# Patient Record
Sex: MALE | Race: WHITE | Employment: OTHER | URBAN - METROPOLITAN AREA
[De-identification: names, ages, dates, MRNs, and addresses within clinical notes are randomized per-mention and may not be internally consistent; named-entity substitution may affect disease eponyms.]

---

## 2017-01-03 ENCOUNTER — OFFICE VISIT (OUTPATIENT)
Dept: CARDIOLOGY | Age: 72
End: 2017-01-03

## 2017-01-03 VITALS
BODY MASS INDEX: 28.69 KG/M2 | OXYGEN SATURATION: 98 % | TEMPERATURE: 97.8 F | HEART RATE: 65 BPM | HEIGHT: 66 IN | WEIGHT: 178.5 LBS | RESPIRATION RATE: 18 BRPM | DIASTOLIC BLOOD PRESSURE: 74 MMHG | SYSTOLIC BLOOD PRESSURE: 135 MMHG

## 2017-01-03 DIAGNOSIS — I10 ESSENTIAL HYPERTENSION: Primary | Chronic | ICD-10-CM

## 2017-01-03 DIAGNOSIS — E78.00 PURE HYPERCHOLESTEROLEMIA: Chronic | ICD-10-CM

## 2017-01-03 DIAGNOSIS — R52 UNCONTROLLED PAIN: ICD-10-CM

## 2017-01-03 DIAGNOSIS — R06.02 SHORTNESS OF BREATH: ICD-10-CM

## 2017-01-03 DIAGNOSIS — I25.119 CORONARY ARTERY DISEASE INVOLVING NATIVE HEART WITH ANGINA PECTORIS, UNSPECIFIED VESSEL OR LESION TYPE (HCC): ICD-10-CM

## 2017-01-03 DIAGNOSIS — Z87.891 HISTORY OF SMOKING: ICD-10-CM

## 2017-01-03 DIAGNOSIS — Z01.818 PRE-OP EVALUATION: ICD-10-CM

## 2017-01-03 DIAGNOSIS — M16.9 OSTEOARTHRITIS OF HIP, UNSPECIFIED LATERALITY, UNSPECIFIED OSTEOARTHRITIS TYPE: ICD-10-CM

## 2017-01-03 PROCEDURE — 93000 ELECTROCARDIOGRAM COMPLETE: CPT | Performed by: INTERNAL MEDICINE

## 2017-01-03 PROCEDURE — 99214 OFFICE O/P EST MOD 30 MIN: CPT | Performed by: INTERNAL MEDICINE

## 2017-01-03 RX ORDER — ATORVASTATIN CALCIUM 20 MG/1
20 TABLET, FILM COATED ORAL DAILY
Qty: 90 TABLET | Refills: 3 | Status: SHIPPED | OUTPATIENT
Start: 2017-01-03 | End: 2017-03-30 | Stop reason: SDUPTHER

## 2017-01-03 RX ORDER — METOPROLOL TARTRATE 50 MG/1
50 TABLET, FILM COATED ORAL 2 TIMES DAILY
Qty: 180 TABLET | Refills: 3 | Status: SHIPPED | OUTPATIENT
Start: 2017-01-03 | End: 2017-03-30 | Stop reason: SDUPTHER

## 2017-01-03 RX ORDER — CLOPIDOGREL BISULFATE 75 MG/1
75 TABLET ORAL DAILY
Qty: 90 TABLET | Refills: 3 | Status: SHIPPED | OUTPATIENT
Start: 2017-01-03 | End: 2017-03-30 | Stop reason: SDUPTHER

## 2017-01-03 RX ORDER — CITALOPRAM 40 MG/1
40 TABLET ORAL DAILY
Qty: 90 TABLET | Refills: 3 | Status: SHIPPED | OUTPATIENT
Start: 2017-01-03 | End: 2017-03-30 | Stop reason: SDUPTHER

## 2017-01-11 ASSESSMENT — ENCOUNTER SYMPTOMS
EYES NEGATIVE: 1
GASTROINTESTINAL NEGATIVE: 1
ALLERGIC/IMMUNOLOGIC NEGATIVE: 1

## 2017-02-17 RX ORDER — LORAZEPAM 1 MG/1
TABLET ORAL
Qty: 30 TABLET | Refills: 2 | OUTPATIENT
Start: 2017-02-17 | End: 2017-03-30 | Stop reason: SDUPTHER

## 2017-02-22 ENCOUNTER — HOSPITAL ENCOUNTER (OUTPATIENT)
Dept: ORTHOPEDIC SURGERY | Age: 72
Discharge: HOME | End: 2017-02-22
Payer: MEDICARE

## 2017-02-22 DIAGNOSIS — M17.11 OSTEOARTHRITIS OF RIGHT KNEE, UNSPECIFIED OSTEOARTHRITIS TYPE: ICD-10-CM

## 2017-02-22 PROCEDURE — 73560 X-RAY EXAM OF KNEE 1 OR 2: CPT

## 2017-03-14 ENCOUNTER — HOSPITAL ENCOUNTER (OUTPATIENT)
Dept: PREADMISSION TESTING | Age: 72
Discharge: HOME | End: 2017-03-14
Payer: MEDICARE

## 2017-03-14 VITALS
HEIGHT: 66 IN | WEIGHT: 174.16 LBS | RESPIRATION RATE: 14 BRPM | OXYGEN SATURATION: 95 % | DIASTOLIC BLOOD PRESSURE: 83 MMHG | SYSTOLIC BLOOD PRESSURE: 162 MMHG | HEART RATE: 58 BPM | TEMPERATURE: 98.2 F | BODY MASS INDEX: 27.99 KG/M2

## 2017-03-14 LAB
ABO/RH: NORMAL
ANION GAP SERPL CALCULATED.3IONS-SCNC: 10 MEQ/L (ref 7–13)
ANTIBODY SCREEN: NORMAL
BACTERIA: NORMAL /HPF
BILIRUBIN URINE: ABNORMAL
BLOOD, URINE: NEGATIVE
BUN BLDV-MCNC: 17 MG/DL (ref 8–23)
CALCIUM SERPL-MCNC: 9.5 MG/DL (ref 8.6–10.2)
CASTS: NORMAL /LPF
CHLORIDE BLD-SCNC: 95 MEQ/L (ref 98–107)
CLARITY: CLEAR
CO2: 27 MEQ/L (ref 22–29)
COLOR: ABNORMAL
CREAT SERPL-MCNC: 0.79 MG/DL (ref 0.7–1.2)
EPITHELIAL CELLS, UA: NORMAL /HPF
GFR AFRICAN AMERICAN: >60
GFR NON-AFRICAN AMERICAN: >60
GLUCOSE BLD-MCNC: 92 MG/DL (ref 74–109)
GLUCOSE URINE: NEGATIVE MG/DL
HCT VFR BLD CALC: 38.3 % (ref 42–52)
HEMOGLOBIN: 12.5 G/DL (ref 14–18)
KETONES, URINE: ABNORMAL MG/DL
LEUKOCYTE ESTERASE, URINE: ABNORMAL
MCH RBC QN AUTO: 31.7 PG (ref 27–31.3)
MCHC RBC AUTO-ENTMCNC: 32.6 % (ref 33–37)
MCV RBC AUTO: 97 FL (ref 80–100)
MUCUS: PRESENT
NITRITE, URINE: NEGATIVE
PDW BLD-RTO: 13.9 % (ref 11.5–14.5)
PH UA: 5.5 (ref 5–9)
PLATELET # BLD: 252 K/UL (ref 130–400)
POTASSIUM SERPL-SCNC: 5.3 MEQ/L (ref 3.5–5.1)
PROTEIN UA: ABNORMAL MG/DL
RBC # BLD: 3.95 M/UL (ref 4.7–6.1)
RBC UA: NORMAL /HPF (ref 0–2)
SODIUM BLD-SCNC: 132 MEQ/L (ref 132–144)
SPECIFIC GRAVITY UA: 1.03 (ref 1–1.03)
UROBILINOGEN, URINE: 1 E.U./DL
WBC # BLD: 9 K/UL (ref 4.8–10.8)
WBC UA: NORMAL /HPF (ref 0–5)

## 2017-03-14 PROCEDURE — 85027 COMPLETE CBC AUTOMATED: CPT

## 2017-03-14 PROCEDURE — 80048 BASIC METABOLIC PNL TOTAL CA: CPT

## 2017-03-14 PROCEDURE — 86901 BLOOD TYPING SEROLOGIC RH(D): CPT

## 2017-03-14 PROCEDURE — 86900 BLOOD TYPING SEROLOGIC ABO: CPT

## 2017-03-14 PROCEDURE — 81001 URINALYSIS AUTO W/SCOPE: CPT

## 2017-03-14 PROCEDURE — 86850 RBC ANTIBODY SCREEN: CPT

## 2017-03-14 RX ORDER — SODIUM CHLORIDE 0.9 % (FLUSH) 0.9 %
10 SYRINGE (ML) INJECTION PRN
Status: CANCELLED | OUTPATIENT
Start: 2017-03-14

## 2017-03-14 RX ORDER — SODIUM CHLORIDE, SODIUM LACTATE, POTASSIUM CHLORIDE, CALCIUM CHLORIDE 600; 310; 30; 20 MG/100ML; MG/100ML; MG/100ML; MG/100ML
INJECTION, SOLUTION INTRAVENOUS CONTINUOUS
Status: CANCELLED | OUTPATIENT
Start: 2017-03-14

## 2017-03-14 RX ORDER — SODIUM CHLORIDE 0.9 % (FLUSH) 0.9 %
10 SYRINGE (ML) INJECTION EVERY 12 HOURS SCHEDULED
Status: CANCELLED | OUTPATIENT
Start: 2017-03-14

## 2017-03-14 RX ORDER — LIDOCAINE HYDROCHLORIDE 10 MG/ML
1 INJECTION, SOLUTION EPIDURAL; INFILTRATION; INTRACAUDAL; PERINEURAL
Status: CANCELLED | OUTPATIENT
Start: 2017-03-14 | End: 2017-03-14

## 2017-03-22 ENCOUNTER — HOSPITAL ENCOUNTER (OUTPATIENT)
Dept: ORTHOPEDIC SURGERY | Age: 72
Discharge: HOME | End: 2017-03-22
Payer: MEDICARE

## 2017-03-22 ENCOUNTER — ANESTHESIA EVENT (OUTPATIENT)
Dept: OPERATING ROOM | Age: 72
DRG: 470 | End: 2017-03-22
Payer: MEDICARE

## 2017-03-22 DIAGNOSIS — M16.10 OSTEOARTHRITIS OF ONE HIP: ICD-10-CM

## 2017-03-22 PROCEDURE — 73502 X-RAY EXAM HIP UNI 2-3 VIEWS: CPT

## 2017-03-24 ENCOUNTER — APPOINTMENT (OUTPATIENT)
Dept: GENERAL RADIOLOGY | Age: 72
DRG: 470 | End: 2017-03-24
Attending: ORTHOPAEDIC SURGERY
Payer: MEDICARE

## 2017-03-24 ENCOUNTER — HOSPITAL ENCOUNTER (INPATIENT)
Age: 72
LOS: 3 days | Discharge: NURSING, REHAB OR LONG-TERM CARE | DRG: 470 | End: 2017-03-27
Attending: ORTHOPAEDIC SURGERY | Admitting: ORTHOPAEDIC SURGERY
Payer: MEDICARE

## 2017-03-24 ENCOUNTER — ANESTHESIA (OUTPATIENT)
Dept: OPERATING ROOM | Age: 72
DRG: 470 | End: 2017-03-24
Payer: MEDICARE

## 2017-03-24 VITALS
OXYGEN SATURATION: 100 % | DIASTOLIC BLOOD PRESSURE: 72 MMHG | SYSTOLIC BLOOD PRESSURE: 127 MMHG | TEMPERATURE: 97.7 F | RESPIRATION RATE: 11 BRPM

## 2017-03-24 PROCEDURE — 3E0R3CZ INTRODUCE REGIONAL ANESTH IN SPINAL CANAL, PERC: ICD-10-PCS | Performed by: ORTHOPAEDIC SURGERY

## 2017-03-24 PROCEDURE — 3700000001 HC ADD 15 MINUTES (ANESTHESIA): Performed by: ORTHOPAEDIC SURGERY

## 2017-03-24 PROCEDURE — C1729 CATH, DRAINAGE: HCPCS | Performed by: ORTHOPAEDIC SURGERY

## 2017-03-24 PROCEDURE — 0QRD0JZ REPLACEMENT OF RIGHT PATELLA WITH SYNTHETIC SUBSTITUTE, OPEN APPROACH: ICD-10-PCS | Performed by: ORTHOPAEDIC SURGERY

## 2017-03-24 PROCEDURE — 73560 X-RAY EXAM OF KNEE 1 OR 2: CPT

## 2017-03-24 PROCEDURE — 2580000003 HC RX 258: Performed by: NURSE PRACTITIONER

## 2017-03-24 PROCEDURE — 6370000000 HC RX 637 (ALT 250 FOR IP): Performed by: INTERNAL MEDICINE

## 2017-03-24 PROCEDURE — 2500000003 HC RX 250 WO HCPCS: Performed by: NURSE ANESTHETIST, CERTIFIED REGISTERED

## 2017-03-24 PROCEDURE — 2500000003 HC RX 250 WO HCPCS: Performed by: STUDENT IN AN ORGANIZED HEALTH CARE EDUCATION/TRAINING PROGRAM

## 2017-03-24 PROCEDURE — C1713 ANCHOR/SCREW BN/BN,TIS/BN: HCPCS | Performed by: ORTHOPAEDIC SURGERY

## 2017-03-24 PROCEDURE — 1210000000 HC MED SURG R&B

## 2017-03-24 PROCEDURE — 2580000003 HC RX 258: Performed by: STUDENT IN AN ORGANIZED HEALTH CARE EDUCATION/TRAINING PROGRAM

## 2017-03-24 PROCEDURE — 7100000001 HC PACU RECOVERY - ADDTL 15 MIN: Performed by: ORTHOPAEDIC SURGERY

## 2017-03-24 PROCEDURE — 3700000000 HC ANESTHESIA ATTENDED CARE: Performed by: ORTHOPAEDIC SURGERY

## 2017-03-24 PROCEDURE — 6370000000 HC RX 637 (ALT 250 FOR IP): Performed by: NURSE PRACTITIONER

## 2017-03-24 PROCEDURE — 3600000004 HC SURGERY LEVEL 4 BASE: Performed by: ORTHOPAEDIC SURGERY

## 2017-03-24 PROCEDURE — 6360000002 HC RX W HCPCS: Performed by: NURSE PRACTITIONER

## 2017-03-24 PROCEDURE — 6370000000 HC RX 637 (ALT 250 FOR IP): Performed by: NURSE ANESTHETIST, CERTIFIED REGISTERED

## 2017-03-24 PROCEDURE — 2580000003 HC RX 258: Performed by: ORTHOPAEDIC SURGERY

## 2017-03-24 PROCEDURE — 0SRC0J9 REPLACEMENT OF RIGHT KNEE JOINT WITH SYNTHETIC SUBSTITUTE, CEMENTED, OPEN APPROACH: ICD-10-PCS | Performed by: ORTHOPAEDIC SURGERY

## 2017-03-24 PROCEDURE — 6370000000 HC RX 637 (ALT 250 FOR IP): Performed by: PHYSICIAN ASSISTANT

## 2017-03-24 PROCEDURE — 7100000000 HC PACU RECOVERY - FIRST 15 MIN: Performed by: ORTHOPAEDIC SURGERY

## 2017-03-24 PROCEDURE — 64520 N BLOCK LUMBAR/THORACIC: CPT | Performed by: ANESTHESIOLOGY

## 2017-03-24 PROCEDURE — 6360000002 HC RX W HCPCS: Performed by: NURSE ANESTHETIST, CERTIFIED REGISTERED

## 2017-03-24 PROCEDURE — C1776 JOINT DEVICE (IMPLANTABLE): HCPCS | Performed by: ORTHOPAEDIC SURGERY

## 2017-03-24 PROCEDURE — 2500000003 HC RX 250 WO HCPCS: Performed by: ORTHOPAEDIC SURGERY

## 2017-03-24 PROCEDURE — 3600000014 HC SURGERY LEVEL 4 ADDTL 15MIN: Performed by: ORTHOPAEDIC SURGERY

## 2017-03-24 DEVICE — COMPONENT ARTC SURF PS 6-9 EF 11 MM RT TIB KNEE POLYETH: Type: IMPLANTABLE DEVICE | Status: FUNCTIONAL

## 2017-03-24 DEVICE — COMPONENT FEM SZ 8 STD R KNEE CO CHROM POST STBL CEM: Type: IMPLANTABLE DEVICE | Status: FUNCTIONAL

## 2017-03-24 DEVICE — DISCONTINUED USE 416978 CEMENT PALACOS R SING DOSE 40GR: Type: IMPLANTABLE DEVICE | Status: FUNCTIONAL

## 2017-03-24 DEVICE — COMPONENT PAT DIA35MM THK9MM KNEE POLY CEM CONVENTIONAL: Type: IMPLANTABLE DEVICE | Status: FUNCTIONAL

## 2017-03-24 DEVICE — PSN TIB STM 5 DEG SZ F R: Type: IMPLANTABLE DEVICE | Status: FUNCTIONAL

## 2017-03-24 DEVICE — SYSTEM TOT KNEE CEM FEM TIB COMP STD TIB INSRT STD PAT: Type: IMPLANTABLE DEVICE | Status: FUNCTIONAL

## 2017-03-24 RX ORDER — ACETAMINOPHEN 325 MG/1
650 TABLET ORAL EVERY 4 HOURS PRN
Status: DISCONTINUED | OUTPATIENT
Start: 2017-03-24 | End: 2017-03-28 | Stop reason: HOSPADM

## 2017-03-24 RX ORDER — LORAZEPAM 2 MG/ML
2 INJECTION INTRAMUSCULAR
Status: DISCONTINUED | OUTPATIENT
Start: 2017-03-24 | End: 2017-03-28 | Stop reason: HOSPADM

## 2017-03-24 RX ORDER — ONDANSETRON 2 MG/ML
4 INJECTION INTRAMUSCULAR; INTRAVENOUS EVERY 6 HOURS PRN
Status: DISCONTINUED | OUTPATIENT
Start: 2017-03-24 | End: 2017-03-28 | Stop reason: HOSPADM

## 2017-03-24 RX ORDER — LORAZEPAM 2 MG/ML
2 INJECTION INTRAMUSCULAR
Status: DISCONTINUED | OUTPATIENT
Start: 2017-03-24 | End: 2017-03-24 | Stop reason: SDUPTHER

## 2017-03-24 RX ORDER — DOCUSATE SODIUM 100 MG/1
100 CAPSULE, LIQUID FILLED ORAL 2 TIMES DAILY
Status: DISCONTINUED | OUTPATIENT
Start: 2017-03-24 | End: 2017-03-28 | Stop reason: HOSPADM

## 2017-03-24 RX ORDER — DIPHENHYDRAMINE HYDROCHLORIDE 50 MG/ML
12.5 INJECTION INTRAMUSCULAR; INTRAVENOUS
Status: DISCONTINUED | OUTPATIENT
Start: 2017-03-24 | End: 2017-03-24 | Stop reason: HOSPADM

## 2017-03-24 RX ORDER — MINERAL OIL AND WHITE PETROLATUM 150; 830 MG/G; MG/G
OINTMENT OPHTHALMIC PRN
Status: DISCONTINUED | OUTPATIENT
Start: 2017-03-24 | End: 2017-03-24 | Stop reason: SDUPTHER

## 2017-03-24 RX ORDER — GLYCOPYRROLATE 0.2 MG/ML
INJECTION INTRAMUSCULAR; INTRAVENOUS PRN
Status: DISCONTINUED | OUTPATIENT
Start: 2017-03-24 | End: 2017-03-24 | Stop reason: SDUPTHER

## 2017-03-24 RX ORDER — SODIUM CHLORIDE 9 MG/ML
INJECTION, SOLUTION INTRAVENOUS CONTINUOUS
Status: DISCONTINUED | OUTPATIENT
Start: 2017-03-24 | End: 2017-03-26

## 2017-03-24 RX ORDER — LORAZEPAM 2 MG/ML
3 INJECTION INTRAMUSCULAR
Status: DISCONTINUED | OUTPATIENT
Start: 2017-03-24 | End: 2017-03-24 | Stop reason: SDUPTHER

## 2017-03-24 RX ORDER — SODIUM CHLORIDE 0.9 % (FLUSH) 0.9 %
10 SYRINGE (ML) INJECTION EVERY 12 HOURS SCHEDULED
Status: DISCONTINUED | OUTPATIENT
Start: 2017-03-24 | End: 2017-03-24 | Stop reason: HOSPADM

## 2017-03-24 RX ORDER — LORAZEPAM 1 MG/1
4 TABLET ORAL
Status: DISCONTINUED | OUTPATIENT
Start: 2017-03-24 | End: 2017-03-28 | Stop reason: HOSPADM

## 2017-03-24 RX ORDER — LORAZEPAM 1 MG/1
1 TABLET ORAL
Status: DISCONTINUED | OUTPATIENT
Start: 2017-03-24 | End: 2017-03-28 | Stop reason: HOSPADM

## 2017-03-24 RX ORDER — SODIUM CHLORIDE, SODIUM LACTATE, POTASSIUM CHLORIDE, CALCIUM CHLORIDE 600; 310; 30; 20 MG/100ML; MG/100ML; MG/100ML; MG/100ML
INJECTION, SOLUTION INTRAVENOUS CONTINUOUS
Status: DISCONTINUED | OUTPATIENT
Start: 2017-03-24 | End: 2017-03-24

## 2017-03-24 RX ORDER — LORAZEPAM 1 MG/1
2 TABLET ORAL
Status: DISCONTINUED | OUTPATIENT
Start: 2017-03-24 | End: 2017-03-28 | Stop reason: HOSPADM

## 2017-03-24 RX ORDER — SODIUM CHLORIDE 0.9 % (FLUSH) 0.9 %
10 SYRINGE (ML) INJECTION PRN
Status: DISCONTINUED | OUTPATIENT
Start: 2017-03-24 | End: 2017-03-24 | Stop reason: SDUPTHER

## 2017-03-24 RX ORDER — LORAZEPAM 2 MG/ML
4 INJECTION INTRAMUSCULAR
Status: DISCONTINUED | OUTPATIENT
Start: 2017-03-24 | End: 2017-03-28 | Stop reason: HOSPADM

## 2017-03-24 RX ORDER — M-VIT,TX,IRON,MINS/CALC/FOLIC 27MG-0.4MG
1 TABLET ORAL DAILY
Status: DISCONTINUED | OUTPATIENT
Start: 2017-03-24 | End: 2017-03-28 | Stop reason: HOSPADM

## 2017-03-24 RX ORDER — ONDANSETRON 2 MG/ML
4 INJECTION INTRAMUSCULAR; INTRAVENOUS
Status: DISCONTINUED | OUTPATIENT
Start: 2017-03-24 | End: 2017-03-24 | Stop reason: HOSPADM

## 2017-03-24 RX ORDER — SODIUM CHLORIDE 0.9 % (FLUSH) 0.9 %
10 SYRINGE (ML) INJECTION PRN
Status: DISCONTINUED | OUTPATIENT
Start: 2017-03-24 | End: 2017-03-24 | Stop reason: HOSPADM

## 2017-03-24 RX ORDER — DEXAMETHASONE SODIUM PHOSPHATE 10 MG/ML
INJECTION INTRAMUSCULAR; INTRAVENOUS PRN
Status: DISCONTINUED | OUTPATIENT
Start: 2017-03-24 | End: 2017-03-24 | Stop reason: SDUPTHER

## 2017-03-24 RX ORDER — SENNA AND DOCUSATE SODIUM 50; 8.6 MG/1; MG/1
1 TABLET, FILM COATED ORAL DAILY
Status: DISCONTINUED | OUTPATIENT
Start: 2017-03-24 | End: 2017-03-28 | Stop reason: HOSPADM

## 2017-03-24 RX ORDER — HYDROCODONE BITARTRATE AND ACETAMINOPHEN 5; 325 MG/1; MG/1
2 TABLET ORAL PRN
Status: DISCONTINUED | OUTPATIENT
Start: 2017-03-24 | End: 2017-03-24 | Stop reason: HOSPADM

## 2017-03-24 RX ORDER — LORAZEPAM 1 MG/1
2 TABLET ORAL
Status: DISCONTINUED | OUTPATIENT
Start: 2017-03-24 | End: 2017-03-24 | Stop reason: SDUPTHER

## 2017-03-24 RX ORDER — LORAZEPAM 2 MG/ML
1 INJECTION INTRAMUSCULAR
Status: DISCONTINUED | OUTPATIENT
Start: 2017-03-24 | End: 2017-03-24 | Stop reason: SDUPTHER

## 2017-03-24 RX ORDER — FINASTERIDE 5 MG/1
5 TABLET, FILM COATED ORAL DAILY
Status: DISCONTINUED | OUTPATIENT
Start: 2017-03-24 | End: 2017-03-28 | Stop reason: HOSPADM

## 2017-03-24 RX ORDER — OXYCODONE AND ACETAMINOPHEN 7.5; 325 MG/1; MG/1
1 TABLET ORAL EVERY 4 HOURS PRN
Status: DISCONTINUED | OUTPATIENT
Start: 2017-03-24 | End: 2017-03-24

## 2017-03-24 RX ORDER — LORAZEPAM 2 MG/ML
3 INJECTION INTRAMUSCULAR
Status: DISCONTINUED | OUTPATIENT
Start: 2017-03-24 | End: 2017-03-28 | Stop reason: HOSPADM

## 2017-03-24 RX ORDER — LIDOCAINE HYDROCHLORIDE 20 MG/ML
INJECTION, SOLUTION INFILTRATION; PERINEURAL PRN
Status: DISCONTINUED | OUTPATIENT
Start: 2017-03-24 | End: 2017-03-24 | Stop reason: SDUPTHER

## 2017-03-24 RX ORDER — FENTANYL CITRATE 50 UG/ML
50 INJECTION, SOLUTION INTRAMUSCULAR; INTRAVENOUS EVERY 10 MIN PRN
Status: DISCONTINUED | OUTPATIENT
Start: 2017-03-24 | End: 2017-03-24 | Stop reason: HOSPADM

## 2017-03-24 RX ORDER — LIDOCAINE HYDROCHLORIDE 10 MG/ML
1 INJECTION, SOLUTION EPIDURAL; INFILTRATION; INTRACAUDAL; PERINEURAL
Status: COMPLETED | OUTPATIENT
Start: 2017-03-24 | End: 2017-03-24

## 2017-03-24 RX ORDER — NICOTINE 21 MG/24HR
1 PATCH, TRANSDERMAL 24 HOURS TRANSDERMAL DAILY
Status: DISCONTINUED | OUTPATIENT
Start: 2017-03-24 | End: 2017-03-28 | Stop reason: HOSPADM

## 2017-03-24 RX ORDER — ONDANSETRON 2 MG/ML
INJECTION INTRAMUSCULAR; INTRAVENOUS PRN
Status: DISCONTINUED | OUTPATIENT
Start: 2017-03-24 | End: 2017-03-24 | Stop reason: SDUPTHER

## 2017-03-24 RX ORDER — SODIUM CHLORIDE 0.9 % (FLUSH) 0.9 %
10 SYRINGE (ML) INJECTION EVERY 12 HOURS SCHEDULED
Status: DISCONTINUED | OUTPATIENT
Start: 2017-03-24 | End: 2017-03-24 | Stop reason: SDUPTHER

## 2017-03-24 RX ORDER — SODIUM CHLORIDE, SODIUM LACTATE, POTASSIUM CHLORIDE, CALCIUM CHLORIDE 600; 310; 30; 20 MG/100ML; MG/100ML; MG/100ML; MG/100ML
INJECTION, SOLUTION INTRAVENOUS
Status: DISPENSED
Start: 2017-03-24 | End: 2017-03-24

## 2017-03-24 RX ORDER — LORAZEPAM 0.5 MG/1
0.5 TABLET ORAL 3 TIMES DAILY PRN
Status: DISCONTINUED | OUTPATIENT
Start: 2017-03-24 | End: 2017-03-28 | Stop reason: HOSPADM

## 2017-03-24 RX ORDER — METOCLOPRAMIDE HYDROCHLORIDE 5 MG/ML
10 INJECTION INTRAMUSCULAR; INTRAVENOUS
Status: DISCONTINUED | OUTPATIENT
Start: 2017-03-24 | End: 2017-03-24 | Stop reason: HOSPADM

## 2017-03-24 RX ORDER — LORAZEPAM 1 MG/1
3 TABLET ORAL
Status: DISCONTINUED | OUTPATIENT
Start: 2017-03-24 | End: 2017-03-28 | Stop reason: HOSPADM

## 2017-03-24 RX ORDER — OXYCODONE AND ACETAMINOPHEN 7.5; 325 MG/1; MG/1
1 TABLET ORAL EVERY 4 HOURS PRN
Status: DISCONTINUED | OUTPATIENT
Start: 2017-03-24 | End: 2017-03-24 | Stop reason: SDUPTHER

## 2017-03-24 RX ORDER — SODIUM CHLORIDE 0.9 % (FLUSH) 0.9 %
10 SYRINGE (ML) INJECTION PRN
Status: DISCONTINUED | OUTPATIENT
Start: 2017-03-24 | End: 2017-03-28 | Stop reason: HOSPADM

## 2017-03-24 RX ORDER — PROPOFOL 10 MG/ML
INJECTION, EMULSION INTRAVENOUS PRN
Status: DISCONTINUED | OUTPATIENT
Start: 2017-03-24 | End: 2017-03-24 | Stop reason: SDUPTHER

## 2017-03-24 RX ORDER — CLOPIDOGREL BISULFATE 75 MG/1
75 TABLET ORAL DAILY
Status: DISCONTINUED | OUTPATIENT
Start: 2017-03-25 | End: 2017-03-28 | Stop reason: HOSPADM

## 2017-03-24 RX ORDER — HYDROCODONE BITARTRATE AND ACETAMINOPHEN 5; 325 MG/1; MG/1
1 TABLET ORAL PRN
Status: DISCONTINUED | OUTPATIENT
Start: 2017-03-24 | End: 2017-03-24 | Stop reason: HOSPADM

## 2017-03-24 RX ORDER — ATORVASTATIN CALCIUM 20 MG/1
20 TABLET, FILM COATED ORAL DAILY
Status: DISCONTINUED | OUTPATIENT
Start: 2017-03-24 | End: 2017-03-28 | Stop reason: HOSPADM

## 2017-03-24 RX ORDER — BISACODYL 10 MG
10 SUPPOSITORY, RECTAL RECTAL DAILY PRN
Status: DISCONTINUED | OUTPATIENT
Start: 2017-03-24 | End: 2017-03-28 | Stop reason: HOSPADM

## 2017-03-24 RX ORDER — CITALOPRAM 20 MG/1
40 TABLET ORAL DAILY
Status: DISCONTINUED | OUTPATIENT
Start: 2017-03-24 | End: 2017-03-28 | Stop reason: HOSPADM

## 2017-03-24 RX ORDER — LORAZEPAM 1 MG/1
4 TABLET ORAL
Status: DISCONTINUED | OUTPATIENT
Start: 2017-03-24 | End: 2017-03-24 | Stop reason: SDUPTHER

## 2017-03-24 RX ORDER — OXYCODONE AND ACETAMINOPHEN 7.5; 325 MG/1; MG/1
1 TABLET ORAL EVERY 4 HOURS PRN
Status: DISCONTINUED | OUTPATIENT
Start: 2017-03-24 | End: 2017-03-27

## 2017-03-24 RX ORDER — OXYCODONE AND ACETAMINOPHEN 7.5; 325 MG/1; MG/1
2 TABLET ORAL EVERY 4 HOURS PRN
Status: DISCONTINUED | OUTPATIENT
Start: 2017-03-24 | End: 2017-03-27

## 2017-03-24 RX ORDER — MAGNESIUM HYDROXIDE 1200 MG/15ML
LIQUID ORAL CONTINUOUS PRN
Status: DISCONTINUED | OUTPATIENT
Start: 2017-03-24 | End: 2017-03-24 | Stop reason: HOSPADM

## 2017-03-24 RX ORDER — SODIUM CHLORIDE 0.9 % (FLUSH) 0.9 %
10 SYRINGE (ML) INJECTION EVERY 12 HOURS SCHEDULED
Status: DISCONTINUED | OUTPATIENT
Start: 2017-03-24 | End: 2017-03-28 | Stop reason: HOSPADM

## 2017-03-24 RX ORDER — LORAZEPAM 2 MG/ML
4 INJECTION INTRAMUSCULAR
Status: DISCONTINUED | OUTPATIENT
Start: 2017-03-24 | End: 2017-03-24 | Stop reason: SDUPTHER

## 2017-03-24 RX ORDER — MORPHINE SULFATE 2 MG/ML
2 INJECTION, SOLUTION INTRAMUSCULAR; INTRAVENOUS
Status: DISCONTINUED | OUTPATIENT
Start: 2017-03-24 | End: 2017-03-25

## 2017-03-24 RX ORDER — PANTOPRAZOLE SODIUM 40 MG/1
40 TABLET, DELAYED RELEASE ORAL
Status: DISCONTINUED | OUTPATIENT
Start: 2017-03-25 | End: 2017-03-28 | Stop reason: HOSPADM

## 2017-03-24 RX ORDER — LORAZEPAM 1 MG/1
3 TABLET ORAL
Status: DISCONTINUED | OUTPATIENT
Start: 2017-03-24 | End: 2017-03-24 | Stop reason: SDUPTHER

## 2017-03-24 RX ORDER — MORPHINE SULFATE 4 MG/ML
4 INJECTION, SOLUTION INTRAMUSCULAR; INTRAVENOUS
Status: DISCONTINUED | OUTPATIENT
Start: 2017-03-24 | End: 2017-03-25

## 2017-03-24 RX ORDER — MEPERIDINE HYDROCHLORIDE 25 MG/ML
12.5 INJECTION INTRAMUSCULAR; INTRAVENOUS; SUBCUTANEOUS EVERY 5 MIN PRN
Status: DISCONTINUED | OUTPATIENT
Start: 2017-03-24 | End: 2017-03-24 | Stop reason: HOSPADM

## 2017-03-24 RX ORDER — LORAZEPAM 1 MG/1
1 TABLET ORAL
Status: DISCONTINUED | OUTPATIENT
Start: 2017-03-24 | End: 2017-03-24 | Stop reason: SDUPTHER

## 2017-03-24 RX ORDER — LORAZEPAM 2 MG/ML
1 INJECTION INTRAMUSCULAR
Status: DISCONTINUED | OUTPATIENT
Start: 2017-03-24 | End: 2017-03-28 | Stop reason: HOSPADM

## 2017-03-24 RX ORDER — PROPOFOL 10 MG/ML
INJECTION, EMULSION INTRAVENOUS CONTINUOUS PRN
Status: DISCONTINUED | OUTPATIENT
Start: 2017-03-24 | End: 2017-03-24 | Stop reason: SDUPTHER

## 2017-03-24 RX ADMIN — SODIUM CHLORIDE, POTASSIUM CHLORIDE, SODIUM LACTATE AND CALCIUM CHLORIDE: 600; 310; 30; 20 INJECTION, SOLUTION INTRAVENOUS at 13:25

## 2017-03-24 RX ADMIN — LIDOCAINE HYDROCHLORIDE 40 MG: 20 INJECTION, SOLUTION INFILTRATION; PERINEURAL at 12:28

## 2017-03-24 RX ADMIN — SODIUM CHLORIDE, POTASSIUM CHLORIDE, SODIUM LACTATE AND CALCIUM CHLORIDE: 600; 310; 30; 20 INJECTION, SOLUTION INTRAVENOUS at 12:57

## 2017-03-24 RX ADMIN — PROPOFOL 50 MCG/KG/MIN: 10 INJECTION, EMULSION INTRAVENOUS at 12:28

## 2017-03-24 RX ADMIN — CEFAZOLIN SODIUM 2 G: 1 INJECTION, SOLUTION INTRAVENOUS at 12:22

## 2017-03-24 RX ADMIN — SODIUM CHLORIDE: 9 INJECTION, SOLUTION INTRAVENOUS at 18:58

## 2017-03-24 RX ADMIN — ATORVASTATIN CALCIUM 20 MG: 20 TABLET, FILM COATED ORAL at 20:26

## 2017-03-24 RX ADMIN — PHENYLEPHRINE HYDROCHLORIDE 100 MCG: 10 INJECTION INTRAVENOUS at 12:46

## 2017-03-24 RX ADMIN — DEXAMETHASONE SODIUM PHOSPHATE 5 MG: 10 INJECTION INTRAMUSCULAR; INTRAVENOUS at 12:37

## 2017-03-24 RX ADMIN — PROPOFOL 40 MG: 10 INJECTION, EMULSION INTRAVENOUS at 12:28

## 2017-03-24 RX ADMIN — PHENYLEPHRINE HYDROCHLORIDE 50 MCG: 10 INJECTION INTRAVENOUS at 12:35

## 2017-03-24 RX ADMIN — MINERAL OIL AND WHITE PETROLATUM 1 INCH: 150; 830 OINTMENT OPHTHALMIC at 12:33

## 2017-03-24 RX ADMIN — DOCUSATE SODIUM 100 MG: 100 CAPSULE, LIQUID FILLED ORAL at 20:26

## 2017-03-24 RX ADMIN — MORPHINE SULFATE 4 MG: 4 INJECTION, SOLUTION INTRAMUSCULAR; INTRAVENOUS at 18:11

## 2017-03-24 RX ADMIN — LIDOCAINE HYDROCHLORIDE 0.1 ML: 10 INJECTION, SOLUTION EPIDURAL; INFILTRATION; INTRACAUDAL; PERINEURAL at 10:54

## 2017-03-24 RX ADMIN — SODIUM CHLORIDE, POTASSIUM CHLORIDE, SODIUM LACTATE AND CALCIUM CHLORIDE: 600; 310; 30; 20 INJECTION, SOLUTION INTRAVENOUS at 10:55

## 2017-03-24 RX ADMIN — PHENYLEPHRINE HYDROCHLORIDE 50 MCG: 10 INJECTION INTRAVENOUS at 12:56

## 2017-03-24 RX ADMIN — CARBOXYMETHYLCELLULOSE SODIUM 1 DROP: 10 GEL OPHTHALMIC at 20:26

## 2017-03-24 RX ADMIN — OXYCODONE HYDROCHLORIDE AND ACETAMINOPHEN 1 TABLET: 7.5; 325 TABLET ORAL at 17:31

## 2017-03-24 RX ADMIN — OXYCODONE HYDROCHLORIDE AND ACETAMINOPHEN 2 TABLET: 7.5; 325 TABLET ORAL at 21:44

## 2017-03-24 RX ADMIN — PHENYLEPHRINE HYDROCHLORIDE 50 MCG: 10 INJECTION INTRAVENOUS at 13:02

## 2017-03-24 RX ADMIN — METOPROLOL TARTRATE 25 MG: 25 TABLET, FILM COATED ORAL at 20:26

## 2017-03-24 RX ADMIN — Medication 2 G: at 18:59

## 2017-03-24 RX ADMIN — ONDANSETRON 4 MG: 2 INJECTION, SOLUTION INTRAMUSCULAR; INTRAVENOUS at 13:42

## 2017-03-24 RX ADMIN — SENNOSIDES AND DOCUSATE SODIUM 1 TABLET: 8.6; 5 TABLET ORAL at 20:26

## 2017-03-24 RX ADMIN — GLYCOPYRROLATE 0.2 MG: 0.2 INJECTION INTRAMUSCULAR; INTRAVENOUS at 13:04

## 2017-03-24 RX ADMIN — MORPHINE SULFATE 2 MG: 2 INJECTION, SOLUTION INTRAMUSCULAR; INTRAVENOUS at 23:14

## 2017-03-24 ASSESSMENT — PAIN DESCRIPTION - ONSET: ONSET: ON-GOING

## 2017-03-24 ASSESSMENT — PAIN DESCRIPTION - PAIN TYPE
TYPE: CHRONIC PAIN
TYPE: SURGICAL PAIN

## 2017-03-24 ASSESSMENT — PAIN DESCRIPTION - ORIENTATION
ORIENTATION: RIGHT
ORIENTATION: RIGHT

## 2017-03-24 ASSESSMENT — PAIN SCALES - GENERAL
PAINLEVEL_OUTOF10: 0
PAINLEVEL_OUTOF10: 0
PAINLEVEL_OUTOF10: 5
PAINLEVEL_OUTOF10: 0
PAINLEVEL_OUTOF10: 7
PAINLEVEL_OUTOF10: 6
PAINLEVEL_OUTOF10: 7
PAINLEVEL_OUTOF10: 10
PAINLEVEL_OUTOF10: 7

## 2017-03-24 ASSESSMENT — PAIN DESCRIPTION - PROGRESSION: CLINICAL_PROGRESSION: GRADUALLY WORSENING

## 2017-03-24 ASSESSMENT — PAIN DESCRIPTION - FREQUENCY: FREQUENCY: CONTINUOUS

## 2017-03-24 ASSESSMENT — PAIN - FUNCTIONAL ASSESSMENT: PAIN_FUNCTIONAL_ASSESSMENT: 0-10

## 2017-03-24 ASSESSMENT — PAIN DESCRIPTION - DESCRIPTORS: DESCRIPTORS: ACHING

## 2017-03-24 ASSESSMENT — PAIN DESCRIPTION - LOCATION
LOCATION: KNEE
LOCATION: KNEE

## 2017-03-25 LAB
ANION GAP SERPL CALCULATED.3IONS-SCNC: 12 MEQ/L (ref 7–13)
BUN BLDV-MCNC: 15 MG/DL (ref 8–23)
CALCIUM SERPL-MCNC: 9.4 MG/DL (ref 8.6–10.2)
CHLORIDE BLD-SCNC: 94 MEQ/L (ref 98–107)
CO2: 25 MEQ/L (ref 22–29)
CREAT SERPL-MCNC: 0.76 MG/DL (ref 0.7–1.2)
GFR AFRICAN AMERICAN: >60
GFR NON-AFRICAN AMERICAN: >60
GLUCOSE BLD-MCNC: 109 MG/DL (ref 74–109)
HCT VFR BLD CALC: 36.7 % (ref 42–52)
HEMOGLOBIN: 12 G/DL (ref 14–18)
MAGNESIUM: 2.1 MG/DL (ref 1.7–2.3)
MCH RBC QN AUTO: 31.1 PG (ref 27–31.3)
MCHC RBC AUTO-ENTMCNC: 32.9 % (ref 33–37)
MCV RBC AUTO: 94.6 FL (ref 80–100)
PDW BLD-RTO: 13 % (ref 11.5–14.5)
PLATELET # BLD: 269 K/UL (ref 130–400)
POTASSIUM SERPL-SCNC: 4.9 MEQ/L (ref 3.5–5.1)
RBC # BLD: 3.88 M/UL (ref 4.7–6.1)
SODIUM BLD-SCNC: 131 MEQ/L (ref 132–144)
WBC # BLD: 11.9 K/UL (ref 4.8–10.8)

## 2017-03-25 PROCEDURE — 6360000002 HC RX W HCPCS: Performed by: NURSE PRACTITIONER

## 2017-03-25 PROCEDURE — G8978 MOBILITY CURRENT STATUS: HCPCS

## 2017-03-25 PROCEDURE — 81001 URINALYSIS AUTO W/SCOPE: CPT

## 2017-03-25 PROCEDURE — 83735 ASSAY OF MAGNESIUM: CPT

## 2017-03-25 PROCEDURE — G8987 SELF CARE CURRENT STATUS: HCPCS

## 2017-03-25 PROCEDURE — 97110 THERAPEUTIC EXERCISES: CPT

## 2017-03-25 PROCEDURE — 1210000000 HC MED SURG R&B

## 2017-03-25 PROCEDURE — 97167 OT EVAL HIGH COMPLEX 60 MIN: CPT

## 2017-03-25 PROCEDURE — 6370000000 HC RX 637 (ALT 250 FOR IP): Performed by: PHYSICIAN ASSISTANT

## 2017-03-25 PROCEDURE — 97535 SELF CARE MNGMENT TRAINING: CPT

## 2017-03-25 PROCEDURE — 36415 COLL VENOUS BLD VENIPUNCTURE: CPT

## 2017-03-25 PROCEDURE — 85027 COMPLETE CBC AUTOMATED: CPT

## 2017-03-25 PROCEDURE — G8979 MOBILITY GOAL STATUS: HCPCS

## 2017-03-25 PROCEDURE — 80048 BASIC METABOLIC PNL TOTAL CA: CPT

## 2017-03-25 PROCEDURE — G8988 SELF CARE GOAL STATUS: HCPCS

## 2017-03-25 PROCEDURE — 6370000000 HC RX 637 (ALT 250 FOR IP): Performed by: INTERNAL MEDICINE

## 2017-03-25 PROCEDURE — 97162 PT EVAL MOD COMPLEX 30 MIN: CPT

## 2017-03-25 PROCEDURE — 2580000003 HC RX 258: Performed by: NURSE PRACTITIONER

## 2017-03-25 PROCEDURE — 97116 GAIT TRAINING THERAPY: CPT

## 2017-03-25 PROCEDURE — 6370000000 HC RX 637 (ALT 250 FOR IP): Performed by: NURSE PRACTITIONER

## 2017-03-25 RX ORDER — LIDOCAINE 50 MG/G
2 PATCH TOPICAL DAILY
Status: DISCONTINUED | OUTPATIENT
Start: 2017-03-25 | End: 2017-03-28 | Stop reason: HOSPADM

## 2017-03-25 RX ADMIN — MORPHINE SULFATE 2 MG: 2 INJECTION, SOLUTION INTRAMUSCULAR; INTRAVENOUS at 08:48

## 2017-03-25 RX ADMIN — OXYCODONE HYDROCHLORIDE AND ACETAMINOPHEN 2 TABLET: 7.5; 325 TABLET ORAL at 06:18

## 2017-03-25 RX ADMIN — OXYCODONE HYDROCHLORIDE AND ACETAMINOPHEN 2 TABLET: 7.5; 325 TABLET ORAL at 01:48

## 2017-03-25 RX ADMIN — OXYCODONE HYDROCHLORIDE AND ACETAMINOPHEN 2 TABLET: 7.5; 325 TABLET ORAL at 20:22

## 2017-03-25 RX ADMIN — Medication 2 G: at 02:57

## 2017-03-25 RX ADMIN — DOCUSATE SODIUM 100 MG: 100 CAPSULE, LIQUID FILLED ORAL at 08:48

## 2017-03-25 RX ADMIN — PANTOPRAZOLE SODIUM 40 MG: 40 TABLET, DELAYED RELEASE ORAL at 06:18

## 2017-03-25 RX ADMIN — SODIUM CHLORIDE, PRESERVATIVE FREE 10 ML: 5 INJECTION INTRAVENOUS at 20:28

## 2017-03-25 RX ADMIN — MULTIPLE VITAMINS W/ MINERALS TAB 1 TABLET: TAB at 08:47

## 2017-03-25 RX ADMIN — SODIUM CHLORIDE, PRESERVATIVE FREE 10 ML: 5 INJECTION INTRAVENOUS at 08:48

## 2017-03-25 RX ADMIN — DOCUSATE SODIUM 100 MG: 100 CAPSULE, LIQUID FILLED ORAL at 20:29

## 2017-03-25 RX ADMIN — ATORVASTATIN CALCIUM 20 MG: 20 TABLET, FILM COATED ORAL at 20:22

## 2017-03-25 RX ADMIN — OXYCODONE HYDROCHLORIDE AND ACETAMINOPHEN 2 TABLET: 7.5; 325 TABLET ORAL at 15:10

## 2017-03-25 RX ADMIN — CLOPIDOGREL BISULFATE 75 MG: 75 TABLET ORAL at 08:47

## 2017-03-25 RX ADMIN — LORAZEPAM 0.5 MG: 0.5 TABLET ORAL at 02:57

## 2017-03-25 RX ADMIN — METOPROLOL TARTRATE 25 MG: 25 TABLET, FILM COATED ORAL at 20:21

## 2017-03-25 RX ADMIN — MORPHINE SULFATE 4 MG: 4 INJECTION, SOLUTION INTRAMUSCULAR; INTRAVENOUS at 12:56

## 2017-03-25 RX ADMIN — FINASTERIDE 5 MG: 5 TABLET, FILM COATED ORAL at 08:47

## 2017-03-25 RX ADMIN — CITALOPRAM HYDROBROMIDE 40 MG: 20 TABLET ORAL at 08:48

## 2017-03-25 RX ADMIN — OXYCODONE HYDROCHLORIDE AND ACETAMINOPHEN 2 TABLET: 7.5; 325 TABLET ORAL at 10:57

## 2017-03-25 RX ADMIN — METOPROLOL TARTRATE 25 MG: 25 TABLET, FILM COATED ORAL at 08:47

## 2017-03-25 RX ADMIN — SENNOSIDES AND DOCUSATE SODIUM 1 TABLET: 8.6; 5 TABLET ORAL at 08:47

## 2017-03-25 ASSESSMENT — PAIN DESCRIPTION - LOCATION
LOCATION: KNEE

## 2017-03-25 ASSESSMENT — PAIN SCALES - GENERAL
PAINLEVEL_OUTOF10: 5
PAINLEVEL_OUTOF10: 6
PAINLEVEL_OUTOF10: 2
PAINLEVEL_OUTOF10: 7
PAINLEVEL_OUTOF10: 0
PAINLEVEL_OUTOF10: 7
PAINLEVEL_OUTOF10: 9
PAINLEVEL_OUTOF10: 8
PAINLEVEL_OUTOF10: 8
PAINLEVEL_OUTOF10: 7
PAINLEVEL_OUTOF10: 5

## 2017-03-25 ASSESSMENT — PAIN DESCRIPTION - ORIENTATION
ORIENTATION: RIGHT

## 2017-03-25 ASSESSMENT — PAIN DESCRIPTION - PAIN TYPE
TYPE: SURGICAL PAIN
TYPE: SURGICAL PAIN

## 2017-03-25 ASSESSMENT — PAIN DESCRIPTION - DESCRIPTORS: DESCRIPTORS: ACHING;CONSTANT

## 2017-03-26 ENCOUNTER — APPOINTMENT (OUTPATIENT)
Dept: GENERAL RADIOLOGY | Age: 72
DRG: 470 | End: 2017-03-26
Attending: ORTHOPAEDIC SURGERY
Payer: MEDICARE

## 2017-03-26 LAB
ANION GAP SERPL CALCULATED.3IONS-SCNC: 16 MEQ/L (ref 7–13)
BACTERIA: ABNORMAL /HPF
BILIRUBIN URINE: NEGATIVE
BILIRUBIN URINE: NEGATIVE
BLOOD, URINE: ABNORMAL
BLOOD, URINE: ABNORMAL
BUN BLDV-MCNC: 10 MG/DL (ref 8–23)
CALCIUM SERPL-MCNC: 9.2 MG/DL (ref 8.6–10.2)
CHLORIDE BLD-SCNC: 91 MEQ/L (ref 98–107)
CLARITY: CLEAR
CLARITY: CLEAR
CO2: 25 MEQ/L (ref 22–29)
COLOR: ABNORMAL
COLOR: ABNORMAL
CREAT SERPL-MCNC: 0.57 MG/DL (ref 0.7–1.2)
EPITHELIAL CELLS, UA: ABNORMAL /HPF
GFR AFRICAN AMERICAN: >60
GFR NON-AFRICAN AMERICAN: >60
GLUCOSE BLD-MCNC: 108 MG/DL (ref 74–109)
GLUCOSE URINE: NEGATIVE MG/DL
GLUCOSE URINE: NEGATIVE MG/DL
HCT VFR BLD CALC: 34 % (ref 42–52)
HEMOGLOBIN: 11.1 G/DL (ref 14–18)
KETONES, URINE: ABNORMAL MG/DL
KETONES, URINE: NEGATIVE MG/DL
LEUKOCYTE ESTERASE, URINE: ABNORMAL
LEUKOCYTE ESTERASE, URINE: NEGATIVE
MCH RBC QN AUTO: 31 PG (ref 27–31.3)
MCHC RBC AUTO-ENTMCNC: 32.7 % (ref 33–37)
MCV RBC AUTO: 94.8 FL (ref 80–100)
MUCUS: PRESENT
MUCUS: PRESENT
NITRITE, URINE: NEGATIVE
NITRITE, URINE: NEGATIVE
PDW BLD-RTO: 12.9 % (ref 11.5–14.5)
PH UA: 5.5 (ref 5–9)
PH UA: 6 (ref 5–9)
PLATELET # BLD: 238 K/UL (ref 130–400)
POTASSIUM SERPL-SCNC: 4.1 MEQ/L (ref 3.5–5.1)
PROTEIN UA: NEGATIVE MG/DL
PROTEIN UA: NEGATIVE MG/DL
RBC # BLD: 3.59 M/UL (ref 4.7–6.1)
RBC UA: ABNORMAL /HPF (ref 0–2)
RBC UA: ABNORMAL /HPF (ref 0–2)
SODIUM BLD-SCNC: 132 MEQ/L (ref 132–144)
SPECIFIC GRAVITY UA: 1.02 (ref 1–1.03)
SPECIFIC GRAVITY UA: 1.02 (ref 1–1.03)
UROBILINOGEN, URINE: 1 E.U./DL
UROBILINOGEN, URINE: 1 E.U./DL
WBC # BLD: 17.8 K/UL (ref 4.8–10.8)
WBC UA: ABNORMAL /HPF (ref 0–5)
WBC UA: ABNORMAL /HPF (ref 0–5)

## 2017-03-26 PROCEDURE — 80048 BASIC METABOLIC PNL TOTAL CA: CPT

## 2017-03-26 PROCEDURE — 6370000000 HC RX 637 (ALT 250 FOR IP): Performed by: NURSE PRACTITIONER

## 2017-03-26 PROCEDURE — 6370000000 HC RX 637 (ALT 250 FOR IP): Performed by: INTERNAL MEDICINE

## 2017-03-26 PROCEDURE — 87086 URINE CULTURE/COLONY COUNT: CPT

## 2017-03-26 PROCEDURE — 97535 SELF CARE MNGMENT TRAINING: CPT

## 2017-03-26 PROCEDURE — 81001 URINALYSIS AUTO W/SCOPE: CPT

## 2017-03-26 PROCEDURE — 71010 XR CHEST LIMITED: CPT

## 2017-03-26 PROCEDURE — 97116 GAIT TRAINING THERAPY: CPT

## 2017-03-26 PROCEDURE — 36415 COLL VENOUS BLD VENIPUNCTURE: CPT

## 2017-03-26 PROCEDURE — 6370000000 HC RX 637 (ALT 250 FOR IP): Performed by: PHYSICIAN ASSISTANT

## 2017-03-26 PROCEDURE — 2580000003 HC RX 258: Performed by: NURSE PRACTITIONER

## 2017-03-26 PROCEDURE — 1210000000 HC MED SURG R&B

## 2017-03-26 PROCEDURE — 85027 COMPLETE CBC AUTOMATED: CPT

## 2017-03-26 RX ORDER — POLYETHYLENE GLYCOL 3350 17 G/17G
17 POWDER, FOR SOLUTION ORAL DAILY
Status: DISCONTINUED | OUTPATIENT
Start: 2017-03-26 | End: 2017-03-28 | Stop reason: HOSPADM

## 2017-03-26 RX ORDER — SODIUM CHLORIDE 9 MG/ML
INJECTION, SOLUTION INTRAVENOUS CONTINUOUS
Status: DISCONTINUED | OUTPATIENT
Start: 2017-03-26 | End: 2017-03-28 | Stop reason: HOSPADM

## 2017-03-26 RX ADMIN — POLYETHYLENE GLYCOL 3350 17 G: 17 POWDER, FOR SOLUTION ORAL at 14:14

## 2017-03-26 RX ADMIN — OXYCODONE HYDROCHLORIDE AND ACETAMINOPHEN 1 TABLET: 7.5; 325 TABLET ORAL at 23:52

## 2017-03-26 RX ADMIN — FINASTERIDE 5 MG: 5 TABLET, FILM COATED ORAL at 08:45

## 2017-03-26 RX ADMIN — CITALOPRAM HYDROBROMIDE 40 MG: 20 TABLET ORAL at 08:46

## 2017-03-26 RX ADMIN — OXYCODONE HYDROCHLORIDE AND ACETAMINOPHEN 2 TABLET: 7.5; 325 TABLET ORAL at 18:12

## 2017-03-26 RX ADMIN — MULTIPLE VITAMINS W/ MINERALS TAB 1 TABLET: TAB at 08:45

## 2017-03-26 RX ADMIN — OXYCODONE HYDROCHLORIDE AND ACETAMINOPHEN 2 TABLET: 7.5; 325 TABLET ORAL at 11:47

## 2017-03-26 RX ADMIN — CLOPIDOGREL BISULFATE 75 MG: 75 TABLET ORAL at 08:45

## 2017-03-26 RX ADMIN — DOCUSATE SODIUM 100 MG: 100 CAPSULE, LIQUID FILLED ORAL at 08:45

## 2017-03-26 RX ADMIN — CARBOXYMETHYLCELLULOSE SODIUM 1 DROP: 10 GEL OPHTHALMIC at 14:14

## 2017-03-26 RX ADMIN — PANTOPRAZOLE SODIUM 40 MG: 40 TABLET, DELAYED RELEASE ORAL at 05:39

## 2017-03-26 RX ADMIN — METOPROLOL TARTRATE 25 MG: 25 TABLET, FILM COATED ORAL at 08:45

## 2017-03-26 RX ADMIN — CARBOXYMETHYLCELLULOSE SODIUM 1 DROP: 10 GEL OPHTHALMIC at 21:20

## 2017-03-26 RX ADMIN — OXYCODONE HYDROCHLORIDE AND ACETAMINOPHEN 2 TABLET: 7.5; 325 TABLET ORAL at 05:39

## 2017-03-26 RX ADMIN — SODIUM CHLORIDE: 900 INJECTION, SOLUTION INTRAVENOUS at 17:30

## 2017-03-26 RX ADMIN — DOCUSATE SODIUM 100 MG: 100 CAPSULE, LIQUID FILLED ORAL at 21:18

## 2017-03-26 RX ADMIN — SODIUM CHLORIDE, PRESERVATIVE FREE 10 ML: 5 INJECTION INTRAVENOUS at 08:48

## 2017-03-26 RX ADMIN — SENNOSIDES AND DOCUSATE SODIUM 1 TABLET: 8.6; 5 TABLET ORAL at 08:46

## 2017-03-26 RX ADMIN — CARBOXYMETHYLCELLULOSE SODIUM 1 DROP: 10 GEL OPHTHALMIC at 10:40

## 2017-03-26 RX ADMIN — ATORVASTATIN CALCIUM 20 MG: 20 TABLET, FILM COATED ORAL at 21:18

## 2017-03-26 RX ADMIN — METOPROLOL TARTRATE 25 MG: 25 TABLET, FILM COATED ORAL at 21:18

## 2017-03-26 ASSESSMENT — PAIN SCALES - GENERAL
PAINLEVEL_OUTOF10: 7
PAINLEVEL_OUTOF10: 7
PAINLEVEL_OUTOF10: 8
PAINLEVEL_OUTOF10: 4
PAINLEVEL_OUTOF10: 5
PAINLEVEL_OUTOF10: 7
PAINLEVEL_OUTOF10: 6

## 2017-03-26 ASSESSMENT — PAIN DESCRIPTION - ORIENTATION
ORIENTATION: RIGHT

## 2017-03-26 ASSESSMENT — PAIN DESCRIPTION - DESCRIPTORS: DESCRIPTORS: ACHING

## 2017-03-26 ASSESSMENT — PAIN DESCRIPTION - LOCATION
LOCATION: KNEE

## 2017-03-26 ASSESSMENT — PAIN DESCRIPTION - PAIN TYPE
TYPE: SURGICAL PAIN

## 2017-03-26 ASSESSMENT — PAIN DESCRIPTION - FREQUENCY: FREQUENCY: CONTINUOUS

## 2017-03-27 VITALS
DIASTOLIC BLOOD PRESSURE: 65 MMHG | BODY MASS INDEX: 28.27 KG/M2 | HEART RATE: 94 BPM | TEMPERATURE: 97.6 F | WEIGHT: 175.93 LBS | RESPIRATION RATE: 16 BRPM | SYSTOLIC BLOOD PRESSURE: 150 MMHG | HEIGHT: 66 IN | OXYGEN SATURATION: 97 %

## 2017-03-27 LAB
HCT VFR BLD CALC: 32.5 % (ref 42–52)
HEMOGLOBIN: 10.9 G/DL (ref 14–18)
MCH RBC QN AUTO: 31.4 PG (ref 27–31.3)
MCHC RBC AUTO-ENTMCNC: 33.5 % (ref 33–37)
MCV RBC AUTO: 93.8 FL (ref 80–100)
PDW BLD-RTO: 13 % (ref 11.5–14.5)
PLATELET # BLD: 203 K/UL (ref 130–400)
RBC # BLD: 3.46 M/UL (ref 4.7–6.1)
URINE CULTURE, ROUTINE: NORMAL
WBC # BLD: 19 K/UL (ref 4.8–10.8)

## 2017-03-27 PROCEDURE — 2580000003 HC RX 258: Performed by: NURSE PRACTITIONER

## 2017-03-27 PROCEDURE — 97535 SELF CARE MNGMENT TRAINING: CPT

## 2017-03-27 PROCEDURE — 6370000000 HC RX 637 (ALT 250 FOR IP): Performed by: PHYSICIAN ASSISTANT

## 2017-03-27 PROCEDURE — 1210000000 HC MED SURG R&B

## 2017-03-27 PROCEDURE — 97116 GAIT TRAINING THERAPY: CPT

## 2017-03-27 PROCEDURE — 85027 COMPLETE CBC AUTOMATED: CPT

## 2017-03-27 PROCEDURE — 36415 COLL VENOUS BLD VENIPUNCTURE: CPT

## 2017-03-27 PROCEDURE — 97112 NEUROMUSCULAR REEDUCATION: CPT

## 2017-03-27 PROCEDURE — 6370000000 HC RX 637 (ALT 250 FOR IP): Performed by: INTERNAL MEDICINE

## 2017-03-27 PROCEDURE — 6370000000 HC RX 637 (ALT 250 FOR IP): Performed by: NURSE PRACTITIONER

## 2017-03-27 RX ORDER — OXYCODONE AND ACETAMINOPHEN 7.5; 325 MG/1; MG/1
1 TABLET ORAL EVERY 4 HOURS PRN
Qty: 60 TABLET | Refills: 0 | Status: SHIPPED | OUTPATIENT
Start: 2017-03-27 | End: 2017-03-30 | Stop reason: SDUPTHER

## 2017-03-27 RX ORDER — OXYCODONE HYDROCHLORIDE AND ACETAMINOPHEN 5; 325 MG/1; MG/1
2 TABLET ORAL EVERY 4 HOURS PRN
Status: DISCONTINUED | OUTPATIENT
Start: 2017-03-27 | End: 2017-03-28 | Stop reason: HOSPADM

## 2017-03-27 RX ORDER — ASPIRIN 81 MG/1
81 TABLET, CHEWABLE ORAL DAILY
Status: DISCONTINUED | OUTPATIENT
Start: 2017-03-27 | End: 2017-03-28 | Stop reason: HOSPADM

## 2017-03-27 RX ORDER — LIDOCAINE 50 MG/G
2 PATCH TOPICAL DAILY
Qty: 30 PATCH | Refills: 0 | Status: SHIPPED | OUTPATIENT
Start: 2017-03-27 | End: 2017-03-30 | Stop reason: SDUPTHER

## 2017-03-27 RX ORDER — ASPIRIN 81 MG/1
81 TABLET, CHEWABLE ORAL DAILY
Qty: 30 TABLET | Refills: 3
Start: 2017-03-27 | End: 2017-03-30 | Stop reason: SDUPTHER

## 2017-03-27 RX ORDER — OXYCODONE HYDROCHLORIDE AND ACETAMINOPHEN 5; 325 MG/1; MG/1
1 TABLET ORAL EVERY 4 HOURS PRN
Status: DISCONTINUED | OUTPATIENT
Start: 2017-03-27 | End: 2017-03-28 | Stop reason: HOSPADM

## 2017-03-27 RX ORDER — OXYCODONE HYDROCHLORIDE AND ACETAMINOPHEN 5; 325 MG/1; MG/1
1 TABLET ORAL EVERY 4 HOURS PRN
Qty: 60 TABLET | Refills: 0 | Status: SHIPPED | OUTPATIENT
Start: 2017-03-27 | End: 2017-03-30 | Stop reason: SDUPTHER

## 2017-03-27 RX ORDER — PSEUDOEPHEDRINE HCL 30 MG
100 TABLET ORAL 2 TIMES DAILY PRN
Qty: 30 CAPSULE | Refills: 0 | Status: SHIPPED | OUTPATIENT
Start: 2017-03-27 | End: 2017-03-30 | Stop reason: SDUPTHER

## 2017-03-27 RX ADMIN — CLOPIDOGREL BISULFATE 75 MG: 75 TABLET ORAL at 08:56

## 2017-03-27 RX ADMIN — OXYCODONE HYDROCHLORIDE AND ACETAMINOPHEN 2 TABLET: 7.5; 325 TABLET ORAL at 04:34

## 2017-03-27 RX ADMIN — MULTIPLE VITAMINS W/ MINERALS TAB 1 TABLET: TAB at 08:55

## 2017-03-27 RX ADMIN — SENNOSIDES AND DOCUSATE SODIUM 1 TABLET: 8.6; 5 TABLET ORAL at 08:57

## 2017-03-27 RX ADMIN — OXYCODONE HYDROCHLORIDE AND ACETAMINOPHEN 2 TABLET: 7.5; 325 TABLET ORAL at 09:09

## 2017-03-27 RX ADMIN — CITALOPRAM HYDROBROMIDE 40 MG: 20 TABLET ORAL at 08:56

## 2017-03-27 RX ADMIN — PANTOPRAZOLE SODIUM 40 MG: 40 TABLET, DELAYED RELEASE ORAL at 05:58

## 2017-03-27 RX ADMIN — POLYETHYLENE GLYCOL 3350 17 G: 17 POWDER, FOR SOLUTION ORAL at 09:12

## 2017-03-27 RX ADMIN — SODIUM CHLORIDE: 900 INJECTION, SOLUTION INTRAVENOUS at 04:34

## 2017-03-27 RX ADMIN — DOCUSATE SODIUM 100 MG: 100 CAPSULE, LIQUID FILLED ORAL at 08:58

## 2017-03-27 RX ADMIN — OXYCODONE HYDROCHLORIDE AND ACETAMINOPHEN 2 TABLET: 5; 325 TABLET ORAL at 14:51

## 2017-03-27 RX ADMIN — ASPIRIN 81 MG: 81 TABLET, CHEWABLE ORAL at 08:57

## 2017-03-27 RX ADMIN — OXYCODONE HYDROCHLORIDE AND ACETAMINOPHEN 2 TABLET: 5; 325 TABLET ORAL at 18:48

## 2017-03-27 RX ADMIN — FINASTERIDE 5 MG: 5 TABLET, FILM COATED ORAL at 08:58

## 2017-03-27 RX ADMIN — SODIUM CHLORIDE: 900 INJECTION, SOLUTION INTRAVENOUS at 14:52

## 2017-03-27 RX ADMIN — CARBOXYMETHYLCELLULOSE SODIUM 1 DROP: 10 GEL OPHTHALMIC at 10:34

## 2017-03-27 ASSESSMENT — PAIN SCALES - GENERAL
PAINLEVEL_OUTOF10: 8
PAINLEVEL_OUTOF10: 6
PAINLEVEL_OUTOF10: 7
PAINLEVEL_OUTOF10: 7

## 2017-03-27 ASSESSMENT — PAIN DESCRIPTION - ORIENTATION
ORIENTATION: RIGHT;LEFT
ORIENTATION: LEFT;RIGHT
ORIENTATION: LEFT;RIGHT
ORIENTATION: RIGHT;LEFT
ORIENTATION: RIGHT
ORIENTATION: RIGHT;LEFT
ORIENTATION: RIGHT

## 2017-03-27 ASSESSMENT — PAIN DESCRIPTION - LOCATION
LOCATION: KNEE;HIP
LOCATION: KNEE
LOCATION: HIP;KNEE
LOCATION: KNEE;HIP
LOCATION: HIP;KNEE
LOCATION: KNEE;HIP
LOCATION: INCISION;KNEE

## 2017-03-27 ASSESSMENT — PAIN DESCRIPTION - FREQUENCY: FREQUENCY: CONTINUOUS

## 2017-03-27 ASSESSMENT — PAIN DESCRIPTION - PROGRESSION: CLINICAL_PROGRESSION: GRADUALLY WORSENING

## 2017-03-27 ASSESSMENT — PAIN DESCRIPTION - PAIN TYPE
TYPE: SURGICAL PAIN

## 2017-03-27 ASSESSMENT — PAIN DESCRIPTION - DESCRIPTORS: DESCRIPTORS: ACHING

## 2017-03-27 ASSESSMENT — PAIN DESCRIPTION - ONSET: ONSET: ON-GOING

## 2017-03-28 ENCOUNTER — NURSE ONLY (OUTPATIENT)
Dept: GERIATRIC MEDICINE | Age: 72
End: 2017-03-28

## 2017-03-28 DIAGNOSIS — F32.A DEPRESSION, UNSPECIFIED DEPRESSION TYPE: ICD-10-CM

## 2017-03-28 DIAGNOSIS — K59.00 CONSTIPATION, UNSPECIFIED CONSTIPATION TYPE: ICD-10-CM

## 2017-03-28 DIAGNOSIS — R41.0 DELIRIUM: ICD-10-CM

## 2017-03-28 DIAGNOSIS — M15.9 OSTEOARTHRITIS OF MULTIPLE JOINTS, UNSPECIFIED OSTEOARTHRITIS TYPE: Primary | ICD-10-CM

## 2017-03-28 DIAGNOSIS — I10 ESSENTIAL HYPERTENSION: ICD-10-CM

## 2017-03-28 LAB — URINE CULTURE, ROUTINE: NORMAL

## 2017-03-28 PROCEDURE — 99305 1ST NF CARE MODERATE MDM 35: CPT | Performed by: INTERNAL MEDICINE

## 2017-03-30 RX ORDER — ACETAMINOPHEN 325 MG/1
650 TABLET ORAL EVERY 4 HOURS PRN
COMMUNITY
End: 2017-04-20

## 2017-03-30 RX ORDER — CLOPIDOGREL BISULFATE 75 MG/1
75 TABLET ORAL DAILY
COMMUNITY
End: 2018-03-12 | Stop reason: SDUPTHER

## 2017-03-30 RX ORDER — METOPROLOL TARTRATE 50 MG/1
50 TABLET, FILM COATED ORAL 2 TIMES DAILY
COMMUNITY
End: 2018-03-12 | Stop reason: SDUPTHER

## 2017-03-30 RX ORDER — MULTIVIT WITH MINERALS/LUTEIN
TABLET ORAL DAILY
COMMUNITY
End: 2019-02-15

## 2017-03-30 RX ORDER — ASPIRIN 81 MG/1
81 TABLET, CHEWABLE ORAL DAILY
COMMUNITY
End: 2017-04-20

## 2017-03-30 RX ORDER — ATORVASTATIN CALCIUM 20 MG/1
20 TABLET, FILM COATED ORAL DAILY
COMMUNITY
End: 2018-03-12 | Stop reason: SDUPTHER

## 2017-03-30 RX ORDER — OXYCODONE HYDROCHLORIDE AND ACETAMINOPHEN 5; 325 MG/1; MG/1
1-2 TABLET ORAL EVERY 4 HOURS PRN
COMMUNITY
End: 2018-07-12

## 2017-03-30 RX ORDER — NITROGLYCERIN 0.4 MG/1
0.4 TABLET SUBLINGUAL EVERY 5 MIN PRN
COMMUNITY
End: 2019-05-01 | Stop reason: SDUPTHER

## 2017-03-30 RX ORDER — CITALOPRAM 40 MG/1
40 TABLET ORAL NIGHTLY
COMMUNITY
End: 2018-04-23 | Stop reason: SDUPTHER

## 2017-03-30 RX ORDER — FINASTERIDE 5 MG/1
5 TABLET, FILM COATED ORAL DAILY
COMMUNITY
End: 2017-10-10 | Stop reason: SDUPTHER

## 2017-03-30 RX ORDER — ACETAMINOPHEN 650 MG/1
650 SUPPOSITORY RECTAL EVERY 4 HOURS PRN
COMMUNITY
End: 2017-04-20

## 2017-03-30 RX ORDER — LIDOCAINE 50 MG/G
2 PATCH TOPICAL DAILY
COMMUNITY
End: 2017-04-20

## 2017-03-31 ENCOUNTER — NURSE ONLY (OUTPATIENT)
Dept: GERIATRIC MEDICINE | Age: 72
End: 2017-03-31

## 2017-03-31 DIAGNOSIS — R41.82 ALTERED MENTAL STATUS, UNSPECIFIED ALTERED MENTAL STATUS TYPE: Primary | ICD-10-CM

## 2017-03-31 PROCEDURE — 99308 SBSQ NF CARE LOW MDM 20: CPT | Performed by: NURSE PRACTITIONER

## 2017-04-03 VITALS
TEMPERATURE: 98.2 F | OXYGEN SATURATION: 98 % | SYSTOLIC BLOOD PRESSURE: 124 MMHG | HEART RATE: 70 BPM | DIASTOLIC BLOOD PRESSURE: 69 MMHG | RESPIRATION RATE: 18 BRPM

## 2017-04-03 ASSESSMENT — ENCOUNTER SYMPTOMS
CONSTIPATION: 0
SHORTNESS OF BREATH: 0
SORE THROAT: 0
WHEEZING: 0
ABDOMINAL DISTENTION: 0
COUGH: 1
ABDOMINAL PAIN: 0
DIARRHEA: 0
VOMITING: 0
NAUSEA: 0
SINUS PRESSURE: 0
BLOOD IN STOOL: 0

## 2017-04-05 ENCOUNTER — HOSPITAL ENCOUNTER (OUTPATIENT)
Dept: ORTHOPEDIC SURGERY | Age: 72
Discharge: HOME | End: 2017-04-05
Payer: MEDICARE

## 2017-04-05 ENCOUNTER — NURSE ONLY (OUTPATIENT)
Dept: GERIATRIC MEDICINE | Age: 72
End: 2017-04-05

## 2017-04-05 VITALS
RESPIRATION RATE: 19 BRPM | TEMPERATURE: 97.9 F | HEART RATE: 70 BPM | DIASTOLIC BLOOD PRESSURE: 65 MMHG | OXYGEN SATURATION: 99 % | SYSTOLIC BLOOD PRESSURE: 130 MMHG

## 2017-04-05 VITALS — DIASTOLIC BLOOD PRESSURE: 66 MMHG | SYSTOLIC BLOOD PRESSURE: 113 MMHG | HEART RATE: 83 BPM | TEMPERATURE: 98.1 F

## 2017-04-05 DIAGNOSIS — R41.0 CONFUSION: Primary | ICD-10-CM

## 2017-04-05 DIAGNOSIS — M17.11 OSTEOARTHRITIS OF RIGHT KNEE, UNSPECIFIED OSTEOARTHRITIS TYPE: ICD-10-CM

## 2017-04-05 PROCEDURE — 73560 X-RAY EXAM OF KNEE 1 OR 2: CPT

## 2017-04-05 PROCEDURE — 99308 SBSQ NF CARE LOW MDM 20: CPT | Performed by: NURSE PRACTITIONER

## 2017-04-17 ASSESSMENT — ENCOUNTER SYMPTOMS
COUGH: 0
SHORTNESS OF BREATH: 0

## 2017-04-20 ENCOUNTER — OFFICE VISIT (OUTPATIENT)
Dept: FAMILY MEDICINE CLINIC | Age: 72
End: 2017-04-20

## 2017-04-20 VITALS
SYSTOLIC BLOOD PRESSURE: 110 MMHG | HEART RATE: 75 BPM | WEIGHT: 163.2 LBS | DIASTOLIC BLOOD PRESSURE: 64 MMHG | OXYGEN SATURATION: 97 % | BODY MASS INDEX: 26.23 KG/M2 | HEIGHT: 66 IN

## 2017-04-20 DIAGNOSIS — G89.29 CHRONIC LOW BACK PAIN, UNSPECIFIED BACK PAIN LATERALITY, WITH SCIATICA PRESENCE UNSPECIFIED: ICD-10-CM

## 2017-04-20 DIAGNOSIS — M46.1 SACROILIITIS (HCC): ICD-10-CM

## 2017-04-20 DIAGNOSIS — R41.0 CONFUSION CAUSED BY A DRUG: ICD-10-CM

## 2017-04-20 DIAGNOSIS — E78.00 PURE HYPERCHOLESTEROLEMIA: Chronic | ICD-10-CM

## 2017-04-20 DIAGNOSIS — M17.11 PRIMARY OSTEOARTHRITIS OF RIGHT KNEE: Primary | Chronic | ICD-10-CM

## 2017-04-20 DIAGNOSIS — M47.816 SPONDYLOSIS OF LUMBAR REGION WITHOUT MYELOPATHY OR RADICULOPATHY: ICD-10-CM

## 2017-04-20 DIAGNOSIS — Z23 NEED FOR PNEUMOCOCCAL VACCINATION: ICD-10-CM

## 2017-04-20 DIAGNOSIS — T50.905A CONFUSION CAUSED BY A DRUG: ICD-10-CM

## 2017-04-20 DIAGNOSIS — I10 ESSENTIAL HYPERTENSION: Chronic | ICD-10-CM

## 2017-04-20 DIAGNOSIS — Z12.11 SCREEN FOR COLON CANCER: ICD-10-CM

## 2017-04-20 DIAGNOSIS — M54.5 CHRONIC LOW BACK PAIN, UNSPECIFIED BACK PAIN LATERALITY, WITH SCIATICA PRESENCE UNSPECIFIED: ICD-10-CM

## 2017-04-20 DIAGNOSIS — Z09 HOSPITAL DISCHARGE FOLLOW-UP: ICD-10-CM

## 2017-04-20 LAB
ANION GAP SERPL CALCULATED.3IONS-SCNC: 10 MEQ/L (ref 7–13)
BUN BLDV-MCNC: 18 MG/DL (ref 8–23)
CALCIUM SERPL-MCNC: 9.4 MG/DL (ref 8.6–10.2)
CHLORIDE BLD-SCNC: 98 MEQ/L (ref 98–107)
CO2: 26 MEQ/L (ref 22–29)
CREAT SERPL-MCNC: 1.01 MG/DL (ref 0.7–1.2)
GFR AFRICAN AMERICAN: >60
GFR NON-AFRICAN AMERICAN: >60
GLUCOSE BLD-MCNC: 108 MG/DL (ref 74–109)
HCT VFR BLD CALC: 32.9 % (ref 42–52)
HEMOGLOBIN: 10.9 G/DL (ref 14–18)
MCH RBC QN AUTO: 30.8 PG (ref 27–31.3)
MCHC RBC AUTO-ENTMCNC: 33 % (ref 33–37)
MCV RBC AUTO: 93.4 FL (ref 80–100)
PDW BLD-RTO: 13.7 % (ref 11.5–14.5)
PLATELET # BLD: 347 K/UL (ref 130–400)
POTASSIUM SERPL-SCNC: 4.7 MEQ/L (ref 3.5–5.1)
RBC # BLD: 3.53 M/UL (ref 4.7–6.1)
SODIUM BLD-SCNC: 134 MEQ/L (ref 132–144)
WBC # BLD: 10.4 K/UL (ref 4.8–10.8)

## 2017-04-20 PROCEDURE — 99214 OFFICE O/P EST MOD 30 MIN: CPT | Performed by: FAMILY MEDICINE

## 2017-04-20 PROCEDURE — 90670 PCV13 VACCINE IM: CPT | Performed by: FAMILY MEDICINE

## 2017-04-20 PROCEDURE — G0009 ADMIN PNEUMOCOCCAL VACCINE: HCPCS | Performed by: FAMILY MEDICINE

## 2017-04-20 RX ORDER — DICLOFENAC SODIUM 75 MG/1
TABLET, DELAYED RELEASE ORAL
Refills: 1 | COMMUNITY
Start: 2017-01-24 | End: 2017-04-20

## 2017-04-20 RX ORDER — OMEPRAZOLE 10 MG/1
10 CAPSULE, DELAYED RELEASE ORAL DAILY
COMMUNITY
End: 2020-02-07 | Stop reason: SDUPTHER

## 2017-04-20 RX ORDER — DICLOFENAC SODIUM 75 MG/1
TABLET, DELAYED RELEASE ORAL
Qty: 60 TABLET | Refills: 1 | Status: CANCELLED | OUTPATIENT
Start: 2017-04-20

## 2017-05-12 ENCOUNTER — APPOINTMENT (OUTPATIENT)
Dept: GENERAL RADIOLOGY | Age: 72
End: 2017-05-12
Attending: ORTHOPAEDIC SURGERY
Payer: MEDICARE

## 2017-05-12 ENCOUNTER — ANESTHESIA (OUTPATIENT)
Dept: OPERATING ROOM | Age: 72
End: 2017-05-12
Payer: MEDICARE

## 2017-05-12 ENCOUNTER — HOSPITAL ENCOUNTER (OUTPATIENT)
Age: 72
Setting detail: OUTPATIENT SURGERY
Discharge: HOME | End: 2017-05-12
Attending: ORTHOPAEDIC SURGERY | Admitting: ORTHOPAEDIC SURGERY
Payer: MEDICARE

## 2017-05-12 ENCOUNTER — ANESTHESIA EVENT (OUTPATIENT)
Dept: OPERATING ROOM | Age: 72
End: 2017-05-12
Payer: MEDICARE

## 2017-05-12 VITALS — DIASTOLIC BLOOD PRESSURE: 57 MMHG | SYSTOLIC BLOOD PRESSURE: 106 MMHG | OXYGEN SATURATION: 100 %

## 2017-05-12 VITALS
DIASTOLIC BLOOD PRESSURE: 66 MMHG | HEART RATE: 56 BPM | OXYGEN SATURATION: 98 % | SYSTOLIC BLOOD PRESSURE: 128 MMHG | HEIGHT: 66 IN | BODY MASS INDEX: 26.2 KG/M2 | WEIGHT: 163 LBS | RESPIRATION RATE: 16 BRPM | TEMPERATURE: 97.5 F

## 2017-05-12 PROCEDURE — 2580000003 HC RX 258: Performed by: STUDENT IN AN ORGANIZED HEALTH CARE EDUCATION/TRAINING PROGRAM

## 2017-05-12 PROCEDURE — 7100000011 HC PHASE II RECOVERY - ADDTL 15 MIN: Performed by: ORTHOPAEDIC SURGERY

## 2017-05-12 PROCEDURE — 7100000000 HC PACU RECOVERY - FIRST 15 MIN: Performed by: ORTHOPAEDIC SURGERY

## 2017-05-12 PROCEDURE — 2580000003 HC RX 258: Performed by: NURSE ANESTHETIST, CERTIFIED REGISTERED

## 2017-05-12 PROCEDURE — 73560 X-RAY EXAM OF KNEE 1 OR 2: CPT

## 2017-05-12 PROCEDURE — 2500000003 HC RX 250 WO HCPCS: Performed by: NURSE ANESTHETIST, CERTIFIED REGISTERED

## 2017-05-12 PROCEDURE — 3600000001 HC SURGERY LEVEL 1  BASE: Performed by: ORTHOPAEDIC SURGERY

## 2017-05-12 PROCEDURE — 3700000001 HC ADD 15 MINUTES (ANESTHESIA): Performed by: ORTHOPAEDIC SURGERY

## 2017-05-12 PROCEDURE — 7100000010 HC PHASE II RECOVERY - FIRST 15 MIN: Performed by: ORTHOPAEDIC SURGERY

## 2017-05-12 PROCEDURE — 3700000000 HC ANESTHESIA ATTENDED CARE: Performed by: ORTHOPAEDIC SURGERY

## 2017-05-12 PROCEDURE — 6360000002 HC RX W HCPCS: Performed by: NURSE ANESTHETIST, CERTIFIED REGISTERED

## 2017-05-12 PROCEDURE — 6370000000 HC RX 637 (ALT 250 FOR IP): Performed by: ORTHOPAEDIC SURGERY

## 2017-05-12 PROCEDURE — 3600000011 HC SURGERY LEVEL 1  ADDTL 15MIN: Performed by: ORTHOPAEDIC SURGERY

## 2017-05-12 PROCEDURE — 7100000001 HC PACU RECOVERY - ADDTL 15 MIN: Performed by: ORTHOPAEDIC SURGERY

## 2017-05-12 RX ORDER — SODIUM CHLORIDE, SODIUM LACTATE, POTASSIUM CHLORIDE, CALCIUM CHLORIDE 600; 310; 30; 20 MG/100ML; MG/100ML; MG/100ML; MG/100ML
INJECTION, SOLUTION INTRAVENOUS CONTINUOUS PRN
Status: DISCONTINUED | OUTPATIENT
Start: 2017-05-12 | End: 2017-05-12 | Stop reason: SDUPTHER

## 2017-05-12 RX ORDER — LIDOCAINE HYDROCHLORIDE 10 MG/ML
1 INJECTION, SOLUTION EPIDURAL; INFILTRATION; INTRACAUDAL; PERINEURAL
Status: DISCONTINUED | OUTPATIENT
Start: 2017-05-12 | End: 2017-05-12 | Stop reason: HOSPADM

## 2017-05-12 RX ORDER — MORPHINE SULFATE 2 MG/ML
2 INJECTION, SOLUTION INTRAMUSCULAR; INTRAVENOUS
Status: DISCONTINUED | OUTPATIENT
Start: 2017-05-12 | End: 2017-05-12 | Stop reason: HOSPADM

## 2017-05-12 RX ORDER — SUCCINYLCHOLINE CHLORIDE 20 MG/ML
INJECTION INTRAMUSCULAR; INTRAVENOUS PRN
Status: DISCONTINUED | OUTPATIENT
Start: 2017-05-12 | End: 2017-05-12 | Stop reason: SDUPTHER

## 2017-05-12 RX ORDER — METOCLOPRAMIDE HYDROCHLORIDE 5 MG/ML
10 INJECTION INTRAMUSCULAR; INTRAVENOUS
Status: DISCONTINUED | OUTPATIENT
Start: 2017-05-12 | End: 2017-05-12 | Stop reason: HOSPADM

## 2017-05-12 RX ORDER — OXYCODONE HYDROCHLORIDE AND ACETAMINOPHEN 5; 325 MG/1; MG/1
1 TABLET ORAL EVERY 4 HOURS PRN
Status: DISCONTINUED | OUTPATIENT
Start: 2017-05-12 | End: 2017-05-12 | Stop reason: HOSPADM

## 2017-05-12 RX ORDER — SODIUM CHLORIDE, SODIUM LACTATE, POTASSIUM CHLORIDE, CALCIUM CHLORIDE 600; 310; 30; 20 MG/100ML; MG/100ML; MG/100ML; MG/100ML
INJECTION, SOLUTION INTRAVENOUS CONTINUOUS
Status: DISCONTINUED | OUTPATIENT
Start: 2017-05-12 | End: 2017-05-12 | Stop reason: HOSPADM

## 2017-05-12 RX ORDER — FENTANYL CITRATE 50 UG/ML
50 INJECTION, SOLUTION INTRAMUSCULAR; INTRAVENOUS EVERY 10 MIN PRN
Status: DISCONTINUED | OUTPATIENT
Start: 2017-05-12 | End: 2017-05-12 | Stop reason: HOSPADM

## 2017-05-12 RX ORDER — FENTANYL CITRATE 50 UG/ML
INJECTION, SOLUTION INTRAMUSCULAR; INTRAVENOUS PRN
Status: DISCONTINUED | OUTPATIENT
Start: 2017-05-12 | End: 2017-05-12 | Stop reason: SDUPTHER

## 2017-05-12 RX ORDER — LIDOCAINE HYDROCHLORIDE 20 MG/ML
INJECTION, SOLUTION EPIDURAL; INFILTRATION; INTRACAUDAL; PERINEURAL PRN
Status: DISCONTINUED | OUTPATIENT
Start: 2017-05-12 | End: 2017-05-12 | Stop reason: SDUPTHER

## 2017-05-12 RX ORDER — PROPOFOL 10 MG/ML
INJECTION, EMULSION INTRAVENOUS PRN
Status: DISCONTINUED | OUTPATIENT
Start: 2017-05-12 | End: 2017-05-12 | Stop reason: SDUPTHER

## 2017-05-12 RX ORDER — ONDANSETRON 2 MG/ML
4 INJECTION INTRAMUSCULAR; INTRAVENOUS EVERY 6 HOURS PRN
Status: DISCONTINUED | OUTPATIENT
Start: 2017-05-12 | End: 2017-05-12 | Stop reason: HOSPADM

## 2017-05-12 RX ORDER — MORPHINE SULFATE 4 MG/ML
4 INJECTION, SOLUTION INTRAMUSCULAR; INTRAVENOUS
Status: DISCONTINUED | OUTPATIENT
Start: 2017-05-12 | End: 2017-05-12 | Stop reason: HOSPADM

## 2017-05-12 RX ORDER — HYDROMORPHONE HCL 110MG/55ML
0.5 PATIENT CONTROLLED ANALGESIA SYRINGE INTRAVENOUS EVERY 10 MIN PRN
Status: DISCONTINUED | OUTPATIENT
Start: 2017-05-12 | End: 2017-05-12 | Stop reason: HOSPADM

## 2017-05-12 RX ORDER — ONDANSETRON 2 MG/ML
4 INJECTION INTRAMUSCULAR; INTRAVENOUS
Status: DISCONTINUED | OUTPATIENT
Start: 2017-05-12 | End: 2017-05-12 | Stop reason: HOSPADM

## 2017-05-12 RX ORDER — OXYCODONE HYDROCHLORIDE AND ACETAMINOPHEN 5; 325 MG/1; MG/1
2 TABLET ORAL EVERY 4 HOURS PRN
Status: DISCONTINUED | OUTPATIENT
Start: 2017-05-12 | End: 2017-05-12 | Stop reason: HOSPADM

## 2017-05-12 RX ORDER — DIPHENHYDRAMINE HYDROCHLORIDE 50 MG/ML
12.5 INJECTION INTRAMUSCULAR; INTRAVENOUS
Status: DISCONTINUED | OUTPATIENT
Start: 2017-05-12 | End: 2017-05-12 | Stop reason: HOSPADM

## 2017-05-12 RX ORDER — SODIUM CHLORIDE 0.9 % (FLUSH) 0.9 %
10 SYRINGE (ML) INJECTION EVERY 12 HOURS SCHEDULED
Status: DISCONTINUED | OUTPATIENT
Start: 2017-05-12 | End: 2017-05-12 | Stop reason: HOSPADM

## 2017-05-12 RX ORDER — SODIUM CHLORIDE 0.9 % (FLUSH) 0.9 %
10 SYRINGE (ML) INJECTION PRN
Status: DISCONTINUED | OUTPATIENT
Start: 2017-05-12 | End: 2017-05-12 | Stop reason: HOSPADM

## 2017-05-12 RX ORDER — HYDROCODONE BITARTRATE AND ACETAMINOPHEN 5; 325 MG/1; MG/1
2 TABLET ORAL PRN
Status: DISCONTINUED | OUTPATIENT
Start: 2017-05-12 | End: 2017-05-12 | Stop reason: HOSPADM

## 2017-05-12 RX ORDER — MIDAZOLAM HYDROCHLORIDE 1 MG/ML
INJECTION INTRAMUSCULAR; INTRAVENOUS PRN
Status: DISCONTINUED | OUTPATIENT
Start: 2017-05-12 | End: 2017-05-12 | Stop reason: SDUPTHER

## 2017-05-12 RX ORDER — ACETAMINOPHEN 325 MG/1
650 TABLET ORAL EVERY 4 HOURS PRN
Status: DISCONTINUED | OUTPATIENT
Start: 2017-05-12 | End: 2017-05-12 | Stop reason: HOSPADM

## 2017-05-12 RX ORDER — HYDROCODONE BITARTRATE AND ACETAMINOPHEN 5; 325 MG/1; MG/1
1 TABLET ORAL PRN
Status: DISCONTINUED | OUTPATIENT
Start: 2017-05-12 | End: 2017-05-12 | Stop reason: HOSPADM

## 2017-05-12 RX ORDER — MEPERIDINE HYDROCHLORIDE 50 MG/ML
12.5 INJECTION INTRAMUSCULAR; INTRAVENOUS; SUBCUTANEOUS EVERY 5 MIN PRN
Status: DISCONTINUED | OUTPATIENT
Start: 2017-05-12 | End: 2017-05-12 | Stop reason: HOSPADM

## 2017-05-12 RX ADMIN — OXYCODONE HYDROCHLORIDE AND ACETAMINOPHEN 2 TABLET: 5; 325 TABLET ORAL at 08:26

## 2017-05-12 RX ADMIN — SODIUM CHLORIDE, POTASSIUM CHLORIDE, SODIUM LACTATE AND CALCIUM CHLORIDE: 600; 310; 30; 20 INJECTION, SOLUTION INTRAVENOUS at 07:10

## 2017-05-12 RX ADMIN — LIDOCAINE HYDROCHLORIDE 50 MG: 20 INJECTION, SOLUTION EPIDURAL; INFILTRATION; INTRACAUDAL; PERINEURAL at 07:20

## 2017-05-12 RX ADMIN — MIDAZOLAM HYDROCHLORIDE 2 MG: 1 INJECTION, SOLUTION INTRAMUSCULAR; INTRAVENOUS at 07:16

## 2017-05-12 RX ADMIN — FENTANYL CITRATE 50 MCG: 50 INJECTION, SOLUTION INTRAMUSCULAR; INTRAVENOUS at 07:20

## 2017-05-12 RX ADMIN — FENTANYL CITRATE 50 MCG: 50 INJECTION, SOLUTION INTRAMUSCULAR; INTRAVENOUS at 07:25

## 2017-05-12 RX ADMIN — PROPOFOL 180 MG: 10 INJECTION, EMULSION INTRAVENOUS at 07:20

## 2017-05-12 RX ADMIN — SODIUM CHLORIDE, POTASSIUM CHLORIDE, SODIUM LACTATE AND CALCIUM CHLORIDE: 600; 310; 30; 20 INJECTION, SOLUTION INTRAVENOUS at 06:39

## 2017-05-12 RX ADMIN — SUCCINYLCHOLINE CHLORIDE 100 MG: 20 INJECTION, SOLUTION INTRAMUSCULAR; INTRAVENOUS at 07:20

## 2017-05-12 ASSESSMENT — PAIN SCALES - GENERAL
PAINLEVEL_OUTOF10: 5
PAINLEVEL_OUTOF10: 0

## 2017-05-15 ENCOUNTER — HOSPITAL ENCOUNTER (OUTPATIENT)
Dept: PHYSICAL THERAPY | Age: 72
Setting detail: THERAPIES SERIES
Discharge: HOME | End: 2017-05-15
Payer: MEDICARE

## 2017-05-15 PROCEDURE — 97530 THERAPEUTIC ACTIVITIES: CPT

## 2017-05-15 PROCEDURE — G8978 MOBILITY CURRENT STATUS: HCPCS

## 2017-05-15 PROCEDURE — 97110 THERAPEUTIC EXERCISES: CPT

## 2017-05-15 PROCEDURE — 97162 PT EVAL MOD COMPLEX 30 MIN: CPT

## 2017-05-15 PROCEDURE — G8979 MOBILITY GOAL STATUS: HCPCS

## 2017-05-15 ASSESSMENT — PAIN DESCRIPTION - ORIENTATION: ORIENTATION: RIGHT

## 2017-05-15 ASSESSMENT — PAIN DESCRIPTION - FREQUENCY: FREQUENCY: CONTINUOUS

## 2017-05-15 ASSESSMENT — PAIN DESCRIPTION - PAIN TYPE: TYPE: SURGICAL PAIN;CHRONIC PAIN

## 2017-05-15 ASSESSMENT — PAIN SCALES - GENERAL: PAINLEVEL_OUTOF10: 4

## 2017-05-15 ASSESSMENT — PAIN DESCRIPTION - LOCATION: LOCATION: KNEE

## 2017-05-17 ENCOUNTER — HOSPITAL ENCOUNTER (OUTPATIENT)
Dept: PHYSICAL THERAPY | Age: 72
Setting detail: THERAPIES SERIES
Discharge: HOME | End: 2017-05-17
Payer: MEDICARE

## 2017-05-17 PROCEDURE — 97140 MANUAL THERAPY 1/> REGIONS: CPT

## 2017-05-17 PROCEDURE — 97110 THERAPEUTIC EXERCISES: CPT

## 2017-05-17 ASSESSMENT — PAIN DESCRIPTION - LOCATION: LOCATION: KNEE

## 2017-05-17 ASSESSMENT — PAIN DESCRIPTION - ORIENTATION: ORIENTATION: RIGHT

## 2017-05-17 ASSESSMENT — PAIN SCALES - GENERAL: PAINLEVEL_OUTOF10: 4

## 2017-05-17 ASSESSMENT — PAIN DESCRIPTION - PAIN TYPE: TYPE: SURGICAL PAIN

## 2017-05-19 ENCOUNTER — HOSPITAL ENCOUNTER (OUTPATIENT)
Dept: PHYSICAL THERAPY | Age: 72
Setting detail: THERAPIES SERIES
Discharge: HOME | End: 2017-05-19
Payer: MEDICARE

## 2017-05-19 PROCEDURE — 97110 THERAPEUTIC EXERCISES: CPT

## 2017-05-19 PROCEDURE — 97140 MANUAL THERAPY 1/> REGIONS: CPT

## 2017-05-19 PROCEDURE — 97116 GAIT TRAINING THERAPY: CPT

## 2017-05-19 ASSESSMENT — PAIN DESCRIPTION - ORIENTATION: ORIENTATION: RIGHT

## 2017-05-19 ASSESSMENT — PAIN SCALES - GENERAL: PAINLEVEL_OUTOF10: 2

## 2017-05-19 ASSESSMENT — PAIN DESCRIPTION - LOCATION: LOCATION: KNEE

## 2017-05-19 ASSESSMENT — PAIN DESCRIPTION - PAIN TYPE: TYPE: SURGICAL PAIN

## 2017-05-22 ENCOUNTER — HOSPITAL ENCOUNTER (OUTPATIENT)
Dept: PHYSICAL THERAPY | Age: 72
Setting detail: THERAPIES SERIES
Discharge: HOME | End: 2017-05-22
Payer: MEDICARE

## 2017-05-22 PROCEDURE — 97110 THERAPEUTIC EXERCISES: CPT

## 2017-05-22 PROCEDURE — 97116 GAIT TRAINING THERAPY: CPT

## 2017-05-22 PROCEDURE — 97140 MANUAL THERAPY 1/> REGIONS: CPT

## 2017-05-22 ASSESSMENT — PAIN DESCRIPTION - PAIN TYPE: TYPE: SURGICAL PAIN

## 2017-05-22 ASSESSMENT — PAIN DESCRIPTION - LOCATION: LOCATION: KNEE

## 2017-05-22 ASSESSMENT — PAIN DESCRIPTION - ORIENTATION: ORIENTATION: RIGHT

## 2017-05-22 ASSESSMENT — PAIN SCALES - GENERAL: PAINLEVEL_OUTOF10: 3

## 2017-05-24 ENCOUNTER — HOSPITAL ENCOUNTER (OUTPATIENT)
Dept: PHYSICAL THERAPY | Age: 72
Setting detail: THERAPIES SERIES
Discharge: HOME | End: 2017-05-24
Payer: MEDICARE

## 2017-05-24 ENCOUNTER — HOSPITAL ENCOUNTER (OUTPATIENT)
Dept: ORTHOPEDIC SURGERY | Age: 72
Discharge: HOME | End: 2017-05-24
Payer: MEDICARE

## 2017-05-24 DIAGNOSIS — M17.11 LOCALIZED PRIMARY OSTEOARTHRITIS OF LOWER LEG, RIGHT: ICD-10-CM

## 2017-05-24 PROCEDURE — 73560 X-RAY EXAM OF KNEE 1 OR 2: CPT

## 2017-05-24 PROCEDURE — 97140 MANUAL THERAPY 1/> REGIONS: CPT

## 2017-05-24 PROCEDURE — 97110 THERAPEUTIC EXERCISES: CPT

## 2017-05-24 ASSESSMENT — PAIN DESCRIPTION - LOCATION: LOCATION: KNEE

## 2017-05-24 ASSESSMENT — PAIN SCALES - GENERAL: PAINLEVEL_OUTOF10: 4

## 2017-05-24 ASSESSMENT — PAIN DESCRIPTION - ORIENTATION: ORIENTATION: RIGHT

## 2017-05-24 ASSESSMENT — PAIN DESCRIPTION - PAIN TYPE: TYPE: SURGICAL PAIN

## 2017-05-26 ENCOUNTER — HOSPITAL ENCOUNTER (OUTPATIENT)
Dept: PHYSICAL THERAPY | Age: 72
Setting detail: THERAPIES SERIES
Discharge: HOME | End: 2017-05-26
Payer: MEDICARE

## 2017-05-26 PROCEDURE — 97110 THERAPEUTIC EXERCISES: CPT

## 2017-05-26 PROCEDURE — 97112 NEUROMUSCULAR REEDUCATION: CPT

## 2017-05-26 PROCEDURE — 97116 GAIT TRAINING THERAPY: CPT

## 2017-05-26 ASSESSMENT — PAIN SCALES - GENERAL: PAINLEVEL_OUTOF10: 3

## 2017-05-26 ASSESSMENT — PAIN DESCRIPTION - PAIN TYPE: TYPE: CHRONIC PAIN

## 2017-05-26 ASSESSMENT — PAIN DESCRIPTION - ORIENTATION: ORIENTATION: RIGHT

## 2017-05-26 ASSESSMENT — PAIN DESCRIPTION - LOCATION: LOCATION: KNEE

## 2017-05-31 ENCOUNTER — HOSPITAL ENCOUNTER (OUTPATIENT)
Dept: PHYSICAL THERAPY | Age: 72
Setting detail: THERAPIES SERIES
Discharge: HOME | End: 2017-05-31
Payer: MEDICARE

## 2017-05-31 PROCEDURE — 97140 MANUAL THERAPY 1/> REGIONS: CPT

## 2017-05-31 PROCEDURE — 97110 THERAPEUTIC EXERCISES: CPT

## 2017-05-31 ASSESSMENT — PAIN DESCRIPTION - LOCATION: LOCATION: KNEE

## 2017-05-31 ASSESSMENT — PAIN DESCRIPTION - ORIENTATION: ORIENTATION: RIGHT

## 2017-05-31 ASSESSMENT — PAIN SCALES - GENERAL: PAINLEVEL_OUTOF10: 5

## 2017-05-31 ASSESSMENT — PAIN DESCRIPTION - PAIN TYPE: TYPE: CHRONIC PAIN

## 2017-06-02 ENCOUNTER — HOSPITAL ENCOUNTER (OUTPATIENT)
Dept: PHYSICAL THERAPY | Age: 72
Setting detail: THERAPIES SERIES
Discharge: HOME | End: 2017-06-02
Payer: MEDICARE

## 2017-06-02 PROCEDURE — 97110 THERAPEUTIC EXERCISES: CPT

## 2017-06-02 PROCEDURE — 97140 MANUAL THERAPY 1/> REGIONS: CPT

## 2017-06-05 ENCOUNTER — HOSPITAL ENCOUNTER (OUTPATIENT)
Dept: PHYSICAL THERAPY | Age: 72
Setting detail: THERAPIES SERIES
Discharge: HOME | End: 2017-06-05
Payer: MEDICARE

## 2017-06-05 PROCEDURE — 97110 THERAPEUTIC EXERCISES: CPT

## 2017-06-05 PROCEDURE — 97140 MANUAL THERAPY 1/> REGIONS: CPT

## 2017-06-05 ASSESSMENT — PAIN DESCRIPTION - PAIN TYPE: TYPE: SURGICAL PAIN

## 2017-06-05 ASSESSMENT — PAIN SCALES - GENERAL: PAINLEVEL_OUTOF10: 4

## 2017-06-05 ASSESSMENT — PAIN DESCRIPTION - LOCATION: LOCATION: KNEE

## 2017-06-05 ASSESSMENT — PAIN DESCRIPTION - ORIENTATION: ORIENTATION: RIGHT

## 2017-06-07 ENCOUNTER — HOSPITAL ENCOUNTER (OUTPATIENT)
Dept: PHYSICAL THERAPY | Age: 72
Setting detail: THERAPIES SERIES
Discharge: HOME | End: 2017-06-07
Payer: MEDICARE

## 2017-06-07 PROCEDURE — 97110 THERAPEUTIC EXERCISES: CPT

## 2017-06-07 PROCEDURE — 97116 GAIT TRAINING THERAPY: CPT

## 2017-06-07 ASSESSMENT — PAIN DESCRIPTION - PAIN TYPE: TYPE: SURGICAL PAIN;CHRONIC PAIN

## 2017-06-07 ASSESSMENT — PAIN SCALES - GENERAL: PAINLEVEL_OUTOF10: 4

## 2017-06-07 ASSESSMENT — PAIN DESCRIPTION - LOCATION: LOCATION: BACK;KNEE

## 2017-06-07 ASSESSMENT — PAIN DESCRIPTION - ORIENTATION: ORIENTATION: RIGHT

## 2017-06-09 ENCOUNTER — HOSPITAL ENCOUNTER (OUTPATIENT)
Dept: PHYSICAL THERAPY | Age: 72
Setting detail: THERAPIES SERIES
Discharge: HOME | End: 2017-06-09
Payer: MEDICARE

## 2017-06-09 PROCEDURE — 97140 MANUAL THERAPY 1/> REGIONS: CPT

## 2017-06-09 PROCEDURE — 97110 THERAPEUTIC EXERCISES: CPT

## 2017-06-09 PROCEDURE — 97116 GAIT TRAINING THERAPY: CPT

## 2017-06-09 ASSESSMENT — PAIN DESCRIPTION - PAIN TYPE: TYPE: CHRONIC PAIN

## 2017-06-09 ASSESSMENT — PAIN SCALES - GENERAL: PAINLEVEL_OUTOF10: 4

## 2017-06-09 ASSESSMENT — PAIN DESCRIPTION - ORIENTATION: ORIENTATION: RIGHT

## 2017-06-09 ASSESSMENT — PAIN DESCRIPTION - LOCATION: LOCATION: BACK

## 2017-06-13 ENCOUNTER — HOSPITAL ENCOUNTER (OUTPATIENT)
Dept: PHYSICAL THERAPY | Age: 72
Setting detail: THERAPIES SERIES
Discharge: HOME | End: 2017-06-13
Payer: MEDICARE

## 2017-06-13 PROCEDURE — 97110 THERAPEUTIC EXERCISES: CPT

## 2017-06-13 PROCEDURE — 97140 MANUAL THERAPY 1/> REGIONS: CPT

## 2017-06-13 ASSESSMENT — PAIN DESCRIPTION - ORIENTATION: ORIENTATION: RIGHT

## 2017-06-13 ASSESSMENT — PAIN SCALES - GENERAL: PAINLEVEL_OUTOF10: 3

## 2017-06-13 ASSESSMENT — PAIN DESCRIPTION - PAIN TYPE: TYPE: CHRONIC PAIN

## 2017-06-13 ASSESSMENT — PAIN DESCRIPTION - LOCATION: LOCATION: BACK;KNEE

## 2017-06-15 ENCOUNTER — HOSPITAL ENCOUNTER (OUTPATIENT)
Dept: PHYSICAL THERAPY | Age: 72
Setting detail: THERAPIES SERIES
Discharge: HOME | End: 2017-06-15
Payer: MEDICARE

## 2017-06-15 PROCEDURE — 97110 THERAPEUTIC EXERCISES: CPT

## 2017-06-15 PROCEDURE — 97530 THERAPEUTIC ACTIVITIES: CPT

## 2017-06-15 PROCEDURE — 97116 GAIT TRAINING THERAPY: CPT

## 2017-06-15 PROCEDURE — G8978 MOBILITY CURRENT STATUS: HCPCS

## 2017-06-15 PROCEDURE — G8979 MOBILITY GOAL STATUS: HCPCS

## 2017-06-15 PROCEDURE — 97140 MANUAL THERAPY 1/> REGIONS: CPT

## 2017-06-15 ASSESSMENT — PAIN SCALES - GENERAL: PAINLEVEL_OUTOF10: 4

## 2017-06-15 ASSESSMENT — PAIN DESCRIPTION - PAIN TYPE: TYPE: CHRONIC PAIN

## 2017-06-15 ASSESSMENT — PAIN DESCRIPTION - ORIENTATION: ORIENTATION: RIGHT

## 2017-06-15 ASSESSMENT — PAIN DESCRIPTION - LOCATION: LOCATION: BACK;KNEE

## 2017-06-20 ENCOUNTER — HOSPITAL ENCOUNTER (OUTPATIENT)
Dept: PHYSICAL THERAPY | Age: 72
Setting detail: THERAPIES SERIES
Discharge: HOME | End: 2017-06-20
Payer: MEDICARE

## 2017-06-20 PROCEDURE — 97110 THERAPEUTIC EXERCISES: CPT

## 2017-06-20 PROCEDURE — 97140 MANUAL THERAPY 1/> REGIONS: CPT

## 2017-06-20 ASSESSMENT — PAIN DESCRIPTION - ORIENTATION: ORIENTATION: RIGHT

## 2017-06-20 ASSESSMENT — PAIN SCALES - GENERAL: PAINLEVEL_OUTOF10: 3

## 2017-06-20 ASSESSMENT — PAIN DESCRIPTION - LOCATION: LOCATION: KNEE

## 2017-06-20 ASSESSMENT — PAIN DESCRIPTION - PAIN TYPE: TYPE: CHRONIC PAIN

## 2017-06-23 ENCOUNTER — HOSPITAL ENCOUNTER (OUTPATIENT)
Dept: PHYSICAL THERAPY | Age: 72
Setting detail: THERAPIES SERIES
Discharge: HOME | End: 2017-06-23
Payer: MEDICARE

## 2017-06-23 PROCEDURE — 97140 MANUAL THERAPY 1/> REGIONS: CPT

## 2017-06-23 PROCEDURE — 97116 GAIT TRAINING THERAPY: CPT

## 2017-06-23 PROCEDURE — 97110 THERAPEUTIC EXERCISES: CPT

## 2017-06-23 ASSESSMENT — PAIN DESCRIPTION - PAIN TYPE: TYPE: CHRONIC PAIN

## 2017-06-23 ASSESSMENT — PAIN DESCRIPTION - ORIENTATION: ORIENTATION: RIGHT

## 2017-06-23 ASSESSMENT — PAIN DESCRIPTION - LOCATION: LOCATION: KNEE

## 2017-06-23 ASSESSMENT — PAIN SCALES - GENERAL: PAINLEVEL_OUTOF10: 3

## 2017-06-27 ENCOUNTER — TELEPHONE (OUTPATIENT)
Dept: FAMILY MEDICINE CLINIC | Age: 72
End: 2017-06-27

## 2017-06-27 ENCOUNTER — HOSPITAL ENCOUNTER (OUTPATIENT)
Dept: PHYSICAL THERAPY | Age: 72
Setting detail: THERAPIES SERIES
Discharge: HOME | End: 2017-06-27
Payer: MEDICARE

## 2017-06-27 DIAGNOSIS — Z12.11 SCREEN FOR COLON CANCER: ICD-10-CM

## 2017-06-27 LAB
CONTROL: YES
HEMOCCULT STL QL: ABNORMAL

## 2017-06-27 PROCEDURE — 97140 MANUAL THERAPY 1/> REGIONS: CPT

## 2017-06-27 PROCEDURE — 97116 GAIT TRAINING THERAPY: CPT

## 2017-06-27 PROCEDURE — 97110 THERAPEUTIC EXERCISES: CPT

## 2017-06-27 PROCEDURE — 82274 ASSAY TEST FOR BLOOD FECAL: CPT | Performed by: FAMILY MEDICINE

## 2017-06-30 ENCOUNTER — HOSPITAL ENCOUNTER (OUTPATIENT)
Dept: PHYSICAL THERAPY | Age: 72
Setting detail: THERAPIES SERIES
Discharge: HOME | End: 2017-06-30
Payer: MEDICARE

## 2017-06-30 PROCEDURE — 97140 MANUAL THERAPY 1/> REGIONS: CPT

## 2017-06-30 PROCEDURE — 97110 THERAPEUTIC EXERCISES: CPT

## 2017-06-30 PROCEDURE — 97116 GAIT TRAINING THERAPY: CPT

## 2017-06-30 ASSESSMENT — PAIN DESCRIPTION - PAIN TYPE: TYPE: SURGICAL PAIN

## 2017-06-30 ASSESSMENT — PAIN SCALES - GENERAL: PAINLEVEL_OUTOF10: 2

## 2017-06-30 ASSESSMENT — PAIN DESCRIPTION - ORIENTATION: ORIENTATION: RIGHT

## 2017-06-30 ASSESSMENT — PAIN DESCRIPTION - LOCATION: LOCATION: KNEE

## 2017-07-03 ENCOUNTER — APPOINTMENT (OUTPATIENT)
Dept: PHYSICAL THERAPY | Age: 72
End: 2017-07-03
Payer: MEDICARE

## 2017-07-07 ENCOUNTER — HOSPITAL ENCOUNTER (OUTPATIENT)
Dept: PHYSICAL THERAPY | Age: 72
Setting detail: THERAPIES SERIES
Discharge: HOME | End: 2017-07-07
Payer: MEDICARE

## 2017-07-07 PROCEDURE — 97140 MANUAL THERAPY 1/> REGIONS: CPT

## 2017-07-07 PROCEDURE — 97110 THERAPEUTIC EXERCISES: CPT

## 2017-07-07 ASSESSMENT — PAIN DESCRIPTION - ORIENTATION: ORIENTATION: RIGHT

## 2017-07-07 ASSESSMENT — PAIN DESCRIPTION - LOCATION: LOCATION: KNEE

## 2017-07-07 ASSESSMENT — PAIN DESCRIPTION - PAIN TYPE: TYPE: SURGICAL PAIN

## 2017-07-07 ASSESSMENT — PAIN SCALES - GENERAL: PAINLEVEL_OUTOF10: 3

## 2017-07-10 ENCOUNTER — HOSPITAL ENCOUNTER (OUTPATIENT)
Dept: PHYSICAL THERAPY | Age: 72
Setting detail: THERAPIES SERIES
Discharge: HOME | End: 2017-07-10
Payer: MEDICARE

## 2017-07-10 PROCEDURE — 97140 MANUAL THERAPY 1/> REGIONS: CPT

## 2017-07-10 PROCEDURE — 97110 THERAPEUTIC EXERCISES: CPT

## 2017-07-10 ASSESSMENT — PAIN DESCRIPTION - LOCATION: LOCATION: KNEE

## 2017-07-10 ASSESSMENT — PAIN DESCRIPTION - PAIN TYPE: TYPE: SURGICAL PAIN

## 2017-07-10 ASSESSMENT — PAIN DESCRIPTION - ORIENTATION: ORIENTATION: RIGHT

## 2017-07-10 ASSESSMENT — PAIN SCALES - GENERAL: PAINLEVEL_OUTOF10: 2

## 2017-07-12 ENCOUNTER — HOSPITAL ENCOUNTER (OUTPATIENT)
Dept: PHYSICAL THERAPY | Age: 72
Setting detail: THERAPIES SERIES
Discharge: HOME | End: 2017-07-12
Payer: MEDICARE

## 2017-07-12 PROCEDURE — 97140 MANUAL THERAPY 1/> REGIONS: CPT

## 2017-07-12 PROCEDURE — 97116 GAIT TRAINING THERAPY: CPT

## 2017-07-12 PROCEDURE — 97530 THERAPEUTIC ACTIVITIES: CPT

## 2017-07-12 ASSESSMENT — PAIN SCALES - GENERAL: PAINLEVEL_OUTOF10: 2

## 2017-07-12 ASSESSMENT — PAIN DESCRIPTION - ORIENTATION: ORIENTATION: RIGHT

## 2017-07-12 ASSESSMENT — PAIN DESCRIPTION - LOCATION: LOCATION: KNEE

## 2017-07-12 ASSESSMENT — PAIN DESCRIPTION - DESCRIPTORS: DESCRIPTORS: ACHING

## 2017-07-12 ASSESSMENT — PAIN DESCRIPTION - PAIN TYPE: TYPE: SURGICAL PAIN

## 2017-07-19 ENCOUNTER — HOSPITAL ENCOUNTER (OUTPATIENT)
Dept: ORTHOPEDIC SURGERY | Age: 72
Discharge: HOME | End: 2017-07-19
Payer: MEDICARE

## 2017-07-19 DIAGNOSIS — M17.11 LOCALIZED PRIMARY OSTEOARTHRITIS OF LOWER LEG, RIGHT: ICD-10-CM

## 2017-07-19 PROCEDURE — 73560 X-RAY EXAM OF KNEE 1 OR 2: CPT

## 2017-08-05 ENCOUNTER — HOSPITAL ENCOUNTER (OUTPATIENT)
Dept: MRI IMAGING | Age: 72
Discharge: HOME | End: 2017-08-05
Payer: MEDICARE

## 2017-08-05 DIAGNOSIS — M47.817 SPONDYLOSIS OF LUMBOSACRAL REGION, UNSPECIFIED SPINAL OSTEOARTHRITIS COMPLICATION STATUS: ICD-10-CM

## 2017-08-30 ENCOUNTER — HOSPITAL ENCOUNTER (OUTPATIENT)
Dept: CT IMAGING | Age: 72
Discharge: HOME | End: 2017-08-30
Payer: MEDICARE

## 2017-08-30 VITALS
RESPIRATION RATE: 16 BRPM | BODY MASS INDEX: 28.12 KG/M2 | SYSTOLIC BLOOD PRESSURE: 113 MMHG | HEIGHT: 66 IN | DIASTOLIC BLOOD PRESSURE: 68 MMHG | WEIGHT: 175 LBS | HEART RATE: 64 BPM

## 2017-08-30 DIAGNOSIS — M51.36 ANNULAR TEAR OF LUMBAR DISC: ICD-10-CM

## 2017-08-30 DIAGNOSIS — M47.817 FACET DEGENERATION OF LUMBOSACRAL REGION: ICD-10-CM

## 2017-08-30 PROCEDURE — 72131 CT LUMBAR SPINE W/O DYE: CPT

## 2017-10-03 ENCOUNTER — TELEPHONE (OUTPATIENT)
Dept: UROLOGY | Age: 72
End: 2017-10-03

## 2017-10-03 DIAGNOSIS — N40.1 BENIGN NON-NODULAR PROSTATIC HYPERPLASIA WITH LOWER URINARY TRACT SYMPTOMS: ICD-10-CM

## 2017-10-03 DIAGNOSIS — N40.1 BENIGN NON-NODULAR PROSTATIC HYPERPLASIA WITH LOWER URINARY TRACT SYMPTOMS: Primary | ICD-10-CM

## 2017-10-03 LAB — PROSTATE SPECIFIC ANTIGEN: 1.78 NG/ML (ref 0–6.22)

## 2017-10-10 ENCOUNTER — OFFICE VISIT (OUTPATIENT)
Dept: UROLOGY | Age: 72
End: 2017-10-10

## 2017-10-10 VITALS
SYSTOLIC BLOOD PRESSURE: 110 MMHG | WEIGHT: 175 LBS | HEART RATE: 63 BPM | DIASTOLIC BLOOD PRESSURE: 66 MMHG | BODY MASS INDEX: 28.12 KG/M2 | HEIGHT: 66 IN

## 2017-10-10 DIAGNOSIS — N40.1 BENIGN NON-NODULAR PROSTATIC HYPERPLASIA WITH LOWER URINARY TRACT SYMPTOMS: Primary | ICD-10-CM

## 2017-10-10 PROCEDURE — 99213 OFFICE O/P EST LOW 20 MIN: CPT | Performed by: UROLOGY

## 2017-10-10 RX ORDER — GABAPENTIN 100 MG/1
100 CAPSULE ORAL 3 TIMES DAILY
COMMUNITY
End: 2018-07-12 | Stop reason: ALTCHOICE

## 2017-10-10 RX ORDER — FINASTERIDE 5 MG/1
5 TABLET, FILM COATED ORAL DAILY
Qty: 90 TABLET | Refills: 3 | Status: SHIPPED | OUTPATIENT
Start: 2017-10-10 | End: 2018-10-16 | Stop reason: SDUPTHER

## 2017-10-10 NOTE — PROGRESS NOTES
[facility] EVALUATION NOTE                                                 H&P                                                                                                                                                 Reason for Visit  BPH with obstruction    History of Present Illness  39-year-old male with history of elevated PSA who underwent prostate biopsies at a PSA of 10. Those biopsies were negative. He was noted to have a 80 g prostate with some voiding symptoms. Patient was started on finasteride at that time. His voiding symptoms have improved through the years along with a drop of his PSA. Urologic Review of Systems/Symptoms  Denies hematuria  Denies dysuria  Denies incontinence  Denies flank pain  Other Urologic: Nocturia 1 time per night    Review of Systems  Head and neck: No issues/reviewed  Cardiac: No recent issues/reviewed  Pulmonary: No issues/reviewed  Gastrointestinal: No issues/reviewed  Neurologic: No recent issues/reviewed  Extremities: No issues/reviewed  Lymphatics: No lymphadenopathy no change  Genitourinary: See above  Skin: No issues/reviewed  Hospitalization: No recent hospitalization  Continues take finasteride  All 14 categories of Review of Systems otherwise reviewed no other findings reported.     Past Medical History:   Diagnosis Date    Alcohol abuse     CAD (coronary artery disease)     patient states no doctor has told him this    Chronic back pain     Chronic kidney disease     Chronic sinusitis     DJD (degenerative joint disease) of knee     left knee DJD    Elevated PSA     History of blood transfusion     History of inferior wall myocardial infarction     HTN (hypertension)     Hyperlipidemia      Past Surgical History:   Procedure Laterality Date    ABDOMEN SURGERY      spleenectomy    BACK SURGERY  2009    CARDIAC SURGERY      stents    CORONARY ANGIOPLASTY WITH STENT PLACEMENT  1/29/2016    HERNIA REPAIR      JOINT REPLACEMENT by mouth nightly      clopidogrel (PLAVIX) 75 MG tablet Take 75 mg by mouth daily      metoprolol tartrate (LOPRESSOR) 50 MG tablet Take 50 mg by mouth 2 times daily      nitroGLYCERIN (NITROSTAT) 0.4 MG SL tablet Place 0.4 mg under the tongue every 5 minutes as needed for Chest pain up to max of 3 total doses. If no relief after 1 dose, call 911.  oxyCODONE-acetaminophen (PERCOCET) 5-325 MG per tablet Take 1-2 tablets by mouth every 4 hours as needed for Pain . No current facility-administered medications for this visit. Review of patient's allergies indicates no known allergies. All reviewed and verified by Dr Oscar Soriano on today's visit    PSA   Date Value Ref Range Status   10/03/2017 1.78 0.00 - 6.22 ng/mL Final   10/04/2016 2.89 0.00 - 6.22 ng/mL Final   10/05/2015 5.01 0.00 - 6.22 ng/mL Final   03/30/2015 9.47 (H) 0.00 - 6.22 ng/mL Final   04/28/2014 9.25 (H) 0.00 - 6.22 ng/mL Final     Comment:     When the Total PSA is between 3.00 and 10.00 ng/mL, consider  requesting a Free PSA to aid in diagnosis. Diagnostic Psa   Date Value Ref Range Status   09/23/2014 10.01 (H) 0.00 - 6.22 ng/mL Final     No results found for this visit on 10/10/17. Physical Exam  Vitals:    10/10/17 1408   BP: 110/66   Pulse: 63   Weight: 175 lb (79.4 kg)   Height: 5' 6\" (1.676 m)     Constitutional: patient is oriented to person, place, and time. patient appears well-developed. not in distress. Ears: Adequate hearing/no hearing loss  Head: Normocephalic. Atraumatic  Neck: Normal range of motion. Cardiovascular: Normal rate, BP reviewed. sinus rhythm  Pulmonary/Chest: Normal respiratory effort  no shortness of breath  Abdominal: Not distended. No hernias  Urologic Exam  Patient defers prostate exam.  Urologic exam otherwise unchanged. PSA lower at 1.7 . Musculoskeletal: Normal range of motion. Patient has recent right knee replacement has issues with the right knee.   Extremities: Mild swelling behind

## 2017-10-18 ENCOUNTER — HOSPITAL ENCOUNTER (OUTPATIENT)
Dept: ORTHOPEDIC SURGERY | Age: 72
Discharge: HOME | End: 2017-10-18
Payer: MEDICARE

## 2017-10-18 DIAGNOSIS — M17.11 PRIMARY LOCALIZED OSTEOARTHROSIS OF RIGHT LOWER LEG: ICD-10-CM

## 2017-10-18 PROCEDURE — 73560 X-RAY EXAM OF KNEE 1 OR 2: CPT

## 2017-10-19 PROBLEM — M47.16 OSTEOARTHRITIS OF SPINE WITH MYELOPATHY, LUMBOSACRAL REGION: Status: ACTIVE | Noted: 2017-10-19

## 2017-10-19 PROBLEM — M51.36 DDD (DEGENERATIVE DISC DISEASE), LUMBAR: Status: ACTIVE | Noted: 2017-10-19

## 2017-10-19 PROBLEM — M43.17 ACQUIRED SPONDYLOLISTHESIS OF LUMBOSACRAL REGION: Status: ACTIVE | Noted: 2017-10-19

## 2017-10-19 PROBLEM — M54.42 CHRONIC BILATERAL LOW BACK PAIN WITH BILATERAL SCIATICA: Status: ACTIVE | Noted: 2017-10-19

## 2017-10-19 PROBLEM — M48.062 NEUROGENIC CLAUDICATION DUE TO LUMBAR SPINAL STENOSIS: Status: ACTIVE | Noted: 2017-10-19

## 2017-10-19 PROBLEM — M96.1 POSTLAMINECTOMY SYNDROME, LUMBAR REGION: Status: ACTIVE | Noted: 2017-10-19

## 2017-10-19 PROBLEM — M54.41 CHRONIC BILATERAL LOW BACK PAIN WITH BILATERAL SCIATICA: Status: ACTIVE | Noted: 2017-10-19

## 2017-10-19 PROBLEM — G89.29 CHRONIC BILATERAL LOW BACK PAIN WITH BILATERAL SCIATICA: Status: ACTIVE | Noted: 2017-10-19

## 2017-10-23 ENCOUNTER — HOSPITAL ENCOUNTER (OUTPATIENT)
Dept: ULTRASOUND IMAGING | Age: 72
Discharge: HOME | End: 2017-10-23
Payer: MEDICARE

## 2017-10-23 DIAGNOSIS — M79.89 SWELLING OF LIMB: ICD-10-CM

## 2017-10-23 PROCEDURE — 93970 EXTREMITY STUDY: CPT

## 2017-11-07 ENCOUNTER — HOSPITAL ENCOUNTER (OUTPATIENT)
Dept: MRI IMAGING | Age: 72
Discharge: HOME | End: 2017-11-07
Payer: MEDICARE

## 2017-11-07 DIAGNOSIS — M47.16 OSTEOARTHRITIS OF SPINE WITH MYELOPATHY, LUMBOSACRAL REGION: ICD-10-CM

## 2017-11-07 DIAGNOSIS — M51.36 DDD (DEGENERATIVE DISC DISEASE), LUMBAR: ICD-10-CM

## 2017-11-07 DIAGNOSIS — M54.42 CHRONIC BILATERAL LOW BACK PAIN WITH BILATERAL SCIATICA: ICD-10-CM

## 2017-11-07 DIAGNOSIS — G89.29 CHRONIC BILATERAL LOW BACK PAIN WITH BILATERAL SCIATICA: ICD-10-CM

## 2017-11-07 DIAGNOSIS — M54.41 CHRONIC BILATERAL LOW BACK PAIN WITH BILATERAL SCIATICA: ICD-10-CM

## 2017-11-07 DIAGNOSIS — M96.1 POSTLAMINECTOMY SYNDROME, LUMBAR REGION: ICD-10-CM

## 2017-11-07 DIAGNOSIS — M48.062 NEUROGENIC CLAUDICATION DUE TO LUMBAR SPINAL STENOSIS: ICD-10-CM

## 2017-11-07 DIAGNOSIS — M43.17 ACQUIRED SPONDYLOLISTHESIS OF LUMBOSACRAL REGION: ICD-10-CM

## 2017-11-07 LAB
GFR AFRICAN AMERICAN: >60
GFR NON-AFRICAN AMERICAN: >60
PERFORMED ON: NORMAL
POC CREATININE: 1 MG/DL (ref 0.8–1.3)
POC SAMPLE TYPE: NORMAL

## 2017-11-07 PROCEDURE — 72158 MRI LUMBAR SPINE W/O & W/DYE: CPT

## 2017-11-07 PROCEDURE — A9579 GAD-BASE MR CONTRAST NOS,1ML: HCPCS | Performed by: NEUROLOGICAL SURGERY

## 2017-11-07 PROCEDURE — 6360000004 HC RX CONTRAST MEDICATION: Performed by: NEUROLOGICAL SURGERY

## 2017-11-07 RX ADMIN — GADOTERIDOL 20 ML: 279.3 INJECTION, SOLUTION INTRAVENOUS at 17:09

## 2017-11-16 PROBLEM — M43.16 SPONDYLOLISTHESIS AT L4-L5 LEVEL: Status: ACTIVE | Noted: 2017-11-16

## 2017-11-16 PROBLEM — M51.26 HNP (HERNIATED NUCLEUS PULPOSUS), LUMBAR: Status: ACTIVE | Noted: 2017-11-16

## 2017-12-26 ENCOUNTER — HOSPITAL ENCOUNTER (OUTPATIENT)
Dept: PREADMISSION TESTING | Age: 72
Discharge: HOME | DRG: 460 | End: 2017-12-26
Payer: MEDICARE

## 2017-12-26 VITALS
WEIGHT: 180 LBS | OXYGEN SATURATION: 95 % | SYSTOLIC BLOOD PRESSURE: 129 MMHG | TEMPERATURE: 97.3 F | HEART RATE: 64 BPM | BODY MASS INDEX: 28.93 KG/M2 | RESPIRATION RATE: 16 BRPM | HEIGHT: 66 IN | DIASTOLIC BLOOD PRESSURE: 62 MMHG

## 2017-12-26 DIAGNOSIS — R20.0 ANESTHESIA: ICD-10-CM

## 2017-12-26 LAB
ANION GAP SERPL CALCULATED.3IONS-SCNC: 13 MEQ/L (ref 7–13)
BILIRUBIN URINE: NEGATIVE
BLOOD, URINE: NEGATIVE
BUN BLDV-MCNC: 16 MG/DL (ref 8–23)
CALCIUM SERPL-MCNC: 9.5 MG/DL (ref 8.6–10.2)
CHLORIDE BLD-SCNC: 102 MEQ/L (ref 98–107)
CLARITY: CLEAR
CO2: 26 MEQ/L (ref 22–29)
COLOR: YELLOW
CREAT SERPL-MCNC: 0.57 MG/DL (ref 0.7–1.2)
EKG ATRIAL RATE: 54 BPM
EKG P AXIS: 27 DEGREES
EKG P-R INTERVAL: 132 MS
EKG Q-T INTERVAL: 442 MS
EKG QRS DURATION: 82 MS
EKG QTC CALCULATION (BAZETT): 419 MS
EKG R AXIS: 30 DEGREES
EKG T AXIS: 13 DEGREES
EKG VENTRICULAR RATE: 54 BPM
GFR AFRICAN AMERICAN: >60
GFR NON-AFRICAN AMERICAN: >60
GLUCOSE BLD-MCNC: 87 MG/DL (ref 74–109)
GLUCOSE URINE: NEGATIVE MG/DL
HCT VFR BLD CALC: 36.1 % (ref 42–52)
HEMOGLOBIN: 11.9 G/DL (ref 14–18)
INR BLD: 1.1
KETONES, URINE: NEGATIVE MG/DL
LEUKOCYTE ESTERASE, URINE: NEGATIVE
MCH RBC QN AUTO: 33.8 PG (ref 27–31.3)
MCHC RBC AUTO-ENTMCNC: 33 % (ref 33–37)
MCV RBC AUTO: 102.3 FL (ref 80–100)
NITRITE, URINE: NEGATIVE
PDW BLD-RTO: 13.2 % (ref 11.5–14.5)
PH UA: 6.5 (ref 5–9)
PLATELET # BLD: 248 K/UL (ref 130–400)
POTASSIUM SERPL-SCNC: 4.9 MEQ/L (ref 3.5–5.1)
PROTEIN UA: NEGATIVE MG/DL
PROTHROMBIN TIME: 11.2 SEC (ref 8.1–13.7)
RBC # BLD: 3.53 M/UL (ref 4.7–6.1)
SODIUM BLD-SCNC: 141 MEQ/L (ref 132–144)
SPECIFIC GRAVITY UA: 1.01 (ref 1–1.03)
URINE REFLEX TO CULTURE: NORMAL
UROBILINOGEN, URINE: 1 E.U./DL
WBC # BLD: 10 K/UL (ref 4.8–10.8)

## 2017-12-26 PROCEDURE — 93005 ELECTROCARDIOGRAM TRACING: CPT

## 2017-12-26 PROCEDURE — 85027 COMPLETE CBC AUTOMATED: CPT

## 2017-12-26 PROCEDURE — 87070 CULTURE OTHR SPECIMN AEROBIC: CPT

## 2017-12-26 PROCEDURE — 81003 URINALYSIS AUTO W/O SCOPE: CPT

## 2017-12-26 PROCEDURE — 86900 BLOOD TYPING SEROLOGIC ABO: CPT

## 2017-12-26 PROCEDURE — 86850 RBC ANTIBODY SCREEN: CPT

## 2017-12-26 PROCEDURE — 86901 BLOOD TYPING SEROLOGIC RH(D): CPT

## 2017-12-26 PROCEDURE — 85610 PROTHROMBIN TIME: CPT

## 2017-12-26 PROCEDURE — 80048 BASIC METABOLIC PNL TOTAL CA: CPT

## 2017-12-26 RX ORDER — SODIUM CHLORIDE, SODIUM LACTATE, POTASSIUM CHLORIDE, CALCIUM CHLORIDE 600; 310; 30; 20 MG/100ML; MG/100ML; MG/100ML; MG/100ML
INJECTION, SOLUTION INTRAVENOUS CONTINUOUS
Status: CANCELLED | OUTPATIENT
Start: 2017-12-26

## 2017-12-26 RX ORDER — LIDOCAINE HYDROCHLORIDE 10 MG/ML
1 INJECTION, SOLUTION EPIDURAL; INFILTRATION; INTRACAUDAL; PERINEURAL
Status: CANCELLED | OUTPATIENT
Start: 2017-12-26 | End: 2017-12-26

## 2017-12-26 RX ORDER — SODIUM CHLORIDE, SODIUM LACTATE, POTASSIUM CHLORIDE, CALCIUM CHLORIDE 600; 310; 30; 20 MG/100ML; MG/100ML; MG/100ML; MG/100ML
INJECTION, SOLUTION INTRAVENOUS CONTINUOUS
Status: CANCELLED | OUTPATIENT
Start: 2017-12-29

## 2017-12-26 RX ORDER — SODIUM CHLORIDE 0.9 % (FLUSH) 0.9 %
10 SYRINGE (ML) INJECTION EVERY 12 HOURS SCHEDULED
Status: CANCELLED | OUTPATIENT
Start: 2017-12-26

## 2017-12-26 RX ORDER — SODIUM CHLORIDE 0.9 % (FLUSH) 0.9 %
10 SYRINGE (ML) INJECTION PRN
Status: CANCELLED | OUTPATIENT
Start: 2017-12-26

## 2017-12-26 ASSESSMENT — ENCOUNTER SYMPTOMS
VOMITING: 0
BLURRED VISION: 1
BACK PAIN: 1
ABDOMINAL PAIN: 0
NAUSEA: 0
SHORTNESS OF BREATH: 0
WHEEZING: 0
SORE THROAT: 0
STRIDOR: 0
HEARTBURN: 0
COUGH: 0
DIARRHEA: 0
CONSTIPATION: 0

## 2017-12-26 NOTE — H&P
Ishan Wright is an 74 y.o.  male. Patient presents for OR /  Right & bilateral Lumbar 4-5 lysis of scar diskectomy interbody fusion posterolateral fusion pedicle screws. States had lumbar back OR 2009 & did well for few years to follow. Onset of low back pain few years ago that has intensified within the last year. C/o low back pain that radiates into both buttocks, both hips, & down both legs. Difficult for patient to sit, stand, ambulate. Has much low back stiffness. Past Medical History:   Diagnosis Date    Alcohol abuse     CAD (coronary artery disease)     patient states no doctor has told him this / has 1 cardiac stent    Chronic back pain     Chronic kidney disease     Chronic sinusitis     DJD (degenerative joint disease) of knee     left knee DJD    Elevated PSA     History of blood transfusion 1980's    History of inferior wall myocardial infarction 01/2016    1 cardiac stent    HTN (hypertension)     meds . 1 yr    Hyperlipidemia     meds > 12 yrs    Smoker        Allergies: No Known Allergies    Principal Problem:    HNP (herniated nucleus pulposus), lumbar  Active Problems:    Spondylosis of lumbar region without myelopathy or radiculopathy    Spondylolisthesis at L4-L5 level    Blood pressure 129/62, pulse 64, temperature 97.3 °F (36.3 °C), temperature source Temporal, resp. rate 16, height 5' 6\" (1.676 m), weight 180 lb (81.6 kg), SpO2 95 %. Review of Systems   Constitutional: Negative. Negative for chills. HENT: Negative for sore throat. Eyes: Positive for blurred vision (needs Phaco with IOL OU). Respiratory: Negative for cough, shortness of breath, wheezing and stridor. Cardiovascular: Negative for chest pain, palpitations and leg swelling. Gastrointestinal: Negative for abdominal pain, constipation, diarrhea, heartburn, nausea and vomiting. Genitourinary: Negative for dysuria and frequency.         Frequency   Musculoskeletal: Positive for back pain (low back pain radiates to both buttocks, both hips & down both legs). Negative for myalgias and neck pain. Skin: Negative. Neurological: Negative. Negative for seizures, loss of consciousness and headaches. Endo/Heme/Allergies: Negative. Psychiatric/Behavioral: Negative. Physical Exam   Constitutional: He is oriented to person, place, and time. He appears well-developed and well-nourished. HENT:   Head: Normocephalic and atraumatic. Mouth/Throat: Oropharynx is clear and moist. No oropharyngeal exudate. Eyes: Conjunctivae and EOM are normal. Pupils are equal, round, and reactive to light. No scleral icterus. OD lower lid lateral skin deformity /post sutures / lower lid OD does not fully close   Neck: Normal range of motion. No tracheal deviation present. No thyromegaly present. Cardiovascular: Normal rate, regular rhythm and normal heart sounds. Pulmonary/Chest: No respiratory distress. He has no wheezes. He has no rales. Abdominal: Soft. Bowel sounds are normal. He exhibits no distension and no mass. There is no tenderness. Large mid-line OR scar   Genitourinary:   Genitourinary Comments: deferred   Musculoskeletal: Normal range of motion. He exhibits no edema or tenderness. Lumbar spine OR scar  Ambulation with lumbar stiffness & shuffling of legs   Lymphadenopathy:     He has no cervical adenopathy. Neurological: He is alert and oriented to person, place, and time. Skin: Skin is warm and dry. No rash noted. No erythema. Psychiatric: He has a normal mood and affect.  His behavior is normal. Judgment and thought content normal.       Assessment:  Lumbar 4-5 herniated disc spondylolisthesis    Plan:  Right & bilateral lumbar 4-5 lysis of scar diskectomy interbody cage fusion posterolateral fusion pedicle screws    Doug Torres NP  12/26/2017

## 2017-12-27 LAB
ABO/RH: NORMAL
ANTIBODY SCREEN: NORMAL
MRSA CULTURE ONLY: NORMAL

## 2017-12-27 PROCEDURE — 93010 ELECTROCARDIOGRAM REPORT: CPT | Performed by: INTERNAL MEDICINE

## 2017-12-28 ENCOUNTER — ANESTHESIA EVENT (OUTPATIENT)
Dept: OPERATING ROOM | Age: 72
DRG: 460 | End: 2017-12-28
Payer: MEDICARE

## 2017-12-28 ASSESSMENT — LIFESTYLE VARIABLES: SMOKING_STATUS: 1

## 2017-12-29 ENCOUNTER — HOSPITAL ENCOUNTER (INPATIENT)
Age: 72
LOS: 1 days | Discharge: HOME | DRG: 460 | End: 2017-12-30
Attending: NEUROLOGICAL SURGERY | Admitting: NEUROLOGICAL SURGERY
Payer: MEDICARE

## 2017-12-29 ENCOUNTER — APPOINTMENT (OUTPATIENT)
Dept: GENERAL RADIOLOGY | Age: 72
DRG: 460 | End: 2017-12-29
Attending: NEUROLOGICAL SURGERY
Payer: MEDICARE

## 2017-12-29 ENCOUNTER — ANESTHESIA (OUTPATIENT)
Dept: OPERATING ROOM | Age: 72
DRG: 460 | End: 2017-12-29
Payer: MEDICARE

## 2017-12-29 VITALS — TEMPERATURE: 96.8 F | SYSTOLIC BLOOD PRESSURE: 121 MMHG | DIASTOLIC BLOOD PRESSURE: 57 MMHG | OXYGEN SATURATION: 100 %

## 2017-12-29 PROBLEM — M51.26 HERNIATED NUCLEUS PULPOSUS, L4-5: Status: ACTIVE | Noted: 2017-12-29

## 2017-12-29 PROCEDURE — 7100000000 HC PACU RECOVERY - FIRST 15 MIN: Performed by: NEUROLOGICAL SURGERY

## 2017-12-29 PROCEDURE — A6402 STERILE GAUZE <= 16 SQ IN: HCPCS | Performed by: NEUROLOGICAL SURGERY

## 2017-12-29 PROCEDURE — 6360000002 HC RX W HCPCS: Performed by: NEUROLOGICAL SURGERY

## 2017-12-29 PROCEDURE — 0SG00AJ FUSION OF LUMBAR VERTEBRAL JOINT WITH INTERBODY FUSION DEVICE, POSTERIOR APPROACH, ANTERIOR COLUMN, OPEN APPROACH: ICD-10-PCS | Performed by: NEUROLOGICAL SURGERY

## 2017-12-29 PROCEDURE — 6360000002 HC RX W HCPCS: Performed by: NURSE ANESTHETIST, CERTIFIED REGISTERED

## 2017-12-29 PROCEDURE — 2580000003 HC RX 258: Performed by: STUDENT IN AN ORGANIZED HEALTH CARE EDUCATION/TRAINING PROGRAM

## 2017-12-29 PROCEDURE — 2580000003 HC RX 258: Performed by: NEUROLOGICAL SURGERY

## 2017-12-29 PROCEDURE — 3600000014 HC SURGERY LEVEL 4 ADDTL 15MIN: Performed by: NEUROLOGICAL SURGERY

## 2017-12-29 PROCEDURE — 1210000000 HC MED SURG R&B

## 2017-12-29 PROCEDURE — 2500000003 HC RX 250 WO HCPCS: Performed by: NEUROLOGICAL SURGERY

## 2017-12-29 PROCEDURE — 3209999900 FLUORO FOR SURGICAL PROCEDURES

## 2017-12-29 PROCEDURE — 6370000000 HC RX 637 (ALT 250 FOR IP): Performed by: NEUROLOGICAL SURGERY

## 2017-12-29 PROCEDURE — 2780000010 HC IMPLANT OTHER: Performed by: NEUROLOGICAL SURGERY

## 2017-12-29 PROCEDURE — 2500000003 HC RX 250 WO HCPCS: Performed by: NURSE ANESTHETIST, CERTIFIED REGISTERED

## 2017-12-29 PROCEDURE — 3600000004 HC SURGERY LEVEL 4 BASE: Performed by: NEUROLOGICAL SURGERY

## 2017-12-29 PROCEDURE — C1713 ANCHOR/SCREW BN/BN,TIS/BN: HCPCS | Performed by: NEUROLOGICAL SURGERY

## 2017-12-29 PROCEDURE — 2500000003 HC RX 250 WO HCPCS: Performed by: STUDENT IN AN ORGANIZED HEALTH CARE EDUCATION/TRAINING PROGRAM

## 2017-12-29 PROCEDURE — 6360000002 HC RX W HCPCS: Performed by: ANESTHESIOLOGY

## 2017-12-29 PROCEDURE — 3700000001 HC ADD 15 MINUTES (ANESTHESIA): Performed by: NEUROLOGICAL SURGERY

## 2017-12-29 PROCEDURE — 3700000000 HC ANESTHESIA ATTENDED CARE: Performed by: NEUROLOGICAL SURGERY

## 2017-12-29 PROCEDURE — 6360000002 HC RX W HCPCS: Performed by: NURSE PRACTITIONER

## 2017-12-29 PROCEDURE — C9359 IMPLNT,BON VOID FILLER-PUTTY: HCPCS | Performed by: NEUROLOGICAL SURGERY

## 2017-12-29 PROCEDURE — 7100000001 HC PACU RECOVERY - ADDTL 15 MIN: Performed by: NEUROLOGICAL SURGERY

## 2017-12-29 PROCEDURE — 0SB20ZZ EXCISION OF LUMBAR VERTEBRAL DISC, OPEN APPROACH: ICD-10-PCS | Performed by: NEUROLOGICAL SURGERY

## 2017-12-29 PROCEDURE — 2720000010 HC SURG SUPPLY STERILE: Performed by: NEUROLOGICAL SURGERY

## 2017-12-29 PROCEDURE — A6252 ABSORPT DRG >16 <=48 W/O BDR: HCPCS | Performed by: NEUROLOGICAL SURGERY

## 2017-12-29 DEVICE — IMPLANTABLE DEVICE
Type: IMPLANTABLE DEVICE | Site: SPINE LUMBAR | Status: FUNCTIONAL
Brand: SEQUOIA®

## 2017-12-29 DEVICE — IMPLANTABLE DEVICE: Type: IMPLANTABLE DEVICE | Site: SPINE LUMBAR | Status: FUNCTIONAL

## 2017-12-29 DEVICE — IMPLANTABLE DEVICE
Type: IMPLANTABLE DEVICE | Site: SPINE LUMBAR | Status: FUNCTIONAL
Brand: PATHFINDER NXT®

## 2017-12-29 DEVICE — GRAFT BNE SUB 7.5GM PTTY SYN SIGNAFUSE: Type: IMPLANTABLE DEVICE | Site: SPINE LUMBAR | Status: FUNCTIONAL

## 2017-12-29 RX ORDER — ONDANSETRON 2 MG/ML
4 INJECTION INTRAMUSCULAR; INTRAVENOUS
Status: DISCONTINUED | OUTPATIENT
Start: 2017-12-29 | End: 2017-12-29 | Stop reason: HOSPADM

## 2017-12-29 RX ORDER — PANTOPRAZOLE SODIUM 40 MG/1
40 TABLET, DELAYED RELEASE ORAL
Status: DISCONTINUED | OUTPATIENT
Start: 2017-12-30 | End: 2017-12-30 | Stop reason: HOSPADM

## 2017-12-29 RX ORDER — MEPERIDINE HYDROCHLORIDE 25 MG/ML
12.5 INJECTION INTRAMUSCULAR; INTRAVENOUS; SUBCUTANEOUS EVERY 5 MIN PRN
Status: DISCONTINUED | OUTPATIENT
Start: 2017-12-29 | End: 2017-12-29 | Stop reason: HOSPADM

## 2017-12-29 RX ORDER — HYDROCODONE BITARTRATE AND ACETAMINOPHEN 5; 325 MG/1; MG/1
1 TABLET ORAL PRN
Status: DISCONTINUED | OUTPATIENT
Start: 2017-12-29 | End: 2017-12-29 | Stop reason: HOSPADM

## 2017-12-29 RX ORDER — METOPROLOL TARTRATE 50 MG/1
50 TABLET, FILM COATED ORAL 2 TIMES DAILY
Status: DISCONTINUED | OUTPATIENT
Start: 2017-12-29 | End: 2017-12-30 | Stop reason: HOSPADM

## 2017-12-29 RX ORDER — KETOROLAC TROMETHAMINE 30 MG/ML
30 INJECTION, SOLUTION INTRAMUSCULAR; INTRAVENOUS EVERY 6 HOURS
Status: COMPLETED | OUTPATIENT
Start: 2017-12-29 | End: 2017-12-29

## 2017-12-29 RX ORDER — DIPHENHYDRAMINE HYDROCHLORIDE 50 MG/ML
12.5 INJECTION INTRAMUSCULAR; INTRAVENOUS
Status: DISCONTINUED | OUTPATIENT
Start: 2017-12-29 | End: 2017-12-29 | Stop reason: HOSPADM

## 2017-12-29 RX ORDER — LIDOCAINE HYDROCHLORIDE 10 MG/ML
1 INJECTION, SOLUTION EPIDURAL; INFILTRATION; INTRACAUDAL; PERINEURAL
Status: COMPLETED | OUTPATIENT
Start: 2017-12-29 | End: 2017-12-29

## 2017-12-29 RX ORDER — SODIUM CHLORIDE 0.9 % (FLUSH) 0.9 %
10 SYRINGE (ML) INJECTION EVERY 12 HOURS SCHEDULED
Status: DISCONTINUED | OUTPATIENT
Start: 2017-12-29 | End: 2017-12-29 | Stop reason: HOSPADM

## 2017-12-29 RX ORDER — LABETALOL HYDROCHLORIDE 5 MG/ML
INJECTION, SOLUTION INTRAVENOUS PRN
Status: DISCONTINUED | OUTPATIENT
Start: 2017-12-29 | End: 2017-12-29 | Stop reason: SDUPTHER

## 2017-12-29 RX ORDER — OXYCODONE HYDROCHLORIDE AND ACETAMINOPHEN 5; 325 MG/1; MG/1
1 TABLET ORAL 4 TIMES DAILY PRN
Status: DISCONTINUED | OUTPATIENT
Start: 2017-12-29 | End: 2017-12-30 | Stop reason: HOSPADM

## 2017-12-29 RX ORDER — LIDOCAINE HYDROCHLORIDE 20 MG/ML
INJECTION, SOLUTION INFILTRATION; PERINEURAL PRN
Status: DISCONTINUED | OUTPATIENT
Start: 2017-12-29 | End: 2017-12-29 | Stop reason: SDUPTHER

## 2017-12-29 RX ORDER — ONDANSETRON 2 MG/ML
4 INJECTION INTRAMUSCULAR; INTRAVENOUS EVERY 6 HOURS PRN
Status: DISCONTINUED | OUTPATIENT
Start: 2017-12-29 | End: 2017-12-30 | Stop reason: HOSPADM

## 2017-12-29 RX ORDER — SODIUM CHLORIDE 0.9 % (FLUSH) 0.9 %
10 SYRINGE (ML) INJECTION PRN
Status: DISCONTINUED | OUTPATIENT
Start: 2017-12-29 | End: 2017-12-30 | Stop reason: HOSPADM

## 2017-12-29 RX ORDER — METOCLOPRAMIDE HYDROCHLORIDE 5 MG/ML
10 INJECTION INTRAMUSCULAR; INTRAVENOUS
Status: DISCONTINUED | OUTPATIENT
Start: 2017-12-29 | End: 2017-12-29 | Stop reason: HOSPADM

## 2017-12-29 RX ORDER — DOCUSATE SODIUM 100 MG/1
100 CAPSULE, LIQUID FILLED ORAL 2 TIMES DAILY
Status: DISCONTINUED | OUTPATIENT
Start: 2017-12-29 | End: 2017-12-30 | Stop reason: HOSPADM

## 2017-12-29 RX ORDER — CLOPIDOGREL BISULFATE 75 MG/1
75 TABLET ORAL DAILY
Status: DISCONTINUED | OUTPATIENT
Start: 2017-12-29 | End: 2017-12-30 | Stop reason: HOSPADM

## 2017-12-29 RX ORDER — SODIUM CHLORIDE, SODIUM LACTATE, POTASSIUM CHLORIDE, CALCIUM CHLORIDE 600; 310; 30; 20 MG/100ML; MG/100ML; MG/100ML; MG/100ML
INJECTION, SOLUTION INTRAVENOUS CONTINUOUS
Status: DISCONTINUED | OUTPATIENT
Start: 2017-12-29 | End: 2017-12-29

## 2017-12-29 RX ORDER — ONDANSETRON 2 MG/ML
INJECTION INTRAMUSCULAR; INTRAVENOUS PRN
Status: DISCONTINUED | OUTPATIENT
Start: 2017-12-29 | End: 2017-12-29 | Stop reason: SDUPTHER

## 2017-12-29 RX ORDER — CITALOPRAM 20 MG/1
40 TABLET ORAL NIGHTLY
Status: DISCONTINUED | OUTPATIENT
Start: 2017-12-29 | End: 2017-12-30 | Stop reason: HOSPADM

## 2017-12-29 RX ORDER — SODIUM CHLORIDE 0.9 % (FLUSH) 0.9 %
10 SYRINGE (ML) INJECTION EVERY 12 HOURS SCHEDULED
Status: DISCONTINUED | OUTPATIENT
Start: 2017-12-29 | End: 2017-12-30 | Stop reason: HOSPADM

## 2017-12-29 RX ORDER — GABAPENTIN 100 MG/1
100 CAPSULE ORAL 3 TIMES DAILY
Status: DISCONTINUED | OUTPATIENT
Start: 2017-12-29 | End: 2017-12-30 | Stop reason: HOSPADM

## 2017-12-29 RX ORDER — DEXAMETHASONE SODIUM PHOSPHATE 4 MG/ML
INJECTION, SOLUTION INTRA-ARTICULAR; INTRALESIONAL; INTRAMUSCULAR; INTRAVENOUS; SOFT TISSUE PRN
Status: DISCONTINUED | OUTPATIENT
Start: 2017-12-29 | End: 2017-12-29 | Stop reason: SDUPTHER

## 2017-12-29 RX ORDER — HYDROCODONE BITARTRATE AND ACETAMINOPHEN 5; 325 MG/1; MG/1
2 TABLET ORAL PRN
Status: DISCONTINUED | OUTPATIENT
Start: 2017-12-29 | End: 2017-12-29 | Stop reason: HOSPADM

## 2017-12-29 RX ORDER — NITROGLYCERIN 0.4 MG/1
0.4 TABLET SUBLINGUAL EVERY 5 MIN PRN
Status: DISCONTINUED | OUTPATIENT
Start: 2017-12-29 | End: 2017-12-30 | Stop reason: HOSPADM

## 2017-12-29 RX ORDER — MAGNESIUM HYDROXIDE 1200 MG/15ML
LIQUID ORAL CONTINUOUS PRN
Status: DISCONTINUED | OUTPATIENT
Start: 2017-12-29 | End: 2017-12-29 | Stop reason: HOSPADM

## 2017-12-29 RX ORDER — ACETAMINOPHEN 325 MG/1
650 TABLET ORAL EVERY 4 HOURS PRN
Status: DISCONTINUED | OUTPATIENT
Start: 2017-12-29 | End: 2017-12-30 | Stop reason: HOSPADM

## 2017-12-29 RX ORDER — SODIUM CHLORIDE 9 MG/ML
INJECTION, SOLUTION INTRAVENOUS CONTINUOUS
Status: DISCONTINUED | OUTPATIENT
Start: 2017-12-29 | End: 2017-12-30 | Stop reason: HOSPADM

## 2017-12-29 RX ORDER — ATORVASTATIN CALCIUM 20 MG/1
20 TABLET, FILM COATED ORAL DAILY
Status: DISCONTINUED | OUTPATIENT
Start: 2017-12-29 | End: 2017-12-30 | Stop reason: HOSPADM

## 2017-12-29 RX ORDER — ROCURONIUM BROMIDE 10 MG/ML
INJECTION, SOLUTION INTRAVENOUS PRN
Status: DISCONTINUED | OUTPATIENT
Start: 2017-12-29 | End: 2017-12-29 | Stop reason: SDUPTHER

## 2017-12-29 RX ORDER — FINASTERIDE 5 MG/1
5 TABLET, FILM COATED ORAL DAILY
Status: DISCONTINUED | OUTPATIENT
Start: 2017-12-29 | End: 2017-12-30 | Stop reason: HOSPADM

## 2017-12-29 RX ORDER — PROPOFOL 10 MG/ML
INJECTION, EMULSION INTRAVENOUS PRN
Status: DISCONTINUED | OUTPATIENT
Start: 2017-12-29 | End: 2017-12-29 | Stop reason: SDUPTHER

## 2017-12-29 RX ORDER — FENTANYL CITRATE 50 UG/ML
50 INJECTION, SOLUTION INTRAMUSCULAR; INTRAVENOUS EVERY 10 MIN PRN
Status: COMPLETED | OUTPATIENT
Start: 2017-12-29 | End: 2017-12-29

## 2017-12-29 RX ORDER — FENTANYL CITRATE 50 UG/ML
INJECTION, SOLUTION INTRAMUSCULAR; INTRAVENOUS PRN
Status: DISCONTINUED | OUTPATIENT
Start: 2017-12-29 | End: 2017-12-29 | Stop reason: SDUPTHER

## 2017-12-29 RX ORDER — SODIUM CHLORIDE 0.9 % (FLUSH) 0.9 %
10 SYRINGE (ML) INJECTION PRN
Status: DISCONTINUED | OUTPATIENT
Start: 2017-12-29 | End: 2017-12-29 | Stop reason: HOSPADM

## 2017-12-29 RX ADMIN — METOPROLOL TARTRATE 50 MG: 50 TABLET ORAL at 21:43

## 2017-12-29 RX ADMIN — PROPOFOL 50 MG: 10 INJECTION, EMULSION INTRAVENOUS at 08:28

## 2017-12-29 RX ADMIN — ROCURONIUM BROMIDE 20 MG: 10 INJECTION INTRAVENOUS at 08:46

## 2017-12-29 RX ADMIN — LIDOCAINE HYDROCHLORIDE 60 MG: 20 INJECTION, SOLUTION INFILTRATION; PERINEURAL at 07:53

## 2017-12-29 RX ADMIN — FENTANYL CITRATE 50 MCG: 50 INJECTION, SOLUTION INTRAMUSCULAR; INTRAVENOUS at 14:16

## 2017-12-29 RX ADMIN — ROCURONIUM BROMIDE 50 MG: 10 INJECTION INTRAVENOUS at 07:53

## 2017-12-29 RX ADMIN — LABETALOL HYDROCHLORIDE 2.5 MG: 5 INJECTION, SOLUTION INTRAVENOUS at 11:08

## 2017-12-29 RX ADMIN — SODIUM CHLORIDE, POTASSIUM CHLORIDE, SODIUM LACTATE AND CALCIUM CHLORIDE: 600; 310; 30; 20 INJECTION, SOLUTION INTRAVENOUS at 09:17

## 2017-12-29 RX ADMIN — LABETALOL HYDROCHLORIDE 2.5 MG: 5 INJECTION, SOLUTION INTRAVENOUS at 11:38

## 2017-12-29 RX ADMIN — DEXAMETHASONE SODIUM PHOSPHATE 4 MG: 4 INJECTION INTRA-ARTICULAR; INTRALESIONAL; INTRAMUSCULAR; INTRAVENOUS; SOFT TISSUE at 08:00

## 2017-12-29 RX ADMIN — KETOROLAC TROMETHAMINE 30 MG: 30 INJECTION, SOLUTION INTRAMUSCULAR at 21:37

## 2017-12-29 RX ADMIN — PROPOFOL 20 MG: 10 INJECTION, EMULSION INTRAVENOUS at 13:10

## 2017-12-29 RX ADMIN — ROCURONIUM BROMIDE 20 MG: 10 INJECTION INTRAVENOUS at 09:41

## 2017-12-29 RX ADMIN — ROCURONIUM BROMIDE 20 MG: 10 INJECTION INTRAVENOUS at 10:29

## 2017-12-29 RX ADMIN — CITALOPRAM HYDROBROMIDE 40 MG: 20 TABLET ORAL at 21:43

## 2017-12-29 RX ADMIN — OXYCODONE HYDROCHLORIDE AND ACETAMINOPHEN 1 TABLET: 5; 325 TABLET ORAL at 23:34

## 2017-12-29 RX ADMIN — FENTANYL CITRATE 25 MCG: 50 INJECTION, SOLUTION INTRAMUSCULAR; INTRAVENOUS at 11:53

## 2017-12-29 RX ADMIN — ROCURONIUM BROMIDE 10 MG: 10 INJECTION INTRAVENOUS at 09:18

## 2017-12-29 RX ADMIN — SUGAMMADEX 200 MG: 100 INJECTION, SOLUTION INTRAVENOUS at 12:56

## 2017-12-29 RX ADMIN — ATORVASTATIN CALCIUM 20 MG: 20 TABLET, FILM COATED ORAL at 21:42

## 2017-12-29 RX ADMIN — KETOROLAC TROMETHAMINE 30 MG: 30 INJECTION, SOLUTION INTRAMUSCULAR at 15:00

## 2017-12-29 RX ADMIN — DOCUSATE SODIUM 100 MG: 100 CAPSULE, LIQUID FILLED ORAL at 21:43

## 2017-12-29 RX ADMIN — CEFAZOLIN SODIUM 2 G: 2 SOLUTION INTRAVENOUS at 11:43

## 2017-12-29 RX ADMIN — SODIUM CHLORIDE: 9 INJECTION, SOLUTION INTRAVENOUS at 17:30

## 2017-12-29 RX ADMIN — SODIUM CHLORIDE, POTASSIUM CHLORIDE, SODIUM LACTATE AND CALCIUM CHLORIDE: 600; 310; 30; 20 INJECTION, SOLUTION INTRAVENOUS at 12:51

## 2017-12-29 RX ADMIN — FINASTERIDE 5 MG: 5 TABLET, FILM COATED ORAL at 21:43

## 2017-12-29 RX ADMIN — ROCURONIUM BROMIDE 20 MG: 10 INJECTION INTRAVENOUS at 08:29

## 2017-12-29 RX ADMIN — ROCURONIUM BROMIDE 10 MG: 10 INJECTION INTRAVENOUS at 12:15

## 2017-12-29 RX ADMIN — ROCURONIUM BROMIDE 10 MG: 10 INJECTION INTRAVENOUS at 09:57

## 2017-12-29 RX ADMIN — ROCURONIUM BROMIDE 10 MG: 10 INJECTION INTRAVENOUS at 11:08

## 2017-12-29 RX ADMIN — LABETALOL HYDROCHLORIDE 2.5 MG: 5 INJECTION, SOLUTION INTRAVENOUS at 11:53

## 2017-12-29 RX ADMIN — LABETALOL HYDROCHLORIDE 2.5 MG: 5 INJECTION, SOLUTION INTRAVENOUS at 10:33

## 2017-12-29 RX ADMIN — FENTANYL CITRATE 50 MCG: 50 INJECTION, SOLUTION INTRAMUSCULAR; INTRAVENOUS at 14:01

## 2017-12-29 RX ADMIN — ROCURONIUM BROMIDE 10 MG: 10 INJECTION INTRAVENOUS at 11:54

## 2017-12-29 RX ADMIN — FENTANYL CITRATE 50 MCG: 50 INJECTION, SOLUTION INTRAMUSCULAR; INTRAVENOUS at 14:41

## 2017-12-29 RX ADMIN — LABETALOL HYDROCHLORIDE 2.5 MG: 5 INJECTION, SOLUTION INTRAVENOUS at 11:27

## 2017-12-29 RX ADMIN — FENTANYL CITRATE 25 MCG: 50 INJECTION, SOLUTION INTRAMUSCULAR; INTRAVENOUS at 12:36

## 2017-12-29 RX ADMIN — CEFAZOLIN SODIUM 2 G: 2 SOLUTION INTRAVENOUS at 07:44

## 2017-12-29 RX ADMIN — LIDOCAINE HYDROCHLORIDE 0.1 ML: 10 INJECTION, SOLUTION EPIDURAL; INFILTRATION; INTRACAUDAL; PERINEURAL at 06:43

## 2017-12-29 RX ADMIN — LABETALOL HYDROCHLORIDE 2.5 MG: 5 INJECTION, SOLUTION INTRAVENOUS at 10:53

## 2017-12-29 RX ADMIN — FENTANYL CITRATE 100 MCG: 50 INJECTION, SOLUTION INTRAMUSCULAR; INTRAVENOUS at 07:53

## 2017-12-29 RX ADMIN — CEFAZOLIN SODIUM 2 G: 2 SOLUTION INTRAVENOUS at 21:37

## 2017-12-29 RX ADMIN — ROCURONIUM BROMIDE 10 MG: 10 INJECTION INTRAVENOUS at 11:27

## 2017-12-29 RX ADMIN — ONDANSETRON 4 MG: 2 INJECTION INTRAMUSCULAR; INTRAVENOUS at 12:51

## 2017-12-29 RX ADMIN — SODIUM CHLORIDE, POTASSIUM CHLORIDE, SODIUM LACTATE AND CALCIUM CHLORIDE: 600; 310; 30; 20 INJECTION, SOLUTION INTRAVENOUS at 06:44

## 2017-12-29 RX ADMIN — FENTANYL CITRATE 50 MCG: 50 INJECTION, SOLUTION INTRAMUSCULAR; INTRAVENOUS at 13:50

## 2017-12-29 RX ADMIN — GABAPENTIN 100 MG: 100 CAPSULE ORAL at 21:42

## 2017-12-29 RX ADMIN — GABAPENTIN 100 MG: 100 CAPSULE ORAL at 17:31

## 2017-12-29 RX ADMIN — PROPOFOL 150 MG: 10 INJECTION, EMULSION INTRAVENOUS at 07:53

## 2017-12-29 RX ADMIN — OXYCODONE HYDROCHLORIDE AND ACETAMINOPHEN 1 TABLET: 5; 325 TABLET ORAL at 17:30

## 2017-12-29 RX ADMIN — ROCURONIUM BROMIDE 10 MG: 10 INJECTION INTRAVENOUS at 10:56

## 2017-12-29 ASSESSMENT — PULMONARY FUNCTION TESTS
PIF_VALUE: 1
PIF_VALUE: 16
PIF_VALUE: 17
PIF_VALUE: 16
PIF_VALUE: 39
PIF_VALUE: 16
PIF_VALUE: 16
PIF_VALUE: 24
PIF_VALUE: 16
PIF_VALUE: 17
PIF_VALUE: 17
PIF_VALUE: 16
PIF_VALUE: 17
PIF_VALUE: 3
PIF_VALUE: 16
PIF_VALUE: 2
PIF_VALUE: 16
PIF_VALUE: 15
PIF_VALUE: 16
PIF_VALUE: 3
PIF_VALUE: 16
PIF_VALUE: 16
PIF_VALUE: 17
PIF_VALUE: 16
PIF_VALUE: 17
PIF_VALUE: 16
PIF_VALUE: 2
PIF_VALUE: 17
PIF_VALUE: 17
PIF_VALUE: 3
PIF_VALUE: 16
PIF_VALUE: 17
PIF_VALUE: 3
PIF_VALUE: 16
PIF_VALUE: 16
PIF_VALUE: 4
PIF_VALUE: 3
PIF_VALUE: 16
PIF_VALUE: 17
PIF_VALUE: 16
PIF_VALUE: 15
PIF_VALUE: 17
PIF_VALUE: 16
PIF_VALUE: 16
PIF_VALUE: 15
PIF_VALUE: 16
PIF_VALUE: 16
PIF_VALUE: 17
PIF_VALUE: 16
PIF_VALUE: 17
PIF_VALUE: 16
PIF_VALUE: 15
PIF_VALUE: 16
PIF_VALUE: 16
PIF_VALUE: 17
PIF_VALUE: 16
PIF_VALUE: 16
PIF_VALUE: 3
PIF_VALUE: 16
PIF_VALUE: 3
PIF_VALUE: 16
PIF_VALUE: 17
PIF_VALUE: 16
PIF_VALUE: 16
PIF_VALUE: 17
PIF_VALUE: 16
PIF_VALUE: 17
PIF_VALUE: 4
PIF_VALUE: 16
PIF_VALUE: 17
PIF_VALUE: 16
PIF_VALUE: 16
PIF_VALUE: 15
PIF_VALUE: 16
PIF_VALUE: 19
PIF_VALUE: 16
PIF_VALUE: 17
PIF_VALUE: 15
PIF_VALUE: 16
PIF_VALUE: 22
PIF_VALUE: 17
PIF_VALUE: 16
PIF_VALUE: 17
PIF_VALUE: 3
PIF_VALUE: 16
PIF_VALUE: 17
PIF_VALUE: 16
PIF_VALUE: 16
PIF_VALUE: 19
PIF_VALUE: 16
PIF_VALUE: 3
PIF_VALUE: 16
PIF_VALUE: 3
PIF_VALUE: 18
PIF_VALUE: 22
PIF_VALUE: 16
PIF_VALUE: 15
PIF_VALUE: 16
PIF_VALUE: 17
PIF_VALUE: 16
PIF_VALUE: 16
PIF_VALUE: 22
PIF_VALUE: 18
PIF_VALUE: 17
PIF_VALUE: 15
PIF_VALUE: 1
PIF_VALUE: 16
PIF_VALUE: 17
PIF_VALUE: 32
PIF_VALUE: 16
PIF_VALUE: 16
PIF_VALUE: 20
PIF_VALUE: 17
PIF_VALUE: 18
PIF_VALUE: 4
PIF_VALUE: 16
PIF_VALUE: 15
PIF_VALUE: 19
PIF_VALUE: 16
PIF_VALUE: 17
PIF_VALUE: 16
PIF_VALUE: 15
PIF_VALUE: 16
PIF_VALUE: 1
PIF_VALUE: 16
PIF_VALUE: 15
PIF_VALUE: 16
PIF_VALUE: 2
PIF_VALUE: 16
PIF_VALUE: 24
PIF_VALUE: 15
PIF_VALUE: 16
PIF_VALUE: 2
PIF_VALUE: 16
PIF_VALUE: 15
PIF_VALUE: 3
PIF_VALUE: 16
PIF_VALUE: 3
PIF_VALUE: 16
PIF_VALUE: 15
PIF_VALUE: 16
PIF_VALUE: 17
PIF_VALUE: 17
PIF_VALUE: 16
PIF_VALUE: 15
PIF_VALUE: 16
PIF_VALUE: 16
PIF_VALUE: 21
PIF_VALUE: 16
PIF_VALUE: 16
PIF_VALUE: 22
PIF_VALUE: 2
PIF_VALUE: 15
PIF_VALUE: 16
PIF_VALUE: 16
PIF_VALUE: 18
PIF_VALUE: 16
PIF_VALUE: 17
PIF_VALUE: 16
PIF_VALUE: 15
PIF_VALUE: 1
PIF_VALUE: 24
PIF_VALUE: 16
PIF_VALUE: 18
PIF_VALUE: 16
PIF_VALUE: 17
PIF_VALUE: 16
PIF_VALUE: 15
PIF_VALUE: 16
PIF_VALUE: 7
PIF_VALUE: 16
PIF_VALUE: 15
PIF_VALUE: 16
PIF_VALUE: 16
PIF_VALUE: 18
PIF_VALUE: 16
PIF_VALUE: 15
PIF_VALUE: 16
PIF_VALUE: 17
PIF_VALUE: 2
PIF_VALUE: 23
PIF_VALUE: 17
PIF_VALUE: 15
PIF_VALUE: 35
PIF_VALUE: 17
PIF_VALUE: 16
PIF_VALUE: 1
PIF_VALUE: 16
PIF_VALUE: 17
PIF_VALUE: 22
PIF_VALUE: 15
PIF_VALUE: 16
PIF_VALUE: 24
PIF_VALUE: 16
PIF_VALUE: 4
PIF_VALUE: 16
PIF_VALUE: 17
PIF_VALUE: 16
PIF_VALUE: 1
PIF_VALUE: 16
PIF_VALUE: 26
PIF_VALUE: 17
PIF_VALUE: 16
PIF_VALUE: 26
PIF_VALUE: 16
PIF_VALUE: 19
PIF_VALUE: 16
PIF_VALUE: 17
PIF_VALUE: 16
PIF_VALUE: 17
PIF_VALUE: 17
PIF_VALUE: 15
PIF_VALUE: 17
PIF_VALUE: 16
PIF_VALUE: 17
PIF_VALUE: 3
PIF_VALUE: 2
PIF_VALUE: 16
PIF_VALUE: 16
PIF_VALUE: 3
PIF_VALUE: 18
PIF_VALUE: 16
PIF_VALUE: 16

## 2017-12-29 ASSESSMENT — PAIN DESCRIPTION - LOCATION
LOCATION: BACK
LOCATION: BACK

## 2017-12-29 ASSESSMENT — PAIN SCALES - GENERAL
PAINLEVEL_OUTOF10: 0
PAINLEVEL_OUTOF10: 5
PAINLEVEL_OUTOF10: 5
PAINLEVEL_OUTOF10: 4
PAINLEVEL_OUTOF10: 9
PAINLEVEL_OUTOF10: 4
PAINLEVEL_OUTOF10: 10
PAINLEVEL_OUTOF10: 7
PAINLEVEL_OUTOF10: 5
PAINLEVEL_OUTOF10: 4
PAINLEVEL_OUTOF10: 9
PAINLEVEL_OUTOF10: 9
PAINLEVEL_OUTOF10: 4

## 2017-12-29 ASSESSMENT — PAIN DESCRIPTION - FREQUENCY
FREQUENCY: CONTINUOUS
FREQUENCY: CONTINUOUS

## 2017-12-29 ASSESSMENT — PAIN DESCRIPTION - DESCRIPTORS
DESCRIPTORS: ACHING;STABBING
DESCRIPTORS: BURNING;ACHING
DESCRIPTORS: ACHING;BURNING;CONSTANT

## 2017-12-29 ASSESSMENT — PAIN DESCRIPTION - PROGRESSION
CLINICAL_PROGRESSION: GRADUALLY WORSENING
CLINICAL_PROGRESSION: GRADUALLY WORSENING

## 2017-12-29 ASSESSMENT — PAIN DESCRIPTION - PAIN TYPE
TYPE: ACUTE PAIN
TYPE: ACUTE PAIN
TYPE: SURGICAL PAIN

## 2017-12-29 ASSESSMENT — PAIN DESCRIPTION - ORIENTATION
ORIENTATION: LOWER;MID
ORIENTATION: LOWER;MID

## 2017-12-29 ASSESSMENT — PAIN DESCRIPTION - ONSET
ONSET: ON-GOING
ONSET: GRADUAL

## 2017-12-29 ASSESSMENT — PAIN - FUNCTIONAL ASSESSMENT: PAIN_FUNCTIONAL_ASSESSMENT: 0-10

## 2017-12-29 NOTE — H&P
narrowing with endplate changes consistent with discogenic disease. Slight anterolisthesis of L3 on L4. There is a right L3 pars defect. This is combining with facet arthropathy resulting in moderate spinal canal narrowing.  There is    right foraminal narrowing and probable left foraminal narrowing.       L4-L5: Grade 2 spondylolisthesis with bilateral pars defects. There is moderate to severe spinal canal stenosis and bilateral foraminal narrowing.       L5-S1: Disc spaces normal.       Retroperitoneum: No abnormality of the visualized aorta or retroperitoneum  is identified.            Impression       MULTILEVEL DISC DISEASE, MOST STRIKING AT L3-L4 AND L4-L5, AS DISCUSSED ABOVE.      RESULT, UNKNOWN PROVIDER Jun 20, 2016       Narrative   EXAMINATION LUMBAR SPINE, 6 VIEWS       CLINICAL HISTORY LOW BACK PAIN       COMPARISONS None available.       FINDINGS 6 views lumbar spine demonstrate a mild scoliosis. There is   degenerative bone spurring and posterior facet arthritis throughout the   lumbar spine. There is a grade 1 degenerative spondylolisthesis of L4   on L5. There are old mild compression deformities of T12, L1 vertebra. There is narrowing of several lumbar disc spaces compatible with   multilevel discogenic degenerative disease in the lumbar spine. There   is no osteolytic or osteoblastic lesion involving the lumbar spine.       IMPRESSION    1. MILD SCOLIOSIS INVOLVING THE LUMBAR SPINE. 2. DEGENERATIVE AND ARTHRITIC CHANGES THROUGHOUT THE LUMBAR SPINE. 3. GRADE 1 DEGENERATIVE SPONDYLOLISTHESIS OF L4 ON L5.   4. MULTILEVEL DISCOGENIC DEGENERATIVE DISEASE IN THE LUMBAR SPINE.    5. NO ACUTE FRACTURE, OSTEOLYTIC OR OSTEOBLASTIC LESION IN THE LUMBAR   SPINE.            -  RADWHERE        Read By- Dolores Dai M.D.        Released By- Dolores Dai M.D.        Released Date Time- 06/20/16 1630         1/23/2009 right and bilateral L3-4 and L4-5 decompressive
difficulty going up on his toes and his heels.  He is mildly paraparetic. [facility] still maintains bladder and bowel control he tells me although he has some urgency.  Sensation was intact in both lower extremities.  He is stiff in his knees and his hips status post right knee and left hip surgeries.  Sensation was intact in the lower extremities with distal loss secondary to his obvious swelling.  He has edema in both lower extremities but there are no sores or open skin sores.  Lungs had decreased breath sounds.  He is a smoker at times. [facility] does not smoke consistently.  Heart tones were normal.  No rubs or murmurs.  He is awake and alert and bright.  Cranial nerves individually tested were normal.       IMPRESSION:    1. L3, 4, 5 severe canal stenosis secondary to acquired spondylolisthesis.    2. Status post left hip and right knee replacements. 3. Atherosclerotic cardiac disease. 4. Cardiac stent.     TREATMENT AND RECOMMENDATIONS: [facility] should be able to undergo safely MRI study of the lumbar spine which I am ordering otherwise we will have to proceed with myelogram, post-myelogram CT scan which I did discuss procedure, indications and risks with him including the spinal tap.  The risks are small for that procedure including something serious like even cardiopulmonary complications, meningitis, death, paralysis, sensory loss, and so forth.  The risks are small but still present.  This would be a diagnostic test only.  The physician performing the study will also review the risks with the patient.  Stacey Angel would also like to obtain x-rays lateral, flexion, extension as well as MRI study ideally.      11/7/2017  4:32 PM Darwin, Chpo Incoming Lab Results From Bren Haro MD CC'd Results   Narrative   EXAMINATION: MRI lumbar spine without contrast       CLINICAL HISTORY: Osteoarthritis of the spine. Myelopathy. Chronic bilateral sciatica. Chronic back pain.       FINDINGS: Unenhanced scans were obtained.  T1 and

## 2017-12-29 NOTE — BRIEF OP NOTE
Brief Postoperative Note  ______________________________________________________________    Patient: Andre Vaughan  YOB: 1945  MRN: [id number]  Date of Procedure: 12/29/2017    Pre-Op Diagnosis: L 4-5 HERNIATED DISC SPONDYLOLISTHESIS    Post-Op Diagnosis: Same       Procedure(s):  RIGHT AND BILATERAL L 4-5 LYSIS OF SCAR DISKECTOMY INTERBODY CAGE FUSION POSTEROLATERAL FUSION PEDICLE SCREWS    Anesthesia: General    Surgeon(s):  Farhana Gómez MD    Staff:  First Assistant: Amina Altamirano  Scrub Person First: Juan Bowen; Asya Frye     Estimated Blood Loss: 200cc      Implants:    Implant Name Type Inv.  Item Serial No.  Lot No. LRB No. Used   FARTUN-PUTTY SIGNAFUSE BIOACTIVE BONE 7.5G Bone/Graft/Tissue FARTUN-PUTTY SIGNAFUSE BIOACTIVE BONE 7.5G  [employer] [id number]  1   PEEK MED STRT 23 X 11MM THRD Spine PEEK MED STRT 23 X 11MM THRD  [employer]   2   SCREW SPINE POLYAXIAL CAMILLA 6.5X45 Spine SCREW SPINE POLYAXIAL CAMILLA 6.5X45  [employer]   4   IMPL SP SPINE PRE BENT BL TI 5.5X40MM Spine IMPL SP SPINE PRE BENT BL TI 5.5X40MM  [employer]   2   TOP CLOSURE ROCKY OPEN IMPLT 5.5MM TI Spine TOP CLOSURE ROCKY OPEN IMPLT 5.5MM TI   [employer]     4         Drains:   Urethral Catheter Temperature probe 16 fr (Active)   $ Urethral catheter insertion Inserted for procedure 12/29/2017 12:09 PM         Rosaura Manzano MD  Date: 12/29/2017  Time: 1:32 PM

## 2017-12-29 NOTE — ANESTHESIA POSTPROCEDURE EVALUATION
Department of Anesthesiology  Postprocedure Note    Patient: Kandace Armstrong  MRN: [id number]  YOB: 1945  Date of evaluation: 12/29/2017  Time:  1:39 PM     Procedure Summary     Date:  12/29/17 Room / Location:  [place] [address] [address] [place] [facility]    Anesthesia Start:  0745 Anesthesia Stop:      Procedure:  RIGHT AND BILATERAL L 4-5 LYSIS OF SCAR DISKECTOMY INTERBODY CAGE FUSION POSTEROLATERAL FUSION PEDICLE SCREWS (Bilateral Spine Lumbar) Diagnosis:  (L 4-5 HERNIATED DISC SPONDYLOLISTHESIS)    Surgeon:  Frank Samayoa MD Responsible Provider:  Ollen Soulier, MD    Anesthesia Type:  general ASA Status:  3          Anesthesia Type: general    Patricia Phase I: Patricia Score: 10    Patricia Phase II:      Last vitals: Reviewed and per EMR flowsheets.        Anesthesia Post Evaluation    Patient location during evaluation: PACU  Patient participation: complete - patient participated  Level of consciousness: awake and confused  Pain score: 0  Airway patency: patent  Nausea & Vomiting: no nausea and no vomiting  Complications: no  Cardiovascular status: hemodynamically stable  Respiratory status: acceptable  Hydration status: euvolemic

## 2017-12-29 NOTE — ANESTHESIA PRE PROCEDURE
Department of Anesthesiology  Preprocedure Note       Name:  Brennon Molina   Age:  74 y.o.  :  1945                                          MRN:  [id number]         Date:  2017      Surgeon: Goldie Dodge):  Roxy Ganser, MD    Procedure: Procedure(s):  RIGHT AND BILATERAL L 4-5 LYSIS OF SCAR DISKECTOMY INTERBODY CAGE FUSION POSTEROLATERAL FUSION PEDICLE SCREWS 3-31/2 HOURS, 2 C-ARMS, POWER RONGEUR, *[facility]* 1ST CASE    Medications prior to admission:   Prior to Admission medications    Medication Sig Start Date End Date Taking? Authorizing Provider   diclofenac (VOLTAREN) 50 MG EC tablet Take 1 tablet by mouth 3 times daily (with meals) 10/18/17   Tammi Iglesias MD   gabapentin (NEURONTIN) 100 MG capsule Take 100 mg by mouth 3 times daily    Historical Provider, MD   finasteride (PROSCAR) 5 MG tablet Take 1 tablet by mouth daily 10/10/17   Stella West MD   omeprazole (PRILOSEC) 10 MG delayed release capsule Take 10 mg by mouth daily    Historical Provider, MD   LORAZEPAM PO Take by mouth as needed    Historical Provider, MD   BUMETANIDE PO Take by mouth as needed    Historical Provider, MD   DOCUSATE CALCIUM PO Take by mouth as needed    Historical Provider, MD   diclofenac (VOLTAREN) 50 MG EC tablet Take 1 tablet by mouth 2 times daily 17   Marc Thomas MD   atorvastatin (LIPITOR) 20 MG tablet Take 20 mg by mouth daily    Historical Provider, MD   Multiple Vitamins-Minerals (CENTRUM SILVER) TABS Take by mouth daily    Historical Provider, MD   citalopram (CELEXA) 40 MG tablet Take 40 mg by mouth nightly    Historical Provider, MD   clopidogrel (PLAVIX) 75 MG tablet Take 75 mg by mouth daily    Historical Provider, MD   metoprolol tartrate (LOPRESSOR) 50 MG tablet Take 50 mg by mouth 2 times daily    Historical Provider, MD   nitroGLYCERIN (NITROSTAT) 0.4 MG SL tablet Place 0.4 mg under the tongue every 5 minutes as needed for Chest pain up to max of 3 total doses.  If no relief after 1 dose, call 911. Historical Provider, MD   oxyCODONE-acetaminophen (PERCOCET) 5-325 MG per tablet Take 1-2 tablets by mouth every 4 hours as needed for Pain . Historical Provider, MD       Current medications:    No current facility-administered medications for this encounter. Current Outpatient Prescriptions   Medication Sig Dispense Refill    diclofenac (VOLTAREN) 50 MG EC tablet Take 1 tablet by mouth 3 times daily (with meals) 60 tablet 5    gabapentin (NEURONTIN) 100 MG capsule Take 100 mg by mouth 3 times daily      finasteride (PROSCAR) 5 MG tablet Take 1 tablet by mouth daily 90 tablet 3    omeprazole (PRILOSEC) 10 MG delayed release capsule Take 10 mg by mouth daily      LORAZEPAM PO Take by mouth as needed      BUMETANIDE PO Take by mouth as needed      DOCUSATE CALCIUM PO Take by mouth as needed      diclofenac (VOLTAREN) 50 MG EC tablet Take 1 tablet by mouth 2 times daily 60 tablet 5    atorvastatin (LIPITOR) 20 MG tablet Take 20 mg by mouth daily      Multiple Vitamins-Minerals (CENTRUM SILVER) TABS Take by mouth daily      citalopram (CELEXA) 40 MG tablet Take 40 mg by mouth nightly      clopidogrel (PLAVIX) 75 MG tablet Take 75 mg by mouth daily      metoprolol tartrate (LOPRESSOR) 50 MG tablet Take 50 mg by mouth 2 times daily      nitroGLYCERIN (NITROSTAT) 0.4 MG SL tablet Place 0.4 mg under the tongue every 5 minutes as needed for Chest pain up to max of 3 total doses. If no relief after 1 dose, call 911.  oxyCODONE-acetaminophen (PERCOCET) 5-325 MG per tablet Take 1-2 tablets by mouth every 4 hours as needed for Pain .          Allergies:  No Known Allergies    Problem List:    Patient Active Problem List   Diagnosis Code    HTN (hypertension) I10    Tobacco use Z72.0    Hip pain M25.559    Knee pain M25.569    Chronic back pain M54.9, G89.29    Elevated PSA R97.20    Primary osteoarthritis of right knee M17.11    Spondylosis of lumbar region without myelopathy or radiculopathy M47.816    Sacroiliitis, not elsewhere classified ([facility] [address] M46.1    Pure hypercholesterolemia E78.00    Charcot's joint of left ankle M14.672    Left ankle pain M25.572    Confusion caused by a drug ([facility] [address] F19.921    DDD (degenerative disc disease), lumbar M51.36    Osteoarthritis of spine with myelopathy, lumbosacral region M47.16    Chronic bilateral low back pain with bilateral sciatica M54.42, M54.41, G89.29    Postlaminectomy syndrome, lumbar region M96.1    Acquired spondylolisthesis of lumbosacral region M43.17    Neurogenic claudication due to lumbar spinal stenosis M48.062    HNP (herniated nucleus pulposus), lumbar M51.26    Spondylolisthesis at L4-L5 level M43.16    Anesthesia R20.0       Past Medical History:        Diagnosis Date    Alcohol abuse     CAD (coronary artery disease)     patient states no doctor has told him this / has 1 cardiac stent    Chronic back pain     Chronic kidney disease     Chronic sinusitis     DJD (degenerative joint disease) of knee     left knee DJD    Elevated PSA     History of blood transfusion 1980's    History of inferior wall myocardial infarction 01/2016    1 cardiac stent    HTN (hypertension)     meds .  1 yr    Hyperlipidemia     meds > 12 yrs    Smoker        Past Surgical History:        Procedure Laterality Date    ABDOMEN SURGERY  1961    spleenectomy due to [address]  2009    lumbar disc OR    CARDIAC SURGERY      stents    COLONOSCOPY      CORONARY ANGIOPLASTY WITH STENT PLACEMENT  1/29/2016    EYE SURGERY      to have Phaco with IOL OU 2/2018    HERNIA REPAIR      JOINT REPLACEMENT Left 1994    LTHR    JOINT REPLACEMENT Right 2009    RTKR    KNEE SURGERY Right 2011    arthroscopy    DE MANIPULATN KNEE JT+ANESTHESIA Right 5/12/2017    RIGHT KNEE MANIPULATION UNDER ANESTHESIA performed by Bright Joy MD at [address] [facility]  2004    [facility] ARTHROPLASTY Right 3/24/2017    RIGHT  KNEE TOTAL ARTHROPLASTY, [employer] CEMENTED PERSONA  performed by Bert Siddiqui [facility] at [facility] History:    Social History   Substance Use Topics    Smoking status: Current Every Day Smoker     Packs/day: 0.50     Years: 60.00     Types: Cigarettes    Smokeless tobacco: Never Used    Alcohol use 4.2 oz/week     7 Shots of liquor per week      Comment: last day before yesterday                                Ready to quit: Not Answered  Counseling given: Not Answered      Vital Signs (Current): There were no vitals filed for this visit. BP Readings from Last 3 Encounters:   12/26/17 129/62   10/10/17 110/66   08/30/17 113/68       NPO Status:                                                                                 BMI:   Wt Readings from Last 3 Encounters:   12/26/17 180 lb (81.6 kg)   11/16/17 180 lb (81.6 kg)   10/19/17 180 lb (81.6 kg)     There is no height or weight on file to calculate BMI.    CBC:   Lab Results   Component Value Date    WBC 10.0 12/26/2017    RBC 3.53 12/26/2017    HGB 11.9 12/26/2017    HCT 36.1 12/26/2017    .3 12/26/2017    RDW 13.2 12/26/2017     12/26/2017       CMP:   Lab Results   Component Value Date     12/26/2017    K 4.9 12/26/2017     12/26/2017    CO2 26 12/26/2017    BUN 16 12/26/2017    CREATININE 0.57 12/26/2017    GFRAA >60.0 12/26/2017    LABGLOM >60.0 12/26/2017    GLUCOSE 87 12/26/2017    PROT 6.6 03/28/2016    CALCIUM 9.5 12/26/2017    BILITOT 0.7 03/28/2016    ALKPHOS 64 03/28/2016    AST 14 03/28/2016    ALT 22 03/28/2016       POC Tests: No results for input(s): POCGLU, POCNA, POCK, POCCL, POCBUN, POCHEMO, POCHCT in the last 72 hours.     Coags:   Lab Results   Component Value Date    PROTIME 11.2 12/26/2017    INR 1.1 12/26/2017    APTT 27.9 01/29/2016       HCG (If Applicable): No results found for: PREGTESTUR, PREGSERUM, HCG, HCGQUANT

## 2017-12-30 VITALS
DIASTOLIC BLOOD PRESSURE: 51 MMHG | WEIGHT: 181.66 LBS | BODY MASS INDEX: 29.32 KG/M2 | RESPIRATION RATE: 18 BRPM | TEMPERATURE: 98.4 F | HEART RATE: 73 BPM | SYSTOLIC BLOOD PRESSURE: 120 MMHG | OXYGEN SATURATION: 98 %

## 2017-12-30 PROCEDURE — 97161 PT EVAL LOW COMPLEX 20 MIN: CPT

## 2017-12-30 PROCEDURE — 2580000003 HC RX 258: Performed by: NEUROLOGICAL SURGERY

## 2017-12-30 PROCEDURE — 6370000000 HC RX 637 (ALT 250 FOR IP): Performed by: NEUROLOGICAL SURGERY

## 2017-12-30 PROCEDURE — G8979 MOBILITY GOAL STATUS: HCPCS

## 2017-12-30 PROCEDURE — 6360000002 HC RX W HCPCS: Performed by: NEUROLOGICAL SURGERY

## 2017-12-30 PROCEDURE — G8978 MOBILITY CURRENT STATUS: HCPCS

## 2017-12-30 PROCEDURE — G8980 MOBILITY D/C STATUS: HCPCS

## 2017-12-30 RX ORDER — FENTANYL 25 UG/H
1 PATCH TRANSDERMAL
Status: DISCONTINUED | OUTPATIENT
Start: 2017-12-30 | End: 2017-12-30 | Stop reason: HOSPADM

## 2017-12-30 RX ADMIN — METOPROLOL TARTRATE 50 MG: 50 TABLET ORAL at 08:51

## 2017-12-30 RX ADMIN — CLOPIDOGREL BISULFATE 75 MG: 75 TABLET ORAL at 08:51

## 2017-12-30 RX ADMIN — PANTOPRAZOLE SODIUM 40 MG: 40 TABLET, DELAYED RELEASE ORAL at 08:52

## 2017-12-30 RX ADMIN — OXYCODONE HYDROCHLORIDE AND ACETAMINOPHEN 1 TABLET: 5; 325 TABLET ORAL at 15:36

## 2017-12-30 RX ADMIN — GABAPENTIN 100 MG: 100 CAPSULE ORAL at 08:51

## 2017-12-30 RX ADMIN — OXYCODONE HYDROCHLORIDE AND ACETAMINOPHEN 1 TABLET: 5; 325 TABLET ORAL at 06:39

## 2017-12-30 RX ADMIN — DOCUSATE SODIUM 100 MG: 100 CAPSULE, LIQUID FILLED ORAL at 08:51

## 2017-12-30 RX ADMIN — GABAPENTIN 100 MG: 100 CAPSULE ORAL at 14:18

## 2017-12-30 RX ADMIN — FINASTERIDE 5 MG: 5 TABLET, FILM COATED ORAL at 08:51

## 2017-12-30 RX ADMIN — OXYCODONE HYDROCHLORIDE AND ACETAMINOPHEN 1 TABLET: 5; 325 TABLET ORAL at 11:24

## 2017-12-30 RX ADMIN — SODIUM CHLORIDE, PRESERVATIVE FREE 10 ML: 5 INJECTION INTRAVENOUS at 08:52

## 2017-12-30 RX ADMIN — CEFAZOLIN SODIUM 2 G: 2 SOLUTION INTRAVENOUS at 04:29

## 2017-12-30 ASSESSMENT — PAIN DESCRIPTION - PAIN TYPE: TYPE: SURGICAL PAIN

## 2017-12-30 ASSESSMENT — PAIN SCALES - GENERAL
PAINLEVEL_OUTOF10: 5
PAINLEVEL_OUTOF10: 7
PAINLEVEL_OUTOF10: 6
PAINLEVEL_OUTOF10: 6
PAINLEVEL_OUTOF10: 5

## 2017-12-30 ASSESSMENT — PAIN DESCRIPTION - LOCATION: LOCATION: BACK

## 2017-12-30 NOTE — PROGRESS NOTES
Complexity  No Skilled PT: Safe to return home (with PRN assistance from family)    Prognosis: Good  Patient Education: Pt educated in role of acute care PT, PT POC, benefits of mobility while in house, safety techniques, use of call light for assistance, d/c planning, d/c recommendation, donning brace, HEP  Barriers to Learning: none    DISCHARGE RECOMMENDATIONS:  No Skilled PT: Safe to return home (with PRN assistance from family)    Assessment: Pt demonstrates the above deficits. Pt would benefit from skilled physical therapy improve Le and core strength, increase lumbar ROM, improve standing balance, improve endurance to allow for decrease risk for falls, return to previous level of function, and safe return to home with/out assistance. REQUIRES PT FOLLOW UP: No      PLAN OF CARE:  Plan  Times per week: eval only  Safety Devices  Type of devices: All fall risk precautions in place    G-Code:  PT G-Codes  Functional Assessment Tool Used: clinical judgement  Functional Limitation: Mobility: Walking and moving around  Mobility: Walking and Moving Around Current Status (): At least 1 percent but less than 20 percent impaired, limited or restricted  Mobility: Walking and Moving Around Goal Status (): At least 1 percent but less than 20 percent impaired, limited or restricted  Mobility: Walking and Moving Around Discharge Status ():  At least 1 percent but less than 20 percent impaired, limited or restricted    Goals:  Patient goals : \"go home'\"    Therapy Time:   Individual   Time In 1126   Time Out 1149   Minutes PAUL Collins [address] [place] [address], 12/30/17 at 11:54 AM

## 2017-12-30 NOTE — OP NOTE
[address]                       [address], [address] [facility]                                 OPERATIVE REPORT    PATIENT NAME: Surinder Isaacs                     :        1945  MED REC NO:   [id number]                            ROOM:  ACCOUNT NO:   [de-identified]                           ADMIT DATE: 2017  PROVIDER:     Kaushal Irizarry MD    DATE OF PROCEDURE:  2017    PREOPERATIVE DIAGNOSES:  L4-L5 grade 2 acquired spondylolisthesis, severe  canal stenosis, recurrent extruded disk. POSTOPERATIVE DIAGNOSES:  L4-L5 grade 2 acquired spondylolisthesis, severe  canal stenosis, recurrent extruded disk. OPERATION PERFORMED:  Bilateral L4-L5 microdissection, lysis of scar,  diskectomy, interbody cage posterolateral fusion pedicle screws. DESCRIPTION OF PROCEDURE:  The patient was given general endotracheal  anesthesia in supine position. Turned to the prone position. Ortega  catheter was established, Ancef IV preoperatively and also  intraoperatively. The back was prepped and draped. Time-out, patient  identified. Needles were placed, lateral x-rays obtained to confirm level,  needles withdrawn. Time-out, patient identified. Skin infiltrated with a  solution of 1% lidocaine with epinephrine. The patient is status post on  2009, right and bilateral L3, L4, L5 decompressive laminectomies. Skin incision was made over the tips of the spinous processes of L3, L4,  L5. Dissection was carried down to the spinous process tips, first on the  left side and then on the right side. The spinous processes, residual  lamina, and facet joint complexes were identified of L3, L4, and L5. Identified the transverse processes, which were decorticated. Allograft  bone was placed after decortication of the transverse processes, lateral  aspect of facet joints to the posterolateral fusion. A microscope was  brought on to the field.   Needle had already been

## 2018-01-05 ENCOUNTER — TELEPHONE (OUTPATIENT)
Dept: FAMILY MEDICINE CLINIC | Age: 73
End: 2018-01-05

## 2018-01-05 RX ORDER — AZITHROMYCIN 250 MG/1
TABLET, FILM COATED ORAL
Qty: 1 PACKET | Refills: 0 | Status: SHIPPED | OUTPATIENT
Start: 2018-01-05 | End: 2018-01-15

## 2018-01-05 NOTE — TELEPHONE ENCOUNTER
Pt has a sore throat, coughing, and has some congestion. Could you send something in? Please advise.

## 2018-03-14 RX ORDER — CLOPIDOGREL BISULFATE 75 MG/1
75 TABLET ORAL DAILY
Qty: 30 TABLET | Refills: 0 | Status: SHIPPED | OUTPATIENT
Start: 2018-03-14 | End: 2018-03-29 | Stop reason: SDUPTHER

## 2018-03-14 RX ORDER — ATORVASTATIN CALCIUM 20 MG/1
20 TABLET, FILM COATED ORAL DAILY
Qty: 30 TABLET | Refills: 0 | Status: SHIPPED | OUTPATIENT
Start: 2018-03-14 | End: 2018-03-29 | Stop reason: SDUPTHER

## 2018-03-14 RX ORDER — METOPROLOL TARTRATE 50 MG/1
50 TABLET, FILM COATED ORAL 2 TIMES DAILY
Qty: 30 TABLET | Refills: 0 | Status: SHIPPED | OUTPATIENT
Start: 2018-03-14 | End: 2018-03-29 | Stop reason: SDUPTHER

## 2018-03-29 ENCOUNTER — OFFICE VISIT (OUTPATIENT)
Dept: CARDIOLOGY CLINIC | Age: 73
End: 2018-03-29
Payer: MEDICARE

## 2018-03-29 VITALS
BODY MASS INDEX: 31.28 KG/M2 | HEART RATE: 58 BPM | DIASTOLIC BLOOD PRESSURE: 72 MMHG | HEIGHT: 62 IN | WEIGHT: 170 LBS | OXYGEN SATURATION: 98 % | SYSTOLIC BLOOD PRESSURE: 136 MMHG | RESPIRATION RATE: 16 BRPM

## 2018-03-29 DIAGNOSIS — I21.4 NSTEMI (NON-ST ELEVATED MYOCARDIAL INFARCTION) (HCC): Primary | ICD-10-CM

## 2018-03-29 DIAGNOSIS — E78.00 PURE HYPERCHOLESTEROLEMIA: Chronic | ICD-10-CM

## 2018-03-29 DIAGNOSIS — I10 ESSENTIAL HYPERTENSION: Chronic | ICD-10-CM

## 2018-03-29 DIAGNOSIS — Z98.61 S/P PTCA (PERCUTANEOUS TRANSLUMINAL CORONARY ANGIOPLASTY): ICD-10-CM

## 2018-03-29 DIAGNOSIS — R42 DIZZINESS: ICD-10-CM

## 2018-03-29 DIAGNOSIS — Z72.0 TOBACCO USE: ICD-10-CM

## 2018-03-29 PROCEDURE — 99214 OFFICE O/P EST MOD 30 MIN: CPT | Performed by: INTERNAL MEDICINE

## 2018-03-29 RX ORDER — ATORVASTATIN CALCIUM 20 MG/1
20 TABLET, FILM COATED ORAL DAILY
Qty: 30 TABLET | Refills: 3 | Status: SHIPPED | OUTPATIENT
Start: 2018-03-29 | End: 2018-08-07 | Stop reason: SDUPTHER

## 2018-03-29 RX ORDER — CLOPIDOGREL BISULFATE 75 MG/1
75 TABLET ORAL DAILY
Qty: 30 TABLET | Refills: 3 | Status: SHIPPED | OUTPATIENT
Start: 2018-03-29 | End: 2018-08-07 | Stop reason: SDUPTHER

## 2018-03-29 RX ORDER — METOPROLOL TARTRATE 50 MG/1
50 TABLET, FILM COATED ORAL 2 TIMES DAILY
Qty: 30 TABLET | Refills: 3 | Status: SHIPPED | OUTPATIENT
Start: 2018-03-29 | End: 2018-05-26 | Stop reason: SDUPTHER

## 2018-03-29 ASSESSMENT — ENCOUNTER SYMPTOMS
SHORTNESS OF BREATH: 0
BLOOD IN STOOL: 0
COUGH: 0
CHEST TIGHTNESS: 0
STRIDOR: 0
NAUSEA: 0
GASTROINTESTINAL NEGATIVE: 1
EYES NEGATIVE: 1
RESPIRATORY NEGATIVE: 1
WHEEZING: 0

## 2018-04-18 ENCOUNTER — HOSPITAL ENCOUNTER (OUTPATIENT)
Dept: NON INVASIVE DIAGNOSTICS | Age: 73
Discharge: HOME | End: 2018-04-18
Payer: MEDICARE

## 2018-04-18 ENCOUNTER — HOSPITAL ENCOUNTER (OUTPATIENT)
Dept: ULTRASOUND IMAGING | Age: 73
Discharge: HOME | End: 2018-04-20
Payer: MEDICARE

## 2018-04-18 DIAGNOSIS — I10 ESSENTIAL HYPERTENSION: Chronic | ICD-10-CM

## 2018-04-18 DIAGNOSIS — R42 DIZZINESS: ICD-10-CM

## 2018-04-18 DIAGNOSIS — Z72.0 TOBACCO USE: ICD-10-CM

## 2018-04-18 DIAGNOSIS — I21.4 NSTEMI (NON-ST ELEVATED MYOCARDIAL INFARCTION) (HCC): ICD-10-CM

## 2018-04-18 DIAGNOSIS — Z98.61 S/P PTCA (PERCUTANEOUS TRANSLUMINAL CORONARY ANGIOPLASTY): ICD-10-CM

## 2018-04-18 LAB
LV EF: 55 %
LVEF MODALITY: NORMAL

## 2018-04-18 PROCEDURE — 93880 EXTRACRANIAL BILAT STUDY: CPT

## 2018-04-18 PROCEDURE — 93978 VASCULAR STUDY: CPT

## 2018-04-18 PROCEDURE — 93306 TTE W/DOPPLER COMPLETE: CPT

## 2018-04-23 RX ORDER — CITALOPRAM 40 MG/1
40 TABLET ORAL NIGHTLY
Qty: 30 TABLET | Refills: 5 | Status: SHIPPED | OUTPATIENT
Start: 2018-04-23 | End: 2018-11-07 | Stop reason: SDUPTHER

## 2018-04-24 ENCOUNTER — TELEPHONE (OUTPATIENT)
Dept: FAMILY MEDICINE CLINIC | Age: 73
End: 2018-04-24

## 2018-05-29 RX ORDER — METOPROLOL TARTRATE 50 MG/1
TABLET, FILM COATED ORAL
Qty: 90 TABLET | Refills: 2 | Status: SHIPPED | OUTPATIENT
Start: 2018-05-29 | End: 2018-10-06 | Stop reason: SDUPTHER

## 2018-06-29 ENCOUNTER — HOSPITAL ENCOUNTER (OUTPATIENT)
Dept: ULTRASOUND IMAGING | Age: 73
Discharge: HOME | End: 2018-07-01
Payer: MEDICARE

## 2018-06-29 DIAGNOSIS — M79.661 PAIN IN RIGHT LOWER LEG: ICD-10-CM

## 2018-06-29 DIAGNOSIS — M79.662 PAIN IN LEFT LOWER LEG: ICD-10-CM

## 2018-06-29 PROCEDURE — 93970 EXTREMITY STUDY: CPT

## 2018-07-12 ENCOUNTER — OFFICE VISIT (OUTPATIENT)
Dept: FAMILY MEDICINE CLINIC | Age: 73
End: 2018-07-12
Payer: MEDICARE

## 2018-07-12 VITALS
OXYGEN SATURATION: 96 % | HEIGHT: 62 IN | BODY MASS INDEX: 32.72 KG/M2 | HEART RATE: 61 BPM | WEIGHT: 177.8 LBS | DIASTOLIC BLOOD PRESSURE: 80 MMHG | SYSTOLIC BLOOD PRESSURE: 136 MMHG

## 2018-07-12 DIAGNOSIS — M25.879 DECREASED PROPRIOCEPTION OF JOINT OF FOOT, UNSPECIFIED LATERALITY: ICD-10-CM

## 2018-07-12 DIAGNOSIS — R60.9 DEPENDENT EDEMA: Primary | ICD-10-CM

## 2018-07-12 DIAGNOSIS — M46.1 SACROILIITIS, NOT ELSEWHERE CLASSIFIED (HCC): ICD-10-CM

## 2018-07-12 DIAGNOSIS — R26.0 ATAXIC GAIT: ICD-10-CM

## 2018-07-12 PROCEDURE — G8598 ASA/ANTIPLAT THER USED: HCPCS | Performed by: FAMILY MEDICINE

## 2018-07-12 PROCEDURE — 3017F COLORECTAL CA SCREEN DOC REV: CPT | Performed by: FAMILY MEDICINE

## 2018-07-12 PROCEDURE — 1101F PT FALLS ASSESS-DOCD LE1/YR: CPT | Performed by: FAMILY MEDICINE

## 2018-07-12 PROCEDURE — 4004F PT TOBACCO SCREEN RCVD TLK: CPT | Performed by: FAMILY MEDICINE

## 2018-07-12 PROCEDURE — 99213 OFFICE O/P EST LOW 20 MIN: CPT | Performed by: FAMILY MEDICINE

## 2018-07-12 PROCEDURE — 4040F PNEUMOC VAC/ADMIN/RCVD: CPT | Performed by: FAMILY MEDICINE

## 2018-07-12 PROCEDURE — G8417 CALC BMI ABV UP PARAM F/U: HCPCS | Performed by: FAMILY MEDICINE

## 2018-07-12 PROCEDURE — G8427 DOCREV CUR MEDS BY ELIG CLIN: HCPCS | Performed by: FAMILY MEDICINE

## 2018-07-12 PROCEDURE — 1123F ACP DISCUSS/DSCN MKR DOCD: CPT | Performed by: FAMILY MEDICINE

## 2018-07-12 RX ORDER — BUMETANIDE 1 MG/1
1 TABLET ORAL DAILY
Qty: 30 TABLET | Refills: 3 | Status: SHIPPED | OUTPATIENT
Start: 2018-07-12 | End: 2019-05-01 | Stop reason: ALTCHOICE

## 2018-07-12 RX ORDER — PREGABALIN 50 MG/1
50 CAPSULE ORAL 3 TIMES DAILY
COMMUNITY
End: 2019-02-15

## 2018-07-12 RX ORDER — OXYCODONE AND ACETAMINOPHEN 7.5; 325 MG/1; MG/1
1 TABLET ORAL EVERY 6 HOURS PRN
Qty: 120 TABLET | Refills: 0 | COMMUNITY
Start: 2018-07-12 | End: 2020-03-20

## 2018-07-12 ASSESSMENT — PATIENT HEALTH QUESTIONNAIRE - PHQ9
1. LITTLE INTEREST OR PLEASURE IN DOING THINGS: 0
2. FEELING DOWN, DEPRESSED OR HOPELESS: 0
SUM OF ALL RESPONSES TO PHQ9 QUESTIONS 1 & 2: 0
SUM OF ALL RESPONSES TO PHQ QUESTIONS 1-9: 0

## 2018-07-12 NOTE — PROGRESS NOTES
Chief Complaint   Patient presents with    Edema     legs swelling, did have US a few weeks ago    Other     blanace is terrible       HPI:  Gus Gore is a 74 y.o. male  Bilateral leg swelling    Now still with pain in the right leg/hip. Limited ROM of the hip. Had venous ablation of the right leg in the past.     Chronic pain pt    He states he quit taking neurontin  Has started lyrica    Back:  Dr. Lisa Cervantes had done surgery on his back in the past.     He has old Rx for bumex    Past Medical History:   Diagnosis Date    Alcohol abuse     CAD (coronary artery disease)     patient states no doctor has told him this / has 1 cardiac stent    Chronic back pain     Chronic kidney disease     Chronic sinusitis     DJD (degenerative joint disease) of knee     left knee DJD    Elevated PSA     History of blood transfusion 1980's    History of inferior wall myocardial infarction 01/2016    1 cardiac stent    HTN (hypertension)     meds .  1 yr    Hyperlipidemia     meds > 12 yrs    Smoker      Past Surgical History:   Procedure Laterality Date    ABDOMEN SURGERY  1961    spleenectomy due to [address]  2009    lumbar disc OR    CARDIAC SURGERY      stents    COLONOSCOPY      CORONARY ANGIOPLASTY WITH STENT PLACEMENT  1/29/2016    EYE SURGERY      to have Phaco with IOL OU 2/2018    HERNIA REPAIR      JOINT REPLACEMENT Left 1994    LTHR    JOINT REPLACEMENT Right 2009    RTKR    KNEE SURGERY Right 2011    arthroscopy    SD MANIPULATN KNEE JT+ANESTHESIA Right 5/12/2017    RIGHT KNEE MANIPULATION UNDER ANESTHESIA performed by Sherri Khalil MD at [address] OFFICE/OUTPT VISIT,PROCEDURE ONLY Bilateral 12/29/2017    RIGHT AND BILATERAL L 4-5 LYSIS OF SCAR DISKECTOMY INTERBODY CAGE FUSION POSTEROLATERAL FUSION PEDICLE SCREWS performed by Amaury Luevano MD at [address] [place] [address] Road Right 3/24/2017    RIGHT  KNEE TOTAL ARTHROPLASTY, ELHAM CEMENTED PERSONA  performed by Lisandro Redman MD at [address] History   Problem Relation Age of Onset    Osteoarthritis Mother     Emphysema Mother     Hypertension Father     Hearing Loss Father     Dementia Father     No Known Problems Sister     No Known Problems Brother      Social History     Social History    Marital status:      Spouse name: N/A    Number of children: N/A    Years of education: N/A     Occupational History    retired      Social History Main Topics    Smoking status: Current Every Day Smoker     Packs/day: 0.50     Years: 60.00     Types: Cigarettes    Smokeless tobacco: Never Used    Alcohol use 4.2 oz/week     7 Shots of liquor per week      Comment: last day before yesterday    Drug use: No    Sexual activity: Yes     Other Topics Concern    None     Social History Narrative    Lives alone     Current Outpatient Prescriptions   Medication Sig Dispense Refill    pregabalin (LYRICA) 50 MG capsule Take 50 mg by mouth 3 times daily. Lorren Lesches bumetanide (BUMEX) 1 MG tablet Take 1 tablet by mouth daily 30 tablet 3    oxyCODONE-acetaminophen (ENDOCET) 7.5-325 MG per tablet Take 1 tablet by mouth every 6 hours as needed for Pain. Intended supply: 30 days.  120 tablet 0    metoprolol tartrate (LOPRESSOR) 50 MG tablet TAKE ONE TABLET BY MOUTH TWO TIMES A DAY 90 tablet 2    citalopram (CELEXA) 40 MG tablet Take 1 tablet by mouth nightly 30 tablet 5    clopidogrel (PLAVIX) 75 MG tablet Take 1 tablet by mouth daily 30 tablet 3    atorvastatin (LIPITOR) 20 MG tablet Take 1 tablet by mouth daily 30 tablet 3    finasteride (PROSCAR) 5 MG tablet Take 1 tablet by mouth daily 90 tablet 3    omeprazole (PRILOSEC) 10 MG delayed release capsule Take 10 mg by mouth daily       LORAZEPAM PO Take by mouth as needed      BUMETANIDE PO Take by mouth as needed       DOCUSATE CALCIUM PO Take by mouth as needed      diclofenac (VOLTAREN) 50 MG EC tablet Take 1 tablet by mouth 2 times daily 60 tablet 5    Multiple Vitamins-Minerals (CENTRUM SILVER) TABS Take by mouth daily       nitroGLYCERIN (NITROSTAT) 0.4 MG SL tablet Place 0.4 mg under the tongue every 5 minutes as needed for Chest pain up to max of 3 total doses. If no relief after 1 dose, call 911. No current facility-administered medications for this visit. No Known Allergies    Review of Systems:   General ROS: negative for - chills, fatigue, fever, malaise, weight gain or weight loss  Respiratory ROS: no cough, shortness of breath, or wheezing  Cardiovascular ROS: no chest pain or dyspnea on exertion  Gastrointestinal ROS: no abdominal pain, change in bowel habits, or black or bloody stools  Genito-Urinary ROS: no dysuria, trouble voiding  Musculoskeletal ROS: see HPI  Neurological ROS: gait ataxia    In general patient otherwise reports feeling well. Physical Exam:  /80 (Site: Right Arm)   Pulse 61   Ht 5' 2\" (1.575 m)   Wt 177 lb 12.8 oz (80.6 kg)   SpO2 96%   BMI 32.52 kg/m²     Gen: Well, NAD, Alert, Oriented x 3   HEENT: EOMI, eyes clear, MMM  Skin: without rash or jaundice  Neck: no significant lymphadenopathy or thyromegaly  Lungs: exp wheeze   Heart: RRR, S1S2, w/out M/R/G, non-displaced PMI   Abdomen: Soft NT/ND, w/out R/G, w/ +BSx4   Ext: prominent varicosities bilaterally. 2+ pedal edema  Neuro:altered light touch in feet        A&P   Diagnosis Orders   1. Dependent edema  bumetanide (BUMEX) 1 MG tablet    Comprehensive Metabolic Panel    TSH without Reflex    CBC   2. Ataxic gait     3. Decreased proprioception of joint of foot, unspecified laterality     4.  Sacroiliitis, not elsewhere classified ([facility] [address]  oxyCODONE-acetaminophen (ENDOCET) 7.5-325 MG per tablet     Multifactorial gait ataxia  Mainly edema, lyrica, decreased proprioception/alertness on chronic opiate medicatioons  Restart bumex daily  Labs as ordered   F/u with pain mgmt      Suyapa Espinoza MD

## 2018-07-18 DIAGNOSIS — R60.9 DEPENDENT EDEMA: ICD-10-CM

## 2018-07-18 LAB
ALBUMIN SERPL-MCNC: 4.4 G/DL (ref 3.9–4.9)
ALP BLD-CCNC: 102 U/L (ref 35–104)
ALT SERPL-CCNC: 17 U/L (ref 0–41)
ANION GAP SERPL CALCULATED.3IONS-SCNC: 16 MEQ/L (ref 7–13)
AST SERPL-CCNC: 15 U/L (ref 0–40)
BILIRUB SERPL-MCNC: 0.8 MG/DL (ref 0–1.2)
BUN BLDV-MCNC: 43 MG/DL (ref 8–23)
CALCIUM SERPL-MCNC: 8.9 MG/DL (ref 8.6–10.2)
CHLORIDE BLD-SCNC: 93 MEQ/L (ref 98–107)
CO2: 26 MEQ/L (ref 22–29)
CREAT SERPL-MCNC: 1.52 MG/DL (ref 0.7–1.2)
GFR AFRICAN AMERICAN: 54.4
GFR NON-AFRICAN AMERICAN: 45
GLOBULIN: 2.9 G/DL (ref 2.3–3.5)
GLUCOSE BLD-MCNC: 88 MG/DL (ref 74–109)
HCT VFR BLD CALC: 39.2 % (ref 42–52)
HEMOGLOBIN: 13.1 G/DL (ref 14–18)
MCH RBC QN AUTO: 33.1 PG (ref 27–31.3)
MCHC RBC AUTO-ENTMCNC: 33.5 % (ref 33–37)
MCV RBC AUTO: 98.9 FL (ref 80–100)
PDW BLD-RTO: 13.5 % (ref 11.5–14.5)
PLATELET # BLD: 260 K/UL (ref 130–400)
POTASSIUM SERPL-SCNC: 5.3 MEQ/L (ref 3.5–5.1)
RBC # BLD: 3.97 M/UL (ref 4.7–6.1)
SODIUM BLD-SCNC: 135 MEQ/L (ref 132–144)
TOTAL PROTEIN: 7.3 G/DL (ref 6.4–8.1)
TSH SERPL DL<=0.05 MIU/L-ACNC: 1.06 UIU/ML (ref 0.27–4.2)
WBC # BLD: 10.4 K/UL (ref 4.8–10.8)

## 2018-07-20 DIAGNOSIS — I10 ESSENTIAL HYPERTENSION: Primary | Chronic | ICD-10-CM

## 2018-08-10 RX ORDER — CLOPIDOGREL BISULFATE 75 MG/1
TABLET ORAL
Qty: 90 TABLET | Refills: 2 | Status: SHIPPED | OUTPATIENT
Start: 2018-08-10 | End: 2019-05-09 | Stop reason: SDUPTHER

## 2018-08-10 RX ORDER — ATORVASTATIN CALCIUM 20 MG/1
TABLET, FILM COATED ORAL
Qty: 90 TABLET | Refills: 2 | Status: SHIPPED | OUTPATIENT
Start: 2018-08-10 | End: 2018-10-16

## 2018-09-04 DIAGNOSIS — I10 ESSENTIAL HYPERTENSION: Chronic | ICD-10-CM

## 2018-09-04 LAB
ALBUMIN SERPL-MCNC: 4.5 G/DL (ref 3.9–4.9)
ALP BLD-CCNC: 87 U/L (ref 35–104)
ALT SERPL-CCNC: 16 U/L (ref 0–41)
ANION GAP SERPL CALCULATED.3IONS-SCNC: 13 MEQ/L (ref 7–13)
AST SERPL-CCNC: 15 U/L (ref 0–40)
BILIRUB SERPL-MCNC: 0.5 MG/DL (ref 0–1.2)
BUN BLDV-MCNC: 27 MG/DL (ref 8–23)
CALCIUM SERPL-MCNC: 9.1 MG/DL (ref 8.6–10.2)
CHLORIDE BLD-SCNC: 102 MEQ/L (ref 98–107)
CO2: 25 MEQ/L (ref 22–29)
CREAT SERPL-MCNC: 1.08 MG/DL (ref 0.7–1.2)
GFR AFRICAN AMERICAN: >60
GFR NON-AFRICAN AMERICAN: >60
GLOBULIN: 2.6 G/DL (ref 2.3–3.5)
GLUCOSE BLD-MCNC: 72 MG/DL (ref 74–109)
HCT VFR BLD CALC: 34.6 % (ref 42–52)
HEMOGLOBIN: 11.4 G/DL (ref 14–18)
MCH RBC QN AUTO: 32.7 PG (ref 27–31.3)
MCHC RBC AUTO-ENTMCNC: 32.9 % (ref 33–37)
MCV RBC AUTO: 99.5 FL (ref 80–100)
PDW BLD-RTO: 13.9 % (ref 11.5–14.5)
PLATELET # BLD: 238 K/UL (ref 130–400)
POTASSIUM SERPL-SCNC: 5.2 MEQ/L (ref 3.5–5.1)
RBC # BLD: 3.48 M/UL (ref 4.7–6.1)
SODIUM BLD-SCNC: 140 MEQ/L (ref 132–144)
TOTAL PROTEIN: 7.1 G/DL (ref 6.4–8.1)
TSH SERPL DL<=0.05 MIU/L-ACNC: 0.41 UIU/ML (ref 0.27–4.2)
WBC # BLD: 8 K/UL (ref 4.8–10.8)

## 2018-09-28 ENCOUNTER — OFFICE VISIT (OUTPATIENT)
Dept: CARDIOLOGY CLINIC | Age: 73
End: 2018-09-28
Payer: MEDICARE

## 2018-09-28 VITALS
WEIGHT: 182 LBS | DIASTOLIC BLOOD PRESSURE: 74 MMHG | RESPIRATION RATE: 16 BRPM | HEIGHT: 62 IN | OXYGEN SATURATION: 97 % | BODY MASS INDEX: 33.49 KG/M2 | HEART RATE: 63 BPM | SYSTOLIC BLOOD PRESSURE: 126 MMHG

## 2018-09-28 DIAGNOSIS — E78.00 PURE HYPERCHOLESTEROLEMIA: Chronic | ICD-10-CM

## 2018-09-28 DIAGNOSIS — Z98.61 S/P PTCA (PERCUTANEOUS TRANSLUMINAL CORONARY ANGIOPLASTY): Primary | ICD-10-CM

## 2018-09-28 DIAGNOSIS — I10 ESSENTIAL HYPERTENSION: Chronic | ICD-10-CM

## 2018-09-28 DIAGNOSIS — I21.4 NSTEMI (NON-ST ELEVATED MYOCARDIAL INFARCTION) (HCC): ICD-10-CM

## 2018-09-28 DIAGNOSIS — Z72.0 TOBACCO USE: ICD-10-CM

## 2018-09-28 PROCEDURE — G8427 DOCREV CUR MEDS BY ELIG CLIN: HCPCS | Performed by: INTERNAL MEDICINE

## 2018-09-28 PROCEDURE — G8417 CALC BMI ABV UP PARAM F/U: HCPCS | Performed by: INTERNAL MEDICINE

## 2018-09-28 PROCEDURE — 99214 OFFICE O/P EST MOD 30 MIN: CPT | Performed by: INTERNAL MEDICINE

## 2018-09-28 PROCEDURE — 4004F PT TOBACCO SCREEN RCVD TLK: CPT | Performed by: INTERNAL MEDICINE

## 2018-09-28 PROCEDURE — 3017F COLORECTAL CA SCREEN DOC REV: CPT | Performed by: INTERNAL MEDICINE

## 2018-09-28 PROCEDURE — 1101F PT FALLS ASSESS-DOCD LE1/YR: CPT | Performed by: INTERNAL MEDICINE

## 2018-09-28 PROCEDURE — G8598 ASA/ANTIPLAT THER USED: HCPCS | Performed by: INTERNAL MEDICINE

## 2018-09-28 PROCEDURE — 1123F ACP DISCUSS/DSCN MKR DOCD: CPT | Performed by: INTERNAL MEDICINE

## 2018-09-28 PROCEDURE — 4040F PNEUMOC VAC/ADMIN/RCVD: CPT | Performed by: INTERNAL MEDICINE

## 2018-09-28 ASSESSMENT — ENCOUNTER SYMPTOMS
BLOOD IN STOOL: 0
SHORTNESS OF BREATH: 0
EYES NEGATIVE: 1
STRIDOR: 0
WHEEZING: 0
CHEST TIGHTNESS: 0
GASTROINTESTINAL NEGATIVE: 1
COUGH: 0
RESPIRATORY NEGATIVE: 1
NAUSEA: 0

## 2018-09-28 NOTE — PROGRESS NOTES
by Rudy Hendricks MD at [address] [place] [address] History   Problem Relation Age of Onset    Osteoarthritis Mother     Emphysema Mother     Hypertension Father     Hearing Loss Father     Dementia Father     No Known Problems Sister     No Known Problems Brother        Social History     Social History    Marital status:      Spouse name: N/A    Number of children: N/A    Years of education: N/A     Occupational History    retired      Social History Main Topics    Smoking status: Current Every Day Smoker     Packs/day: 0.50     Years: 60.00     Types: Cigarettes    Smokeless tobacco: Never Used    Alcohol use 4.2 oz/week     7 Shots of liquor per week      Comment: last day before yesterday    Drug use: No    Sexual activity: Yes     Other Topics Concern    None     Social History Narrative    Lives alone       No Known Allergies    Current Outpatient Prescriptions   Medication Sig Dispense Refill    clopidogrel (PLAVIX) 75 MG tablet TAKE ONE TABLET BY MOUTH DAILY 90 tablet 2    atorvastatin (LIPITOR) 20 MG tablet TAKE ONE TABLET BY MOUTH DAILY 90 tablet 2    pregabalin (LYRICA) 50 MG capsule Take 50 mg by mouth 3 times daily. Tristan Pickett oxyCODONE-acetaminophen (ENDOCET) 7.5-325 MG per tablet Take 1 tablet by mouth every 6 hours as needed for Pain. Intended supply: 30 days.  120 tablet 0    metoprolol tartrate (LOPRESSOR) 50 MG tablet TAKE ONE TABLET BY MOUTH TWO TIMES A DAY 90 tablet 2    citalopram (CELEXA) 40 MG tablet Take 1 tablet by mouth nightly 30 tablet 5    finasteride (PROSCAR) 5 MG tablet Take 1 tablet by mouth daily 90 tablet 3    omeprazole (PRILOSEC) 10 MG delayed release capsule Take 10 mg by mouth daily       LORAZEPAM PO Take by mouth as needed      BUMETANIDE PO Take by mouth as needed       DOCUSATE CALCIUM PO Take by mouth as needed      diclofenac (VOLTAREN) 50 MG EC tablet Take 1 tablet by mouth 2 times daily 60 tablet 5    Multiple Vitamins-Minerals (CENTRUM lumbar region without myelopathy or radiculopathy    Sacroiliitis, not elsewhere classified ([facility] [address]    Pure hypercholesterolemia    Charcot's joint of left ankle    Left ankle pain    Confusion caused by a drug ([address]    DDD (degenerative disc disease), lumbar    Osteoarthritis of spine with myelopathy, lumbosacral region    Chronic bilateral low back pain with bilateral sciatica    Postlaminectomy syndrome, lumbar region    Acquired spondylolisthesis of lumbosacral region    Neurogenic claudication due to lumbar spinal stenosis    HNP (herniated nucleus pulposus), lumbar    Spondylolisthesis at L4-L5 level    Anesthesia    Herniated nucleus pulposus, L4-5    NSTEMI (non-ST elevated myocardial infarction) ([address]    S/P PTCA (percutaneous transluminal coronary angioplasty)       There are no discontinued medications. Modified Medications    No medications on file       No orders of the defined types were placed in this encounter. Assessment/Plan:    1. Essential hypertension  Stable     2. Tobacco use  stop    3. Pure hypercholesterolemia   statin    4. NSTEMI (non-ST elevated myocardial infarction) ([facility] [address]   no angina    5. S/P PTCA (percutaneous transluminal coronary angioplasty)   stable       Counseling:  Heart Healthy Lifestyle, Improve BMI, Stop Smoking, Low Salt Diet, Take Precautions to Prevent Falls, Regular Exercise and Walk Daily    Return in about 6 months (around 3/28/2019) for Cardiovascular care. .      Electronically signed by Juliana Hecrules MD on 9/28/2018 at 4:12 PM

## 2018-10-02 ENCOUNTER — HOSPITAL ENCOUNTER (OUTPATIENT)
Dept: NEUROLOGY | Age: 73
Discharge: HOME | End: 2018-10-02
Payer: MEDICARE

## 2018-10-02 PROCEDURE — 95911 NRV CNDJ TEST 9-10 STUDIES: CPT

## 2018-10-02 PROCEDURE — 95886 MUSC TEST DONE W/N TEST COMP: CPT

## 2018-10-02 NOTE — PROCEDURES
[address]                       [address], [address] [facility]                               ELECTROMYOGRAM REPORT    PATIENT NAME: Ashok Patterson                     :        1945  MED REC NO:   [id number]                            ROOM:  ACCOUNT NO:   [de-identified]                           ADMIT DATE: 10/02/2018  PROVIDER:     Bethel Orosco MD    DATE OF EMG:  10/02/2018    Neurophysiologic studies are requested for the patient's underlying  numbness of his hands. This is a comparative study. The previous study  was done in the year 2016. Motor nerve conduction studies of the right median nerve shows normal peak  latencies, low amplitudes, and normal conduction velocity. Motor nerve  conduction studies of the left median nerve shows significantly prolonged  latencies, low amplitudes, and normal conduction velocity. The right ulnar  nerve motor nerve conduction study shows normal amplitudes, latencies, and  conduction velocity. The left ulnar nerve motor nerve conduction study  shows normal peak latencies and borderline conduction velocity. F-responses are normal.  Sensory nerve conduction studies of the right  median nerve recorded in digit two shows significantly prolonged latencies,  normal amplitudes, and decreased velocity. Further mid palm stimulation  was obtained to confirm findings shows significantly prolonged latencies,  low amplitudes, and decreased conduction velocity. The left median digital  examination shows significantly prolonged latencies, normal amplitudes, and  decreased velocity. The left median mid palm stimulation shows prolonged  latencies, low amplitudes, and decreased conduction velocity. The right  ulnar digital examination shows normal peak latencies, normal amplitudes,  and normal conduction velocity. The left ulnar digital examination shows  normal amplitudes, latencies, and conduction velocity.   The right ulnar  mixed orthodromic study shows normal peak latencies, low amplitudes, and  normal conduction velocity. The left ulnar mixed orthodromic study shows  prolonged latencies, normal amplitudes, and decreased velocity. Radial and  sensory nerve conduction studies are normal.  Needle electrode examination  of the above sampled muscles shows some minor denervation seen in the C5-6  distribution on the right with the suggestion of decreased _____ units in  the right abductor pollicis brevis muscle. 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 These findings are based on prolonged  latencies seen both in the antidromic and orthodromic nerve recordings with  low amplitude tracings. On the left, a  [place] canal stenosis is likely,  which has not changed. The rest of the nerve conduction studies are normal.    There is suggestion of some very subtle C5-C6 denervation in the right arm. Decompressive surgeries may be considered. The outcomes are likely to be  guarded given the values of the latency present. This EMG shows further  worsening of his carpal tunnel syndrome in comparison to one done in 2016. Multiple sensory nerve conduction studies were evaluated to rule out an  artifact or temperature differences. This test is personally performed and  interpreted by me. Thank you Dr. Vikki Mcgrath for asking us to assist in the care of this patient.         Eliot Maharaj MD    D: 10/02/2018 11:37:37       T: 10/02/2018 13:08:51     [email or web address]  Job#: [id number]     Doc#: [id number]    CC:

## 2018-10-08 RX ORDER — METOPROLOL TARTRATE 50 MG/1
TABLET, FILM COATED ORAL
Qty: 180 TABLET | Refills: 1 | Status: SHIPPED | OUTPATIENT
Start: 2018-10-08 | End: 2019-04-08 | Stop reason: SDUPTHER

## 2018-10-10 DIAGNOSIS — N40.1 BENIGN NON-NODULAR PROSTATIC HYPERPLASIA WITH LOWER URINARY TRACT SYMPTOMS: ICD-10-CM

## 2018-10-10 LAB — PROSTATE SPECIFIC ANTIGEN: 2 NG/ML (ref 0–6.22)

## 2018-10-16 ENCOUNTER — OFFICE VISIT (OUTPATIENT)
Dept: UROLOGY | Age: 73
End: 2018-10-16
Payer: MEDICARE

## 2018-10-16 VITALS
WEIGHT: 180 LBS | BODY MASS INDEX: 28.93 KG/M2 | DIASTOLIC BLOOD PRESSURE: 78 MMHG | HEIGHT: 66 IN | HEART RATE: 60 BPM | SYSTOLIC BLOOD PRESSURE: 136 MMHG

## 2018-10-16 DIAGNOSIS — R97.20 ELEVATED PSA: Primary | ICD-10-CM

## 2018-10-16 PROCEDURE — 1123F ACP DISCUSS/DSCN MKR DOCD: CPT | Performed by: UROLOGY

## 2018-10-16 PROCEDURE — 4004F PT TOBACCO SCREEN RCVD TLK: CPT | Performed by: UROLOGY

## 2018-10-16 PROCEDURE — G8427 DOCREV CUR MEDS BY ELIG CLIN: HCPCS | Performed by: UROLOGY

## 2018-10-16 PROCEDURE — 3017F COLORECTAL CA SCREEN DOC REV: CPT | Performed by: UROLOGY

## 2018-10-16 PROCEDURE — G8484 FLU IMMUNIZE NO ADMIN: HCPCS | Performed by: UROLOGY

## 2018-10-16 PROCEDURE — 99213 OFFICE O/P EST LOW 20 MIN: CPT | Performed by: UROLOGY

## 2018-10-16 PROCEDURE — 4040F PNEUMOC VAC/ADMIN/RCVD: CPT | Performed by: UROLOGY

## 2018-10-16 PROCEDURE — G8417 CALC BMI ABV UP PARAM F/U: HCPCS | Performed by: UROLOGY

## 2018-10-16 PROCEDURE — 1101F PT FALLS ASSESS-DOCD LE1/YR: CPT | Performed by: UROLOGY

## 2018-10-16 PROCEDURE — G8598 ASA/ANTIPLAT THER USED: HCPCS | Performed by: UROLOGY

## 2018-10-16 RX ORDER — FINASTERIDE 5 MG/1
5 TABLET, FILM COATED ORAL DAILY
Qty: 90 TABLET | Refills: 3 | Status: SHIPPED | OUTPATIENT
Start: 2018-10-16 | End: 2019-12-13 | Stop reason: SDUPTHER

## 2018-10-16 NOTE — PROGRESS NOTES
BY MOUTH TWO TIMES A  tablet 1    clopidogrel (PLAVIX) 75 MG tablet TAKE ONE TABLET BY MOUTH DAILY 90 tablet 2    pregabalin (LYRICA) 50 MG capsule Take 50 mg by mouth 3 times daily. [employer] bumetanide (BUMEX) 1 MG tablet Take 1 tablet by mouth daily 30 tablet 3    oxyCODONE-acetaminophen (ENDOCET) 7.5-325 MG per tablet Take 1 tablet by mouth every 6 hours as needed for Pain. Intended supply: 30 days. 120 tablet 0    citalopram (CELEXA) 40 MG tablet Take 1 tablet by mouth nightly 30 tablet 5    omeprazole (PRILOSEC) 10 MG delayed release capsule Take 10 mg by mouth daily       LORAZEPAM PO Take by mouth as needed      DOCUSATE CALCIUM PO Take by mouth as needed      diclofenac (VOLTAREN) 50 MG EC tablet Take 1 tablet by mouth 2 times daily 60 tablet 5    Multiple Vitamins-Minerals (CENTRUM SILVER) TABS Take by mouth daily       nitroGLYCERIN (NITROSTAT) 0.4 MG SL tablet Place 0.4 mg under the tongue every 5 minutes as needed for Chest pain up to max of 3 total doses. If no relief after 1 dose, call 911. No current facility-administered medications for this visit. Patient has no known allergies. All reviewed and verified by Dr Yamil Chandler on today's visit    PSA   Date Value Ref Range Status   10/10/2018 2.00 0.00 - 6.22 ng/mL Final   10/03/2017 1.78 0.00 - 6.22 ng/mL Final   10/04/2016 2.89 0.00 - 6.22 ng/mL Final   10/05/2015 5.01 0.00 - 6.22 ng/mL Final   03/30/2015 9.47 (H) 0.00 - 6.22 ng/mL Final     Diagnostic Psa   Date Value Ref Range Status   09/23/2014 10.01 (H) 0.00 - 6.22 ng/mL Final     No results found for this visit on 10/16/18. Physical Exam  Vitals:    10/16/18 1255 10/16/18 1326   BP: (!) 152/108 136/78   Pulse: 60    Weight: 180 lb (81.6 kg)    Height: 5' 6\" (1.676 m)      Constitutional: patient is oriented to person, place, and time. patient appears well-developed. pleasant and cooperative. Ears: Adequate hearing/no hearing loss  Head: Normocephalic.

## 2018-10-30 ENCOUNTER — TELEPHONE (OUTPATIENT)
Dept: CARDIOLOGY CLINIC | Age: 73
End: 2018-10-30

## 2018-10-30 NOTE — TELEPHONE ENCOUNTER
The  Patient is being followed by pain management for arthritic discomfort. He has been taking Voltaren PO for awhile with good relief from his pain.  The pain Clinic physician wants him to switch to Celebrex, due to it would be more cardiac friendly, but he wanted to check with you first.

## 2018-11-08 RX ORDER — CITALOPRAM 40 MG/1
TABLET ORAL
Qty: 30 TABLET | Refills: 4 | Status: SHIPPED | OUTPATIENT
Start: 2018-11-08 | End: 2019-04-09 | Stop reason: SDUPTHER

## 2018-12-18 ENCOUNTER — OFFICE VISIT (OUTPATIENT)
Dept: FAMILY MEDICINE CLINIC | Age: 73
End: 2018-12-18
Payer: MEDICARE

## 2018-12-18 VITALS
TEMPERATURE: 99 F | DIASTOLIC BLOOD PRESSURE: 70 MMHG | BODY MASS INDEX: 28.93 KG/M2 | OXYGEN SATURATION: 96 % | HEIGHT: 66 IN | SYSTOLIC BLOOD PRESSURE: 120 MMHG | WEIGHT: 180 LBS | HEART RATE: 62 BPM

## 2018-12-18 DIAGNOSIS — M96.1 POSTLAMINECTOMY SYNDROME, LUMBAR REGION: ICD-10-CM

## 2018-12-18 DIAGNOSIS — D17.22 LIPOMA OF LEFT UPPER EXTREMITY: Primary | ICD-10-CM

## 2018-12-18 DIAGNOSIS — M54.42 CHRONIC BILATERAL LOW BACK PAIN WITH BILATERAL SCIATICA: ICD-10-CM

## 2018-12-18 DIAGNOSIS — M51.36 DDD (DEGENERATIVE DISC DISEASE), LUMBAR: ICD-10-CM

## 2018-12-18 DIAGNOSIS — I21.4 NSTEMI (NON-ST ELEVATED MYOCARDIAL INFARCTION) (HCC): ICD-10-CM

## 2018-12-18 DIAGNOSIS — M46.1 SACROILIITIS, NOT ELSEWHERE CLASSIFIED (HCC): ICD-10-CM

## 2018-12-18 DIAGNOSIS — M54.41 CHRONIC BILATERAL LOW BACK PAIN WITH BILATERAL SCIATICA: ICD-10-CM

## 2018-12-18 DIAGNOSIS — M17.11 PRIMARY OSTEOARTHRITIS OF RIGHT KNEE: Chronic | ICD-10-CM

## 2018-12-18 DIAGNOSIS — M47.816 SPONDYLOSIS OF LUMBAR REGION WITHOUT MYELOPATHY OR RADICULOPATHY: ICD-10-CM

## 2018-12-18 DIAGNOSIS — G89.29 CHRONIC BILATERAL LOW BACK PAIN WITH BILATERAL SCIATICA: ICD-10-CM

## 2018-12-18 DIAGNOSIS — M47.16 OSTEOARTHRITIS OF SPINE WITH MYELOPATHY, LUMBOSACRAL REGION: ICD-10-CM

## 2018-12-18 PROCEDURE — 1123F ACP DISCUSS/DSCN MKR DOCD: CPT | Performed by: FAMILY MEDICINE

## 2018-12-18 PROCEDURE — 3017F COLORECTAL CA SCREEN DOC REV: CPT | Performed by: FAMILY MEDICINE

## 2018-12-18 PROCEDURE — 4004F PT TOBACCO SCREEN RCVD TLK: CPT | Performed by: FAMILY MEDICINE

## 2018-12-18 PROCEDURE — 1101F PT FALLS ASSESS-DOCD LE1/YR: CPT | Performed by: FAMILY MEDICINE

## 2018-12-18 PROCEDURE — G8484 FLU IMMUNIZE NO ADMIN: HCPCS | Performed by: FAMILY MEDICINE

## 2018-12-18 PROCEDURE — 99213 OFFICE O/P EST LOW 20 MIN: CPT | Performed by: FAMILY MEDICINE

## 2018-12-18 PROCEDURE — 4040F PNEUMOC VAC/ADMIN/RCVD: CPT | Performed by: FAMILY MEDICINE

## 2018-12-18 PROCEDURE — G8417 CALC BMI ABV UP PARAM F/U: HCPCS | Performed by: FAMILY MEDICINE

## 2018-12-18 PROCEDURE — G8427 DOCREV CUR MEDS BY ELIG CLIN: HCPCS | Performed by: FAMILY MEDICINE

## 2018-12-18 PROCEDURE — G8598 ASA/ANTIPLAT THER USED: HCPCS | Performed by: FAMILY MEDICINE

## 2018-12-18 NOTE — PROGRESS NOTES
Chief Complaint   Patient presents with    Discuss Medications     patient has questions about celexa and the reason he is taking it . ..  Foot Pain     patient has an ulcer on left foot for a month . ..put benidine and a patch on it but its not working. ..  Mass     patient has a lump like patch under his left arm    Discuss Medications     patient doesnt like the pain mng office he is attending. ..wants ti discuss options       HPI:  [place] Wilson is a 74 y.o. male    Scheduled      Had venous ablation of the right leg in the past.     Chronic pain pt  Claims not happy with current pain mgmt office    He states he quit taking neurontin  Has started lyrica    Back:  Dr. Awa Cordova had done surgery on his back in the past.       Ulcer on outside of left foot    He is actually seeing Dr Nohemi Lee and was fitted for AFO left ankle      Past Medical History:   Diagnosis Date    Alcohol abuse     CAD (coronary artery disease)     patient states no doctor has told him this / has 1 cardiac stent    Chronic back pain     Chronic kidney disease     Chronic sinusitis     DJD (degenerative joint disease) of knee     left knee DJD    Elevated PSA     History of blood transfusion 1980's    History of inferior wall myocardial infarction 01/2016    1 cardiac stent    HTN (hypertension)     meds .  1 yr    Hyperlipidemia     meds > 12 yrs    Smoker      Past Surgical History:   Procedure Laterality Date    ABDOMEN SURGERY  1961    spleenectomy due to [address]  2009    lumbar disc OR    CARDIAC SURGERY      stents    COLONOSCOPY      CORONARY ANGIOPLASTY WITH STENT PLACEMENT  1/29/2016    EYE SURGERY      to have Phaco with IOL OU 2/2018    HERNIA REPAIR      JOINT REPLACEMENT Left 1994    LTHR    JOINT REPLACEMENT Right 2009    RTKR    KNEE SURGERY Right 2011    arthroscopy    [place] MANIPULATN KNEE JT+ANESTHESIA Right 5/12/2017    RIGHT KNEE MANIPULATION UNDER ANESTHESIA performed by Kayley Bro,

## 2019-02-12 ENCOUNTER — TELEPHONE (OUTPATIENT)
Dept: FAMILY MEDICINE CLINIC | Age: 74
End: 2019-02-12

## 2019-02-13 ENCOUNTER — OFFICE VISIT (OUTPATIENT)
Dept: FAMILY MEDICINE CLINIC | Age: 74
End: 2019-02-13
Payer: MEDICARE

## 2019-02-13 VITALS
DIASTOLIC BLOOD PRESSURE: 60 MMHG | HEIGHT: 66 IN | OXYGEN SATURATION: 98 % | HEART RATE: 67 BPM | SYSTOLIC BLOOD PRESSURE: 110 MMHG | WEIGHT: 168 LBS | BODY MASS INDEX: 27 KG/M2 | TEMPERATURE: 97.9 F

## 2019-02-13 DIAGNOSIS — R10.11 RUQ PAIN: Primary | ICD-10-CM

## 2019-02-13 DIAGNOSIS — R10.11 RUQ PAIN: ICD-10-CM

## 2019-02-13 LAB
ALBUMIN SERPL-MCNC: 4.5 G/DL (ref 3.5–4.6)
ALP BLD-CCNC: 99 U/L (ref 35–104)
ALT SERPL-CCNC: 15 U/L (ref 0–41)
ANION GAP SERPL CALCULATED.3IONS-SCNC: 14 MEQ/L (ref 9–15)
AST SERPL-CCNC: 16 U/L (ref 0–40)
BILIRUB SERPL-MCNC: 0.5 MG/DL (ref 0.2–0.7)
BILIRUBIN, POC: NORMAL
BLOOD URINE, POC: NORMAL
BUN BLDV-MCNC: 16 MG/DL (ref 8–23)
CALCIUM SERPL-MCNC: 9.6 MG/DL (ref 8.5–9.9)
CHLORIDE BLD-SCNC: 97 MEQ/L (ref 95–107)
CLARITY, POC: CLEAR
CO2: 27 MEQ/L (ref 20–31)
COLOR, POC: NORMAL
CREAT SERPL-MCNC: 0.83 MG/DL (ref 0.7–1.2)
GFR AFRICAN AMERICAN: >60
GFR NON-AFRICAN AMERICAN: >60
GLOBULIN: 2.9 G/DL (ref 2.3–3.5)
GLUCOSE BLD-MCNC: 64 MG/DL (ref 70–99)
GLUCOSE URINE, POC: NORMAL
HCT VFR BLD CALC: 39.3 % (ref 42–52)
HEMOGLOBIN: 13 G/DL (ref 14–18)
KETONES, POC: NORMAL
LEUKOCYTE EST, POC: NORMAL
MCH RBC QN AUTO: 32.2 PG (ref 27–31.3)
MCHC RBC AUTO-ENTMCNC: 33 % (ref 33–37)
MCV RBC AUTO: 97.7 FL (ref 80–100)
NITRITE, POC: NORMAL
PDW BLD-RTO: 13.2 % (ref 11.5–14.5)
PH, POC: NORMAL
PLATELET # BLD: 311 K/UL (ref 130–400)
POTASSIUM SERPL-SCNC: 5.1 MEQ/L (ref 3.4–4.9)
PROTEIN, POC: NORMAL
RBC # BLD: 4.02 M/UL (ref 4.7–6.1)
SODIUM BLD-SCNC: 138 MEQ/L (ref 135–144)
SPECIFIC GRAVITY, POC: 1.01
TOTAL PROTEIN: 7.4 G/DL (ref 6.3–8)
UROBILINOGEN, POC: 0.2
WBC # BLD: 11.5 K/UL (ref 4.8–10.8)

## 2019-02-13 PROCEDURE — 4040F PNEUMOC VAC/ADMIN/RCVD: CPT | Performed by: FAMILY MEDICINE

## 2019-02-13 PROCEDURE — G8484 FLU IMMUNIZE NO ADMIN: HCPCS | Performed by: FAMILY MEDICINE

## 2019-02-13 PROCEDURE — 81002 URINALYSIS NONAUTO W/O SCOPE: CPT | Performed by: FAMILY MEDICINE

## 2019-02-13 PROCEDURE — G8427 DOCREV CUR MEDS BY ELIG CLIN: HCPCS | Performed by: FAMILY MEDICINE

## 2019-02-13 PROCEDURE — G8598 ASA/ANTIPLAT THER USED: HCPCS | Performed by: FAMILY MEDICINE

## 2019-02-13 PROCEDURE — G8417 CALC BMI ABV UP PARAM F/U: HCPCS | Performed by: FAMILY MEDICINE

## 2019-02-13 PROCEDURE — 1101F PT FALLS ASSESS-DOCD LE1/YR: CPT | Performed by: FAMILY MEDICINE

## 2019-02-13 PROCEDURE — 99215 OFFICE O/P EST HI 40 MIN: CPT | Performed by: FAMILY MEDICINE

## 2019-02-13 PROCEDURE — 1123F ACP DISCUSS/DSCN MKR DOCD: CPT | Performed by: FAMILY MEDICINE

## 2019-02-13 PROCEDURE — 4004F PT TOBACCO SCREEN RCVD TLK: CPT | Performed by: FAMILY MEDICINE

## 2019-02-13 PROCEDURE — 3017F COLORECTAL CA SCREEN DOC REV: CPT | Performed by: FAMILY MEDICINE

## 2019-02-13 RX ORDER — ATORVASTATIN CALCIUM 20 MG/1
TABLET, FILM COATED ORAL
COMMUNITY
Start: 2019-02-04 | End: 2019-02-13 | Stop reason: ALTCHOICE

## 2019-02-13 RX ORDER — ATORVASTATIN CALCIUM 20 MG/1
20 TABLET, FILM COATED ORAL
COMMUNITY
Start: 2018-11-07 | End: 2020-01-29

## 2019-02-13 ASSESSMENT — ENCOUNTER SYMPTOMS
COLOR CHANGE: 0
ABDOMINAL PAIN: 0
STRIDOR: 0
CONSTIPATION: 0
NAUSEA: 0
COUGH: 0
WHEEZING: 0
CHEST TIGHTNESS: 0
SHORTNESS OF BREATH: 0
VOICE CHANGE: 0
TROUBLE SWALLOWING: 0
EYE DISCHARGE: 0
DIARRHEA: 0
ABDOMINAL DISTENTION: 0

## 2019-02-15 ENCOUNTER — OFFICE VISIT (OUTPATIENT)
Dept: SURGERY | Age: 74
End: 2019-02-15
Payer: MEDICARE

## 2019-02-15 ENCOUNTER — HOSPITAL ENCOUNTER (OUTPATIENT)
Dept: ULTRASOUND IMAGING | Age: 74
Discharge: HOME | End: 2019-02-17
Payer: MEDICARE

## 2019-02-15 ENCOUNTER — OFFICE VISIT (OUTPATIENT)
Dept: FAMILY MEDICINE CLINIC | Age: 74
End: 2019-02-15
Payer: MEDICARE

## 2019-02-15 VITALS
TEMPERATURE: 97.6 F | OXYGEN SATURATION: 97 % | DIASTOLIC BLOOD PRESSURE: 62 MMHG | SYSTOLIC BLOOD PRESSURE: 112 MMHG | HEIGHT: 66 IN | WEIGHT: 168 LBS | HEART RATE: 65 BPM | BODY MASS INDEX: 27 KG/M2

## 2019-02-15 VITALS
TEMPERATURE: 97.7 F | WEIGHT: 165 LBS | SYSTOLIC BLOOD PRESSURE: 124 MMHG | BODY MASS INDEX: 26.52 KG/M2 | DIASTOLIC BLOOD PRESSURE: 70 MMHG | HEIGHT: 66 IN

## 2019-02-15 DIAGNOSIS — Q89.01 ASPLENIA: ICD-10-CM

## 2019-02-15 DIAGNOSIS — K82.4 GALLBLADDER POLYP: ICD-10-CM

## 2019-02-15 DIAGNOSIS — R10.11 RIGHT UPPER QUADRANT ABDOMINAL PAIN: Primary | ICD-10-CM

## 2019-02-15 DIAGNOSIS — D72.829 LEUKOCYTOSIS, UNSPECIFIED TYPE: Primary | ICD-10-CM

## 2019-02-15 DIAGNOSIS — R10.11 RUQ PAIN: ICD-10-CM

## 2019-02-15 PROCEDURE — G8598 ASA/ANTIPLAT THER USED: HCPCS | Performed by: SURGERY

## 2019-02-15 PROCEDURE — 4040F PNEUMOC VAC/ADMIN/RCVD: CPT | Performed by: SURGERY

## 2019-02-15 PROCEDURE — G8484 FLU IMMUNIZE NO ADMIN: HCPCS | Performed by: SURGERY

## 2019-02-15 PROCEDURE — 4040F PNEUMOC VAC/ADMIN/RCVD: CPT | Performed by: FAMILY MEDICINE

## 2019-02-15 PROCEDURE — G8427 DOCREV CUR MEDS BY ELIG CLIN: HCPCS | Performed by: FAMILY MEDICINE

## 2019-02-15 PROCEDURE — 3017F COLORECTAL CA SCREEN DOC REV: CPT | Performed by: SURGERY

## 2019-02-15 PROCEDURE — 99202 OFFICE O/P NEW SF 15 MIN: CPT | Performed by: SURGERY

## 2019-02-15 PROCEDURE — 4004F PT TOBACCO SCREEN RCVD TLK: CPT | Performed by: FAMILY MEDICINE

## 2019-02-15 PROCEDURE — 76705 ECHO EXAM OF ABDOMEN: CPT

## 2019-02-15 PROCEDURE — G8598 ASA/ANTIPLAT THER USED: HCPCS | Performed by: FAMILY MEDICINE

## 2019-02-15 PROCEDURE — 1123F ACP DISCUSS/DSCN MKR DOCD: CPT | Performed by: SURGERY

## 2019-02-15 PROCEDURE — G8427 DOCREV CUR MEDS BY ELIG CLIN: HCPCS | Performed by: SURGERY

## 2019-02-15 PROCEDURE — G8417 CALC BMI ABV UP PARAM F/U: HCPCS | Performed by: SURGERY

## 2019-02-15 PROCEDURE — G8484 FLU IMMUNIZE NO ADMIN: HCPCS | Performed by: FAMILY MEDICINE

## 2019-02-15 PROCEDURE — 1101F PT FALLS ASSESS-DOCD LE1/YR: CPT | Performed by: FAMILY MEDICINE

## 2019-02-15 PROCEDURE — 99214 OFFICE O/P EST MOD 30 MIN: CPT | Performed by: FAMILY MEDICINE

## 2019-02-15 PROCEDURE — 4004F PT TOBACCO SCREEN RCVD TLK: CPT | Performed by: SURGERY

## 2019-02-15 PROCEDURE — 1123F ACP DISCUSS/DSCN MKR DOCD: CPT | Performed by: FAMILY MEDICINE

## 2019-02-15 PROCEDURE — 1101F PT FALLS ASSESS-DOCD LE1/YR: CPT | Performed by: SURGERY

## 2019-02-15 PROCEDURE — G8417 CALC BMI ABV UP PARAM F/U: HCPCS | Performed by: FAMILY MEDICINE

## 2019-02-15 PROCEDURE — 3017F COLORECTAL CA SCREEN DOC REV: CPT | Performed by: FAMILY MEDICINE

## 2019-02-15 PROCEDURE — G8510 SCR DEP NEG, NO PLAN REQD: HCPCS | Performed by: FAMILY MEDICINE

## 2019-02-15 ASSESSMENT — ENCOUNTER SYMPTOMS
EYES NEGATIVE: 1
DIARRHEA: 0
ABDOMINAL DISTENTION: 0
CONSTIPATION: 0
VOICE CHANGE: 0
ALLERGIC/IMMUNOLOGIC NEGATIVE: 1
COLOR CHANGE: 0
ABDOMINAL PAIN: 1
ABDOMINAL DISTENTION: 0
EYE DISCHARGE: 0
SHORTNESS OF BREATH: 0
NAUSEA: 0
COUGH: 0
TROUBLE SWALLOWING: 0
SHORTNESS OF BREATH: 0
COLOR CHANGE: 0
BLOOD IN STOOL: 0
BLOOD IN STOOL: 0
ABDOMINAL PAIN: 1
CHEST TIGHTNESS: 0
CONSTIPATION: 0
RHINORRHEA: 0

## 2019-02-15 ASSESSMENT — PATIENT HEALTH QUESTIONNAIRE - PHQ9
SUM OF ALL RESPONSES TO PHQ9 QUESTIONS 1 & 2: 0
SUM OF ALL RESPONSES TO PHQ QUESTIONS 1-9: 0
SUM OF ALL RESPONSES TO PHQ QUESTIONS 1-9: 0
1. LITTLE INTEREST OR PLEASURE IN DOING THINGS: 0
2. FEELING DOWN, DEPRESSED OR HOPELESS: 0

## 2019-02-22 ENCOUNTER — HOSPITAL ENCOUNTER (OUTPATIENT)
Dept: NUCLEAR MEDICINE | Age: 74
Discharge: HOME | End: 2019-02-24
Payer: MEDICARE

## 2019-02-22 DIAGNOSIS — R10.11 RIGHT UPPER QUADRANT ABDOMINAL PAIN: ICD-10-CM

## 2019-02-22 PROCEDURE — 3430000000 HC RX DIAGNOSTIC RADIOPHARMACEUTICAL: Performed by: SURGERY

## 2019-02-22 PROCEDURE — A9537 TC99M MEBROFENIN: HCPCS | Performed by: SURGERY

## 2019-02-22 PROCEDURE — 78227 HEPATOBIL SYST IMAGE W/DRUG: CPT

## 2019-02-22 RX ADMIN — MEBROFENIN 6.9 MILLICURIE: 45 INJECTION, POWDER, LYOPHILIZED, FOR SOLUTION INTRAVENOUS at 10:20

## 2019-03-07 ENCOUNTER — OFFICE VISIT (OUTPATIENT)
Dept: SURGERY | Age: 74
End: 2019-03-07
Payer: MEDICARE

## 2019-03-07 VITALS
WEIGHT: 166.6 LBS | BODY MASS INDEX: 26.78 KG/M2 | DIASTOLIC BLOOD PRESSURE: 64 MMHG | HEIGHT: 66 IN | SYSTOLIC BLOOD PRESSURE: 100 MMHG | TEMPERATURE: 97.5 F

## 2019-03-07 DIAGNOSIS — R10.11 RIGHT UPPER QUADRANT ABDOMINAL PAIN: ICD-10-CM

## 2019-03-07 DIAGNOSIS — K82.4 GALLBLADDER POLYP: Primary | ICD-10-CM

## 2019-03-07 DIAGNOSIS — K82.8 BILIARY DYSKINESIA: ICD-10-CM

## 2019-03-07 PROCEDURE — 4004F PT TOBACCO SCREEN RCVD TLK: CPT | Performed by: SURGERY

## 2019-03-07 PROCEDURE — 1123F ACP DISCUSS/DSCN MKR DOCD: CPT | Performed by: SURGERY

## 2019-03-07 PROCEDURE — 1101F PT FALLS ASSESS-DOCD LE1/YR: CPT | Performed by: SURGERY

## 2019-03-07 PROCEDURE — 3017F COLORECTAL CA SCREEN DOC REV: CPT | Performed by: SURGERY

## 2019-03-07 PROCEDURE — G8484 FLU IMMUNIZE NO ADMIN: HCPCS | Performed by: SURGERY

## 2019-03-07 PROCEDURE — 99213 OFFICE O/P EST LOW 20 MIN: CPT | Performed by: SURGERY

## 2019-03-07 PROCEDURE — 4040F PNEUMOC VAC/ADMIN/RCVD: CPT | Performed by: SURGERY

## 2019-03-07 PROCEDURE — G8598 ASA/ANTIPLAT THER USED: HCPCS | Performed by: SURGERY

## 2019-03-07 PROCEDURE — G8427 DOCREV CUR MEDS BY ELIG CLIN: HCPCS | Performed by: SURGERY

## 2019-03-07 PROCEDURE — G8417 CALC BMI ABV UP PARAM F/U: HCPCS | Performed by: SURGERY

## 2019-03-07 ASSESSMENT — ENCOUNTER SYMPTOMS
BLOOD IN STOOL: 0
ALLERGIC/IMMUNOLOGIC NEGATIVE: 1
ABDOMINAL PAIN: 1
CONSTIPATION: 0
CHEST TIGHTNESS: 0
ABDOMINAL DISTENTION: 0
RHINORRHEA: 0
SHORTNESS OF BREATH: 0
COLOR CHANGE: 0
EYES NEGATIVE: 1

## 2019-03-29 ENCOUNTER — OFFICE VISIT (OUTPATIENT)
Dept: CARDIOLOGY CLINIC | Age: 74
End: 2019-03-29
Payer: MEDICARE

## 2019-03-29 VITALS
SYSTOLIC BLOOD PRESSURE: 106 MMHG | OXYGEN SATURATION: 96 % | HEART RATE: 67 BPM | RESPIRATION RATE: 16 BRPM | BODY MASS INDEX: 26.84 KG/M2 | DIASTOLIC BLOOD PRESSURE: 62 MMHG | WEIGHT: 167 LBS | HEIGHT: 66 IN

## 2019-03-29 DIAGNOSIS — I25.119 CORONARY ARTERY DISEASE INVOLVING NATIVE CORONARY ARTERY OF NATIVE HEART WITH ANGINA PECTORIS (HCC): ICD-10-CM

## 2019-03-29 DIAGNOSIS — E78.00 PURE HYPERCHOLESTEROLEMIA: Chronic | ICD-10-CM

## 2019-03-29 DIAGNOSIS — Z98.61 S/P PTCA (PERCUTANEOUS TRANSLUMINAL CORONARY ANGIOPLASTY): ICD-10-CM

## 2019-03-29 DIAGNOSIS — R68.89 EXERCISE INTOLERANCE: ICD-10-CM

## 2019-03-29 DIAGNOSIS — I21.4 NSTEMI (NON-ST ELEVATED MYOCARDIAL INFARCTION) (HCC): ICD-10-CM

## 2019-03-29 DIAGNOSIS — R07.9 CHEST PAIN, UNSPECIFIED TYPE: ICD-10-CM

## 2019-03-29 DIAGNOSIS — I10 ESSENTIAL HYPERTENSION: Primary | Chronic | ICD-10-CM

## 2019-03-29 DIAGNOSIS — R07.89 OTHER CHEST PAIN: Primary | ICD-10-CM

## 2019-03-29 DIAGNOSIS — Z95.5 HISTORY OF CORONARY ARTERY STENT PLACEMENT: ICD-10-CM

## 2019-03-29 DIAGNOSIS — R94.31 ABNORMAL EKG: ICD-10-CM

## 2019-03-29 DIAGNOSIS — R06.09 DOE (DYSPNEA ON EXERTION): ICD-10-CM

## 2019-03-29 PROCEDURE — 3017F COLORECTAL CA SCREEN DOC REV: CPT | Performed by: INTERNAL MEDICINE

## 2019-03-29 PROCEDURE — G8484 FLU IMMUNIZE NO ADMIN: HCPCS | Performed by: INTERNAL MEDICINE

## 2019-03-29 PROCEDURE — G8417 CALC BMI ABV UP PARAM F/U: HCPCS | Performed by: INTERNAL MEDICINE

## 2019-03-29 PROCEDURE — 4040F PNEUMOC VAC/ADMIN/RCVD: CPT | Performed by: INTERNAL MEDICINE

## 2019-03-29 PROCEDURE — G8598 ASA/ANTIPLAT THER USED: HCPCS | Performed by: INTERNAL MEDICINE

## 2019-03-29 PROCEDURE — G8427 DOCREV CUR MEDS BY ELIG CLIN: HCPCS | Performed by: INTERNAL MEDICINE

## 2019-03-29 PROCEDURE — 99214 OFFICE O/P EST MOD 30 MIN: CPT | Performed by: INTERNAL MEDICINE

## 2019-03-29 PROCEDURE — 93000 ELECTROCARDIOGRAM COMPLETE: CPT | Performed by: INTERNAL MEDICINE

## 2019-03-29 PROCEDURE — 4004F PT TOBACCO SCREEN RCVD TLK: CPT | Performed by: INTERNAL MEDICINE

## 2019-03-29 PROCEDURE — 1123F ACP DISCUSS/DSCN MKR DOCD: CPT | Performed by: INTERNAL MEDICINE

## 2019-03-29 ASSESSMENT — ENCOUNTER SYMPTOMS
STRIDOR: 0
BLOOD IN STOOL: 0
COUGH: 0
SHORTNESS OF BREATH: 0
WHEEZING: 0
CHEST TIGHTNESS: 0
GASTROINTESTINAL NEGATIVE: 1
RESPIRATORY NEGATIVE: 1
NAUSEA: 0
EYES NEGATIVE: 1

## 2019-04-09 PROBLEM — G56.01 CARPAL TUNNEL SYNDROME ON RIGHT: Status: ACTIVE | Noted: 2019-04-09

## 2019-04-09 PROBLEM — G56.02 CARPAL TUNNEL SYNDROME ON LEFT: Status: ACTIVE | Noted: 2019-04-09

## 2019-04-09 RX ORDER — METOPROLOL TARTRATE 50 MG/1
TABLET, FILM COATED ORAL
Qty: 180 TABLET | Refills: 2 | Status: SHIPPED | OUTPATIENT
Start: 2019-04-09 | End: 2020-03-06 | Stop reason: SDUPTHER

## 2019-04-09 RX ORDER — CITALOPRAM 40 MG/1
TABLET ORAL
Qty: 30 TABLET | Refills: 3 | Status: SHIPPED | OUTPATIENT
Start: 2019-04-09 | End: 2019-08-07 | Stop reason: SDUPTHER

## 2019-04-10 ENCOUNTER — HOSPITAL ENCOUNTER (OUTPATIENT)
Dept: NUCLEAR MEDICINE | Age: 74
Discharge: HOME | End: 2019-04-12
Payer: MEDICARE

## 2019-04-10 DIAGNOSIS — Z95.5 HISTORY OF CORONARY ARTERY STENT PLACEMENT: ICD-10-CM

## 2019-04-10 DIAGNOSIS — R07.89 OTHER CHEST PAIN: ICD-10-CM

## 2019-04-10 DIAGNOSIS — I25.119 CORONARY ARTERY DISEASE INVOLVING NATIVE CORONARY ARTERY OF NATIVE HEART WITH ANGINA PECTORIS (HCC): ICD-10-CM

## 2019-04-10 DIAGNOSIS — R68.89 EXERCISE INTOLERANCE: ICD-10-CM

## 2019-04-10 DIAGNOSIS — R06.09 DOE (DYSPNEA ON EXERTION): ICD-10-CM

## 2019-04-10 DIAGNOSIS — R94.31 ABNORMAL EKG: ICD-10-CM

## 2019-04-10 PROCEDURE — 2580000003 HC RX 258: Performed by: INTERNAL MEDICINE

## 2019-04-10 PROCEDURE — 3430000000 HC RX DIAGNOSTIC RADIOPHARMACEUTICAL: Performed by: INTERNAL MEDICINE

## 2019-04-10 PROCEDURE — A9502 TC99M TETROFOSMIN: HCPCS | Performed by: INTERNAL MEDICINE

## 2019-04-10 PROCEDURE — 93017 CV STRESS TEST TRACING ONLY: CPT

## 2019-04-10 PROCEDURE — 6360000002 HC RX W HCPCS: Performed by: INTERNAL MEDICINE

## 2019-04-10 PROCEDURE — 78452 HT MUSCLE IMAGE SPECT MULT: CPT

## 2019-04-10 RX ORDER — SODIUM CHLORIDE 0.9 % (FLUSH) 0.9 %
10 SYRINGE (ML) INJECTION PRN
Status: DISCONTINUED | OUTPATIENT
Start: 2019-04-10 | End: 2019-04-13 | Stop reason: HOSPADM

## 2019-04-10 RX ADMIN — TETROFOSMIN 32.1 MILLICURIE: 1.38 INJECTION, POWDER, LYOPHILIZED, FOR SOLUTION INTRAVENOUS at 12:55

## 2019-04-10 RX ADMIN — Medication 10 ML: at 12:51

## 2019-04-10 RX ADMIN — TETROFOSMIN 9.7 MILLICURIE: 1.38 INJECTION, POWDER, LYOPHILIZED, FOR SOLUTION INTRAVENOUS at 10:29

## 2019-04-10 RX ADMIN — REGADENOSON 0.4 MG: 0.08 INJECTION, SOLUTION INTRAVENOUS at 12:55

## 2019-04-10 RX ADMIN — Medication 10 ML: at 10:29

## 2019-04-10 RX ADMIN — Medication 10 ML: at 12:55

## 2019-04-10 NOTE — PROGRESS NOTES
Hx,allergies and medications reviewed. [address] [facility] here. Injected patient with [place] and Myoview. Tolerated procedure fairly. SOB reported. Returned to baseline in recovery. 1256 Patient c/o chest pain 6/10 post injection. NTG 0.4mg sl given. Chest pain decreased 7:30 minutes post 3-4/10. Patient also stated that pain mimics his gallbladder pain. Chest pain 1-2/10,Dr Grewal notified of above. Instucted to complete study as planned. Relayed to Nuclear medicine. EKG shows non spec. T wave abnormality. Radha Ahn

## 2019-04-14 NOTE — PROCEDURES
[address]                      [address], [address] [facility]                              CARDIAC STRESS TEST    PATIENT NAME: Staci Heath                     :        1945  MED REC NO:   [id number]                            ROOM:  ACCOUNT NO:   [de-identified]                           ADMIT DATE: 04/10/2019  PROVIDER:     Meera Mayers DO    CARDIOVASCULAR DIAGNOSTIC DEPARTMENT    DATE OF STUDY:  04/10/2019    ORDERING PROVIDER:  Breann Jackson MD    PRIMARY CARE PROVIDER:  Ayana Morrow MD    EXAM TYPE:  Lexiscan Myocardial Perfusion Stress Test, 1-day. REASON FOR EXAM:  Chest pain, evaluate for CAD progression. PROCEDURE DESCRIPTION:  The patient was injected  intravenously at rest  with technetium-99m tetrofosmin followed by resting SPECT myocardial  perfusion imaging and then underwent stress protocol on a treadmill. The patient was then injected intravenously at maximal exercise with  technetium-99m tetrofosmin and SPECT myocardial perfusion and gated  imaging were repeated. Rest dose:  9.7 mCi  Stress dose:  32.1 mCi    FINDINGS:  The stress and rest images exhibit homogeneous uptake of  tracer throughout the left ventricular myocardium. There is no evidence  of stress-induced reversible perfusion abnormalities to suggest  myocardial ischemia. Gated imaging exhibits normal left ventricular  size and normal wall motion and myocardial thickening. The patient did  not develop any symptoms other than fatigue during the procedure. EKG  analysis did not demonstrate ST-segment changes nor arrhythmias  concerning for myocardial ischemia. LVEF:  76%  TID ratio:  0.95    IMPRESSION:  1. No evidence of stress-induced myocardial ischemia. 2.  Normal left ventricular systolic function. 3.  There is no diaphragmatic attenuation noted.       Juany Hunter DO    D: 2019 #4:52:48       T: 2019 8:51:10     [email or web address]  Job#: [id number]     Doc#: [id number]    CC:

## 2019-05-01 ENCOUNTER — OFFICE VISIT (OUTPATIENT)
Dept: CARDIOLOGY CLINIC | Age: 74
End: 2019-05-01
Payer: MEDICARE

## 2019-05-01 VITALS
OXYGEN SATURATION: 96 % | RESPIRATION RATE: 12 BRPM | BODY MASS INDEX: 27.89 KG/M2 | SYSTOLIC BLOOD PRESSURE: 102 MMHG | WEIGHT: 172.8 LBS | HEART RATE: 58 BPM | DIASTOLIC BLOOD PRESSURE: 58 MMHG

## 2019-05-01 DIAGNOSIS — E78.5 DYSLIPIDEMIA: ICD-10-CM

## 2019-05-01 DIAGNOSIS — I21.4 NSTEMI (NON-ST ELEVATED MYOCARDIAL INFARCTION) (HCC): ICD-10-CM

## 2019-05-01 DIAGNOSIS — I10 ESSENTIAL HYPERTENSION: Primary | Chronic | ICD-10-CM

## 2019-05-01 DIAGNOSIS — Z98.61 S/P PTCA (PERCUTANEOUS TRANSLUMINAL CORONARY ANGIOPLASTY): ICD-10-CM

## 2019-05-01 PROBLEM — I20.89 ANGINA EFFORT: Status: ACTIVE | Noted: 2019-05-01

## 2019-05-01 PROBLEM — I20.8 ANGINA EFFORT: Status: ACTIVE | Noted: 2019-05-01

## 2019-05-01 PROCEDURE — 4040F PNEUMOC VAC/ADMIN/RCVD: CPT | Performed by: INTERNAL MEDICINE

## 2019-05-01 PROCEDURE — 4004F PT TOBACCO SCREEN RCVD TLK: CPT | Performed by: INTERNAL MEDICINE

## 2019-05-01 PROCEDURE — 3017F COLORECTAL CA SCREEN DOC REV: CPT | Performed by: INTERNAL MEDICINE

## 2019-05-01 PROCEDURE — 1123F ACP DISCUSS/DSCN MKR DOCD: CPT | Performed by: INTERNAL MEDICINE

## 2019-05-01 PROCEDURE — G8598 ASA/ANTIPLAT THER USED: HCPCS | Performed by: INTERNAL MEDICINE

## 2019-05-01 PROCEDURE — G8427 DOCREV CUR MEDS BY ELIG CLIN: HCPCS | Performed by: INTERNAL MEDICINE

## 2019-05-01 PROCEDURE — 99214 OFFICE O/P EST MOD 30 MIN: CPT | Performed by: INTERNAL MEDICINE

## 2019-05-01 PROCEDURE — G8417 CALC BMI ABV UP PARAM F/U: HCPCS | Performed by: INTERNAL MEDICINE

## 2019-05-01 RX ORDER — NITROGLYCERIN 0.4 MG/1
0.4 TABLET SUBLINGUAL EVERY 5 MIN PRN
Qty: 25 TABLET | Refills: 3 | Status: SHIPPED | OUTPATIENT
Start: 2019-05-01

## 2019-05-01 ASSESSMENT — ENCOUNTER SYMPTOMS
BLOOD IN STOOL: 0
COUGH: 0
CHEST TIGHTNESS: 1
SHORTNESS OF BREATH: 0
NAUSEA: 0
EYES NEGATIVE: 1
GASTROINTESTINAL NEGATIVE: 1
STRIDOR: 0
WHEEZING: 0

## 2019-05-01 NOTE — PROGRESS NOTES
Subsequent Progress Note  Patient: Shefali Shaw  YOB: 1945  MRN: [id number]    Chief Complaint: cad CP dizzy htn   Chief Complaint   Patient presents with    Results     stress test 4/10/19    Hypertension    Leg Swelling     lle       CV Data:  1/2016 NSTEMI RCA KRISTA  4/2018 echo  55 1TR  4/2018 CUS MIld palque  4/2018 Abd US neg AAA  4/2019 Spect negative     Subjective/HPI: still has occ chest tightness mid sternum no radiation no sob no nasuea no diaphoresis. Has not required SL NTG     occ dizzy no falls no bleed     Smokes 1/2 ppd  No etoh    EKG:    Past Medical History:   Diagnosis Date    Alcohol abuse     CAD (coronary artery disease)     patient states no doctor has told him this / has 1 cardiac stent    Chronic back pain     Chronic kidney disease     Chronic sinusitis     DJD (degenerative joint disease) of knee     left knee DJD    Elevated PSA     History of blood transfusion 1980's    History of inferior wall myocardial infarction 01/2016    1 cardiac stent    HTN (hypertension)     meds .  1 yr    Hyperlipidemia     meds > 12 yrs    NSTEMI (non-ST elevated myocardial infarction) ([facility] [address]     Smoker        Past Surgical History:   Procedure Laterality Date    ABDOMEN SURGERY  1961    spleenectomy due to [address]  2009    lumbar disc OR    CARDIAC SURGERY      stents    COLONOSCOPY      CORONARY ANGIOPLASTY WITH STENT PLACEMENT  1/29/2016    EYE SURGERY      to have Phaco with IOL OU 2/2018    HERNIA REPAIR      JOINT REPLACEMENT Left 1994    LTHR    JOINT REPLACEMENT Right 2009    RTKR    KNEE SURGERY Right 2011    arthroscopy    MI MANIPULATN KNEE JT+ANESTHESIA Right 5/12/2017    RIGHT KNEE MANIPULATION UNDER ANESTHESIA performed by Deepak Mann MD at [address] OFFICE/OUTPT VISIT,PROCEDURE ONLY Bilateral 12/29/2017    RIGHT AND BILATERAL L 4-5 LYSIS OF SCAR DISKECTOMY INTERBODY CAGE FUSION POSTEROLATERAL FUSION PEDICLE SCREWS performed by Albert Arora MD at [address] [place] [address]  2004    benign    SPLENECTOMY  1961    TOTAL KNEE ARTHROPLASTY Right 3/24/2017    RIGHT  KNEE TOTAL ARTHROPLASTY, ELHAM CEMENTED PERSONA  performed by Deepak Mann MD at [address] [place] [address] History   Problem Relation Age of Onset    Osteoarthritis Mother     Emphysema Mother     Hypertension Father     Hearing Loss Father     Dementia Father     No Known Problems Sister     No Known Problems Brother        Social History     Socioeconomic History    Marital status:      Spouse name: None    Number of children: None    Years of education: None    Highest education level: None   Occupational History    Occupation: retired   Social Needs    Financial resource strain: None    Food insecurity:     Worry: None     Inability: None    Transportation needs:     Medical: None     Non-medical: None   Tobacco Use    Smoking status: Current Every Day Smoker     Packs/day: 0.50     Years: 60.00     Pack years: 30.00     Types: Cigarettes    Smokeless tobacco: Never Used   Substance and Sexual Activity    Alcohol use:  Yes     Alcohol/week: 4.2 oz     Types: 7 Shots of liquor per week     Comment: last day before yesterday    Drug use: No    Sexual activity: Yes   Lifestyle    Physical activity:     Days per week: None     Minutes per session: None    Stress: None   Relationships    Social connections:     Talks on phone: None     Gets together: None     Attends Buddhism service: None     Active member of club or organization: None     Attends meetings of clubs or organizations: None     Relationship status: None    Intimate partner violence:     Fear of current or ex partner: None     Emotionally abused: None     Physically abused: None     Forced sexual activity: None   Other Topics Concern    None   Social History Narrative    Lives alone       No Known Allergies    Current Outpatient Medications   Medication Sig Dispense Refill    metoprolol tartrate (LOPRESSOR) 50 MG tablet TAKE ONE TABLET BY MOUTH TWO TIMES A  tablet 2    citalopram (CELEXA) 40 MG tablet TAKE 1 TABLET BY MOUTH NIGHTLY 30 tablet 3    atorvastatin (LIPITOR) 20 MG tablet Take 20 mg by mouth      diclofenac (VOLTAREN) 50 MG EC tablet Take 1 tablet by mouth 2 times daily 60 tablet 5    finasteride (PROSCAR) 5 MG tablet Take 1 tablet by mouth daily 90 tablet 3    clopidogrel (PLAVIX) 75 MG tablet TAKE ONE TABLET BY MOUTH DAILY 90 tablet 2    oxyCODONE-acetaminophen (ENDOCET) 7.5-325 MG per tablet Take 1 tablet by mouth every 6 hours as needed for Pain. Intended supply: 30 days. 120 tablet 0    omeprazole (PRILOSEC) 10 MG delayed release capsule Take 10 mg by mouth daily       DOCUSATE CALCIUM PO Take by mouth as needed      nitroGLYCERIN (NITROSTAT) 0.4 MG SL tablet Place 0.4 mg under the tongue every 5 minutes as needed for Chest pain up to max of 3 total doses. If no relief after 1 dose, call 911. No current facility-administered medications for this visit. Review of Systems:   Review of Systems   Constitutional: Negative. Negative for diaphoresis and fatigue. HENT: Negative. Eyes: Negative. Respiratory: Positive for chest tightness. Negative for cough, shortness of breath, wheezing and stridor. Cardiovascular: Negative. Negative for chest pain, palpitations and leg swelling. Gastrointestinal: Negative. Negative for blood in stool and nausea. Genitourinary: Negative. Musculoskeletal: Negative. Skin: Negative. Neurological: Negative. Negative for dizziness, syncope, weakness and light-headedness. Hematological: Negative. Psychiatric/Behavioral: Negative. Physical Examination:    BP (!) 102/58 (Site: Left Upper Arm)   Pulse 58   Resp 12   Wt 172 lb 12.8 oz (78.4 kg)   SpO2 96%   BMI 27.89 kg/m²    Physical Exam   Constitutional: He appears healthy. No distress.    HENT:   Normal cephalic and Atraumatic Eyes: Pupils are equal, round, and reactive to light. Neck: Normal range of motion and thyroid normal. Neck supple. No JVD present. No neck adenopathy. No thyromegaly present. Cardiovascular: Normal rate, regular rhythm, intact distal pulses and normal pulses. Murmur heard. Pulmonary/Chest: Effort normal and breath sounds normal. He has no wheezes. He has no rales. He exhibits no tenderness. Abdominal: Soft. Bowel sounds are normal. There is no tenderness. Musculoskeletal: Normal range of motion. He exhibits edema (1-2+ ). He exhibits no tenderness. Neurological: He is alert and oriented to person, place, and time. Skin: Skin is warm. No cyanosis. Nails show no clubbing.        LABS:  CBC:   Lab Results   Component Value Date    WBC 11.5 02/13/2019    RBC 4.02 02/13/2019    HGB 13.0 02/13/2019    HCT 39.3 02/13/2019    MCV 97.7 02/13/2019    MCH 32.2 02/13/2019    MCHC 33.0 02/13/2019    RDW 13.2 02/13/2019     02/13/2019     Lipids:  Lab Results   Component Value Date    CHOL 165 03/28/2016    CHOL 214 (H) 01/29/2016    CHOL 194 04/28/2014     Lab Results   Component Value Date    TRIG 64 03/28/2016    TRIG 144 01/29/2016    TRIG 91 04/28/2014     Lab Results   Component Value Date     (H) 03/28/2016    HDL 92 (H) 01/29/2016    HDL 82 (H) 04/28/2014     Lab Results   Component Value Date    LDLCALC 40 03/28/2016    LDLCALC 93 01/29/2016    LDLCALC 94 04/28/2014     No results found for: LABVLDL, VLDL  No results found for: CHOLHDLRATIO  CMP:    Lab Results   Component Value Date     02/13/2019    K 5.1 02/13/2019    CL 97 02/13/2019    CO2 27 02/13/2019    BUN 16 02/13/2019    CREATININE 0.83 02/13/2019    GFRAA >60.0 02/13/2019    LABGLOM >60.0 02/13/2019    GLUCOSE 64 02/13/2019    PROT 7.4 02/13/2019    LABALBU 4.5 02/13/2019    CALCIUM 9.6 02/13/2019    BILITOT 0.5 02/13/2019    ALKPHOS 99 02/13/2019    AST 16 02/13/2019    ALT 15 02/13/2019     BMP:    Lab Results 2. S/P PTCA (percutaneous transluminal coronary angioplasty)  spect negative     3. NSTEMI (non-ST elevated myocardial infarction) (HCC)     - nitroGLYCERIN (NITROSTAT) 0.4 MG SL tablet; Place 1 tablet under the tongue every 5 minutes as needed for Chest pain up to max of 3 total doses. If no relief after 1 dose, call 911. Dispense: 25 tablet; Refill: 3    4. Dyslipidemia  Statin     5. LE Edema- chronic stasis- compression stockings. 5. Pt cleared for Carpal Tunnel release- Dr. Devyn Patel     Counseling:  Heart Healthy Lifestyle, Stop Smoking, Low Salt Diet, Take Precautions to Prevent Falls, Regular Exercise and Walk Daily    Return in about 3 months (around 8/1/2019) for Cardiovascular care. .      Electronically signed by Pallavi Forbes MD on 5/1/2019 at 4:22 PM

## 2019-05-05 ENCOUNTER — ANESTHESIA EVENT (OUTPATIENT)
Dept: OPERATING ROOM | Age: 74
End: 2019-05-05
Payer: MEDICARE

## 2019-05-06 ENCOUNTER — ANESTHESIA (OUTPATIENT)
Dept: OPERATING ROOM | Age: 74
End: 2019-05-06
Payer: MEDICARE

## 2019-05-06 ENCOUNTER — HOSPITAL ENCOUNTER (OUTPATIENT)
Age: 74
Setting detail: OUTPATIENT SURGERY
Discharge: HOME | End: 2019-05-06
Attending: NEUROLOGICAL SURGERY | Admitting: NEUROLOGICAL SURGERY
Payer: MEDICARE

## 2019-05-06 VITALS
BODY MASS INDEX: 27.64 KG/M2 | OXYGEN SATURATION: 95 % | TEMPERATURE: 98.1 F | HEART RATE: 60 BPM | SYSTOLIC BLOOD PRESSURE: 161 MMHG | WEIGHT: 172 LBS | RESPIRATION RATE: 14 BRPM | HEIGHT: 66 IN | DIASTOLIC BLOOD PRESSURE: 60 MMHG

## 2019-05-06 VITALS — DIASTOLIC BLOOD PRESSURE: 58 MMHG | SYSTOLIC BLOOD PRESSURE: 114 MMHG | OXYGEN SATURATION: 98 %

## 2019-05-06 LAB
ANION GAP SERPL CALCULATED.3IONS-SCNC: 13 MEQ/L (ref 9–15)
BUN BLDV-MCNC: 15 MG/DL (ref 8–23)
CALCIUM SERPL-MCNC: 8.6 MG/DL (ref 8.5–9.9)
CHLORIDE BLD-SCNC: 102 MEQ/L (ref 95–107)
CO2: 26 MEQ/L (ref 20–31)
CREAT SERPL-MCNC: 0.75 MG/DL (ref 0.7–1.2)
GFR AFRICAN AMERICAN: >60
GFR NON-AFRICAN AMERICAN: >60
GLUCOSE BLD-MCNC: 91 MG/DL (ref 70–99)
HCT VFR BLD CALC: 33.9 % (ref 42–52)
HEMOGLOBIN: 11.1 G/DL (ref 14–18)
MCH RBC QN AUTO: 31.2 PG (ref 27–31.3)
MCHC RBC AUTO-ENTMCNC: 32.7 % (ref 33–37)
MCV RBC AUTO: 95.3 FL (ref 80–100)
PDW BLD-RTO: 14.1 % (ref 11.5–14.5)
PLATELET # BLD: 265 K/UL (ref 130–400)
POTASSIUM SERPL-SCNC: 4.9 MEQ/L (ref 3.4–4.9)
RBC # BLD: 3.55 M/UL (ref 4.7–6.1)
SODIUM BLD-SCNC: 141 MEQ/L (ref 135–144)
WBC # BLD: 9.7 K/UL (ref 4.8–10.8)

## 2019-05-06 PROCEDURE — 3700000000 HC ANESTHESIA ATTENDED CARE: Performed by: NEUROLOGICAL SURGERY

## 2019-05-06 PROCEDURE — 2580000003 HC RX 258

## 2019-05-06 PROCEDURE — 6360000002 HC RX W HCPCS: Performed by: NEUROLOGICAL SURGERY

## 2019-05-06 PROCEDURE — 2500000003 HC RX 250 WO HCPCS: Performed by: NEUROLOGICAL SURGERY

## 2019-05-06 PROCEDURE — 3700000001 HC ADD 15 MINUTES (ANESTHESIA): Performed by: NEUROLOGICAL SURGERY

## 2019-05-06 PROCEDURE — 85027 COMPLETE CBC AUTOMATED: CPT

## 2019-05-06 PROCEDURE — 2580000003 HC RX 258: Performed by: NURSE PRACTITIONER

## 2019-05-06 PROCEDURE — 2709999900 HC NON-CHARGEABLE SUPPLY: Performed by: NEUROLOGICAL SURGERY

## 2019-05-06 PROCEDURE — 2720000010 HC SURG SUPPLY STERILE: Performed by: NEUROLOGICAL SURGERY

## 2019-05-06 PROCEDURE — 7100000010 HC PHASE II RECOVERY - FIRST 15 MIN: Performed by: NEUROLOGICAL SURGERY

## 2019-05-06 PROCEDURE — 6360000002 HC RX W HCPCS: Performed by: NURSE ANESTHETIST, CERTIFIED REGISTERED

## 2019-05-06 PROCEDURE — 3600000013 HC SURGERY LEVEL 3 ADDTL 15MIN: Performed by: NEUROLOGICAL SURGERY

## 2019-05-06 PROCEDURE — 2580000003 HC RX 258: Performed by: NEUROLOGICAL SURGERY

## 2019-05-06 PROCEDURE — 2500000003 HC RX 250 WO HCPCS: Performed by: NURSE PRACTITIONER

## 2019-05-06 PROCEDURE — 7100000011 HC PHASE II RECOVERY - ADDTL 15 MIN: Performed by: NEUROLOGICAL SURGERY

## 2019-05-06 PROCEDURE — 2500000003 HC RX 250 WO HCPCS: Performed by: NURSE ANESTHETIST, CERTIFIED REGISTERED

## 2019-05-06 PROCEDURE — 80048 BASIC METABOLIC PNL TOTAL CA: CPT

## 2019-05-06 PROCEDURE — 3600000003 HC SURGERY LEVEL 3 BASE: Performed by: NEUROLOGICAL SURGERY

## 2019-05-06 RX ORDER — SODIUM CHLORIDE, SODIUM LACTATE, POTASSIUM CHLORIDE, CALCIUM CHLORIDE 600; 310; 30; 20 MG/100ML; MG/100ML; MG/100ML; MG/100ML
INJECTION, SOLUTION INTRAVENOUS
Status: COMPLETED
Start: 2019-05-06 | End: 2019-05-06

## 2019-05-06 RX ORDER — FENTANYL CITRATE 50 UG/ML
50 INJECTION, SOLUTION INTRAMUSCULAR; INTRAVENOUS EVERY 10 MIN PRN
Status: DISCONTINUED | OUTPATIENT
Start: 2019-05-06 | End: 2019-05-06 | Stop reason: HOSPADM

## 2019-05-06 RX ORDER — SODIUM CHLORIDE 0.9 % (FLUSH) 0.9 %
10 SYRINGE (ML) INJECTION PRN
Status: DISCONTINUED | OUTPATIENT
Start: 2019-05-06 | End: 2019-05-06 | Stop reason: HOSPADM

## 2019-05-06 RX ORDER — SODIUM CHLORIDE 0.9 % (FLUSH) 0.9 %
10 SYRINGE (ML) INJECTION EVERY 12 HOURS SCHEDULED
Status: DISCONTINUED | OUTPATIENT
Start: 2019-05-06 | End: 2019-05-06 | Stop reason: HOSPADM

## 2019-05-06 RX ORDER — PROPOFOL 10 MG/ML
INJECTION, EMULSION INTRAVENOUS CONTINUOUS PRN
Status: DISCONTINUED | OUTPATIENT
Start: 2019-05-06 | End: 2019-05-06 | Stop reason: SDUPTHER

## 2019-05-06 RX ORDER — HYDROCODONE BITARTRATE AND ACETAMINOPHEN 5; 325 MG/1; MG/1
2 TABLET ORAL PRN
Status: DISCONTINUED | OUTPATIENT
Start: 2019-05-06 | End: 2019-05-06 | Stop reason: HOSPADM

## 2019-05-06 RX ORDER — HYDROCODONE BITARTRATE AND ACETAMINOPHEN 5; 325 MG/1; MG/1
1 TABLET ORAL PRN
Status: DISCONTINUED | OUTPATIENT
Start: 2019-05-06 | End: 2019-05-06 | Stop reason: HOSPADM

## 2019-05-06 RX ORDER — BUPIVACAINE HYDROCHLORIDE AND EPINEPHRINE 5; 5 MG/ML; UG/ML
INJECTION, SOLUTION EPIDURAL; INTRACAUDAL; PERINEURAL PRN
Status: DISCONTINUED | OUTPATIENT
Start: 2019-05-06 | End: 2019-05-06 | Stop reason: ALTCHOICE

## 2019-05-06 RX ORDER — MAGNESIUM HYDROXIDE 1200 MG/15ML
LIQUID ORAL CONTINUOUS PRN
Status: COMPLETED | OUTPATIENT
Start: 2019-05-06 | End: 2019-05-06

## 2019-05-06 RX ORDER — ONDANSETRON 2 MG/ML
4 INJECTION INTRAMUSCULAR; INTRAVENOUS
Status: DISCONTINUED | OUTPATIENT
Start: 2019-05-06 | End: 2019-05-06 | Stop reason: HOSPADM

## 2019-05-06 RX ORDER — PROPOFOL 10 MG/ML
INJECTION, EMULSION INTRAVENOUS PRN
Status: DISCONTINUED | OUTPATIENT
Start: 2019-05-06 | End: 2019-05-06 | Stop reason: SDUPTHER

## 2019-05-06 RX ORDER — FENTANYL CITRATE 50 UG/ML
INJECTION, SOLUTION INTRAMUSCULAR; INTRAVENOUS PRN
Status: DISCONTINUED | OUTPATIENT
Start: 2019-05-06 | End: 2019-05-06 | Stop reason: SDUPTHER

## 2019-05-06 RX ORDER — METOCLOPRAMIDE HYDROCHLORIDE 5 MG/ML
10 INJECTION INTRAMUSCULAR; INTRAVENOUS
Status: DISCONTINUED | OUTPATIENT
Start: 2019-05-06 | End: 2019-05-06 | Stop reason: HOSPADM

## 2019-05-06 RX ORDER — LIDOCAINE HYDROCHLORIDE 10 MG/ML
1 INJECTION, SOLUTION EPIDURAL; INFILTRATION; INTRACAUDAL; PERINEURAL
Status: COMPLETED | OUTPATIENT
Start: 2019-05-06 | End: 2019-05-06

## 2019-05-06 RX ORDER — LIDOCAINE HYDROCHLORIDE 5 MG/ML
INJECTION, SOLUTION INFILTRATION; INTRAVENOUS PRN
Status: DISCONTINUED | OUTPATIENT
Start: 2019-05-06 | End: 2019-05-06 | Stop reason: SDUPTHER

## 2019-05-06 RX ORDER — MEPERIDINE HYDROCHLORIDE 25 MG/ML
12.5 INJECTION INTRAMUSCULAR; INTRAVENOUS; SUBCUTANEOUS EVERY 5 MIN PRN
Status: DISCONTINUED | OUTPATIENT
Start: 2019-05-06 | End: 2019-05-06 | Stop reason: HOSPADM

## 2019-05-06 RX ORDER — SODIUM CHLORIDE, SODIUM LACTATE, POTASSIUM CHLORIDE, CALCIUM CHLORIDE 600; 310; 30; 20 MG/100ML; MG/100ML; MG/100ML; MG/100ML
INJECTION, SOLUTION INTRAVENOUS CONTINUOUS
Status: DISCONTINUED | OUTPATIENT
Start: 2019-05-06 | End: 2019-05-06 | Stop reason: HOSPADM

## 2019-05-06 RX ORDER — DIPHENHYDRAMINE HYDROCHLORIDE 50 MG/ML
12.5 INJECTION INTRAMUSCULAR; INTRAVENOUS
Status: DISCONTINUED | OUTPATIENT
Start: 2019-05-06 | End: 2019-05-06 | Stop reason: HOSPADM

## 2019-05-06 RX ADMIN — SODIUM CHLORIDE, POTASSIUM CHLORIDE, SODIUM LACTATE AND CALCIUM CHLORIDE 1000 ML: 600; 310; 30; 20 INJECTION, SOLUTION INTRAVENOUS at 10:24

## 2019-05-06 RX ADMIN — LIDOCAINE HYDROCHLORIDE 40 ML: 5 INJECTION, SOLUTION INFILTRATION at 12:32

## 2019-05-06 RX ADMIN — Medication 2 G: at 12:35

## 2019-05-06 RX ADMIN — FENTANYL CITRATE 25 MCG: 50 INJECTION, SOLUTION INTRAMUSCULAR; INTRAVENOUS at 12:28

## 2019-05-06 RX ADMIN — PROPOFOL 20 MG: 10 INJECTION, EMULSION INTRAVENOUS at 12:42

## 2019-05-06 RX ADMIN — LIDOCAINE HYDROCHLORIDE 0.1 ML: 10 INJECTION, SOLUTION EPIDURAL; INFILTRATION; INTRACAUDAL; PERINEURAL at 10:24

## 2019-05-06 RX ADMIN — PROPOFOL 100 MCG/KG/MIN: 10 INJECTION, EMULSION INTRAVENOUS at 12:28

## 2019-05-06 RX ADMIN — PROPOFOL 20 MG: 10 INJECTION, EMULSION INTRAVENOUS at 12:28

## 2019-05-06 RX ADMIN — PROPOFOL 20 MG: 10 INJECTION, EMULSION INTRAVENOUS at 12:30

## 2019-05-06 RX ADMIN — PROPOFOL 30 MG: 10 INJECTION, EMULSION INTRAVENOUS at 12:32

## 2019-05-06 RX ADMIN — SODIUM CHLORIDE, POTASSIUM CHLORIDE, SODIUM LACTATE AND CALCIUM CHLORIDE: 600; 310; 30; 20 INJECTION, SOLUTION INTRAVENOUS at 12:24

## 2019-05-06 ASSESSMENT — PULMONARY FUNCTION TESTS
PIF_VALUE: 1
PIF_VALUE: 0
PIF_VALUE: 1
PIF_VALUE: 0
PIF_VALUE: 1
PIF_VALUE: 0
PIF_VALUE: 1
PIF_VALUE: 0
PIF_VALUE: 0
PIF_VALUE: 1
PIF_VALUE: 0
PIF_VALUE: 1
PIF_VALUE: 1
PIF_VALUE: 0

## 2019-05-06 ASSESSMENT — LIFESTYLE VARIABLES: SMOKING_STATUS: 1

## 2019-05-06 ASSESSMENT — PAIN SCALES - GENERAL: PAINLEVEL_OUTOF10: 0

## 2019-05-06 ASSESSMENT — PAIN - FUNCTIONAL ASSESSMENT: PAIN_FUNCTIONAL_ASSESSMENT: 0-10

## 2019-05-06 NOTE — PROGRESS NOTES
Disch instr were reviewed with patient and his grandson and sister. All verbalized understanding. Anxious to go home. Condition stable.

## 2019-05-06 NOTE — ANESTHESIA PRE PROCEDURE
Department of Anesthesiology  Preprocedure Note       Name:  Tiffanie Chatman   Age:  74 y.o.  :  1945                                          MRN:  [id number]         Date:  2019      Surgeon: Melvin Downs):  Steven Gil MD    Procedure: RIGHT CARPAL TUNNEL RELEASE 1 HOUR (PAT ON ADMIT) (Right )    Medications prior to admission:   Prior to Admission medications    Medication Sig Start Date End Date Taking? Authorizing Provider   nitroGLYCERIN (NITROSTAT) 0.4 MG SL tablet Place 1 tablet under the tongue every 5 minutes as needed for Chest pain up to max of 3 total doses. If no relief after 1 dose, call 911. 19   Merrill Wallace MD   metoprolol tartrate (LOPRESSOR) 50 MG tablet TAKE ONE TABLET BY MOUTH TWO TIMES A DAY 19   Merrill Wallace MD   citalopram (CELEXA) 40 MG tablet TAKE 1 TABLET BY MOUTH NIGHTLY 19   Chrissy Juares MD   atorvastatin (LIPITOR) 20 MG tablet Take 20 mg by mouth 18   Historical Provider, MD   diclofenac (VOLTAREN) 50 MG EC tablet Take 1 tablet by mouth 2 times daily 18   Chrissy Juares MD   finasteride (PROSCAR) 5 MG tablet Take 1 tablet by mouth daily 10/16/18   Lincoln Horowitz MD   clopidogrel (PLAVIX) 75 MG tablet TAKE ONE TABLET BY MOUTH DAILY 8/10/18   Merrill Wallace MD   oxyCODONE-acetaminophen (ENDOCET) 7.5-325 MG per tablet Take 1 tablet by mouth every 6 hours as needed for Pain. Intended supply: 30 days. 18   Chrissy Juares MD   omeprazole (PRILOSEC) 10 MG delayed release capsule Take 10 mg by mouth daily     Historical Provider, MD   DOCUSATE CALCIUM PO Take by mouth as needed    Historical Provider, MD       Current medications:    No current facility-administered medications for this encounter. Current Outpatient Medications   Medication Sig Dispense Refill    nitroGLYCERIN (NITROSTAT) 0.4 MG SL tablet Place 1 tablet under the tongue every 5 minutes as needed for Chest pain up to max of 3 total doses.  If no relief after 1 dose, call 911. 25 tablet 3  metoprolol tartrate (LOPRESSOR) 50 MG tablet TAKE ONE TABLET BY MOUTH TWO TIMES A  tablet 2    citalopram (CELEXA) 40 MG tablet TAKE 1 TABLET BY MOUTH NIGHTLY 30 tablet 3    atorvastatin (LIPITOR) 20 MG tablet Take 20 mg by mouth      diclofenac (VOLTAREN) 50 MG EC tablet Take 1 tablet by mouth 2 times daily 60 tablet 5    finasteride (PROSCAR) 5 MG tablet Take 1 tablet by mouth daily 90 tablet 3    clopidogrel (PLAVIX) 75 MG tablet TAKE ONE TABLET BY MOUTH DAILY 90 tablet 2    oxyCODONE-acetaminophen (ENDOCET) 7.5-325 MG per tablet Take 1 tablet by mouth every 6 hours as needed for Pain. Intended supply: 30 days.  120 tablet 0    omeprazole (PRILOSEC) 10 MG delayed release capsule Take 10 mg by mouth daily       DOCUSATE CALCIUM PO Take by mouth as needed         Allergies:  No Known Allergies    Problem List:    Patient Active Problem List   Diagnosis Code    HTN (hypertension) I10    Tobacco use Z72.0    Hip pain M25.559    Knee pain M25.569    Chronic back pain M54.9, G89.29    Elevated PSA R97.20    Primary osteoarthritis of right knee M17.11    Spondylosis of lumbar region without myelopathy or radiculopathy M47.816    Sacroiliitis, not elsewhere classified ([address] M46.1    Pure hypercholesterolemia E78.00    Charcot's joint of left ankle M14.672    Left ankle pain M25.572    Confusion caused by a drug ([address] F19.921    DDD (degenerative disc disease), lumbar M51.36    Osteoarthritis of spine with myelopathy, lumbosacral region M47.16    Chronic bilateral low back pain with bilateral sciatica M54.42, M54.41, G89.29    Postlaminectomy syndrome, lumbar region M96.1    Acquired spondylolisthesis of lumbosacral region M43.17    Neurogenic claudication due to lumbar spinal stenosis M48.062    HNP (herniated nucleus pulposus), lumbar M51.26    Spondylolisthesis at L4-L5 level M43.16    Anesthesia R20.0    Herniated nucleus pulposus, L4-5 M51.26    NSTEMI (non-ST elevated myocardial infarction) ([facility] [address] I21.4    S/P PTCA (percutaneous transluminal coronary angioplasty) Z98.61    Right upper quadrant abdominal pain R10.11    Gallbladder polyp K82.4    Biliary dyskinesia K82.8    Carpal tunnel syndrome on right G56.01    Carpal tunnel syndrome on left G56.02    Angina effort (HCC) I20.8    Dyslipidemia E78.5       Past Medical History:        Diagnosis Date    Alcohol abuse     CAD (coronary artery disease)     patient states no doctor has told him this / has 1 cardiac stent    Chronic back pain     Chronic kidney disease     Chronic sinusitis     DJD (degenerative joint disease) of knee     left knee DJD    Elevated PSA     History of blood transfusion 1980's    History of inferior wall myocardial infarction 01/2016    1 cardiac stent    HTN (hypertension)     meds .  1 yr    Hyperlipidemia     meds > 12 yrs    NSTEMI (non-ST elevated myocardial infarction) ([facility] [address]     Smoker        Past Surgical History:        Procedure Laterality Date    ABDOMEN SURGERY  1961    spleenectomy due to [address]  2009    lumbar disc OR    CARDIAC SURGERY      stents    COLONOSCOPY      CORONARY ANGIOPLASTY WITH STENT PLACEMENT  1/29/2016    EYE SURGERY      to have Phaco with IOL OU 2/2018    HERNIA REPAIR      JOINT REPLACEMENT Left 1994    LTHR    JOINT REPLACEMENT Right 2009    RTKR    KNEE SURGERY Right 2011    arthroscopy    MI MANIPULATN KNEE JT+ANESTHESIA Right 5/12/2017    RIGHT KNEE MANIPULATION UNDER ANESTHESIA performed by Sandrita Smith MD at [address] OFFICE/OUTPT VISIT,PROCEDURE ONLY Bilateral 12/29/2017    RIGHT AND BILATERAL L 4-5 LYSIS OF SCAR DISKECTOMY INTERBODY CAGE FUSION POSTEROLATERAL FUSION PEDICLE SCREWS performed by Dora Lawson MD at [address] [place] [address]  2004    benign    SPLENECTOMY  1961    TOTAL KNEE ARTHROPLASTY Right 3/24/2017    RIGHT  KNEE TOTAL ARTHROPLASTY, ELHAM CEMENTED PERSONA  performed by Sandrita Smith MD at [address] History:    Social History     Tobacco Use    Smoking status: Current Every Day Smoker     Packs/day: 0.50     Years: 60.00     Pack years: 30.00     Types: Cigarettes    Smokeless tobacco: Never Used   Substance Use Topics    Alcohol use: Yes     Alcohol/week: 4.2 oz     Types: 7 Shots of liquor per week     Comment: last day before yesterday                                Ready to quit: Not Answered  Counseling given: Not Answered      Vital Signs (Current): There were no vitals filed for this visit. BP Readings from Last 3 Encounters:   05/01/19 (!) 102/58   03/29/19 106/62   03/07/19 100/64       NPO Status:                                                                                 BMI:   Wt Readings from Last 3 Encounters:   05/01/19 172 lb 12.8 oz (78.4 kg)   04/09/19 167 lb (75.8 kg)   03/29/19 167 lb (75.8 kg)     There is no height or weight on file to calculate BMI.    CBC:   Lab Results   Component Value Date    WBC 11.5 02/13/2019    RBC 4.02 02/13/2019    HGB 13.0 02/13/2019    HCT 39.3 02/13/2019    MCV 97.7 02/13/2019    RDW 13.2 02/13/2019     02/13/2019       CMP:   Lab Results   Component Value Date     02/13/2019    K 5.1 02/13/2019    CL 97 02/13/2019    CO2 27 02/13/2019    BUN 16 02/13/2019    CREATININE 0.83 02/13/2019    GFRAA >60.0 02/13/2019    LABGLOM >60.0 02/13/2019    GLUCOSE 64 02/13/2019    PROT 7.4 02/13/2019    CALCIUM 9.6 02/13/2019    BILITOT 0.5 02/13/2019    ALKPHOS 99 02/13/2019    AST 16 02/13/2019    ALT 15 02/13/2019       POC Tests: No results for input(s): POCGLU, POCNA, POCK, POCCL, POCBUN, POCHEMO, POCHCT in the last 72 hours.     Coags:   Lab Results   Component Value Date    PROTIME 11.2 12/26/2017    INR 1.1 12/26/2017    APTT 27.9 01/29/2016       HCG (If Applicable): No results found for: PREGTESTUR, PREGSERUM, HCG, HCGQUANT     ABGs: No results found for: PHART, PO2ART, [id number],
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
## 2019-05-07 NOTE — OP NOTE
[address]                      [address], [address] [facility]                                OPERATIVE REPORT    PATIENT NAME: Ivy Donato                     :        1945  MED REC NO:   [id number]                            ROOM:  ACCOUNT NO:   [de-identified]                           ADMIT DATE: 2019  PROVIDER:     Dora Lawson MD    DATE OF PROCEDURE:  2019    PREOPERATIVE DIAGNOSIS:  Right carpal tunnel syndrome. POSTOPERATIVE DIAGNOSIS:  Right carpal tunnel syndrome. OPERATION PERFORMED:  Right wrist median nerve neuroplasty. DESCRIPTION OF PROCEDURE:  The patient was placed in the same day  surgery suite in supine position. [place] block administered to the right  hand and forearm. IV sedation was used or MAC. The arm was prepped and  draped. It was stabilized with the _____ hand. The time-out, patient  identified. Skin infiltrated with solution of 0.5% Marcaine with  epinephrine. An incision was made at the most distal flexor crease  carrying it down to the superficial palmar fascia exposing the palmaris  tendon ligament. This tendon was mobilized laterally gently. A small  slit was made in the superficial flexor retinaculum. The Skye Mode was  then used to make a passageway for the knife guide which was then  placed. Using the knife guide, the knife was made incision into the  carpal ligament distally into the palm the hand. The instrumentation  was removed. The deep layers were closed with interrupted 3-0 Vicryl  suture. 4-0 Vicryl suture was used to close the skin edges. Sterile  dressing applied. The patient tolerated well. Instructed to keep the  wound clean and dry about 3 days, avoiding soaking or soap for a week,  reduce swelling by elevation using ice pack moving the fingers. If he  has any questions or problems, contact the office.         Ronnie Middleton MD    D: 2019 13:50:54       T: 2019 13:55:01 [id number]  Job#: [id number]     Doc#: [id number]    CC:

## 2019-06-27 ENCOUNTER — OFFICE VISIT (OUTPATIENT)
Dept: FAMILY MEDICINE CLINIC | Age: 74
End: 2019-06-27
Payer: MEDICARE

## 2019-06-27 VITALS
HEART RATE: 62 BPM | WEIGHT: 170.6 LBS | DIASTOLIC BLOOD PRESSURE: 66 MMHG | BODY MASS INDEX: 27.42 KG/M2 | HEIGHT: 66 IN | SYSTOLIC BLOOD PRESSURE: 118 MMHG | OXYGEN SATURATION: 96 %

## 2019-06-27 DIAGNOSIS — R41.0 CONFUSION CAUSED BY A DRUG: ICD-10-CM

## 2019-06-27 DIAGNOSIS — M46.1 SACROILIITIS, NOT ELSEWHERE CLASSIFIED (HCC): ICD-10-CM

## 2019-06-27 DIAGNOSIS — T50.905A CONFUSION CAUSED BY A DRUG: ICD-10-CM

## 2019-06-27 DIAGNOSIS — Z87.891 PERSONAL HISTORY OF TOBACCO USE: ICD-10-CM

## 2019-06-27 DIAGNOSIS — I21.4 NSTEMI (NON-ST ELEVATED MYOCARDIAL INFARCTION) (HCC): ICD-10-CM

## 2019-06-27 DIAGNOSIS — Z00.00 ROUTINE GENERAL MEDICAL EXAMINATION AT A HEALTH CARE FACILITY: Primary | ICD-10-CM

## 2019-06-27 DIAGNOSIS — M14.672 CHARCOT'S JOINT OF LEFT ANKLE: ICD-10-CM

## 2019-06-27 PROCEDURE — 3017F COLORECTAL CA SCREEN DOC REV: CPT | Performed by: FAMILY MEDICINE

## 2019-06-27 PROCEDURE — 1123F ACP DISCUSS/DSCN MKR DOCD: CPT | Performed by: FAMILY MEDICINE

## 2019-06-27 PROCEDURE — G8598 ASA/ANTIPLAT THER USED: HCPCS | Performed by: FAMILY MEDICINE

## 2019-06-27 PROCEDURE — 4040F PNEUMOC VAC/ADMIN/RCVD: CPT | Performed by: FAMILY MEDICINE

## 2019-06-27 PROCEDURE — G0438 PPPS, INITIAL VISIT: HCPCS | Performed by: FAMILY MEDICINE

## 2019-06-27 PROCEDURE — G0296 VISIT TO DETERM LDCT ELIG: HCPCS | Performed by: FAMILY MEDICINE

## 2019-06-27 ASSESSMENT — LIFESTYLE VARIABLES
AUDIT-C TOTAL SCORE: 2
HOW MANY STANDARD DRINKS CONTAINING ALCOHOL DO YOU HAVE ON A TYPICAL DAY: 0
HOW OFTEN DO YOU HAVE SIX OR MORE DRINKS ON ONE OCCASION: 0
HOW OFTEN DO YOU HAVE A DRINK CONTAINING ALCOHOL: 2

## 2019-06-27 ASSESSMENT — PATIENT HEALTH QUESTIONNAIRE - PHQ9
SUM OF ALL RESPONSES TO PHQ QUESTIONS 1-9: 0
SUM OF ALL RESPONSES TO PHQ QUESTIONS 1-9: 0

## 2019-06-27 ASSESSMENT — ANXIETY QUESTIONNAIRES: GAD7 TOTAL SCORE: 1

## 2019-06-27 NOTE — PROGRESS NOTES
Medicare Annual Wellness Visit  Name: Naida Izaguirre Date: 2019   MRN: [id number] Sex: Male   Age: 74 y.o. Ethnicity: Non-/Non    : 1945 Race: [place] Mallory is here for Medicare AWV (worried about falling, equalibrium off ); Edema (right hand swelling before going to bed); and Health Maintenance (has hemmroids doesn't want to do colonscopy or cologuard )    Mentions issues above  Has had issues with gait for sometime    Right hand will swell at night  Feels it happening  Had CTS      Screenings for behavioral, psychosocial and functional/safety risks, and cognitive dysfunction are all negative except as indicated below. These results, as well as other patient data from the [address] form, are documented in Flowsheets linked to this Encounter. No Known Allergiesic  Prior to Visit Medications    Medication Sig Taking? Authorizing Provider   clopidogrel (PLAVIX) 75 MG tablet TAKE ONE TABLET BY MOUTH DAILY Yes Mega Dobson MD   nitroGLYCERIN (NITROSTAT) 0.4 MG SL tablet Place 1 tablet under the tongue every 5 minutes as needed for Chest pain up to max of 3 total doses. If no relief after 1 dose, call 911. Yes Mega Dobson MD   metoprolol tartrate (LOPRESSOR) 50 MG tablet TAKE ONE TABLET BY MOUTH TWO TIMES A DAY Yes Mega Dobson MD   citalopram (CELEXA) 40 MG tablet TAKE 1 TABLET BY MOUTH NIGHTLY Yes Marietta Fulton MD   atorvastatin (LIPITOR) 20 MG tablet Take 20 mg by mouth Yes Historical Provider, MD   diclofenac (VOLTAREN) 50 MG EC tablet Take 1 tablet by mouth 2 times daily Yes Marietta Fulton MD   finasteride (PROSCAR) 5 MG tablet Take 1 tablet by mouth daily Yes Joel Suazo MD   oxyCODONE-acetaminophen (ENDOCET) 7.5-325 MG per tablet Take 1 tablet by mouth every 6 hours as needed for Pain. Intended supply: 30 days.  Yes Marietta Fulton MD   omeprazole (PRILOSEC) 10 MG delayed release capsule Take 10 mg by mouth daily  Yes Historical Provider, MD DOCUSATE CALCIUM PO Take by mouth as needed Yes Historical Provider, MD   ations    Medication Sig Taking? Authorizing Provider   clopidogrel (PLAVIX) 75 MG tablet TAKE ONE TABLET BY MOUTH DAILY Yes Mendez Sarah MD   nitroGLYCERIN (NITROSTAT) 0.4 MG SL tablet Place 1 tablet under the tongue every 5 minutes as needed for Chest pain up to max of 3 total doses. If no relief after 1 dose, call 911. Yes Mendez Sarah MD   metoprolol tartrate (LOPRESSOR) 50 MG tablet TAKE ONE TABLET BY MOUTH TWO TIMES A DAY Yes Mendez Sarah MD   citalopram (CELEXA) 40 MG tablet TAKE 1 TABLET BY MOUTH NIGHTLY Yes Quyen Shipman MD   atorvastatin (LIPITOR) 20 MG tablet Take 20 mg by mouth Yes Historical Provider, MD   diclofenac (VOLTAREN) 50 MG EC tablet Take 1 tablet by mouth 2 times daily Yes Quyen Shipman MD   finasteride (PROSCAR) 5 MG tablet Take 1 tablet by mouth daily Yes Sana Burk MD   oxyCODONE-acetaminophen (ENDOCET) 7.5-325 MG per tablet Take 1 tablet by mouth every 6 hours as needed for Pain. Intended supply: 30 days. Yes Quyen Shipman MD   omeprazole (PRILOSEC) 10 MG delayed release capsule Take 10 mg by mouth daily  Yes Historical Provider, MD   DOCUSATE CALCIUM PO Take by mouth as needed Yes Historical Provider, MD      Diagnosis Date    Alcohol abuse     CAD (coronary artery disease)     patient states no doctor has told him this / has 1 cardiac stent    Chronic back pain     Chronic kidney disease     Chronic sinusitis     DJD (degenerative joint disease) of knee     left knee DJD    Elevated PSA     History of blood transfusion 1980's    History of inferior wall myocardial infarction 01/2016    1 cardiac stent    HTN (hypertension)     meds .  1 yr    Hyperlipidemia     meds > 12 yrs    NSTEMI (non-ST elevated myocardial infarction) ([facility] [address]     Smoker      Past Surgical History:   Procedure Laterality Date    ABDOMEN SURGERY  1961    spleenectomy due to [address]  2009    lumbar disc OR    the past year?: (!) No  Body mass index is 27.54 kg/m². Health Habits/Nutrition Interventions:  · dentist    Hearing/Vision:  Hearing/Vision  Do you or your family notice any trouble with your hearing?: No  Do you have difficulty driving, watching TV, or doing any of your daily activities because of your eyesight?: (!) Yes  Have you had an eye exam within the past year?: Yes  Hearing/Vision Interventions:  · eye exam    Safety:  Safety  Do you have working smoke detectors?: (!) No  Have all throw rugs been removed or fastened?: Yes  Do you have non-slip mats in all bathtubs?: Yes  Do all of your stairways have a railing or banister?: Yes  Are your doorways, halls and stairs free of clutter?: Yes  Do you always fasten your seatbelt when you are in a car?: Yes  Safety Interventions:  · Home safety tips provided    Personalized Preventive Plan   Current Health Maintenance Status  Immunization History   Administered Date(s) Administered    Pneumococcal Conjugate 13-valent ([id number]) 04/20/2017    Pneumococcal Polysaccharide ([id number]) 05/08/2014        Health Maintenance   Topic Date Due    Hib Vaccine (1 of 1 - Risk 1-dose series) 01/12/1945    Meningococcal (ACWY) Vaccine (1 - Risk 2-dose series) 10/12/1945    DTaP/Tdap/Td vaccine (1 - Tdap) 10/12/1962    Shingles Vaccine (1 of 2) 10/12/1993    Low dose CT lung screening  10/12/1998    Annual Wellness Visit (AWV)  10/12/2006    Colon Cancer Screen FIT/FOBT  06/27/2018    Flu vaccine (Season Ended) 09/01/2019    Lipid screen  03/28/2021    Pneumococcal 65+ years Vaccine  Completed    AAA screen  Completed    HPV vaccine  Aged Out     Recommendations for Apprity Due: see orders and patient instructions/AVS.    Recommended screening schedule for the next 5-10 years is provided to the patient in written form: see Patient Instructions/AVS.        Diagnosis Orders   1. Routine general medical examination at a health care facility     2.  NSTEMI

## 2019-06-27 NOTE — PATIENT INSTRUCTIONS
Personalized Preventive Plan for [place] - 6/27/2019  Medicare offers a range of preventive health benefits. Some of the tests and screenings are paid in full while other may be subject to a deductible, co-insurance, and/or copay. Some of these benefits include a comprehensive review of your medical history including lifestyle, illnesses that may run in your family, and various assessments and screenings as appropriate. After reviewing your medical record and screening and assessments performed today your provider may have ordered immunizations, labs, imaging, and/or referrals for you. A list of these orders (if applicable) as well as your Preventive Care list are included within your After Visit Summary for your review. Other Preventive Recommendations:    · A preventive eye exam performed by an eye specialist is recommended every 1-2 years to screen for glaucoma; cataracts, macular degeneration, and other eye disorders. · A preventive dental visit is recommended every 6 months. · Try to get at least 150 minutes of exercise per week or 10,000 steps per day on a pedometer . · Order or download the FREE \"Exercise & Physical Activity: Your Everyday Guide\" from [employer]. Call 2-658.739.7966 or search [employer] online. · You need 7357-5767 mg of calcium and 2836-8904 IU of vitamin D per day. It is possible to meet your calcium requirement with diet alone, but a vitamin D supplement is usually necessary to meet this goal.  · When exposed to the sun, use a sunscreen that protects against both UVA and UVB radiation with an SPF of 30 or greater. Reapply every 2 to 3 hours or after sweating, drying off with a towel, or swimming. · Always wear a seat belt when traveling in a car. Always wear a helmet when riding a bicycle or motorcycle. What is lung cancer screening?   Lung cancer screening is a way in which doctors check the lungs for early signs of cancer

## 2019-07-01 ENCOUNTER — TELEPHONE (OUTPATIENT)
Dept: CASE MANAGEMENT | Age: 74
End: 2019-07-01

## 2019-07-08 ENCOUNTER — HOSPITAL ENCOUNTER (OUTPATIENT)
Dept: CT IMAGING | Age: 74
Discharge: HOME | End: 2019-07-10
Payer: MEDICARE

## 2019-07-08 DIAGNOSIS — Z87.891 PERSONAL HISTORY OF TOBACCO USE: ICD-10-CM

## 2019-07-08 PROCEDURE — G0297 LDCT FOR LUNG CA SCREEN: HCPCS

## 2019-08-07 RX ORDER — CITALOPRAM 40 MG/1
TABLET ORAL
Qty: 30 TABLET | Refills: 3 | Status: SHIPPED | OUTPATIENT
Start: 2019-08-07 | End: 2019-11-30 | Stop reason: SDUPTHER

## 2019-08-22 ENCOUNTER — OFFICE VISIT (OUTPATIENT)
Dept: CARDIOLOGY CLINIC | Age: 74
End: 2019-08-22
Payer: MEDICARE

## 2019-08-22 VITALS
RESPIRATION RATE: 16 BRPM | BODY MASS INDEX: 27.86 KG/M2 | DIASTOLIC BLOOD PRESSURE: 62 MMHG | WEIGHT: 172.6 LBS | OXYGEN SATURATION: 98 % | HEART RATE: 58 BPM | SYSTOLIC BLOOD PRESSURE: 124 MMHG

## 2019-08-22 DIAGNOSIS — I21.4 NSTEMI (NON-ST ELEVATED MYOCARDIAL INFARCTION) (HCC): ICD-10-CM

## 2019-08-22 DIAGNOSIS — E78.5 DYSLIPIDEMIA: ICD-10-CM

## 2019-08-22 DIAGNOSIS — R06.09 DOE (DYSPNEA ON EXERTION): ICD-10-CM

## 2019-08-22 DIAGNOSIS — I10 ESSENTIAL HYPERTENSION: Primary | Chronic | ICD-10-CM

## 2019-08-22 PROCEDURE — 4004F PT TOBACCO SCREEN RCVD TLK: CPT | Performed by: INTERNAL MEDICINE

## 2019-08-22 PROCEDURE — G8598 ASA/ANTIPLAT THER USED: HCPCS | Performed by: INTERNAL MEDICINE

## 2019-08-22 PROCEDURE — 1123F ACP DISCUSS/DSCN MKR DOCD: CPT | Performed by: INTERNAL MEDICINE

## 2019-08-22 PROCEDURE — 99214 OFFICE O/P EST MOD 30 MIN: CPT | Performed by: INTERNAL MEDICINE

## 2019-08-22 PROCEDURE — 4040F PNEUMOC VAC/ADMIN/RCVD: CPT | Performed by: INTERNAL MEDICINE

## 2019-08-22 PROCEDURE — G8417 CALC BMI ABV UP PARAM F/U: HCPCS | Performed by: INTERNAL MEDICINE

## 2019-08-22 PROCEDURE — 3017F COLORECTAL CA SCREEN DOC REV: CPT | Performed by: INTERNAL MEDICINE

## 2019-08-22 PROCEDURE — G8427 DOCREV CUR MEDS BY ELIG CLIN: HCPCS | Performed by: INTERNAL MEDICINE

## 2019-08-22 ASSESSMENT — ENCOUNTER SYMPTOMS
GASTROINTESTINAL NEGATIVE: 1
EYES NEGATIVE: 1
SHORTNESS OF BREATH: 1
NAUSEA: 0
STRIDOR: 0
BLOOD IN STOOL: 0
WHEEZING: 0
CHEST TIGHTNESS: 0
COUGH: 0

## 2019-09-04 DIAGNOSIS — M17.11 OSTEOARTHRITIS OF RIGHT KNEE, UNSPECIFIED OSTEOARTHRITIS TYPE: Primary | ICD-10-CM

## 2019-09-04 DIAGNOSIS — M54.50 LUMBAR PAIN: ICD-10-CM

## 2019-09-22 ENCOUNTER — HOSPITAL ENCOUNTER (OUTPATIENT)
Dept: ULTRASOUND IMAGING | Age: 74
Discharge: HOME | End: 2019-09-24
Payer: MEDICARE

## 2019-09-22 DIAGNOSIS — R10.11 RIGHT UPPER QUADRANT ABDOMINAL PAIN: ICD-10-CM

## 2019-09-22 DIAGNOSIS — K82.4 GALLBLADDER POLYP: ICD-10-CM

## 2019-09-22 PROCEDURE — 76705 ECHO EXAM OF ABDOMEN: CPT

## 2019-09-30 ENCOUNTER — OFFICE VISIT (OUTPATIENT)
Dept: SURGERY | Age: 74
End: 2019-09-30
Payer: MEDICARE

## 2019-09-30 VITALS
TEMPERATURE: 97 F | WEIGHT: 171.4 LBS | SYSTOLIC BLOOD PRESSURE: 122 MMHG | BODY MASS INDEX: 27.55 KG/M2 | DIASTOLIC BLOOD PRESSURE: 78 MMHG | HEIGHT: 66 IN

## 2019-09-30 DIAGNOSIS — K82.4 GALLBLADDER POLYP: Primary | ICD-10-CM

## 2019-09-30 PROCEDURE — 4040F PNEUMOC VAC/ADMIN/RCVD: CPT | Performed by: SURGERY

## 2019-09-30 PROCEDURE — 99213 OFFICE O/P EST LOW 20 MIN: CPT | Performed by: SURGERY

## 2019-09-30 PROCEDURE — 4004F PT TOBACCO SCREEN RCVD TLK: CPT | Performed by: SURGERY

## 2019-09-30 PROCEDURE — G8427 DOCREV CUR MEDS BY ELIG CLIN: HCPCS | Performed by: SURGERY

## 2019-09-30 PROCEDURE — 1123F ACP DISCUSS/DSCN MKR DOCD: CPT | Performed by: SURGERY

## 2019-09-30 PROCEDURE — G8417 CALC BMI ABV UP PARAM F/U: HCPCS | Performed by: SURGERY

## 2019-09-30 PROCEDURE — G8598 ASA/ANTIPLAT THER USED: HCPCS | Performed by: SURGERY

## 2019-09-30 PROCEDURE — 3017F COLORECTAL CA SCREEN DOC REV: CPT | Performed by: SURGERY

## 2019-09-30 ASSESSMENT — ENCOUNTER SYMPTOMS
SHORTNESS OF BREATH: 0
RHINORRHEA: 0
CONSTIPATION: 0
ABDOMINAL PAIN: 1
ABDOMINAL DISTENTION: 0
EYES NEGATIVE: 1
BLOOD IN STOOL: 0
ALLERGIC/IMMUNOLOGIC NEGATIVE: 1
CHEST TIGHTNESS: 0
COLOR CHANGE: 0

## 2019-09-30 NOTE — LETTER
Brinda Chambers MD  [address]  [place], [facility]  Phone [phone]  Fax [phone]    September 30, 2019      Gely Rendon      Dear Prerna [place]:      Our records indicate that you are due for your Gallbladder Ultrasound. PLEASE CALL [phone] TO SCHEDULE YOUR GALLBLADDER ULTRASOUND. TAKE THE ATTACHED ORDER WITH YOU TO YOUR APPOINTMENT. Please make an appointment for a mammogram at your earliest convenience.     Sincerely,      Brinda Chambers MD

## 2019-10-15 DIAGNOSIS — R97.20 ELEVATED PSA: ICD-10-CM

## 2019-10-15 LAB — PROSTATE SPECIFIC ANTIGEN: 2.82 NG/ML (ref 0–6.22)

## 2019-10-21 ENCOUNTER — OFFICE VISIT (OUTPATIENT)
Dept: UROLOGY | Age: 74
End: 2019-10-21
Payer: MEDICARE

## 2019-10-21 VITALS
HEART RATE: 60 BPM | BODY MASS INDEX: 27.64 KG/M2 | OXYGEN SATURATION: 98 % | WEIGHT: 172 LBS | DIASTOLIC BLOOD PRESSURE: 84 MMHG | SYSTOLIC BLOOD PRESSURE: 130 MMHG | HEIGHT: 66 IN

## 2019-10-21 DIAGNOSIS — R97.20 ELEVATED PSA: Primary | ICD-10-CM

## 2019-10-21 PROCEDURE — G8484 FLU IMMUNIZE NO ADMIN: HCPCS | Performed by: UROLOGY

## 2019-10-21 PROCEDURE — 4004F PT TOBACCO SCREEN RCVD TLK: CPT | Performed by: UROLOGY

## 2019-10-21 PROCEDURE — 4040F PNEUMOC VAC/ADMIN/RCVD: CPT | Performed by: UROLOGY

## 2019-10-21 PROCEDURE — G8598 ASA/ANTIPLAT THER USED: HCPCS | Performed by: UROLOGY

## 2019-10-21 PROCEDURE — G8427 DOCREV CUR MEDS BY ELIG CLIN: HCPCS | Performed by: UROLOGY

## 2019-10-21 PROCEDURE — 1123F ACP DISCUSS/DSCN MKR DOCD: CPT | Performed by: UROLOGY

## 2019-10-21 PROCEDURE — G8417 CALC BMI ABV UP PARAM F/U: HCPCS | Performed by: UROLOGY

## 2019-10-21 PROCEDURE — 99213 OFFICE O/P EST LOW 20 MIN: CPT | Performed by: UROLOGY

## 2019-11-01 DIAGNOSIS — M17.11 PRIMARY OSTEOARTHRITIS OF RIGHT KNEE: Chronic | ICD-10-CM

## 2019-12-02 RX ORDER — CITALOPRAM 40 MG/1
TABLET ORAL
Qty: 90 TABLET | Refills: 3 | Status: SHIPPED | OUTPATIENT
Start: 2019-12-02 | End: 2020-12-29

## 2019-12-13 RX ORDER — FINASTERIDE 5 MG/1
TABLET, FILM COATED ORAL
Qty: 90 TABLET | Refills: 3 | Status: SHIPPED | OUTPATIENT
Start: 2019-12-13 | End: 2020-12-10

## 2020-01-10 ENCOUNTER — OFFICE VISIT (OUTPATIENT)
Dept: CARDIOLOGY CLINIC | Age: 75
End: 2020-01-10
Payer: MEDICARE

## 2020-01-10 VITALS
HEART RATE: 60 BPM | OXYGEN SATURATION: 95 % | WEIGHT: 177.4 LBS | DIASTOLIC BLOOD PRESSURE: 79 MMHG | BODY MASS INDEX: 28.63 KG/M2 | SYSTOLIC BLOOD PRESSURE: 146 MMHG | RESPIRATION RATE: 16 BRPM

## 2020-01-10 PROBLEM — I25.119 CORONARY ARTERY DISEASE INVOLVING NATIVE CORONARY ARTERY OF NATIVE HEART WITH ANGINA PECTORIS (HCC): Status: ACTIVE | Noted: 2020-01-10

## 2020-01-10 PROBLEM — R07.9 CHEST PAIN: Status: ACTIVE | Noted: 2020-01-10

## 2020-01-10 PROCEDURE — 4040F PNEUMOC VAC/ADMIN/RCVD: CPT | Performed by: INTERNAL MEDICINE

## 2020-01-10 PROCEDURE — 4004F PT TOBACCO SCREEN RCVD TLK: CPT | Performed by: INTERNAL MEDICINE

## 2020-01-10 PROCEDURE — G8484 FLU IMMUNIZE NO ADMIN: HCPCS | Performed by: INTERNAL MEDICINE

## 2020-01-10 PROCEDURE — 1123F ACP DISCUSS/DSCN MKR DOCD: CPT | Performed by: INTERNAL MEDICINE

## 2020-01-10 PROCEDURE — G8417 CALC BMI ABV UP PARAM F/U: HCPCS | Performed by: INTERNAL MEDICINE

## 2020-01-10 PROCEDURE — 99214 OFFICE O/P EST MOD 30 MIN: CPT | Performed by: INTERNAL MEDICINE

## 2020-01-10 PROCEDURE — G8427 DOCREV CUR MEDS BY ELIG CLIN: HCPCS | Performed by: INTERNAL MEDICINE

## 2020-01-10 RX ORDER — ISOSORBIDE MONONITRATE 30 MG/1
30 TABLET, EXTENDED RELEASE ORAL DAILY
Qty: 30 TABLET | Refills: 5 | Status: SHIPPED | OUTPATIENT
Start: 2020-01-10 | End: 2020-02-07 | Stop reason: SDUPTHER

## 2020-01-10 ASSESSMENT — ENCOUNTER SYMPTOMS
WHEEZING: 0
GASTROINTESTINAL NEGATIVE: 1
EYES NEGATIVE: 1
CHEST TIGHTNESS: 0
STRIDOR: 0
NAUSEA: 0
COUGH: 0
SHORTNESS OF BREATH: 1
BLOOD IN STOOL: 0

## 2020-01-10 NOTE — PROGRESS NOTES
Subsequent Progress Note  Patient: Gladys Rivera  YOB: 1945  MRN: [id number]    Chief Complaint: cad dizzy htn samuel rash  Chief Complaint   Patient presents with    Follow-up     4 month     Hypertension       CV Data:  1/2016 NSTEMI RCA KRISTA  4/2018 echo  55 1TR  4/2018 CUS MIld palque  4/2018 Abd US neg AAA  4/2019 Spect negative     Subjective/HPI: occ dizzy + samuel no cp no falls no bleed rash right temple for 6 weeks      1/10/2020 More SAMUEL and more frequent Chest tightness. No falls no bleed. Takes meds. Feels gaseous. Smokes 1/2 ppd + cigars. No etoh  Retired- supervisor. [employer]    EKG:    Past Medical History:   Diagnosis Date    Alcohol abuse     CAD (coronary artery disease)     patient states no doctor has told him this / has 1 cardiac stent    Chronic back pain     Chronic kidney disease     Chronic sinusitis     DJD (degenerative joint disease) of knee     left knee DJD    Elevated PSA     History of blood transfusion 1980's    History of inferior wall myocardial infarction 01/2016    1 cardiac stent    HTN (hypertension)     meds .  1 yr    Hyperlipidemia     meds > 12 yrs    NSTEMI (non-ST elevated myocardial infarction) [facility] [address]     Smoker        Past Surgical History:   Procedure Laterality Date    ABDOMEN SURGERY  1961    spleenectomy due to [address]  2009    lumbar disc OR    CARDIAC SURGERY      stents    CARPAL TUNNEL RELEASE Right 5/6/2019    RIGHT CARPAL TUNNEL RELEASE performed by Justin Benson MD at [place] [address] WITH STENT PLACEMENT  1/29/2016    EYE SURGERY      to have Phaco with IOL OU 2/2018    HERNIA REPAIR      JOINT REPLACEMENT Left 1994    LTHR    JOINT REPLACEMENT Right 2009    RTKR    KNEE SURGERY Right 2011    arthroscopy    [place] MANIPULATN KNEE JT+ANESTHESIA Right 5/12/2017    RIGHT KNEE MANIPULATION UNDER ANESTHESIA performed by Nithya Tatum MD at [address] OFFICE/OUTPT VISIT,PROCEDURE ONLY Bilateral 12/29/2017    RIGHT AND BILATERAL L 4-5 LYSIS OF SCAR DISKECTOMY INTERBODY CAGE FUSION POSTEROLATERAL FUSION PEDICLE SCREWS performed by Joselito David MD at [address] [place] [address]  2004    benign   [address]    TOTAL KNEE ARTHROPLASTY Right 3/24/2017    RIGHT  KNEE TOTAL ARTHROPLASTY, ELHAM CEMENTED PERSONA  performed by Denise Martinez MD at [address] [place] [address] History   Problem Relation Age of Onset    Osteoarthritis Mother     Emphysema Mother     Hypertension Father     Hearing Loss Father     Dementia Father     No Known Problems Sister     No Known Problems Brother        Social History     Socioeconomic History    Marital status:      Spouse name: None    Number of children: None    Years of education: None    Highest education level: None   Occupational History    Occupation: retired   Social Needs    Financial resource strain: None    Food insecurity:     Worry: None     Inability: None    Transportation needs:     Medical: None     Non-medical: None   Tobacco Use    Smoking status: Current Every Day Smoker     Packs/day: 0.50     Years: 60.00     Pack years: 30.00     Types: Cigarettes    Smokeless tobacco: Never Used   Substance and Sexual Activity    Alcohol use:  Yes     Alcohol/week: 7.0 standard drinks     Types: 7 Shots of liquor per week     Comment: last day before yesterday    Drug use: No    Sexual activity: Yes   Lifestyle    Physical activity:     Days per week: None     Minutes per session: None    Stress: None   Relationships    Social connections:     Talks on phone: None     Gets together: None     Attends Taoist service: None     Active member of club or organization: None     Attends meetings of clubs or organizations: None     Relationship status: None    Intimate partner violence:     Fear of current or ex partner: None     Emotionally abused: None     Physically abused: None     Forced sexual activity:

## 2020-02-07 ENCOUNTER — OFFICE VISIT (OUTPATIENT)
Dept: CARDIOLOGY CLINIC | Age: 75
End: 2020-02-07

## 2020-02-07 VITALS
WEIGHT: 174.8 LBS | SYSTOLIC BLOOD PRESSURE: 122 MMHG | DIASTOLIC BLOOD PRESSURE: 66 MMHG | BODY MASS INDEX: 28.21 KG/M2 | OXYGEN SATURATION: 98 % | HEART RATE: 63 BPM | RESPIRATION RATE: 16 BRPM

## 2020-02-07 PROCEDURE — 4040F PNEUMOC VAC/ADMIN/RCVD: CPT | Performed by: INTERNAL MEDICINE

## 2020-02-07 PROCEDURE — 1123F ACP DISCUSS/DSCN MKR DOCD: CPT | Performed by: INTERNAL MEDICINE

## 2020-02-07 PROCEDURE — G8417 CALC BMI ABV UP PARAM F/U: HCPCS | Performed by: INTERNAL MEDICINE

## 2020-02-07 PROCEDURE — 99214 OFFICE O/P EST MOD 30 MIN: CPT | Performed by: INTERNAL MEDICINE

## 2020-02-07 PROCEDURE — G8484 FLU IMMUNIZE NO ADMIN: HCPCS | Performed by: INTERNAL MEDICINE

## 2020-02-07 PROCEDURE — G8427 DOCREV CUR MEDS BY ELIG CLIN: HCPCS | Performed by: INTERNAL MEDICINE

## 2020-02-07 PROCEDURE — 4004F PT TOBACCO SCREEN RCVD TLK: CPT | Performed by: INTERNAL MEDICINE

## 2020-02-07 RX ORDER — ISOSORBIDE MONONITRATE 30 MG/1
30 TABLET, EXTENDED RELEASE ORAL DAILY
Qty: 90 TABLET | Refills: 3 | Status: SHIPPED | OUTPATIENT
Start: 2020-02-07 | End: 2021-02-22

## 2020-02-07 RX ORDER — OMEPRAZOLE 10 MG/1
10 CAPSULE, DELAYED RELEASE ORAL DAILY
Qty: 90 CAPSULE | Refills: 3 | Status: SHIPPED | OUTPATIENT
Start: 2020-02-07 | End: 2020-02-07

## 2020-02-07 RX ORDER — SENNOSIDES 8.6 MG
650 CAPSULE ORAL 2 TIMES DAILY PRN
COMMUNITY
End: 2020-07-14

## 2020-02-07 RX ORDER — OMEPRAZOLE 10 MG/1
10 CAPSULE, DELAYED RELEASE ORAL DAILY
Qty: 30 CAPSULE | Refills: 5 | Status: SHIPPED | OUTPATIENT
Start: 2020-02-07 | End: 2020-09-18

## 2020-02-07 ASSESSMENT — ENCOUNTER SYMPTOMS
NAUSEA: 0
BLOOD IN STOOL: 0
COUGH: 0
CHEST TIGHTNESS: 0
SHORTNESS OF BREATH: 1
WHEEZING: 0
STRIDOR: 0
EYES NEGATIVE: 1
GASTROINTESTINAL NEGATIVE: 1

## 2020-02-07 NOTE — PROGRESS NOTES
Subsequent Progress Note  Patient: Chad Uriostegui  YOB: 1945  MRN: [id number]    Chief Complaint: cad dizzy htn felder rash  Chief Complaint   Patient presents with    1 Month Follow-Up    Hypertension    Coronary Artery Disease    Shortness of Breath     FELDER-improving from last visit    Edema     BLE-relieved with \"water pills\"       CV Data:  1/2016 NSTEMI RCA KRISTA  4/2018 echo  55 1TR  4/2018 CUS MIld palque  4/2018 Abd US neg AAA  4/2019 Spect negative     Subjective/HPI: occ dizzy + felder no cp no falls no bleed rash right temple for 6 weeks      1/10/2020 More FELDER and more frequent Chest tightness. No falls no bleed. Takes meds. Feels gaseous. 2/7/2020 chest tightness and felder much less since Imdur. Compliant with meds    Smokes 1/2 ppd + cigars. No etoh  Retired- supervisor. [employer]    EKG:    Past Medical History:   Diagnosis Date    Alcohol abuse     CAD (coronary artery disease)     patient states no doctor has told him this / has 1 cardiac stent    Chronic back pain     Chronic kidney disease     Chronic sinusitis     DJD (degenerative joint disease) of knee     left knee DJD    Elevated PSA     History of blood transfusion 1980's    History of inferior wall myocardial infarction 01/2016    1 cardiac stent    HTN (hypertension)     meds .  1 yr    Hyperlipidemia     meds > 12 yrs    NSTEMI (non-ST elevated myocardial infarction) ([facility] [address]     Smoker        Past Surgical History:   Procedure Laterality Date    ABDOMEN SURGERY  1961    spleenectomy due to [address]  2009    lumbar disc OR    CARDIAC SURGERY      stents    CARPAL TUNNEL RELEASE Right 5/6/2019    RIGHT CARPAL TUNNEL RELEASE performed by Rohan Mcclure MD at [place] [address] WITH STENT PLACEMENT  1/29/2016    EYE SURGERY      to have Phaco with IOL OU 2/2018    HERNIA REPAIR      JOINT REPLACEMENT Left 1994    LTHR    JOINT REPLACEMENT Right 2009    RTKR    KNEE SURGERY Right 2011    arthroscopy    IN MANIPULATN KNEE JT+ANESTHESIA Right 5/12/2017    RIGHT KNEE MANIPULATION UNDER ANESTHESIA performed by Ivana Pearl MD at [address] [facility] OFFICE/OUTPT [address] Bilateral 12/29/2017    RIGHT AND BILATERAL L 4-5 LYSIS OF SCAR DISKECTOMY INTERBODY CAGE FUSION POSTEROLATERAL FUSION PEDICLE SCREWS performed by Wendie Cifuentes MD at [address] [place] [address]  2004    benign    SPLENECTOMY  1961    TOTAL KNEE ARTHROPLASTY Right 3/24/2017    RIGHT  KNEE TOTAL ARTHROPLASTY, ELHAM CEMENTED PERSONA  performed by Ivana Pearl MD at [address] [place] [address] History   Problem Relation Age of Onset    Osteoarthritis Mother     Emphysema Mother     Hypertension Father     Hearing Loss Father     Dementia Father     No Known Problems Sister     No Known Problems Brother        Social History     Socioeconomic History    Marital status:      Spouse name: None    Number of children: None    Years of education: None    Highest education level: None   Occupational History    Occupation: retired   Social Needs    Financial resource strain: None    Food insecurity:     Worry: None     Inability: None    Transportation needs:     Medical: None     Non-medical: None   Tobacco Use    Smoking status: Current Every Day Smoker     Packs/day: 0.50     Years: 60.00     Pack years: 30.00     Types: Cigarettes    Smokeless tobacco: Never Used   Substance and Sexual Activity    Alcohol use:  Yes     Alcohol/week: 7.0 standard drinks     Types: 7 Shots of liquor per week     Comment: last day before yesterday    Drug use: No    Sexual activity: Yes   Lifestyle    Physical activity:     Days per week: None     Minutes per session: None    Stress: None   Relationships    Social connections:     Talks on phone: None     Gets together: None     Attends Mandaen service: None     Active member of club or organization: None     Attends meetings of clubs or Cardiovascular: Negative. Negative for chest pain, palpitations and leg swelling. Gastrointestinal: Negative. Negative for blood in stool and nausea. Genitourinary: Negative. Musculoskeletal: Negative. Skin: Negative. Neurological: Positive for light-headedness. Negative for dizziness, syncope and weakness. Hematological: Negative. Psychiatric/Behavioral: Negative. Physical Examination:    /66 (Site: Right Upper Arm, Position: Sitting, Cuff Size: Medium Adult)   Pulse 63   Resp 16   Wt 174 lb 12.8 oz (79.3 kg)   SpO2 98%   BMI 28.21 kg/m²    Physical Exam   Constitutional: He appears healthy. No distress. HENT:   Normal cephalic and Atraumatic   Eyes: Pupils are equal, round, and reactive to light. Neck: Normal range of motion and thyroid normal. Neck supple. No JVD present. No neck adenopathy. No thyromegaly present. Cardiovascular: Normal rate, regular rhythm, intact distal pulses and normal pulses. Murmur heard. Pulmonary/Chest: Effort normal and breath sounds normal. He has no wheezes. He has no rales. He exhibits no tenderness. Abdominal: Soft. Bowel sounds are normal. There is no abdominal tenderness. Musculoskeletal: Normal range of motion. General: No tenderness or edema. Neurological: He is alert and oriented to person, place, and time. Skin: Skin is warm. No cyanosis. Nails show no clubbing.        LABS:  CBC:   Lab Results   Component Value Date    WBC 9.7 05/06/2019    RBC 3.55 05/06/2019    HGB 11.1 05/06/2019    HCT 33.9 05/06/2019    MCV 95.3 05/06/2019    MCH 31.2 05/06/2019    MCHC 32.7 05/06/2019    RDW 14.1 05/06/2019     05/06/2019     Lipids:  Lab Results   Component Value Date    CHOL 165 03/28/2016    CHOL 214 (H) 01/29/2016    CHOL 194 04/28/2014     Lab Results   Component Value Date    TRIG 64 03/28/2016    TRIG 144 01/29/2016    TRIG 91 04/28/2014     Lab Results   Component Value Date     (H) 03/28/2016    HDL 92 (H) 01/29/2016    HDL 82 (H) 04/28/2014     Lab Results   Component Value Date    LDLCALC 40 03/28/2016    LDLCALC 93 01/29/2016    LDLCALC 94 04/28/2014     No results found for: LABVLDL, VLDL  No results found for: CHOLHDLRATIO  CMP:    Lab Results   Component Value Date     05/06/2019    K 4.9 05/06/2019     05/06/2019    CO2 26 05/06/2019    BUN 15 05/06/2019    CREATININE 0.75 05/06/2019    GFRAA >60.0 05/06/2019    LABGLOM >60.0 05/06/2019    GLUCOSE 91 05/06/2019    PROT 7.4 02/13/2019    LABALBU 4.5 02/13/2019    CALCIUM 8.6 05/06/2019    BILITOT 0.5 02/13/2019    ALKPHOS 99 02/13/2019    AST 16 02/13/2019    ALT 15 02/13/2019     BMP:    Lab Results   Component Value Date     05/06/2019    K 4.9 05/06/2019     05/06/2019    CO2 26 05/06/2019    BUN 15 05/06/2019    LABALBU 4.5 02/13/2019    CREATININE 0.75 05/06/2019    CALCIUM 8.6 05/06/2019    GFRAA >60.0 05/06/2019    LABGLOM >60.0 05/06/2019    GLUCOSE 91 05/06/2019     Magnesium:    Lab Results   Component Value Date    MG 2.1 03/25/2017     TSH:  Lab Results   Component Value Date    TSH 0.406 09/04/2018       Patient Active Problem List   Diagnosis    HTN (hypertension)    Tobacco use    Hip pain    Knee pain    Chronic back pain    Elevated PSA    Primary osteoarthritis of right knee    Spondylosis of lumbar region without myelopathy or radiculopathy    Sacroiliitis, not elsewhere classified ([address]    Pure hypercholesterolemia    Charcot's joint of left ankle    Left ankle pain    Confusion caused by a drug ([address]    DDD (degenerative disc disease), lumbar    Osteoarthritis of spine with myelopathy, lumbosacral region    Chronic bilateral low back pain with bilateral sciatica    Postlaminectomy syndrome, lumbar region    Acquired spondylolisthesis of lumbosacral region    Neurogenic claudication due to lumbar spinal stenosis    HNP (herniated nucleus pulposus), lumbar    Spondylolisthesis at L4-L5 level  Anesthesia    Herniated nucleus pulposus, L4-5    [facility]STEM[facility]non-ST elevated [facility] infarction) [facility]    S/P PTCA (percutaneous transluminal coronary angioplasty)    Right upper quadrant abdominal pain    Gallbladder polyp    Biliary dyskinesia    Carpal tunnel syndrome on right    Carpal tunnel syndrome on left    Angina effort    Dyslipidemia    FELDER (dyspnea on exertion)    Coronary artery disease involving native coronary artery of native heart with angina pectoris (HCC)    Chest pain       There are no discontinued medications. Modified Medications    No medications on file       No orders of the defined types were placed in this encounter. Assessment/Plan:    1. Essential hypertension  Stable     2. NSTEMI (non-ST elevated myocardial infarction) ([facility] [address]   no angina     3. Dyslipidemia  Statin     4. FELDER (dyspnea on exertion)  Stop smoking   Possible angina - Imdur made a big difference- if worse let me know. 5. Rash- did not see Derm. Facial rash gone now. No further occurrence. Counseling:  Heart Healthy Lifestyle, Stop Smoking, Low Salt Diet, Take Precautions to Prevent Falls, Regular Exercise and Walk Daily    Return in about 3 months (around 5/7/2020) for Cardiovascular care. .      Electronically signed by Charisse Larson MD on 2/7/2020 at 3:09 PM

## 2020-02-20 ENCOUNTER — OFFICE VISIT (OUTPATIENT)
Dept: GASTROENTEROLOGY | Age: 75
End: 2020-02-20
Payer: MEDICARE

## 2020-02-20 VITALS
WEIGHT: 176 LBS | OXYGEN SATURATION: 91 % | DIASTOLIC BLOOD PRESSURE: 64 MMHG | SYSTOLIC BLOOD PRESSURE: 130 MMHG | HEIGHT: 66 IN | TEMPERATURE: 97.8 F | HEART RATE: 70 BPM | BODY MASS INDEX: 28.28 KG/M2

## 2020-02-20 PROCEDURE — 4004F PT TOBACCO SCREEN RCVD TLK: CPT | Performed by: INTERNAL MEDICINE

## 2020-02-20 PROCEDURE — 4040F PNEUMOC VAC/ADMIN/RCVD: CPT | Performed by: INTERNAL MEDICINE

## 2020-02-20 PROCEDURE — 99203 OFFICE O/P NEW LOW 30 MIN: CPT | Performed by: INTERNAL MEDICINE

## 2020-02-20 PROCEDURE — G8417 CALC BMI ABV UP PARAM F/U: HCPCS | Performed by: INTERNAL MEDICINE

## 2020-02-20 PROCEDURE — G8484 FLU IMMUNIZE NO ADMIN: HCPCS | Performed by: INTERNAL MEDICINE

## 2020-02-20 PROCEDURE — G8427 DOCREV CUR MEDS BY ELIG CLIN: HCPCS | Performed by: INTERNAL MEDICINE

## 2020-02-20 PROCEDURE — 1123F ACP DISCUSS/DSCN MKR DOCD: CPT | Performed by: INTERNAL MEDICINE

## 2020-02-20 RX ORDER — POLYETHYLENE GLYCOL 3350, SODIUM CHLORIDE, SODIUM BICARBONATE, POTASSIUM CHLORIDE 420; 11.2; 5.72; 1.48 G/4L; G/4L; G/4L; G/4L
4000 POWDER, FOR SOLUTION ORAL ONCE
Qty: 1 BOTTLE | Refills: 0 | Status: SHIPPED | OUTPATIENT
Start: 2020-02-20 | End: 2020-02-20

## 2020-02-20 RX ORDER — ACTIVATED CHARCOAL 260 MG
CAPSULE ORAL
Qty: 120 CAPSULE | Refills: 3 | Status: SHIPPED | OUTPATIENT
Start: 2020-02-20 | End: 2020-07-14

## 2020-02-20 RX ORDER — GREEN TEA/HOODIA GORDONII 315-12.5MG
1 CAPSULE ORAL DAILY
Qty: 30 TABLET | Refills: 1 | Status: SHIPPED | OUTPATIENT
Start: 2020-02-20 | End: 2020-03-21

## 2020-02-20 NOTE — PROGRESS NOTES
ANGIOPLASTY WITH STENT PLACEMENT  1/29/2016    EYE SURGERY      to have Phaco with IOL OU 2/2018    HERNIA REPAIR      JOINT REPLACEMENT Left 1994    LTHR    JOINT REPLACEMENT Right 2009    RTKR    KNEE SURGERY Right 2011    arthroscopy    [place] MANIPULATN KNEE JT+ANESTHESIA Right 5/12/2017    RIGHT KNEE MANIPULATION UNDER ANESTHESIA performed by Kemal Watson MD at [address] [facility] OFFICE/OUTPT VISIT,PROCEDURE ONLY Bilateral 12/29/2017    RIGHT AND BILATERAL L 4-5 LYSIS OF SCAR DISKECTOMY INTERBODY CAGE FUSION POSTEROLATERAL FUSION PEDICLE SCREWS performed by Vasiliy Schwarz MD at [address] [place] [address]  2004    benign   [address] Right 3/24/2017    RIGHT  KNEE TOTAL ARTHROPLASTY, ELHAM CEMENTED PERSONA  performed by Kemal Watson MD at [facility]     Current Outpatient Medications on File Prior to Visit   Medication Sig Dispense Refill    acetaminophen (ACETAMINOPHEN 8 HOUR) 650 MG extended release tablet Take 650 mg by mouth 2 times daily as needed for Pain      isosorbide mononitrate (IMDUR) 30 MG extended release tablet Take 1 tablet by mouth daily 90 tablet 3    omeprazole (PRILOSEC) 10 MG delayed release capsule Take 1 capsule by mouth daily 30 capsule 5    atorvastatin (LIPITOR) 20 MG tablet TAKE ONE TABLET BY MOUTH DAILY 90 tablet 2    clopidogrel (PLAVIX) 75 MG tablet TAKE ONE TABLET BY MOUTH DAILY 90 tablet 2    finasteride (PROSCAR) 5 MG tablet TAKE ONE TABLET BY MOUTH DAILY 90 tablet 3    citalopram (CELEXA) 40 MG tablet TAKE 1 TABLET BY MOUTH NIGHTLY 90 tablet 3    diclofenac (VOLTAREN) 50 MG EC tablet TAKE ONE TABLET BY MOUTH TWO TIMES A DAY 60 tablet 4    nitroGLYCERIN (NITROSTAT) 0.4 MG SL tablet Place 1 tablet under the tongue every 5 minutes as needed for Chest pain up to max of 3 total doses.  If no relief after 1 dose, call 911. 25 tablet 3    metoprolol tartrate (LOPRESSOR) 50 MG tablet TAKE ONE TABLET BY MOUTH TWO TIMES A  tablet 2 130/64, pulse 70, temperature 97.8 °F (36.6 °C), height 5' 6\" (1.676 m), weight 176 lb (79.8 kg), SpO2 91 %. Physical Exam  Constitutional:       General: He is not in acute distress. Appearance: Normal appearance. He is well-developed. Eyes:      General: No scleral icterus. Cardiovascular:      Rate and Rhythm: Normal rate and regular rhythm. Pulmonary:      Effort: Pulmonary effort is normal.      Breath sounds: Normal breath sounds. Abdominal:      General: Bowel sounds are normal. There is no distension. Palpations: Abdomen is soft. There is no mass. Tenderness: There is abdominal tenderness in the epigastric area. There is no guarding or rebound. Comments: Large midline scar in the upper abdomen, incisional hernia, tenderness as noted below   Musculoskeletal: Normal range of motion. Lymphadenopathy:      Cervical: No cervical adenopathy. Neurological:      Mental Status: He is alert and oriented to person, place, and time. Psychiatric:         Behavior: Behavior normal.         Thought Content: Thought content normal.         Judgment: Judgment normal.       Laboratory, Pathology, Radiology reviewed indetail with relevant important investigations summarized below:  Lab Results   Component Value Date    WBC 9.7 05/06/2019    HGB 11.1 (L) 05/06/2019    HCT 33.9 (L) 05/06/2019    MCV 95.3 05/06/2019     05/06/2019     Lab Results   Component Value Date    ALT 15 02/13/2019    AST 16 02/13/2019    ALKPHOS 99 02/13/2019    BILITOT 0.5 02/13/2019     Ultrasound abdomen: 9/22/2019:  FINDINGS: Limited right upper quadrant abdominal sonogram was obtained. The pancreas is largely obscured. There are no focal hepatic lesions. There is no ascites. Bile ducts are nondilated. The gallbladder is normal volume. The previously identified 2 mm posterior gallbladder polyp is visualized. No large echogenic calculi are present within the gallbladder lumen.  The polyp reported near the neck is not visible today. The common bile duct is not significantly dilated. Maximum diameter is 5 mm. There is no sign of choledocholithiasis. CONCLUSION: NO ACUTE SONOGRAPHIC ABNORMALITY OF THE RIGHT UPPER QUADRANT HAS DEVELOPED. THERE IS A SMALL, SUSPECTED GALLBLADDER POLYP. NO LARGE, ECHOGENIC STONES HAVE DEVELOPED. NO SIGNS OF CHOLEDOCHOLITHIASIS HAVE DEVELOPED     Endoscopic investigations:   EGD and colon, a very long time ago, patient does not recall details    Assessment and Plan:  66 y.o. male with   1. Abdominal bloating  2. Flatulence symptom  -Dietary changes advised   -Cut back or stop smoking  -Colon cleanse followed by probiotics, will perform colon cleanse with Colyte  - activated vegetable charcoal (CHARCOCAPS) 260 MG capsule; Take 1 capsule 4 times a day as needed  Dispense: 120 capsule; Refill: 3  - polyethylene glycol-electrolytes (NULYTELY) 420 g solution; Take 4,000 mLs by mouth once for 1 dose  Dispense: 1 Bottle; Refill: 0    3. Weight loss  -Continued observation     4. Gallbladder polyp  -2 mm, surveillance ultrasound in 12 months    5. Cigarette smoker  -Advised to cut back/stop completely    Return in about 2 months (around 4/20/2020). Layla Mondragon MD   Staff Gastroenterologist  [facility]    Please note this report has been partially produced using speech recognition software and may cause contain errors related to thatsystem including grammar, punctuation and spelling as well as words and phrases that may seem inappropriate. If there are questions or concerns please feel free to contact me to clarify.

## 2020-02-27 RX ORDER — BUMETANIDE 1 MG/1
1 TABLET ORAL DAILY
Qty: 30 TABLET | Refills: 3 | Status: SHIPPED | OUTPATIENT
Start: 2020-02-27 | End: 2020-10-14 | Stop reason: SDUPTHER

## 2020-02-29 ENCOUNTER — HOSPITAL ENCOUNTER (OUTPATIENT)
Dept: ULTRASOUND IMAGING | Age: 75
Discharge: HOME | End: 2020-03-02
Payer: MEDICARE

## 2020-02-29 PROCEDURE — 93880 EXTRACRANIAL BILAT STUDY: CPT | Performed by: INTERNAL MEDICINE

## 2020-02-29 PROCEDURE — 93880 EXTRACRANIAL BILAT STUDY: CPT

## 2020-03-06 RX ORDER — METOPROLOL TARTRATE 50 MG/1
TABLET, FILM COATED ORAL
Qty: 180 TABLET | Refills: 2 | Status: SHIPPED | OUTPATIENT
Start: 2020-03-06 | End: 2020-12-29

## 2020-03-20 ENCOUNTER — OFFICE VISIT (OUTPATIENT)
Dept: SURGERY | Age: 75
End: 2020-03-20
Payer: MEDICARE

## 2020-03-20 VITALS
DIASTOLIC BLOOD PRESSURE: 70 MMHG | SYSTOLIC BLOOD PRESSURE: 108 MMHG | HEIGHT: 66 IN | BODY MASS INDEX: 28.12 KG/M2 | TEMPERATURE: 97.1 F | WEIGHT: 175 LBS

## 2020-03-20 PROCEDURE — 4040F PNEUMOC VAC/ADMIN/RCVD: CPT | Performed by: SURGERY

## 2020-03-20 PROCEDURE — G8427 DOCREV CUR MEDS BY ELIG CLIN: HCPCS | Performed by: SURGERY

## 2020-03-20 PROCEDURE — 4004F PT TOBACCO SCREEN RCVD TLK: CPT | Performed by: SURGERY

## 2020-03-20 PROCEDURE — G8484 FLU IMMUNIZE NO ADMIN: HCPCS | Performed by: SURGERY

## 2020-03-20 PROCEDURE — 99213 OFFICE O/P EST LOW 20 MIN: CPT | Performed by: SURGERY

## 2020-03-20 PROCEDURE — G8417 CALC BMI ABV UP PARAM F/U: HCPCS | Performed by: SURGERY

## 2020-03-20 PROCEDURE — 1123F ACP DISCUSS/DSCN MKR DOCD: CPT | Performed by: SURGERY

## 2020-03-20 RX ORDER — OXYCODONE HYDROCHLORIDE AND ACETAMINOPHEN 5; 325 MG/1; MG/1
1 TABLET ORAL EVERY 4 HOURS PRN
COMMUNITY
End: 2020-10-26

## 2020-03-20 ASSESSMENT — ENCOUNTER SYMPTOMS
ABDOMINAL PAIN: 1
BLOOD IN STOOL: 0
CHEST TIGHTNESS: 0
RHINORRHEA: 0
EYES NEGATIVE: 1
COLOR CHANGE: 0
CONSTIPATION: 0
ABDOMINAL DISTENTION: 0
SHORTNESS OF BREATH: 0
ALLERGIC/IMMUNOLOGIC NEGATIVE: 1

## 2020-03-23 ENCOUNTER — HOSPITAL ENCOUNTER (OUTPATIENT)
Dept: CT IMAGING | Age: 75
Discharge: HOME | End: 2020-03-25
Payer: MEDICARE

## 2020-03-23 VITALS — WEIGHT: 172 LBS | HEIGHT: 66 IN | BODY MASS INDEX: 27.64 KG/M2

## 2020-03-23 PROCEDURE — 74176 CT ABD & PELVIS W/O CONTRAST: CPT

## 2020-03-23 PROCEDURE — 2500000003 HC RX 250 WO HCPCS: Performed by: SURGERY

## 2020-03-23 RX ORDER — SODIUM CHLORIDE 0.9 % (FLUSH) 0.9 %
10 SYRINGE (ML) INJECTION
Status: DISCONTINUED | OUTPATIENT
Start: 2020-03-23 | End: 2020-03-23

## 2020-03-23 RX ADMIN — BARIUM SULFATE 450 ML: 20 SUSPENSION ORAL at 09:35

## 2020-03-24 LAB
GFR AFRICAN AMERICAN: 35
GFR NON-AFRICAN AMERICAN: 29
PERFORMED ON: ABNORMAL
POC CREATININE: 2.2 MG/DL (ref 0.8–1.3)
POC SAMPLE TYPE: ABNORMAL

## 2020-03-27 ENCOUNTER — OFFICE VISIT (OUTPATIENT)
Dept: SURGERY | Age: 75
End: 2020-03-27
Payer: MEDICARE

## 2020-03-27 VITALS
DIASTOLIC BLOOD PRESSURE: 78 MMHG | WEIGHT: 175 LBS | BODY MASS INDEX: 28.12 KG/M2 | HEIGHT: 66 IN | SYSTOLIC BLOOD PRESSURE: 118 MMHG | TEMPERATURE: 97.6 F

## 2020-03-27 PROBLEM — R91.1 LUNG NODULE SEEN ON IMAGING STUDY: Status: ACTIVE | Noted: 2020-03-27

## 2020-03-27 PROCEDURE — G8417 CALC BMI ABV UP PARAM F/U: HCPCS | Performed by: SURGERY

## 2020-03-27 PROCEDURE — 99213 OFFICE O/P EST LOW 20 MIN: CPT | Performed by: SURGERY

## 2020-03-27 PROCEDURE — 4004F PT TOBACCO SCREEN RCVD TLK: CPT | Performed by: SURGERY

## 2020-03-27 PROCEDURE — G8484 FLU IMMUNIZE NO ADMIN: HCPCS | Performed by: SURGERY

## 2020-03-27 PROCEDURE — 1123F ACP DISCUSS/DSCN MKR DOCD: CPT | Performed by: SURGERY

## 2020-03-27 PROCEDURE — G8427 DOCREV CUR MEDS BY ELIG CLIN: HCPCS | Performed by: SURGERY

## 2020-03-27 PROCEDURE — 4040F PNEUMOC VAC/ADMIN/RCVD: CPT | Performed by: SURGERY

## 2020-03-27 ASSESSMENT — ENCOUNTER SYMPTOMS
COLOR CHANGE: 0
CHEST TIGHTNESS: 0
EYES NEGATIVE: 1
RHINORRHEA: 0
ALLERGIC/IMMUNOLOGIC NEGATIVE: 1
ABDOMINAL PAIN: 1
CONSTIPATION: 0
ABDOMINAL DISTENTION: 0
BLOOD IN STOOL: 0
SHORTNESS OF BREATH: 0

## 2020-03-27 NOTE — PROGRESS NOTES
Subjective:      Patient ID: Araceli Mccormick is a 66 y.o. male. Araceli Mccormick is a 66 y.o. male who is being in follow up for possible gallbladder disease. The symptoms include intermittent right upper quadrant pain, nausea and vomiting. The patient denies juandice and/or dark urine. The symptoms were first noticed 15 months ago. He was seen last year about 6 months ago and at that time he elected not to have any surgery. His pain resolved but now it is back again over the last month. The pain is rated as a 4 in intensity. The symptoms are increasing with time. The patient does not have a family history of gallbladder disease. Ultrasound on 9/22/2019 showed gall bladder polyp and it remains unchanged  He is on Plavix for CAD. HIDA scan showed a low EF. CT scan of the abdomen and pelvis on 3/23/2020 was unremarkable other than a new 11 mm nodule in the left lung. He does have a long history of cigarette smoking. Review of Systems   Constitutional: Negative for activity change, appetite change and unexpected weight change. HENT: Negative for congestion, nosebleeds, postnasal drip, rhinorrhea and sneezing. Eyes: Negative. Negative for visual disturbance. Respiratory: Negative for chest tightness and shortness of breath. Cardiovascular: Negative for chest pain and leg swelling. Gastrointestinal: Positive for abdominal pain. Negative for abdominal distention, blood in stool and constipation. Endocrine: Negative. Genitourinary: Negative for difficulty urinating. Musculoskeletal: Negative for arthralgias, gait problem and myalgias. Skin: Negative for color change. Allergic/Immunologic: Negative. Neurological: Negative for dizziness, light-headedness, numbness and headaches. Hematological: Does not bruise/bleed easily. Psychiatric/Behavioral: Negative for sleep disturbance. Objective:   Physical Exam  Constitutional:       General: He is not in acute distress.

## 2020-04-01 ENCOUNTER — VIRTUAL VISIT (OUTPATIENT)
Dept: PULMONOLOGY | Age: 75
End: 2020-04-01
Payer: MEDICARE

## 2020-04-01 PROCEDURE — 99443 PR PHYS/QHP TELEPHONE EVALUATION 21-30 MIN: CPT | Performed by: INTERNAL MEDICINE
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
## 2020-04-06 ENCOUNTER — HOSPITAL ENCOUNTER (OUTPATIENT)
Dept: CT IMAGING | Age: 75
Discharge: HOME | End: 2020-04-08
Payer: MEDICARE

## 2020-04-06 PROCEDURE — 71250 CT THORAX DX C-: CPT

## 2020-04-14 ENCOUNTER — TELEPHONE (OUTPATIENT)
Dept: PULMONOLOGY | Age: 75
End: 2020-04-14

## 2020-04-14 NOTE — TELEPHONE ENCOUNTER
Woke with the patient, reviewed CT results with him, will obtain PET scan if positive will proceed with CT-guided biopsy. All questions answered.

## 2020-04-24 ENCOUNTER — TELEPHONE (OUTPATIENT)
Dept: PULMONOLOGY | Age: 75
End: 2020-04-24

## 2020-04-24 ENCOUNTER — HOSPITAL ENCOUNTER (OUTPATIENT)
Dept: CT IMAGING | Age: 75
Discharge: HOME | End: 2020-04-26
Payer: MEDICARE

## 2020-04-24 PROCEDURE — 3430000000 HC RX DIAGNOSTIC RADIOPHARMACEUTICAL: Performed by: INTERNAL MEDICINE

## 2020-04-24 PROCEDURE — A9552 F18 FDG: HCPCS | Performed by: INTERNAL MEDICINE

## 2020-04-24 PROCEDURE — 78815 PET IMAGE W/CT SKULL-THIGH: CPT

## 2020-04-24 RX ORDER — FLUDEOXYGLUCOSE F 18 200 MCI/ML
17.1 INJECTION, SOLUTION INTRAVENOUS
Status: COMPLETED | OUTPATIENT
Start: 2020-04-24 | End: 2020-04-24

## 2020-04-24 RX ADMIN — FLUDEOXYGLUCOSE F 18 17.1 MILLICURIE: 200 INJECTION, SOLUTION INTRAVENOUS at 10:22

## 2020-04-24 NOTE — TELEPHONE ENCOUNTER
Attempted to call patient today he did not answer I recorded a voice message, he has PET positive nodule left lower lobe, will refer to interventional radiology for CT-guided biopsy.     Orders Placed This Encounter   Procedures   Chelsey Aguila MD, Interventional Radiology, [place]     Referral Priority:   Routine     Referral Type:   Eval and Treat     Referral Reason:   Specialty Services Required     Requested Specialty:   Radiology     Number of Visits Requested:   1

## 2020-04-27 ENCOUNTER — VIRTUAL VISIT (OUTPATIENT)
Dept: PULMONOLOGY | Age: 75
End: 2020-04-27
Payer: MEDICARE

## 2020-04-27 PROCEDURE — 1123F ACP DISCUSS/DSCN MKR DOCD: CPT | Performed by: INTERNAL MEDICINE

## 2020-04-27 PROCEDURE — 4040F PNEUMOC VAC/ADMIN/RCVD: CPT | Performed by: INTERNAL MEDICINE

## 2020-04-27 PROCEDURE — 99214 OFFICE O/P EST MOD 30 MIN: CPT | Performed by: INTERNAL MEDICINE

## 2020-04-27 PROCEDURE — G8428 CUR MEDS NOT DOCUMENT: HCPCS | Performed by: INTERNAL MEDICINE

## 2020-04-27 RX ORDER — FLUTICASONE PROPIONATE 50 MCG
1 SPRAY, SUSPENSION (ML) NASAL 2 TIMES DAILY
Qty: 1 BOTTLE | Refills: 3 | Status: SHIPPED | OUTPATIENT
Start: 2020-04-27 | End: 2021-09-29

## 2020-04-27 RX ORDER — FLUTICASONE FUROATE AND VILANTEROL TRIFENATATE 100; 25 UG/1; UG/1
1 POWDER RESPIRATORY (INHALATION) DAILY
Qty: 1 EACH | Refills: 3 | Status: SHIPPED | OUTPATIENT
Start: 2020-04-27 | End: 2020-11-09 | Stop reason: SDUPTHER

## 2020-04-27 ASSESSMENT — ENCOUNTER SYMPTOMS
EYES NEGATIVE: 1
COUGH: 1
SHORTNESS OF BREATH: 1
CHEST TIGHTNESS: 1
GASTROINTESTINAL NEGATIVE: 1

## 2020-04-27 NOTE — PROGRESS NOTES
50 MCG/ACT nasal spray 1 spray by Each Nostril route 2 times daily Yes Kim Contreras MD   diclofenac (VOLTAREN) 50 MG EC tablet TAKE ONE TABLET BY MOUTH TWO TIMES A DAY  Kaylah Akhtar MD   oxyCODONE-acetaminophen (PERCOCET) 5-325 MG per tablet Take 1 tablet by mouth every 4 hours as needed for Pain. Historical Provider, MD   metoprolol tartrate (LOPRESSOR) 50 MG tablet TAKE ONE TABLET BY MOUTH TWO TIMES A DAY  Esther Mccarthy MD   bumetanide (BUMEX) 1 MG tablet Take 1 tablet by mouth daily  Kaylah Akhtar MD   activated vegetable charcoal (CHARCOCAPS) 260 MG capsule Take 1 capsule 4 times a day as needed  Jesica Varela MD   acetaminophen (ACETAMINOPHEN 8 HOUR) 650 MG extended release tablet Take 650 mg by mouth 2 times daily as needed for Pain  Historical Provider, MD   isosorbide mononitrate (IMDUR) 30 MG extended release tablet Take 1 tablet by mouth daily  Esther Mccarthy MD   omeprazole (PRILOSEC) 10 MG delayed release capsule Take 1 capsule by mouth daily  Esther Mccarthy MD   atorvastatin (LIPITOR) 20 MG tablet TAKE ONE TABLET BY MOUTH DAILY  Esther Mccarthy MD   clopidogrel (PLAVIX) 75 MG tablet TAKE ONE TABLET BY MOUTH DAILY  Esther Mccarthy MD   finasteride (PROSCAR) 5 MG tablet TAKE ONE TABLET BY MOUTH DAILY  Corinne Diver, MD   citalopram (CELEXA) 40 MG tablet TAKE 1 TABLET BY MOUTH NIGHTLY  Kaylah Akhtar MD   nitroGLYCERIN (NITROSTAT) 0.4 MG SL tablet Place 1 tablet under the tongue every 5 minutes as needed for Chest pain up to max of 3 total doses. If no relief after 1 dose, call 911. Esther Mccarthy MD   DOCUSATE CALCIUM PO Take by mouth as needed  Historical Provider, MD       Social History     Tobacco Use    Smoking status: Current Every Day Smoker     Packs/day: 0.50     Years: 60.00     Pack years: 30.00     Types: Cigarettes    Smokeless tobacco: Never Used   Substance Use Topics    Alcohol use:  Yes     Alcohol/week: 7.0 standard drinks     Types: 7 Shots of liquor per week     Comment: last day before yesterday    Drug use: No        No Known Allergies,   Past Medical History:   Diagnosis Date    Alcohol abuse     CAD (coronary artery disease)     patient states no doctor has told him this / has 1 cardiac stent    Chronic back pain     Chronic kidney disease     Chronic sinusitis     DJD (degenerative joint disease) of knee     left knee DJD    Elevated PSA     History of blood transfusion 1980's    History of inferior wall myocardial infarction 01/2016    1 cardiac stent    HTN (hypertension)     meds .  1 yr    Hyperlipidemia     meds > 12 yrs    NSTEMI (non-ST elevated myocardial infarction) ([facility] [address]     Smoker    ,   Past Surgical History:   Procedure Laterality Date    ABDOMEN SURGERY  1961    spleenectomy due to [address]  2009    lumbar disc OR    CARDIAC SURGERY      stents    CARPAL TUNNEL RELEASE Right 5/6/2019    RIGHT CARPAL TUNNEL RELEASE performed by Willie Diggs MD at [place] [address] WITH STENT PLACEMENT  1/29/2016    EYE SURGERY      to have Phaco with IOL OU 2/2018    HERNIA REPAIR      JOINT REPLACEMENT Left 1994    LTHR    JOINT REPLACEMENT Right 2009    RTKR    KNEE SURGERY Right 2011    arthroscopy    TN MANIPULATN KNEE JT+ANESTHESIA Right 5/12/2017    RIGHT KNEE MANIPULATION [facility]ER [facility]EST[facility]ES[facility],PROCEDURE ONLY Bilateral 12/29/2017    RIGHT AND BILATERAL L 4-5 LYSIS OF SCAR DISKECTOMY INTERBODY CAGE FUSION POSTEROLATERAL FUSION PEDICLE SCREWS performed by Willie Diggs MD at [address] [place] [address]  2004    benign    SPLENECTOMY  1961    TOTAL KNEE ARTHROPLASTY Right 3/24/2017    RIGHT  KNEE TOTAL ARTHROPLASTY, ELHAM CEMENTED PERSONA  performed by Jacobo Roman MD at [facility]   ,   Social History     Tobacco Use    Smoking status: Current Every Day Smoker     Packs/day: 0.50     Years: 60.00     Pack years: 30.00     Types: Cigarettes    Smokeless tobacco: Never Used   Substance Use Topics    Alcohol use:  Yes     Alcohol/week: 7.0 standard drinks     Types: 7 Shots of liquor per week     Comment: last day before yesterday    Drug use: No   ,   Family History   Problem Relation Age of Onset    Osteoarthritis Mother     Emphysema Mother     Hypertension Father     Hearing Loss Father     Dementia Father     No Known Problems Sister     No Known Problems Brother     Colon Polyps Neg Hx    ,   Immunization History   Administered Date(s) Administered    Pneumococcal Conjugate 13-valent (Sebastián Mainland) 04/20/2017    Pneumococcal Polysaccharide ([id number]) 05/08/2014   ,   Health Maintenance   Topic Date Due    Meningococcal (ACWY) vaccine (1 - Risk start before 7 months 4-dose series) 1945    Hib vaccine (1 of 1 - Risk 1-dose series) 01/12/1945    Meningococcal B vaccine (1 of 2 - Increased Risk Bexsero 2-dose series) 10/12/1953    DTaP/Tdap/Td vaccine (1 - Tdap) 10/12/1962    Shingles Vaccine (1 of 2) 10/12/1993    Lipid screen  03/28/2017    Potassium monitoring  05/06/2020    Creatinine monitoring  05/06/2020    Annual Wellness Visit (AWV)  06/27/2020    Flu vaccine (Season Ended) 09/01/2020    Low dose CT lung screening  04/24/2021    Pneumococcal 65+ yrs at Risk Vaccine  Completed    Hepatitis A vaccine  Aged Out    Hepatitis B vaccine  Aged Out       PHYSICAL EXAMINATION:  [ INSTRUCTIONS:  \"[x]\" Indicates a positive item  \"[]\" Indicates a negative item  -- DELETE ALL ITEMS NOT EXAMINED]  [x] Alert  [x] Oriented to person/place/time    [x] No apparent distress  [] Toxic appearing    [] Face flushed appearing [x] Sclera clear  [] Lips are cyanotic      [x] Breathing appears normal  [] Appears tachypneic      [x] no Rash on visible skin    [x] Cranial Nerves II-XII grossly intact    [x] Motor grossly intact in visible upper extremities    [] Motor grossly intact in visible lower extremities    [x] Normal Mood  [] Anxious appearing

## 2020-05-12 ENCOUNTER — VIRTUAL VISIT (OUTPATIENT)
Dept: INTERVENTIONAL RADIOLOGY/VASCULAR | Age: 75
End: 2020-05-12
Payer: MEDICARE

## 2020-05-12 PROCEDURE — 99443 PR PHYS/QHP TELEPHONE EVALUATION 21-30 MIN: CPT | Performed by: NURSE PRACTITIONER

## 2020-05-12 ASSESSMENT — ENCOUNTER SYMPTOMS
SINUS PRESSURE: 0
SINUS PAIN: 0
COUGH: 0
NAUSEA: 0
EYE PAIN: 0
WHEEZING: 0
VOMITING: 0
ABDOMINAL PAIN: 0
ABDOMINAL DISTENTION: 0
BACK PAIN: 0
SHORTNESS OF BREATH: 1
EYE DISCHARGE: 0
DIARRHEA: 0
EYE ITCHING: 0
EYE REDNESS: 0
TROUBLE SWALLOWING: 0
SORE THROAT: 0
CONSTIPATION: 0
EYES NEGATIVE: 1

## 2020-05-12 NOTE — PROGRESS NOTES
2020    TELEHEALTH EVALUATION -- Audio/Visual (During [facility]- public health emergency)    Due to [place] 19 outbreak, patient's office visit was converted to a virtual visit. Patient was contacted and agreed to proceed with a virtual visit via Telephone Visit  The risks and benefits of converting to a virtual visit were discussed in light of the current infectious disease epidemic. Patient also understood that insurance coverage and co-pays are up to their individual insurance plans. HPI:    Patel Alvarez (:  1945) has requested an audio/video evaluation for the following concern(s):    Referred to IR Clinic by his pulmonologist Dr. Junior Daniel for Ct Guided Percutaneous Bx of LLL lung nodule suspicious for malignancy. Cardiologist Dr. Delmy Peraza on Plavix with H/O coronary stenting. Has VV appt on 2020. Review of Systems   Constitutional: Positive for fatigue. Negative for appetite change, chills and fever. HENT: Positive for postnasal drip. Negative for congestion, sinus pressure, sinus pain, sneezing, sore throat and trouble swallowing. Eyes: Negative. Negative for pain, discharge, redness and itching. Respiratory: Positive for shortness of breath. Negative for cough and wheezing. Cardiovascular: Positive for leg swelling (ankles ). Negative for chest pain and palpitations. Gastrointestinal: Negative for abdominal distention, abdominal pain, constipation, diarrhea, nausea and vomiting. Endocrine: Negative. Genitourinary: Negative. Negative for difficulty urinating, dysuria, frequency, hematuria and urgency. Musculoskeletal: Negative. Negative for back pain, neck pain and neck stiffness. Skin: Negative for rash and wound. Neurological: Negative. Negative for dizziness, light-headedness and headaches. Hematological: Negative. Does not bruise/bleed easily. Psychiatric/Behavioral: Negative. Prior to Visit Medications    Medication Sig Taking?  Authorizing Ha Babb CNP on 5/12/2020 at 11:20 AM        Pursuant to the emergency declaration under the [address] [place] [address], [address] [place] [address] authority and the [employer] and [employer] Act, this Virtual  Visit was conducted, with patient's consent, to reduce the patient's risk of exposure to COVID-19 and provide continuity of care for an established patient. Services were provided through a video synchronous discussion virtually to substitute for in-person clinic visit. This billable evisit was conducted via Telehealth over phone from [facility] [place] [address], via myself and the patient. It was explained to patient purpose of Telehealth visit to limit face to face contact due to Coronavirus mandates now in place. Patient verbalized agreement to participate in a billable Telehealth phone visit. Time spent with telehealth visit planning, patient education and counseling, and preparation > 30 minutes. Pursuant to the emergency declaration under the [address] [place] [address], [address] [place] [address] authority and the [employer] and [employer] Act, this Virtual Visit was conducted, with patient's consent, to reduce the patient's risk of exposure to COVID-19 and provide continuity of care for an established patient. Services were provided through a video synchronous discussion virtually to substitute for in-person clinic visit. Visit completed using telephone visit. Patient was located at home and I was located in the clinic.

## 2020-05-14 ENCOUNTER — VIRTUAL VISIT (OUTPATIENT)
Dept: CARDIOLOGY CLINIC | Age: 75
End: 2020-05-14
Payer: MEDICARE

## 2020-05-14 PROBLEM — I65.23 BILATERAL CAROTID ARTERY STENOSIS: Status: ACTIVE | Noted: 2020-05-14

## 2020-05-14 PROBLEM — I10 ESSENTIAL HYPERTENSION: Status: ACTIVE | Noted: 2020-05-14

## 2020-05-14 PROCEDURE — G8428 CUR MEDS NOT DOCUMENT: HCPCS | Performed by: INTERNAL MEDICINE

## 2020-05-14 PROCEDURE — 4040F PNEUMOC VAC/ADMIN/RCVD: CPT | Performed by: INTERNAL MEDICINE

## 2020-05-14 PROCEDURE — 99214 OFFICE O/P EST MOD 30 MIN: CPT | Performed by: INTERNAL MEDICINE

## 2020-05-14 PROCEDURE — 1123F ACP DISCUSS/DSCN MKR DOCD: CPT | Performed by: INTERNAL MEDICINE

## 2020-05-14 ASSESSMENT — ENCOUNTER SYMPTOMS
SHORTNESS OF BREATH: 1
CHEST TIGHTNESS: 0
BLOOD IN STOOL: 0
STRIDOR: 0
GASTROINTESTINAL NEGATIVE: 1
EYES NEGATIVE: 1
WHEEZING: 0
COUGH: 0
NAUSEA: 0

## 2020-05-14 NOTE — PROGRESS NOTES
REPLACEMENT Left 1994    LTHR    JOINT REPLACEMENT Right 2009    RTKR    KNEE SURGERY Right 2011    arthroscopy    VT MANIPULATN KNEE JT+ANESTHESIA Right 5/12/2017    RIGHT KNEE MANIPULATION UNDER ANESTHESIA [facility] [address] Bilateral 12/29/2017    RIGHT AND BILATERAL L 4-5 LYSIS OF SCAR DISKECTOMY INTERBODY CAGE FUSION POSTEROLATERAL FUSION PEDICLE SCREWS performed by Eric Nair MD at [address] [place] [address]  2004    benign   [address] Right 3/24/2017    RIGHT  KNEE TOTAL ARTHROPLASTY, ELHAM CEMENTED PERSONA  performed by Laurel Mckinnon MD at [address] [place] [address] History   Problem Relation Age of Onset    Osteoarthritis Mother     Emphysema Mother     Hypertension Father     Hearing Loss Father     Dementia Father     No Known Problems Sister     No Known Problems Brother     Colon Polyps Neg Hx        Social History     Socioeconomic History    Marital status:      Spouse name: Not on file    Number of children: Not on file    Years of education: Not on file    Highest education level: Not on file   Occupational History    Occupation: retired   Social Needs    Financial resource strain: Not on file    Food insecurity     Worry: Not on file     Inability: Not on file   [employer] needs     Medical: Not on file     Non-medical: Not on file   Tobacco Use    Smoking status: Current Every Day Smoker     Packs/day: 0.50     Years: 60.00     Pack years: 30.00     Types: Cigarettes    Smokeless tobacco: Never Used   Substance and Sexual Activity    Alcohol use:  Yes     Alcohol/week: 7.0 standard drinks     Types: 7 Shots of liquor per week     Comment: last day before yesterday    Drug use: No    Sexual activity: Yes   Lifestyle    Physical activity     Days per week: Not on file     Minutes per session: Not on file    Stress: Not on file   Relationships    Social connections     Talks on phone: Not on file     Gets together: Not on file     Attends Denominational service: Not on file     Active member of club or organization: Not on file     Attends meetings of clubs or organizations: Not on file     Relationship status: Not on file    Intimate partner violence     Fear of current or ex partner: Not on file     Emotionally abused: Not on file     Physically abused: Not on file     Forced sexual activity: Not on file   Other Topics Concern    Not on file   Social History Narrative    Lives alone       No Known Allergies    Current Outpatient Medications   Medication Sig Dispense Refill    fluticasone-vilanterol (BREO ELLIPTA) 100-25 MCG/INH AEPB inhaler Inhale 1 puff into the lungs daily 1 each 3    fluticasone (FLONASE) 50 MCG/ACT nasal spray 1 spray by Each Nostril route 2 times daily 1 Bottle 3    diclofenac (VOLTAREN) 50 MG EC tablet TAKE ONE TABLET BY MOUTH TWO TIMES A DAY 60 tablet 0    oxyCODONE-acetaminophen (PERCOCET) 5-325 MG per tablet Take 1 tablet by mouth every 4 hours as needed for Pain.       metoprolol tartrate (LOPRESSOR) 50 MG tablet TAKE ONE TABLET BY MOUTH TWO TIMES A  tablet 2    bumetanide (BUMEX) 1 MG tablet Take 1 tablet by mouth daily 30 tablet 3    activated vegetable charcoal (CHARCOCAPS) 260 MG capsule Take 1 capsule 4 times a day as needed 120 capsule 3    acetaminophen (ACETAMINOPHEN 8 HOUR) 650 MG extended release tablet Take 650 mg by mouth 2 times daily as needed for Pain      isosorbide mononitrate (IMDUR) 30 MG extended release tablet Take 1 tablet by mouth daily 90 tablet 3    omeprazole (PRILOSEC) 10 MG delayed release capsule Take 1 capsule by mouth daily 30 capsule 5    atorvastatin (LIPITOR) 20 MG tablet TAKE ONE TABLET BY MOUTH DAILY 90 tablet 2    clopidogrel (PLAVIX) 75 MG tablet TAKE ONE TABLET BY MOUTH DAILY 90 tablet 2    finasteride (PROSCAR) 5 MG tablet TAKE ONE TABLET BY MOUTH DAILY 90 tablet 3    citalopram (CELEXA) 40 MG tablet TAKE 1 TABLET BY MOUTH NIGHTLY 90 tablet 3    nitroGLYCERIN (NITROSTAT) 0.4 MG SL tablet Place 1 tablet under the tongue every 5 minutes as needed for Chest pain up to max of 3 total doses. If no relief after 1 dose, call 911. 25 tablet 3    DOCUSATE CALCIUM PO Take by mouth as needed       No current facility-administered medications for this visit. Review of Systems:   Review of Systems   Constitutional: Negative. Negative for diaphoresis and fatigue. HENT: Negative. Eyes: Negative. Respiratory: Positive for shortness of breath. Negative for cough, chest tightness, wheezing and stridor. Cardiovascular: Negative. Negative for chest pain, palpitations and leg swelling. Gastrointestinal: Negative. Negative for blood in stool and nausea. Genitourinary: Negative. Musculoskeletal: Negative. Skin: Negative. Neurological: Positive for light-headedness. Negative for dizziness, syncope and weakness. Hematological: Negative. Psychiatric/Behavioral: Negative. Physical Examination:    There were no vitals taken for this visit.    Physical Exam       LABS:  CBC:   Lab Results   Component Value Date    WBC 9.7 05/06/2019    RBC 3.55 05/06/2019    HGB 11.1 05/06/2019    HCT 33.9 05/06/2019    MCV 95.3 05/06/2019    MCH 31.2 05/06/2019    MCHC 32.7 05/06/2019    RDW 14.1 05/06/2019     05/06/2019     Lipids:  Lab Results   Component Value Date    CHOL 165 03/28/2016    CHOL 214 (H) 01/29/2016    CHOL 194 04/28/2014     Lab Results   Component Value Date    TRIG 64 03/28/2016    TRIG 144 01/29/2016    TRIG 91 04/28/2014     Lab Results   Component Value Date     (H) 03/28/2016    HDL 92 (H) 01/29/2016    HDL 82 (H) 04/28/2014     Lab Results   Component Value Date    LDLCALC 40 03/28/2016    LDLCALC 93 01/29/2016    LDLCALC 94 04/28/2014     No results found for: LABVLDL, VLDL  No results found for: CHOLHDLRATIO  CMP:    Lab Results   Component Gallbladder polyp    Biliary dyskinesia    Carpal tunnel syndrome on right    Carpal tunnel syndrome on left    Angina effort    Dyslipidemia    FELDER (dyspnea on exertion)    Coronary artery disease involving native coronary artery of native heart with angina pectoris (HCC)    Chest pain    Lung nodule seen on imaging study    Bilateral carotid artery stenosis    Essential hypertension       There are no discontinued medications. Modified Medications    No medications on file       No orders of the defined types were placed in this encounter. Assessment/Plan:    1. Essential hypertension  Stable     2. NSTEMI (non-ST elevated myocardial infarction) ([facility] [address]   no angina     3. Dyslipidemia  Statin     4. FELDER (dyspnea on exertion)  Stop smoking   Possible angina - Imdur made a big difference- if worse let me know. 5. Rash- did not see Derm. Facial rash gone now. No further occurrence. 6. Lung Nodule - OK to hold Plavix 5 days for Biopsy. Pursuant to the emergency declaration under the [facility], [facility] waiver authority and the [email or web address] and [employer] Act, this Virtual Visit was conducted, with patient's consent, to reduce the patient's risk of exposure to COVID-19 and provide continuity of care for an established patient. Services were provided through a video synchronous discussion virtually to substitute for in-person clinic visit. Visit completed using [email or web address]. Patient was located at home and I was located in the clinic. Counseling:  Heart Healthy Lifestyle, Stop Smoking, Low Salt Diet, Take Precautions to Prevent Falls, Regular Exercise and Walk Daily    Return in about 6 weeks (around 6/25/2020) for Cardiovascular care. .      Electronically signed by Rebecca Coon MD on 5/14/2020 at 1:50 PM

## 2020-05-15 ENCOUNTER — TELEPHONE (OUTPATIENT)
Dept: INTERVENTIONAL RADIOLOGY/VASCULAR | Age: 75
End: 2020-05-15

## 2020-05-15 DIAGNOSIS — Z87.891 PERSONAL HISTORY OF TOBACCO USE: ICD-10-CM

## 2020-05-15 DIAGNOSIS — R91.1 NODULE OF LOWER LOBE OF LEFT LUNG: ICD-10-CM

## 2020-05-15 DIAGNOSIS — R94.2 ABNORMAL PET OF LEFT LUNG: ICD-10-CM

## 2020-05-15 LAB
HCT VFR BLD CALC: 36.8 % (ref 42–52)
HEMOGLOBIN: 11.7 G/DL (ref 14–18)
MCH RBC QN AUTO: 32.1 PG (ref 27–31.3)
MCHC RBC AUTO-ENTMCNC: 31.9 % (ref 33–37)
MCV RBC AUTO: 100.7 FL (ref 80–100)
PDW BLD-RTO: 15 % (ref 11.5–14.5)
PLATELET # BLD: 276 K/UL (ref 130–400)
RBC # BLD: 3.65 M/UL (ref 4.7–6.1)
WBC # BLD: 10.3 K/UL (ref 4.8–10.8)

## 2020-05-20 ENCOUNTER — PREP FOR PROCEDURE (OUTPATIENT)
Dept: INTERVENTIONAL RADIOLOGY/VASCULAR | Age: 75
End: 2020-05-20

## 2020-05-20 RX ORDER — FENTANYL CITRATE 50 UG/ML
50 INJECTION, SOLUTION INTRAMUSCULAR; INTRAVENOUS
Status: CANCELLED | OUTPATIENT
Start: 2020-05-20

## 2020-05-20 RX ORDER — 0.9 % SODIUM CHLORIDE 0.9 %
250 INTRAVENOUS SOLUTION INTRAVENOUS
Status: CANCELLED | OUTPATIENT
Start: 2020-05-20

## 2020-05-20 RX ORDER — LIDOCAINE HYDROCHLORIDE 20 MG/ML
10 INJECTION, SOLUTION INFILTRATION; PERINEURAL
Status: CANCELLED | OUTPATIENT
Start: 2020-05-20

## 2020-05-20 RX ORDER — MIDAZOLAM HYDROCHLORIDE 1 MG/ML
0.5 INJECTION INTRAMUSCULAR; INTRAVENOUS
Status: CANCELLED | OUTPATIENT
Start: 2020-05-20

## 2020-05-20 RX ORDER — IBUPROFEN 200 MG
TABLET ORAL ONCE
Status: CANCELLED | OUTPATIENT
Start: 2020-05-20 | End: 2020-05-20

## 2020-05-21 ENCOUNTER — HOSPITAL ENCOUNTER (OUTPATIENT)
Dept: GENERAL RADIOLOGY | Age: 75
Discharge: HOME | End: 2020-05-23
Payer: MEDICARE

## 2020-05-21 ENCOUNTER — HOSPITAL ENCOUNTER (OUTPATIENT)
Dept: CT IMAGING | Age: 75
Discharge: HOME | End: 2020-05-23
Payer: MEDICARE

## 2020-05-21 VITALS
OXYGEN SATURATION: 96 % | SYSTOLIC BLOOD PRESSURE: 178 MMHG | RESPIRATION RATE: 16 BRPM | DIASTOLIC BLOOD PRESSURE: 81 MMHG | BODY MASS INDEX: 28.93 KG/M2 | WEIGHT: 180 LBS | HEIGHT: 66 IN | HEART RATE: 60 BPM

## 2020-05-21 PROCEDURE — 6360000002 HC RX W HCPCS: Performed by: NURSE PRACTITIONER

## 2020-05-21 PROCEDURE — 88305 TISSUE EXAM BY PATHOLOGIST: CPT

## 2020-05-21 PROCEDURE — 6370000000 HC RX 637 (ALT 250 FOR IP): Performed by: NURSE PRACTITIONER

## 2020-05-21 PROCEDURE — 88341 IMHCHEM/IMCYTCHM EA ADD ANTB: CPT

## 2020-05-21 PROCEDURE — 88342 IMHCHEM/IMCYTCHM 1ST ANTB: CPT

## 2020-05-21 PROCEDURE — 2580000003 HC RX 258: Performed by: NURSE PRACTITIONER

## 2020-05-21 PROCEDURE — 71046 X-RAY EXAM CHEST 2 VIEWS: CPT

## 2020-05-21 PROCEDURE — 77012 CT SCAN FOR NEEDLE BIOPSY: CPT

## 2020-05-21 PROCEDURE — 77012 CT SCAN FOR NEEDLE BIOPSY: CPT | Performed by: RADIOLOGY

## 2020-05-21 PROCEDURE — 88313 SPECIAL STAINS GROUP 2: CPT

## 2020-05-21 PROCEDURE — 32405 CT NEEDLE BIOPSY LUNG PERCUTANEOUS: CPT | Performed by: RADIOLOGY

## 2020-05-21 PROCEDURE — 32405 CT NEEDLE BIOPSY LUNG PERCUTANEOUS: CPT

## 2020-05-21 PROCEDURE — 2500000003 HC RX 250 WO HCPCS: Performed by: NURSE PRACTITIONER

## 2020-05-21 PROCEDURE — 77002 NEEDLE LOCALIZATION BY XRAY: CPT | Performed by: RADIOLOGY

## 2020-05-21 RX ORDER — BACITRACIN, NEOMYCIN, POLYMYXIN B 400; 3.5; 5 [USP'U]/G; MG/G; [USP'U]/G
OINTMENT TOPICAL ONCE
Status: COMPLETED | OUTPATIENT
Start: 2020-05-21 | End: 2020-05-21

## 2020-05-21 RX ORDER — 0.9 % SODIUM CHLORIDE 0.9 %
250 INTRAVENOUS SOLUTION INTRAVENOUS
Status: DISCONTINUED | OUTPATIENT
Start: 2020-05-21 | End: 2020-05-24 | Stop reason: HOSPADM

## 2020-05-21 RX ORDER — MIDAZOLAM HYDROCHLORIDE 1 MG/ML
0.5 INJECTION INTRAMUSCULAR; INTRAVENOUS
Status: DISCONTINUED | OUTPATIENT
Start: 2020-05-21 | End: 2020-05-24 | Stop reason: HOSPADM

## 2020-05-21 RX ORDER — FENTANYL CITRATE 50 UG/ML
50 INJECTION, SOLUTION INTRAMUSCULAR; INTRAVENOUS
Status: DISCONTINUED | OUTPATIENT
Start: 2020-05-21 | End: 2020-05-24 | Stop reason: HOSPADM

## 2020-05-21 RX ORDER — LIDOCAINE HYDROCHLORIDE 20 MG/ML
10 INJECTION, SOLUTION INFILTRATION; PERINEURAL
Status: DISCONTINUED | OUTPATIENT
Start: 2020-05-21 | End: 2020-05-24 | Stop reason: HOSPADM

## 2020-05-21 RX ADMIN — MIDAZOLAM HYDROCHLORIDE 0.5 MG: 1 INJECTION, SOLUTION INTRAMUSCULAR; INTRAVENOUS at 09:38

## 2020-05-21 RX ADMIN — FENTANYL CITRATE 50 MCG: 50 INJECTION, SOLUTION INTRAMUSCULAR; INTRAVENOUS at 09:48

## 2020-05-21 RX ADMIN — FENTANYL CITRATE 50 MCG: 50 INJECTION, SOLUTION INTRAMUSCULAR; INTRAVENOUS at 09:38

## 2020-05-21 RX ADMIN — SODIUM CHLORIDE 250 ML: 9 INJECTION, SOLUTION INTRAVENOUS at 09:35

## 2020-05-21 RX ADMIN — MIDAZOLAM HYDROCHLORIDE 0.5 MG: 1 INJECTION, SOLUTION INTRAMUSCULAR; INTRAVENOUS at 09:48

## 2020-05-21 RX ADMIN — LIDOCAINE HYDROCHLORIDE 10 ML: 20 INJECTION, SOLUTION INFILTRATION; PERINEURAL at 09:56

## 2020-05-21 RX ADMIN — BACITRACIN ZINC, NEOMYCIN, POLYMYXIN B 1 G: 400; 3.5; 5 OINTMENT TOPICAL at 10:07

## 2020-05-21 ASSESSMENT — PAIN - FUNCTIONAL ASSESSMENT: PAIN_FUNCTIONAL_ASSESSMENT: 0-10

## 2020-05-21 NOTE — FLOWSHEET NOTE
Patient brought to the pre/post cath lab area for initial interview and IV start. Denies pain, noticing increased shortness of breath. 0845 IV started. Initial interview done. Dima Malik CNP  here to visit with the patient and do the pre sedation evaluation. Wheezing noted with ascultation. 0900 Consent obtained.

## 2020-05-21 NOTE — FLOWSHEET NOTE
Patient now back in the pre/post cath lab for observation s/p lung biopsy. VSS. Cup of coffee offered per his request.  Patient denies shortness of breath or pain. Call bell cart side. 1030 Written discharge instructions given to the patient and his son for review. VSS. Patient denies pain or shortness of breath. Band-aid to the left side of patient's back CD&I.     1115 Patient taken to the bathroom to void. Ambulated in slow stiff gait due to multiple knee surgeries. 1123 Patient taken to x ray department for post CXR.     1129 Post CXR done and Dr Smiley Irene office notified that we need a reading. 1140 IV removed jelco intact. Patient denies pain. Band-aid CD&I. Patient's blood pressure elevated, but he did not take his medication as ordered today. Reminded to go home and take his meds. Discharge instructions reviewed in detail. Few questions asked. Patient voiced good understanding of the plan of care. 525 [facility] awaiting CXR results. Patient assisted to get dressed and offered support while we wait for results. Band-aid CD&I.     1218 No pneumothorax noted on the CXR per Dr Kin Aguila ok to discharge to home. Patient taken via wheelchair to the [employer] car. Able to ambulate a short distance ins steady gait with use of walker. Denies pain or shortness of breath. Support  Offered. Patient encouraged to call if he had questions or concerns.

## 2020-05-21 NOTE — PRE SEDATION
TABLET BY MOUTH TWO TIMES A DAY 5/14/20  Yes Rissa Isaacs MD   fluticasone-vilanterol (BREO ELLIPTA) 100-25 MCG/INH AEPB inhaler Inhale 1 puff into the lungs daily 4/27/20  Yes Debby Arnett MD   fluticasone (FLONASE) 50 MCG/ACT nasal spray 1 spray by Each Nostril route 2 times daily 4/27/20  Yes Debby Arnett MD   oxyCODONE-acetaminophen (PERCOCET) 5-325 MG per tablet Take 1 tablet by mouth every 4 hours as needed for Pain. Yes Historical Provider, MD   metoprolol tartrate (LOPRESSOR) 50 MG tablet TAKE ONE TABLET BY MOUTH TWO TIMES A DAY 3/6/20  Yes Jude Grigsby MD   bumetanide (BUMEX) 1 MG tablet Take 1 tablet by mouth daily 2/27/20  Yes Rissa Isaacs MD   activated vegetable charcoal (CHARCOCAPS) 260 MG capsule Take 1 capsule 4 times a day as needed 2/20/20  Yes Tejas Shi MD   acetaminophen (ACETAMINOPHEN 8 HOUR) 650 MG extended release tablet Take 650 mg by mouth 2 times daily as needed for Pain   Yes Historical Provider, MD   isosorbide mononitrate (IMDUR) 30 MG extended release tablet Take 1 tablet by mouth daily 2/7/20  Yes Jude Grigsby MD   omeprazole (PRILOSEC) 10 MG delayed release capsule Take 1 capsule by mouth daily 2/7/20  Yes Jude Grigsby MD   atorvastatin (LIPITOR) 20 MG tablet TAKE ONE TABLET BY MOUTH DAILY 1/29/20  Yes Jude Grigsby MD   clopidogrel (PLAVIX) 75 MG tablet TAKE ONE TABLET BY MOUTH DAILY 1/29/20  Yes Jude Grigsby MD   finasteride (PROSCAR) 5 MG tablet TAKE ONE TABLET BY MOUTH DAILY 12/13/19  Yes Asha Rucker MD   citalopram (CELEXA) 40 MG tablet TAKE 1 TABLET BY MOUTH NIGHTLY 12/2/19  Yes Rissa Isaacs MD   DOCUSATE CALCIUM PO Take by mouth as needed   Yes Historical Provider, MD   nitroGLYCERIN (NITROSTAT) 0.4 MG SL tablet Place 1 tablet under the tongue every 5 minutes as needed for Chest pain up to max of 3 total doses.  If no relief after 1 dose, call 911. 5/1/19   Jude Grigsby MD     Coumadin Use Last 7 Days:  no  Antiplatelet drug therapy use last 7 days: no  Other anticoagulant use last 7 days: no  Additional Medication Information:  None      Pre-Sedation Documentation and Exam:   I have personally completed a history, physical exam & review of systems for this patient (see notes).     Mallampati Airway Assessment:  Mallampati Class III - (soft palate & base of uvula are visible)    Prior History of Anesthesia Complications:   none    ASA Classification:  Class 2 - A normal healthy patient with mild systemic disease    Sedation/ Anesthesia Plan:   intravenous sedation    Medications Planned:   midazolam (Versed) intravenously and fentanyl intravenously    Patient is an appropriate candidate for plan of sedation: yes    Electronically signed by ELOY Gamble CNP on 5/21/2020 at 8:59 AM

## 2020-05-28 ENCOUNTER — TELEPHONE (OUTPATIENT)
Dept: INTERVENTIONAL RADIOLOGY/VASCULAR | Age: 75
End: 2020-05-28

## 2020-05-29 ENCOUNTER — TELEPHONE (OUTPATIENT)
Dept: SURGERY | Age: 75
End: 2020-05-29

## 2020-06-01 ENCOUNTER — TELEPHONE (OUTPATIENT)
Dept: PULMONOLOGY | Age: 75
End: 2020-06-01

## 2020-06-16 NOTE — TELEPHONE ENCOUNTER
I did not hear back from patient daughter regarding the decision whether to proceed with surgery and oncology evaluation or not. I called her again today voicemail , I will need to hear back from patient and his family regarding final decision in regard to surgical and oncology referral.  Also I attempted to call patient himself he did not answer.

## 2020-06-19 NOTE — TELEPHONE ENCOUNTER
Patient called me back later on June 16, she had discussed with her father, he did not wish to proceed with any treatment or follow-up, I did explain to her that this will eventually grow and lead to his death but for now he does not wish any referral.  He will still follow-up with pulmonary office, I will discuss with him when he comes in.

## 2020-06-22 ENCOUNTER — HOSPITAL ENCOUNTER (OUTPATIENT)
Dept: PULMONOLOGY | Age: 75
Discharge: HOME | End: 2020-06-22
Payer: MEDICARE

## 2020-06-22 PROCEDURE — 94729 DIFFUSING CAPACITY: CPT

## 2020-06-22 PROCEDURE — 94726 PLETHYSMOGRAPHY LUNG VOLUMES: CPT

## 2020-06-22 PROCEDURE — 94726 PLETHYSMOGRAPHY LUNG VOLUMES: CPT | Performed by: INTERNAL MEDICINE

## 2020-06-22 PROCEDURE — 94060 EVALUATION OF WHEEZING: CPT | Performed by: INTERNAL MEDICINE

## 2020-06-22 PROCEDURE — 94729 DIFFUSING CAPACITY: CPT | Performed by: INTERNAL MEDICINE

## 2020-06-22 PROCEDURE — 94060 EVALUATION OF WHEEZING: CPT

## 2020-06-22 PROCEDURE — 6360000002 HC RX W HCPCS: Performed by: INTERNAL MEDICINE

## 2020-06-22 RX ORDER — ALBUTEROL SULFATE 2.5 MG/3ML
2.5 SOLUTION RESPIRATORY (INHALATION) ONCE
Status: COMPLETED | OUTPATIENT
Start: 2020-06-22 | End: 2020-06-22

## 2020-06-22 RX ADMIN — ALBUTEROL SULFATE 2.5 MG: 2.5 SOLUTION RESPIRATORY (INHALATION) at 14:50

## 2020-06-29 ENCOUNTER — OFFICE VISIT (OUTPATIENT)
Dept: FAMILY MEDICINE CLINIC | Age: 75
End: 2020-06-29
Payer: MEDICARE

## 2020-06-29 VITALS
HEART RATE: 63 BPM | DIASTOLIC BLOOD PRESSURE: 80 MMHG | TEMPERATURE: 98.1 F | SYSTOLIC BLOOD PRESSURE: 120 MMHG | OXYGEN SATURATION: 94 % | HEIGHT: 66 IN | BODY MASS INDEX: 28.45 KG/M2 | WEIGHT: 177 LBS

## 2020-06-29 PROCEDURE — 4040F PNEUMOC VAC/ADMIN/RCVD: CPT | Performed by: FAMILY MEDICINE

## 2020-06-29 PROCEDURE — 1123F ACP DISCUSS/DSCN MKR DOCD: CPT | Performed by: FAMILY MEDICINE

## 2020-06-29 PROCEDURE — G0439 PPPS, SUBSEQ VISIT: HCPCS | Performed by: FAMILY MEDICINE

## 2020-06-29 RX ORDER — MORPHINE SULFATE 15 MG/1
15 TABLET, FILM COATED, EXTENDED RELEASE ORAL PRN
COMMUNITY
Start: 2020-06-25 | End: 2022-03-02

## 2020-06-29 ASSESSMENT — PATIENT HEALTH QUESTIONNAIRE - PHQ9
SUM OF ALL RESPONSES TO PHQ QUESTIONS 1-9: 0
SUM OF ALL RESPONSES TO PHQ QUESTIONS 1-9: 0

## 2020-06-29 ASSESSMENT — LIFESTYLE VARIABLES
AUDIT-C TOTAL SCORE: 3
HOW OFTEN DO YOU HAVE A DRINK CONTAINING ALCOHOL: 3
HOW MANY STANDARD DRINKS CONTAINING ALCOHOL DO YOU HAVE ON A TYPICAL DAY: 0
HOW OFTEN DO YOU HAVE SIX OR MORE DRINKS ON ONE OCCASION: 0

## 2020-06-29 NOTE — PROGRESS NOTES
 Osteoarthritis Mother     Emphysema Mother     Hypertension Father     Hearing Loss Father     Dementia Father     No Known Problems Sister     No Known Problems Brother     Colon Polyps Neg Hx        CareTeam (Including outside providers/suppliers regularly involved in providing care):   Patient Care Team:  Calli Bazan MD as PCP - General (Family Medicine)  Calli Bazan MD as PCP - [facility] Empaneled Provider  Gina Whipple MD (Orthopedic Surgery)  Clifford Ernandez MD as Cardiologist (Cardiology)    Wt Readings from Last 3 Encounters:   06/29/20 177 lb (80.3 kg)   05/21/20 180 lb (81.6 kg)   03/27/20 175 lb (79.4 kg)     Vitals:    06/29/20 1256   BP: 120/80   Site: Left Upper Arm   Pulse: 63   Temp: 98.1 °F (36.7 °C)   SpO2: 94%   Weight: 177 lb (80.3 kg)   Height: 5' 6\" (1.676 m)     Body mass index is 28.57 kg/m². Based upon direct observation of the patient, evaluation of cognition reveals recent and remote memory intact. Physical Exam:  /80 (Site: Left Upper Arm)   Pulse 63   Temp 98.1 °F (36.7 °C)   Ht 5' 6\" (1.676 m)   Wt 177 lb (80.3 kg)   SpO2 94%   BMI 28.57 kg/m²     Gen: Well, NAD, Alert, Oriented x 3   HEENT: EOMI, eyes clear, MMM  Skin: without rash or jaundice  Neck: no significant lymphadenopathy or thyromegaly  Lungs: CTA B w/out Rales/Wheezes/Rhonchi, Good respiratory effort   Heart: RRR, S1S2, w/out M/R/G, non-displaced PMI   Ext: No C/C/E Bilaterally. Neuro: Neurovascularly intact w/ Sensory/Motor intact UE/LE Bilaterally. Mood euthymic      Patient's complete Health Risk Assessment and screening values have been reviewed and are found in Flowsheets. The following problems were reviewed today and where indicated follow up appointments were made and/or referrals ordered. Positive Risk Factor Screenings with Interventions:     Fall Risk:  Timed Up and Go Test > 12 seconds?  (Complete if either Fall Risk answers are Yes): (!) yes  2 or more falls in past year?:

## 2020-06-29 NOTE — PATIENT INSTRUCTIONS
Personalized Preventive Plan for aDrius Lo - 6/29/2020  Medicare offers a range of preventive health benefits. Some of the tests and screenings are paid in full while other may be subject to a deductible, co-insurance, and/or copay. Some of these benefits include a comprehensive review of your medical history including lifestyle, illnesses that may run in your family, and various assessments and screenings as appropriate. After reviewing your medical record and screening and assessments performed today your provider may have ordered immunizations, labs, imaging, and/or referrals for you. A list of these orders (if applicable) as well as your Preventive Care list are included within your After Visit Summary for your review. Other Preventive Recommendations:    · A preventive eye exam performed by an eye specialist is recommended every 1-2 years to screen for glaucoma; cataracts, macular degeneration, and other eye disorders. · A preventive dental visit is recommended every 6 months. · Try to get at least 150 minutes of exercise per week or 10,000 steps per day on a pedometer . · Order or download the FREE \"Exercise & Physical Activity: Your Everyday Guide\" from [employer]. Call 3-910.775.1628 or search [employer] online. · You need 3785-9007 mg of calcium and 8086-1038 IU of vitamin D per day. It is possible to meet your calcium requirement with diet alone, but a vitamin D supplement is usually necessary to meet this goal.  · When exposed to the sun, use a sunscreen that protects against both UVA and UVB radiation with an SPF of 30 or greater. Reapply every 2 to 3 hours or after sweating, drying off with a towel, or swimming. · Always wear a seat belt when traveling in a car. Always wear a helmet when riding a bicycle or motorcycle.

## 2020-07-02 ENCOUNTER — VIRTUAL VISIT (OUTPATIENT)
Dept: PULMONOLOGY | Age: 75
End: 2020-07-02
Payer: MEDICARE

## 2020-07-02 PROCEDURE — 1123F ACP DISCUSS/DSCN MKR DOCD: CPT | Performed by: INTERNAL MEDICINE

## 2020-07-02 PROCEDURE — 4040F PNEUMOC VAC/ADMIN/RCVD: CPT | Performed by: INTERNAL MEDICINE

## 2020-07-02 PROCEDURE — G8428 CUR MEDS NOT DOCUMENT: HCPCS | Performed by: INTERNAL MEDICINE

## 2020-07-02 PROCEDURE — 99214 OFFICE O/P EST MOD 30 MIN: CPT | Performed by: INTERNAL MEDICINE

## 2020-07-02 RX ORDER — ALBUTEROL SULFATE 90 UG/1
2 AEROSOL, METERED RESPIRATORY (INHALATION) EVERY 6 HOURS PRN
Qty: 1 INHALER | Refills: 3 | Status: SHIPPED | OUTPATIENT
Start: 2020-07-02

## 2020-07-02 ASSESSMENT — ENCOUNTER SYMPTOMS
GASTROINTESTINAL NEGATIVE: 1
EYES NEGATIVE: 1

## 2020-07-02 NOTE — PROGRESS NOTES
2020    TELEHEALTH EVALUATION -- Audio/Visual (During GLWMB-25 public health emergency)    Due to COVID 19 outbreak, patient's office visit was converted to a virtual visit. Patient was contacted and agreed to proceed with a virtual visit via [email or web address] me  The risks and benefits of converting to a virtual visit were discussed in light of the current infectious disease epidemic. Patient also understood that insurance coverage and co-pays are up to their individual insurance plans. HPI:    Faith Pratt (:  1945) has requested an audio/video evaluation for the following concern(s):    Patient presents for COPD follow-up, he is doing okay, still has dyspnea exertion, but improved with Breo however it is $120 a month, he has some wheezing, no chest pain, no lower extremity edema, no fever, no coughing, nasal congestion improved with Flonase. No skin rash or joint swelling, he continues to decline oncology referral, or surgery referral, he also declined radiation oncology referral.  He would like to continue just monitoring his nodule. Review of Systems   Constitutional: Negative. HENT: Negative. Eyes: Negative. Gastrointestinal: Negative. Endocrine: Negative. Musculoskeletal: Negative. Skin: Negative. Neurological: Negative. Hematological: Negative. Psychiatric/Behavioral: Negative. Prior to Visit Medications    Medication Sig Taking?  Authorizing Provider   tiotropium (SPIRIVA RESPIMAT) 2.5 MCG/ACT AERS inhaler Inhale 2 puffs into the lungs daily Yes Daniela Eng MD   albuterol sulfate  (90 Base) MCG/ACT inhaler Inhale 2 puffs into the lungs every 6 hours as needed for Wheezing Yes Daniela Eng MD   morphine (MS CONTIN) 15 MG extended release tablet   Historical Provider, MD   diclofenac (VOLTAREN) 50 MG EC tablet TAKE ONE TABLET BY MOUTH TWO TIMES A DAY  Larry Walters, MD   fluticasone-vilanterol (BREO ELLIPTA) 100-25 MCG/INH AEPB inhaler Inhale 1 puff into the lungs daily  Johnna Jeong MD   fluticasone (FLONASE) 50 MCG/ACT nasal spray 1 spray by Each Nostril route 2 times daily  Johnna Jeong MD   oxyCODONE-acetaminophen (PERCOCET) 5-325 MG per tablet Take 1 tablet by mouth every 4 hours as needed for Pain. Historical Provider, MD   metoprolol tartrate (LOPRESSOR) 50 MG tablet TAKE ONE TABLET BY MOUTH TWO TIMES A DAY  Freddy Valentine MD   bumetanide (BUMEX) 1 MG tablet Take 1 tablet by mouth daily  Marilia Joshi MD   activated vegetable charcoal (CHARCOCAPS) 260 MG capsule Take 1 capsule 4 times a day as needed  Patient not taking: Reported on 6/29/2020  Alyssa Huddleston MD   acetaminophen (ACETAMINOPHEN 8 HOUR) 650 MG extended release tablet Take 650 mg by mouth 2 times daily as needed for Pain  Historical Provider, MD   isosorbide mononitrate (IMDUR) 30 MG extended release tablet Take 1 tablet by mouth daily  Freddy Valentine MD   omeprazole (PRILOSEC) 10 MG delayed release capsule Take 1 capsule by mouth daily  Freddy Valentine MD   atorvastatin (LIPITOR) 20 MG tablet TAKE ONE TABLET BY MOUTH DAILY  Freddy Valentine MD   clopidogrel (PLAVIX) 75 MG tablet TAKE ONE TABLET BY MOUTH DAILY  Freddy Valentine MD   finasteride (PROSCAR) 5 MG tablet TAKE ONE TABLET BY MOUTH DAILY  Shreyas Saez MD   citalopram (CELEXA) 40 MG tablet TAKE 1 TABLET BY MOUTH NIGHTLY  Marilia Joshi MD   nitroGLYCERIN (NITROSTAT) 0.4 MG SL tablet Place 1 tablet under the tongue every 5 minutes as needed for Chest pain up to max of 3 total doses. If no relief after 1 dose, call 911. Freddy Valentine MD   DOCUSATE CALCIUM PO Take by mouth as needed  Historical Provider, MD       Social History     Tobacco Use    Smoking status: Current Every Day Smoker     Packs/day: 0.50     Years: 60.00     Pack years: 30.00     Types: Cigarettes    Smokeless tobacco: Never Used   Substance Use Topics    Alcohol use:  Yes     Alcohol/week: 7.0 standard drinks     Types: 7 Shots of liquor per week     Comment: last day before yesterday    Drug use: No        No Known Allergies,   Past Medical History:   Diagnosis Date    Alcohol abuse     CAD (coronary artery disease)     patient states no doctor has told him this / has 1 cardiac stent    Chronic back pain     Chronic kidney disease     Chronic sinusitis     DJD (degenerative joint disease) of knee     left knee DJD    Elevated PSA     History of blood transfusion 1980's    History of inferior wall myocardial infarction 01/2016    1 cardiac stent    HTN (hypertension)     meds .  1 yr    Hyperlipidemia     meds > 12 yrs    NSTEMI (non-ST elevated myocardial infarction) ([address]     Smoker    ,   Past Surgical History:   Procedure Laterality Date    ABDOMEN SURGERY  1961    spleenectomy due to [address]  2009    lumbar disc OR    CARDIAC SURGERY      stents    CARPAL TUNNEL RELEASE Right 5/6/2019    RIGHT CARPAL TUNNEL RELEASE performed by Sydnie Elizabeth MD at [place] [address] WITH STENT PLACEMENT  1/29/2016    EYE SURGERY      to have Phaco with IOL OU 2/2018    HERNIA REPAIR      JOINT REPLACEMENT Left 1994    LTHR    JOINT REPLACEMENT Right 2009    RTKR    KNEE SURGERY Right 2011    arthroscopy    NE MANIPULATN KNEE JT+ANESTHESIA Right 5/12/2017    RIGHT KNEE MANIPULATION UNDER ANESTHESIA performed by Jn Downs MD at  [facility] OFFICE/OUTPT VISIT,PROCEDURE ONLY Bilateral 12/29/2017    RIGHT AND BILATERAL L 4-5 LYSIS OF SCAR DISKECTOMY INTERBODY CAGE FUSION POSTEROLATERAL FUSION PEDICLE SCREWS performed by Sydnie Elizabeth MD at [place] [address]  2004    benign    SPLENECTOMY  1961    TOTAL KNEE ARTHROPLASTY Right 3/24/2017    RIGHT  KNEE TOTAL ARTHROPLASTY, ELHAM CEMENTED PERSONA  performed by Jn Downs MD at [facility]   ,   Social History     Tobacco Use    Smoking status: Current Every Day Smoker     Packs/day: 0.50     Years: 60.00     Pack years: 30.00     Types: Cigarettes    Smokeless tobacco: Never Used   Substance Use Topics    Alcohol use:  Yes     Alcohol/week: 7.0 standard drinks     Types: 7 Shots of liquor per week     Comment: last day before yesterday    Drug use: No   ,   Family History   Problem Relation Age of Onset    Osteoarthritis Mother     Emphysema Mother     Hypertension Father     Hearing Loss Father     Dementia Father     No Known Problems Sister     No Known Problems Brother     Colon Polyps Neg Hx    ,   Immunization History   Administered Date(s) Administered    Pneumococcal Conjugate 13-valent (Georgianne Cortez) 04/20/2017    Pneumococcal Polysaccharide ([id number]) 05/08/2014   ,   Health Maintenance   Topic Date Due    Meningococcal (ACWY) vaccine (1 - Risk start before 7 months 4-dose series) 1945    Hib vaccine (1 of 1 - Risk 1-dose series) 01/12/1945    Meningococcal B vaccine (1 of 2 - Increased Risk Bexsero 2-dose series) 10/12/1953    DTaP/Tdap/Td vaccine (1 - Tdap) 10/12/1962    Shingles Vaccine (1 of 2) 10/12/1993    Lipid screen  03/28/2017    Potassium monitoring  05/06/2020    Creatinine monitoring  05/06/2020    Flu vaccine (1) 09/01/2020    Low dose CT lung screening  05/21/2021    Annual Wellness Visit (AWV)  06/30/2021    Pneumococcal 65+ yrs at Risk Vaccine  Completed    Hepatitis A vaccine  Aged Out    Hepatitis B vaccine  Aged Out           PHYSICAL EXAMINATION:  [ INSTRUCTIONS:  \"[x]\" Indicates a positive item  \"[]\" Indicates a negative item  -- DELETE ALL ITEMS NOT EXAMINED]  [x] Alert  [x] Oriented to person/place/time    [x] No apparent distress  [] Toxic appearing    [] Face flushed appearing [x] Sclera clear  [] Lips are cyanotic      [x] Breathing appears normal  [] Appears tachypneic      [x] No rash on visible skin    [x] Cranial Nerves II-XII grossly intact    [x] Motor grossly intact in visible upper extremities    [] Motor grossly intact in visible lower extremities    [x] Normal Mood  [] Anxious appearing    [] Depressed appearing  [] Confused appearing      [] Poor short term memory  [] Poor long term memory    [] OTHER:      Due to this being a TeleHealth encounter, evaluation of the following organ systems is limited: Vitals/Constitutional/EENT/Resp/CV/GI//MS/Neuro/Skin/Heme-Lymph-Imm. Imaging studies CT chest shows left lower lobe lung nodule  Labs reviewed   PFT June 22, 2020, shows FEV1 41%      ASSESSMENT/PLAN:  1. Malignant neoplasm of lower lobe of left lung ([facility]  · Patient declined any further treatment or referrals in regard to the biopsy-proven left lower lobe squamous cell carcinoma  · I did discuss with him that it still small and surgery or even radiation treatment can mean cure however he declined  · He is agreeable for follow-up CAT scan which I will do in 6 months and I will see him after that  - tiotropium (SPIRIVA RESPIMAT) 2.5 MCG/ACT AERS inhaler; Inhale 2 puffs into the lungs daily  Dispense: 1 Inhaler; Refill: 3  - CT CHEST WO CONTRAST; Future    2. Chronic obstructive pulmonary disease, unspecified COPD type ([facility] [address]  Severe COPD,  Will continue Breo  Add Spiriva  As needed albuterol  Yearly flu shot  Needs to quit smoking  - tiotropium (SPIRIVA RESPIMAT) 2.5 MCG/ACT AERS inhaler; Inhale 2 puffs into the lungs daily  Dispense: 1 Inhaler; Refill: 3  - albuterol sulfate  (90 Base) MCG/ACT inhaler; Inhale 2 puffs into the lungs every 6 hours as needed for Wheezing  Dispense: 1 Inhaler; Refill: 3    3. Smoking  Smoking cessation is recommended    4. Gastroesophageal reflux disease without esophagitis  Continue PPI    5. Nasal congestion  Patient responded well to [facility] and will continue same      Return in about 4 months (around 11/2/2020). An  electronic signature was used to authenticate this note.     --Bree Giordano MD on 7/2/2020 at 2:35 PM        Pursuant to the emergency declaration under the [facility], [address] [place] [address] authority and the Coronavirus Preparedness and Response Supplemental Appropriations Act, this Virtual  Visit was conducted, with patient's consent, to reduce the patient's risk of exposure to COVID-19 and provide continuity of care for an established patient. Services were provided through a video synchronous discussion virtually to substitute for in-person clinic visit.

## 2020-07-07 ENCOUNTER — TELEPHONE (OUTPATIENT)
Dept: PULMONOLOGY | Age: 75
End: 2020-07-07

## 2020-07-07 DIAGNOSIS — I25.10 CORONARY ARTERY DISEASE INVOLVING NATIVE HEART WITHOUT ANGINA PECTORIS, UNSPECIFIED VESSEL OR LESION TYPE: ICD-10-CM

## 2020-07-07 DIAGNOSIS — R53.83 FATIGUE, UNSPECIFIED TYPE: ICD-10-CM

## 2020-07-07 DIAGNOSIS — R97.20 ELEVATED PSA: ICD-10-CM

## 2020-07-07 LAB
ALBUMIN SERPL-MCNC: 4.3 G/DL (ref 3.5–4.6)
ALP BLD-CCNC: 87 U/L (ref 35–104)
ALT SERPL-CCNC: 13 U/L (ref 0–41)
ANION GAP SERPL CALCULATED.3IONS-SCNC: 11 MEQ/L (ref 9–15)
AST SERPL-CCNC: 15 U/L (ref 0–40)
BILIRUB SERPL-MCNC: 0.5 MG/DL (ref 0.2–0.7)
BUN BLDV-MCNC: 28 MG/DL (ref 8–23)
CALCIUM SERPL-MCNC: 9.2 MG/DL (ref 8.5–9.9)
CHLORIDE BLD-SCNC: 98 MEQ/L (ref 95–107)
CHOLESTEROL, TOTAL: 130 MG/DL (ref 0–199)
CO2: 28 MEQ/L (ref 20–31)
CREAT SERPL-MCNC: 1.09 MG/DL (ref 0.7–1.2)
GFR AFRICAN AMERICAN: >60
GFR NON-AFRICAN AMERICAN: >60
GLOBULIN: 2.6 G/DL (ref 2.3–3.5)
GLUCOSE BLD-MCNC: 84 MG/DL (ref 70–99)
HCT VFR BLD CALC: 37.9 % (ref 42–52)
HDLC SERPL-MCNC: 71 MG/DL (ref 40–59)
HEMOGLOBIN: 12.5 G/DL (ref 14–18)
LDL CHOLESTEROL CALCULATED: 46 MG/DL (ref 0–129)
MCH RBC QN AUTO: 32.8 PG (ref 27–31.3)
MCHC RBC AUTO-ENTMCNC: 32.9 % (ref 33–37)
MCV RBC AUTO: 99.5 FL (ref 80–100)
PDW BLD-RTO: 13.8 % (ref 11.5–14.5)
PLATELET # BLD: 235 K/UL (ref 130–400)
POTASSIUM SERPL-SCNC: 5.1 MEQ/L (ref 3.4–4.9)
RBC # BLD: 3.81 M/UL (ref 4.7–6.1)
SODIUM BLD-SCNC: 137 MEQ/L (ref 135–144)
TOTAL PROTEIN: 6.9 G/DL (ref 6.3–8)
TRIGL SERPL-MCNC: 63 MG/DL (ref 0–150)
TSH SERPL DL<=0.05 MIU/L-ACNC: 0.78 UIU/ML (ref 0.44–3.86)
WBC # BLD: 9.2 K/UL (ref 4.8–10.8)

## 2020-07-07 NOTE — TELEPHONE ENCOUNTER
ELAYNE FROM DR ADAMSON'S OFFICE CALLING TO SEE IF YOU ARE OK WITH PATIENT NOW HAVING HIS GALLBLADDER SURGERY. PLEASE ADVISE.

## 2020-07-14 ENCOUNTER — OFFICE VISIT (OUTPATIENT)
Dept: CARDIOLOGY CLINIC | Age: 75
End: 2020-07-14
Payer: MEDICARE

## 2020-07-14 VITALS
DIASTOLIC BLOOD PRESSURE: 72 MMHG | SYSTOLIC BLOOD PRESSURE: 128 MMHG | HEART RATE: 59 BPM | RESPIRATION RATE: 18 BRPM | WEIGHT: 174 LBS | BODY MASS INDEX: 28.08 KG/M2 | OXYGEN SATURATION: 96 %

## 2020-07-14 PROCEDURE — G8427 DOCREV CUR MEDS BY ELIG CLIN: HCPCS | Performed by: INTERNAL MEDICINE

## 2020-07-14 PROCEDURE — G8417 CALC BMI ABV UP PARAM F/U: HCPCS | Performed by: INTERNAL MEDICINE

## 2020-07-14 PROCEDURE — 93000 ELECTROCARDIOGRAM COMPLETE: CPT | Performed by: INTERNAL MEDICINE

## 2020-07-14 PROCEDURE — 1123F ACP DISCUSS/DSCN MKR DOCD: CPT | Performed by: INTERNAL MEDICINE

## 2020-07-14 PROCEDURE — 4004F PT TOBACCO SCREEN RCVD TLK: CPT | Performed by: INTERNAL MEDICINE

## 2020-07-14 PROCEDURE — 99214 OFFICE O/P EST MOD 30 MIN: CPT | Performed by: INTERNAL MEDICINE

## 2020-07-14 PROCEDURE — 4040F PNEUMOC VAC/ADMIN/RCVD: CPT | Performed by: INTERNAL MEDICINE

## 2020-07-14 ASSESSMENT — ENCOUNTER SYMPTOMS
EYES NEGATIVE: 1
BLOOD IN STOOL: 0
STRIDOR: 0
GASTROINTESTINAL NEGATIVE: 1
NAUSEA: 0
COUGH: 0
SHORTNESS OF BREATH: 1
WHEEZING: 0
CHEST TIGHTNESS: 0
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
## 2020-07-20 ENCOUNTER — TELEPHONE (OUTPATIENT)
Dept: SURGERY | Age: 75
End: 2020-07-20

## 2020-07-20 NOTE — TELEPHONE ENCOUNTER
[facility] .. he is moderate to high risk for pulmonary complications. Left several messages for patient to see if he would like to go forward with his Gallbladder surgery with . Still waiting for him to respond. .Birth weight not on file will schedule an office visit if he wishes to proceed.

## 2020-10-14 ENCOUNTER — TELEPHONE (OUTPATIENT)
Dept: UROLOGY | Age: 75
End: 2020-10-14

## 2020-10-14 ENCOUNTER — OFFICE VISIT (OUTPATIENT)
Dept: CARDIOLOGY CLINIC | Age: 75
End: 2020-10-14
Payer: MEDICARE

## 2020-10-14 VITALS
OXYGEN SATURATION: 99 % | BODY MASS INDEX: 27 KG/M2 | HEIGHT: 67 IN | HEART RATE: 63 BPM | DIASTOLIC BLOOD PRESSURE: 74 MMHG | WEIGHT: 172 LBS | RESPIRATION RATE: 18 BRPM | SYSTOLIC BLOOD PRESSURE: 149 MMHG

## 2020-10-14 DIAGNOSIS — R97.20 ELEVATED PSA: ICD-10-CM

## 2020-10-14 LAB — PROSTATE SPECIFIC ANTIGEN: 1.65 NG/ML (ref 0–6.22)

## 2020-10-14 PROCEDURE — G8427 DOCREV CUR MEDS BY ELIG CLIN: HCPCS | Performed by: INTERNAL MEDICINE

## 2020-10-14 PROCEDURE — 4040F PNEUMOC VAC/ADMIN/RCVD: CPT | Performed by: INTERNAL MEDICINE

## 2020-10-14 PROCEDURE — 1123F ACP DISCUSS/DSCN MKR DOCD: CPT | Performed by: INTERNAL MEDICINE

## 2020-10-14 PROCEDURE — G8417 CALC BMI ABV UP PARAM F/U: HCPCS | Performed by: INTERNAL MEDICINE

## 2020-10-14 PROCEDURE — G8484 FLU IMMUNIZE NO ADMIN: HCPCS | Performed by: INTERNAL MEDICINE

## 2020-10-14 PROCEDURE — 4004F PT TOBACCO SCREEN RCVD TLK: CPT | Performed by: INTERNAL MEDICINE

## 2020-10-14 PROCEDURE — 99214 OFFICE O/P EST MOD 30 MIN: CPT | Performed by: INTERNAL MEDICINE

## 2020-10-14 RX ORDER — BUMETANIDE 1 MG/1
1 TABLET ORAL DAILY
Qty: 90 TABLET | Refills: 3 | Status: ON HOLD | OUTPATIENT
Start: 2020-10-14 | End: 2021-10-15 | Stop reason: HOSPADM

## 2020-10-14 RX ORDER — OMEPRAZOLE 10 MG/1
CAPSULE, DELAYED RELEASE ORAL
Qty: 90 CAPSULE | Refills: 2 | Status: SHIPPED | OUTPATIENT
Start: 2020-10-14 | End: 2021-09-23 | Stop reason: SDUPTHER

## 2020-10-14 ASSESSMENT — ENCOUNTER SYMPTOMS
WHEEZING: 0
STRIDOR: 0
GASTROINTESTINAL NEGATIVE: 1
NAUSEA: 0
CHEST TIGHTNESS: 0
EYES NEGATIVE: 1
COUGH: 0
SHORTNESS OF BREATH: 1
BLOOD IN STOOL: 0
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
## 2020-10-26 ENCOUNTER — HOSPITAL ENCOUNTER (OUTPATIENT)
Dept: CT IMAGING | Age: 75
Discharge: HOME | End: 2020-10-28
Payer: MEDICARE

## 2020-10-26 ENCOUNTER — OFFICE VISIT (OUTPATIENT)
Dept: UROLOGY | Age: 75
End: 2020-10-26
Payer: MEDICARE

## 2020-10-26 VITALS
BODY MASS INDEX: 27 KG/M2 | SYSTOLIC BLOOD PRESSURE: 152 MMHG | WEIGHT: 172 LBS | DIASTOLIC BLOOD PRESSURE: 82 MMHG | HEIGHT: 67 IN | HEART RATE: 66 BPM

## 2020-10-26 VITALS
HEART RATE: 73 BPM | HEIGHT: 67 IN | BODY MASS INDEX: 27 KG/M2 | SYSTOLIC BLOOD PRESSURE: 156 MMHG | RESPIRATION RATE: 16 BRPM | WEIGHT: 172 LBS | DIASTOLIC BLOOD PRESSURE: 76 MMHG

## 2020-10-26 PROCEDURE — G8427 DOCREV CUR MEDS BY ELIG CLIN: HCPCS | Performed by: UROLOGY

## 2020-10-26 PROCEDURE — G8484 FLU IMMUNIZE NO ADMIN: HCPCS | Performed by: UROLOGY

## 2020-10-26 PROCEDURE — 4004F PT TOBACCO SCREEN RCVD TLK: CPT | Performed by: UROLOGY

## 2020-10-26 PROCEDURE — G8417 CALC BMI ABV UP PARAM F/U: HCPCS | Performed by: UROLOGY

## 2020-10-26 PROCEDURE — 71250 CT THORAX DX C-: CPT

## 2020-10-26 PROCEDURE — 4040F PNEUMOC VAC/ADMIN/RCVD: CPT | Performed by: UROLOGY

## 2020-10-26 PROCEDURE — 99213 OFFICE O/P EST LOW 20 MIN: CPT | Performed by: UROLOGY

## 2020-10-26 PROCEDURE — 1123F ACP DISCUSS/DSCN MKR DOCD: CPT | Performed by: UROLOGY

## 2020-10-26 RX ORDER — OXYCODONE AND ACETAMINOPHEN 7.5; 325 MG/1; MG/1
TABLET ORAL
Status: ON HOLD | COMMUNITY
Start: 2020-10-20 | End: 2021-10-15 | Stop reason: SDUPTHER

## 2020-10-26 NOTE — PROGRESS NOTES
[facility] EVALUATION NOTE                                                 H&P                                                                                                                                                 Reason for Visit  Elevated PSA    History of Present Illness  39-year-old male who underwent prostate biopsies in 2004 at a PSA greater than 8.0 at that time he was noted to have no evidence of malignancy on his pathology slides, patient was also noted to have a 90 g prostate. Within 5 minutes of the prostate biopsies patient was started on finasteride due to obstructive voiding symptoms. His PSAs have since dropped to the current level of 1.62. Patient voiding symptoms under excellent control. Urologic Review of Systems/Symptoms  Patient continues to have nocturia 1 time per night. Review of Systems  Hospitalization: No recent admissions  All 14 categories of Review of Systems otherwise reviewed no other findings reported. Past Medical History:   Diagnosis Date    Alcohol abuse     CAD (coronary artery disease)     patient states no doctor has told him this / has 1 cardiac stent    Chronic back pain     Chronic kidney disease     Chronic sinusitis     DJD (degenerative joint disease) of knee     left knee DJD    Elevated PSA     History of blood transfusion 1980's    History of inferior wall myocardial infarction 01/2016    1 cardiac stent    HTN (hypertension)     meds .  1 yr    Hyperlipidemia     meds > 12 yrs    NSTEMI (non-ST elevated myocardial infarction) ([facility] [address]     Smoker      Past Surgical History:   Procedure Laterality Date    ABDOMEN SURGERY  1961    spleenectomy due to [address]  2009    lumbar disc OR    CARDIAC SURGERY      stents    CARPAL TUNNEL RELEASE Right 5/6/2019    RIGHT CARPAL TUNNEL RELEASE performed by [facility]herty [facility] at [facility] [address] WITH STENT PLACEMENT  1/29/2016    EYE SURGERY to have Phaco with IOL OU 2/2018   Kyung Solum HERNIA REPAIR      JOINT REPLACEMENT Left 1994    LTHR    JOINT REPLACEMENT Right 2009    RTKR    KNEE SURGERY Right 2011    arthroscopy    SC MANIPULATN KNEE JT+ANESTHESIA Right 5/12/2017    RIGHT KNEE MANIPULATION UNDER ANESTHESIA [facility] [address] VISIT,PROCEDURE ONLY Bilateral 12/29/2017    RIGHT AND BILATERAL L 4-5 LYSIS OF SCAR DISKECTOMY INTERBODY CAGE FUSION POSTEROLATERAL FUSION PEDICLE SCREWS performed by Manuel Leiva MD at [address] [place] [address]  2004    benign    SPLENECTOMY  1961    TOTAL KNEE ARTHROPLASTY Right 3/24/2017    RIGHT  KNEE TOTAL ARTHROPLASTY, Reubin Pilling CEMENTED PERSONA  performed by Nadia Luevano MD at [address] [place] [address] History     Socioeconomic History    Marital status:      Spouse name: None    Number of children: None    Years of education: None    Highest education level: None   Occupational History    Occupation: retired   Social Needs    Financial resource strain: None    Food insecurity     Worry: None     Inability: None    Transportation needs     Medical: None     Non-medical: None   Tobacco Use    Smoking status: Current Every Day Smoker     Packs/day: 0.50     Years: 60.00     Pack years: 30.00     Types: Cigarettes    Smokeless tobacco: Never Used   Substance and Sexual Activity    Alcohol use:  Yes     Alcohol/week: 7.0 standard drinks     Types: 7 Shots of liquor per week     Comment: last day before yesterday    Drug use: No    Sexual activity: Yes   Lifestyle    Physical activity     Days per week: None     Minutes per session: None    Stress: None   Relationships    Social connections     Talks on phone: None     Gets together: None     Attends Islam service: None     Active member of club or organization: None     Attends meetings of clubs or organizations: None     Relationship status: None    Intimate partner violence     Fear of current or ex partner: None     Emotionally abused: None     Physically abused: None     Forced sexual activity: None   Other Topics Concern    None   Social History Narrative    Lives alone     Family History   Problem Relation Age of Onset    Osteoarthritis Mother     Emphysema Mother     Hypertension Father     Hearing Loss Father     Dementia Father     No Known Problems Sister     No Known Problems Brother     Colon Polyps Neg Hx      Current Outpatient Medications   Medication Sig Dispense Refill    oxyCODONE-acetaminophen (PERCOCET) 7.5-325 MG per tablet TAKE ONE TABLET BY MOUTH THREE TIMES A DAY      omeprazole (PRILOSEC) 10 MG delayed release capsule TAKE ONE CAPSULE BY MOUTH DAILY 90 capsule 2    bumetanide (BUMEX) 1 MG tablet Take 1 tablet by mouth daily 90 tablet 3    tiotropium (SPIRIVA RESPIMAT) 2.5 MCG/ACT AERS inhaler Inhale 2 puffs into the lungs daily 1 Inhaler 3    albuterol sulfate  (90 Base) MCG/ACT inhaler Inhale 2 puffs into the lungs every 6 hours as needed for Wheezing 1 Inhaler 3    morphine (MS CONTIN) 15 MG extended release tablet Take 15 mg by mouth as needed.        diclofenac (VOLTAREN) 50 MG EC tablet TAKE ONE TABLET BY MOUTH TWO TIMES A  tablet 2    fluticasone-vilanterol (BREO ELLIPTA) 100-25 MCG/INH AEPB inhaler Inhale 1 puff into the lungs daily 1 each 3    fluticasone (FLONASE) 50 MCG/ACT nasal spray 1 spray by Each Nostril route 2 times daily 1 Bottle 3    metoprolol tartrate (LOPRESSOR) 50 MG tablet TAKE ONE TABLET BY MOUTH TWO TIMES A  tablet 2    isosorbide mononitrate (IMDUR) 30 MG extended release tablet Take 1 tablet by mouth daily 90 tablet 3    atorvastatin (LIPITOR) 20 MG tablet TAKE ONE TABLET BY MOUTH DAILY 90 tablet 2    clopidogrel (PLAVIX) 75 MG tablet TAKE ONE TABLET BY MOUTH DAILY 90 tablet 2    finasteride (PROSCAR) 5 MG tablet TAKE ONE TABLET BY MOUTH DAILY 90 tablet 3    citalopram (CELEXA) 40 MG tablet TAKE 1 TABLET BY MOUTH Procedures    PSA, Diagnostic     Standing Status:   Future     Standing Expiration Date:   10/26/2021     No orders of the defined types were placed in this encounter. Johanna Pena MD       Please note this report has been partially produced using speech recognition software  And may cause contain errors related to that system including grammar, punctuation and spelling as well as words and phrases that may seem inappropriate. If there are questions or concerns please feel free to contact me to clarify.

## 2020-10-27 ENCOUNTER — TELEPHONE (OUTPATIENT)
Dept: PULMONOLOGY | Age: 75
End: 2020-10-27

## 2020-11-09 ENCOUNTER — OFFICE VISIT (OUTPATIENT)
Dept: PULMONOLOGY | Age: 75
End: 2020-11-09
Payer: MEDICARE

## 2020-11-09 VITALS
DIASTOLIC BLOOD PRESSURE: 66 MMHG | TEMPERATURE: 98.1 F | WEIGHT: 166.8 LBS | OXYGEN SATURATION: 96 % | HEART RATE: 78 BPM | HEIGHT: 67 IN | BODY MASS INDEX: 26.18 KG/M2 | SYSTOLIC BLOOD PRESSURE: 138 MMHG

## 2020-11-09 PROCEDURE — 4040F PNEUMOC VAC/ADMIN/RCVD: CPT | Performed by: INTERNAL MEDICINE

## 2020-11-09 PROCEDURE — G8926 SPIRO NO PERF OR DOC: HCPCS | Performed by: INTERNAL MEDICINE

## 2020-11-09 PROCEDURE — G8484 FLU IMMUNIZE NO ADMIN: HCPCS | Performed by: INTERNAL MEDICINE

## 2020-11-09 PROCEDURE — 3023F SPIROM DOC REV: CPT | Performed by: INTERNAL MEDICINE

## 2020-11-09 PROCEDURE — G8427 DOCREV CUR MEDS BY ELIG CLIN: HCPCS | Performed by: INTERNAL MEDICINE

## 2020-11-09 PROCEDURE — G8417 CALC BMI ABV UP PARAM F/U: HCPCS | Performed by: INTERNAL MEDICINE

## 2020-11-09 PROCEDURE — 1123F ACP DISCUSS/DSCN MKR DOCD: CPT | Performed by: INTERNAL MEDICINE

## 2020-11-09 PROCEDURE — 4004F PT TOBACCO SCREEN RCVD TLK: CPT | Performed by: INTERNAL MEDICINE

## 2020-11-09 PROCEDURE — 99214 OFFICE O/P EST MOD 30 MIN: CPT | Performed by: INTERNAL MEDICINE

## 2020-11-09 RX ORDER — FLUTICASONE FUROATE AND VILANTEROL TRIFENATATE 100; 25 UG/1; UG/1
1 POWDER RESPIRATORY (INHALATION) DAILY
Qty: 1 EACH | Refills: 3 | Status: SHIPPED | OUTPATIENT
Start: 2020-11-09 | End: 2021-02-15 | Stop reason: ALTCHOICE

## 2020-11-09 NOTE — PROGRESS NOTES
Subjective: Dora Bonds is a 66 y.o. male who complains today of:     Chief Complaint   Patient presents with    COPD     abnormal CT scan, and is currently using his inhalers. Needs refill for Spiriva and Breo       HPI  Patient presents for lung nodule follow-up    He is doing good, he has mild dyspnea exertion but able to do his daily activities he is mainly limited due to back pain and knees pain, no coughing, no chest pain, no fever no weight loss, no lower extremity edema, he continues to smoke 2 cigarettes/day, no nasal congestion postnasal drip he is using Flonase which is helping, no heartburn. He is agreeable to see radiation oncology now, he still does not want to see medical oncologist and does not want to consider chemo. He does not want to consider surgery. Allergies:  Patient has no known allergies. Past Medical History:   Diagnosis Date    Alcohol abuse     CAD (coronary artery disease)     patient states no doctor has told him this / has 1 cardiac stent    Chronic back pain     Chronic kidney disease     Chronic sinusitis     DJD (degenerative joint disease) of knee     left knee DJD    Elevated PSA     History of blood transfusion 1980's    History of inferior wall myocardial infarction 01/2016    1 cardiac stent    HTN (hypertension)     meds .  1 yr    Hyperlipidemia     meds > 12 yrs    NSTEMI (non-ST elevated myocardial infarction) ([facility] [address]     Smoker      Past Surgical History:   Procedure Laterality Date    ABDOMEN SURGERY  1961    spleenectomy due to [address]  2009    lumbar disc OR    CARDIAC SURGERY      stents    CARPAL TUNNEL RELEASE Right 5/6/2019    RIGHT CARPAL TUNNEL RELEASE performed by Fay Garcia MD at [place] [address] WITH STENT PLACEMENT  1/29/2016    EYE SURGERY      to have Phaco with IOL OU 2/2018    HERNIA REPAIR      JOINT REPLACEMENT Left 1994    LTHR    JOINT REPLACEMENT Right 2009 RTKR    KNEE SURGERY Right 2011    arthroscopy    OR MANIPULATN KNEE JT+ANESTHESIA Right 5/12/2017    RIGHT KNEE MANIPULATION UNDER ANESTHESIA [facility] [address] [address] Bilateral 12/29/2017    RIGHT AND BILATERAL L 4-5 LYSIS OF SCAR DISKECTOMY INTERBODY CAGE FUSION POSTEROLATERAL FUSION PEDICLE SCREWS performed by Fay Garcia MD at [address] [place], [place] [address]  2004    benign   [address] Right 3/24/2017    RIGHT  KNEE TOTAL ARTHROPLASTY, ELHAM CEMENTED PERSONA  performed by Annemarie Gunn MD at [address] History   Problem Relation Age of Onset    Osteoarthritis Mother     Emphysema Mother     Hypertension Father     Hearing Loss Father     Dementia Father     No Known Problems Sister     No Known Problems Brother     Colon Polyps Neg Hx      Social History     Socioeconomic History    Marital status:      Spouse name: Not on file    Number of children: Not on file    Years of education: Not on file    Highest education level: Not on file   Occupational History    Occupation: retired   Social Needs    Financial resource strain: Not on file    Food insecurity     Worry: Not on file     Inability: Not on file   THERAVECTYS needs     Medical: Not on file     Non-medical: Not on file   Tobacco Use    Smoking status: Current Every Day Smoker     Packs/day: 0.50     Years: 60.00     Pack years: 30.00     Types: Cigarettes    Smokeless tobacco: Never Used   Substance and Sexual Activity    Alcohol use:  Yes     Alcohol/week: 7.0 standard drinks     Types: 7 Shots of liquor per week     Comment: last day before yesterday    Drug use: No    Sexual activity: Yes   Lifestyle    Physical activity     Days per week: Not on file     Minutes per session: Not on file    Stress: Not on file   Relationships    Social connections     Talks on phone: Not on file     Gets together: Not on file Attends Restorationism service: Not on file     Active member of club or organization: Not on file     Attends meetings of clubs or organizations: Not on file     Relationship status: Not on file    Intimate partner violence     Fear of current or ex partner: Not on file     Emotionally abused: Not on file     Physically abused: Not on file     Forced sexual activity: Not on file   Other Topics Concern    Not on file   Social History Narrative    Lives alone         Review of Systems      ROS: 10 organs review of system is done including general, psychological, ENT, hematological, endocrine, respiratory, cardiovascular, gastrointestinal,musculoskeletal, neurological,  allergy and Immunology is done and is otherwise negative. Current Outpatient Medications   Medication Sig Dispense Refill    fluticasone-vilanterol (BREO ELLIPTA) 100-25 MCG/INH AEPB inhaler Inhale 1 puff into the lungs daily 1 each 3    tiotropium (SPIRIVA RESPIMAT) 2.5 MCG/ACT AERS inhaler Inhale 2 puffs into the lungs daily 1 Inhaler 3    oxyCODONE-acetaminophen (PERCOCET) 7.5-325 MG per tablet TAKE ONE TABLET BY MOUTH THREE TIMES A DAY      omeprazole (PRILOSEC) 10 MG delayed release capsule TAKE ONE CAPSULE BY MOUTH DAILY 90 capsule 2    bumetanide (BUMEX) 1 MG tablet Take 1 tablet by mouth daily 90 tablet 3    albuterol sulfate  (90 Base) MCG/ACT inhaler Inhale 2 puffs into the lungs every 6 hours as needed for Wheezing 1 Inhaler 3    morphine (MS CONTIN) 15 MG extended release tablet Take 15 mg by mouth as needed.        diclofenac (VOLTAREN) 50 MG EC tablet TAKE ONE TABLET BY MOUTH TWO TIMES A  tablet 2    fluticasone (FLONASE) 50 MCG/ACT nasal spray 1 spray by Each Nostril route 2 times daily 1 Bottle 3    metoprolol tartrate (LOPRESSOR) 50 MG tablet TAKE ONE TABLET BY MOUTH TWO TIMES A  tablet 2    isosorbide mononitrate (IMDUR) 30 MG extended release tablet Take 1 tablet by mouth daily 90 tablet 3    atorvastatin (LIPITOR) 20 MG tablet TAKE ONE TABLET BY MOUTH DAILY 90 tablet 2    clopidogrel (PLAVIX) 75 MG tablet TAKE ONE TABLET BY MOUTH DAILY 90 tablet 2    finasteride (PROSCAR) 5 MG tablet TAKE ONE TABLET BY MOUTH DAILY 90 tablet 3    citalopram (CELEXA) 40 MG tablet TAKE 1 TABLET BY MOUTH NIGHTLY 90 tablet 3    nitroGLYCERIN (NITROSTAT) 0.4 MG SL tablet Place 1 tablet under the tongue every 5 minutes as needed for Chest pain up to max of 3 total doses. If no relief after 1 dose, call 911. 25 tablet 3    DOCUSATE CALCIUM PO Take by mouth as needed       No current facility-administered medications for this visit. Objective:     Vitals:    11/09/20 1510   BP: 138/66   Pulse: 78   Temp: 98.1 °F (36.7 °C)   SpO2: 96%   Weight: 166 lb 12.8 oz (75.7 kg)   Height: 5' 7\" (1.702 m)         Physical Exam  Constitutional:       Appearance: He is well-developed. HENT:      Head: Normocephalic and atraumatic. Eyes:      General:         Left eye: No discharge. Conjunctiva/sclera: Conjunctivae normal.      Pupils: Pupils are equal, round, and reactive to light. Neck:      Musculoskeletal: Normal range of motion and neck supple. Vascular: No JVD. Cardiovascular:      Rate and Rhythm: Normal rate and regular rhythm. Heart sounds: Normal heart sounds. No murmur. No friction rub. No gallop. Pulmonary:      Effort: Pulmonary effort is normal. No respiratory distress. Breath sounds: Normal breath sounds. No wheezing or rales. Chest:      Chest wall: No tenderness. Abdominal:      General: Bowel sounds are normal.      Palpations: Abdomen is soft. Musculoskeletal:         General: No tenderness or deformity. Right lower leg: No edema. Left lower leg: No edema. Skin:     General: Skin is warm and dry. Neurological:      Mental Status: He is alert and oriented to person, place, and time.    Psychiatric:         Judgment: Judgment normal.         Imaging studies reviewed by me CT chest shows increased size of the left lower lobe lung nodule  Lab results reviewed in chart  PFT FEV1 41%  ECHO: 2018 shows EF 55%    Assessment and Plan       Diagnosis Orders   1. Malignant neoplasm of lower lobe of left lung (HCC)  tiotropium (SPIRIVA RESPIMAT) 2.5 MCG/ACT AERS inhaler    External Referral to Radiation Oncology   2. Chronic obstructive pulmonary disease, unspecified COPD type (HCC)  tiotropium (SPIRIVA RESPIMAT) 2.5 MCG/ACT AERS inhaler   3. Nasal congestion     4. Smoking     5. Gastroesophageal reflux disease without esophagitis       · Left lower lobe squamous cell carcinoma, patient is agreeable to treatment but he only agrees to evaluation by radiation oncology. Will refer patient to radiation oncology and follow-up with him after that. He did not want medical oncology referral at this point  · Continue Spiriva  · Continue Flonase  · Smoking cessation counseling done, he is trying to quit  · Continue PPI  · Yearly flu shot      Orders Placed This Encounter   Procedures    External Referral to Radiation Oncology     Referral Priority:   Routine     Referral Type:   Eval and Treat     Referral Reason:   Specialty Services Required     Referred to Provider:   Janet Saucedo MD     Requested Specialty:   Radiation Oncology     Number of Visits Requested:   1     Orders Placed This Encounter   Medications    fluticasone-vilanterol (BREO ELLIPTA) 100-25 MCG/INH AEPB inhaler     Sig: Inhale 1 puff into the lungs daily     Dispense:  1 each     Refill:  3    tiotropium (SPIRIVA RESPIMAT) 2.5 MCG/ACT AERS inhaler     Sig: Inhale 2 puffs into the lungs daily     Dispense:  1 Inhaler     Refill:  3            Discussed with patient the importance of exercise and weight control and  overall health and well-being. Reviewed with the patient: current clinical status, medications, activities and diet.       Side effects, adverse effects of the medication prescribed today, as well as treatment plan and result expectations have been discussed with the patient who expresses understanding and desires to proceed. Return in about 6 weeks (around 12/21/2020). I spent 25 min with this patient, greater the 50% of this time was spent in counseling and/or coordinating of care.       Jackie Paulson MD

## 2020-11-13 ENCOUNTER — HOSPITAL ENCOUNTER (OUTPATIENT)
Dept: RADIATION ONCOLOGY | Age: 75
Discharge: HOME | End: 2020-11-13
Payer: MEDICARE

## 2020-11-13 VITALS
RESPIRATION RATE: 16 BRPM | DIASTOLIC BLOOD PRESSURE: 68 MMHG | HEART RATE: 59 BPM | OXYGEN SATURATION: 98 % | HEIGHT: 67 IN | TEMPERATURE: 97.6 F | SYSTOLIC BLOOD PRESSURE: 156 MMHG | BODY MASS INDEX: 26.21 KG/M2 | WEIGHT: 167 LBS

## 2020-11-13 PROBLEM — C34.92 NSCLC OF LEFT LUNG (HCC): Status: ACTIVE | Noted: 2020-11-13

## 2020-11-13 PROCEDURE — 99212 OFFICE O/P EST SF 10 MIN: CPT | Performed by: RADIOLOGY

## 2020-11-13 NOTE — H&P
Consult Note      Requesting Physician:  Adela Cantu MD               Pulmonary Medicine    CURRENT COMPLAINT: early stage non-small cell lung cancer left lower lobe    HPI: I was asked by Dr. Herman Brizuela to see this 66 y.o. male who initially presented with an abnormal chest x-ray in preparation for a cholecystectomy. The cholecystectomy procedure was delayed both because of COVID-19 and also because of the pulmonary nodule finding. This led to a CT and subsequent CT-guided biopsy with Dr. Radha Smith. PET scan showed a 1.2 cm left lower lobe nodule near but not involving the pleura in the left lower lobe, with an SUV of 3.2. There was no mediastinal adenopathy. CT-guided biopsy yielded squamous cell carcinoma, p63 positive and TTF-1 negative. The PET scan was done on 4/24/2020, and the biopsy on 5/21/2020. The patient had full PFTs done on 6/22/2020, with Dr. Armen Guardado. This yielded an FEV1 of 1.04, and diffusion capacity at 92% predicted. The patient is not a candidate for surgical resection. He was scheduled for a follow-up CT scan and this showed enlargement of the mass, now measuring 1.5 cm, and it is now pleural-based, not the case on the prior PET/CT. He continues to smoke at 1/2 pack/day. The patient is not on home oxygen, and has never been. He is able to ambulate with a cane. He does become dyspneic if he tries to climb stairs. No hemoptysis. Past Medical History:   Diagnosis Date    Alcohol abuse     CAD (coronary artery disease)     patient states no doctor has told him this / has 1 cardiac stent    Cancer ([address]     lung LLL    Chronic back pain     Chronic kidney disease     Chronic sinusitis     DJD (degenerative joint disease) of knee     left knee DJD    Elevated PSA     History of blood transfusion 1980's    History of inferior wall myocardial infarction 01/2016    1 cardiac stent    HTN (hypertension)     meds .  1 yr    Hyperlipidemia     meds > 12 yrs    NSTEMI (non-ST elevated myocardial infarction) [facility] [address] [facility]     Smoker        Past Surgical History:   Procedure Laterality Date    ABDOMEN SURGERY  1961    spleenectomy due to [address]  2009    lumbar disc OR    CARDIAC SURGERY      stents    CARPAL TUNNEL RELEASE Right 5/6/2019    RIGHT CARPAL TUNNEL RELEASE performed by Eugenie Hurtado MD at [place] [address] WITH STENT PLACEMENT  1/29/2016    EYE SURGERY      to have Phaco with IOL OU 2/2018    HERNIA REPAIR      JOINT REPLACEMENT Left 1994    LTHR    JOINT REPLACEMENT Right 2009    RTKR    KNEE SURGERY Right 2011    arthroscopy    [place] MANIPULAT[place] KNEE JT+ANESTHESIA Right 5/12/2017    RIGHT KNEE MANIPULATION UNDER ANESTHESIA performed by Racheal Underwood MD at [address] OFFICE/OUTPT VISIT,PROCEDURE ONLY Bilateral 12/29/2017    RIGHT AND BILATERAL L 4-5 LYSIS OF SCAR DISKECTOMY INTERBODY CAGE FUSION POSTEROLATERAL FUSION PEDICLE SCREWS performed by Eugenie Hurtado MD at [address] [place] [address]  2004    benign   [address] Right 3/24/2017    RIGHT  KNEE TOTAL ARTHROPLASTY, ELHAM CEMENTED PERSONA  performed by Racheal Underwood MD at [facility]       Prior to Admission medications    Medication Sig Start Date End Date Taking?  Authorizing Provider   fluticasone-vilanterol (BREO ELLIPTA) 100-25 MCG/INH AEPB inhaler Inhale 1 puff into the lungs daily 11/9/20  Yes Ceci Gilbert MD   tiotropium (SPIRIVA RESPIMAT) 2.5 MCG/ACT AERS inhaler Inhale 2 puffs into the lungs daily 11/9/20  Yes Ceci Gilbert MD   oxyCODONE-acetaminophen (PERCOCET) 7.5-325 MG per tablet TAKE ONE TABLET BY MOUTH THREE TIMES A DAY 10/20/20  Yes Historical Provider, MD   omeprazole (PRILOSEC) 10 MG delayed release capsule TAKE ONE CAPSULE BY MOUTH DAILY 10/14/20  Yes Alia Hughes MD   bumetanide (BUMEX) 1 MG tablet Take 1 tablet by mouth daily 10/14/20  Yes Alia Hughes MD   morphine (MS CONTIN) 15 MG extended release tablet Take 15 mg by mouth as needed. 6/25/20  Yes Historical Provider, MD   diclofenac (VOLTAREN) 50 MG EC tablet TAKE ONE TABLET BY MOUTH TWO TIMES A DAY 6/29/20  Yes Rolan Garcia MD   fluticasone (FLONASE) 50 MCG/ACT nasal spray 1 spray by Each Nostril route 2 times daily 4/27/20  Yes Ceci Gilbert MD   metoprolol tartrate (LOPRESSOR) 50 MG tablet TAKE ONE TABLET BY MOUTH TWO TIMES A DAY 3/6/20  Yes Alia Hughes MD   isosorbide mononitrate (IMDUR) 30 MG extended release tablet Take 1 tablet by mouth daily 2/7/20  Yes Alia Hughes MD   atorvastatin (LIPITOR) 20 MG tablet TAKE ONE TABLET BY MOUTH DAILY 1/29/20  Yes Alia Hughes MD   clopidogrel (PLAVIX) 75 MG tablet TAKE ONE TABLET BY MOUTH DAILY 1/29/20  Yes Alia Hughes MD   finasteride (PROSCAR) 5 MG tablet TAKE ONE TABLET BY MOUTH DAILY 12/13/19  Yes Kristian Cortez MD   citalopram (CELEXA) 40 MG tablet TAKE 1 TABLET BY MOUTH NIGHTLY 12/2/19  Yes Rolan Garcia MD   DOCUSATE CALCIUM PO Take by mouth as needed   Yes Historical Provider, MD   albuterol sulfate  (90 Base) MCG/ACT inhaler Inhale 2 puffs into the lungs every 6 hours as needed for Wheezing 7/2/20   Ceci Gilbert MD   nitroGLYCERIN (NITROSTAT) 0.4 MG SL tablet Place 1 tablet under the tongue every 5 minutes as needed for Chest pain up to max of 3 total doses.  If no relief after 1 dose, call 911. 5/1/19   Alia Hughes MD       No Known Allergies    Social History     Tobacco Use   Smoking Status Current Every Day Smoker    Packs/day: 0.50    Years: 60.00    Pack years: 30.00    Types: Cigarettes   Smokeless Tobacco Never Used     Social History     Substance and Sexual Activity   Alcohol Use Yes    Alcohol/week: 12.0 standard drinks    Types: 12 Shots of liquor per week    Comment: last day before yesterday       Family History   Problem Relation Age of Onset    Osteoarthritis Mother     Emphysema Mother     Hypertension Father     Hearing Loss Father     Dementia Father     No Known Problems Sister     Prostate Cancer Brother     Colon Polyps Neg Hx        Review Of Systems:   Pain Score:   Pain Score (1-10): 7   Pain Location: back and knees   Pain Duration: all the time  Pain Management/Control: ms contin, percocet    Is pain affecting your ability to take care of yourself or move throughout your home? []? Yes   [x]? No  General: Weakness and Fatigue  Patient has gained weight []? Yes   [x]? No  Patient has lost weight []? Yes   [x]? No  How much weight in pounds and over what length of time:   Eyes (Ophthalmic): needs an eye exam, vision is not as sharp lately              Skin (Dermatological): No Problems              ENT: chronic sinusitis              Respiratory: Cough and FELDER cough is occasional productive of clear phlegm. No hemoptysis. No chest pain. Denies pleuritic pain. Cardiovascular: Edema legs and feet, on Bumex                          Device   []? Yes   [x]? No              Copy of Card Obtained []? Yes   [x]? No   Gastrointestinal: Nausea on occasion   Genito-Urinary: No Problems              Breast: No Problems              Musculoskeletal: Joint Pain and Back Pain knee pain   Neurological: Numbness and Tingling left hand                          Hematological and Lymphatic: No Problems and Easy Bruising              Endocrine: No Problems   A 10-point review of systems has been conducted and pertinent positives have been   recorded. All other review of systems are negative. Physical Exam:  Vitals:    11/13/20 1431   BP: (!) 156/68   Pulse: 59   Resp: 16   Temp: 97.6 °F (36.4 °C)   SpO2: 98%   Weight: 167 lb (75.8 kg)   Height: 5' 7\" (1.702 m)     ECOG PS: 0  GENERAL: NAD, appears stated age. Alert and oriented. Seen with wife. Covid masks in place. HEENT: PERRLA, EOMI. Oral cavity WNL, no visible lesion, normal palate elevation. NECK: No palpable cervical or supraclavicular adenopathy.    RESPIRATORY/LUNGS:

## 2020-11-13 NOTE — PROGRESS NOTES
Physician notified of need for  referral due to: high distress level r/t cancer diagnosis    Physician stated to notify . No further orders noted.

## 2020-11-13 NOTE — CONSULTS
NURSING ASSESSMENT     Date: 11/13/2020        Patient Name: Saray Knight     YOB: 1945      Age:  66 y.o. MRN: [id number]     Chaperone [] Yes   [x] No      Advance Directives:   Do you currently have completed advance directives (living will)? [x] Yes   [] No         *If yes, please bring us a copy for your records. *If no, would you like info or assistance in completing advance directives (living will)? [] Yes   [x] No    Pain Score:   Pain Score (1-10): 7   Pain Location: back and knees   Pain Duration: all the time  Pain Management/Control: ms contin, percocet      Is pain affecting your ability to take care of yourself or move throughout your home? [] Yes   [x] No    General: Weakness and Fatigue  Patient has gained weight [] Yes   [x] No  Patient has lost weight [] Yes   [x] No  How much weight in pounds and over what length of time:     Eyes (Ophthalmic): needs an eye exam, vision is not as sharp lately     Skin (Dermatological): No Problems     ENT: chronic sinusitis     Respiratory: Cough and FELDER cough is occasional productive of clear phlegm     Cardiovascular: Edema legs and feet, on Bumex      Device   [] Yes   [x] No   Copy of Card Obtained [] Yes   [x] No    Gastrointestinal: Nausea on occasion    Genito-Urinary: No Problems     Breast: No Problems     Musculoskeletal: Joint Pain and Back Pain knee pain    Neurological: Numbness and Tingling left hand      Hematological and Lymphatic: No Problems and Easy Bruising     Endocrine: No Problems     A 10-point review of systems has been conducted and pertinent positives have been   recorded.  All other review of systems are negative    Was the patient admitted during the course of treatment OR within 30 days of treatment? n/a

## 2020-11-17 ENCOUNTER — HOSPITAL ENCOUNTER (OUTPATIENT)
Dept: RADIATION ONCOLOGY | Age: 75
Discharge: HOME | End: 2020-11-17
Attending: RADIOLOGY
Payer: MEDICARE

## 2020-11-17 PROCEDURE — 99213 OFFICE O/P EST LOW 20 MIN: CPT | Performed by: RADIOLOGY

## 2020-11-17 PROCEDURE — 77334 RADIATION TREATMENT AID(S): CPT | Performed by: RADIOLOGY

## 2020-11-17 PROCEDURE — 77290 THER RAD SIMULAJ FIELD CPLX: CPT | Performed by: RADIOLOGY

## 2020-11-17 RX ORDER — CLOPIDOGREL BISULFATE 75 MG/1
TABLET ORAL
Qty: 90 TABLET | Refills: 3 | Status: ON HOLD | OUTPATIENT
Start: 2020-11-17 | End: 2021-10-15 | Stop reason: HOSPADM

## 2020-11-17 RX ORDER — ATORVASTATIN CALCIUM 20 MG/1
TABLET, FILM COATED ORAL
Qty: 90 TABLET | Refills: 3 | Status: SHIPPED | OUTPATIENT
Start: 2020-11-17 | End: 2022-03-21 | Stop reason: SDUPTHER

## 2020-11-17 NOTE — PROGRESS NOTES
Teaching & Instructions - Chest  General  Simulation  Initial Treatment  Self-Care Info  Nutrition  Social Service    Site-Specific  Side Effects  Cough Mgmt  Esophagitis/Pharyngitis  Fatigue Mgmt  Pain Mgmt  SOB Mgmt      Educational Handouts  Radiation Therapy & You  Site Specific Instructions  Radiation Oncology Dept Information  CBMS Program  SBRT Radiation Booklet    Patient eager to learn, verbalized understanding of verbal education and handouts.

## 2020-11-18 ENCOUNTER — NURSE ONLY (OUTPATIENT)
Dept: PRIMARY CARE CLINIC | Age: 75
End: 2020-11-18

## 2020-11-18 DIAGNOSIS — C34.92 NSCLC OF LEFT LUNG (HCC): ICD-10-CM

## 2020-11-20 PROCEDURE — 77295 3-D RADIOTHERAPY PLAN: CPT | Performed by: RADIOLOGY

## 2020-11-20 PROCEDURE — 77293 RESPIRATOR MOTION MGMT SIMUL: CPT | Performed by: RADIOLOGY

## 2020-11-20 PROCEDURE — 77300 RADIATION THERAPY DOSE PLAN: CPT | Performed by: RADIOLOGY

## 2020-11-20 PROCEDURE — 77334 RADIATION TREATMENT AID(S): CPT | Performed by: RADIOLOGY

## 2020-11-21 LAB
SARS-COV-2: NOT DETECTED
SOURCE: NORMAL

## 2020-11-23 PROCEDURE — 77370 RADIATION PHYSICS CONSULT: CPT | Performed by: RADIOLOGY

## 2020-11-23 PROCEDURE — 77470 SPECIAL RADIATION TREATMENT: CPT | Performed by: RADIOLOGY

## 2020-11-30 ENCOUNTER — HOSPITAL ENCOUNTER (OUTPATIENT)
Dept: RADIATION ONCOLOGY | Age: 75
Discharge: HOME | End: 2020-11-30
Attending: RADIOLOGY
Payer: MEDICARE

## 2020-11-30 PROCEDURE — 99211 OFF/OP EST MAY X REQ PHY/QHP: CPT | Performed by: RADIOLOGY

## 2020-11-30 PROCEDURE — 77280 THER RAD SIMULAJ FIELD SMPL: CPT | Performed by: RADIOLOGY

## 2020-11-30 PROCEDURE — 77373 STRTCTC BDY RAD THER TX DLVR: CPT | Performed by: RADIOLOGY

## 2020-12-01 ENCOUNTER — HOSPITAL ENCOUNTER (OUTPATIENT)
Dept: RADIATION ONCOLOGY | Age: 75
Discharge: HOME | End: 2020-12-01
Attending: RADIOLOGY
Payer: MEDICARE

## 2020-12-01 PROCEDURE — 77373 STRTCTC BDY RAD THER TX DLVR: CPT | Performed by: RADIOLOGY

## 2020-12-01 PROCEDURE — 99211 OFF/OP EST MAY X REQ PHY/QHP: CPT | Performed by: RADIOLOGY

## 2020-12-02 ENCOUNTER — HOSPITAL ENCOUNTER (OUTPATIENT)
Dept: RADIATION ONCOLOGY | Age: 75
Discharge: HOME | End: 2020-12-02
Attending: RADIOLOGY
Payer: MEDICARE

## 2020-12-02 PROCEDURE — 77373 STRTCTC BDY RAD THER TX DLVR: CPT | Performed by: RADIOLOGY

## 2020-12-02 PROCEDURE — 99211 OFF/OP EST MAY X REQ PHY/QHP: CPT | Performed by: RADIOLOGY

## 2020-12-03 ENCOUNTER — HOSPITAL ENCOUNTER (OUTPATIENT)
Dept: RADIATION ONCOLOGY | Age: 75
Discharge: HOME | End: 2020-12-03
Attending: RADIOLOGY
Payer: MEDICARE

## 2020-12-03 PROCEDURE — 77373 STRTCTC BDY RAD THER TX DLVR: CPT | Performed by: RADIOLOGY

## 2020-12-03 PROCEDURE — 77336 RADIATION PHYSICS CONSULT: CPT | Performed by: RADIOLOGY

## 2020-12-03 PROCEDURE — 99212 OFFICE O/P EST SF 10 MIN: CPT | Performed by: RADIOLOGY

## 2020-12-03 NOTE — ONCOLOGY
pneumonitis. PET/CT at 3-4 months post-treatment  is planned. Thank you again for allowing us to participate in the care of this patient.      Sincerely,     Beth Villela MD, PhD  Board Certified Radiation Oncologist  [facility] affiliated with  [id number] [address] [place] [address]    cc: Dr. Hughes Shallow

## 2020-12-10 RX ORDER — FINASTERIDE 5 MG/1
TABLET, FILM COATED ORAL
Qty: 90 TABLET | Refills: 3 | Status: SHIPPED | OUTPATIENT
Start: 2020-12-10 | End: 2021-08-11 | Stop reason: SDUPTHER

## 2020-12-29 ENCOUNTER — OFFICE VISIT (OUTPATIENT)
Dept: FAMILY MEDICINE CLINIC | Age: 75
End: 2020-12-29
Payer: MEDICARE

## 2020-12-29 VITALS
TEMPERATURE: 97.5 F | OXYGEN SATURATION: 96 % | WEIGHT: 163 LBS | BODY MASS INDEX: 25.58 KG/M2 | DIASTOLIC BLOOD PRESSURE: 62 MMHG | HEART RATE: 97 BPM | SYSTOLIC BLOOD PRESSURE: 138 MMHG | HEIGHT: 67 IN

## 2020-12-29 PROCEDURE — 99213 OFFICE O/P EST LOW 20 MIN: CPT | Performed by: FAMILY MEDICINE

## 2020-12-29 PROCEDURE — 4004F PT TOBACCO SCREEN RCVD TLK: CPT | Performed by: FAMILY MEDICINE

## 2020-12-29 PROCEDURE — G8484 FLU IMMUNIZE NO ADMIN: HCPCS | Performed by: FAMILY MEDICINE

## 2020-12-29 PROCEDURE — 1123F ACP DISCUSS/DSCN MKR DOCD: CPT | Performed by: FAMILY MEDICINE

## 2020-12-29 PROCEDURE — 4040F PNEUMOC VAC/ADMIN/RCVD: CPT | Performed by: FAMILY MEDICINE

## 2020-12-29 PROCEDURE — G8427 DOCREV CUR MEDS BY ELIG CLIN: HCPCS | Performed by: FAMILY MEDICINE

## 2020-12-29 PROCEDURE — G8417 CALC BMI ABV UP PARAM F/U: HCPCS | Performed by: FAMILY MEDICINE

## 2020-12-29 RX ORDER — METOPROLOL TARTRATE 50 MG/1
TABLET, FILM COATED ORAL
Qty: 180 TABLET | Refills: 3 | Status: SHIPPED | OUTPATIENT
Start: 2020-12-29 | End: 2020-12-29 | Stop reason: SDUPTHER

## 2020-12-29 RX ORDER — METOPROLOL TARTRATE 50 MG/1
TABLET, FILM COATED ORAL
Qty: 180 TABLET | Refills: 3 | Status: SHIPPED | OUTPATIENT
Start: 2020-12-29 | End: 2021-04-22 | Stop reason: SDUPTHER

## 2020-12-29 RX ORDER — CITALOPRAM 40 MG/1
TABLET ORAL
Qty: 90 TABLET | Refills: 0 | Status: SHIPPED | OUTPATIENT
Start: 2020-12-29 | End: 2021-05-24 | Stop reason: SDUPTHER

## 2020-12-29 NOTE — TELEPHONE ENCOUNTER
Patient's electronic refill request sent to Dr. Elena Lopez office in error. Patient's Last office visit 6/29/20. Next visit 12/29/20.

## 2020-12-29 NOTE — PROGRESS NOTES
Chief Complaint   Patient presents with    Annual Exam     6 month, has been falling       HPI:  Jorge Cruz is a 66 y.o. male    Scheduled  As 6 mos follow up    Actually has appt with Dr. Cheyenne Nj coming up regarding balance issues    Chronic pain pt  Sees Dr. Karen Shahk  Morphine and percocet    Back:  Dr. Jax Brito had done surgery on his back in the past.     Needs refill on lopressor    Sees pulm and cardio and urology and rad/onc  Multiple appts coming up all set in EPIC      Past Medical History:   Diagnosis Date    Alcohol abuse     CAD (coronary artery disease)     patient states no doctor has told him this / has 1 cardiac stent    Cancer ([address]     lung LLL    Chronic back pain     Chronic kidney disease     Chronic sinusitis     DJD (degenerative joint disease) of knee     left knee DJD    Elevated PSA     History of blood transfusion 1980's    History of inferior wall myocardial infarction 01/2016    1 cardiac stent    HTN (hypertension)     meds .  1 yr    Hyperlipidemia     meds > 12 yrs    NSTEMI (non-ST elevated myocardial infarction) ([facility] [address]     Smoker      Past Surgical History:   Procedure Laterality Date    ABDOMEN SURGERY  1961    spleenectomy due to [address]  2009    lumbar disc OR    CARDIAC SURGERY      stents    CARPAL TUNNEL RELEASE Right 5/6/2019    RIGHT CARPAL TUNNEL RELEASE performed by Vazquez Jimenes MD at [place] [address] WITH STENT PLACEMENT  1/29/2016    EYE SURGERY      to have Phaco with IOL OU 2/2018    HERNIA REPAIR      JOINT REPLACEMENT Left 1994    LTHR    JOINT REPLACEMENT Right 2009    RTKR    KNEE SURGERY Right 2011    arthroscopy    [place] MANIPULAT[place] KNEE JT+ANESTHESIA Right 5/12/2017    RIGHT KNEE MANIPULATION UNDER ANESTHESIA performed by Wendelyn Canavan, MD at [address] OFFICE/OUTPT VISIT,PROCEDURE ONLY Bilateral 12/29/2017    RIGHT AND BILATERAL L 4-5 LYSIS OF SCAR DISKECTOMY INTERBODY CAGE FUSION POSTEROLATERAL FUSION PEDICLE SCREWS performed by Jas Patel MD at [address] [place] [address]  2004    benign    SPLENECTOMY  1961    TOTAL KNEE ARTHROPLASTY Right 3/24/2017    RIGHT  KNEE TOTAL ARTHROPLASTY, ELHAM CEMENTED PERSONA  performed by Andria Caballero MD at [address] History   Problem Relation Age of Onset    Osteoarthritis Mother     Emphysema Mother     Hypertension Father     Hearing Loss Father     Dementia Father     No Known Problems Sister     Prostate Cancer Brother     Colon Polyps Neg Hx      Social History     Socioeconomic History    Marital status:      Spouse name: None    Number of children: None    Years of education: None    Highest education level: None   Occupational History    Occupation: retired   Social Needs    Financial resource strain: None    Food insecurity     Worry: None     Inability: None    Transportation needs     Medical: None     Non-medical: None   Tobacco Use    Smoking status: Current Every Day Smoker     Packs/day: 0.50     Years: 60.00     Pack years: 30.00     Types: Cigarettes    Smokeless tobacco: Never Used   Substance and Sexual Activity    Alcohol use:  Yes     Alcohol/week: 12.0 standard drinks     Types: 12 Shots of liquor per week     Comment: last day before yesterday    Drug use: No    Sexual activity: Yes   Lifestyle    Physical activity     Days per week: None     Minutes per session: None    Stress: None   Relationships    Social connections     Talks on phone: None     Gets together: None     Attends Jew service: None     Active member of club or organization: None     Attends meetings of clubs or organizations: None     Relationship status: None    Intimate partner violence     Fear of current or ex partner: None     Emotionally abused: None     Physically abused: None     Forced sexual activity: None   Other Topics Concern    None   Social History Narrative    Lives alone     Current Outpatient Medications   Medication Sig Dispense Refill    metoprolol tartrate (LOPRESSOR) 50 MG tablet TAKE ONE TABLET BY MOUTH TWO TIMES A  tablet 3    finasteride (PROSCAR) 5 MG tablet TAKE ONE TABLET BY MOUTH DAILY 90 tablet 3    clopidogrel (PLAVIX) 75 MG tablet TAKE ONE TABLET BY MOUTH DAILY 90 tablet 3    atorvastatin (LIPITOR) 20 MG tablet TAKE ONE TABLET BY MOUTH DAILY 90 tablet 3    fluticasone-vilanterol (BREO ELLIPTA) 100-25 MCG/INH AEPB inhaler Inhale 1 puff into the lungs daily 1 each 3    tiotropium (SPIRIVA RESPIMAT) 2.5 MCG/ACT AERS inhaler Inhale 2 puffs into the lungs daily 1 Inhaler 3    oxyCODONE-acetaminophen (PERCOCET) 7.5-325 MG per tablet TAKE ONE TABLET BY MOUTH THREE TIMES A DAY      omeprazole (PRILOSEC) 10 MG delayed release capsule TAKE ONE CAPSULE BY MOUTH DAILY 90 capsule 2    bumetanide (BUMEX) 1 MG tablet Take 1 tablet by mouth daily 90 tablet 3    morphine (MS CONTIN) 15 MG extended release tablet Take 15 mg by mouth as needed.  diclofenac (VOLTAREN) 50 MG EC tablet TAKE ONE TABLET BY MOUTH TWO TIMES A  tablet 2    fluticasone (FLONASE) 50 MCG/ACT nasal spray 1 spray by Each Nostril route 2 times daily 1 Bottle 3    isosorbide mononitrate (IMDUR) 30 MG extended release tablet Take 1 tablet by mouth daily 90 tablet 3    citalopram (CELEXA) 40 MG tablet TAKE 1 TABLET BY MOUTH NIGHTLY 90 tablet 3    nitroGLYCERIN (NITROSTAT) 0.4 MG SL tablet Place 1 tablet under the tongue every 5 minutes as needed for Chest pain up to max of 3 total doses. If no relief after 1 dose, call 911. 25 tablet 3    albuterol sulfate  (90 Base) MCG/ACT inhaler Inhale 2 puffs into the lungs every 6 hours as needed for Wheezing (Patient not taking: Reported on 12/29/2020) 1 Inhaler 3    DOCUSATE CALCIUM PO Take by mouth as needed       No current facility-administered medications for this visit.       No Known Allergies    Review of Systems:   General ROS: negative for - chills, fatigue, fever, malaise, weight gain or weight loss  Respiratory ROS: no cough, shortness of breath, or wheezing  Cardiovascular ROS: no chest pain or dyspnea on exertion  Gastrointestinal ROS: no abdominal pain, change in bowel habits, or black or bloody stools  Genito-Urinary ROS: no dysuria, trouble voiding  Musculoskeletal ROS: see HPI  Neurological ROS: gait ataxia    In general patient otherwise reports feeling well. Physical Exam:  /62 (Site: Right Upper Arm)   Pulse 97   Temp 97.5 °F (36.4 °C)   Ht 5' 7\" (1.702 m)   Wt 163 lb (73.9 kg)   SpO2 96%   BMI 25.53 kg/m²     Gen: Well, NAD, Alert, Oriented x 3   HEENT: EOMI, eyes clear, MMM  Skin: without rash or jaundice  Neck: no significant lymphadenopathy or thyromegaly  Lungs: CTAB   Heart: RRR, S1S2, w/out M/R/G, non-displaced PMI   Ext: prominent varicosities bilaterally. 2+ pedal edema  Neuro:altered light touch in feet      A&P   Diagnosis Orders   1. Coronary artery disease involving native coronary artery of native heart with angina pectoris (HCC)  metoprolol tartrate (LOPRESSOR) 50 MG tablet   2. Sacroiliitis, not elsewhere classified ([facility]     3. NSTEMI (non-ST elevated myocardial infarction) ([facility]     4. Primary osteoarthritis of right knee     5. Coronary artery disease involving native heart without angina pectoris, unspecified vessel or lesion type     6. Charcot's joint of left ankle     7.  Osteoarthritis of right knee, unspecified osteoarthritis type       Multifactorial gait ataxia  decreased proprioception/alertness on chronic opiate medicatioons    Refills given   F/u with multiple specialists      Milana Austin MD

## 2021-01-13 ENCOUNTER — OFFICE VISIT (OUTPATIENT)
Dept: CARDIOLOGY CLINIC | Age: 76
End: 2021-01-13
Payer: MEDICARE

## 2021-01-13 VITALS
DIASTOLIC BLOOD PRESSURE: 65 MMHG | OXYGEN SATURATION: 98 % | HEIGHT: 67 IN | BODY MASS INDEX: 25.74 KG/M2 | HEART RATE: 63 BPM | WEIGHT: 164 LBS | RESPIRATION RATE: 16 BRPM | SYSTOLIC BLOOD PRESSURE: 120 MMHG

## 2021-01-13 DIAGNOSIS — I10 ESSENTIAL HYPERTENSION: ICD-10-CM

## 2021-01-13 DIAGNOSIS — I25.119 CORONARY ARTERY DISEASE INVOLVING NATIVE CORONARY ARTERY OF NATIVE HEART WITH ANGINA PECTORIS (HCC): Primary | ICD-10-CM

## 2021-01-13 DIAGNOSIS — E78.5 DYSLIPIDEMIA: ICD-10-CM

## 2021-01-13 PROCEDURE — G8417 CALC BMI ABV UP PARAM F/U: HCPCS | Performed by: INTERNAL MEDICINE

## 2021-01-13 PROCEDURE — 4004F PT TOBACCO SCREEN RCVD TLK: CPT | Performed by: INTERNAL MEDICINE

## 2021-01-13 PROCEDURE — 1123F ACP DISCUSS/DSCN MKR DOCD: CPT | Performed by: INTERNAL MEDICINE

## 2021-01-13 PROCEDURE — 99214 OFFICE O/P EST MOD 30 MIN: CPT | Performed by: INTERNAL MEDICINE

## 2021-01-13 PROCEDURE — G8484 FLU IMMUNIZE NO ADMIN: HCPCS | Performed by: INTERNAL MEDICINE

## 2021-01-13 PROCEDURE — 4040F PNEUMOC VAC/ADMIN/RCVD: CPT | Performed by: INTERNAL MEDICINE

## 2021-01-13 PROCEDURE — G8427 DOCREV CUR MEDS BY ELIG CLIN: HCPCS | Performed by: INTERNAL MEDICINE

## 2021-01-13 ASSESSMENT — ENCOUNTER SYMPTOMS
BLOOD IN STOOL: 0
STRIDOR: 0
EYES NEGATIVE: 1
COUGH: 0
WHEEZING: 0
CHEST TIGHTNESS: 0
GASTROINTESTINAL NEGATIVE: 1
SHORTNESS OF BREATH: 1
NAUSEA: 0
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
## 2021-01-14 ENCOUNTER — HOSPITAL ENCOUNTER (OUTPATIENT)
Dept: CT IMAGING | Age: 76
Discharge: HOME | End: 2021-01-16
Payer: MEDICARE

## 2021-01-14 DIAGNOSIS — Z92.3 HISTORY OF THERAPEUTIC RADIATION: ICD-10-CM

## 2021-01-14 DIAGNOSIS — C34.92 NSCLC OF LEFT LUNG (HCC): ICD-10-CM

## 2021-01-14 PROCEDURE — 71250 CT THORAX DX C-: CPT

## 2021-01-20 ENCOUNTER — HOSPITAL ENCOUNTER (OUTPATIENT)
Dept: RADIATION ONCOLOGY | Age: 76
Discharge: HOME | End: 2021-01-20
Attending: RADIOLOGY
Payer: MEDICARE

## 2021-01-20 VITALS
SYSTOLIC BLOOD PRESSURE: 108 MMHG | DIASTOLIC BLOOD PRESSURE: 57 MMHG | WEIGHT: 162.6 LBS | RESPIRATION RATE: 18 BRPM | TEMPERATURE: 97.2 F | BODY MASS INDEX: 25.47 KG/M2 | HEART RATE: 62 BPM | OXYGEN SATURATION: 92 %

## 2021-01-20 DIAGNOSIS — Z92.3 HISTORY OF THERAPEUTIC RADIATION: Primary | ICD-10-CM

## 2021-01-20 DIAGNOSIS — C34.32 MALIGNANT NEOPLASM OF LOWER LOBE BRONCHUS, LEFT (HCC): ICD-10-CM

## 2021-01-20 PROCEDURE — 99212 OFFICE O/P EST SF 10 MIN: CPT | Performed by: RADIOLOGY
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
## 2021-01-20 NOTE — PROGRESS NOTES
NURSING ASSESSMENT     Date: 1/20/2021        Patient Name: Kailash Starr     YOB: 1945      Age:  66 y.o. MRN: [id number]     Chaperone [] Yes   [x] No      Advance Directives:   Do you currently have completed advance directives (living will)? [x] Yes   [] No         *If yes, please bring us a copy for your records. *If no, would you like info or assistance in completing advance directives (living will)? [] Yes   [x] No    Pain Score:   Pain Score (1-10):0  Pain Location:Left ankle, right knee and back  Pain Duration: intemittent   Pain Management/Control: Sees Dr. Siomara Kaufman in pain management MS contin 15 mg, percocet 7.5 mg-325mg      Is pain affecting your ability to take care of yourself or move throughout your home? [] Yes   [x] No    General: No Problems  Patient has gained weight [] Yes   [x] No  Patient has lost weight [x] Yes   [] No  How much weight in pounds and over what length of time: Down 6.6 lbs since last visit 12/3/20     Eyes (Ophthalmic): Seeing black spots on occasion. Seeing optometrist on Feb. 5th, 2021     Skin (Dermatological): No Problems     ENT: No Problems     Respiratory: Cough with occasional clear phlegm     Cardiovascular: No Problems      Device   [] Yes   [x] No   Copy of Card Obtained [] Yes   [x] No    Gastrointestinal: No Problems    Genito-Urinary:    No Problems       Breast: No Problems     Musculoskeletal: Immobility, Joint Pain and Back Pain, uses a cane for ambulation    Neurological:Balance issues. Marvin Cobb on [place] day. To see Neurologist Dr. Margoth topete. Hematological and Lymphatic: No Problems     Endocrine: No Problems         A 10-point review of systems has been conducted and pertinent positives have been   recorded. All other review of systems are negative    Was the patient admitted during the course of treatment OR within 30 days of treatment?  No      Additional Comments:

## 2021-02-15 ENCOUNTER — OFFICE VISIT (OUTPATIENT)
Dept: PULMONOLOGY | Age: 76
End: 2021-02-15
Payer: MEDICARE

## 2021-02-15 VITALS
BODY MASS INDEX: 26.16 KG/M2 | SYSTOLIC BLOOD PRESSURE: 110 MMHG | HEART RATE: 67 BPM | WEIGHT: 167 LBS | DIASTOLIC BLOOD PRESSURE: 70 MMHG | OXYGEN SATURATION: 95 %

## 2021-02-15 DIAGNOSIS — R91.1 LUNG NODULE: ICD-10-CM

## 2021-02-15 DIAGNOSIS — C34.32 MALIGNANT NEOPLASM OF LOWER LOBE OF LEFT LUNG (HCC): ICD-10-CM

## 2021-02-15 DIAGNOSIS — F17.200 SMOKING: ICD-10-CM

## 2021-02-15 DIAGNOSIS — K21.9 GASTROESOPHAGEAL REFLUX DISEASE WITHOUT ESOPHAGITIS: ICD-10-CM

## 2021-02-15 DIAGNOSIS — R09.81 NASAL CONGESTION: ICD-10-CM

## 2021-02-15 DIAGNOSIS — J44.9 CHRONIC OBSTRUCTIVE PULMONARY DISEASE, UNSPECIFIED COPD TYPE (HCC): Primary | ICD-10-CM

## 2021-02-15 PROCEDURE — 4004F PT TOBACCO SCREEN RCVD TLK: CPT | Performed by: INTERNAL MEDICINE

## 2021-02-15 PROCEDURE — 99214 OFFICE O/P EST MOD 30 MIN: CPT | Performed by: INTERNAL MEDICINE

## 2021-02-15 PROCEDURE — 4040F PNEUMOC VAC/ADMIN/RCVD: CPT | Performed by: INTERNAL MEDICINE

## 2021-02-15 PROCEDURE — G8417 CALC BMI ABV UP PARAM F/U: HCPCS | Performed by: INTERNAL MEDICINE

## 2021-02-15 PROCEDURE — G8427 DOCREV CUR MEDS BY ELIG CLIN: HCPCS | Performed by: INTERNAL MEDICINE

## 2021-02-15 PROCEDURE — G8926 SPIRO NO PERF OR DOC: HCPCS | Performed by: INTERNAL MEDICINE

## 2021-02-15 PROCEDURE — G8484 FLU IMMUNIZE NO ADMIN: HCPCS | Performed by: INTERNAL MEDICINE

## 2021-02-15 PROCEDURE — 1123F ACP DISCUSS/DSCN MKR DOCD: CPT | Performed by: INTERNAL MEDICINE

## 2021-02-15 PROCEDURE — 3023F SPIROM DOC REV: CPT | Performed by: INTERNAL MEDICINE
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
## 2021-02-22 RX ORDER — ISOSORBIDE MONONITRATE 30 MG/1
TABLET, EXTENDED RELEASE ORAL
Qty: 90 TABLET | Refills: 0 | Status: SHIPPED | OUTPATIENT
Start: 2021-02-22 | End: 2021-04-22 | Stop reason: SDUPTHER

## 2021-02-22 NOTE — TELEPHONE ENCOUNTER
requesting medication refill.  Please approve or deny this request.    Rx requested:  Requested Prescriptions     Pending Prescriptions Disp Refills    isosorbide mononitrate (IMDUR) 30 MG extended release tablet [Pharmacy Med Name: Isosorbide Mononitrate ER Oral Tablet Extended Release 24 Hour 30 MG] 90 tablet 0     Sig: TAKE ONE TABLET BY MOUTH EVERY DAY         Last Office Visit:   1/13/2021      Next Visit Date:  Future Appointments   Date Time Provider Jeimy Jerry   3/18/2021  1:30 PM MD [facility] [place]   4/22/2021 12:15 PM Quan Omalley MD [address] [place] [address]   5/19/2021  2:45 PM Kate Hernández MD [facility]   6/29/2021  1:30 PM Guerrero Pete MD [facility]   10/26/2021  1:00 PM MD Parish Mulligan

## 2021-02-23 ENCOUNTER — TELEPHONE (OUTPATIENT)
Dept: PULMONOLOGY | Age: 76
End: 2021-02-23

## 2021-02-23 RX ORDER — TIOTROPIUM BROMIDE 18 UG/1
18 CAPSULE ORAL; RESPIRATORY (INHALATION) DAILY
Qty: 90 CAPSULE | Refills: 1 | Status: SHIPPED | OUTPATIENT
Start: 2021-02-23 | End: 2021-05-19 | Stop reason: ALTCHOICE

## 2021-03-08 ENCOUNTER — TELEPHONE (OUTPATIENT)
Dept: PULMONOLOGY | Age: 76
End: 2021-03-08

## 2021-03-08 DIAGNOSIS — J44.9 CHRONIC OBSTRUCTIVE PULMONARY DISEASE, UNSPECIFIED COPD TYPE (HCC): ICD-10-CM

## 2021-03-08 NOTE — TELEPHONE ENCOUNTER
Medical Services has been trying to contact pt to get his oxygen to him but have no luck. They also called asking for the correct oxygen level he is on - one order states 2% and the other states 3%. Please contact them with the correct amount.  Thanks

## 2021-03-09 NOTE — TELEPHONE ENCOUNTER
It's 3L.  Please try to contact patient and let him know and give him [facility] phone number

## 2021-03-18 ENCOUNTER — OFFICE VISIT (OUTPATIENT)
Dept: NEUROLOGY | Age: 76
End: 2021-03-18
Payer: MEDICARE

## 2021-03-18 VITALS
WEIGHT: 168.7 LBS | HEART RATE: 68 BPM | SYSTOLIC BLOOD PRESSURE: 138 MMHG | DIASTOLIC BLOOD PRESSURE: 76 MMHG | BODY MASS INDEX: 26.42 KG/M2

## 2021-03-18 DIAGNOSIS — M48.062 SPINAL STENOSIS OF LUMBAR REGION WITH NEUROGENIC CLAUDICATION: ICD-10-CM

## 2021-03-18 DIAGNOSIS — R42 DYSEQUILIBRIUM: ICD-10-CM

## 2021-03-18 DIAGNOSIS — R26.0 ATAXIC GAIT: Primary | ICD-10-CM

## 2021-03-18 PROCEDURE — 4040F PNEUMOC VAC/ADMIN/RCVD: CPT | Performed by: PSYCHIATRY & NEUROLOGY

## 2021-03-18 PROCEDURE — G8484 FLU IMMUNIZE NO ADMIN: HCPCS | Performed by: PSYCHIATRY & NEUROLOGY

## 2021-03-18 PROCEDURE — 99204 OFFICE O/P NEW MOD 45 MIN: CPT | Performed by: PSYCHIATRY & NEUROLOGY

## 2021-03-18 PROCEDURE — 4004F PT TOBACCO SCREEN RCVD TLK: CPT | Performed by: PSYCHIATRY & NEUROLOGY

## 2021-03-18 PROCEDURE — 1123F ACP DISCUSS/DSCN MKR DOCD: CPT | Performed by: PSYCHIATRY & NEUROLOGY

## 2021-03-18 PROCEDURE — G8427 DOCREV CUR MEDS BY ELIG CLIN: HCPCS | Performed by: PSYCHIATRY & NEUROLOGY

## 2021-03-18 PROCEDURE — G8417 CALC BMI ABV UP PARAM F/U: HCPCS | Performed by: PSYCHIATRY & NEUROLOGY

## 2021-03-18 ASSESSMENT — ENCOUNTER SYMPTOMS
SHORTNESS OF BREATH: 0
NAUSEA: 0
TROUBLE SWALLOWING: 0
VOMITING: 0
PHOTOPHOBIA: 0
BACK PAIN: 0
COLOR CHANGE: 0
CHOKING: 0
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
## 2021-04-08 ENCOUNTER — HOSPITAL ENCOUNTER (OUTPATIENT)
Dept: CT IMAGING | Age: 76
Discharge: HOME | End: 2021-04-10
Payer: MEDICARE

## 2021-04-08 DIAGNOSIS — Z92.3 HISTORY OF THERAPEUTIC RADIATION: ICD-10-CM

## 2021-04-08 DIAGNOSIS — C34.32 MALIGNANT NEOPLASM OF LOWER LOBE BRONCHUS, LEFT (HCC): ICD-10-CM

## 2021-04-08 PROCEDURE — A9552 F18 FDG: HCPCS | Performed by: RADIOLOGY

## 2021-04-08 PROCEDURE — 3430000000 HC RX DIAGNOSTIC RADIOPHARMACEUTICAL: Performed by: RADIOLOGY

## 2021-04-08 PROCEDURE — 78815 PET IMAGE W/CT SKULL-THIGH: CPT

## 2021-04-08 RX ORDER — FLUDEOXYGLUCOSE F 18 200 MCI/ML
15.47 INJECTION, SOLUTION INTRAVENOUS
Status: COMPLETED | OUTPATIENT
Start: 2021-04-08 | End: 2021-04-08

## 2021-04-08 RX ADMIN — FLUDEOXYGLUCOSE F 18 15.47 MILLICURIE: 200 INJECTION, SOLUTION INTRAVENOUS at 11:53

## 2021-04-09 ENCOUNTER — HOSPITAL ENCOUNTER (OUTPATIENT)
Dept: MRI IMAGING | Age: 76
Discharge: HOME | End: 2021-04-11
Payer: MEDICARE

## 2021-04-09 DIAGNOSIS — R26.0 ATAXIC GAIT: ICD-10-CM

## 2021-04-09 LAB
FOLATE: 5.7 NG/ML (ref 7.3–26.1)
VITAMIN B-12: 408 PG/ML (ref 232–1245)

## 2021-04-09 PROCEDURE — 72148 MRI LUMBAR SPINE W/O DYE: CPT

## 2021-04-09 PROCEDURE — 70551 MRI BRAIN STEM W/O DYE: CPT

## 2021-04-13 LAB
ALPHA-TOCOPHEROL: 7.8 MG/L (ref 5.5–18)
GAMMA-TOCOPHEROL: 2.2 MG/L (ref 0–6)
ZINC: 50 UG/DL (ref 60–120)

## 2021-04-20 ENCOUNTER — HOSPITAL ENCOUNTER (OUTPATIENT)
Dept: NEUROLOGY | Age: 76
Discharge: HOME | End: 2021-04-20
Payer: MEDICARE

## 2021-04-20 DIAGNOSIS — R26.0 ATAXIC GAIT: ICD-10-CM

## 2021-04-20 PROCEDURE — 95886 MUSC TEST DONE W/N TEST COMP: CPT

## 2021-04-20 PROCEDURE — 95912 NRV CNDJ TEST 11-12 STUDIES: CPT

## 2021-04-20 PROCEDURE — 95912 NRV CNDJ TEST 11-12 STUDIES: CPT | Performed by: PSYCHIATRY & NEUROLOGY

## 2021-04-20 PROCEDURE — 95886 MUSC TEST DONE W/N TEST COMP: CPT | Performed by: PSYCHIATRY & NEUROLOGY
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
## 2021-04-22 ENCOUNTER — OFFICE VISIT (OUTPATIENT)
Dept: CARDIOLOGY CLINIC | Age: 76
End: 2021-04-22
Payer: MEDICARE

## 2021-04-22 VITALS
DIASTOLIC BLOOD PRESSURE: 61 MMHG | HEART RATE: 64 BPM | OXYGEN SATURATION: 98 % | SYSTOLIC BLOOD PRESSURE: 109 MMHG | WEIGHT: 169 LBS | RESPIRATION RATE: 16 BRPM | HEIGHT: 67 IN | BODY MASS INDEX: 26.53 KG/M2

## 2021-04-22 DIAGNOSIS — E78.5 DYSLIPIDEMIA: ICD-10-CM

## 2021-04-22 DIAGNOSIS — M54.50 CHRONIC LOW BACK PAIN, UNSPECIFIED BACK PAIN LATERALITY, UNSPECIFIED WHETHER SCIATICA PRESENT: Primary | ICD-10-CM

## 2021-04-22 DIAGNOSIS — I10 ESSENTIAL HYPERTENSION: ICD-10-CM

## 2021-04-22 DIAGNOSIS — G89.29 CHRONIC LOW BACK PAIN, UNSPECIFIED BACK PAIN LATERALITY, UNSPECIFIED WHETHER SCIATICA PRESENT: Primary | ICD-10-CM

## 2021-04-22 DIAGNOSIS — I25.119 CORONARY ARTERY DISEASE INVOLVING NATIVE CORONARY ARTERY OF NATIVE HEART WITH ANGINA PECTORIS (HCC): Primary | ICD-10-CM

## 2021-04-22 PROCEDURE — 1123F ACP DISCUSS/DSCN MKR DOCD: CPT | Performed by: INTERNAL MEDICINE

## 2021-04-22 PROCEDURE — 99214 OFFICE O/P EST MOD 30 MIN: CPT | Performed by: INTERNAL MEDICINE

## 2021-04-22 PROCEDURE — G8427 DOCREV CUR MEDS BY ELIG CLIN: HCPCS | Performed by: INTERNAL MEDICINE

## 2021-04-22 PROCEDURE — G8417 CALC BMI ABV UP PARAM F/U: HCPCS | Performed by: INTERNAL MEDICINE

## 2021-04-22 PROCEDURE — 4040F PNEUMOC VAC/ADMIN/RCVD: CPT | Performed by: INTERNAL MEDICINE

## 2021-04-22 PROCEDURE — 4004F PT TOBACCO SCREEN RCVD TLK: CPT | Performed by: INTERNAL MEDICINE

## 2021-04-22 RX ORDER — METOPROLOL TARTRATE 50 MG/1
25 TABLET, FILM COATED ORAL 2 TIMES DAILY
Qty: 180 TABLET | Refills: 3
Start: 2021-04-22 | End: 2022-01-03 | Stop reason: SDUPTHER

## 2021-04-22 RX ORDER — ISOSORBIDE MONONITRATE 30 MG/1
TABLET, EXTENDED RELEASE ORAL
Qty: 90 TABLET | Refills: 3 | Status: SHIPPED | OUTPATIENT
Start: 2021-04-22 | End: 2022-02-04 | Stop reason: SDUPTHER

## 2021-04-22 ASSESSMENT — ENCOUNTER SYMPTOMS
WHEEZING: 0
STRIDOR: 0
NAUSEA: 0
BLOOD IN STOOL: 0
CHEST TIGHTNESS: 0
SHORTNESS OF BREATH: 1
GASTROINTESTINAL NEGATIVE: 1
COUGH: 0
EYES NEGATIVE: 1
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
## 2021-05-03 ENCOUNTER — TELEPHONE (OUTPATIENT)
Dept: NEUROLOGY | Age: 76
End: 2021-05-03

## 2021-05-19 ENCOUNTER — OFFICE VISIT (OUTPATIENT)
Dept: PULMONOLOGY | Age: 76
End: 2021-05-19
Payer: MEDICARE

## 2021-05-19 VITALS
TEMPERATURE: 98 F | OXYGEN SATURATION: 98 % | SYSTOLIC BLOOD PRESSURE: 112 MMHG | WEIGHT: 171.2 LBS | HEART RATE: 70 BPM | DIASTOLIC BLOOD PRESSURE: 70 MMHG | BODY MASS INDEX: 26.87 KG/M2 | HEIGHT: 67 IN

## 2021-05-19 DIAGNOSIS — K21.9 GASTROESOPHAGEAL REFLUX DISEASE WITHOUT ESOPHAGITIS: ICD-10-CM

## 2021-05-19 DIAGNOSIS — C34.32 MALIGNANT NEOPLASM OF LOWER LOBE OF LEFT LUNG (HCC): Primary | ICD-10-CM

## 2021-05-19 DIAGNOSIS — F17.200 SMOKING: ICD-10-CM

## 2021-05-19 DIAGNOSIS — J44.9 CHRONIC OBSTRUCTIVE PULMONARY DISEASE, UNSPECIFIED COPD TYPE (HCC): ICD-10-CM

## 2021-05-19 DIAGNOSIS — R09.81 NASAL CONGESTION: ICD-10-CM

## 2021-05-19 PROCEDURE — G8417 CALC BMI ABV UP PARAM F/U: HCPCS | Performed by: INTERNAL MEDICINE

## 2021-05-19 PROCEDURE — 3023F SPIROM DOC REV: CPT | Performed by: INTERNAL MEDICINE

## 2021-05-19 PROCEDURE — 4040F PNEUMOC VAC/ADMIN/RCVD: CPT | Performed by: INTERNAL MEDICINE

## 2021-05-19 PROCEDURE — 99214 OFFICE O/P EST MOD 30 MIN: CPT | Performed by: INTERNAL MEDICINE

## 2021-05-19 PROCEDURE — 1123F ACP DISCUSS/DSCN MKR DOCD: CPT | Performed by: INTERNAL MEDICINE

## 2021-05-19 PROCEDURE — G8427 DOCREV CUR MEDS BY ELIG CLIN: HCPCS | Performed by: INTERNAL MEDICINE

## 2021-05-19 PROCEDURE — 4004F PT TOBACCO SCREEN RCVD TLK: CPT | Performed by: INTERNAL MEDICINE

## 2021-05-19 PROCEDURE — G8926 SPIRO NO PERF OR DOC: HCPCS | Performed by: INTERNAL MEDICINE

## 2021-05-19 NOTE — PROGRESS NOTES
Mojgan Hamm MD at [address] OFFICE/OUTPT VISIT,PROCEDURE ONLY Bilateral 12/29/2017    RIGHT AND BILATERAL L 4-5 LYSIS OF SCAR DISKECTOMY INTERBODY CAGE FUSION POSTEROLATERAL FUSION PEDICLE SCREWS performed by Pat Conley MD at [address] [place] [address]  2004    benign    SPLENECTOMY  1961    TOTAL KNEE ARTHROPLASTY Right 3/24/2017    RIGHT  KNEE TOTAL ARTHROPLASTY, ELHAM CEMENTED PERSONA  performed by Juliet Reed MD at [address] History   Problem Relation Age of Onset    Osteoarthritis Mother     Emphysema Mother     Hypertension Father     Hearing Loss Father     Dementia Father     No Known Problems Sister     Prostate Cancer Brother     Colon Polyps Neg Hx      Social History     Socioeconomic History    Marital status:      Spouse name: Not on file    Number of children: Not on file    Years of education: Not on file    Highest education level: Not on file   Occupational History    Occupation: retired   Tobacco Use    Smoking status: Current Every Day Smoker     Packs/day: 0.25     Years: 60.00     Pack years: 15.00     Types: Cigarettes    Smokeless tobacco: Never Used   Vaping Use    Vaping Use: Never used   Substance and Sexual Activity    Alcohol use: Yes     Alcohol/week: 12.0 standard drinks     Types: 12 Shots of liquor per week     Comment: last day before yesterday    Drug use: No    Sexual activity: Yes   Other Topics Concern    Not on file   Social History Narrative    Lives alone     Social Determinants of Health     Financial Resource Strain:     Difficulty of Paying Living Expenses:    Food Insecurity:     Worried About Running Out of Food in the Last Year:     Ran Out of Food in the Last Year:    Transportation Needs:     Lack of Transportation (Medical):      Lack of Transportation (Non-Medical):    Physical Activity:     Days of Exercise per Week:     Minutes of Exercise per Session:    Stress:     Feeling of Stress :    Social Connections:     Frequency of Communication with Friends and Family:     Frequency of Social Gatherings with Friends and Family:     Attends Jewish Services:     Active Member of Clubs or Organizations:     Attends Club or Organization Meetings:     Marital Status:    Intimate Partner Violence:     Fear of Current or Ex-Partner:     Emotionally Abused:     Physically Abused:     Sexually Abused:          Review of Systems      ROS: 10 organs review of system is done including general, psychological, ENT, hematological, endocrine, respiratory, cardiovascular, gastrointestinal,musculoskeletal, neurological,  allergy and Immunology is done and is otherwise negative.     Current Outpatient Medications   Medication Sig Dispense Refill    tiotropium (SPIRIVA RESPIMAT) 2.5 MCG/ACT AERS inhaler Inhale 2 puffs into the lungs daily 1 Inhaler 3    isosorbide mononitrate (IMDUR) 30 MG extended release tablet TAKE ONE TABLET BY MOUTH EVERY DAY 90 tablet 3    metoprolol tartrate (LOPRESSOR) 50 MG tablet Take 0.5 tablets by mouth 2 times daily TAKE ONE TABLET BY MOUTH TWO TIMES A  tablet 3    Fluticasone furoate-vilanterol (BREO ELLIPTA) 200-25 MCG/INH AEPB inhaler Inhale 1 puff into the lungs daily 1 each 3    citalopram (CELEXA) 40 MG tablet TAKE ONE TABLET BY MOUTH nightly  90 tablet 0    finasteride (PROSCAR) 5 MG tablet TAKE ONE TABLET BY MOUTH DAILY 90 tablet 3    clopidogrel (PLAVIX) 75 MG tablet TAKE ONE TABLET BY MOUTH DAILY 90 tablet 3    atorvastatin (LIPITOR) 20 MG tablet TAKE ONE TABLET BY MOUTH DAILY 90 tablet 3    oxyCODONE-acetaminophen (PERCOCET) 7.5-325 MG per tablet TAKE ONE TABLET BY MOUTH THREE TIMES A DAY      omeprazole (PRILOSEC) 10 MG delayed release capsule TAKE ONE CAPSULE BY MOUTH DAILY 90 capsule 2    bumetanide (BUMEX) 1 MG tablet Take 1 tablet by mouth daily 90 tablet 3    albuterol sulfate  (90 Base) MCG/ACT inhaler Inhale 2 puffs into the lungs every 6 hours as needed for Wheezing 1 Inhaler 3    morphine (MS CONTIN) 15 MG extended release tablet Take 15 mg by mouth as needed.  diclofenac (VOLTAREN) 50 MG EC tablet TAKE ONE TABLET BY MOUTH TWO TIMES A  tablet 2    fluticasone (FLONASE) 50 MCG/ACT nasal spray 1 spray by Each Nostril route 2 times daily 1 Bottle 3    nitroGLYCERIN (NITROSTAT) 0.4 MG SL tablet Place 1 tablet under the tongue every 5 minutes as needed for Chest pain up to max of 3 total doses. If no relief after 1 dose, call 911. 25 tablet 3    DOCUSATE CALCIUM PO Take by mouth as needed       No current facility-administered medications for this visit. Objective:     Vitals:    05/19/21 1447   BP: 112/70   Site: Right Upper Arm   Position: Sitting   Cuff Size: Small Adult   Pulse: 70   Temp: 98 °F (36.7 °C)   TempSrc: Tympanic   SpO2: 98%   Weight: 171 lb 3.2 oz (77.7 kg)   Height: 5' 7\" (1.702 m)         Physical Exam  Constitutional:       Appearance: He is well-developed. HENT:      Head: Normocephalic and atraumatic. Eyes:      General:         Left eye: No discharge. Conjunctiva/sclera: Conjunctivae normal.      Pupils: Pupils are equal, round, and reactive to light. Neck:      Vascular: No JVD. Cardiovascular:      Rate and Rhythm: Normal rate and regular rhythm. Heart sounds: Normal heart sounds. No murmur heard. No friction rub. No gallop. Pulmonary:      Effort: Pulmonary effort is normal. No respiratory distress. Breath sounds: Normal breath sounds. No wheezing or rales. Chest:      Chest wall: No tenderness. Abdominal:      General: Bowel sounds are normal.      Palpations: Abdomen is soft. Musculoskeletal:         General: No tenderness or deformity. Cervical back: Normal range of motion and neck supple. Right lower leg: Edema present. Left lower leg: Edema present. Comments: Mild 1+ edema up to the ankles   Skin:     General: Skin is warm and dry.    Neurological: Mental Status: He is alert and oriented to person, place, and time. Psychiatric:         Judgment: Judgment normal.         Imaging studies reviewed by me PET CT scan April 2021 shows no hypermetabolic lesion  Lab results reviewed in chart  PFT June 2020, shows FEV1 41%, FEV1/FVC 0.54  ECHO: 2018 EF 95%, with diastolic dysfunction    Assessment and Plan       Diagnosis Orders   1. Malignant neoplasm of lower lobe of left lung ([address]  CT CHEST WO CONTRAST   2. Chronic obstructive pulmonary disease, unspecified COPD type ([address]     3. Smoking     4. Gastroesophageal reflux disease without esophagitis     5. Nasal congestion       · Left lower lobe non-small cell lung cancer, PET negative on last follow-up, will repeat CT chest in September for monitoring  · COPD, symptoms not well controlled, continue Breo, will add Spiriva again, yearly flu shot. · Smoking, smoking cessation strongly recommended  · GERD symptoms controlled continue Prilosec  · Nasal congestion, continue Flonase      Orders Placed This Encounter   Procedures    CT CHEST WO CONTRAST     Standing Status:   Future     Standing Expiration Date:   5/19/2022     Orders Placed This Encounter   Medications    tiotropium (SPIRIVA RESPIMAT) 2.5 MCG/ACT AERS inhaler     Sig: Inhale 2 puffs into the lungs daily     Dispense:  1 Inhaler     Refill:  3            Discussed with patient the importance of exercise and weight control and  overall health and well-being. Reviewed with the patient: current clinical status, medications, activities and diet. Side effects, adverse effects of the medication prescribed today, as well as treatment plan and result expectations have been discussed with the patient who expresses understanding and desires to proceed. Return in about 4 months (around 9/19/2021).       Sailaja Vergara MD

## 2021-05-24 ENCOUNTER — OFFICE VISIT (OUTPATIENT)
Dept: FAMILY MEDICINE CLINIC | Age: 76
End: 2021-05-24
Payer: MEDICARE

## 2021-05-24 VITALS
SYSTOLIC BLOOD PRESSURE: 128 MMHG | TEMPERATURE: 96.7 F | HEART RATE: 66 BPM | DIASTOLIC BLOOD PRESSURE: 72 MMHG | RESPIRATION RATE: 16 BRPM | WEIGHT: 168.9 LBS | BODY MASS INDEX: 26.51 KG/M2 | OXYGEN SATURATION: 94 % | HEIGHT: 67 IN

## 2021-05-24 DIAGNOSIS — S52.502A CLOSED FRACTURE OF DISTAL END OF LEFT RADIUS, UNSPECIFIED FRACTURE MORPHOLOGY, INITIAL ENCOUNTER: Primary | ICD-10-CM

## 2021-05-24 DIAGNOSIS — Z76.0 MEDICATION REFILL: ICD-10-CM

## 2021-05-24 PROCEDURE — 99213 OFFICE O/P EST LOW 20 MIN: CPT | Performed by: NURSE PRACTITIONER

## 2021-05-24 PROCEDURE — G8417 CALC BMI ABV UP PARAM F/U: HCPCS | Performed by: NURSE PRACTITIONER

## 2021-05-24 PROCEDURE — 4004F PT TOBACCO SCREEN RCVD TLK: CPT | Performed by: NURSE PRACTITIONER

## 2021-05-24 PROCEDURE — 1123F ACP DISCUSS/DSCN MKR DOCD: CPT | Performed by: NURSE PRACTITIONER

## 2021-05-24 PROCEDURE — G8427 DOCREV CUR MEDS BY ELIG CLIN: HCPCS | Performed by: NURSE PRACTITIONER

## 2021-05-24 PROCEDURE — 4040F PNEUMOC VAC/ADMIN/RCVD: CPT | Performed by: NURSE PRACTITIONER

## 2021-05-24 RX ORDER — CITALOPRAM 40 MG/1
TABLET ORAL
Qty: 30 TABLET | Refills: 0 | Status: SHIPPED | OUTPATIENT
Start: 2021-05-24 | End: 2021-06-29 | Stop reason: SDUPTHER

## 2021-05-24 SDOH — ECONOMIC STABILITY: FOOD INSECURITY: WITHIN THE PAST 12 MONTHS, THE FOOD YOU BOUGHT JUST DIDN'T LAST AND YOU DIDN'T HAVE MONEY TO GET MORE.: NEVER TRUE

## 2021-05-24 SDOH — ECONOMIC STABILITY: TRANSPORTATION INSECURITY
IN THE PAST 12 MONTHS, HAS THE LACK OF TRANSPORTATION KEPT YOU FROM MEDICAL APPOINTMENTS OR FROM GETTING MEDICATIONS?: NO

## 2021-05-24 SDOH — ECONOMIC STABILITY: TRANSPORTATION INSECURITY
IN THE PAST 12 MONTHS, HAS LACK OF TRANSPORTATION KEPT YOU FROM MEETINGS, WORK, OR FROM GETTING THINGS NEEDED FOR DAILY LIVING?: NO

## 2021-05-24 SDOH — ECONOMIC STABILITY: FOOD INSECURITY: WITHIN THE PAST 12 MONTHS, YOU WORRIED THAT YOUR FOOD WOULD RUN OUT BEFORE YOU GOT MONEY TO BUY MORE.: NEVER TRUE

## 2021-05-24 ASSESSMENT — ENCOUNTER SYMPTOMS
VISUAL CHANGE: 0
RHINORRHEA: 0
TROUBLE SWALLOWING: 0
ABDOMINAL PAIN: 0
VOMITING: 0
COUGH: 0
SORE THROAT: 0
DIARRHEA: 0
WHEEZING: 0
COLOR CHANGE: 0
SINUS PRESSURE: 0
SINUS PAIN: 0
BOWEL INCONTINENCE: 0
BACK PAIN: 0
CONSTIPATION: 0
NAUSEA: 0
SHORTNESS OF BREATH: 0
CHEST TIGHTNESS: 0
ABDOMINAL DISTENTION: 0

## 2021-05-24 ASSESSMENT — PATIENT HEALTH QUESTIONNAIRE - PHQ9
SUM OF ALL RESPONSES TO PHQ QUESTIONS 1-9: 0
SUM OF ALL RESPONSES TO PHQ QUESTIONS 1-9: 0
SUM OF ALL RESPONSES TO PHQ9 QUESTIONS 1 & 2: 0
SUM OF ALL RESPONSES TO PHQ QUESTIONS 1-9: 0

## 2021-05-24 NOTE — PROGRESS NOTES
joint swelling and myalgias. Negative for back pain, gait problem, neck pain and neck stiffness. Skin: Negative for color change, rash and wound. Neurological: Positive for tingling and numbness. Negative for dizziness, tremors, seizures, loss of consciousness, syncope, speech difficulty, weakness, light-headedness and headaches. Past Medical History:   Diagnosis Date    Alcohol abuse     CAD (coronary artery disease)     patient states no doctor has told him this / has 1 cardiac stent    Cancer ([facility]     lung LLL    Chronic back pain     Chronic kidney disease     Chronic sinusitis     DJD (degenerative joint disease) of knee     left knee DJD    Elevated PSA     History of blood transfusion 1980's    History of inferior wall myocardial infarction 01/2016    1 cardiac stent    HTN (hypertension)     meds .  1 yr    Hyperlipidemia     meds > 12 yrs    NSTEMI (non-ST elevated myocardial infarction) ([facility]     Smoker      Past Surgical History:   Procedure Laterality Date    ABDOMEN SURGERY  1961    spleenectomy due to [address]  2009    lumbar disc OR    CARDIAC SURGERY      stents    CARPAL TUNNEL RELEASE Right 5/6/2019    RIGHT CARPAL TUNNEL RELEASE performed by Torres Cole MD at [place] [address] WITH STENT PLACEMENT  1/29/2016    EYE SURGERY      to have Phaco with IOL OU 2/2018    HERNIA REPAIR      JOINT REPLACEMENT Left 1994    LTHR    JOINT REPLACEMENT Right 2009    RTKR    KNEE SURGERY Right 2011    arthroscopy    [place] MANIPULATN KNEE JT+ANESTHESIA Right 5/12/2017    RIGHT KNEE MANIPULATION UNDER ANESTHESIA performed by Valerie Penny MD at [address] OFFICE/OUTPT VISIT,PROCEDURE ONLY Bilateral 12/29/2017    RIGHT AND BILATERAL L 4-5 LYSIS OF SCAR DISKECTOMY INTERBODY CAGE FUSION POSTEROLATERAL FUSION PEDICLE SCREWS performed by Torres Cole MD at [address] [place] [address]  2004    [facility] ARTHROPLASTY Right 3/24/2017    RIGHT  KNEE TOTAL ARTHROPLASTY, ELHAM CEMENTED PERSONA  performed by Mark Nyhan, MD at [address] [facility] History     Socioeconomic History    Marital status:      Spouse name: Not on file    Number of children: Not on file    Years of education: Not on file    Highest education level: Not on file   Occupational History    Occupation: retired   Tobacco Use    Smoking status: Current Every Day Smoker     Packs/day: 0.25     Years: 60.00     Pack years: 15.00     Types: Cigarettes    Smokeless tobacco: Never Used   Vaping Use    Vaping Use: Never used   Substance and Sexual Activity    Alcohol use: Yes     Alcohol/week: 12.0 standard drinks     Types: 12 Shots of liquor per week     Comment: last day before yesterday    Drug use: No    Sexual activity: Yes   Other Topics Concern    Not on file   Social History Narrative    Lives alone     Social Determinants of Health     Financial Resource Strain: Low Risk     Difficulty of Paying Living Expenses: Not very hard   Food Insecurity: No Food Insecurity    Worried About Running Out of Food in the Last Year: Never true    Yoav of Food in the Last Year: Never true   Transportation Needs: No Transportation Needs    Lack of Transportation (Medical): No    Lack of Transportation (Non-Medical):  No   Physical Activity:     Days of Exercise per Week:     Minutes of Exercise per Session:    Stress:     Feeling of Stress :    Social Connections:     Frequency of Communication with Friends and Family:     Frequency of Social Gatherings with Friends and Family:     Attends Latter day Services:     Active Member of Clubs or Organizations:     Attends Club or Organization Meetings:     Marital Status:    Intimate Partner Violence:     Fear of Current or Ex-Partner:     Emotionally Abused:     Physically Abused:     Sexually Abused:      Family History   Problem Relation Age of Onset    Osteoarthritis Mother  Emphysema Mother     Hypertension Father     Hearing Loss Father     Dementia Father     No Known Problems Sister     Prostate Cancer Brother     Colon Polyps Neg Hx      No Known Allergies  Current Outpatient Medications   Medication Sig Dispense Refill    citalopram (CELEXA) 40 MG tablet TAKE ONE TABLET BY MOUTH nightly 30 tablet 0    diclofenac (VOLTAREN) 50 MG EC tablet TAKE ONE TABLET BY MOUTH TWO TIMES A DAY 60 tablet 0    tiotropium (SPIRIVA RESPIMAT) 2.5 MCG/ACT AERS inhaler Inhale 2 puffs into the lungs daily 1 Inhaler 3    isosorbide mononitrate (IMDUR) 30 MG extended release tablet TAKE ONE TABLET BY MOUTH EVERY DAY 90 tablet 3    metoprolol tartrate (LOPRESSOR) 50 MG tablet Take 0.5 tablets by mouth 2 times daily TAKE ONE TABLET BY MOUTH TWO TIMES A  tablet 3    Fluticasone furoate-vilanterol (BREO ELLIPTA) 200-25 MCG/INH AEPB inhaler Inhale 1 puff into the lungs daily 1 each 3    finasteride (PROSCAR) 5 MG tablet TAKE ONE TABLET BY MOUTH DAILY 90 tablet 3    clopidogrel (PLAVIX) 75 MG tablet TAKE ONE TABLET BY MOUTH DAILY 90 tablet 3    atorvastatin (LIPITOR) 20 MG tablet TAKE ONE TABLET BY MOUTH DAILY 90 tablet 3    oxyCODONE-acetaminophen (PERCOCET) 7.5-325 MG per tablet TAKE ONE TABLET BY MOUTH THREE TIMES A DAY      omeprazole (PRILOSEC) 10 MG delayed release capsule TAKE ONE CAPSULE BY MOUTH DAILY 90 capsule 2    bumetanide (BUMEX) 1 MG tablet Take 1 tablet by mouth daily 90 tablet 3    albuterol sulfate  (90 Base) MCG/ACT inhaler Inhale 2 puffs into the lungs every 6 hours as needed for Wheezing 1 Inhaler 3    morphine (MS CONTIN) 15 MG extended release tablet Take 15 mg by mouth as needed.  fluticasone (FLONASE) 50 MCG/ACT nasal spray 1 spray by Each Nostril route 2 times daily 1 Bottle 3    nitroGLYCERIN (NITROSTAT) 0.4 MG SL tablet Place 1 tablet under the tongue every 5 minutes as needed for Chest pain up to max of 3 total doses.  If no relief after 1 dose, call 911. 25 tablet 3    DOCUSATE CALCIUM PO Take by mouth as needed       No current facility-administered medications for this visit. PMH, Surgical Hx, Family Hx, and Social Hx reviewed and updated. Health Maintenance reviewed. Objective    Vitals:    05/24/21 1456   BP: 128/72   Pulse: 66   Resp: 16   Temp: 96.7 °F (35.9 °C)   TempSrc: Temporal   SpO2: 94%   Weight: 168 lb 14.4 oz (76.6 kg)   Height: 5' 7\" (1.702 m)       Physical Exam  Constitutional:       General: He is not in acute distress. Appearance: Normal appearance. He is normal weight. He is not ill-appearing, toxic-appearing or diaphoretic. HENT:      Head: Normocephalic and atraumatic. Right Ear: External ear normal.      Left Ear: External ear normal.   Eyes:      General:         Right eye: No discharge. Left eye: No discharge. Conjunctiva/sclera: Conjunctivae normal.      Pupils: Pupils are equal, round, and reactive to light. Cardiovascular:      Rate and Rhythm: Normal rate and regular rhythm. Pulses: Normal pulses. Pulmonary:      Effort: Pulmonary effort is normal.   Abdominal:      General: There is no distension. Palpations: Abdomen is soft. Tenderness: There is no abdominal tenderness. Musculoskeletal:         General: Swelling, tenderness and signs of injury present. Normal range of motion. Left wrist: Swelling, tenderness and bony tenderness present. No deformity, effusion, lacerations, snuff box tenderness or crepitus. Normal range of motion. Normal pulse. Left hand: Normal.        Hands:       Cervical back: Normal range of motion and neck supple. No rigidity or tenderness. Skin:     General: Skin is warm and dry. Capillary Refill: Capillary refill takes less than 2 seconds. Neurological:      General: No focal deficit present. Mental Status: He is alert and oriented to person, place, and time. Mental status is at baseline. Cranial Nerves:  No cranial nerve deficit. Sensory: No sensory deficit. Motor: No weakness. Coordination: Coordination normal.         Assessment & Plan    Diagnosis Orders   1. Closed fracture of distal end of left radius, unspecified fracture morphology, initial encounter  XR WRIST LEFT (MIN 3 VIEWS)    [address], [place]   2. Medication refill  citalopram (CELEXA) 40 MG tablet    diclofenac (VOLTAREN) 50 MG EC tablet     Orders Placed This Encounter   Procedures    XR WRIST LEFT (MIN 3 VIEWS)     Standing Status:   Future     Number of Occurrences:   1     Standing Expiration Date:   5/24/2022     Order Specific Question:   Reason for exam:     Answer:   fell a week ago, having pain since   [facility]     Referral Priority:   Routine     Referral Type:   Eval and Treat     Referral Reason:   Specialty Services Required     Requested Specialty:   Orthopedic Surgery     Number of Visits Requested:   1     Orders Placed This Encounter   Medications    citalopram (CELEXA) 40 MG tablet     Sig: TAKE ONE TABLET BY MOUTH nightly     Dispense:  30 tablet     Refill:  0    diclofenac (VOLTAREN) 50 MG EC tablet     Sig: TAKE ONE TABLET BY MOUTH TWO TIMES A DAY     Dispense:  60 tablet     Refill:  0     Medications Discontinued During This Encounter   Medication Reason    diclofenac (VOLTAREN) 50 MG EC tablet REORDER    citalopram (CELEXA) 40 MG tablet REORDER      Return in about 1 week (around 5/31/2021), or if symptoms worsen or fail to improve, for follow up with PCP. 3 view xr left wrist positive fracture distal radius buckle fracture - referred to [facility] orthopedics, wrapped with ACE wrap - no other splinting available in the clinic. MSP's intact before and after compression wrap. Reviewed with the patient: current clinical status,medications, activities and diet.      Side effects, adverse effects of themedication prescribed

## 2021-05-26 ENCOUNTER — OFFICE VISIT (OUTPATIENT)
Dept: ORTHOPEDIC SURGERY | Age: 76
End: 2021-05-26
Payer: MEDICARE

## 2021-05-26 VITALS
BODY MASS INDEX: 26.37 KG/M2 | TEMPERATURE: 97.4 F | WEIGHT: 168 LBS | HEART RATE: 67 BPM | HEIGHT: 67 IN | OXYGEN SATURATION: 95 %

## 2021-05-26 DIAGNOSIS — S52.531A CLOSED COLLES' FRACTURE OF RIGHT RADIUS, INITIAL ENCOUNTER: Primary | ICD-10-CM

## 2021-05-26 PROCEDURE — G8417 CALC BMI ABV UP PARAM F/U: HCPCS | Performed by: ORTHOPAEDIC SURGERY

## 2021-05-26 PROCEDURE — 25600 CLTX DST RDL FX/EPHYS SEP WO: CPT | Performed by: ORTHOPAEDIC SURGERY

## 2021-05-26 PROCEDURE — 1123F ACP DISCUSS/DSCN MKR DOCD: CPT | Performed by: ORTHOPAEDIC SURGERY

## 2021-05-26 PROCEDURE — 4040F PNEUMOC VAC/ADMIN/RCVD: CPT | Performed by: ORTHOPAEDIC SURGERY

## 2021-05-26 PROCEDURE — 4004F PT TOBACCO SCREEN RCVD TLK: CPT | Performed by: ORTHOPAEDIC SURGERY

## 2021-05-26 PROCEDURE — 99203 OFFICE O/P NEW LOW 30 MIN: CPT | Performed by: ORTHOPAEDIC SURGERY

## 2021-05-26 PROCEDURE — G8427 DOCREV CUR MEDS BY ELIG CLIN: HCPCS | Performed by: ORTHOPAEDIC SURGERY

## 2021-05-26 NOTE — PROGRESS NOTES
Subjective:      Patient ID: Carlee Gore is a 66 y.o. male who presents today for:  Chief Complaint   Patient presents with   [facility] ED Follow-up     closed fracture of distal end of left radius. xray 05/24/21       HPI    Present status post a fall earlier today he was seen in the ER. The ER notes are reviewed. Of also reviewed the film that was done in the ER. The patient has impacted metaphyseal fracture distal radius and the patient's loss tilt in about 10 degrees off. Past Medical History:   Diagnosis Date    Alcohol abuse     CAD (coronary artery disease)     patient states no doctor has told him this / has 1 cardiac stent    Cancer ([address]     lung LLL    Chronic back pain     Chronic kidney disease     Chronic sinusitis     DJD (degenerative joint disease) of knee     left knee DJD    Elevated PSA     History of blood transfusion 1980's    History of inferior wall myocardial infarction 01/2016    1 cardiac stent    HTN (hypertension)     meds .  1 yr    Hyperlipidemia     meds > 12 yrs    NSTEMI (non-ST elevated myocardial infarction) ([address]     Smoker      Past Surgical History:   Procedure Laterality Date    ABDOMEN SURGERY  1961    spleenectomy due to [address]  2009    lumbar disc OR    CARDIAC SURGERY      stents    CARPAL TUNNEL RELEASE Right 5/6/2019    RIGHT CARPAL TUNNEL RELEASE performed by Carisa Tomlin MD at [place] [address] WITH STENT PLACEMENT  1/29/2016    EYE SURGERY      to have Phaco with IOL OU 2/2018    HERNIA REPAIR      JOINT REPLACEMENT Left 1994    LTHR    JOINT REPLACEMENT Right 2009    RTKR    KNEE SURGERY Right 2011    arthroscopy    HI MANIPULATN KNEE JT+ANESTHESIA Right 5/12/2017    RIGHT KNEE MANIPULATION UNDER ANESTHESIA performed by Kady Moser MD at [address] OFFICE/OUTPT VISIT,PROCEDURE ONLY Bilateral 12/29/2017    RIGHT AND BILATERAL L 4-5 LYSIS OF SCAR DISKECTOMY INTERBODY CAGE FUSION  Emotionally Abused:     Physically Abused:     Sexually Abused:      Family History   Problem Relation Age of Onset    Osteoarthritis Mother     Emphysema Mother     Hypertension Father     Hearing Loss Father     Dementia Father     No Known Problems Sister     Prostate Cancer Brother     Colon Polyps Neg Hx      No Known Allergies  Current Outpatient Medications on File Prior to Visit   Medication Sig Dispense Refill    citalopram (CELEXA) 40 MG tablet TAKE ONE TABLET BY MOUTH nightly 30 tablet 0    diclofenac (VOLTAREN) 50 MG EC tablet TAKE ONE TABLET BY MOUTH TWO TIMES A DAY 60 tablet 0    tiotropium (SPIRIVA RESPIMAT) 2.5 MCG/ACT AERS inhaler Inhale 2 puffs into the lungs daily 1 Inhaler 3    isosorbide mononitrate (IMDUR) 30 MG extended release tablet TAKE ONE TABLET BY MOUTH EVERY DAY 90 tablet 3    metoprolol tartrate (LOPRESSOR) 50 MG tablet Take 0.5 tablets by mouth 2 times daily TAKE ONE TABLET BY MOUTH TWO TIMES A  tablet 3    Fluticasone furoate-vilanterol (BREO ELLIPTA) 200-25 MCG/INH AEPB inhaler Inhale 1 puff into the lungs daily 1 each 3    finasteride (PROSCAR) 5 MG tablet TAKE ONE TABLET BY MOUTH DAILY 90 tablet 3    clopidogrel (PLAVIX) 75 MG tablet TAKE ONE TABLET BY MOUTH DAILY 90 tablet 3    atorvastatin (LIPITOR) 20 MG tablet TAKE ONE TABLET BY MOUTH DAILY 90 tablet 3    oxyCODONE-acetaminophen (PERCOCET) 7.5-325 MG per tablet TAKE ONE TABLET BY MOUTH THREE TIMES A DAY      omeprazole (PRILOSEC) 10 MG delayed release capsule TAKE ONE CAPSULE BY MOUTH DAILY 90 capsule 2    bumetanide (BUMEX) 1 MG tablet Take 1 tablet by mouth daily 90 tablet 3    albuterol sulfate  (90 Base) MCG/ACT inhaler Inhale 2 puffs into the lungs every 6 hours as needed for Wheezing 1 Inhaler 3    morphine (MS CONTIN) 15 MG extended release tablet Take 15 mg by mouth as needed.        fluticasone (FLONASE) 50 MCG/ACT nasal spray 1 spray by Each Nostril route 2 times daily 1 Bottle 3    nitroGLYCERIN (NITROSTAT) 0.4 MG SL tablet Place 1 tablet under the tongue every 5 minutes as needed for Chest pain up to max of 3 total doses. If no relief after 1 dose, call 911. 25 tablet 3    DOCUSATE CALCIUM PO Take by mouth as needed       No current facility-administered medications on file prior to visit. Review of Systems  Patient has no fever chills night sweats. Objective:   Pulse 67   Temp 97.4 °F (36.3 °C) (Temporal)   Ht 5' 7\" (1.702 m)   Wt 168 lb (76.2 kg)   SpO2 95%   BMI 26.31 kg/m²     ORTHOEXAM    Patient skin is intact he has good cap refill and color. He has little tenderness over the distal radius. He has ability to flex and extend the fingers. 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 Assessment:       Diagnosis Orders   1. Closed Colles' fracture of right radius, initial encounter           Plan:   I discussed with the patient the patient has impacted distal radius fracture this would not really reduce satisfactorily with a closed reduction attempt and therefore his options really are to except the present physician and except the malunion. Or to proceed operatively with an ORIF. An operative option would be casting for probably 4 weeks followed by removable splint for 2 weeks. He will refrain from weightbearing in the interim as best he can. He will follow up in several weeks to review the films to ensure no further loss of alignment. My expectation is to have a pretty good functional outcome a little bit of deformity and perhaps a little bit of pain in the future. The operative option would be an open reduction for fixation the risks and benefits and expectations were that surgery were discussed in detail. At this time at 66 he like to proceed nonoperatively given the arthritides he already has.   Therefore he will be placed in a short arm cast and follow-up in several weeks for repeat films to ensure maintenance of alignment and 2 views of the right breast.    Forearm cast well molded has been applied. No orders of the defined types were placed in this encounter. No orders of the defined types were placed in this encounter. No follow-ups on file.       Sivan Hobson MD

## 2021-06-10 ENCOUNTER — OFFICE VISIT (OUTPATIENT)
Dept: ORTHOPEDIC SURGERY | Age: 76
End: 2021-06-10

## 2021-06-10 ENCOUNTER — HOSPITAL ENCOUNTER (OUTPATIENT)
Dept: ORTHOPEDIC SURGERY | Age: 76
Discharge: HOME | End: 2021-06-12
Payer: MEDICARE

## 2021-06-10 VITALS
OXYGEN SATURATION: 92 % | TEMPERATURE: 97.4 F | BODY MASS INDEX: 26.37 KG/M2 | HEART RATE: 71 BPM | WEIGHT: 168 LBS | HEIGHT: 67 IN

## 2021-06-10 DIAGNOSIS — S52.531A CLOSED COLLES' FRACTURE OF RIGHT RADIUS, INITIAL ENCOUNTER: ICD-10-CM

## 2021-06-10 DIAGNOSIS — S52.531A CLOSED COLLES' FRACTURE OF RIGHT RADIUS, INITIAL ENCOUNTER: Primary | ICD-10-CM

## 2021-06-10 PROCEDURE — 73100 X-RAY EXAM OF WRIST: CPT

## 2021-06-10 PROCEDURE — 73100 X-RAY EXAM OF WRIST: CPT | Performed by: ORTHOPAEDIC SURGERY

## 2021-06-10 PROCEDURE — 99024 POSTOP FOLLOW-UP VISIT: CPT | Performed by: PHYSICIAN ASSISTANT

## 2021-06-10 NOTE — PROGRESS NOTES
[facility]      Subjective:      Chief Complaint   Patient presents with    2 Week Follow-Up     on Closed Colles' fracture of right radius       HPI: Mohsen Woods is a 66 y.o. male who is here 2 weeks after suffering a distal radius fracture. Dr. Alex Caruso saw him, they agreed to go nonoperative mild given his age and physical demand of that hand. Past Medical History:   Diagnosis Date    Alcohol abuse     CAD (coronary artery disease)     patient states no doctor has told him this / has 1 cardiac stent    Cancer ([facility] [address]     lung LLL    Chronic back pain     Chronic kidney disease     Chronic sinusitis     DJD (degenerative joint disease) of knee     left knee DJD    Elevated PSA     History of blood transfusion 1980's    History of inferior wall myocardial infarction 01/2016    1 cardiac stent    HTN (hypertension)     meds .  1 yr    Hyperlipidemia     meds > 12 yrs    NSTEMI (non-ST elevated myocardial infarction) ([facility] [address]     Smoker       Past Surgical History:   Procedure Laterality Date    ABDOMEN SURGERY  1961    spleenectomy due to [address]  2009    lumbar disc OR    CARDIAC SURGERY      stents    CARPAL TUNNEL RELEASE Right 5/6/2019    RIGHT CARPAL TUNNEL RELEASE performed by John Chapa MD at [place] [id number] WITH STENT PLACEMENT  1/29/2016    EYE SURGERY      to have Phaco with IOL OU 2/2018    HERNIA REPAIR      JOINT REPLACEMENT Left 1994    LTHR    JOINT REPLACEMENT Right 2009    RTKR    KNEE SURGERY Right 2011    arthroscopy    OK MANIPULATN KNEE JT+ANESTHESIA Right 5/12/2017    RIGHT KNEE MANIPULATION UNDER ANESTHESIA performed by Katalina Holley MD at [address] OFFICE/OUTPT VISIT,PROCEDURE ONLY Bilateral 12/29/2017    RIGHT AND BILATERAL L 4-5 LYSIS OF SCAR DISKECTOMY INTERBODY CAGE FUSION POSTEROLATERAL FUSION PEDICLE SCREWS performed by John Chapa MD at [address] [place] [address] 2004    benign    SPLENECTOMY  1961    TOTAL KNEE ARTHROPLASTY Right 3/24/2017    RIGHT  KNEE TOTAL ARTHROPLASTY, ELHAM CEMENTED PERSONA  performed by Jesse Almanza MD at [facility] History     Socioeconomic History    Marital status:      Spouse name: Not on file    Number of children: Not on file    Years of education: Not on file    Highest education level: Not on file   Occupational History    Occupation: retired   Tobacco Use    Smoking status: Current Every Day Smoker     Packs/day: 0.25     Years: 60.00     Pack years: 15.00     Types: Cigarettes    Smokeless tobacco: Never Used   Vaping Use    Vaping Use: Never used   Substance and Sexual Activity    Alcohol use: Yes     Alcohol/week: 12.0 standard drinks     Types: 12 Shots of liquor per week     Comment: last day before yesterday    Drug use: No    Sexual activity: Yes   Other Topics Concern    Not on file   Social History Narrative    Lives alone     Social Determinants of Health     Financial Resource Strain: Low Risk     Difficulty of Paying Living Expenses: Not very hard   Food Insecurity: No Food Insecurity    Worried About Running Out of Food in the Last Year: Never true    Yoav of Food in the Last Year: Never true   Transportation Needs: No Transportation Needs    Lack of Transportation (Medical): No    Lack of Transportation (Non-Medical):  No   Physical Activity:     Days of Exercise per Week:     Minutes of Exercise per Session:    Stress:     Feeling of Stress :    Social Connections:     Frequency of Communication with Friends and Family:     Frequency of Social Gatherings with Friends and Family:     Attends [employer] Services:     Active Member of Clubs or Organizations:     Attends Club or Organization Meetings:     Marital Status:    Intimate Partner Violence:     Fear of Current or Ex-Partner:     Emotionally Abused:     Physically Abused:     Sexually Abused:      Family History Problem Relation Age of Onset    Osteoarthritis Mother     Emphysema Mother     Hypertension Father     Hearing Loss Father     Dementia Father     No Known Problems Sister     Prostate Cancer Brother     Colon Polyps Neg Hx      No Known Allergies  Current Outpatient Medications on File Prior to Visit   Medication Sig Dispense Refill    citalopram (CELEXA) 40 MG tablet TAKE ONE TABLET BY MOUTH nightly 30 tablet 0    diclofenac (VOLTAREN) 50 MG EC tablet TAKE ONE TABLET BY MOUTH TWO TIMES A DAY 60 tablet 0    tiotropium (SPIRIVA RESPIMAT) 2.5 MCG/ACT AERS inhaler Inhale 2 puffs into the lungs daily 1 Inhaler 3    isosorbide mononitrate (IMDUR) 30 MG extended release tablet TAKE ONE TABLET BY MOUTH EVERY DAY 90 tablet 3    metoprolol tartrate (LOPRESSOR) 50 MG tablet Take 0.5 tablets by mouth 2 times daily TAKE ONE TABLET BY MOUTH TWO TIMES A  tablet 3    Fluticasone furoate-vilanterol (BREO ELLIPTA) 200-25 MCG/INH AEPB inhaler Inhale 1 puff into the lungs daily 1 each 3    finasteride (PROSCAR) 5 MG tablet TAKE ONE TABLET BY MOUTH DAILY 90 tablet 3    clopidogrel (PLAVIX) 75 MG tablet TAKE ONE TABLET BY MOUTH DAILY 90 tablet 3    atorvastatin (LIPITOR) 20 MG tablet TAKE ONE TABLET BY MOUTH DAILY 90 tablet 3    oxyCODONE-acetaminophen (PERCOCET) 7.5-325 MG per tablet TAKE ONE TABLET BY MOUTH THREE TIMES A DAY      omeprazole (PRILOSEC) 10 MG delayed release capsule TAKE ONE CAPSULE BY MOUTH DAILY 90 capsule 2    bumetanide (BUMEX) 1 MG tablet Take 1 tablet by mouth daily 90 tablet 3    albuterol sulfate  (90 Base) MCG/ACT inhaler Inhale 2 puffs into the lungs every 6 hours as needed for Wheezing 1 Inhaler 3    morphine (MS CONTIN) 15 MG extended release tablet Take 15 mg by mouth as needed.        fluticasone (FLONASE) 50 MCG/ACT nasal spray 1 spray by Each Nostril route 2 times daily 1 Bottle 3    nitroGLYCERIN (NITROSTAT) 0.4 MG SL tablet Place 1 tablet under the tongue every 5 minutes as needed for Chest pain up to max of 3 total doses. If no relief after 1 dose, call 911. 25 tablet 3    DOCUSATE CALCIUM PO Take by mouth as needed       No current facility-administered medications on file prior to visit. Objective:   Pulse 71   Temp 97.4 °F (36.3 °C) (Temporal)   Ht 5' 7\" (1.702 m)   Wt 168 lb (76.2 kg)   SpO2 92%   BMI 26.31 kg/m²       Radiographs and Laboratory Studies:   Previous diagnostic imaging studies were reviewed. XR WRIST LEFT (2 VIEWS)    Result Date: 6/10/2021  Views left wrist were obtained today to evaluate for fracture alignment in the cast.  Distal radial metaphyseal fracture is appreciated with increase in the volar tilt and loss of the radial height. No definitive callus noted. Laboratory Studies:   Lab Results   Component Value Date    WBC 9.2 07/07/2020    HGB 12.5 (L) 07/07/2020    HCT 37.9 (L) 07/07/2020    MCV 99.5 07/07/2020     07/07/2020     No results found for: SEDRATE  No results found for: CRP    Assessment and Plan:      Diagnosis Orders   1. Closed Colles' fracture of right radius, initial encounter         2 weeks since distal radius fracture, displaced and angulated. Some increased tilt today on his x-rays. Again we revisit the conversation about operative versus nonoperative treatment. He understands that his arthritis in the wrist will likely be aggravated after this and may worsen in the long-term. He also may have some cosmetic deformity. He is okay with these consequences from this fracture and going with a nonoperative course. We will continue doing that, we will see him back in 2 weeks for cast removal and put him into a brace. We will get an x-ray at that visit as well     The above plan was discussed in thorough detail with Dr. Igor oGre and the patient. No orders of the defined types were placed in this encounter. No orders of the defined types were placed in this encounter.       Return in about 11 days

## 2021-06-21 ENCOUNTER — OFFICE VISIT (OUTPATIENT)
Dept: ORTHOPEDIC SURGERY | Age: 76
End: 2021-06-21
Payer: MEDICARE

## 2021-06-21 ENCOUNTER — HOSPITAL ENCOUNTER (OUTPATIENT)
Dept: ORTHOPEDIC SURGERY | Age: 76
Discharge: HOME | End: 2021-06-23
Payer: MEDICARE

## 2021-06-21 VITALS
HEIGHT: 67 IN | HEART RATE: 69 BPM | TEMPERATURE: 97.2 F | WEIGHT: 168 LBS | OXYGEN SATURATION: 94 % | BODY MASS INDEX: 26.37 KG/M2

## 2021-06-21 DIAGNOSIS — S52.531A CLOSED COLLES' FRACTURE OF RIGHT RADIUS, INITIAL ENCOUNTER: ICD-10-CM

## 2021-06-21 DIAGNOSIS — S52.531A CLOSED COLLES' FRACTURE OF RIGHT RADIUS, INITIAL ENCOUNTER: Primary | ICD-10-CM

## 2021-06-21 PROCEDURE — 99024 POSTOP FOLLOW-UP VISIT: CPT | Performed by: PHYSICIAN ASSISTANT

## 2021-06-21 PROCEDURE — 73100 X-RAY EXAM OF WRIST: CPT

## 2021-06-21 PROCEDURE — L3908 WHO COCK-UP NONMOLDE PRE OTS: HCPCS | Performed by: PHYSICIAN ASSISTANT

## 2021-06-21 NOTE — PROGRESS NOTES
TOTAL ARTHROPLASTY, ELHAM CEMENTED PERSONA  performed by Ashleigh Green MD at [facility] History     Socioeconomic History    Marital status:      Spouse name: Not on file    Number of children: Not on file    Years of education: Not on file    Highest education level: Not on file   Occupational History    Occupation: retired   Tobacco Use    Smoking status: Current Every Day Smoker     Packs/day: 0.25     Years: 60.00     Pack years: 15.00     Types: Cigarettes    Smokeless tobacco: Never Used   Vaping Use    Vaping Use: Never used   Substance and Sexual Activity    Alcohol use: Yes     Alcohol/week: 12.0 standard drinks     Types: 12 Shots of liquor per week     Comment: last day before yesterday    Drug use: No    Sexual activity: Yes   Other Topics Concern    Not on file   Social History Narrative    Lives alone     Social Determinants of Health     Financial Resource Strain: Low Risk     Difficulty of Paying Living Expenses: Not very hard   Food Insecurity: No Food Insecurity    Worried About Running Out of Food in the Last Year: Never true    Yoav of Food in the Last Year: Never true   Transportation Needs: No Transportation Needs    Lack of Transportation (Medical): No    Lack of Transportation (Non-Medical):  No   Physical Activity:     Days of Exercise per Week:     Minutes of Exercise per Session:    Stress:     Feeling of Stress :    Social Connections:     Frequency of Communication with Friends and Family:     Frequency of Social Gatherings with Friends and Family:     Attends Ep[employer] Services:     Active Member of Clubs or Organizations:     Attends Club or Organization Meetings:     Marital Status:    Intimate Partner Violence:     Fear of Current or Ex-Partner:     Emotionally Abused:     Physically Abused:     Sexually Abused:      Family History   Problem Relation Age of Onset    Osteoarthritis Mother     Emphysema Mother     Hypertension Father     Hearing Loss Father     Dementia Father     No Known Problems Sister     Prostate Cancer Brother     Colon Polyps Neg Hx      No Known Allergies  Current Outpatient Medications on File Prior to Visit   Medication Sig Dispense Refill    citalopram (CELEXA) 40 MG tablet TAKE ONE TABLET BY MOUTH nightly 30 tablet 0    diclofenac (VOLTAREN) 50 MG EC tablet TAKE ONE TABLET BY MOUTH TWO TIMES A DAY 60 tablet 0    tiotropium (SPIRIVA RESPIMAT) 2.5 MCG/ACT AERS inhaler Inhale 2 puffs into the lungs daily 1 Inhaler 3    isosorbide mononitrate (IMDUR) 30 MG extended release tablet TAKE ONE TABLET BY MOUTH EVERY DAY 90 tablet 3    metoprolol tartrate (LOPRESSOR) 50 MG tablet Take 0.5 tablets by mouth 2 times daily TAKE ONE TABLET BY MOUTH TWO TIMES A  tablet 3    Fluticasone furoate-vilanterol (BREO ELLIPTA) 200-25 MCG/INH AEPB inhaler Inhale 1 puff into the lungs daily 1 each 3    finasteride (PROSCAR) 5 MG tablet TAKE ONE TABLET BY MOUTH DAILY 90 tablet 3    clopidogrel (PLAVIX) 75 MG tablet TAKE ONE TABLET BY MOUTH DAILY 90 tablet 3    atorvastatin (LIPITOR) 20 MG tablet TAKE ONE TABLET BY MOUTH DAILY 90 tablet 3    oxyCODONE-acetaminophen (PERCOCET) 7.5-325 MG per tablet TAKE ONE TABLET BY MOUTH THREE TIMES A DAY      omeprazole (PRILOSEC) 10 MG delayed release capsule TAKE ONE CAPSULE BY MOUTH DAILY 90 capsule 2    bumetanide (BUMEX) 1 MG tablet Take 1 tablet by mouth daily 90 tablet 3    albuterol sulfate  (90 Base) MCG/ACT inhaler Inhale 2 puffs into the lungs every 6 hours as needed for Wheezing 1 Inhaler 3    morphine (MS CONTIN) 15 MG extended release tablet Take 15 mg by mouth as needed.  fluticasone (FLONASE) 50 MCG/ACT nasal spray 1 spray by Each Nostril route 2 times daily 1 Bottle 3    nitroGLYCERIN (NITROSTAT) 0.4 MG SL tablet Place 1 tablet under the tongue every 5 minutes as needed for Chest pain up to max of 3 total doses.  If no relief after 1 dose, call 911. 25 tablet 3    DOCUSATE CALCIUM PO Take by mouth as needed       No current facility-administered medications on file prior to visit. Objective:   Pulse 69   Temp 97.2 °F (36.2 °C) (Temporal)   Ht 5' 7\" (1.702 m)   Wt 168 lb (76.2 kg)   SpO2 94%   BMI 26.31 kg/m²       Radiographs and Laboratory Studies:   Previous diagnostic imaging studies were reviewed. Laboratory Studies:   Lab Results   Component Value Date    WBC 9.2 07/07/2020    HGB 12.5 (L) 07/07/2020    HCT 37.9 (L) 07/07/2020    MCV 99.5 07/07/2020     07/07/2020     No results found for: SEDRATE  No results found for: CRP    Assessment and Plan:      Diagnosis Orders   1. Closed Colles' fracture of right radius, initial encounter  XR WRIST LEFT (2 VIEWS)     4 weeks after Colles' fracture of the right wrist.  There is significant displacement, he elected to go with nonoperative treatment and understands that there is can be some degree of cosmetic deformity as well as arthritis moving forward. Cast removed today. He is placed in a brace and is aware basically at all times for the next 2 weeks and begin to wean from it at that point. Instructed him to work on some motion exercises at that wrist     The above plan was discussed in thorough detail with Dr. Meena Whitmore and the patient. Orders Placed This Encounter   Procedures    XR WRIST LEFT (2 VIEWS)     Standing Status:   Future     Standing Expiration Date:   6/21/2022     No orders of the defined types were placed in this encounter. No follow-ups on file.     Kian Montalvo PA-C  [facility]  [phone]

## 2021-06-22 PROBLEM — F11.10 HEROIN ABUSE (HCC): Status: ACTIVE | Noted: 2021-06-22

## 2021-06-24 ENCOUNTER — INITIAL CONSULT (OUTPATIENT)
Dept: NEUROSURGERY | Age: 76
End: 2021-06-24
Payer: MEDICARE

## 2021-06-24 VITALS
TEMPERATURE: 97.1 F | DIASTOLIC BLOOD PRESSURE: 64 MMHG | BODY MASS INDEX: 26.84 KG/M2 | HEIGHT: 66 IN | SYSTOLIC BLOOD PRESSURE: 117 MMHG | WEIGHT: 167 LBS

## 2021-06-24 DIAGNOSIS — M48.062 SPINAL STENOSIS OF LUMBAR REGION WITH NEUROGENIC CLAUDICATION: Primary | ICD-10-CM

## 2021-06-24 PROCEDURE — G8427 DOCREV CUR MEDS BY ELIG CLIN: HCPCS | Performed by: NEUROLOGICAL SURGERY

## 2021-06-24 PROCEDURE — G8417 CALC BMI ABV UP PARAM F/U: HCPCS | Performed by: NEUROLOGICAL SURGERY

## 2021-06-24 PROCEDURE — 99212 OFFICE O/P EST SF 10 MIN: CPT | Performed by: NEUROLOGICAL SURGERY
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
## 2021-06-29 ENCOUNTER — OFFICE VISIT (OUTPATIENT)
Dept: FAMILY MEDICINE CLINIC | Age: 76
End: 2021-06-29
Payer: MEDICARE

## 2021-06-29 VITALS
TEMPERATURE: 99 F | SYSTOLIC BLOOD PRESSURE: 122 MMHG | HEART RATE: 60 BPM | WEIGHT: 165 LBS | OXYGEN SATURATION: 94 % | HEIGHT: 65 IN | DIASTOLIC BLOOD PRESSURE: 66 MMHG | BODY MASS INDEX: 27.49 KG/M2

## 2021-06-29 DIAGNOSIS — M17.11 OSTEOARTHRITIS OF RIGHT KNEE, UNSPECIFIED OSTEOARTHRITIS TYPE: ICD-10-CM

## 2021-06-29 DIAGNOSIS — Z76.0 MEDICATION REFILL: ICD-10-CM

## 2021-06-29 DIAGNOSIS — M46.1 SACROILIITIS, NOT ELSEWHERE CLASSIFIED (HCC): ICD-10-CM

## 2021-06-29 DIAGNOSIS — I25.119 CORONARY ARTERY DISEASE INVOLVING NATIVE CORONARY ARTERY OF NATIVE HEART WITH ANGINA PECTORIS (HCC): Primary | ICD-10-CM

## 2021-06-29 DIAGNOSIS — Z11.59 ENCOUNTER FOR HEPATITIS C SCREENING TEST FOR LOW RISK PATIENT: ICD-10-CM

## 2021-06-29 PROCEDURE — 4004F PT TOBACCO SCREEN RCVD TLK: CPT | Performed by: FAMILY MEDICINE

## 2021-06-29 PROCEDURE — 1123F ACP DISCUSS/DSCN MKR DOCD: CPT | Performed by: FAMILY MEDICINE

## 2021-06-29 PROCEDURE — 99213 OFFICE O/P EST LOW 20 MIN: CPT | Performed by: FAMILY MEDICINE

## 2021-06-29 PROCEDURE — G8417 CALC BMI ABV UP PARAM F/U: HCPCS | Performed by: FAMILY MEDICINE

## 2021-06-29 PROCEDURE — G8427 DOCREV CUR MEDS BY ELIG CLIN: HCPCS | Performed by: FAMILY MEDICINE

## 2021-06-29 PROCEDURE — 4040F PNEUMOC VAC/ADMIN/RCVD: CPT | Performed by: FAMILY MEDICINE

## 2021-06-29 RX ORDER — CITALOPRAM 40 MG/1
TABLET ORAL
Qty: 30 TABLET | Refills: 5 | Status: SHIPPED | OUTPATIENT
Start: 2021-06-29 | End: 2022-02-04 | Stop reason: SDUPTHER

## 2021-06-29 NOTE — PROGRESS NOTES
Chief Complaint   Patient presents with    Coronary Artery Disease     6 month       HPI:  Erica Hargrove is a 66 y.o. male    Scheduled  As 6 mos follow up    Actually has appt with Dr. Kamini Butler coming up regarding balance issues    Chronic pain pt  Sees Dr. Monisha Laboy  Morphine and percocet    Back:  Dr. Irvin Yousif had done surgery on his back in the past.     Needs refill on lopressor    Sees pulm and cardio and urology and rad/onc  Multiple appts coming up all set in EPIC      Past Medical History:   Diagnosis Date    Alcohol abuse     CAD (coronary artery disease)     patient states no doctor has told him this / has 1 cardiac stent    Cancer ([facility]     lung LLL    Chronic back pain     Chronic kidney disease     Chronic sinusitis     DJD (degenerative joint disease) of knee     left knee DJD    Elevated PSA     History of blood transfusion 1980's    History of inferior wall myocardial infarction 01/2016    1 cardiac stent    HTN (hypertension)     meds .  1 yr    Hyperlipidemia     meds > 12 yrs    NSTEMI (non-ST elevated myocardial infarction) ([facility]     Smoker      Past Surgical History:   Procedure Laterality Date    ABDOMEN SURGERY  1961    spleenectomy due to [address]  2009    lumbar disc OR    CARDIAC SURGERY      stents    CARPAL TUNNEL RELEASE Right 5/6/2019    RIGHT CARPAL TUNNEL RELEASE performed by Fran Wheeler MD at [place] [id number] WITH STENT PLACEMENT  1/29/2016    EYE SURGERY      to have Phaco with IOL OU 2/2018    HERNIA REPAIR      JOINT REPLACEMENT Left 1994    LTHR    JOINT REPLACEMENT Right 2009    RTKR    KNEE SURGERY Right 2011    arthroscopy    MT MANIPULATN KNEE JT+ANESTHESIA Right 5/12/2017    RIGHT KNEE MANIPULATION UNDER ANESTHESIA performed by Dayday Bacon MD at [address] OFFICE/OUTPT VISIT,PROCEDURE ONLY Bilateral 12/29/2017    RIGHT AND BILATERAL L 4-5 LYSIS OF SCAR DISKECTOMY INTERBODY CAGE FUSION POSTEROLATERAL FUSION PEDICLE SCREWS performed by John Chapa MD at [address] [place] [address] Blvd  2004    benign   [address] Right 3/24/2017    RIGHT  KNEE TOTAL ARTHROPLASTY, ELHAM CEMENTED PERSONA  performed by Katalina Holley MD at [address] History   Problem Relation Age of Onset    Osteoarthritis Mother     Emphysema Mother     Hypertension Father     Hearing Loss Father     Dementia Father     No Known Problems Sister     Prostate Cancer Brother     Colon Polyps Neg Hx      Social History     Socioeconomic History    Marital status:      Spouse name: None    Number of children: None    Years of education: None    Highest education level: None   Occupational History    Occupation: retired   Tobacco Use    Smoking status: Current Every Day Smoker     Packs/day: 0.25     Years: 60.00     Pack years: 15.00     Types: Cigarettes    Smokeless tobacco: Never Used   Vaping Use    Vaping Use: Never used   Substance and Sexual Activity    Alcohol use: Yes     Alcohol/week: 12.0 standard drinks     Types: 12 Shots of liquor per week     Comment: last day before yesterday    Drug use: No    Sexual activity: Yes   Other Topics Concern    None   Social History Narrative    Lives alone     Social Determinants of Health     Financial Resource Strain: Low Risk     Difficulty of Paying Living Expenses: Not very hard   Food Insecurity: No Food Insecurity    Worried About Running Out of Food in the Last Year: Never true    Yoav of Food in the Last Year: Never true   Transportation Needs: No Transportation Needs    Lack of Transportation (Medical): No    Lack of Transportation (Non-Medical):  No   Physical Activity:     Days of Exercise per Week:     Minutes of Exercise per Session:    Stress:     Feeling of Stress :    Social Connections:     Frequency of Communication with Friends and Family:     Frequency of Social Gatherings with Friends and Family:     Attends Congregational Services:     Active Member of Clubs or Organizations:     Attends Club or Organization Meetings:     Marital Status:    Intimate Partner Violence:     Fear of Current or Ex-Partner:     Emotionally Abused:     Physically Abused:     Sexually Abused:      Current Outpatient Medications   Medication Sig Dispense Refill    citalopram (CELEXA) 40 MG tablet TAKE ONE TABLET BY MOUTH nightly 30 tablet 5    diclofenac (VOLTAREN) 50 MG EC tablet TAKE ONE TABLET BY MOUTH TWO TIMES A DAY 60 tablet 5    tiotropium (SPIRIVA RESPIMAT) 2.5 MCG/ACT AERS inhaler Inhale 2 puffs into the lungs daily 1 Inhaler 3    isosorbide mononitrate (IMDUR) 30 MG extended release tablet TAKE ONE TABLET BY MOUTH EVERY DAY 90 tablet 3    metoprolol tartrate (LOPRESSOR) 50 MG tablet Take 0.5 tablets by mouth 2 times daily TAKE ONE TABLET BY MOUTH TWO TIMES A  tablet 3    Fluticasone furoate-vilanterol (BREO ELLIPTA) 200-25 MCG/INH AEPB inhaler Inhale 1 puff into the lungs daily 1 each 3    finasteride (PROSCAR) 5 MG tablet TAKE ONE TABLET BY MOUTH DAILY 90 tablet 3    clopidogrel (PLAVIX) 75 MG tablet TAKE ONE TABLET BY MOUTH DAILY 90 tablet 3    atorvastatin (LIPITOR) 20 MG tablet TAKE ONE TABLET BY MOUTH DAILY 90 tablet 3    oxyCODONE-acetaminophen (PERCOCET) 7.5-325 MG per tablet TAKE ONE TABLET BY MOUTH THREE TIMES A DAY      omeprazole (PRILOSEC) 10 MG delayed release capsule TAKE ONE CAPSULE BY MOUTH DAILY 90 capsule 2    bumetanide (BUMEX) 1 MG tablet Take 1 tablet by mouth daily 90 tablet 3    albuterol sulfate  (90 Base) MCG/ACT inhaler Inhale 2 puffs into the lungs every 6 hours as needed for Wheezing 1 Inhaler 3    morphine (MS CONTIN) 15 MG extended release tablet Take 15 mg by mouth as needed.        fluticasone (FLONASE) 50 MCG/ACT nasal spray 1 spray by Each Nostril route 2 times daily 1 Bottle 3    nitroGLYCERIN (NITROSTAT) 0.4 MG SL tablet Place 1 tablet under the tongue every 5 minutes as needed for Chest pain up to max of 3 total doses. If no relief after 1 dose, call 911. 25 tablet 3    DOCUSATE CALCIUM PO Take by mouth as needed       No current facility-administered medications for this visit. No Known Allergies    Review of Systems:   General ROS: negative for - chills, fatigue, fever, malaise, weight gain or weight loss  Respiratory ROS: no cough, shortness of breath, or wheezing  Cardiovascular ROS: no chest pain or dyspnea on exertion  Gastrointestinal ROS: no abdominal pain, change in bowel habits, or black or bloody stools  Genito-Urinary ROS: no dysuria, trouble voiding  Musculoskeletal ROS: see HPI  Neurological ROS: gait ataxia    In general patient otherwise reports feeling well. Physical Exam:  /66 (Site: Right Upper Arm)   Pulse 60   Temp 99 °F (37.2 °C)   Ht 5' 5\" (1.651 m)   Wt 165 lb (74.8 kg)   SpO2 94%   BMI 27.46 kg/m²     Gen: Well, NAD, Alert, Oriented x 3   HEENT: EOMI, eyes clear, MMM  Skin: without rash or jaundice  Neck: no significant lymphadenopathy or thyromegaly  Lungs: CTAB   Heart: RRR, S1S2, w/out M/R/G, non-displaced PMI   Ext: prominent varicosities bilaterally. 2+ pedal edema  Neuro:altered light touch in feet      A&P   Diagnosis Orders   1. Coronary artery disease involving native coronary artery of native heart with angina pectoris (HCC)  Lipid Panel    Comprehensive Metabolic Panel    CBC   2. Medication refill  citalopram (CELEXA) 40 MG tablet    diclofenac (VOLTAREN) 50 MG EC tablet   3. Sacroiliitis, not elsewhere classified ([facility]     4. Osteoarthritis of right knee, unspecified osteoarthritis type     5.  Encounter for hepatitis C screening test for low risk patient  Hepatitis C Antibody     Multifactorial gait ataxia  decreased proprioception/alertness on chronic opiate medicatioons    Refills given   F/u with multiple specialists      Wilner Bach MD

## 2021-06-30 ENCOUNTER — OFFICE VISIT (OUTPATIENT)
Dept: NEUROLOGY | Age: 76
End: 2021-06-30
Payer: MEDICARE

## 2021-06-30 VITALS
BODY MASS INDEX: 27.46 KG/M2 | HEART RATE: 69 BPM | SYSTOLIC BLOOD PRESSURE: 126 MMHG | DIASTOLIC BLOOD PRESSURE: 74 MMHG | WEIGHT: 165 LBS

## 2021-06-30 DIAGNOSIS — M48.062 SPINAL STENOSIS OF LUMBAR REGION WITH NEUROGENIC CLAUDICATION: ICD-10-CM

## 2021-06-30 DIAGNOSIS — M47.16 OSTEOARTHRITIS OF SPINE WITH MYELOPATHY, LUMBOSACRAL REGION: ICD-10-CM

## 2021-06-30 DIAGNOSIS — R42 DYSEQUILIBRIUM: Primary | ICD-10-CM

## 2021-06-30 DIAGNOSIS — R26.0 ATAXIC GAIT: ICD-10-CM

## 2021-06-30 PROCEDURE — 1123F ACP DISCUSS/DSCN MKR DOCD: CPT | Performed by: PSYCHIATRY & NEUROLOGY

## 2021-06-30 PROCEDURE — G8417 CALC BMI ABV UP PARAM F/U: HCPCS | Performed by: PSYCHIATRY & NEUROLOGY

## 2021-06-30 PROCEDURE — 4040F PNEUMOC VAC/ADMIN/RCVD: CPT | Performed by: PSYCHIATRY & NEUROLOGY

## 2021-06-30 PROCEDURE — 99214 OFFICE O/P EST MOD 30 MIN: CPT | Performed by: PSYCHIATRY & NEUROLOGY

## 2021-06-30 PROCEDURE — 4004F PT TOBACCO SCREEN RCVD TLK: CPT | Performed by: PSYCHIATRY & NEUROLOGY

## 2021-06-30 PROCEDURE — G8427 DOCREV CUR MEDS BY ELIG CLIN: HCPCS | Performed by: PSYCHIATRY & NEUROLOGY

## 2021-06-30 ASSESSMENT — ENCOUNTER SYMPTOMS
VOMITING: 0
SHORTNESS OF BREATH: 0
NAUSEA: 0
TROUBLE SWALLOWING: 0
COLOR CHANGE: 0
CHOKING: 0
PHOTOPHOBIA: 0
BACK PAIN: 1

## 2021-06-30 NOTE — PROGRESS NOTES
Subjective:      Patient ID: Dev Caicedo is a 66 y.o. male who presents today for:  Chief Complaint   Patient presents with    Follow-up     Pt states that he had a fall and broke his wrist, he says it was about 6 weeks ago. He says hes not completely sure what happened he thinks that he just lost his balance and then boom he fell. HPI 57-year-old right-handed gentleman with dysequilibrium  with loss of balance. Patient had lumbar canal stenosis with spinal stenosis in 2017. Since last seen we recommended MRI of the lumbar spine and an EMG nerve conduction study. Also recommended MRI of the brain which is essentially normal.  MRI of the lumbar spine shows severe spinal canal stenosis at L2-3 L4-L5 and moderate stenosis at L3-L4. And we had recommended neurosurgical evaluation. Patient was seen by neurosurgery and not recommended surgical intervention he is managed by pain management as well. Patient historically andi his legs which is explainable by his findings we reviewed all the films. Past Medical History:   Diagnosis Date    Alcohol abuse     CAD (coronary artery disease)     patient states no doctor has told him this / has 1 cardiac stent    Cancer ([address]     lung LLL    Chronic back pain     Chronic kidney disease     Chronic sinusitis     DJD (degenerative joint disease) of knee     left knee DJD    Elevated PSA     History of blood transfusion 1980's    History of inferior wall myocardial infarction 01/2016    1 cardiac stent    HTN (hypertension)     meds .  1 yr    Hyperlipidemia     meds > 12 yrs    NSTEMI (non-ST elevated myocardial infarction) ([address]     Smoker      Past Surgical History:   Procedure Laterality Date    ABDOMEN SURGERY  1961    spleenectomy due to [address]  2009    lumbar disc OR    CARDIAC SURGERY      stents    CARPAL TUNNEL RELEASE Right 5/6/2019    RIGHT CARPAL TUNNEL RELEASE performed by Aureliano Villafana MD at [address] [place] [address]  CORONARY ANGIOPLASTY WITH STENT PLACEMENT  1/29/2016    EYE SURGERY      to have Phaco with IOL OU 2/2018    HERNIA REPAIR      JOINT REPLACEMENT Left 1994    LTHR    JOINT REPLACEMENT Right 2009    RTKR    KNEE SURGERY Right 2011    arthroscopy    NC MANIPULATN KNEE JT+ANESTHESIA Right 5/12/2017    RIGHT KNEE MANIPULATION UNDER ANESTHESIA [facility] [address] [facility]PT [facility]ISIT,PROCEDURE ONLY Bilateral 12/29/2017    RIGHT AND BILATERAL L 4-5 LYSIS OF SCAR DISKECTOMY INTERBODY CAGE FUSION POSTEROLATERAL FUSION PEDICLE SCREWS performed by Eddie Westbrook MD at [place] [address]  2004    benign    SPLENECTOMY  1961    TOTAL KNEE ARTHROPLASTY Right 3/24/2017    RIGHT  KNEE TOTAL ARTHROPLASTY, ELHAM CEMENTED PERSONA  performed by Mago Naqvi MD at [address] [place] [address] History     Socioeconomic History    Marital status:      Spouse name: Not on file    Number of children: Not on file    Years of education: Not on file    Highest education level: Not on file   Occupational History    Occupation: retired   Tobacco Use    Smoking status: Current Every Day Smoker     Packs/day: 0.25     Years: 60.00     Pack years: 15.00     Types: Cigarettes    Smokeless tobacco: Never Used   Vaping Use    Vaping Use: Never used   Substance and Sexual Activity    Alcohol use:  Yes     Alcohol/week: 12.0 standard drinks     Types: 12 Shots of liquor per week     Comment: last day before yesterday    Drug use: No    Sexual activity: Yes   Other Topics Concern    Not on file   Social History Narrative    Lives alone     Social Determinants of Health     Financial Resource Strain: Low Risk     Difficulty of Paying Living Expenses: Not very hard   Food Insecurity: No Food Insecurity    Worried About Running Out of Food in the Last Year: Never true    Yoav of Food in the Last Year: Never true   Transportation Needs: No Transportation Needs    Lack of Transportation THREE TIMES A DAY      omeprazole (PRILOSEC) 10 MG delayed release capsule TAKE ONE CAPSULE BY MOUTH DAILY 90 capsule 2    bumetanide (BUMEX) 1 MG tablet Take 1 tablet by mouth daily 90 tablet 3    albuterol sulfate  (90 Base) MCG/ACT inhaler Inhale 2 puffs into the lungs every 6 hours as needed for Wheezing 1 Inhaler 3    morphine (MS CONTIN) 15 MG extended release tablet Take 15 mg by mouth as needed.  fluticasone (FLONASE) 50 MCG/ACT nasal spray 1 spray by Each Nostril route 2 times daily 1 Bottle 3    nitroGLYCERIN (NITROSTAT) 0.4 MG SL tablet Place 1 tablet under the tongue every 5 minutes as needed for Chest pain up to max of 3 total doses. If no relief after 1 dose, call 911. 25 tablet 3    DOCUSATE CALCIUM PO Take by mouth as needed       No current facility-administered medications for this visit. Review of Systems   Constitutional: Negative for fever. HENT: Negative for ear pain, tinnitus and trouble swallowing. Eyes: Negative for photophobia and visual disturbance. Respiratory: Negative for choking and shortness of breath. Cardiovascular: Negative for chest pain and palpitations. Gastrointestinal: Negative for nausea and vomiting. Musculoskeletal: Positive for back pain and gait problem. Negative for joint swelling, myalgias, neck pain and neck stiffness. Skin: Negative for color change. Allergic/Immunologic: Negative for food allergies. Neurological: Positive for weakness and numbness. Negative for dizziness, tremors, seizures, syncope, facial asymmetry, speech difficulty, light-headedness and headaches. Psychiatric/Behavioral: Negative for behavioral problems, confusion, hallucinations and sleep disturbance. Objective:   /74 (Site: Left Upper Arm, Position: Sitting, Cuff Size: Medium Adult)   Pulse 69   Wt 165 lb (74.8 kg)   BMI 27.46 kg/m²     Physical Exam  Vitals reviewed.    Eyes:      Pupils: Pupils are equal, round, and reactive to light. Cardiovascular:      Rate and Rhythm: Normal rate and regular rhythm. Heart sounds: No murmur heard. Pulmonary:      Effort: Pulmonary effort is normal.      Breath sounds: Normal breath sounds. Abdominal:      General: Bowel sounds are normal.   Musculoskeletal:         General: Normal range of motion. Cervical back: Normal range of motion. Skin:     General: Skin is warm. Neurological:      Mental Status: He is alert and oriented to person, place, and time. Cranial Nerves: No cranial nerve deficit. Sensory: No sensory deficit. Motor: No abnormal muscle tone. Coordination: Coordination normal.      Deep Tendon Reflexes: Reflexes are normal and symmetric. Babinski sign absent on the right side. Babinski sign absent on the left side. Comments: Lower extremity strength of 4/5 with areflexia with a gait ataxia patient walks with a walker   Psychiatric:         Mood and Affect: Mood normal.         No results found.     Lab Results   Component Value Date    WBC 9.2 07/07/2020    RBC 3.81 07/07/2020    HGB 12.5 07/07/2020    HCT 37.9 07/07/2020    MCV 99.5 07/07/2020    MCH 32.8 07/07/2020    MCHC 32.9 07/07/2020    RDW 13.8 07/07/2020     07/07/2020     Lab Results   Component Value Date     07/07/2020    K 5.1 07/07/2020    CL 98 07/07/2020    CO2 28 07/07/2020    BUN 28 07/07/2020    CREATININE 1.09 07/07/2020    GFRAA >60.0 07/07/2020    LABGLOM >60.0 07/07/2020    GLUCOSE 84 07/07/2020    PROT 6.9 07/07/2020    LABALBU 4.3 07/07/2020    CALCIUM 9.2 07/07/2020    BILITOT 0.5 07/07/2020    ALKPHOS 87 07/07/2020    AST 15 07/07/2020    ALT 13 07/07/2020     Lab Results   Component Value Date    PROTIME 11.2 12/26/2017    INR 1.1 12/26/2017     Lab Results   Component Value Date    TSH 0.780 07/07/2020    DSRQBCWB46 408 04/09/2021    FOLATE 5.7 04/09/2021     Lab Results   Component Value Date    TRIG 63 07/07/2020    HDL 71 07/07/2020    LDLCALC 46 07/07/2020     No results found for: Gavin Odor, LABBENZ, CANNAB, COCAINESCRN, LABMETH, OPIATESCREENURINE, PHENCYCLIDINESCREENURINE, PPXUR, ETOH  No results found for: LITHIUM, DILFRTOT, VALPROATE    Assessment:       Diagnosis Orders   1. Dysequilibrium     2. Osteoarthritis of spine with myelopathy, lumbosacral region     3. Ataxic gait     4. Spinal stenosis of lumbar region with neurogenic claudication     Disequilibrium with loss of balance secondary to lumbar canal stenosis. Patient was seen here for complete evaluation including EMG nerve conduction study as well as we had obtained an MRI of the lumbosacral spine which shows severe stenosis at least at 2 levels a. MRI of the brain is normal and he does not have normal pressure hydrocephalus or vasculopathy to explain his disequilibrium. All his symptoms therefore accounts to the lumbar spine. We had further referred him to neurosurgery and he is not a surgical candidate as I am told. Patient is due to see pain management. He may seek a second opinion if he wants as his symptoms are truly related to the lower spine. From the neurological standpoint there is nothing else to offer at this time though we will keep an eye on this and continue to follow this andf details of previous evaluations were discussed. Plan:      No orders of the defined types were placed in this encounter. No orders of the defined types were placed in this encounter. Return in about 6 months (around 12/30/2021).       Kelly Sanches MD

## 2021-07-20 ENCOUNTER — OFFICE VISIT (OUTPATIENT)
Dept: ORTHOPEDIC SURGERY | Age: 76
End: 2021-07-20

## 2021-07-20 VITALS
HEART RATE: 76 BPM | TEMPERATURE: 97.4 F | BODY MASS INDEX: 27.49 KG/M2 | HEIGHT: 65 IN | WEIGHT: 165 LBS | OXYGEN SATURATION: 96 %

## 2021-07-20 DIAGNOSIS — S52.531A CLOSED COLLES' FRACTURE OF RIGHT RADIUS, INITIAL ENCOUNTER: Primary | ICD-10-CM

## 2021-07-20 PROCEDURE — 99024 POSTOP FOLLOW-UP VISIT: CPT | Performed by: PHYSICIAN ASSISTANT

## 2021-07-20 NOTE — PROGRESS NOTES
[facility]      Subjective:      Chief Complaint   Patient presents with    1 Month Follow-Up     Closed Colles' fracture of right radius. Pt states he is still having some pain. HPI: Gordo Lay is a 66 y.o. male who is 8 weeks since a Colles' fracture of the right wrist. There is significant displacement but he elected to go the nonoperative approach. He has been doing some exercises and has some stiffness. There is a notable deformity. Past Medical History:   Diagnosis Date    Alcohol abuse     CAD (coronary artery disease)     patient states no doctor has told him this / has 1 cardiac stent    Cancer ([facility] [address]     lung LLL    Chronic back pain     Chronic kidney disease     Chronic sinusitis     DJD (degenerative joint disease) of knee     left knee DJD    Elevated PSA     History of blood transfusion 1980's    History of inferior wall myocardial infarction 01/2016    1 cardiac stent    HTN (hypertension)     meds .  1 yr    Hyperlipidemia     meds > 12 yrs    NSTEMI (non-ST elevated myocardial infarction) ([facility] [address]     Smoker       Past Surgical History:   Procedure Laterality Date    ABDOMEN SURGERY  1961    spleenectomy due to [address]  2009    lumbar disc OR    CARDIAC SURGERY      stents    CARPAL TUNNEL RELEASE Right 5/6/2019    RIGHT CARPAL TUNNEL RELEASE performed by Evie Barry MD at [place] [address] WITH STENT PLACEMENT  1/29/2016    EYE SURGERY      to have Phaco with IOL OU 2/2018    HERNIA REPAIR      JOINT REPLACEMENT Left 1994    LTHR    JOINT REPLACEMENT Right 2009    RTKR    KNEE SURGERY Right 2011    arthroscopy    VA MANIPULATN KNEE JT+ANESTHESIA Right 5/12/2017    RIGHT KNEE MANIPULATION UNDER ANESTHESIA performed by Frida Diehl MD at [address] OFFICE/OUTPT VISIT,PROCEDURE ONLY Bilateral 12/29/2017    RIGHT AND BILATERAL L 4-5 LYSIS OF SCAR DISKECTOMY INTERBODY CAGE FUSION POSTEROLATERAL FUSION PEDICLE SCREWS performed by Konstantin Singh MD at [address] [place] [address]  2004    benign    SPLENECTOMY  1961    TOTAL KNEE ARTHROPLASTY Right 3/24/2017    RIGHT  KNEE TOTAL ARTHROPLASTY, Carli Alarcon CEMENTED PERSONA  performed by Lexi Napier MD at [address] [facility] History     Socioeconomic History    Marital status:      Spouse name: Not on file    Number of children: Not on file    Years of education: Not on file    Highest education level: Not on file   Occupational History    Occupation: retired   Tobacco Use    Smoking status: Current Every Day Smoker     Packs/day: 0.25     Years: 60.00     Pack years: 15.00     Types: Cigarettes    Smokeless tobacco: Never Used   Vaping Use    Vaping Use: Never used   Substance and Sexual Activity    Alcohol use: Yes     Alcohol/week: 12.0 standard drinks     Types: 12 Shots of liquor per week     Comment: last day before yesterday    Drug use: No    Sexual activity: Yes   Other Topics Concern    Not on file   Social History Narrative    Lives alone     Social Determinants of Health     Financial Resource Strain: Low Risk     Difficulty of Paying Living Expenses: Not very hard   Food Insecurity: No Food Insecurity    Worried About Running Out of Food in the Last Year: Never true    Yoav of Food in the Last Year: Never true   Transportation Needs: No Transportation Needs    Lack of Transportation (Medical): No    Lack of Transportation (Non-Medical):  No   Physical Activity:     Days of Exercise per Week:     Minutes of Exercise per Session:    Stress:     Feeling of Stress :    Social Connections:     Frequency of Communication with Friends and Family:     Frequency of Social Gatherings with Friends and Family:     Attends Church Services:     Active Member of Clubs or Organizations:     Attends Club or Organization Meetings:     Marital Status:    Intimate Partner Violence:     Fear of Current or Ex-Partner:     Emotionally Abused:     Physically Abused:     Sexually Abused:      Family History   Problem Relation Age of Onset    Osteoarthritis Mother     Emphysema Mother     Hypertension Father     Hearing Loss Father     Dementia Father     No Known Problems Sister     Prostate Cancer Brother     Colon Polyps Neg Hx      No Known Allergies  Current Outpatient Medications on File Prior to Visit   Medication Sig Dispense Refill    citalopram (CELEXA) 40 MG tablet TAKE ONE TABLET BY MOUTH nightly 30 tablet 5    diclofenac (VOLTAREN) 50 MG EC tablet TAKE ONE TABLET BY MOUTH TWO TIMES A DAY 60 tablet 5    tiotropium (SPIRIVA RESPIMAT) 2.5 MCG/ACT AERS inhaler Inhale 2 puffs into the lungs daily 1 Inhaler 3    isosorbide mononitrate (IMDUR) 30 MG extended release tablet TAKE ONE TABLET BY MOUTH EVERY DAY 90 tablet 3    metoprolol tartrate (LOPRESSOR) 50 MG tablet Take 0.5 tablets by mouth 2 times daily TAKE ONE TABLET BY MOUTH TWO TIMES A  tablet 3    Fluticasone furoate-vilanterol (BREO ELLIPTA) 200-25 MCG/INH AEPB inhaler Inhale 1 puff into the lungs daily 1 each 3    finasteride (PROSCAR) 5 MG tablet TAKE ONE TABLET BY MOUTH DAILY 90 tablet 3    clopidogrel (PLAVIX) 75 MG tablet TAKE ONE TABLET BY MOUTH DAILY 90 tablet 3    atorvastatin (LIPITOR) 20 MG tablet TAKE ONE TABLET BY MOUTH DAILY 90 tablet 3    oxyCODONE-acetaminophen (PERCOCET) 7.5-325 MG per tablet TAKE ONE TABLET BY MOUTH THREE TIMES A DAY      omeprazole (PRILOSEC) 10 MG delayed release capsule TAKE ONE CAPSULE BY MOUTH DAILY 90 capsule 2    bumetanide (BUMEX) 1 MG tablet Take 1 tablet by mouth daily 90 tablet 3    albuterol sulfate  (90 Base) MCG/ACT inhaler Inhale 2 puffs into the lungs every 6 hours as needed for Wheezing 1 Inhaler 3    morphine (MS CONTIN) 15 MG extended release tablet Take 15 mg by mouth as needed.        fluticasone (FLONASE) 50 MCG/ACT nasal spray 1 spray by Each Nostril route 2 times daily 1 Bottle 3    nitroGLYCERIN (NITROSTAT) 0.4 MG SL tablet Place 1 tablet under the tongue every 5 minutes as needed for Chest pain up to max of 3 total doses. If no relief after 1 dose, call 911. 25 tablet 3    DOCUSATE CALCIUM PO Take by mouth as needed       No current facility-administered medications on file prior to visit. Objective:   Pulse 76   Temp 97.4 °F (36.3 °C) (Temporal)   Ht 5' 5\" (1.651 m)   Wt 165 lb (74.8 kg)   SpO2 96%   BMI 27.46 kg/m²       Radiographs and Laboratory Studies:   Previous diagnostic imaging studies were reviewed. Laboratory Studies:   Lab Results   Component Value Date    WBC 9.2 07/07/2020    HGB 12.5 (L) 07/07/2020    HCT 37.9 (L) 07/07/2020    MCV 99.5 07/07/2020     07/07/2020     No results found for: SEDRATE  No results found for: CRP    Assessment and Plan:      Diagnosis Orders   1. Closed Colles' fracture of right radius, initial encounter         8 weeks after suffering a displaced Colles' fracture with progressive collapse of the right wrist. We have discussed with him multiple times about operative versus nonoperative treatment. He understands that there will be degree of cosmetic deformity as well as arthritis moving forward, this was again discussed today. Because he is aggressively from a clinical standpoint will defer from x-rays today as there is no recent injury or trauma to this wrist and his symptoms are improving. He does have some degree of stiffness which is expected after periods of immobilization, he will work with therapy exercises to help improve those. If his pain worsens or does not subside over the next couple months instructed call our office and may be we can do an steroid injection into that joint space but would have to be with Dr Helga Banks    The above plan was discussed in thorough detail with Dr. Zachariah Mcnally and the patient. No orders of the defined types were placed in this encounter.     No orders of the defined types were placed in this encounter. No follow-ups on file.     Ameena Wakefield PA-C  [facility]  [phone]

## 2021-08-09 ENCOUNTER — OFFICE VISIT (OUTPATIENT)
Dept: FAMILY MEDICINE CLINIC | Age: 76
End: 2021-08-09
Payer: MEDICARE

## 2021-08-09 VITALS
WEIGHT: 162 LBS | SYSTOLIC BLOOD PRESSURE: 134 MMHG | TEMPERATURE: 99.1 F | HEIGHT: 65 IN | OXYGEN SATURATION: 95 % | BODY MASS INDEX: 26.99 KG/M2 | HEART RATE: 97 BPM | DIASTOLIC BLOOD PRESSURE: 68 MMHG

## 2021-08-09 DIAGNOSIS — S81.802A OPEN WOUND OF LEFT LOWER LEG, INITIAL ENCOUNTER: Primary | ICD-10-CM

## 2021-08-09 PROCEDURE — G8417 CALC BMI ABV UP PARAM F/U: HCPCS | Performed by: FAMILY MEDICINE

## 2021-08-09 PROCEDURE — 97597 DBRDMT OPN WND 1ST 20 CM/<: CPT | Performed by: FAMILY MEDICINE

## 2021-08-09 PROCEDURE — 4040F PNEUMOC VAC/ADMIN/RCVD: CPT | Performed by: FAMILY MEDICINE

## 2021-08-09 PROCEDURE — 99214 OFFICE O/P EST MOD 30 MIN: CPT | Performed by: FAMILY MEDICINE

## 2021-08-09 PROCEDURE — 1123F ACP DISCUSS/DSCN MKR DOCD: CPT | Performed by: FAMILY MEDICINE

## 2021-08-09 PROCEDURE — 4004F PT TOBACCO SCREEN RCVD TLK: CPT | Performed by: FAMILY MEDICINE

## 2021-08-09 PROCEDURE — G8427 DOCREV CUR MEDS BY ELIG CLIN: HCPCS | Performed by: FAMILY MEDICINE

## 2021-08-09 RX ORDER — CLINDAMYCIN HYDROCHLORIDE 300 MG/1
300 CAPSULE ORAL 4 TIMES DAILY
Qty: 40 CAPSULE | Refills: 0 | Status: SHIPPED | OUTPATIENT
Start: 2021-08-09 | End: 2021-08-19

## 2021-08-09 ASSESSMENT — PATIENT HEALTH QUESTIONNAIRE - PHQ9
SUM OF ALL RESPONSES TO PHQ QUESTIONS 1-9: 0
SUM OF ALL RESPONSES TO PHQ9 QUESTIONS 1 & 2: 0
1. LITTLE INTEREST OR PLEASURE IN DOING THINGS: 0
2. FEELING DOWN, DEPRESSED OR HOPELESS: 0

## 2021-08-09 NOTE — PATIENT INSTRUCTIONS
Wound is full skin thickness into the muscle. Despite lidocaine patient is unable to tolerate full debridement. Concern for osteomyelitis. Patient has very mild temperature elevation of 99.1. blood work, bone scan, wound care consult all ordered. Initiating clindamycin for on oral option available until we ascertain risk of osteomyelitis since patient is not febrile and there is no spreading erythema.     To ER if wound worsens in any way
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
## 2021-08-10 ENCOUNTER — TELEPHONE (OUTPATIENT)
Dept: FAMILY MEDICINE CLINIC | Age: 76
End: 2021-08-10

## 2021-08-10 NOTE — TELEPHONE ENCOUNTER
Kristi Bales from Scheduling calling about the bone scan, needs to be three phase bone scan, please place new order. Her phone number is [phone] option 1.

## 2021-08-11 DIAGNOSIS — S81.802A OPEN WOUND OF LEFT LOWER LEG, INITIAL ENCOUNTER: Primary | ICD-10-CM

## 2021-08-12 RX ORDER — FINASTERIDE 5 MG/1
5 TABLET, FILM COATED ORAL DAILY
Qty: 90 TABLET | Refills: 3 | Status: SHIPPED | OUTPATIENT
Start: 2021-08-12

## 2021-08-16 ENCOUNTER — HOSPITAL ENCOUNTER (OUTPATIENT)
Dept: NUCLEAR MEDICINE | Age: 76
Discharge: HOME | End: 2021-08-18
Payer: MEDICARE

## 2021-08-16 DIAGNOSIS — S81.802A OPEN WOUND OF LEFT LOWER LEG, INITIAL ENCOUNTER: ICD-10-CM

## 2021-08-16 PROCEDURE — 78315 BONE IMAGING 3 PHASE: CPT

## 2021-08-16 PROCEDURE — 3430000000 HC RX DIAGNOSTIC RADIOPHARMACEUTICAL: Performed by: FAMILY MEDICINE

## 2021-08-16 PROCEDURE — A9503 TC99M MEDRONATE: HCPCS | Performed by: FAMILY MEDICINE

## 2021-08-16 RX ORDER — TC 99M MEDRONATE 20 MG/10ML
25 INJECTION, POWDER, LYOPHILIZED, FOR SOLUTION INTRAVENOUS
Status: COMPLETED | OUTPATIENT
Start: 2021-08-16 | End: 2021-08-16

## 2021-08-16 RX ADMIN — TC 99M MEDRONATE 29.8 MILLICURIE: 20 INJECTION, POWDER, LYOPHILIZED, FOR SOLUTION INTRAVENOUS at 10:50

## 2021-08-17 ENCOUNTER — HOSPITAL ENCOUNTER (OUTPATIENT)
Dept: WOUND CARE | Age: 76
Discharge: HOME | End: 2021-08-17
Payer: MEDICARE

## 2021-08-17 VITALS
WEIGHT: 162 LBS | SYSTOLIC BLOOD PRESSURE: 149 MMHG | HEART RATE: 63 BPM | BODY MASS INDEX: 26.96 KG/M2 | RESPIRATION RATE: 20 BRPM | TEMPERATURE: 96.9 F | DIASTOLIC BLOOD PRESSURE: 68 MMHG

## 2021-08-17 DIAGNOSIS — M86.272 SUBACUTE OSTEOMYELITIS OF LEFT ANKLE (HCC): ICD-10-CM

## 2021-08-17 DIAGNOSIS — I70.222 ATHEROSCLEROSIS OF NATIVE ARTERIES OF EXTREMITIES WITH REST PAIN, LEFT LEG (HCC): Chronic | ICD-10-CM

## 2021-08-17 DIAGNOSIS — M86.032: Primary | ICD-10-CM

## 2021-08-17 DIAGNOSIS — M86.172 OTHER ACUTE OSTEOMYELITIS OF LEFT ANKLE (HCC): ICD-10-CM

## 2021-08-17 DIAGNOSIS — L97.322 NON-PRESSURE CHRONIC ULCER OF LEFT ANKLE WITH FAT LAYER EXPOSED (HCC): Chronic | ICD-10-CM

## 2021-08-17 PROBLEM — M86.9 OSTEOMYELITIS OF ANKLE (HCC): Status: ACTIVE | Noted: 2021-08-17

## 2021-08-17 PROBLEM — M86.9 OSTEOMYELITIS (HCC): Status: ACTIVE | Noted: 2021-08-17

## 2021-08-17 PROCEDURE — 11042 DBRDMT SUBQ TIS 1ST 20SQCM/<: CPT

## 2021-08-17 PROCEDURE — 87075 CULTR BACTERIA EXCEPT BLOOD: CPT

## 2021-08-17 PROCEDURE — 87070 CULTURE OTHR SPECIMN AEROBIC: CPT

## 2021-08-17 PROCEDURE — 87077 CULTURE AEROBIC IDENTIFY: CPT

## 2021-08-17 PROCEDURE — 99213 OFFICE O/P EST LOW 20 MIN: CPT

## 2021-08-17 PROCEDURE — 87205 SMEAR GRAM STAIN: CPT

## 2021-08-17 PROCEDURE — 87186 SC STD MICRODIL/AGAR DIL: CPT

## 2021-08-17 NOTE — PLAN OF CARE
[address]:     [facility] [id number] [address] [facility] [place] p: [phone] f: [phone]     Ordering Center:     [address] [place] [address]  Cathryn Siddiqui [address]  [phone]  WOUND CARE [phone]  FAX NUMBER [phone]    Patient Information:      [address] [id number]   [phone]   : 1945  AGE: 66 y.o.      GENDER: male   TODAYS DATE:  2021    Insurance:      PRIMARY INSURANCE:  Plan: MEDICARE PART A AND B  Coverage: MEDICARE  Effective Date: 2018  [id number] - (Medicare)    SECONDARY INSURANCE:  Plan: MEDICAL M[employer]UAL [address]  Coverage: MEDICAL MUTUAL  Effective Date: 2018  [unfilled]    [unfilled]   [unfilled]     Patient Wound Information:      Problem List Items Addressed This Visit     Non-pressure chronic ulcer of left ankle with fat layer exposed ([address] (Chronic)    Relevant Orders    US DUP LOWER EXTREMITY LEFT ARTERIES    Atherosclerosis of native arteries of extremities with rest pain, left leg (HCC) (Chronic)    Relevant Orders    US DUP LOWER EXTREMITY LEFT ARTERIES    Osteomyelitis ([address] - Primary    Relevant Orders    Aliyah Schaffer MD, Infectious Disease, [place]    Osteomyelitis of ankle ([address]    Relevant Orders    Aliyah Schaffer MD, Infectious Disease, [place]          WOUNDS REQUIRING DRESSING SUPPLIES:     Wound 21 #1 left anterior leg (Active)   Wound Image   21 1501   Wound Cleansed Cleansed with saline 21 1501   Dressing/Treatment Hydrating gel;Dry dressing 21 1557   Wound Length (cm) 2 cm 21 1501   Wound Width (cm) 1.3 cm 21 1501   Wound Depth (cm) 0.3 cm 21 1501   Wound Surface Area (cm^2) 2.6 cm^2 21 1501   Wound Volume (cm^3) 0.78 cm^3 21 1501   Post-Procedure Length (cm) 2 cm 21 1531   Post-Procedure Width (cm) 1.3 cm 21 1531   Post-Procedure Depth (cm) 0.3 cm 21 1531   Post-Procedure Surface Area (cm^2) 2.6 cm^2 08/17/21 1531   Post-Procedure Volume (cm^3) 0.78 cm^3 08/17/21 1531   Wound Assessment Granulation tissue;Slough 08/17/21 1501   Drainage Amount Moderate 08/17/21 1501   Drainage Description Serosanguinous 08/17/21 1501   Odor None 08/17/21 1501   Ela-wound Assessment Intact 08/17/21 1501   Margins Defined edges 08/17/21 1501   Wound Thickness Description not for Pressure Injury Full thickness 08/17/21 1501   Number of days: 0     Incision 05/06/19 Wrist Right (Active)   Number of days: 604       Supplies Requested :      WOUND #: 1   PRIMARY DRESSING:  Other: 2x2 guaze   Cover and Secure with:  Other bordered gauze     FREQUENCY OF DRESSING CHANGES:  Daily             ADDITIONAL ITEMS:  [] Gloves Small  [x] Gloves Medium [] Gloves Large [] Gloves XLarge  [] Tape 1\" [x] Tape 2\" [] Tape 3\"  [] Medipore Tape  [x] Saline  [] Skin Prep   [] Adhesive Remover   [] Cotton Tip Applicators   [] Other:    Patient Wound(s) Debrided: [x] Yes   [] No    Debribement Type: subcutaneous tissue    Debridement Date: 08/17/21    Patient currently being seen by Home Health: [] Yes   [x] No    Duration for needed supplies:  []15  [x]30  []60  []90 Days    Provider Information:      [facility] [id number]

## 2021-08-17 NOTE — PROGRESS NOTES
[address] [facility] [address]                                                   Progress Note and Procedure Note      Vikash Amin RECORD NUMBER:  [id number]  AGE: 66 y.o. GENDER: male  : 1945  EPISODE DATE:  2021    Subjective:     Chief Complaint   Patient presents with    Wound Check     LLE wound new         HISTORY of PRESENT ILLNESS LULY Strauss is a 66 y.o. male who presents today for wound/ulcer evaluation. History of Wound Context: Patient presents per referral of Dr. Kenyetta Stevenson for a one with history of an open wound of the left dorsal ankle. Bone scan performed and positive for osteomyelitis of the talus. Wound/Ulcer Pain Timing/Severity: constant  Quality of pain: aching  Severity:  3 / 10   Modifying Factors: None  Associated Signs/Symptoms: pain    Ulcer Identification:  Ulcer Type: undetermined  Contributing Factors: smoking    Wound: N/A        PAST MEDICAL HISTORY        Diagnosis Date    Alcohol abuse     CAD (coronary artery disease)     patient states no doctor has told him this / has 1 cardiac stent    Cancer ([address]     lung LLL    Chronic back pain     Chronic kidney disease     Chronic sinusitis     DJD (degenerative joint disease) of knee     left knee DJD    Elevated PSA     History of blood transfusion     History of inferior wall myocardial infarction 2016    1 cardiac stent    HTN (hypertension)     meds .  1 yr    Hyperlipidemia     meds > 12 yrs    NSTEMI (non-ST elevated myocardial infarction) ([address]     Smoker        PAST SURGICAL HISTORY    Past Surgical History:   Procedure Laterality Date    ABDOMEN SURGERY      spleenectomy due to [address]      lumbar disc OR    CARDIAC SURGERY      stents    CARPAL TUNNEL RELEASE Right 2019    RIGHT CARPAL TUNNEL RELEASE performed by Agustin Woods MD at [place] [address] WITH STENT PLACEMENT  2016    EYE SURGERY      to have Phaco with IOL OU 2/2018    HERNIA REPAIR      JOINT REPLACEMENT Left 1994    LTHR    JOINT REPLACEMENT Right 2009    RTKR    KNEE SURGERY Right 2011    arthroscopy    W[place] MANIPULATN KNEE JT+ANESTHESIA Right 5/12/2017    RIGHT KNEE MANIPULATION UNDER ANESTHESIA performed by Venkatesh Farmer MD at [address] [facility] [address] [facility] OFFICE/OUTPT [address] Bilateral 12/29/2017    RIGHT AND BILATERAL L 4-5 LYSIS OF SCAR DISKECTOMY INTERBODY CAGE FUSION POSTEROLATERAL FUSION PEDICLE SCREWS performed by Milli Whelan MD at [address] [place] [address]  2004    benign    SPLENECTOMY  1961    TOTAL KNEE ARTHROPLASTY Right 3/24/2017    RIGHT  KNEE TOTAL ARTHROPLASTY, ELHAM CEMENTED PERSONA  performed by Venkatesh Farmer MD at [address] [place] [address] HISTORY    Family History   Problem Relation Age of Onset    Osteoarthritis Mother     Emphysema Mother     Hypertension Father     Hearing Loss Father     Dementia Father     No Known Problems Sister     Prostate Cancer Brother     Colon Polyps Neg Hx        SOCIAL HISTORY    Social History     Tobacco Use    Smoking status: Current Every Day Smoker     Packs/day: 0.25     Years: 60.00     Pack years: 15.00     Types: Cigarettes    Smokeless tobacco: Never Used   Vaping Use    Vaping Use: Never used   Substance Use Topics    Alcohol use:  Yes     Alcohol/week: 12.0 standard drinks     Types: 12 Shots of liquor per week     Comment: last day before yesterday    Drug use: No       ALLERGIES    No Known Allergies    MEDICATIONS    Current Outpatient Medications on File Prior to Encounter   Medication Sig Dispense Refill    finasteride (PROSCAR) 5 MG tablet Take 1 tablet by mouth daily 90 tablet 3    clindamycin (CLEOCIN) 300 MG capsule Take 1 capsule by mouth 4 times daily for 10 days 40 capsule 0    citalopram (CELEXA) 40 MG tablet TAKE ONE TABLET BY MOUTH nightly 30 tablet 5    diclofenac (VOLTAREN) 50 MG EC tablet TAKE ONE TABLET BY MOUTH TWO TIMES A DAY 60 tablet 5  isosorbide mononitrate (IMDUR) 30 MG extended release tablet TAKE ONE TABLET BY MOUTH EVERY DAY 90 tablet 3    metoprolol tartrate (LOPRESSOR) 50 MG tablet Take 0.5 tablets by mouth 2 times daily TAKE ONE TABLET BY MOUTH TWO TIMES A  tablet 3    Fluticasone furoate-vilanterol (BREO ELLIPTA) 200-25 MCG/INH AEPB inhaler Inhale 1 puff into the lungs daily 1 each 3    clopidogrel (PLAVIX) 75 MG tablet TAKE ONE TABLET BY MOUTH DAILY 90 tablet 3    atorvastatin (LIPITOR) 20 MG tablet TAKE ONE TABLET BY MOUTH DAILY 90 tablet 3    oxyCODONE-acetaminophen (PERCOCET) 7.5-325 MG per tablet TAKE ONE TABLET BY MOUTH THREE TIMES A DAY      omeprazole (PRILOSEC) 10 MG delayed release capsule TAKE ONE CAPSULE BY MOUTH DAILY 90 capsule 2    bumetanide (BUMEX) 1 MG tablet Take 1 tablet by mouth daily 90 tablet 3    albuterol sulfate  (90 Base) MCG/ACT inhaler Inhale 2 puffs into the lungs every 6 hours as needed for Wheezing 1 Inhaler 3    morphine (MS CONTIN) 15 MG extended release tablet Take 15 mg by mouth as needed.  fluticasone (FLONASE) 50 MCG/ACT nasal spray 1 spray by Each Nostril route 2 times daily 1 Bottle 3    nitroGLYCERIN (NITROSTAT) 0.4 MG SL tablet Place 1 tablet under the tongue every 5 minutes as needed for Chest pain up to max of 3 total doses. If no relief after 1 dose, call 911. 25 tablet 3    DOCUSATE CALCIUM PO Take by mouth as needed      tiotropium (SPIRIVA RESPIMAT) 2.5 MCG/ACT AERS inhaler Inhale 2 puffs into the lungs daily 1 Inhaler 3     No current facility-administered medications on file prior to encounter. REVIEW OF SYSTEMS    Pertinent items are noted in HPI.     Objective:      BP (!) 149/68   Pulse 63   Temp 96.9 °F (36.1 °C) (Temporal)   Resp 20   Wt 162 lb (73.5 kg)   BMI 26.96 kg/m²     Wt Readings from Last 3 Encounters:   08/17/21 162 lb (73.5 kg)   08/09/21 162 lb (73.5 kg)   07/20/21 165 lb (74.8 kg)       PHYSICAL EXAM    Constitutional:   Well nourished and well developed. Appears neat and clean. Patient is alert, oriented x3, and in no apparent distress. Respiratory:  Respiratory effort is easy and symmetric bilaterally. Rate is normal at rest and on room air. Vascular:  Pedal Pulses is not palpable and audible with doppler. Capillary refill is <3 sec to digits bilateral.  Extremities negative for  pitting edema. Neurological:   Sensation is intact  to lower extremities. Dermatological:  Wound description noted in wound assessment. Wound on the anterior left ankle is full thickness and is very painful. The wound is with and granular and slough base. Lidocaine cream was applied with still painful debridement. Psychiatric:  Judgement and insight intact. Short and long term memory intact. No evidence of depression, anxiety, or agitation. Patient is calm, cooperative, and communicative. Appropriate interactions and affect. Assessment:      Active Hospital Problems    Diagnosis Date Noted    Non-pressure chronic ulcer of left ankle with fat layer exposed ([address] [L97.322] 08/17/2021    Atherosclerosis of native arteries of extremities with rest pain, left leg ([address] [I70.222] 08/17/2021    Left ankle pain [M25.572] 03/29/2016        Procedure Note  Indications:  Based on my examination of this patient's wound(s)/ulcer(s) today, debridement is required to promote healing and evaluate the wound base. Performed by: Kayy Duong DPM    Consent obtained:  Yes    Time out taken:  Yes    Pain Control:   lidocaine cream      Debridement:Excisional Debridement    Using curette the wound(s)/ulcer(s) was/were sharply debrided down through and including the removal of epidermis, dermis and subcutaneous tissue.         Devitalized Tissue Debrided:  slough    Pre Debridement Measurements:  Are located in the Wound/Ulcer Documentation Flow Sheet    Wound/Ulcer #: 1    Post Debridement Measurements:  Wound/Ulcer Descriptions are Pre Debridement except measurements:    Wound 08/17/21 #1 left anterior leg (Active)   Wound Image   08/17/21 1501   Wound Cleansed Cleansed with saline 08/17/21 1501   Dressing/Treatment Hydrating gel;Dry dressing 08/17/21 1557   Wound Length (cm) 2 cm 08/17/21 1501   Wound Width (cm) 1.3 cm 08/17/21 1501   Wound Depth (cm) 0.3 cm 08/17/21 1501   Wound Surface Area (cm^2) 2.6 cm^2 08/17/21 1501   Wound Volume (cm^3) 0.78 cm^3 08/17/21 1501   Post-Procedure Length (cm) 2 cm 08/17/21 1531   Post-Procedure Width (cm) 1.3 cm 08/17/21 1531   Post-Procedure Depth (cm) 0.3 cm 08/17/21 1531   Post-Procedure Surface Area (cm^2) 2.6 cm^2 08/17/21 1531   Post-Procedure Volume (cm^3) 0.78 cm^3 08/17/21 1531   Wound Assessment Granulation tissue;Slough 08/17/21 1501   Drainage Amount Moderate 08/17/21 1501   Drainage Description Serosanguinous 08/17/21 1501   Odor None 08/17/21 1501   Ela-wound Assessment Intact 08/17/21 1501   Margins Defined edges 08/17/21 1501   Wound Thickness Description not for Pressure Injury Full thickness 08/17/21 1501   Number of days: 0     Incision 05/06/19 Wrist Right (Active)   Number of days: 926         Percent of Wound/Ulcer Debrided: 50%    Total Surface Area Debrided:  1.3 sq cm     Diabetic/Pressure/Non Pressure Ulcers:  Ulcer: Non-Pressure ulcer, fat layer exposed      Bleeding:  Minimal    Hemostasis Achieved:  by pressure    Procedural Pain:  5  / 10     Post Procedural Pain:  2 / 10     Response to treatment:  Well tolerated by patient. Plan:   Patient examined and debrided  All labs and scans reviewed as well as notes of PCP  Rx Santyl, salineand moistened saline guaze daily  Follow up in 2 weeks  Culture obtained  ID referral for osteomyelitis  Referral for art duplex              Treatment Note please see attached Discharge Instructions    Written patient dismissal instructions given to patient and signed by patient or POA.          Discharge [id number] [address] [facility]   Physician Orders and Discharge Instructions  [address]  Telephone: [phone]      NAME:  Reinaldo Ramirez  YOB: 1945  MEDICAL RECORD NUMBER:  [id number]    Home Care/Facility:    Wound Location:   LEFT ANTERIOR LEG  Dressing orders: 1. Cleanse wound(s) with normal saline. 2. Apply a nickel thickness layer of SANTYL OINTMENT  to the wound bed for enzymatic debridement purposes. 3. Apply a moistened saline 4x4 (gauze pad) over the Santyl Ointment. 4. Cover with additional dry 4x4's and wrap with gauze (graciela or kerlix). 5. Change Every Day. UNTIL YOU RECEIVE YOUR SANTYL APPLY A NORMAL SALINE WET TO MOIST DRESSING EVERY DAY. TODAY IN WOUND CENTER APPLY PLUROGEL AND DRY DRESSING. Compression:    Offloading Device:    Other Instructions:  SANTYL AND NORMAL SALINE AT YOUR PHARMACY. DR. Ben Austin IS ORDERING AN ULTRASOUND OF YOUR LEFT LEG - PLEASE CALL [phone] TO SCHEDULE. Keep all dressings clean, dry and intact. Keep pressure off the wound(s) at all times. Follow up visit   2 Weeks    AUGUST 31, 2021  AT       Please give 24 hour notice if unable to keep appointment. [phone]    If you experience any of the following, please call the Wound Care Service at  [phone] or go to the nearest emergency room. *Increase in pain *Temperature over 101 *Increase in drainage from your wound or a foul odor  *Uncontrolled swelling *Need for compression bandage changes due to slippage, breakthrough drainage       PLEASE NOTE: IF YOU ARE UNABLE TO OBTAIN WOUND SUPPLIES, CONTINUE TO USE THE SUPPLIES YOU HAVE AVAILABLE UNTIL YOU ARE ABLE TO REACH US.  IT IS MOST IMPORTANT TO KEEP THE WOUND COVERED AT ALL TIMES                    Electronically signed by Yanique Mejias DPM on 8/17/2021 at 4:53 PM

## 2021-08-17 NOTE — CODE DOCUMENTATION
[id number] Physician Billing Sheet. Reinaldo Ramirez  AGE: 66 y.o.    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

## 2021-08-18 ENCOUNTER — APPOINTMENT (OUTPATIENT)
Dept: CT IMAGING | Age: 76
End: 2021-08-18
Payer: MEDICARE

## 2021-08-18 ENCOUNTER — HOSPITAL ENCOUNTER (EMERGENCY)
Age: 76
Discharge: HOME | End: 2021-08-18
Attending: EMERGENCY MEDICINE
Payer: MEDICARE

## 2021-08-18 ENCOUNTER — APPOINTMENT (OUTPATIENT)
Dept: GENERAL RADIOLOGY | Age: 76
End: 2021-08-18
Payer: MEDICARE

## 2021-08-18 VITALS
WEIGHT: 167 LBS | RESPIRATION RATE: 18 BRPM | SYSTOLIC BLOOD PRESSURE: 158 MMHG | DIASTOLIC BLOOD PRESSURE: 80 MMHG | TEMPERATURE: 99.1 F | HEIGHT: 66 IN | OXYGEN SATURATION: 96 % | HEART RATE: 70 BPM | BODY MASS INDEX: 26.84 KG/M2

## 2021-08-18 DIAGNOSIS — M79.641 RIGHT HAND PAIN: ICD-10-CM

## 2021-08-18 DIAGNOSIS — S20.212A RIB CONTUSION, LEFT, INITIAL ENCOUNTER: Primary | ICD-10-CM

## 2021-08-18 PROCEDURE — 99285 EMERGENCY DEPT VISIT HI MDM: CPT

## 2021-08-18 PROCEDURE — 73130 X-RAY EXAM OF HAND: CPT

## 2021-08-18 PROCEDURE — 73110 X-RAY EXAM OF WRIST: CPT

## 2021-08-18 PROCEDURE — 6370000000 HC RX 637 (ALT 250 FOR IP): Performed by: EMERGENCY MEDICINE

## 2021-08-18 PROCEDURE — 29125 APPL SHORT ARM SPLINT STATIC: CPT

## 2021-08-18 PROCEDURE — 70450 CT HEAD/BRAIN W/O DYE: CPT

## 2021-08-18 PROCEDURE — 71101 X-RAY EXAM UNILAT RIBS/CHEST: CPT

## 2021-08-18 PROCEDURE — 29130 APPL FINGER SPLINT STATIC: CPT

## 2021-08-18 PROCEDURE — 6830039000 HC L3 TRAUMA ALERT

## 2021-08-18 PROCEDURE — 72125 CT NECK SPINE W/O DYE: CPT

## 2021-08-18 RX ORDER — LIDOCAINE 4 G/G
1 PATCH TOPICAL DAILY
Status: DISCONTINUED | OUTPATIENT
Start: 2021-08-18 | End: 2021-08-18 | Stop reason: HOSPADM

## 2021-08-18 RX ORDER — OXYCODONE HYDROCHLORIDE AND ACETAMINOPHEN 5; 325 MG/1; MG/1
1 TABLET ORAL ONCE
Status: COMPLETED | OUTPATIENT
Start: 2021-08-18 | End: 2021-08-18

## 2021-08-18 RX ADMIN — OXYCODONE HYDROCHLORIDE AND ACETAMINOPHEN 1 TABLET: 5; 325 TABLET ORAL at 11:22

## 2021-08-18 ASSESSMENT — PAIN SCALES - GENERAL
PAINLEVEL_OUTOF10: 0
PAINLEVEL_OUTOF10: 8

## 2021-08-18 ASSESSMENT — PAIN DESCRIPTION - ORIENTATION: ORIENTATION: LEFT

## 2021-08-18 ASSESSMENT — PAIN DESCRIPTION - DESCRIPTORS: DESCRIPTORS: SHARP

## 2021-08-18 ASSESSMENT — PAIN DESCRIPTION - FREQUENCY
FREQUENCY: CONTINUOUS
FREQUENCY: CONTINUOUS

## 2021-08-18 ASSESSMENT — PAIN DESCRIPTION - LOCATION
LOCATION: CHEST;RIB CAGE
LOCATION: CHEST

## 2021-08-18 ASSESSMENT — PAIN DESCRIPTION - PAIN TYPE
TYPE: ACUTE PAIN
TYPE: ACUTE PAIN

## 2021-08-18 NOTE — ED TRIAGE NOTES
Anabela Hopkins last night in the kitchen, felt like legs gave out. Opt admits to having a couple drinks. States feel like cant fully lift left foot.  Pain right rib cage and chest

## 2021-08-18 NOTE — ED PROVIDER NOTES
[facility]  EMERGENCY MEDICINE     Pt Name: Ludwig Kawasaki  MRN: [id number]  Lakshmigfeulalio 1945  Date of evaluation: 8/18/2021  PCP:    Kenia Jason MD  Provider: Citlali Rutherford DO    CHIEF COMPLAINT       Chief Complaint   Patient presents with    Fall       HISTORY OF PRESENT ILLNESS    HPI     53-year-old male with history of hyperlipidemia, chronic left ankle ulcer, degenerative disc disease, spinal stenosis, CAD, hypertension, presents to the emergency department with complaint of fall yesterday. Apparently, patient was drinking when his left leg gave out and he slept. Patient states that he was able to get up afterwards but states that he was having pain in his right hand and left ribs. He does not remember hitting his head but is not have a headache double vision blurry vision or numbness or tingling in his extremities. He denies any shortness of breath or cough. States most of his pain is on the left anterior chest.  He does have bruising and cuts on his right hand. He is up-to-date with tetanus shot. Denies any other injuries. He was able to get up with no issues. Triage notes and Nursing notes were reviewed by myself. Any discrepancies are addressed above. PAST MEDICAL HISTORY     Past Medical History:   Diagnosis Date    Alcohol abuse     CAD (coronary artery disease)     patient states no doctor has told him this / has 1 cardiac stent    Cancer ([address]     lung LLL    Chronic back pain     Chronic kidney disease     Chronic sinusitis     DJD (degenerative joint disease) of knee     left knee DJD    Elevated PSA     History of blood transfusion 1980's    History of inferior wall myocardial infarction 01/2016    1 cardiac stent    HTN (hypertension)     meds .  1 yr    Hyperlipidemia     meds > 12 yrs    NSTEMI (non-ST elevated myocardial infarction) ([address]     Smoker        SURGICAL HISTORY       Past Surgical History:   Procedure Laterality Date    ABDOMEN SURGERY  1961    spleenectomy due to [address]  2009    lumbar disc OR    CARDIAC SURGERY      stents    CARPAL TUNNEL RELEASE Right 5/6/2019    RIGHT CARPAL TUNNEL RELEASE performed by Kellee Shell MD at [place] [address] WITH STENT PLACEMENT  1/29/2016    EYE SURGERY      to have Phaco with IOL OU 2/2018    HERNIA REPAIR      JOINT REPLACEMENT Left 1994    LTHR    JOINT REPLACEMENT Right 2009    RTKR    KNEE SURGERY Right 2011    arthroscopy    NC MANIPULATN KNEE JT+ANESTHESIA Right 5/12/2017    RIGHT KNEE MANIPULATION UNDER ANESTHESIA performed by Ying Modi MD at [address] [facility] OFFICE/OUTPT VISIT,PROCEDURE ONLY Bilateral 12/29/2017    RIGHT AND BILATERAL L 4-5 LYSIS OF SCAR DISKECTOMY INTERBODY CAGE FUSION POSTEROLATERAL FUSION PEDICLE SCREWS performed by Kellee Shell MD at [address] [place] [address]  2004    benign    SPLENECTOMY  1961    TOTAL KNEE ARTHROPLASTY Right 3/24/2017    RIGHT  KNEE TOTAL ARTHROPLASTY, ELHAM CEMENTED PERSONA  performed by Ying Modi MD at [facility] [address]       Previous Medications    ALBUTEROL SULFATE  (90 BASE) MCG/ACT INHALER    Inhale 2 puffs into the lungs every 6 hours as needed for Wheezing    ATORVASTATIN (LIPITOR) 20 MG TABLET    TAKE ONE TABLET BY MOUTH DAILY    BUMETANIDE (BUMEX) 1 MG TABLET    Take 1 tablet by mouth daily    CITALOPRAM (CELEXA) 40 MG TABLET    TAKE ONE TABLET BY MOUTH nightly    CLINDAMYCIN (CLEOCIN) 300 MG CAPSULE    Take 1 capsule by mouth 4 times daily for 10 days    CLOPIDOGREL (PLAVIX) 75 MG TABLET    TAKE ONE TABLET BY MOUTH DAILY    COLLAGENASE (SANTYL) 250 UNIT/GM OINTMENT    Apply topically daily.     DICLOFENAC (VOLTAREN) 50 MG EC TABLET    TAKE ONE TABLET BY MOUTH TWO TIMES A DAY    DOCUSATE CALCIUM PO    Take by mouth as needed    FINASTERIDE (PROSCAR) 5 MG TABLET    Take 1 tablet by mouth daily    FLUTICASONE (FLONASE) 50 MCG/ACT NASAL SPRAY    1 spray by Each Nostril route 2 times daily    FLUTICASONE FUROATE-VILANTEROL (BREO ELLIPTA) 200-25 MCG/INH AEPB INHALER    Inhale 1 puff into the lungs daily    ISOSORBIDE MONONITRATE (IMDUR) 30 MG EXTENDED RELEASE TABLET    TAKE ONE TABLET BY MOUTH EVERY DAY    METOPROLOL TARTRATE (LOPRESSOR) 50 MG TABLET    Take 0.5 tablets by mouth 2 times daily TAKE ONE TABLET BY MOUTH TWO TIMES A DAY    MORPHINE (MS CONTIN) 15 MG EXTENDED RELEASE TABLET    Take 15 mg by mouth as needed. NITROGLYCERIN (NITROSTAT) 0.4 MG SL TABLET    Place 1 tablet under the tongue every 5 minutes as needed for Chest pain up to max of 3 total doses. If no relief after 1 dose, call 911. OMEPRAZOLE (PRILOSEC) 10 MG DELAYED RELEASE CAPSULE    TAKE ONE CAPSULE BY MOUTH DAILY    OXYCODONE-ACETAMINOPHEN (PERCOCET) 7.5-325 MG PER TABLET    TAKE ONE TABLET BY MOUTH THREE TIMES A DAY    SODIUM CHLORIDE, EXTERNAL, 0.9 % SOLN    Apply 1 Applicatorful topically daily    TIOTROPIUM (SPIRIVA RESPIMAT) 2.5 MCG/ACT AERS INHALER    Inhale 2 puffs into the lungs daily       ALLERGIES     No Known Allergies    FAMILY HISTORY       Family History   Problem Relation Age of Onset    Osteoarthritis Mother     Emphysema Mother     Hypertension Father     Hearing Loss Father     Dementia Father     No Known Problems Sister     Prostate Cancer Brother     Colon Polyps Neg Hx         SOCIAL HISTORY       Social History     Socioeconomic History    Marital status:      Spouse name: None    Number of children: None    Years of education: None    Highest education level: None   Occupational History    Occupation: retired   Tobacco Use    Smoking status: Current Every Day Smoker     Packs/day: 0.25     Years: 60.00     Pack years: 15.00     Types: Cigarettes    Smokeless tobacco: Never Used   Vaping Use    Vaping Use: Never used   Substance and Sexual Activity    Alcohol use:  Yes     Alcohol/week: 12.0 standard drinks     Types: 12 Shots of liquor per week     Comment: last day before yesterday    Drug use: No    Sexual activity: Yes   Other Topics Concern    None   Social History Narrative    Lives alone     Social Determinants of Health     Financial Resource Strain: Low Risk     Difficulty of Paying Living Expenses: Not very hard   Food Insecurity: No Food Insecurity    Worried About Running Out of Food in the Last Year: Never true    Yoav of Food in the Last Year: Never true   Transportation Needs: No Transportation Needs    Lack of Transportation (Medical): No    Lack of Transportation (Non-Medical): No   Physical Activity:     Days of Exercise per Week:     Minutes of Exercise per Session:    Stress:     Feeling of Stress :    Social Connections:     Frequency of Communication with Friends and Family:     Frequency of Social Gatherings with Friends and Family:     Attends [employer] Services:     Active Member of Clubs or Organizations:     Attends Club or Organization Meetings:     Marital Status:    Intimate Partner Violence:     Fear of Current or Ex-Partner:     Emotionally Abused:     Physically Abused:     Sexually Abused:        REVIEW OF SYSTEMS     Review of Systems   Cardiovascular: Positive for chest pain (left rib pain). Musculoskeletal: Positive for arthralgias, joint swelling and myalgias. Except as noted above the remainder of the review of systems was reviewed and is negative.   SCREENINGS        Giovanni Coma Scale  Eye Opening: Spontaneous  Best Verbal Response: Oriented  Best Motor Response: Obeys commands  Giovanni Coma Scale Score: 15               PHYSICAL EXAM    (up to 7 for level 4, 8 or more for level 5)     ED Triage Vitals   BP Temp Temp Source Pulse Resp SpO2 Height Weight   08/18/21 1032 08/18/21 1030 08/18/21 1030 08/18/21 1030 08/18/21 1030 08/18/21 1030 08/18/21 1030 08/18/21 1030   (!) 161/73 99.1 °F (37.3 °C) Oral 74 15 96 % 5' 6\" (1.676 m) 167 lb (75.8 kg)       Physical Exam  Constitutional: Appearance: Normal appearance. He is normal weight. He is not ill-appearing or toxic-appearing. HENT:      Head: Normocephalic and atraumatic. Nose: Nose normal. No congestion or rhinorrhea. Mouth/Throat:      Mouth: Mucous membranes are moist.   Eyes:      General:         Right eye: No discharge. Left eye: No discharge. Conjunctiva/sclera: Conjunctivae normal.      Pupils: Pupils are equal, round, and reactive to light. Neck:      Comments: No midline cervical spine tenderness, no meningeal signs. Cardiovascular:      Rate and Rhythm: Normal rate. Heart sounds: No murmur heard. Pulmonary:      Effort: Pulmonary effort is normal. No respiratory distress. Breath sounds: Normal breath sounds. No wheezing. Chest:      Chest wall: No tenderness. Abdominal:      General: Abdomen is flat. There is no distension. Palpations: Abdomen is soft. Tenderness: There is no abdominal tenderness. Musculoskeletal:         General: Swelling present. Arms:       Cervical back: Normal range of motion and neck supple. Legs:       Comments: Swelling noted along the first digit of the right hand. Radial pulses are intact. Bleeding is under control with the skin tears. Compartments are soft otherwise. Decreased range of motion in the thumb secondary to pain. Skin:     General: Skin is warm and dry. Capillary Refill: Capillary refill takes less than 2 seconds. Neurological:      General: No focal deficit present. Mental Status: He is alert and oriented to person, place, and time. Psychiatric:         Mood and Affect: Mood normal.         Behavior: Behavior normal.         Thought Content:  Thought content normal.         Judgment: Judgment normal.           DIAGNOSTIC RESULTS     EKG:(none if blank)  All EKGs are interpreted by the Emergency Department Physician who either signs or Co-signs this chart in the absence of a cardiologist.        RADIOLOGY: (none if blank)   I directly visualized the following images and reviewed the radiologist interpretations. Interpretation per the Radiologist below, if available at the time of this note:  XR HAND RIGHT (MIN 3 VIEWS)   Final Result   NO ACUTE FRACTURES      EXAMINATION: XR HAND RIGHT (MIN 3 VIEWS), XR WRIST RIGHT (MIN 3 VIEWS)       CLINICAL HISTORY:  fall        COMPARISONS: None       FINDINGS:      Four views of the right wrist are submitted. There are several bony fragments seen in the region of the radial styloid process. No dislocations. Faint calcification joint space suggesting underlying chondrocalcinosis. IMPRESSION: SEVERAL BONY FRAGMENTS SEEN IN REGION OF THE RADIAL STYLOID PROCESS. MAY REPRESENT AGE-INDETERMINATE FRACTURE. CORRELATE WITH POINT TENDERNESS. XR WRIST RIGHT (MIN 3 VIEWS)   Final Result   NO ACUTE FRACTURES      EXAMINATION: XR HAND RIGHT (MIN 3 VIEWS), XR WRIST RIGHT (MIN 3 VIEWS)       CLINICAL HISTORY:  fall        COMPARISONS: None       FINDINGS:      Four views of the right wrist are submitted. There are several bony fragments seen in the region of the radial styloid process. No dislocations. Faint calcification joint space suggesting underlying chondrocalcinosis. IMPRESSION: SEVERAL BONY FRAGMENTS SEEN IN REGION OF THE RADIAL STYLOID PROCESS. MAY REPRESENT AGE-INDETERMINATE FRACTURE. CORRELATE WITH POINT TENDERNESS. XR RIBS LEFT INCLUDE CHEST (MIN 3 VIEWS)   Final Result      No acute findings. CT Head WO Contrast   Final Result   1. Age-appropriate cortical atrophy and periventricular microangiopathy. 2.No acute hemorrhage or extra-axial fluid collection. All CT scans at this facility use dose modulation, iterative reconstruction, and/or weight based dosing when appropriate to reduce radiation dose to as low as reasonably achievable.       CT OF THE CERVICAL SPINE WITHOUT CONTRAST      COMPARISON: No Patient usually takes Percocet at home so was given a dose today. Lidocaine patch was placed on the left chest/ribs which is where he is having point tenderness. X-rays were all negative for acute fractures most likely contusion of the rib. Patient was able to ambulate with assistance in the department. He usually uses a walker at home. Will be discharged in stable condition otherwise. Strict return precautions and follow up instructions were discussed with the patient with which the patient agrees    ED Medications administered this visit:    Medications   lidocaine 4 % external patch 1 patch (1 patch Transdermal Patch Applied 8/18/21 9688)   oxyCODONE-acetaminophen (PERCOCET) 5-325 MG per tablet 1 tablet (1 tablet Oral Given 8/18/21 1122)         FINAL IMPRESSION      1. Rib contusion, left, initial encounter    2.  Right hand pain          DISPOSITION/PLAN   DISPOSITION Discharge - Pending Orders Complete 08/18/2021 02:05:12 PM      PATIENT REFERRED TO:  [place] [address]  [phone]            DISCHARGE MEDICATIONS:  New Prescriptions    No medications on file              Alma Dee DO (electronically signed)  Attending Physician, Emergency Department         Alma Dee DO  08/18/21 1317

## 2021-08-19 ENCOUNTER — CARE COORDINATION (OUTPATIENT)
Dept: CARE COORDINATION | Age: 76
End: 2021-08-19

## 2021-08-19 NOTE — CARE COORDINATION
Attempted to contact patient for ER Follow up and care coordination discussion   Unable to reach patient by phone. Message left regarding purpose of call. Number provided and call back requested. Noah Pitt

## 2021-08-20 ENCOUNTER — CARE COORDINATION (OUTPATIENT)
Dept: CARE COORDINATION | Age: 76
End: 2021-08-20

## 2021-08-20 NOTE — CARE COORDINATION
I called to complete an ER follow up with Jameson Ritter.  He tells me that he is still pretty sore especially in his left ribs. I have made him aware that the ribs look okay on X-ray. He denies any issues with breathing including pain with deep inspiration or SOB. He has not called orthopedics but plans to do so today  He is using the walker for ambulation as he is afraid to fall. I spoke with him about care coordination including my role and the process. He does not feel that he needs this at this time.

## 2021-08-21 LAB
ANAEROBIC CULTURE: ABNORMAL
GRAM STAIN RESULT: ABNORMAL
ORGANISM: ABNORMAL
WOUND/ABSCESS: ABNORMAL
WOUND/ABSCESS: ABNORMAL

## 2021-08-24 RX ORDER — CIPROFLOXACIN 500 MG/1
500 TABLET, FILM COATED ORAL 2 TIMES DAILY
Qty: 20 TABLET | Refills: 0 | Status: SHIPPED | OUTPATIENT
Start: 2021-08-24 | End: 2021-09-03

## 2021-08-31 ENCOUNTER — HOSPITAL ENCOUNTER (OUTPATIENT)
Dept: WOUND CARE | Age: 76
Discharge: HOME | End: 2021-08-31
Payer: MEDICARE

## 2021-08-31 VITALS
HEART RATE: 56 BPM | RESPIRATION RATE: 18 BRPM | DIASTOLIC BLOOD PRESSURE: 59 MMHG | SYSTOLIC BLOOD PRESSURE: 99 MMHG | TEMPERATURE: 95.8 F

## 2021-08-31 PROCEDURE — 99213 OFFICE O/P EST LOW 20 MIN: CPT | Performed by: NURSE PRACTITIONER

## 2021-08-31 PROCEDURE — 99213 OFFICE O/P EST LOW 20 MIN: CPT
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
## 2021-09-01 ENCOUNTER — HOSPITAL ENCOUNTER (OUTPATIENT)
Dept: ULTRASOUND IMAGING | Age: 76
Discharge: HOME | End: 2021-09-03
Payer: MEDICARE

## 2021-09-01 DIAGNOSIS — L97.322 NON-PRESSURE CHRONIC ULCER OF LEFT ANKLE WITH FAT LAYER EXPOSED (HCC): Chronic | ICD-10-CM

## 2021-09-01 DIAGNOSIS — I70.222 ATHEROSCLEROSIS OF NATIVE ARTERIES OF EXTREMITIES WITH REST PAIN, LEFT LEG (HCC): Chronic | ICD-10-CM

## 2021-09-01 PROCEDURE — 93926 LOWER EXTREMITY STUDY: CPT

## 2021-09-07 ENCOUNTER — HOSPITAL ENCOUNTER (OUTPATIENT)
Dept: CT IMAGING | Age: 76
Discharge: HOME | End: 2021-09-09
Payer: MEDICARE

## 2021-09-07 DIAGNOSIS — C34.32 MALIGNANT NEOPLASM OF LOWER LOBE OF LEFT LUNG (HCC): ICD-10-CM

## 2021-09-07 PROCEDURE — 71250 CT THORAX DX C-: CPT

## 2021-09-09 ENCOUNTER — OFFICE VISIT (OUTPATIENT)
Dept: CARDIOLOGY CLINIC | Age: 76
End: 2021-09-09
Payer: MEDICARE

## 2021-09-09 ENCOUNTER — OFFICE VISIT (OUTPATIENT)
Dept: PULMONOLOGY | Age: 76
End: 2021-09-09
Payer: MEDICARE

## 2021-09-09 VITALS
BODY MASS INDEX: 23.42 KG/M2 | TEMPERATURE: 96.9 F | HEART RATE: 60 BPM | RESPIRATION RATE: 16 BRPM | WEIGHT: 149.2 LBS | HEIGHT: 67 IN | SYSTOLIC BLOOD PRESSURE: 124 MMHG | DIASTOLIC BLOOD PRESSURE: 68 MMHG | OXYGEN SATURATION: 99 %

## 2021-09-09 VITALS
RESPIRATION RATE: 16 BRPM | WEIGHT: 149 LBS | BODY MASS INDEX: 23.95 KG/M2 | SYSTOLIC BLOOD PRESSURE: 118 MMHG | OXYGEN SATURATION: 95 % | DIASTOLIC BLOOD PRESSURE: 64 MMHG | HEART RATE: 63 BPM | HEIGHT: 66 IN

## 2021-09-09 DIAGNOSIS — I25.119 CORONARY ARTERY DISEASE INVOLVING NATIVE CORONARY ARTERY OF NATIVE HEART WITH ANGINA PECTORIS (HCC): Primary | ICD-10-CM

## 2021-09-09 DIAGNOSIS — C34.32 MALIGNANT NEOPLASM OF LOWER LOBE OF LEFT LUNG (HCC): Primary | ICD-10-CM

## 2021-09-09 DIAGNOSIS — J44.9 CHRONIC OBSTRUCTIVE PULMONARY DISEASE, UNSPECIFIED COPD TYPE (HCC): ICD-10-CM

## 2021-09-09 DIAGNOSIS — E78.5 DYSLIPIDEMIA: ICD-10-CM

## 2021-09-09 DIAGNOSIS — R09.81 NASAL CONGESTION: ICD-10-CM

## 2021-09-09 DIAGNOSIS — F17.200 SMOKING: ICD-10-CM

## 2021-09-09 DIAGNOSIS — I10 ESSENTIAL HYPERTENSION: ICD-10-CM

## 2021-09-09 DIAGNOSIS — K21.9 GASTROESOPHAGEAL REFLUX DISEASE WITHOUT ESOPHAGITIS: ICD-10-CM

## 2021-09-09 PROCEDURE — 3023F SPIROM DOC REV: CPT | Performed by: INTERNAL MEDICINE

## 2021-09-09 PROCEDURE — 93000 ELECTROCARDIOGRAM COMPLETE: CPT | Performed by: INTERNAL MEDICINE

## 2021-09-09 PROCEDURE — 4004F PT TOBACCO SCREEN RCVD TLK: CPT | Performed by: INTERNAL MEDICINE

## 2021-09-09 PROCEDURE — G8427 DOCREV CUR MEDS BY ELIG CLIN: HCPCS | Performed by: INTERNAL MEDICINE

## 2021-09-09 PROCEDURE — 4040F PNEUMOC VAC/ADMIN/RCVD: CPT | Performed by: INTERNAL MEDICINE

## 2021-09-09 PROCEDURE — G8420 CALC BMI NORM PARAMETERS: HCPCS | Performed by: INTERNAL MEDICINE

## 2021-09-09 PROCEDURE — G8926 SPIRO NO PERF OR DOC: HCPCS | Performed by: INTERNAL MEDICINE

## 2021-09-09 PROCEDURE — 1123F ACP DISCUSS/DSCN MKR DOCD: CPT | Performed by: INTERNAL MEDICINE

## 2021-09-09 PROCEDURE — 99214 OFFICE O/P EST MOD 30 MIN: CPT | Performed by: INTERNAL MEDICINE

## 2021-09-09 ASSESSMENT — ENCOUNTER SYMPTOMS
BLOOD IN STOOL: 0
SHORTNESS OF BREATH: 1
STRIDOR: 0
EYES NEGATIVE: 1
COUGH: 0
CHEST TIGHTNESS: 0
GASTROINTESTINAL NEGATIVE: 1
NAUSEA: 0
WHEEZING: 0

## 2021-09-09 NOTE — PROGRESS NOTES
Subjective: Rajni Reyes is a 66 y.o. male who complains today of:     Chief Complaint   Patient presents with    Follow-up     COPD and Lung Nodule    Results     CT Scan of Chest       HPI  Patient presents for abnormal CT    He is doing good, no chest pain, has dyspnea on exertion, no coughing, no hemoptysis, he is losing weight, 20 pounds since I seen him last in May, no fever or chills, no lower extremity edema, no nasal congestion or postnasal drip and no heartburn. He continues to smoke 10 cigarettes/day, no joint swelling or pain, no skin rash. Allergies:  Patient has no known allergies. Past Medical History:   Diagnosis Date    Alcohol abuse     CAD (coronary artery disease)     patient states no doctor has told him this / has 1 cardiac stent    Cancer ([address]     lung LLL    Chronic back pain     Chronic kidney disease     Chronic sinusitis     DJD (degenerative joint disease) of knee     left knee DJD    Elevated PSA     History of blood transfusion 1980's    History of inferior wall myocardial infarction 01/2016    1 cardiac stent    HTN (hypertension)     meds .  1 yr    Hyperlipidemia     meds > 12 yrs    NSTEMI (non-ST elevated myocardial infarction) ([address]     Smoker      Past Surgical History:   Procedure Laterality Date    ABDOMEN SURGERY  1961    spleenectomy due to [address]  2009    lumbar disc OR    CARDIAC SURGERY      stents    CARPAL TUNNEL RELEASE Right 5/6/2019    RIGHT CARPAL TUNNEL RELEASE performed by Yolanda Costello MD at [place] [address] WITH STENT PLACEMENT  1/29/2016    EYE SURGERY      to have Phaco with IOL OU 2/2018    HERNIA REPAIR      JOINT REPLACEMENT Left 1994    LTHR    JOINT REPLACEMENT Right 2009    RTKR    KNEE SURGERY Right 2011    arthroscopy    PA MANIPULATN KNEE JT+ANESTHESIA Right 5/12/2017    RIGHT KNEE MANIPULATION UNDER ANESTHESIA performed by Mary Heath MD at [address] [facility] OFFICE/OUTPT VISIT,PROCEDURE ONLY Bilateral 12/29/2017    RIGHT AND BILATERAL L 4-5 LYSIS OF SCAR DISKECTOMY INTERBODY CAGE FUSION POSTEROLATERAL FUSION PEDICLE SCREWS performed by Justino Keller MD at [address] [place] [address]  2004    benign    SPLENECTOMY  1961    TOTAL KNEE ARTHROPLASTY Right 3/24/2017    RIGHT  KNEE TOTAL ARTHROPLASTY, ELHAM CEMENTED PERSONA  performed by Viky Billy MD at [address] History   Problem Relation Age of Onset    Osteoarthritis Mother     Emphysema Mother     Hypertension Father     Hearing Loss Father     Dementia Father     No Known Problems Sister     Prostate Cancer Brother     Colon Polyps Neg Hx      Social History     Socioeconomic History    Marital status:      Spouse name: Not on file    Number of children: Not on file    Years of education: Not on file    Highest education level: Not on file   Occupational History    Occupation: retired   Tobacco Use    Smoking status: Current Every Day Smoker     Packs/day: 0.25     Years: 60.00     Pack years: 15.00     Types: Cigarettes    Smokeless tobacco: Never Used   Vaping Use    Vaping Use: Never used   Substance and Sexual Activity    Alcohol use: Yes     Alcohol/week: 12.0 standard drinks     Types: 12 Shots of liquor per week     Comment: last day before yesterday    Drug use: No    Sexual activity: Yes   Other Topics Concern    Not on file   Social History Narrative    Lives alone     Social Determinants of Health     Financial Resource Strain: Low Risk     Difficulty of Paying Living Expenses: Not very hard   Food Insecurity: No Food Insecurity    Worried About Running Out of Food in the Last Year: Never true    Yoav of Food in the Last Year: Never true   Transportation Needs: No Transportation Needs    Lack of Transportation (Medical): No    Lack of Transportation (Non-Medical):  No   Physical Activity:     Days of Exercise per Week:     Minutes of Exercise per Session: Stress:     Feeling of Stress :    Social Connections:     Frequency of Communication with Friends and Family:     Frequency of Social Gatherings with Friends and Family:     Attends Hoahaoism Services:     Active Member of Clubs or Organizations:     Attends Club or Organization Meetings:     Marital Status:    Intimate Partner Violence:     Fear of Current or Ex-Partner:     Emotionally Abused:     Physically Abused:     Sexually Abused:          Review of Systems      ROS: 10 organs review of system is done including general, psychological, ENT, hematological, endocrine, respiratory, cardiovascular, gastrointestinal,musculoskeletal, neurological,  allergy and Immunology is done and is otherwise negative.     Current Outpatient Medications   Medication Sig Dispense Refill    SODIUM CHLORIDE, EXTERNAL, 0.9 % SOLN Apply 1 Applicatorful topically daily 1000 mL 2    finasteride (PROSCAR) 5 MG tablet Take 1 tablet by mouth daily 90 tablet 3    citalopram (CELEXA) 40 MG tablet TAKE ONE TABLET BY MOUTH nightly 30 tablet 5    diclofenac (VOLTAREN) 50 MG EC tablet TAKE ONE TABLET BY MOUTH TWO TIMES A DAY 60 tablet 5    isosorbide mononitrate (IMDUR) 30 MG extended release tablet TAKE ONE TABLET BY MOUTH EVERY DAY 90 tablet 3    metoprolol tartrate (LOPRESSOR) 50 MG tablet Take 0.5 tablets by mouth 2 times daily TAKE ONE TABLET BY MOUTH TWO TIMES A  tablet 3    Fluticasone furoate-vilanterol (BREO ELLIPTA) 200-25 MCG/INH AEPB inhaler Inhale 1 puff into the lungs daily 1 each 3    clopidogrel (PLAVIX) 75 MG tablet TAKE ONE TABLET BY MOUTH DAILY 90 tablet 3    atorvastatin (LIPITOR) 20 MG tablet TAKE ONE TABLET BY MOUTH DAILY 90 tablet 3    oxyCODONE-acetaminophen (PERCOCET) 7.5-325 MG per tablet TAKE ONE TABLET BY MOUTH THREE TIMES A DAY      omeprazole (PRILOSEC) 10 MG delayed release capsule TAKE ONE CAPSULE BY MOUTH DAILY 90 capsule 2    bumetanide (BUMEX) 1 MG tablet Take 1 tablet by mouth supple. Right lower leg: No edema. Left lower leg: No edema. Skin:     General: Skin is warm and dry. Neurological:      Mental Status: He is alert and oriented to person, place, and time. Psychiatric:         Judgment: Judgment normal.         Imaging studies reviewed by me CT chest reviewed with the patient, increased size left lower lobe mass, previously known lung cancer. Lab results reviewed in chart  PFT FEV1 41%  ECHO: 2018, EF 55%    Assessment and Plan       Diagnosis Orders   1. Malignant neoplasm of lower lobe of left lung ([address]  PET CT Skull Base To Mid Thigh    Rodolfo Collins DO, Oncology, [facility], [facility]   2. Chronic obstructive pulmonary disease, unspecified COPD type ([address]     3. Smoking     4. Gastroesophageal reflux disease without esophagitis     5. Nasal congestion       · History of squamous cell carcinoma, signs of recurrence, patient declined surgery in the past, he underwent radiation therapy, he declined medical oncology evaluation. I reviewed CT chest with him today, he is agreeable for medical oncology and radiation oncology referral again. Will obtain PET CT scan, refer patient to oncology and radiation oncology.   · COPD, not well controlled, due to smoking and noncompliance, he is currently not taking Spiriva, I gave him samples he currently has $130 co-pay for Spiriva month which is too much for him I will give him samples for most of the year  · Smoking cessation counseling done, currently smokes 10 cigarettes/day  · GERD symptoms controlled currently on Prilosec  · Nasal congestion symptoms controlled on Flonase as needed      Orders Placed This Encounter   Procedures    PET CT Skull Base To Mid Thigh     Standing Status:   Future     Standing Expiration Date:   9/9/2022    RYAN Avalos, Oncology, [facility]     Referral Priority:   Routine     Referral Type:   Eval and Treat     Referral Reason: Specialty Services Required     Referred to Provider:   Tevin Gunter DO     Requested Specialty:   Hematology and Oncology     Number of Visits Requested:   [facility], [place]     Referral Priority:   Routine     Referral Type:   Eval and Treat     Referral Reason:   Specialty Services Required     Number of Visits Requested:   1     No orders of the defined types were placed in this encounter. Discussed with patient the importance of exercise and weight control and  overall health and well-being. Reviewed with the patient: current clinical status, medications, activities and diet. Side effects, adverse effects of the medication prescribed today, as well as treatment plan and result expectations have been discussed with the patient who expresses understanding and desires to proceed. Return in about 6 weeks (around 10/21/2021).       Thiago Rg MD

## 2021-09-09 NOTE — PROGRESS NOTES
Subsequent Progress Note  Patient: Rehan Gerard  YOB: 1945  MRN: [id number]    Chief Complaint: cad dizzy htn felder rash  Chief Complaint   Patient presents with    3 Month Follow-Up    Coronary Artery Disease    Hypertension    Shortness of Breath    Weight Loss     lost 22 lbs in the last 3 weeks       CV Data:  1/2016 NSTEMI RCA KRISTA  4/2018 echo  55 1TR  4/2018 CUS MIld palque  4/2018 Abd US neg AAA  4/2019 Spect negative   2/2020 CUS mild  9/21 LE Art Duplex - negative. Subjective/HPI: occ dizzy + felder no cp no falls no bleed rash right temple for 6 weeks      1/10/2020 More FELDER and more frequent Chest tightness. No falls no bleed. Takes meds. Feels gaseous. 2/7/2020 chest tightness and felder much less since Imdur. Compliant with meds    5/14/2020 TELEHEALTH EVALUATION -- Audio/Visual (During FKL-89 public health emergency)    disocered isolated LLL nodule irregualr 1.2 cm suspicious for cancer. PET scan Negative of Mets. No CP no SOB No Bleed    7/14/2020 is declining to have localized LLL lung cancer to be removed. No cp breathing is ok. No falls. No bleeds    10/14/2020 no cp no sob no falls no bleed. Takes meds. Gait is wobbly but no falls. Taking Bumex prn.  lE edema worse. 1/13/21 no cp no sob no falls no bleed. Takes meds. Eats well.     4/22/21 no cp no sob occ dizzy no falls no bleed. Takes a lot of salt    9/9/21 tired all the time. Eats well no cp same FELDER. No worse. No palps no bleed no falls    Smokes 1/2 ppd + cigars. 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    EKG: SR 63  QTc 445    Past Medical History:   Diagnosis Date    Alcohol abuse     CAD (coronary artery disease)     patient states no doctor has told him this / has 1 cardiac stent    Cancer ([facility] [address]     lung LLL    Chronic back pain     Chronic kidney disease     Chronic sinusitis     DJD (degenerative joint disease) of knee     left knee DJD    Elevated PSA     History of blood transfusion 60.00     Pack years: 15.00     Types: Cigarettes    Smokeless tobacco: Never Used   Vaping Use    Vaping Use: Never used   Substance and Sexual Activity    Alcohol use: Yes     Alcohol/week: 12.0 standard drinks     Types: 12 Shots of liquor per week     Comment: last day before yesterday    Drug use: No    Sexual activity: Yes   Other Topics Concern    None   Social History Narrative    Lives alone     Social Determinants of Health     Financial Resource Strain: Low Risk     Difficulty of Paying Living Expenses: Not very hard   Food Insecurity: No Food Insecurity    Worried About Running Out of Food in the Last Year: Never true    Yoav of Food in the Last Year: Never true   Transportation Needs: No Transportation Needs    Lack of Transportation (Medical): No    Lack of Transportation (Non-Medical):  No   Physical Activity:     Days of Exercise per Week:     Minutes of Exercise per Session:    Stress:     Feeling of Stress :    Social Connections:     Frequency of Communication with Friends and Family:     Frequency of Social Gatherings with Friends and Family:     Attends [employer] Services:     Active Member of Clubs or Organizations:     Attends Club or Organization Meetings:     Marital Status:    Intimate Partner Violence:     Fear of Current or Ex-Partner:     Emotionally Abused:     Physically Abused:     Sexually Abused:        No Known Allergies    Current Outpatient Medications   Medication Sig Dispense Refill    SODIUM CHLORIDE, EXTERNAL, 0.9 % SOLN Apply 1 Applicatorful topically daily 1000 mL 2    finasteride (PROSCAR) 5 MG tablet Take 1 tablet by mouth daily 90 tablet 3    citalopram (CELEXA) 40 MG tablet TAKE ONE TABLET BY MOUTH nightly 30 tablet 5    diclofenac (VOLTAREN) 50 MG EC tablet TAKE ONE TABLET BY MOUTH TWO TIMES A DAY 60 tablet 5    tiotropium (SPIRIVA RESPIMAT) 2.5 MCG/ACT AERS inhaler Inhale 2 puffs into the lungs daily 1 Inhaler 3    isosorbide mononitrate (IMDUR) 30 MG extended release tablet TAKE ONE TABLET BY MOUTH EVERY DAY 90 tablet 3    metoprolol tartrate (LOPRESSOR) 50 MG tablet Take 0.5 tablets by mouth 2 times daily TAKE ONE TABLET BY MOUTH TWO TIMES A  tablet 3    Fluticasone furoate-vilanterol (BREO ELLIPTA) 200-25 MCG/INH AEPB inhaler Inhale 1 puff into the lungs daily 1 each 3    clopidogrel (PLAVIX) 75 MG tablet TAKE ONE TABLET BY MOUTH DAILY 90 tablet 3    atorvastatin (LIPITOR) 20 MG tablet TAKE ONE TABLET BY MOUTH DAILY 90 tablet 3    oxyCODONE-acetaminophen (PERCOCET) 7.5-325 MG per tablet TAKE ONE TABLET BY MOUTH THREE TIMES A DAY      omeprazole (PRILOSEC) 10 MG delayed release capsule TAKE ONE CAPSULE BY MOUTH DAILY 90 capsule 2    bumetanide (BUMEX) 1 MG tablet Take 1 tablet by mouth daily 90 tablet 3    albuterol sulfate  (90 Base) MCG/ACT inhaler Inhale 2 puffs into the lungs every 6 hours as needed for Wheezing 1 Inhaler 3    morphine (MS CONTIN) 15 MG extended release tablet Take 15 mg by mouth as needed.  fluticasone (FLONASE) 50 MCG/ACT nasal spray 1 spray by Each Nostril route 2 times daily 1 Bottle 3    nitroGLYCERIN (NITROSTAT) 0.4 MG SL tablet Place 1 tablet under the tongue every 5 minutes as needed for Chest pain up to max of 3 total doses. If no relief after 1 dose, call 911. 25 tablet 3    DOCUSATE CALCIUM PO Take by mouth as needed       No current facility-administered medications for this visit. Review of Systems:   Review of Systems   Constitutional: Negative. Negative for diaphoresis and fatigue. HENT: Negative. Eyes: Negative. Respiratory: Positive for shortness of breath. Negative for cough, chest tightness, wheezing and stridor. Cardiovascular: Negative. Negative for chest pain, palpitations and leg swelling. Gastrointestinal: Negative. Negative for blood in stool and nausea. Genitourinary: Negative. Musculoskeletal: Negative. Skin: Negative. Neurological: Positive for light-headedness. Negative for dizziness, syncope and weakness. Hematological: Negative. Psychiatric/Behavioral: Negative. Physical Examination:    /64 (Site: Right Upper Arm, Position: Sitting, Cuff Size: Medium Adult)   Pulse 63   Resp 16   Ht 5' 6\" (1.676 m)   Wt 149 lb (67.6 kg)   SpO2 95%   BMI 24.05 kg/m²    Physical Exam   Constitutional: He appears healthy. No distress. HENT:   Normal cephalic and Atraumatic   Eyes: Pupils are equal, round, and reactive to light. Neck: Thyroid normal. No JVD present. No neck adenopathy. No thyromegaly present. Cardiovascular: Normal rate, regular rhythm, intact distal pulses and normal pulses. Murmur heard. Pulmonary/Chest: Effort normal. He has no rales. He has scattered wheezes. He exhibits no tenderness. Abdominal: Soft. Bowel sounds are normal. There is no abdominal tenderness. Musculoskeletal:         General: Edema present. No tenderness. Normal range of motion. Cervical back: Normal range of motion and neck supple. Neurological: He is alert and oriented to person, place, and time. Skin: Skin is warm. No cyanosis. Nails show no clubbing.        LABS:  CBC:   Lab Results   Component Value Date    WBC 9.2 07/07/2020    RBC 3.81 07/07/2020    HGB 12.5 07/07/2020    HCT 37.9 07/07/2020    MCV 99.5 07/07/2020    MCH 32.8 07/07/2020    MCHC 32.9 07/07/2020    RDW 13.8 07/07/2020     07/07/2020     Lipids:  Lab Results   Component Value Date    CHOL 130 07/07/2020    CHOL 165 03/28/2016    CHOL 214 (H) 01/29/2016     Lab Results   Component Value Date    TRIG 63 07/07/2020    TRIG 64 03/28/2016    TRIG 144 01/29/2016     Lab Results   Component Value Date    HDL 71 (H) 07/07/2020     (H) 03/28/2016    HDL 92 (H) 01/29/2016     Lab Results   Component Value Date    LDLCALC 46 07/07/2020    LDLCALC 40 03/28/2016    [address] 01/29/2016     No results found for: LABVLDL, VLDL  No results found for: CHOLHDLRATIO  CMP:    Lab Results   Component Value Date     07/07/2020    K 5.1 07/07/2020    CL 98 07/07/2020    CO2 28 07/07/2020    BUN 28 07/07/2020    CREATININE 1.09 07/07/2020    GFRAA >60.0 07/07/2020    LABGLOM >60.0 07/07/2020    GLUCOSE 84 07/07/2020    PROT 6.9 07/07/2020    LABALBU 4.3 07/07/2020    CALCIUM 9.2 07/07/2020    BILITOT 0.5 07/07/2020    ALKPHOS 87 07/07/2020    AST 15 07/07/2020    ALT 13 07/07/2020     BMP:    Lab Results   Component Value Date     07/07/2020    K 5.1 07/07/2020    CL 98 07/07/2020    CO2 28 07/07/2020    BUN 28 07/07/2020    LABALBU 4.3 07/07/2020    CREATININE 1.09 07/07/2020    CALCIUM 9.2 07/07/2020    GFRAA >60.0 07/07/2020    LABGLOM >60.0 07/07/2020    GLUCOSE 84 07/07/2020     Magnesium:    Lab Results   Component Value Date    MG 2.1 03/25/2017     TSH:  Lab Results   Component Value Date    TSH 0.780 07/07/2020       Patient Active Problem List   Diagnosis    Tobacco use    Hip pain    Knee pain    Chronic back pain    Elevated PSA    Primary osteoarthritis of right knee    Spondylosis of lumbar region without myelopathy or radiculopathy    Sacroiliitis, not elsewhere classified ([facility] [address]    Pure hypercholesterolemia    Charcot's joint of left ankle    Left ankle pain    Confusion caused by a drug    DDD (degenerative disc disease), lumbar    Osteoarthritis of spine with myelopathy, lumbosacral region    Chronic bilateral low back pain with bilateral sciatica    Postlaminectomy syndrome, lumbar region    Acquired spondylolisthesis of lumbosacral region    Spinal stenosis of lumbar region with neurogenic claudication    HNP (herniated nucleus pulposus), lumbar    Spondylolisthesis at L4-L5 level    Anesthesia    Herniated nucleus pulposus, L4-5    NSTEMI (non-ST elevated myocardial infarction) ([facility] [address]    S/P PTCA (percutaneous transluminal coronary angioplasty)    Right upper quadrant abdominal pain    Gallbladder polyp  Biliary dyskinesia    Carpal tunnel syndrome on right    Carpal tunnel syndrome on left    Angina effort    Dyslipidemia    FELDER (dyspnea on exertion)    Coronary artery disease involving native coronary artery of native heart with angina pectoris (HCC)    Chest pain    Lung nodule seen on imaging study    Bilateral carotid artery stenosis    Essential hypertension    NSCLC of left lung (HCC)    Ataxic gait    Dysequilibrium    Fracture, Colles, right, closed    Heroin abuse ([facility] [address]    Non-pressure chronic ulcer of left ankle with fat layer exposed ([facility] [address]    Atherosclerosis of native arteries of extremities with rest pain, left leg (HCC)    Osteomyelitis (HCC)    Osteomyelitis of ankle (HCC)       There are no discontinued medications. Modified Medications    No medications on file       No orders of the defined types were placed in this encounter. Assessment/Plan:    1. Essential hypertension  Stable- continue meds. Low salt diet    2. NSTEMI (non-ST elevated myocardial infarction) ([facility] [address]   no angina continue CV meds. 3. Dyslipidemia  Statin - continue alice.e low fat diet. Check labs. 4. FELDER (dyspnea on exertion)  Stop smoking   Possible angina - Imdur made a big difference- if worse let me know. 5. Rash- did not see Derm. Facial rash gone now. No further occurrence. 6. Lung Nodule - OK to hold Plavix 5 days for Biopsy. - revealed Malignancy. PET negative but pt refusing to have surgery. - f/u Dr. Lexi Martin. - finished XRT f/u.     7. Wobbly gait- need to use rubin/walker. No recent falls. 8. Le EDEAM- TAKE bUMEX 1 MG qd. - decrease salt intake     9. Tires all the time- see Dr. Elena Magana and get screening labs. Counseling:  Heart Healthy Lifestyle, Stop Smoking, Low Salt Diet, Take Precautions to Prevent Falls, Regular Exercise and Walk Daily    Return in about 3 months (around 12/9/2021).       Electronically signed by Wilfrid Hernandez MD on 9/9/2021 at 3:59 PM

## 2021-09-14 ENCOUNTER — HOSPITAL ENCOUNTER (OUTPATIENT)
Dept: WOUND CARE | Age: 76
Discharge: HOME | End: 2021-09-14
Payer: MEDICARE

## 2021-09-14 VITALS
RESPIRATION RATE: 16 BRPM | HEART RATE: 53 BPM | SYSTOLIC BLOOD PRESSURE: 110 MMHG | DIASTOLIC BLOOD PRESSURE: 64 MMHG | TEMPERATURE: 98 F

## 2021-09-14 DIAGNOSIS — L97.322 NON-PRESSURE CHRONIC ULCER OF LEFT ANKLE WITH FAT LAYER EXPOSED (HCC): Primary | ICD-10-CM

## 2021-09-14 PROCEDURE — 11042 DBRDMT SUBQ TIS 1ST 20SQCM/<: CPT

## 2021-09-14 PROCEDURE — 6370000000 HC RX 637 (ALT 250 FOR IP): Performed by: PODIATRIST

## 2021-09-14 RX ORDER — BACITRACIN ZINC AND POLYMYXIN B SULFATE 500; 1000 [USP'U]/G; [USP'U]/G
OINTMENT TOPICAL ONCE
Status: CANCELLED | OUTPATIENT
Start: 2021-09-14 | End: 2021-09-14

## 2021-09-14 RX ORDER — CLOBETASOL PROPIONATE 0.5 MG/G
OINTMENT TOPICAL ONCE
Status: CANCELLED | OUTPATIENT
Start: 2021-09-14 | End: 2021-09-14

## 2021-09-14 RX ORDER — LIDOCAINE HYDROCHLORIDE 20 MG/ML
JELLY TOPICAL ONCE
Status: CANCELLED | OUTPATIENT
Start: 2021-09-14 | End: 2021-09-14

## 2021-09-14 RX ORDER — LIDOCAINE 50 MG/G
OINTMENT TOPICAL ONCE
Status: CANCELLED | OUTPATIENT
Start: 2021-09-14 | End: 2021-09-14

## 2021-09-14 RX ORDER — LIDOCAINE 40 MG/G
CREAM TOPICAL ONCE
Status: COMPLETED | OUTPATIENT
Start: 2021-09-14 | End: 2021-09-14

## 2021-09-14 RX ORDER — LIDOCAINE HYDROCHLORIDE 40 MG/ML
SOLUTION TOPICAL ONCE
Status: CANCELLED | OUTPATIENT
Start: 2021-09-14 | End: 2021-09-14

## 2021-09-14 RX ORDER — BACITRACIN, NEOMYCIN, POLYMYXIN B 400; 3.5; 5 [USP'U]/G; MG/G; [USP'U]/G
OINTMENT TOPICAL ONCE
Status: CANCELLED | OUTPATIENT
Start: 2021-09-14 | End: 2021-09-14

## 2021-09-14 RX ORDER — GENTAMICIN SULFATE 1 MG/G
OINTMENT TOPICAL ONCE
Status: CANCELLED | OUTPATIENT
Start: 2021-09-14 | End: 2021-09-14

## 2021-09-14 RX ORDER — BETAMETHASONE DIPROPIONATE 0.05 %
OINTMENT (GRAM) TOPICAL ONCE
Status: CANCELLED | OUTPATIENT
Start: 2021-09-14 | End: 2021-09-14

## 2021-09-14 RX ORDER — GINSENG 100 MG
CAPSULE ORAL ONCE
Status: CANCELLED | OUTPATIENT
Start: 2021-09-14 | End: 2021-09-14

## 2021-09-14 RX ORDER — LIDOCAINE 40 MG/G
CREAM TOPICAL ONCE
Status: CANCELLED | OUTPATIENT
Start: 2021-09-14 | End: 2021-09-14

## 2021-09-14 RX ADMIN — LIDOCAINE: 40 CREAM TOPICAL at 15:06

## 2021-09-14 NOTE — PLAN OF CARE
[address]:     [employer] [facility]  [facility], [address] [place] [address] p: [phone] f: [phone]     Ordering Center:     [address] [place] [id number]  [phone]  WOUND CARE [phone]  FAX NUMBER [phone]    Patient Information:      [address] [id number]   [phone]   : 1945  AGE: 66 y.o.      GENDER: male   TODAYS DATE:  2021    Insurance:      PRIMARY INSURANCE:  Plan: MEDICARE PART A AND B  Coverage: MEDICARE  Effective Date: 2018  [id number] - (Medicare)    SECONDARY INSURANCE:  Plan: MED[employer] M[employer]UAL [address]  Coverage: MEDICAL MUTUAL  Effective Date: 2018  [unfilled]    [unfilled]   [unfilled]     Patient Wound Information:      Problem List Items Addressed This Visit     Non-pressure chronic ulcer of left ankle with fat layer exposed ([facility] [address] - Primary (Chronic)    Relevant Orders    Initiate Outpatient Wound Care Protocol          WOUNDS REQUIRING DRESSING SUPPLIES:     Wound 21 #1 left anterior leg (Active)   Wound Image   21 1445   Wound Etiology Other 21 1445   Wound Cleansed Cleansed with saline 21 1445   Dressing/Treatment Hydrating gel;Dry dressing 21 1557   Wound Length (cm) 2.1 cm 21 1445   Wound Width (cm) 1.3 cm 21 1445   Wound Depth (cm) 0.2 cm 21 1445   Wound Surface Area (cm^2) 2.73 cm^2 21 1445   Change in Wound Size % (l*w) -5 21 1445   Wound Volume (cm^3) 0.546 cm^3 21 1445   Wound Healing % 30 21 1445   Post-Procedure Length (cm) 2.1 cm 21 1518   Post-Procedure Width (cm) 1.3 cm 21 1518   Post-Procedure Depth (cm) 0.2 cm 21 1518   Post-Procedure Surface Area (cm^2) 2.73 cm^2 21 1518   Post-Procedure Volume (cm^3) 0.546 cm^3 21 1518   Wound Assessment Granulation tissue;Slough 21 1445   Drainage Amount Large 21 1445 Drainage Description Yellow 09/14/21 1445   Odor None 09/14/21 1445   Ela-wound Assessment Intact 09/14/21 1445   Margins Defined edges 09/14/21 1445   Wound Thickness Description not for Pressure Injury Full thickness 09/14/21 1445   Number of days: 28     Incision 05/06/19 Wrist Right (Active)   Number of days: 604       Supplies Requested :      WOUND #: 1   PRIMARY DRESSING:  Other: 4X4 GAUZE   Cover and Secure with:  Other BORDERED GAUZE     FREQUENCY OF DRESSING CHANGES:  Daily         ADDITIONAL ITEMS:  [] Gloves Small  [x] Gloves Medium [] Gloves Large [] Gloves XLarge  [] Tape 1\" [x] Tape 2\" [] Tape 3\"  [] Medipore Tape  [x] Saline  [] Skin Prep   [] Adhesive Remover   [] Cotton Tip Applicators   [] Other:    Patient Wound(s) Debrided: [x] Yes   [] No    Debribement Type: subcutaneous tissue    Debridement Date: 09/14/21    Patient currently being seen by Home Health: [] Yes   [x] No    Duration for needed supplies:  []15  [x]30  []60  []90 Days    Provider Information:      PROVIDER'S NAMESaida Vazquez DPM      NPI [id number]

## 2021-09-14 NOTE — CODE DOCUMENTATION
[id number] [facility] Billing Sheet. Raven Choi  AGE: 66 y.o.    GENDER: male  : 1945  TODAY'S DATE:  2021    ICD-10 CODES  Active Hospital Problems    Diagnosis Date Noted    Non-pressure chronic ulcer of left ankle with fat layer exposed ([facility] [address] [L97.322] 2021    Osteomyelitis of ankle ([facility] [address] [M86.9] 2021    Tobacco use [Z72.0] 2014       PHYSICIAN PROCEDURES    CPT CODE  66609 LT      Electronically signed by Barrington Jaime DPM on 2021 at 3:24 PM

## 2021-09-14 NOTE — PROGRESS NOTES
[address]                                                   Progress Note and Procedure Note      Delphine Silva RECORD NUMBER:  [id number]  AGE: 66 y.o. GENDER: male  : 1945  EPISODE DATE:  2021    Subjective:     Chief Complaint   Patient presents with    Wound Check     left leg wound recheck         HISTORY of PRESENT ILLNESS HPI     Janis Casarez is a 66 y.o. male who presents today for wound/ulcer evaluation. History of Wound Context: Patient presents per referral of Dr. Ángela Gibson for a one with history of an open wound of the left dorsal ankle. Bone scan performed and positive for osteomyelitis of the talus. Wound/Ulcer Pain Timing/Severity: constant  Quality of pain: aching  Severity:  3  10   Modifying Factors: None  Associated Signs/Symptoms: pain    Ulcer Identification:  Ulcer Type: undetermined  Contributing Factors: smoking    Wound: N/A        PAST MEDICAL HISTORY        Diagnosis Date    Alcohol abuse     CAD (coronary artery disease)     patient states no doctor has told him this / has 1 cardiac stent    Cancer ([facility] [address]     lung LLL    Chronic back pain     Chronic kidney disease     Chronic sinusitis     DJD (degenerative joint disease) of knee     left knee DJD    Elevated PSA     History of blood transfusion     History of inferior wall myocardial infarction 2016    1 cardiac stent    HTN (hypertension)     meds .  1 yr    Hyperlipidemia     meds > 12 yrs    NSTEMI (non-ST elevated myocardial infarction) ([address]     Smoker        PAST SURGICAL HISTORY    Past Surgical History:   Procedure Laterality Date    ABDOMEN SURGERY      spleenectomy due to [address]      lumbar disc OR    CARDIAC SURGERY      stents    CARPAL TUNNEL RELEASE Right 2019    RIGHT CARPAL TUNNEL RELEASE performed by Kellee Shell MD at [place] [address] WITH STENT PLACEMENT  2016    EYE SURGERY      to have Phaco with IOL OU 2/2018    HERNIA REPAIR      JOINT REPLACEMENT Left 1994    LTHR    JOINT REPLACEMENT Right 2009    RTKR    KNEE SURGERY Right 2011    arthroscopy    NM MANIPULATN KNEE JT+ANESTHESIA Right 5/12/2017    RIGHT KNEE MANIPULATION UNDER ANESTHESIA performed by Lexi Napier MD at [address] [facility] OFFICE/OUTPT [address] [facility] [address] Bilateral 12/29/2017    RIGHT AND BILATERAL L 4-5 LYSIS OF SCAR DISKECTOMY INTERBODY CAGE FUSION POSTEROLATERAL FUSION PEDICLE SCREWS performed by Konstantin Singh MD at [address] [place] [address]  2004    benign    SPLENECTOMY  1961    TOTAL KNEE ARTHROPLASTY Right 3/24/2017    RIGHT  KNEE TOTAL ARTHROPLASTY, ELHAM CEMENTED PERSONA  performed by Lexi Napier MD at [address] [place] [address] HISTORY    Family History   Problem Relation Age of Onset    Osteoarthritis Mother     Emphysema Mother     Hypertension Father     Hearing Loss Father     Dementia Father     No Known Problems Sister     Prostate Cancer Brother     Colon Polyps Neg Hx        SOCIAL HISTORY    Social History     Tobacco Use    Smoking status: Current Every Day Smoker     Packs/day: 0.25     Years: 60.00     Pack years: 15.00     Types: Cigarettes    Smokeless tobacco: Never Used   Vaping Use    Vaping Use: Never used   Substance Use Topics    Alcohol use:  Yes     Alcohol/week: 12.0 standard drinks     Types: 12 Shots of liquor per week     Comment: last day before yesterday    Drug use: No       ALLERGIES    No Known Allergies    MEDICATIONS    Current Outpatient Medications on File Prior to Encounter   Medication Sig Dispense Refill    SODIUM CHLORIDE, EXTERNAL, 0.9 % SOLN Apply 1 Applicatorful topically daily 1000 mL 2    finasteride (PROSCAR) 5 MG tablet Take 1 tablet by mouth daily 90 tablet 3    citalopram (CELEXA) 40 MG tablet TAKE ONE TABLET BY MOUTH nightly 30 tablet 5    diclofenac (VOLTAREN) 50 MG EC tablet TAKE ONE TABLET BY MOUTH TWO TIMES A DAY 60 tablet 5    tiotropium (SPIRIVA RESPIMAT) 2.5 MCG/ACT AERS inhaler Inhale 2 puffs into the lungs daily (Patient not taking: Reported on 9/9/2021) 1 Inhaler 3    isosorbide mononitrate (IMDUR) 30 MG extended release tablet TAKE ONE TABLET BY MOUTH EVERY DAY 90 tablet 3    metoprolol tartrate (LOPRESSOR) 50 MG tablet Take 0.5 tablets by mouth 2 times daily TAKE ONE TABLET BY MOUTH TWO TIMES A  tablet 3    Fluticasone furoate-vilanterol (BREO ELLIPTA) 200-25 MCG/INH AEPB inhaler Inhale 1 puff into the lungs daily 1 each 3    clopidogrel (PLAVIX) 75 MG tablet TAKE ONE TABLET BY MOUTH DAILY 90 tablet 3    atorvastatin (LIPITOR) 20 MG tablet TAKE ONE TABLET BY MOUTH DAILY 90 tablet 3    oxyCODONE-acetaminophen (PERCOCET) 7.5-325 MG per tablet TAKE ONE TABLET BY MOUTH THREE TIMES A DAY      omeprazole (PRILOSEC) 10 MG delayed release capsule TAKE ONE CAPSULE BY MOUTH DAILY 90 capsule 2    bumetanide (BUMEX) 1 MG tablet Take 1 tablet by mouth daily 90 tablet 3    albuterol sulfate  (90 Base) MCG/ACT inhaler Inhale 2 puffs into the lungs every 6 hours as needed for Wheezing 1 Inhaler 3    morphine (MS CONTIN) 15 MG extended release tablet Take 15 mg by mouth as needed.  fluticasone (FLONASE) 50 MCG/ACT nasal spray 1 spray by Each Nostril route 2 times daily 1 Bottle 3    nitroGLYCERIN (NITROSTAT) 0.4 MG SL tablet Place 1 tablet under the tongue every 5 minutes as needed for Chest pain up to max of 3 total doses. If no relief after 1 dose, call 911. 25 tablet 3    DOCUSATE CALCIUM PO Take by mouth as needed       No current facility-administered medications on file prior to encounter. REVIEW OF SYSTEMS    Pertinent items are noted in HPI.     Objective:      /64   Pulse 53   Temp 98 °F (36.7 °C) (Temporal)   Resp 16     Wt Readings from Last 3 Encounters:   09/09/21 149 lb 3.2 oz (67.7 kg)   09/09/21 149 lb (67.6 kg)   08/18/21 167 lb (75.8 kg)       PHYSICAL EXAM    Constitutional:   Well nourished and well developed. Appears neat and clean. Patient is alert, oriented x3, and in no apparent distress. Respiratory:  Respiratory effort is easy and symmetric bilaterally. Rate is normal at rest and on room air. Vascular:  Pedal Pulses is not palpable and audible with doppler. Capillary refill is <3 sec to digits bilateral.  Extremities negative for  pitting edema. Neurological:   Sensation is intact  to lower extremities. Dermatological:  Wound description noted in wound assessment. Wound on the anterior left ankle is full thickness and is less painful. The wound is with and granular and slough base. Lidocaine cream was applied for debridement. Psychiatric:  Judgement and insight intact. Short and long term memory intact. No evidence of depression, anxiety, or agitation. Patient is calm, cooperative, and communicative. Appropriate interactions and affect. Assessment:      Active Hospital Problems    Diagnosis Date Noted    Non-pressure chronic ulcer of left ankle with fat layer exposed ([address] [L97.322] 08/17/2021    Osteomyelitis of ankle ([address] [M86.9] 08/17/2021    Tobacco use [Z72.0] 04/24/2014        Procedure Note  Indications:  Based on my examination of this patient's wound(s)/ulcer(s) today, debridement is required to promote healing and evaluate the wound base. Performed by: Salvatore Giordano DPM    Consent obtained:  Yes    Time out taken:  Yes    Pain Control:   lidocaine cream      Debridement:Excisional Debridement    Using curette the wound(s)/ulcer(s) was/were sharply debrided down through and including the removal of epidermis, dermis and subcutaneous tissue.         Devitalized Tissue Debrided:  slough    Pre Debridement Measurements:  Are located in the [place]  Documentation Flow Sheet    Wound/Ulcer #: 1    Post Debridement Measurements:  Wound/Ulcer Descriptions are Pre Debridement except measurements:    Wound 08/17/21 #1 left anterior leg (Active)   Wound Image   09/14/21 1445   Wound Etiology Other 09/14/21 1445   Wound Cleansed Cleansed with saline 09/14/21 1445   Dressing/Treatment Hydrating gel;Dry dressing 08/17/21 1557   Wound Length (cm) 2.1 cm 09/14/21 1445   Wound Width (cm) 1.3 cm 09/14/21 1445   Wound Depth (cm) 0.2 cm 09/14/21 1445   Wound Surface Area (cm^2) 2.73 cm^2 09/14/21 1445   Change in Wound Size % (l*w) -5 09/14/21 1445   Wound Volume (cm^3) 0.546 cm^3 09/14/21 1445   Wound Healing % 30 09/14/21 1445   Post-Procedure Length (cm) 2.1 cm 09/14/21 1518   Post-Procedure Width (cm) 1.3 cm 09/14/21 1518   Post-Procedure Depth (cm) 0.2 cm 09/14/21 1518   Post-Procedure Surface Area (cm^2) 2.73 cm^2 09/14/21 1518   Post-Procedure Volume (cm^3) 0.546 cm^3 09/14/21 1518   Wound Assessment Granulation tissue;Slough 09/14/21 1445   Drainage Amount Large 09/14/21 1445   Drainage Description Yellow 09/14/21 1445   Odor None 09/14/21 1445   Ela-wound Assessment Intact 09/14/21 1445   Margins Defined edges 09/14/21 1445   Wound Thickness Description not for Pressure Injury Full thickness 09/14/21 1445   Number of days: 28         Percent of Wound/Ulcer Debrided: 50%    Total Surface Area Debrided:  2.73 sq cm     Diabetic/Pressure/Non Pressure Ulcers:  Ulcer: Non-Pressure ulcer, fat layer exposed      Bleeding:  Minimal    Hemostasis Achieved:  by pressure    Procedural Pain:  5  / 10     Post Procedural Pain:  2 / 10     Response to treatment:  Well tolerated by patient. Plan:   Patient examined and debrided  Follow up in 2 weeks  ID referral for osteomyelitis encouraged  Continue  Santyl                  Treatment Note please see attached Discharge Instructions    Written patient dismissal instructions given to patient and signed by patient or POA.          Discharge Instructions       [facility]   Physician Orders and Discharge Instructions  [facility]  [address] [facility] [address], [address] [facility]  Telephone: [phone]      [phone]        NAME: Judie Shirley  DATE OF BIRTH:  1945  MEDICAL RECORD NUMBER:  [id number]     Home Care/Facility:     Wound Location:   LEFT ANTERIOR LEG  Dressing orders: 1. Cleanse wound(s) with normal saline. 2. APPLY SKIN PREP TO AREA AROUND WOUND - DO NOT GET IN THE WOUND. 3. Apply a nickel thickness layer of SANTYL OINTMENT  to the wound bed for enzymatic debridement purposes. 4. Apply a moistened saline 4x4 (gauze pad) over the Santyl Ointment. 5. COVER WITH BORDERED GAUZE. 6. Change Every Day.     TODAY IN WOUND CENTER APPLY PLUROGEL AND DRY DRESSING.     Compression:     Offloading Device:     Other Instructions: MAKE APPOINTMENT WITH INFECTIOUS DISEASE DOCTOR - [phone]     Keep all dressings clean, dry and intact.  Keep pressure off the wound(s) at all times.      Follow up visit   2 Weeks    September 28, 2021  AT        Please give 24 hour notice if unable to keep appointment. [phone]     If you experience any of the following, please call the Wound Care Service at  [phone] or go to the nearest emergency room.        *Increase in pain         *Temperature over 101           *Increase in drainage from your wound or a foul odor  *Uncontrolled swelling            *Need for compression bandage changes due to slippage, breakthrough drainage       PLEASE NOTE: IF YOU ARE UNABLE TO OBTAIN WOUND SUPPLIES, CONTINUE TO USE THE SUPPLIES YOU HAVE AVAILABLE UNTIL YOU ARE ABLE TO [facility].  IT IS MOST IMPORTANT TO KEEP THE WOUND COVERED AT ALL TIMES  Electronically signed by Aníbal Chu DPM on 9/14/2021 at 3:19 PM          Electronically signed by Aníbal Chu DPM on 9/14/2021 at 3:20 PM

## 2021-09-17 ENCOUNTER — APPOINTMENT (OUTPATIENT)
Dept: GENERAL RADIOLOGY | Age: 76
End: 2021-09-17
Payer: MEDICARE

## 2021-09-17 ENCOUNTER — HOSPITAL ENCOUNTER (EMERGENCY)
Age: 76
Discharge: HOME | End: 2021-09-17
Attending: EMERGENCY MEDICINE
Payer: MEDICARE

## 2021-09-17 VITALS
OXYGEN SATURATION: 97 % | WEIGHT: 150 LBS | HEIGHT: 66 IN | BODY MASS INDEX: 24.11 KG/M2 | SYSTOLIC BLOOD PRESSURE: 175 MMHG | DIASTOLIC BLOOD PRESSURE: 90 MMHG | HEART RATE: 71 BPM | TEMPERATURE: 98.3 F | RESPIRATION RATE: 16 BRPM

## 2021-09-17 DIAGNOSIS — S63.005A LEFT WRIST DISLOCATION, INITIAL ENCOUNTER: Primary | ICD-10-CM

## 2021-09-17 DIAGNOSIS — R63.4 WEIGHT LOSS: ICD-10-CM

## 2021-09-17 DIAGNOSIS — R53.83 FATIGUE, UNSPECIFIED TYPE: ICD-10-CM

## 2021-09-17 LAB
ALBUMIN SERPL-MCNC: 4.3 G/DL (ref 3.5–4.6)
ALP BLD-CCNC: 116 U/L (ref 35–104)
ALT SERPL-CCNC: 14 U/L (ref 0–41)
ANION GAP SERPL CALCULATED.3IONS-SCNC: 8 MEQ/L (ref 9–15)
AST SERPL-CCNC: 15 U/L (ref 0–40)
BASOPHILS ABSOLUTE: 0.1 K/UL (ref 0–0.2)
BASOPHILS RELATIVE PERCENT: 1.1 %
BILIRUB SERPL-MCNC: 0.4 MG/DL (ref 0.2–0.7)
BUN BLDV-MCNC: 19 MG/DL (ref 8–23)
CALCIUM SERPL-MCNC: 9.4 MG/DL (ref 8.5–9.9)
CHLORIDE BLD-SCNC: 98 MEQ/L (ref 95–107)
CO2: 26 MEQ/L (ref 20–31)
CREAT SERPL-MCNC: 1 MG/DL (ref 0.7–1.2)
EOSINOPHILS ABSOLUTE: 0.2 K/UL (ref 0–0.7)
EOSINOPHILS RELATIVE PERCENT: 2.3 %
GFR AFRICAN AMERICAN: >60
GFR NON-AFRICAN AMERICAN: >60
GLOBULIN: 2.5 G/DL (ref 2.3–3.5)
GLUCOSE BLD-MCNC: 92 MG/DL (ref 70–99)
HCT VFR BLD CALC: 37.9 % (ref 42–52)
HEMOGLOBIN: 12.7 G/DL (ref 14–18)
LIPASE: 28 U/L (ref 12–95)
LYMPHOCYTES ABSOLUTE: 1.3 K/UL (ref 1–4.8)
LYMPHOCYTES RELATIVE PERCENT: 14.1 %
MAGNESIUM: 1.9 MG/DL (ref 1.7–2.4)
MCH RBC QN AUTO: 32.3 PG (ref 27–31.3)
MCHC RBC AUTO-ENTMCNC: 33.5 % (ref 33–37)
MCV RBC AUTO: 96.6 FL (ref 80–100)
MONOCYTES ABSOLUTE: 1.2 K/UL (ref 0.2–0.8)
MONOCYTES RELATIVE PERCENT: 12.3 %
NEUTROPHILS ABSOLUTE: 6.7 K/UL (ref 1.4–6.5)
NEUTROPHILS RELATIVE PERCENT: 70.2 %
PDW BLD-RTO: 13.2 % (ref 11.5–14.5)
PLATELET # BLD: 311 K/UL (ref 130–400)
POTASSIUM SERPL-SCNC: 4.9 MEQ/L (ref 3.4–4.9)
RBC # BLD: 3.93 M/UL (ref 4.7–6.1)
SODIUM BLD-SCNC: 132 MEQ/L (ref 135–144)
TOTAL PROTEIN: 6.8 G/DL (ref 6.3–8)
TROPONIN: <0.01 NG/ML (ref 0–0.01)
WBC # BLD: 9.5 K/UL (ref 4.8–10.8)

## 2021-09-17 PROCEDURE — 36415 COLL VENOUS BLD VENIPUNCTURE: CPT

## 2021-09-17 PROCEDURE — 99282 EMERGENCY DEPT VISIT SF MDM: CPT

## 2021-09-17 PROCEDURE — 83690 ASSAY OF LIPASE: CPT

## 2021-09-17 PROCEDURE — 84484 ASSAY OF TROPONIN QUANT: CPT

## 2021-09-17 PROCEDURE — 96375 TX/PRO/DX INJ NEW DRUG ADDON: CPT

## 2021-09-17 PROCEDURE — 85025 COMPLETE CBC W/AUTO DIFF WBC: CPT

## 2021-09-17 PROCEDURE — 73110 X-RAY EXAM OF WRIST: CPT

## 2021-09-17 PROCEDURE — 71046 X-RAY EXAM CHEST 2 VIEWS: CPT

## 2021-09-17 PROCEDURE — 2580000003 HC RX 258: Performed by: EMERGENCY MEDICINE

## 2021-09-17 PROCEDURE — 83735 ASSAY OF MAGNESIUM: CPT

## 2021-09-17 PROCEDURE — 96374 THER/PROPH/DIAG INJ IV PUSH: CPT

## 2021-09-17 PROCEDURE — 6360000002 HC RX W HCPCS: Performed by: EMERGENCY MEDICINE

## 2021-09-17 PROCEDURE — 93005 ELECTROCARDIOGRAM TRACING: CPT | Performed by: EMERGENCY MEDICINE

## 2021-09-17 PROCEDURE — 80053 COMPREHEN METABOLIC PANEL: CPT

## 2021-09-17 RX ORDER — OXYCODONE HYDROCHLORIDE AND ACETAMINOPHEN 5; 325 MG/1; MG/1
1 TABLET ORAL EVERY 6 HOURS PRN
Qty: 12 TABLET | Refills: 0 | Status: SHIPPED | OUTPATIENT
Start: 2021-09-17 | End: 2021-09-20

## 2021-09-17 RX ORDER — ONDANSETRON 2 MG/ML
4 INJECTION INTRAMUSCULAR; INTRAVENOUS ONCE
Status: COMPLETED | OUTPATIENT
Start: 2021-09-17 | End: 2021-09-17

## 2021-09-17 RX ORDER — MORPHINE SULFATE 2 MG/ML
4 INJECTION, SOLUTION INTRAMUSCULAR; INTRAVENOUS
Status: DISCONTINUED | OUTPATIENT
Start: 2021-09-17 | End: 2021-09-17 | Stop reason: HOSPADM

## 2021-09-17 RX ORDER — 0.9 % SODIUM CHLORIDE 0.9 %
1000 INTRAVENOUS SOLUTION INTRAVENOUS ONCE
Status: COMPLETED | OUTPATIENT
Start: 2021-09-17 | End: 2021-09-17

## 2021-09-17 RX ADMIN — ONDANSETRON 4 MG: 2 INJECTION INTRAMUSCULAR; INTRAVENOUS at 18:49

## 2021-09-17 RX ADMIN — MORPHINE SULFATE 4 MG: 2 INJECTION, SOLUTION INTRAMUSCULAR; INTRAVENOUS at 18:51

## 2021-09-17 RX ADMIN — SODIUM CHLORIDE 1000 ML: 9 INJECTION, SOLUTION INTRAVENOUS at 18:49

## 2021-09-17 ASSESSMENT — ENCOUNTER SYMPTOMS
COUGH: 0
BACK PAIN: 0
SORE THROAT: 0
NAUSEA: 0
ABDOMINAL PAIN: 0
DIARRHEA: 0
VOMITING: 0
SHORTNESS OF BREATH: 0

## 2021-09-17 ASSESSMENT — PAIN SCALES - GENERAL
PAINLEVEL_OUTOF10: 0
PAINLEVEL_OUTOF10: 5

## 2021-09-17 NOTE — ED PROVIDER NOTES
[address] [facility] ED  eMERGENCYdEPARTMENT eNCOUnter      Pt Name: Janis Casarez  MRN: [id number]  [place] 1945  Date of evaluation: 9/17/2021  Rafael Bolton MD    CHIEF COMPLAINT           HPI  Janis Casarez is a 64 y.o. male per chart review has a h/o CAD, COPD, ETOH abuse, RA who presents to the ED for abnormal lab results. Pt states that his PCP sent him to the ER for an elevated K (5.7). Patient does report recent symptoms of weight loss, fatigue, wrist pain, and general malaise. He states he has been sleeping more. He states he has lost 20 lbs in the past 6 weeks, and his family who are with him today also acknowledge this acute weight loss. He has a history of cancer and had a PET scan scheduled for next week. He denies diarrhea, chest pain, SOB, and syncope. ROS  Review of Systems   Constitutional: Positive for fatigue and unexpected weight change. Negative for activity change, chills and fever. HENT: Negative for ear pain and sore throat. Eyes: Negative for visual disturbance. Respiratory: Negative for cough and shortness of breath. Cardiovascular: Negative for chest pain, palpitations and leg swelling. Gastrointestinal: Negative for abdominal pain, diarrhea, nausea and vomiting. Genitourinary: Negative for dysuria. Musculoskeletal: Positive for arthralgias and myalgias. Negative for back pain. Skin: Negative for rash. Neurological: Negative for dizziness and weakness. Except as noted above the remainder of the review of systems was reviewed and negative.        PAST MEDICAL HISTORY     Past Medical History:   Diagnosis Date    Alcohol abuse     quit drinking 8/18/21    CAD (coronary artery disease)     patient states no doctor has told him this / has 1 cardiac stent    Cancer ([facility] [address]     lung LLL    Chronic back pain     Chronic kidney disease     Chronic sinusitis     DJD (degenerative joint disease) of knee     left knee DJD    Elevated PSA     History of blood transfusion 1980's    History of inferior wall myocardial infarction 01/2016    1 cardiac stent    HTN (hypertension)     meds .  1 yr    Hyperlipidemia     meds > 12 yrs    NSTEMI (non-ST elevated myocardial infarction) ([facility] [address]     Smoker          SURGICAL HISTORY       Past Surgical History:   Procedure Laterality Date    ABDOMEN SURGERY  1961    spleenectomy due to [address]  2009    lumbar disc OR    CARDIAC SURGERY      stents    CARPAL TUNNEL RELEASE Right 5/6/2019    RIGHT CARPAL TUNNEL RELEASE performed by Adria Curling, MD at [place] [address] WITH STENT PLACEMENT  1/29/2016    EYE SURGERY      to have Phaco with IOL OU 2/2018    HERNIA REPAIR      JOINT REPLACEMENT Left 1994    LTHR    JOINT REPLACEMENT Right 2009    RTKR    KNEE SURGERY Right 2011    arthroscopy    [place] MANIPULATN KNEE JT+ANESTHESIA Right 5/12/2017    RIGHT KNEE MANIPULATION UNDER ANESTHESIA [facility] [address] [facility]OUT[facility] V[facility]IT,PROCEDURE ONLY Bilateral 12/29/2017    RIGHT AND BILATERAL L 4-5 LYSIS OF SCAR DISKECTOMY INTERBODY CAGE FUSION POSTEROLATERAL FUSION PEDICLE SCREWS performed by Adria Curling, MD at [address] [place] [address] Road Right 3/24/2017    RIGHT  KNEE TOTAL ARTHROPLASTY, ELHAM CEMENTED PERSONA  performed by Ayaan Moreno MD at [address] [place] [address]       Discharge Medication List as of 9/17/2021  7:28 PM      CONTINUE these medications which have NOT CHANGED    Details   SODIUM CHLORIDE, EXTERNAL, 0.9 % SOLN Apply 1 Applicatorful topically daily, Disp-1000 mL, R-2Normal      finasteride (PROSCAR) 5 MG tablet Take 1 tablet by mouth daily, Disp-90 tablet, R-3Normal      citalopram (CELEXA) 40 MG tablet TAKE ONE TABLET BY MOUTH nightly, Disp-30 tablet, R-5Normal      diclofenac (VOLTAREN) 50 MG EC tablet TAKE ONE TABLET BY MOUTH TWO TIMES A DAY, Disp-60 tablet, R-5Normal      tiotropium (SPIRIVA RESPIMAT) 2.5 MCG/ACT AERS inhaler Inhale 2 puffs into the lungs daily, Disp-1 Inhaler, R-3Normal      isosorbide mononitrate (IMDUR) 30 MG extended release tablet TAKE ONE TABLET BY MOUTH EVERY DAY, Disp-90 tablet, R-3Normal      metoprolol tartrate (LOPRESSOR) 50 MG tablet Take 0.5 tablets by mouth 2 times daily TAKE ONE TABLET BY MOUTH TWO TIMES A DAY, Disp-180 tablet, R-3Adjust Sig      Fluticasone furoate-vilanterol (BREO ELLIPTA) 200-25 MCG/INH AEPB inhaler Inhale 1 puff into the lungs daily, Disp-1 each, R-3Normal      atorvastatin (LIPITOR) 20 MG tablet TAKE ONE TABLET BY MOUTH DAILY, Disp-90 tablet,R-3Normal      clopidogrel (PLAVIX) 75 MG tablet TAKE ONE TABLET BY MOUTH DAILY, Disp-90 tablet,R-3Normal      oxyCODONE-acetaminophen (PERCOCET) 7.5-325 MG per tablet TAKE ONE TABLET BY MOUTH THREE TIMES A DAYHistorical Med      omeprazole (PRILOSEC) 10 MG delayed release capsule TAKE ONE CAPSULE BY MOUTH DAILY, Disp-90 capsule,R-2Normal      bumetanide (BUMEX) 1 MG tablet Take 1 tablet by mouth daily, Disp-90 tablet,R-3Normal      albuterol sulfate  (90 Base) MCG/ACT inhaler Inhale 2 puffs into the lungs every 6 hours as needed for Wheezing, Disp-1 Inhaler, R-3Normal      morphine (MS CONTIN) 15 MG extended release tablet Take 15 mg by mouth as needed. Historical Med      fluticasone (FLONASE) 50 MCG/ACT nasal spray 1 spray by Each Nostril route 2 times daily, Disp-1 Bottle, R-3Normal      nitroGLYCERIN (NITROSTAT) 0.4 MG SL tablet Place 1 tablet under the tongue every 5 minutes as needed for Chest pain up to max of 3 total doses. If no relief after 1 dose, call 911., Disp-25 tablet, R-3Normal      DOCUSATE CALCIUM PO Take by mouth as neededHistorical Med             ALLERGIES     Patient has no known allergies.     FAMILY HISTORY       Family History   Problem Relation Age of Onset    Osteoarthritis Mother     Emphysema Mother     Hypertension Father  Hearing Loss Father     Dementia Father     No Known Problems Sister     Prostate Cancer Brother     Colon Polyps Neg Hx           SOCIAL HISTORY       Social History     Socioeconomic History    Marital status:      Spouse name: Not on file    Number of children: Not on file    Years of education: Not on file    Highest education level: Not on file   Occupational History    Occupation: retired   Tobacco Use    Smoking status: Current Every Day Smoker     Packs/day: 0.50     Years: 60.00     Pack years: 30.00     Types: Cigarettes    Smokeless tobacco: Never Used    Tobacco comment: also smokes cigars   Vaping Use    Vaping Use: Never used   Substance and Sexual Activity    Alcohol use: Not Currently     Alcohol/week: 12.0 standard drinks     Types: 12 Shots of liquor per week     Comment: quit drinking aug 18th, 2021    Drug use: No    Sexual activity: Yes   Other Topics Concern    Not on file   Social History Narrative     He is  and lives alone    Type of Home: [address] [facility] [facility] [address]: Able to Live on Main level with bedroom/bathroom, Two level, Performs ADL's on one level    Home Access: Stairs to enter with rails    Entrance Stairs - Number of Steps: 3- Rails: Both    Bathroom Shower/Tub: Tub/Shower unit--Shower chair, Grab bars in shower (does not use chair)    Home Equipment: Rolling walker, 4 wheeled walker, Oxygen    ADL Assistance: Independent    Homemaking Assistance: Independent    Homemaking Responsibilities: Yes    Ambulation Assistance: Independent    Transfer Assistance: Independent    Active : Yes    He is retired from [address] [place] [address] Strain: Low Risk     Difficulty of Paying Living Expenses: Not very hard   Food Insecurity: No Food Insecurity    Worried About Running Out of Food in the Last Year: Never true    Yoav of Food in the Last Year: Never true Transportation Needs: No Transportation Needs    Lack of Transportation (Medical): No    Lack of Transportation (Non-Medical): No   Physical Activity:     Days of Exercise per Week: Not on file    Minutes of Exercise per Session: Not on file   Stress:     Feeling of Stress : Not on file   Social Connections:     Frequency of Communication with Friends and Family: Not on file    Frequency of Social Gatherings with Friends and Family: Not on file    Attends [employer] Services: Not on file    Active Member of Meetup Group or Organizations: Not on file    Attends Club or Organization Meetings: Not on file    Marital Status: Not on file   Intimate Partner Violence:     Fear of Current or Ex-Partner: Not on file    Emotionally Abused: Not on file    Physically Abused: Not on file    Sexually Abused: Not on file   Housing Stability:     Unable to Pay for Housing in the Last Year: Not on file    Number of [place] in the Last Year: Not on file    Unstable Housing in the Last Year: Not on file         PHYSICAL EXAM       ED Triage Vitals [09/17/21 1721]   BP Temp Temp Source Pulse Resp SpO2 Height Weight   135/76 98.3 °F (36.8 °C) Temporal 75 16 98 % 5' 6\" (1.676 m) 150 lb (68 kg)       Physical Exam  Vitals and nursing note reviewed. Constitutional:       Appearance: He is well-developed. HENT:      Head: Normocephalic. Right Ear: External ear normal.      Left Ear: External ear normal.   Eyes:      Conjunctiva/sclera: Conjunctivae normal.      Pupils: Pupils are equal, round, and reactive to light. Cardiovascular:      Rate and Rhythm: Normal rate and regular rhythm. Heart sounds: Normal heart sounds. Pulmonary:      Effort: Pulmonary effort is normal.      Breath sounds: Normal breath sounds. Abdominal:      General: Bowel sounds are normal. There is no distension. Palpations: Abdomen is soft. Tenderness: There is abdominal tenderness.    Musculoskeletal:         General: Normal range of motion. Arms:       Cervical back: Normal range of motion and neck supple. Skin:     General: Skin is warm and dry. Neurological:      Mental Status: He is alert and oriented to person, place, and time. Psychiatric:         Mood and Affect: Mood normal.         Behavior: Behavior normal.           MDM  This is a 66year old male who presents for abnormal lab results. Patient is afebrile and hemodynamically stable. Pt labs are remarkable for K of 4.9 (down for 5.7), Cr 1.0. EKG shows NSR with HR 65, normal axis, normal intervals, no ST changes. Chest X-ray shows no lesions unchanged from previous imaging. Wrist XR shows dislocation and fracture of the left wrist, injury is months old and partially healed, plan is to follow up with ortho surgery Dr. Roshni Anderson and PO Percocet PRN for pain. Potassium returned to normal likely due to increased kidney function. Patient educated on hydration, kidney function, and wrist dislocations. He understands the plan and is agreeable. Pt told to return if symptoms persist or worsen. FINAL IMPRESSION      1. Left wrist dislocation, initial encounter    2. Weight loss    3.  Fatigue, unspecified type          DISPOSITION/PLAN   DISPOSITION Decision To Discharge 09/17/2021 07:24:41 PM        DISCHARGE MEDICATIONS:  [unfilled]         Anette Breen MD(electronically signed)  Attending Emergency Physician           Anette Breen MD  09/17/21 1934       Anette Breen MD  11/20/21 3994

## 2021-09-17 NOTE — ED TRIAGE NOTES
Pt had yearly lab work done and was told to go to ER his labs were bad pt mentions do said something about kidneys alert x4 family at bedside

## 2021-09-20 LAB
EKG ATRIAL RATE: 65 BPM
EKG P AXIS: 28 DEGREES
EKG P-R INTERVAL: 152 MS
EKG Q-T INTERVAL: 424 MS
EKG QRS DURATION: 84 MS
EKG QTC CALCULATION (BAZETT): 440 MS
EKG R AXIS: 25 DEGREES
EKG T AXIS: 24 DEGREES
EKG VENTRICULAR RATE: 65 BPM

## 2021-09-20 PROCEDURE — 93010 ELECTROCARDIOGRAM REPORT: CPT | Performed by: INTERNAL MEDICINE

## 2021-09-23 ENCOUNTER — HOSPITAL ENCOUNTER (OUTPATIENT)
Dept: CT IMAGING | Age: 76
Discharge: HOME | End: 2021-09-25
Payer: MEDICARE

## 2021-09-23 DIAGNOSIS — C34.32 MALIGNANT NEOPLASM OF LOWER LOBE OF LEFT LUNG (HCC): ICD-10-CM

## 2021-09-23 PROCEDURE — A9552 F18 FDG: HCPCS | Performed by: INTERNAL MEDICINE

## 2021-09-23 PROCEDURE — 3430000000 HC RX DIAGNOSTIC RADIOPHARMACEUTICAL: Performed by: INTERNAL MEDICINE

## 2021-09-23 PROCEDURE — 78815 PET IMAGE W/CT SKULL-THIGH: CPT

## 2021-09-23 RX ORDER — FLUDEOXYGLUCOSE F 18 200 MCI/ML
18.1 INJECTION, SOLUTION INTRAVENOUS
Status: COMPLETED | OUTPATIENT
Start: 2021-09-23 | End: 2021-09-23

## 2021-09-23 RX ORDER — OMEPRAZOLE 10 MG/1
CAPSULE, DELAYED RELEASE ORAL
Qty: 90 CAPSULE | Refills: 2 | Status: SHIPPED | OUTPATIENT
Start: 2021-09-23

## 2021-09-23 RX ADMIN — FLUDEOXYGLUCOSE F 18 18.1 MILLICURIE: 200 INJECTION, SOLUTION INTRAVENOUS at 09:37

## 2021-09-24 ENCOUNTER — OFFICE VISIT (OUTPATIENT)
Dept: FAMILY MEDICINE CLINIC | Age: 76
End: 2021-09-24
Payer: MEDICARE

## 2021-09-24 VITALS
HEART RATE: 66 BPM | DIASTOLIC BLOOD PRESSURE: 80 MMHG | BODY MASS INDEX: 24.62 KG/M2 | SYSTOLIC BLOOD PRESSURE: 114 MMHG | TEMPERATURE: 98.6 F | OXYGEN SATURATION: 94 % | WEIGHT: 153.2 LBS | HEIGHT: 66 IN

## 2021-09-24 DIAGNOSIS — S62.102A CLOSED FRACTURE OF LEFT WRIST, INITIAL ENCOUNTER: ICD-10-CM

## 2021-09-24 DIAGNOSIS — Z91.81 AT HIGH RISK FOR FALLS: ICD-10-CM

## 2021-09-24 DIAGNOSIS — C34.92 NSCLC OF LEFT LUNG (HCC): ICD-10-CM

## 2021-09-24 DIAGNOSIS — M25.532 LEFT WRIST PAIN: Primary | ICD-10-CM

## 2021-09-24 PROCEDURE — 99213 OFFICE O/P EST LOW 20 MIN: CPT | Performed by: FAMILY MEDICINE

## 2021-09-24 PROCEDURE — 1123F ACP DISCUSS/DSCN MKR DOCD: CPT | Performed by: FAMILY MEDICINE

## 2021-09-24 PROCEDURE — 4040F PNEUMOC VAC/ADMIN/RCVD: CPT | Performed by: FAMILY MEDICINE

## 2021-09-24 PROCEDURE — 4004F PT TOBACCO SCREEN RCVD TLK: CPT | Performed by: FAMILY MEDICINE

## 2021-09-24 PROCEDURE — G8420 CALC BMI NORM PARAMETERS: HCPCS | Performed by: FAMILY MEDICINE

## 2021-09-24 PROCEDURE — G8427 DOCREV CUR MEDS BY ELIG CLIN: HCPCS | Performed by: FAMILY MEDICINE

## 2021-09-24 NOTE — PROGRESS NOTES
Chief Complaint   Patient presents with    Follow-Up from Hospital     ER for blood work, cnacer showed up agin, slipped and hurt Palomo Rivas       HPI:  Heavenly Carlin is a 66 y.o. male    Went to ER due to lab abnormalities  Hyperkalemia, hyponatremia, KEILY  Repeat labs actually ok    Noted left wrist pain and deformity  Actually fell several weeks ago, slipped in the kitchen     Recurrence of lung cancer   Following with pulm  Has appt with oncology       Chronic pain pt  Sees Dr. Hugo Hernandez  Morphine and percocet      Sees pulm and cardio and urology and rad/onc  Multiple appts coming up all set in EPIC      Past Medical History:   Diagnosis Date    Alcohol abuse     CAD (coronary artery disease)     patient states no doctor has told him this / has 1 cardiac stent    Cancer ([address]     lung LLL    Chronic back pain     Chronic kidney disease     Chronic sinusitis     DJD (degenerative joint disease) of knee     left knee DJD    Elevated PSA     History of blood transfusion 1980's    History of inferior wall myocardial infarction 01/2016    1 cardiac stent    HTN (hypertension)     meds .  1 yr    Hyperlipidemia     meds > 12 yrs    NSTEMI (non-ST elevated myocardial infarction) ([address]     Smoker      Past Surgical History:   Procedure Laterality Date    ABDOMEN SURGERY  1961    spleenectomy due to [address]  2009    lumbar disc OR    CARDIAC SURGERY      stents    CARPAL TUNNEL RELEASE Right 5/6/2019    RIGHT CARPAL TUNNEL RELEASE performed by Serg Cai MD at [place] [address] WITH STENT PLACEMENT  1/29/2016    EYE SURGERY      to have Phaco with IOL OU 2/2018    HERNIA REPAIR      JOINT REPLACEMENT Left 1994    LTHR    JOINT REPLACEMENT Right 2009    RTKR    KNEE SURGERY Right 2011    arthroscopy    FL MANIPULATN KNEE JT+ANESTHESIA Right 5/12/2017    RIGHT KNEE MANIPULATION UNDER ANESTHESIA performed by Afshin Thornton MD [address] OFFICE/OUTPT VISIT,PROCEDURE ONLY Bilateral 12/29/2017    RIGHT AND BILATERAL L 4-5 LYSIS OF SCAR DISKECTOMY INTERBODY CAGE FUSION POSTEROLATERAL FUSION PEDICLE SCREWS performed by Serg Cai MD at [address] [place] [address]  2004    benign   [address] Right 3/24/2017    RIGHT  KNEE TOTAL ARTHROPLASTY, ELHAM CEMENTED PERSONA  performed by Afshin Thornton MD at [address] History   Problem Relation Age of Onset    Osteoarthritis Mother     Emphysema Mother     Hypertension Father     Hearing Loss Father     Dementia Father     No Known Problems Sister     Prostate Cancer Brother     Colon Polyps Neg Hx      Social History     Socioeconomic History    Marital status:      Spouse name: None    Number of children: None    Years of education: None    Highest education level: None   Occupational History    Occupation: retired   Tobacco Use    Smoking status: Current Every Day Smoker     Packs/day: 0.25     Years: 60.00     Pack years: 15.00     Types: Cigarettes    Smokeless tobacco: Never Used   Vaping Use    Vaping Use: Never used   Substance and Sexual Activity    Alcohol use: Yes     Alcohol/week: 12.0 standard drinks     Types: 12 Shots of liquor per week     Comment: last day before yesterday    Drug use: No    Sexual activity: Yes   Other Topics Concern    None   Social History Narrative    Lives alone     Social Determinants of Health     Financial Resource Strain: Low Risk     Difficulty of Paying Living Expenses: Not very hard   Food Insecurity: No Food Insecurity    Worried About Running Out of Food in the Last Year: Never true    Yoav of Food in the Last Year: Never true   Transportation Needs: No Transportation Needs    Lack of Transportation (Medical): No    Lack of Transportation (Non-Medical):  No   Physical Activity:     Days of Exercise per Week:     Minutes of Exercise per Session:    Stress:     Feeling of Stress : Social Connections:     Frequency of Communication with Friends and Family:     Frequency of Social Gatherings with Friends and Family:     Attends Yarsani Services:     Active Member of Clubs or Organizations:     Attends Club or Organization Meetings:     Marital Status:    Intimate Partner Violence:     Fear of Current or Ex-Partner:     Emotionally Abused:     Physically Abused:     Sexually Abused:      Current Outpatient Medications   Medication Sig Dispense Refill    omeprazole (PRILOSEC) 10 MG delayed release capsule TAKE ONE CAPSULE BY MOUTH DAILY 90 capsule 2    SODIUM CHLORIDE, EXTERNAL, 0.9 % SOLN Apply 1 Applicatorful topically daily 1000 mL 2    finasteride (PROSCAR) 5 MG tablet Take 1 tablet by mouth daily 90 tablet 3    citalopram (CELEXA) 40 MG tablet TAKE ONE TABLET BY MOUTH nightly 30 tablet 5    diclofenac (VOLTAREN) 50 MG EC tablet TAKE ONE TABLET BY MOUTH TWO TIMES A DAY 60 tablet 5    tiotropium (SPIRIVA RESPIMAT) 2.5 MCG/ACT AERS inhaler Inhale 2 puffs into the lungs daily 1 Inhaler 3    isosorbide mononitrate (IMDUR) 30 MG extended release tablet TAKE ONE TABLET BY MOUTH EVERY DAY 90 tablet 3    metoprolol tartrate (LOPRESSOR) 50 MG tablet Take 0.5 tablets by mouth 2 times daily TAKE ONE TABLET BY MOUTH TWO TIMES A  tablet 3    Fluticasone furoate-vilanterol (BREO ELLIPTA) 200-25 MCG/INH AEPB inhaler Inhale 1 puff into the lungs daily 1 each 3    clopidogrel (PLAVIX) 75 MG tablet TAKE ONE TABLET BY MOUTH DAILY 90 tablet 3    atorvastatin (LIPITOR) 20 MG tablet TAKE ONE TABLET BY MOUTH DAILY 90 tablet 3    oxyCODONE-acetaminophen (PERCOCET) 7.5-325 MG per tablet TAKE ONE TABLET BY MOUTH THREE TIMES A DAY      bumetanide (BUMEX) 1 MG tablet Take 1 tablet by mouth daily 90 tablet 3    albuterol sulfate  (90 Base) MCG/ACT inhaler Inhale 2 puffs into the lungs every 6 hours as needed for Wheezing 1 Inhaler 3    morphine (MS CONTIN) 15 MG extended release tablet Take 15 mg by mouth as needed.  fluticasone (FLONASE) 50 MCG/ACT nasal spray 1 spray by Each Nostril route 2 times daily 1 Bottle 3    nitroGLYCERIN (NITROSTAT) 0.4 MG SL tablet Place 1 tablet under the tongue every 5 minutes as needed for Chest pain up to max of 3 total doses. If no relief after 1 dose, call 911. 25 tablet 3    DOCUSATE CALCIUM PO Take by mouth as needed       No current facility-administered medications for this visit. No Known Allergies    Review of Systems:   General ROS: negative for - chills, fatigue, fever, malaise, weight gain or weight loss  Respiratory ROS: no cough, shortness of breath, or wheezing  Cardiovascular ROS: no chest pain or dyspnea on exertion  Gastrointestinal ROS: no abdominal pain, change in bowel habits, or black or bloody stools  Genito-Urinary ROS: no dysuria, trouble voiding  Musculoskeletal ROS: see HPI  Neurological ROS: gait ataxia    In general patient otherwise reports feeling well. Physical Exam:  /80 (Site: Left Upper Arm)   Pulse 66   Temp 98.6 °F (37 °C)   Ht 5' 6\" (1.676 m)   Wt 153 lb 3.2 oz (69.5 kg)   SpO2 94%   BMI 24.73 kg/m²     Gen: Well, NAD, Alert, Oriented x 3   HEENT: EOMI, eyes clear, MMM  Skin: without rash or jaundice  Neck: no significant lymphadenopathy or thyromegaly  Lungs: CTAB   Heart: RRR, S1S2, w/out M/R/G, non-displaced PMI   Ext: prominent varicosities bilaterally. 2+ pedal edema  Neuro:altered light touch in feet  Obvious deformity of left wrist      A&P   Diagnosis Orders   1. Left wrist pain     2. Closed fracture of left wrist, initial encounter     3. NSCLC of left lung ([address]         Splint/brace to wrist    See ortho next week   Needs cast    Majority of f/u with oncology/pulm/pain mgmt  Also ID and wound care     Declines flu shot     Johnathan Montero MD                On the basis of positive falls risk screening, assessment and plan is as follows: home safety tips provided.

## 2021-09-27 ENCOUNTER — OFFICE VISIT (OUTPATIENT)
Dept: INFECTIOUS DISEASES | Age: 76
End: 2021-09-27
Payer: MEDICARE

## 2021-09-27 VITALS
TEMPERATURE: 97.8 F | SYSTOLIC BLOOD PRESSURE: 99 MMHG | BODY MASS INDEX: 24.95 KG/M2 | DIASTOLIC BLOOD PRESSURE: 54 MMHG | WEIGHT: 154.6 LBS | HEART RATE: 76 BPM

## 2021-09-27 DIAGNOSIS — M86.272 SUBACUTE OSTEOMYELITIS, LEFT ANKLE AND FOOT (HCC): Primary | ICD-10-CM

## 2021-09-27 DIAGNOSIS — A49.9 POLYMICROBIAL BACTERIAL INFECTION: ICD-10-CM

## 2021-09-27 PROCEDURE — 99203 OFFICE O/P NEW LOW 30 MIN: CPT | Performed by: INTERNAL MEDICINE

## 2021-09-27 PROCEDURE — 1123F ACP DISCUSS/DSCN MKR DOCD: CPT | Performed by: INTERNAL MEDICINE

## 2021-09-27 PROCEDURE — G8420 CALC BMI NORM PARAMETERS: HCPCS | Performed by: INTERNAL MEDICINE

## 2021-09-27 PROCEDURE — 4040F PNEUMOC VAC/ADMIN/RCVD: CPT | Performed by: INTERNAL MEDICINE

## 2021-09-27 PROCEDURE — 4004F PT TOBACCO SCREEN RCVD TLK: CPT | Performed by: INTERNAL MEDICINE

## 2021-09-27 PROCEDURE — G8427 DOCREV CUR MEDS BY ELIG CLIN: HCPCS | Performed by: INTERNAL MEDICINE

## 2021-09-27 RX ORDER — SULFAMETHOXAZOLE AND TRIMETHOPRIM 800; 160 MG/1; MG/1
1 TABLET ORAL 2 TIMES DAILY
Qty: 60 TABLET | Refills: 0 | Status: ON HOLD | OUTPATIENT
Start: 2021-09-27 | End: 2021-10-15 | Stop reason: HOSPADM

## 2021-09-27 RX ORDER — AMOXICILLIN AND CLAVULANATE POTASSIUM 875; 125 MG/1; MG/1
1 TABLET, FILM COATED ORAL 2 TIMES DAILY
Qty: 60 TABLET | Refills: 0 | Status: ON HOLD | OUTPATIENT
Start: 2021-09-27 | End: 2021-10-15 | Stop reason: HOSPADM
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
## 2021-09-29 ENCOUNTER — HOSPITAL ENCOUNTER (OUTPATIENT)
Dept: RADIATION ONCOLOGY | Age: 76
Discharge: HOME | End: 2021-09-29
Payer: MEDICARE

## 2021-09-29 VITALS
HEART RATE: 68 BPM | HEIGHT: 66 IN | BODY MASS INDEX: 24.14 KG/M2 | DIASTOLIC BLOOD PRESSURE: 60 MMHG | OXYGEN SATURATION: 94 % | SYSTOLIC BLOOD PRESSURE: 117 MMHG | RESPIRATION RATE: 18 BRPM | TEMPERATURE: 97.8 F | WEIGHT: 150.2 LBS

## 2021-09-29 DIAGNOSIS — C34.92 NSCLC OF LEFT LUNG (HCC): ICD-10-CM

## 2021-09-29 PROCEDURE — 99212 OFFICE O/P EST SF 10 MIN: CPT | Performed by: RADIOLOGY

## 2021-09-29 PROCEDURE — 99204 OFFICE O/P NEW MOD 45 MIN: CPT | Performed by: RADIOLOGY

## 2021-09-29 NOTE — PROGRESS NOTES
Radiation Oncology Consult Note         9/29/2021    Jorge Cruz  66 y.o.   1945    REFERRING PROVIDER: Maria G Brito    PCP:  Jeovanny Isbell MD    CHIEF COMPLAINT: \"My cancer came back. \"    DIAGNOSIS: Local recurrence of his left lower lobe squamous cell carcinoma status post SBRT almost a year ago here. STAGING: Cancer Staging  NSCLC of left lung ([address]  Staging form: Lung, AJCC 8th Edition  - Clinical stage from 11/13/2020: Stage IA2 (cT1b, cN0, cM0) - Signed by Barb Gentile MD on 11/13/2020  - rT2a N0 M0 Stage IB      HISTORY OF PRESENT ILLNESS: Mr. Jorge Cruz  is a 66y.o. year old  male smoker of cigars and half a pack a day of cigarettes x 60 years. Who was initially seen here on November 13, 2020 for a left lower lobe squamous cell carcinoma, biopsy-proven on May 21, 2020, after CT evaluation for a pre-cholecystectomy chest x-ray abnormality. April 24, 2020, PET CT scan had shown a 1.2 cm left lower lobe lesion with SUV max of 3.2, no mediastinal adenopathy. The patient was deemed not a candidate for surgical resection, and follow-up CT after delayed due to COVID-19 showed that the lesion, which had not been pleural-based, was now so, in addition to an increase in size to 1.5 cm. From November 30, 2020, through December 3, 2020, he received 50 Gy in 4 SBRT fractions of 12.5 Gy each. He was last seen here on January 20, 2021, with January 14, 2021, chest CT showing an excellent response, the tumor decreasing to 0.8 x 0.8 cm, no mediastinal adenopathy. The recommendation was for a repeat PET CT in about 3 months. April 8, 2021, PET CT scan showed an uptake at the site of the previous left lower lobe lesion, SUV max 1.7, otherwise negative. This was deemed treatment related. April 9, 2021, brain MRI was negative. April 9, 2021, MRI of the L-spine showed multilevel degenerative changes with canal stenosis and right femoral head avascular necrosis.     The patient fell on August 17, 2021, reportedly under the influence of alcohol and injured his left wrists. August 18, 2021, CT of the head and neck was negative. Bone scan that day showed uptake in the left ankle area from his osteomyelitis as well as diffuse uptake consistent with degenerative changes, not metastatic disease. Most recently, September 7, 2021, chest CT showed that the left lower lobe lung lesion has now increased to 3.6 x 2 x 1 cm in size. September 23, 2021, PET CT scan showed uptake in the 3.2 cm left lower lobe lung lesion, uptake in multiple ribs consistent with traumatic fractures, and a stable left adrenal that had not changed compared to 2020. The patient was deemed to have a local recurrence, and a radiation oncology consultation is requested regarding evaluation and treatment recommendation. PAST MEDICAL HISTORY:      Diagnosis Date    Alcohol abuse     quit drinking 8/18/21    CAD (coronary artery disease)     patient states no doctor has told him this / has 1 cardiac stent    Cancer ([address]     lung LLL    Chronic back pain     Chronic kidney disease     Chronic sinusitis     DJD (degenerative joint disease) of knee     left knee DJD    Elevated PSA     History of blood transfusion 1980's    History of inferior wall myocardial infarction 01/2016    1 cardiac stent    HTN (hypertension)     meds .  1 yr    Hyperlipidemia     meds > 12 yrs    NSTEMI (non-ST elevated myocardial infarction) ([address]     Smoker        PAST SURGICAL HISTORY:      Procedure Laterality Date    ABDOMEN SURGERY  1961    spleenectomy due to [address]  2009    lumbar disc OR    CARDIAC SURGERY      stents    CARPAL TUNNEL RELEASE Right 5/6/2019    RIGHT CARPAL TUNNEL RELEASE performed by Jas Patel MD at [place] [id number] WITH STENT PLACEMENT  1/29/2016    EYE SURGERY      to have Phaco with IOL OU 2/2018    HERNIA REPAIR      JOINT REPLACEMENT Left 1994    LTHR    JOINT REPLACEMENT Right 2009    RTKR    KNEE SURGERY Right 2011    arthroscopy    MT MANIPULATN KNEE JT+ANESTHESIA Right 5/12/2017    RIGHT KNEE MANIPULATION UNDER ANESTHESIA performed by Rhett Khan MD at [facility] OFFICE/OUTPT VISIT,PROCEDURE ONLY Bilateral 12/29/2017    RIGHT AND BILATERAL L 4-5 LYSIS OF SCAR DISKECTOMY INTERBODY CAGE FUSION POSTEROLATERAL FUSION PEDICLE SCREWS performed by Katya Inman MD at [address] [place] [address]  2004    benign   [address] Right 3/24/2017    RIGHT  KNEE TOTAL ARTHROPLASTY, ELHAM CEMENTED PERSONA  performed by Rhett Khan MD at [facility]       No Known Allergies    MEDICATIONS:  Medications reviewed and reconciled.   Current Outpatient Medications   Medication Sig Dispense Refill    amoxicillin-clavulanate (AUGMENTIN) 875-125 MG per tablet Take 1 tablet by mouth 2 times daily 60 tablet 0    sulfamethoxazole-trimethoprim (BACTRIM DS) 800-160 MG per tablet Take 1 tablet by mouth 2 times daily 60 tablet 0    omeprazole (PRILOSEC) 10 MG delayed release capsule TAKE ONE CAPSULE BY MOUTH DAILY 90 capsule 2    SODIUM CHLORIDE, EXTERNAL, 0.9 % SOLN Apply 1 Applicatorful topically daily 1000 mL 2    finasteride (PROSCAR) 5 MG tablet Take 1 tablet by mouth daily 90 tablet 3    citalopram (CELEXA) 40 MG tablet TAKE ONE TABLET BY MOUTH nightly 30 tablet 5    diclofenac (VOLTAREN) 50 MG EC tablet TAKE ONE TABLET BY MOUTH TWO TIMES A DAY 60 tablet 5    tiotropium (SPIRIVA RESPIMAT) 2.5 MCG/ACT AERS inhaler Inhale 2 puffs into the lungs daily 1 Inhaler 3    isosorbide mononitrate (IMDUR) 30 MG extended release tablet TAKE ONE TABLET BY MOUTH EVERY DAY 90 tablet 3    metoprolol tartrate (LOPRESSOR) 50 MG tablet Take 0.5 tablets by mouth 2 times daily TAKE ONE TABLET BY MOUTH TWO TIMES A  tablet 3    Fluticasone furoate-vilanterol (BREO ELLIPTA) 200-25 MCG/INH AEPB inhaler Inhale 1 puff into the lungs daily 1 each 3    clopidogrel (PLAVIX) 75 MG tablet TAKE ONE TABLET BY MOUTH DAILY 90 tablet 3    atorvastatin (LIPITOR) 20 MG tablet TAKE ONE TABLET BY MOUTH DAILY 90 tablet 3    oxyCODONE-acetaminophen (PERCOCET) 7.5-325 MG per tablet TAKE ONE TABLET BY MOUTH THREE TIMES A DAY      bumetanide (BUMEX) 1 MG tablet Take 1 tablet by mouth daily 90 tablet 3    morphine (MS CONTIN) 15 MG extended release tablet Take 15 mg by mouth as needed.  DOCUSATE CALCIUM PO Take by mouth as needed      albuterol sulfate  (90 Base) MCG/ACT inhaler Inhale 2 puffs into the lungs every 6 hours as needed for Wheezing 1 Inhaler 3    nitroGLYCERIN (NITROSTAT) 0.4 MG SL tablet Place 1 tablet under the tongue every 5 minutes as needed for Chest pain up to max of 3 total doses. If no relief after 1 dose, call 911. 25 tablet 3     No current facility-administered medications for this encounter. FAMILY HISTORY:  Family History   Problem Relation Age of Onset    Osteoarthritis Mother     Emphysema Mother     Hypertension Father     Hearing Loss Father     Dementia Father     No Known Problems Sister     Prostate Cancer Brother     Colon Polyps Neg Hx    Father  age 68 of pneumonia, mother  age 66 of emphysema, neither with a history of malignancies. A brother is alive at age 76 diagnosed 4 years ago with bladder cancer. The patient denies any other family history of malignancies. SOCIAL HISTORY:    , living alone, the patient smokes cigars as well as half a pack a day for 60 years of cigarettes. Chart review showed that he consumes 12 shots of liquor is a week. REVIEW OF SYSTEMS:  Obtained from the patient, chart review and nursing assessment.   ECOG Performance Status 3  Constitutional: Fair appetite, 15- to 20-pound weight loss over the past 6 months  HEENT:  No headache or visual symptoms  Respiratory:  No cough or hemoptysis  Cardiovascular:  No chest pain, orthopnea, or paroxysmal nocturnal dyspnea  GI:  No incontinence, hematochezia, or diarrhea  :  No incontinence, hematuria, or dysuria  Lymph:  No edema  Neuro: Chronic back pain but no paresis or paresthesia  Psychiatric:  No psychiatric illness    PHYSICAL EXAMINATION:      Vitals:    09/29/21 1320   BP: 117/60   Pulse: 68   Resp: 18   Temp: 97.8 °F (36.6 °C)   SpO2: 94%   Weight: 150 lb 3.2 oz (68.1 kg)   Height: 5' 6\" (1.676 m)       Wt Readings from Last 3 Encounters:   09/29/21 150 lb 3.2 oz (68.1 kg)   09/27/21 154 lb 9.6 oz (70.1 kg)   09/24/21 153 lb 3.2 oz (69.5 kg)       ECOG PERFORMANCE STATUS:  3    Constitutional: 66 y.o. male who is alert, cooperative and in no apparent distress. He looks well and is accompanied by his sister, Jacob Murrell. HEENT:   No jaundice or icterus. Pupils are equal and reactive. He is lower edentulous. There is scarring in the lateral aspect of the right lower eyelid from previous surgery. Cranial nerves II-XII are grossly intact. Lymph: No palpable adenopathy in the neck, supraclavicular, infraclavicular, or axillary regions. Heart Exam shows regular rate and rhythm without murmurs, rubs, or gallop. Lungs  are clear to auscultation. Abdomen exam shows a non-distended non-tender abdomen with positive bowel sounds. Extremities: The patient has bandages in the left ankle area from his osteomyelitis, no edema, clubbing, or cyanosis. There is decreased range of motion of the right hip and left ankle. Neuro Exam:  Shows strength 5/5 proximally and distally in all 4 extremities (subjective to the significantly decreased range of motion of the right hip and left ankle as mentioned above), and sensory is grossly normal for light touch and deep pressure.     RADIATION SAFETY AND ONCOLOGIC TREATMENT SUPPORT:    - Previous radiation history:  No  - History of autoimmune or connective tissue disease:  No  - Nutritional support/ PEG:  Not applicable  - Dental evaluation:  Not applicable  -  requested: Not asked for.  - Oncology Nurse navigator requested:  Postponed by the patient until next time. - Transportation for daily treatment:  Self    IMAGING REVIEWED: I went over the results of his various CT scans and PET CTs showing response to SBRT treatment last year and subsequent local recurrence with the patient and his sister. PATHOLOGY REVIEWED: I went over the results of the biopsy with the patient and his sister. IMPRESSION: 64-year-old  male patient status post December 3, 2020, completion of 50 Gy in 4 SBRT fractions of 12.5 Gy each for a left lower lobe T1b N0 M0 stage IA2, now with radiologic evidence of growing local recurrence, rT2 a N0 M0 stage I B.    DISCUSSION/PLAN:   I went over the diagnosis, treatment options including retreatment with SBRT or with external beam radiation with or without concurrent chemotherapy. The patient refuses chemo. I went over the side effects and outcome based on retrospective reviews. As today is my last day as a [facility] radiation oncologist, and as Dr. Maylin Milner will start tomorrow, I defer the recommendation to Dr. Maylin Milner, who will need to obtain a written consent after the recommendation is made. Because the above 2 options for radiation are different, I am deferring the details of simulation, treatment planning, and there orders to Dr. Maylin Milner. A radiation treatment summary will be provided at the end of the patient's radiation treatment course, and thank you very much for asking us to participate in the care of this patient.     Rosendo Sow MD PHD  Radiation Oncologist  Diplomate, American Board of Radiology -- Radiation Oncology    Department of [address] [place] [address]  [address]

## 2021-09-29 NOTE — CONSULTS
NURSING ASSESSMENT     Date: 9/29/2021        Patient Name: Raven Choi     YOB: 1945      Age:  66 y.o. MRN: [id number]       Chaperone [] Yes   [x] No      Advance Directives:   Do you currently have completed advance directives (living will)? [x] Yes   [] No         *If yes, please bring us a copy for your records. *If no, would you like info or assistance in completing advance directives (living will)? [] Yes   [x] No    Pain Score:   Pain Score (1-10): 5   Pain Location: back, right knee and ankle   Pain Duration:  intermittent  Pain Management/Control: morphine, oxycodone      Is pain affecting your ability to take care of yourself or move throughout your home? [] Yes   [] No    General: Weight Loss, Weakness and Fatigue  Patient has gained weight [] Yes   [x] No  Patient has lost weight [x] Yes   [] No  How much weight in pounds and over what length of time: 15-20lb lost over last several months  Eyes (Ophthalmic): No Problem     Skin (Dermatological): bruises easily, has an ulcer left ankle which is being cared for at [facility] [address] [facility],  Has a dressing on it which is clean, dry and intact     ENT: No Problems     Respiratory: Cough, FELDER and Oxygen, occasional productive cough clear phlegm, has oxygen to use at night but hasn't used in weeks     Cardiovascular: No Problems and Edema      Device   [] Yes   [x] No   Copy of Card Obtained [] Yes   [x] No    Gastrointestinal: No Problems    Genito-Urinary: No Problems, nocturia x2     Breast: No Problems     Musculoskeletal: arthrits generalized, has a swollen left wrist from fall    Neurological: Dizziness when standing on occasion      Hematological and Lymphatic: Easy Bruising     Endocrine: No Problems     A 10-point review of systems has been conducted and pertinent positives have been   recorded.  All other review of systems are negative    Was the patient admitted during the course of treatment OR within 27 days of treatment? n/a

## 2021-10-11 ENCOUNTER — APPOINTMENT (OUTPATIENT)
Dept: CT IMAGING | Age: 76
DRG: 640 | End: 2021-10-11
Payer: MEDICARE

## 2021-10-11 ENCOUNTER — APPOINTMENT (OUTPATIENT)
Dept: GENERAL RADIOLOGY | Age: 76
DRG: 640 | End: 2021-10-11
Payer: MEDICARE

## 2021-10-11 ENCOUNTER — HOSPITAL ENCOUNTER (INPATIENT)
Age: 76
LOS: 4 days | Discharge: NURSING, REHAB OR LONG-TERM CARE | DRG: 640 | End: 2021-10-15
Attending: INTERNAL MEDICINE | Admitting: INTERNAL MEDICINE
Payer: MEDICARE

## 2021-10-11 DIAGNOSIS — N17.9 AKI (ACUTE KIDNEY INJURY) (HCC): ICD-10-CM

## 2021-10-11 DIAGNOSIS — R53.1 GENERAL WEAKNESS: ICD-10-CM

## 2021-10-11 DIAGNOSIS — R10.9 ABDOMINAL PAIN, UNSPECIFIED ABDOMINAL LOCATION: ICD-10-CM

## 2021-10-11 DIAGNOSIS — M54.6 ACUTE BILATERAL THORACIC BACK PAIN: ICD-10-CM
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
## 2021-10-11 DIAGNOSIS — S62.524A CLOSED NONDISPLACED FRACTURE OF DISTAL PHALANX OF RIGHT THUMB, INITIAL ENCOUNTER: ICD-10-CM

## 2021-10-11 DIAGNOSIS — M86.672 OTHER CHRONIC OSTEOMYELITIS OF LEFT ANKLE (HCC): ICD-10-CM

## 2021-10-11 DIAGNOSIS — S51.011A SKIN TEAR OF RIGHT ELBOW WITHOUT COMPLICATION, INITIAL ENCOUNTER: ICD-10-CM

## 2021-10-11 DIAGNOSIS — S09.90XA INJURY OF HEAD, INITIAL ENCOUNTER: Primary | ICD-10-CM

## 2021-10-11 DIAGNOSIS — E87.5 HYPERKALEMIA: ICD-10-CM

## 2021-10-11 DIAGNOSIS — E87.1 HYPONATREMIA: ICD-10-CM

## 2021-10-11 DIAGNOSIS — S42.201A CLOSED FRACTURE OF PROXIMAL END OF RIGHT HUMERUS, UNSPECIFIED FRACTURE MORPHOLOGY, INITIAL ENCOUNTER: ICD-10-CM

## 2021-10-11 PROBLEM — I25.10 CAD (CORONARY ARTERY DISEASE): Status: ACTIVE | Noted: 2020-01-10

## 2021-10-11 PROBLEM — R29.6 FREQUENT FALLS: Status: ACTIVE | Noted: 2021-10-11

## 2021-10-11 LAB
ALBUMIN SERPL-MCNC: 3.9 G/DL (ref 3.5–4.6)
ALP BLD-CCNC: 104 U/L (ref 35–104)
ALT SERPL-CCNC: 12 U/L (ref 0–41)
ANION GAP SERPL CALCULATED.3IONS-SCNC: 14 MEQ/L (ref 9–15)
APTT: 34.3 SEC (ref 24.4–36.8)
AST SERPL-CCNC: 19 U/L (ref 0–40)
BACTERIA: NEGATIVE /HPF
BASOPHILS ABSOLUTE: 0.1 K/UL (ref 0–0.2)
BASOPHILS RELATIVE PERCENT: 0.5 %
BILIRUB SERPL-MCNC: 0.7 MG/DL (ref 0.2–0.7)
BILIRUBIN URINE: NEGATIVE
BLOOD, URINE: ABNORMAL
BUN BLDV-MCNC: 25 MG/DL (ref 8–23)
CALCIUM SERPL-MCNC: 9.3 MG/DL (ref 8.5–9.9)
CHLORIDE BLD-SCNC: 92 MEQ/L (ref 95–107)
CK MB: 7.8 NG/ML (ref 0–6.7)
CLARITY: CLEAR
CO2: 16 MEQ/L (ref 20–31)
COLOR: YELLOW
CREAT SERPL-MCNC: 1.73 MG/DL (ref 0.7–1.2)
CREATINE KINASE-MB INDEX: 1.7 % (ref 0–3.5)
EOSINOPHILS ABSOLUTE: 0 K/UL (ref 0–0.7)
EOSINOPHILS RELATIVE PERCENT: 0 %
EPITHELIAL CELLS, UA: ABNORMAL /HPF (ref 0–5)
ETHANOL PERCENT: NORMAL G/DL
ETHANOL: <10 MG/DL (ref 0–0.08)
GFR AFRICAN AMERICAN: 46.5
GFR NON-AFRICAN AMERICAN: 38.4
GLOBULIN: 2.7 G/DL (ref 2.3–3.5)
GLUCOSE BLD-MCNC: 147 MG/DL (ref 70–99)
GLUCOSE URINE: NEGATIVE MG/DL
HCT VFR BLD CALC: 25.8 % (ref 42–52)
HEMOGLOBIN: 8.6 G/DL (ref 14–18)
HYALINE CASTS: ABNORMAL /HPF (ref 0–5)
INR BLD: 1.2
KETONES, URINE: NEGATIVE MG/DL
LEUKOCYTE ESTERASE, URINE: NEGATIVE
LYMPHOCYTES ABSOLUTE: 0.5 K/UL (ref 1–4.8)
LYMPHOCYTES RELATIVE PERCENT: 3.1 %
MCH RBC QN AUTO: 31.7 PG (ref 27–31.3)
MCHC RBC AUTO-ENTMCNC: 33.5 % (ref 33–37)
MCV RBC AUTO: 94.6 FL (ref 80–100)
MONOCYTES ABSOLUTE: 0.6 K/UL (ref 0.2–0.8)
MONOCYTES RELATIVE PERCENT: 3.4 %
NEUTROPHILS ABSOLUTE: 15.1 K/UL (ref 1.4–6.5)
NEUTROPHILS RELATIVE PERCENT: 93 %
NITRITE, URINE: NEGATIVE
PDW BLD-RTO: 13 % (ref 11.5–14.5)
PH UA: 5.5 (ref 5–9)
PLATELET # BLD: 289 K/UL (ref 130–400)
POC CREATININE WHOLE BLOOD: 1
POTASSIUM SERPL-SCNC: 5.9 MEQ/L (ref 3.4–4.9)
PROTEIN UA: ABNORMAL MG/DL
PROTHROMBIN TIME: 14.9 SEC (ref 12.3–14.9)
RBC # BLD: 2.73 M/UL (ref 4.7–6.1)
RBC UA: ABNORMAL /HPF (ref 0–5)
REASON FOR REJECTION: NORMAL
REJECTED TEST: NORMAL
SODIUM BLD-SCNC: 122 MEQ/L (ref 135–144)
SPECIFIC GRAVITY UA: 1.02 (ref 1–1.03)
TOTAL CK: 462 U/L (ref 0–190)
TOTAL PROTEIN: 6.6 G/DL (ref 6.3–8)
TROPONIN: <0.01 NG/ML (ref 0–0.01)
URINE REFLEX TO CULTURE: ABNORMAL
UROBILINOGEN, URINE: 0.2 E.U./DL
WBC # BLD: 16.3 K/UL (ref 4.8–10.8)
WBC UA: ABNORMAL /HPF (ref 0–5)

## 2021-10-11 PROCEDURE — 6830039000 HC L3 TRAUMA ALERT

## 2021-10-11 PROCEDURE — 73130 X-RAY EXAM OF HAND: CPT

## 2021-10-11 PROCEDURE — 81001 URINALYSIS AUTO W/SCOPE: CPT

## 2021-10-11 PROCEDURE — 84484 ASSAY OF TROPONIN QUANT: CPT

## 2021-10-11 PROCEDURE — 85730 THROMBOPLASTIN TIME PARTIAL: CPT

## 2021-10-11 PROCEDURE — 2580000003 HC RX 258: Performed by: PHYSICIAN ASSISTANT

## 2021-10-11 PROCEDURE — 71250 CT THORAX DX C-: CPT

## 2021-10-11 PROCEDURE — 96374 THER/PROPH/DIAG INJ IV PUSH: CPT

## 2021-10-11 PROCEDURE — 36415 COLL VENOUS BLD VENIPUNCTURE: CPT

## 2021-10-11 PROCEDURE — 99285 EMERGENCY DEPT VISIT HI MDM: CPT

## 2021-10-11 PROCEDURE — 85025 COMPLETE CBC W/AUTO DIFF WBC: CPT

## 2021-10-11 PROCEDURE — 82550 ASSAY OF CK (CPK): CPT

## 2021-10-11 PROCEDURE — 6370000000 HC RX 637 (ALT 250 FOR IP): Performed by: PHYSICIAN ASSISTANT

## 2021-10-11 PROCEDURE — 82077 ASSAY SPEC XCP UR&BREATH IA: CPT

## 2021-10-11 PROCEDURE — 74176 CT ABD & PELVIS W/O CONTRAST: CPT

## 2021-10-11 PROCEDURE — 85610 PROTHROMBIN TIME: CPT

## 2021-10-11 PROCEDURE — 73200 CT UPPER EXTREMITY W/O DYE: CPT

## 2021-10-11 PROCEDURE — 93005 ELECTROCARDIOGRAM TRACING: CPT | Performed by: PHYSICIAN ASSISTANT

## 2021-10-11 PROCEDURE — 72125 CT NECK SPINE W/O DYE: CPT

## 2021-10-11 PROCEDURE — 1210000000 HC MED SURG R&B

## 2021-10-11 PROCEDURE — 6360000002 HC RX W HCPCS: Performed by: PHYSICIAN ASSISTANT

## 2021-10-11 PROCEDURE — 72128 CT CHEST SPINE W/O DYE: CPT

## 2021-10-11 PROCEDURE — 96375 TX/PRO/DX INJ NEW DRUG ADDON: CPT

## 2021-10-11 PROCEDURE — 70450 CT HEAD/BRAIN W/O DYE: CPT

## 2021-10-11 PROCEDURE — 72131 CT LUMBAR SPINE W/O DYE: CPT

## 2021-10-11 PROCEDURE — 80053 COMPREHEN METABOLIC PANEL: CPT

## 2021-10-11 PROCEDURE — 82553 CREATINE MB FRACTION: CPT

## 2021-10-11 RX ORDER — FENTANYL CITRATE 50 UG/ML
25 INJECTION, SOLUTION INTRAMUSCULAR; INTRAVENOUS ONCE
Status: COMPLETED | OUTPATIENT
Start: 2021-10-11 | End: 2021-10-11

## 2021-10-11 RX ORDER — DIAPER,BRIEF,INFANT-TODD,DISP
EACH MISCELLANEOUS 2 TIMES DAILY
Status: DISCONTINUED | OUTPATIENT
Start: 2021-10-11 | End: 2021-10-15 | Stop reason: HOSPADM

## 2021-10-11 RX ORDER — 0.9 % SODIUM CHLORIDE 0.9 %
1000 INTRAVENOUS SOLUTION INTRAVENOUS ONCE
Status: COMPLETED | OUTPATIENT
Start: 2021-10-11 | End: 2021-10-11

## 2021-10-11 RX ORDER — ONDANSETRON 2 MG/ML
4 INJECTION INTRAMUSCULAR; INTRAVENOUS ONCE
Status: COMPLETED | OUTPATIENT
Start: 2021-10-11 | End: 2021-10-11

## 2021-10-11 RX ORDER — SODIUM CHLORIDE 9 MG/ML
INJECTION, SOLUTION INTRAVENOUS CONTINUOUS
Status: DISCONTINUED | OUTPATIENT
Start: 2021-10-11 | End: 2021-10-12

## 2021-10-11 RX ADMIN — ONDANSETRON 4 MG: 2 INJECTION INTRAMUSCULAR; INTRAVENOUS at 19:55

## 2021-10-11 RX ADMIN — FENTANYL CITRATE 25 MCG: 0.05 INJECTION, SOLUTION INTRAMUSCULAR; INTRAVENOUS at 19:55

## 2021-10-11 RX ADMIN — FENTANYL CITRATE 25 MCG: 50 INJECTION, SOLUTION INTRAMUSCULAR; INTRAVENOUS at 22:29

## 2021-10-11 RX ADMIN — SODIUM CHLORIDE: 9 INJECTION, SOLUTION INTRAVENOUS at 20:44

## 2021-10-11 RX ADMIN — BACITRACIN ZINC 1 G: 500 OINTMENT TOPICAL at 20:24

## 2021-10-11 RX ADMIN — SODIUM CHLORIDE 1000 ML: 9 INJECTION, SOLUTION INTRAVENOUS at 19:55

## 2021-10-11 ASSESSMENT — ENCOUNTER SYMPTOMS
NAUSEA: 0
SORE THROAT: 0
TROUBLE SWALLOWING: 0
ABDOMINAL DISTENTION: 0
SHORTNESS OF BREATH: 0
COUGH: 0
EYES NEGATIVE: 1
CONSTIPATION: 0
ABDOMINAL PAIN: 0
COLOR CHANGE: 1
APNEA: 0
VOICE CHANGE: 0
DIARRHEA: 0
CHEST TIGHTNESS: 0
WHEEZING: 0
EYE DISCHARGE: 0
BACK PAIN: 1
BACK PAIN: 0
PHOTOPHOBIA: 0
VOMITING: 0
ANAL BLEEDING: 0

## 2021-10-11 ASSESSMENT — PAIN SCALES - GENERAL
PAINLEVEL_OUTOF10: 6
PAINLEVEL_OUTOF10: 6
PAINLEVEL_OUTOF10: 7

## 2021-10-11 NOTE — ED PROVIDER NOTES
[address] [facility] ED  eMERGENCY dEPARTMENT eNCOUnter      Pt Name: Concepcion Kearns  MRN: [id number]  [place] 1945  Date of evaluation: 10/11/2021  Provider: Audrey Butler PA-C    CHIEF COMPLAINT       Chief Complaint   Patient presents with    Fall         HISTORY OF PRESENT ILLNESS   (Location/Symptom, Timing/Onset,Context/Setting, Quality, Duration, Modifying Factors, Severity)  Note limiting factors. Concepcion Kearns is a 66 y.o. male who presents to the emergency department patient fell at 3:00 this morning notes he was ambulatory yesterday was unable to get up legs are weak he does have a headache states positive loss of consciousness he has right shoulder pain worse with motion he does take blood thinners does have bruising has skin tear to right hand as well as right side of nose denies chest pain back pain abdominal pain. Symptoms are moderate to severe in severity touch motion worsens his symptoms reported crepitus by EMS upon arrival to right upper arm. HPI    NursingNotes were reviewed. REVIEW OF SYSTEMS    (2-9 systems for level 4, 10 or more for level 5)     Review of Systems   Constitutional: Negative for activity change, appetite change, fever and unexpected weight change. HENT: Negative for ear discharge, nosebleeds and voice change. Eyes: Negative for photophobia and discharge. Respiratory: Negative for apnea, cough and shortness of breath. Cardiovascular: Negative for chest pain. Gastrointestinal: Negative for abdominal distention, abdominal pain, anal bleeding, nausea and vomiting. Endocrine: Negative for cold intolerance, heat intolerance and polyphagia. Genitourinary: Negative for genital sores. Musculoskeletal: Negative for back pain, joint swelling and neck pain. Skin: Positive for color change and wound. Negative for pallor. Allergic/Immunologic: Negative for immunocompromised state.    Neurological: Positive for syncope, weakness and headaches. Negative for seizures, facial asymmetry and speech difficulty. Hematological: Does not bruise/bleed easily. Psychiatric/Behavioral: Negative for behavioral problems, self-injury and sleep disturbance. All other systems reviewed and are negative. Except as noted above the remainder of the review of systems was reviewed and negative. PAST MEDICAL HISTORY     Past Medical History:   Diagnosis Date    Alcohol abuse     quit drinking 8/18/21    CAD (coronary artery disease)     patient states no doctor has told him this / has 1 cardiac stent    Cancer ([facility] [address]     lung LLL    Chronic back pain     Chronic kidney disease     Chronic sinusitis     DJD (degenerative joint disease) of knee     left knee DJD    Elevated PSA     History of blood transfusion 1980's    History of inferior wall myocardial infarction 01/2016    1 cardiac stent    HTN (hypertension)     meds .  1 yr    Hyperlipidemia     meds > 12 yrs    NSTEMI (non-ST elevated myocardial infarction) ([facility] [address] [facility]     Smoker          SURGICALHISTORY       Past Surgical History:   Procedure Laterality Date    ABDOMEN SURGERY  1961    spleenectomy due to [address]  2009    lumbar disc OR    CARDIAC SURGERY      stents    CARPAL TUNNEL RELEASE Right 5/6/2019    RIGHT CARPAL TUNNEL RELEASE performed by Mercy Marcelino MD at [place] [id number] WITH STENT PLACEMENT  1/29/2016    EYE SURGERY      to have Phaco with IOL OU 2/2018    HERNIA REPAIR      JOINT REPLACEMENT Left 1994    LTHR    JOINT REPLACEMENT Right 2009    RTKR    KNEE SURGERY Right 2011    arthroscopy    LA MANIPULATN KNEE JT+ANESTHESIA Right 5/12/2017    RIGHT KNEE MANIPULATION UNDER ANESTHESIA performed by Oleg Rainey MD at [address] OFFICE/OUTPT VISIT,PROCEDURE ONLY Bilateral 12/29/2017    RIGHT AND BILATERAL L 4-5 LYSIS OF SCAR DISKECTOMY INTERBODY CAGE FUSION POSTEROLATERAL FUSION PEDICLE SCREWS performed by Eric Duron Jad Gibbs MD at [address] [place] [address] Blvd  2004    benign   [address],    TOTAL KNEE ARTHROPLASTY Right 3/24/2017    RIGHT  KNEE TOTAL ARTHROPLASTY, ELHAM CEMENTED PERSONA  performed by Neeta Noel MD at [facility]       Previous Medications    ALBUTEROL SULFATE  (90 BASE) MCG/ACT INHALER    Inhale 2 puffs into the lungs every 6 hours as needed for Wheezing    AMOXICILLIN-CLAVULANATE (AUGMENTIN) 875-125 MG PER TABLET    Take 1 tablet by mouth 2 times daily    ATORVASTATIN (LIPITOR) 20 MG TABLET    TAKE ONE TABLET BY MOUTH DAILY    BUMETANIDE (BUMEX) 1 MG TABLET    Take 1 tablet by mouth daily    CITALOPRAM (CELEXA) 40 MG TABLET    TAKE ONE TABLET BY MOUTH nightly    CLOPIDOGREL (PLAVIX) 75 MG TABLET    TAKE ONE TABLET BY MOUTH DAILY    DICLOFENAC (VOLTAREN) 50 MG EC TABLET    TAKE ONE TABLET BY MOUTH TWO TIMES A DAY    DOCUSATE CALCIUM PO    Take by mouth as needed    FINASTERIDE (PROSCAR) 5 MG TABLET    Take 1 tablet by mouth daily    FLUTICASONE FUROATE-VILANTEROL (BREO ELLIPTA) 200-25 MCG/INH AEPB INHALER    Inhale 1 puff into the lungs daily    ISOSORBIDE MONONITRATE (IMDUR) 30 MG EXTENDED RELEASE TABLET    TAKE ONE TABLET BY MOUTH EVERY DAY    METOPROLOL TARTRATE (LOPRESSOR) 50 MG TABLET    Take 0.5 tablets by mouth 2 times daily TAKE ONE TABLET BY MOUTH TWO TIMES A DAY    MORPHINE (MS CONTIN) 15 MG EXTENDED RELEASE TABLET    Take 15 mg by mouth as needed. NITROGLYCERIN (NITROSTAT) 0.4 MG SL TABLET    Place 1 tablet under the tongue every 5 minutes as needed for Chest pain up to max of 3 total doses. If no relief after 1 dose, call 911.     OMEPRAZOLE (PRILOSEC) 10 MG DELAYED RELEASE CAPSULE    TAKE ONE CAPSULE BY MOUTH DAILY    OXYCODONE-ACETAMINOPHEN (PERCOCET) 7.5-325 MG PER TABLET    TAKE ONE TABLET BY MOUTH THREE TIMES A DAY    SODIUM CHLORIDE, EXTERNAL, 0.9 % SOLN    Apply 1 Applicatorful topically daily    SULFAMETHOXAZOLE-TRIMETHOPRIM (BACTRIM DS) 800-160 MG PER TABLET    Take 1 tablet by mouth 2 times daily    TIOTROPIUM (SPIRIVA RESPIMAT) 2.5 MCG/ACT AERS INHALER    Inhale 2 puffs into the lungs daily            Patient has no known allergies. FAMILY HISTORY       Family History   Problem Relation Age of Onset    Osteoarthritis Mother     Emphysema Mother     Hypertension Father     Hearing Loss Father     Dementia Father     No Known Problems Sister     Prostate Cancer Brother     Colon Polyps Neg Hx           SOCIAL HISTORY       Social History     Socioeconomic History    Marital status:      Spouse name: None    Number of children: None    Years of education: None    Highest education level: None   Occupational History    Occupation: retired   Tobacco Use    Smoking status: Current Every Day Smoker     Packs/day: 0.50     Years: 60.00     Pack years: 30.00     Types: Cigarettes    Smokeless tobacco: Never Used    Tobacco comment: also smokes cigars   Vaping Use    Vaping Use: Never used   Substance and Sexual Activity    Alcohol use: Not Currently     Alcohol/week: 12.0 standard drinks     Types: 12 Shots of liquor per week     Comment: quit drinking aug 18th, 2021    Drug use: No    Sexual activity: Yes   Other Topics Concern    None   Social History Narrative    Lives alone     Social Determinants of Health     Financial Resource Strain: Low Risk     Difficulty of Paying Living Expenses: Not very hard   Food Insecurity: No Food Insecurity    Worried About Running Out of Food in the Last Year: Never true    Yoav of Food in the Last Year: Never true   Transportation Needs: No Transportation Needs    Lack of Transportation (Medical): No    Lack of Transportation (Non-Medical):  No   Physical Activity:     Days of Exercise per Week:     Minutes of Exercise per Session:    Stress:     Feeling of Stress :    Social Connections:     Frequency of Communication with Friends and Family:     Frequency of Social Gatherings with Friends and Family:     Attends Advent Services:     Active Member of Clubs or Organizations:     Attends Club or Organization Meetings:     Marital Status:    Intimate Partner Violence:     Fear of Current or Ex-Partner:     Emotionally Abused:     Physically Abused:     Sexually Abused:        SCREENINGS   Giovanni Coma Scale  Eye Opening: Spontaneous  Best Verbal Response: Oriented  Best Motor Response: Obeys commands  Giovanni Coma Scale Score: 15  Ebola Virus Disease (EVD) Screening   Temp: 98.2 °F (36.8 °C)  CIWA Assessment  BP: (!) 147/48  Pulse: 84    PHYSICAL EXAM    (up to 7 for level 4, 8 or more for level 5)     ED Triage Vitals   BP Temp Temp src Pulse Resp SpO2 Height Weight   -- -- -- -- -- -- -- --       Physical Exam  Vitals and nursing note reviewed. Constitutional:       General: He is not in acute distress. Appearance: He is well-developed. He is not ill-appearing. HENT:      Head: Normocephalic and atraumatic. Right Ear: Tympanic membrane and external ear normal. There is no impacted cerumen. Left Ear: Tympanic membrane and external ear normal. There is no impacted cerumen. Nose: Nose normal.      Mouth/Throat:      Mouth: Mucous membranes are moist.      Pharynx: No oropharyngeal exudate or posterior oropharyngeal erythema. Eyes:      General:         Right eye: No discharge. Left eye: No discharge. Pupils: Pupils are equal, round, and reactive to light. Cardiovascular:      Rate and Rhythm: Normal rate and regular rhythm. Pulses: Normal pulses. Heart sounds: Normal heart sounds. Pulmonary:      Effort: Pulmonary effort is normal. No respiratory distress. Breath sounds: Normal breath sounds. No stridor. No wheezing or rhonchi. Abdominal:      General: Bowel sounds are normal. There is no distension. Palpations: Abdomen is soft. Tenderness: There is abdominal tenderness.  There is no right CVA tenderness or left CVA tenderness. Comments: Diffuse tenderness. Musculoskeletal:         General: Tenderness and signs of injury present. Cervical back: Normal range of motion and neck supple. No rigidity or tenderness. Back:    Skin:     General: Skin is warm. Coloration: Skin is jaundiced. Findings: Bruising present. No erythema. Comments: Ecchymosis noted right shoulder anterior posterior as well as into humerus. Skin tear dorsum of hand negative tenderness positive bruising     Neurological:      Mental Status: He is alert and oriented to person, place, and time. Motor: Weakness present. Comments: 3 for 5 strength bilateral lower extremity   Psychiatric:         Mood and Affect: Mood normal.         RESULTS     EKG: All EKG's are interpreted by the Emergency Department Physician who either signs or Co-signsthis chart in the absence of a cardiologist.    KG normal sinus rhythm rate 85 neg ST segment elevation. RADIOLOGY:   Non-plain filmimages such as CT, Ultrasound and MRI are read by the radiologist. Plain radiographic images are visualized and preliminarily interpreted by the emergency physician with the below findings:    See rad reports    Interpretation per the Radiologist below, if available at the time ofthis note:    CT Head WO Contrast   Final Result   Impression:      Mild cerebral atrophy. Chronic ischemic white matter disease. All CT scans at this facility use dose modulation, iterative reconstruction, and/or weight based dosing when appropriate to reduce radiation dose to as low as reasonably achievable. CT CERVICAL SPINE WO CONTRAST   Final Result      No fracture. Severe degenerative change cervical spine. Grade 1 C7 spondylolisthesis, unchanged. Bilateral carotid artery calcification. If clinical concern warrants, carotid sonography may be utilized for further evaluation.          All CT scans at this facility use dose nodes.    Mesenteric:  No enlarged mesenteric lymph nodes. Pelvic: No enlarged pelvic lymph nodes. Ureters: Normal in course and caliber. No calcifications. Bladder: No wall thickening. Reproductive organs: No pelvic masses. Abdominal Wall:      Increased attenuation measuring 4.5 x 4.6 x 8.2 cm within right pelvic soft tissue (series 2, image 89). Bones:  No bone lesions. Lumbar scoliosis convexity to left apex L3. Remote internal fixation L4, and L5. Remote left hip replacement. IMPRESSION:      Soft tissue hematoma, right pelvis, measuring 4.5 x 4.6 x 8.2 cm. Splenectomy. No change, exophytic, calcified gastric nodule as discussed. Remote internal fixation L4 and L5. Left hip replacement. All CT scans at this facility use dose modulation, iterative reconstruction, and/or weight based dosing when appropriate to reduce radiation dose to as low as reasonably achievable. 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 No acute fractures. Remote fractures left sixth through eighth ribs            CT OF THE ABDOMEN AND PELVIS WITH INTRAVENOUS CONTRAST MEDIUM. FINDINGS:         Liver:  Normal in size, shape, and attenuation. Bile Ducts:  Normal in caliber. Gallbladder:  No stones or wall thickening. Pancreas:  Normal without masses, cysts, ductal dilatation or calcification. Spleen:  Surgically absent. Kidneys:  Normal in size. No hydronephrosis, masses, or stones. Mild bilateral perinephric fat stranding. Adrenals:  Normal.       Stomach: Exophytic, calcified 1.6 cm nodule, gastric cardia again identified, unchanged. Small bowel:  Normal in caliber. Appendix:  Normal.       Colon:  Normal in caliber.        Peritoneum:  No ascites, free air, or fluid collections. Vessels: Aorta normal in course and caliber. Lymph nodes:        Retroperitoneal:  No enlarged retroperitoneal lymph nodes. Mesenteric:  No enlarged mesenteric lymph nodes. Pelvic: No enlarged pelvic lymph nodes. Ureters: Normal in course and caliber. No calcifications. Bladder: No wall thickening. Reproductive organs: No pelvic masses. Abdominal Wall:      Increased attenuation measuring 4.5 x 4.6 x 8.2 cm within right pelvic soft tissue (series 2, image 89). Bones:  No bone lesions. Lumbar scoliosis convexity to left apex L3. Remote internal fixation L4, and L5. Remote left hip replacement. IMPRESSION:      Soft tissue hematoma, right pelvis, measuring 4.5 x 4.6 x 8.2 cm. Splenectomy. No change, exophytic, calcified gastric nodule as discussed. Remote internal fixation L4 and L5. Left hip replacement. All CT scans at this facility use dose modulation, iterative reconstruction, and/or weight based dosing when appropriate to reduce radiation dose to as low as reasonably achievable. CT HUMERUS RIGHT WO CONTRAST    (Results Pending)   XR HAND RIGHT (MIN 3 VIEWS)    (Results Pending)         ED BEDSIDE ULTRASOUND:   Performed by ED Physician - none    LABS:  Labs Reviewed   COMPREHENSIVE METABOLIC PANEL - Abnormal; Notable for the following components:       Result Value    Sodium 122 (*)     Potassium 5.9 (*)     Chloride 92 (*)     CO2 16 (*)     Glucose 147 (*)     BUN 25 (*)     CREATININE 1.73 (*)     GFR Non- 38.4 (*)     GFR  46.5 (*)     All other components within normal limits   CK - Abnormal; Notable for the following components:     Total  (*)     All other components within normal limits   CBC WITH AUTO DIFFERENTIAL - Abnormal; Notable for the following components:    WBC 16.3 (*)     RBC 2.73 (*)     Hemoglobin 8.6 (*)     Hematocrit 25.8 (*)     MCH 31.7 (*) Neutrophils Absolute 15.1 (*)     Lymphocytes Absolute 0.5 (*)     All other components within normal limits    Narrative:     PREVIOUS TEST Rejected: Quantity not sufficient 10/11/2021  19:20 CALLED   TO [facility] IN ER FOR RECOLLECT. THIS IS A REDRAW   CKMB & RELATIVE PERCENT - Abnormal; Notable for the following components:    CK-MB 7.8 (*)     All other components within normal limits   POCT CREATININE - URINE - Normal   ETHANOL   TROPONIN   SPECIMEN REJECTION   PROTIME-INR    Narrative:     PREVIOUS TEST Rejected: Quantity not sufficient 10/11/2021  19:20 CALLED   TO [facility] ER FOR RECOLLECT. THIS IS A REDRAW   APTT    Narrative:     PREVIOUS TEST Rejected: Quantity not sufficient 10/11/2021  19:20 CALLED   TO [facility] IN ER FOR RECOLLECT. THIS IS A REDRAW   URINE RT REFLEX TO CULTURE       All other labs were within normal range or not returned as of this dictation. EMERGENCY DEPARTMENT COURSE and DIFFERENTIAL DIAGNOSIS/MDM:   Vitals:    Vitals:    10/11/21 1835 10/11/21 1945 10/11/21 2000 10/11/21 2003   BP: (!) 143/51 129/62 (!) 147/48 (!) 147/48   Pulse: 81 83 92 84   Resp: 18 17 21    Temp: 98.2 °F (36.8 °C)      SpO2: 96% 98% 100%    Weight: 150 lb (68 kg)      Height: 5' 6\" (1.676 m)               MDM  Number of Diagnoses or Management Options  Abdominal pain, unspecified abdominal location  Acute bilateral thoracic back pain  Closed fracture of proximal end of right humerus, unspecified fracture morphology, initial encounter  Closed nondisplaced fracture of distal phalanx of right thumb, initial encounter  Fall, initial encounter  General weakness  Hyperkalemia  Hyponatremia  Injury of head, initial encounter  Skin tear of right elbow without complication, initial encounter  Diagnosis management comments: Patient has history of lung cancer which is recurred he is awaiting radiation therapy on it.   Sodium 122 consulted trauma service Dr. Tatiana Lopez reviewed numerous notes fracture no other fractures noted on CT reports will admit patient to hospitalist with orthopedic consult    Family note they see [id number] orthopedics they request [id number] orthopedics for Ortho consult. Discussed with Dr. Torey West will admit       Amount and/or Complexity of Data Reviewed  Clinical lab tests: reviewed and ordered  Tests in the radiology section of CPT®: ordered        CRITICAL CARE TIME   Total Critical Care time was 31 minutes, excluding separately reportableprocedures. There was a high probability of clinicallysignificant/life threatening deterioration in the patient's condition which required my urgent intervention. CONSULTS:  None    PROCEDURES:  Unless otherwise noted below, none     Procedures    FINAL IMPRESSION      1. Injury of head, initial encounter    2. Fall, initial encounter    3. Hyponatremia    4. Closed fracture of proximal end of right humerus, unspecified fracture morphology, initial encounter    5. Skin tear of right elbow without complication, initial encounter    6. Closed nondisplaced fracture of distal phalanx of right thumb, initial encounter    7. General weakness    8. Acute bilateral thoracic back pain    9. Abdominal pain, unspecified abdominal location    10. Hyperkalemia    11. KEILY (acute kidney injury) ([facility] [address]          DISPOSITION/PLAN   DISPOSITION        PATIENT REFERRED TO:  No follow-up provider specified.     DISCHARGE MEDICATIONS:  New Prescriptions    No medications on file          (Please note that portions of this note were completed with a voice recognition program.  Efforts were made to edit the dictations but occasionally words are mis-transcribed.)    Kelli Gusman PA-C (electronically signed)  Attending Emergency Physician       Kelli Gusman PA-C  10/11/21 2017       Kelli Gusman PA-C  10/11/21 2047

## 2021-10-11 NOTE — ED NOTES
Bed: 24  Expected date:   Expected time:   Means of arrival:   Comments:  66 male fall at 0300 last night.  Rt shoulder pain.vss. unknown thinners iv hl

## 2021-10-11 NOTE — ED TRIAGE NOTES
Pt presents after fall this morning, history of frequent falling. Denies LOC but was down for a period of time. Pt presents with swelling, bruising and crepitus to the right shoulder and abrasion to the right hand.

## 2021-10-11 NOTE — ED PROVIDER NOTES
[facility] [facility] ED  eMERGENCY dEPARTMENT eNCOUnter      Pt Name: Georgie Bird  MRN: [id number]  [place] 1945  Date of evaluation: 10/11/2021  Provider: Jason Muir PA-C    CHIEF COMPLAINT       Chief Complaint   Patient presents with    Fall         HISTORY OF PRESENT ILLNESS   (Location/Symptom, Timing/Onset,Context/Setting, Quality, Duration, Modifying Factors, Severity)  Note limiting factors. Georgie Bird is a 66 y.o. male who presents to the emergency department patient fell at 3:00 this morning notes he was ambulatory yesterday was unable to get up legs are weak he does have a headache states positive loss of consciousness he has right shoulder pain worse with motion he does take blood thinners does have bruising has skin tear to right hand as well as right side of nose denies chest pain back pain abdominal pain. Symptoms are moderate to severe in severity touch motion worsens his symptoms reported crepitus by EMS upon arrival to right upper arm. HPI    NursingNotes were reviewed. REVIEW OF SYSTEMS    (2-9 systems for level 4, 10 or more for level 5)     Review of Systems   Constitutional: Negative for activity change, appetite change, fever and unexpected weight change. HENT: Negative for ear discharge, nosebleeds and voice change. Eyes: Negative for photophobia and discharge. Respiratory: Negative for apnea, cough and shortness of breath. Cardiovascular: Negative for chest pain. Gastrointestinal: Negative for abdominal distention, abdominal pain, anal bleeding, nausea and vomiting. Endocrine: Negative for cold intolerance, heat intolerance and polyphagia. Genitourinary: Negative for genital sores. Musculoskeletal: Negative for back pain, joint swelling and neck pain. Skin: Positive for color change and wound. Negative for pallor. Allergic/Immunologic: Negative for immunocompromised state.    Neurological: Positive for syncope, weakness and headaches. Negative for seizures, facial asymmetry and speech difficulty. Hematological: Does not bruise/bleed easily. Psychiatric/Behavioral: Negative for behavioral problems, self-injury and sleep disturbance. All other systems reviewed and are negative. Except as noted above the remainder of the review of systems was reviewed and negative. PAST MEDICAL HISTORY     Past Medical History:   Diagnosis Date    Alcohol abuse     quit drinking 8/18/21    CAD (coronary artery disease)     patient states no doctor has told him this / has 1 cardiac stent    Cancer ([facility] [address]     lung LLL    Chronic back pain     Chronic kidney disease     Chronic sinusitis     DJD (degenerative joint disease) of knee     left knee DJD    Elevated PSA     History of blood transfusion 1980's    History of inferior wall myocardial infarction 01/2016    1 cardiac stent    HTN (hypertension)     meds .  1 yr    Hyperlipidemia     meds > 12 yrs    NSTEMI (non-ST elevated myocardial infarction) ([facility] [address]     Smoker          SURGICALHISTORY       Past Surgical History:   Procedure Laterality Date    ABDOMEN SURGERY  1961    spleenectomy due to [address]  2009    lumbar disc OR    CARDIAC SURGERY      stents    CARPAL TUNNEL RELEASE Right 5/6/2019    RIGHT CARPAL TUNNEL RELEASE performed by Tony Perrin MD at [place] [address] WITH STENT PLACEMENT  1/29/2016    EYE SURGERY      to have Phaco with IOL OU 2/2018    HERNIA REPAIR      JOINT REPLACEMENT Left 1994    LTHR    JOINT REPLACEMENT Right 2009    RTKR    KNEE SURGERY Right 2011    arthroscopy    [place] MANIPULATN KNEE JT+ANESTHESIA Right 5/12/2017    RIGHT KNEE MANIPULATION UNDER ANESTHESIA performed by Elizabeth Tolbert MD at [address] OFFICE/OUTPT VISIT,PROCEDURE ONLY Bilateral 12/29/2017    RIGHT AND BILATERAL L 4-5 LYSIS OF SCAR DISKECTOMY INTERBODY CAGE FUSION POSTEROLATERAL FUSION PEDICLE SCREWS performed by Rashad Bryant Jad Gibbs MD at [address] [place] [address] Blvd  2004    benign   [address],    TOTAL KNEE ARTHROPLASTY Right 3/24/2017    RIGHT  KNEE TOTAL ARTHROPLASTY, ELHAM CEMENTED PERSONA  performed by Neeta Noel MD at [facility]       Previous Medications    ALBUTEROL SULFATE  (90 BASE) MCG/ACT INHALER    Inhale 2 puffs into the lungs every 6 hours as needed for Wheezing    AMOXICILLIN-CLAVULANATE (AUGMENTIN) 875-125 MG PER TABLET    Take 1 tablet by mouth 2 times daily    ATORVASTATIN (LIPITOR) 20 MG TABLET    TAKE ONE TABLET BY MOUTH DAILY    BUMETANIDE (BUMEX) 1 MG TABLET    Take 1 tablet by mouth daily    CITALOPRAM (CELEXA) 40 MG TABLET    TAKE ONE TABLET BY MOUTH nightly    CLOPIDOGREL (PLAVIX) 75 MG TABLET    TAKE ONE TABLET BY MOUTH DAILY    DICLOFENAC (VOLTAREN) 50 MG EC TABLET    TAKE ONE TABLET BY MOUTH TWO TIMES A DAY    DOCUSATE CALCIUM PO    Take by mouth as needed    FINASTERIDE (PROSCAR) 5 MG TABLET    Take 1 tablet by mouth daily    FLUTICASONE FUROATE-VILANTEROL (BREO ELLIPTA) 200-25 MCG/INH AEPB INHALER    Inhale 1 puff into the lungs daily    ISOSORBIDE MONONITRATE (IMDUR) 30 MG EXTENDED RELEASE TABLET    TAKE ONE TABLET BY MOUTH EVERY DAY    METOPROLOL TARTRATE (LOPRESSOR) 50 MG TABLET    Take 0.5 tablets by mouth 2 times daily TAKE ONE TABLET BY MOUTH TWO TIMES A DAY    MORPHINE (MS CONTIN) 15 MG EXTENDED RELEASE TABLET    Take 15 mg by mouth as needed. NITROGLYCERIN (NITROSTAT) 0.4 MG SL TABLET    Place 1 tablet under the tongue every 5 minutes as needed for Chest pain up to max of 3 total doses. If no relief after 1 dose, call 911.     OMEPRAZOLE (PRILOSEC) 10 MG DELAYED RELEASE CAPSULE    TAKE ONE CAPSULE BY MOUTH DAILY    OXYCODONE-ACETAMINOPHEN (PERCOCET) 7.5-325 MG PER TABLET    TAKE ONE TABLET BY MOUTH THREE TIMES A DAY    SODIUM CHLORIDE, EXTERNAL, 0.9 % SOLN    Apply 1 Applicatorful topically daily    SULFAMETHOXAZOLE-TRIMETHOPRIM (BACTRIM DS) 800-160 MG PER TABLET    Take 1 tablet by mouth 2 times daily    TIOTROPIUM (SPIRIVA RESPIMAT) 2.5 MCG/ACT AERS INHALER    Inhale 2 puffs into the lungs daily            Patient has no known allergies. FAMILY HISTORY       Family History   Problem Relation Age of Onset    Osteoarthritis Mother     Emphysema Mother     Hypertension Father     Hearing Loss Father     Dementia Father     No Known Problems Sister     Prostate Cancer Brother     Colon Polyps Neg Hx           SOCIAL HISTORY       Social History     Socioeconomic History    Marital status:      Spouse name: None    Number of children: None    Years of education: None    Highest education level: None   Occupational History    Occupation: retired   Tobacco Use    Smoking status: Current Every Day Smoker     Packs/day: 0.50     Years: 60.00     Pack years: 30.00     Types: Cigarettes    Smokeless tobacco: Never Used    Tobacco comment: also smokes cigars   Vaping Use    Vaping Use: Never used   Substance and Sexual Activity    Alcohol use: Not Currently     Alcohol/week: 12.0 standard drinks     Types: 12 Shots of liquor per week     Comment: quit drinking aug 18th, 2021    Drug use: No    Sexual activity: Yes   Other Topics Concern    None   Social History Narrative    Lives alone     Social Determinants of Health     Financial Resource Strain: Low Risk     Difficulty of Paying Living Expenses: Not very hard   Food Insecurity: No Food Insecurity    Worried About Running Out of Food in the Last Year: Never true    Yoav of Food in the Last Year: Never true   Transportation Needs: No Transportation Needs    Lack of Transportation (Medical): No    Lack of Transportation (Non-Medical):  No   Physical Activity:     Days of Exercise per Week:     Minutes of Exercise per Session:    Stress:     Feeling of Stress :    Social Connections:     Frequency of Communication with Friends and Family:     Frequency of Social Gatherings with Friends and Family:     Attends Mosque Services:     Active Member of Clubs or Organizations:     Attends Club or Organization Meetings:     Marital Status:    Intimate Partner Violence:     Fear of Current or Ex-Partner:     Emotionally Abused:     Physically Abused:     Sexually Abused:        SCREENINGS   Giovanni Coma Scale  Eye Opening: Spontaneous  Best Verbal Response: Oriented  Best Motor Response: Obeys commands  Giovanni Coma Scale Score: 15  Ebola Virus Disease (EVD) Screening   Temp: 98.2 °F (36.8 °C)  CIWA Assessment  BP: (!) 147/48  Pulse: 84    PHYSICAL EXAM    (up to 7 for level 4, 8 or more for level 5)     ED Triage Vitals   BP Temp Temp src Pulse Resp SpO2 Height Weight   -- -- -- -- -- -- -- --       Physical Exam  Vitals and nursing note reviewed. Constitutional:       General: He is not in acute distress. Appearance: He is well-developed. He is not ill-appearing. HENT:      Head: Normocephalic and atraumatic. Right Ear: Tympanic membrane and external ear normal. There is no impacted cerumen. Left Ear: Tympanic membrane and external ear normal. There is no impacted cerumen. Nose: Nose normal.      Mouth/Throat:      Mouth: Mucous membranes are moist.      Pharynx: No oropharyngeal exudate or posterior oropharyngeal erythema. Eyes:      General:         Right eye: No discharge. Left eye: No discharge. Pupils: Pupils are equal, round, and reactive to light. Cardiovascular:      Rate and Rhythm: Normal rate and regular rhythm. Pulses: Normal pulses. Heart sounds: Normal heart sounds. Pulmonary:      Effort: Pulmonary effort is normal. No respiratory distress. Breath sounds: Normal breath sounds. No stridor. No wheezing or rhonchi. Abdominal:      General: Bowel sounds are normal. There is no distension. Palpations: Abdomen is soft. Tenderness: There is abdominal tenderness.  There is no right CVA tenderness or left CVA tenderness. Comments: Diffuse tenderness. Musculoskeletal:         General: Tenderness and signs of injury present. Cervical back: Normal range of motion and neck supple. No rigidity or tenderness. Back:    Skin:     General: Skin is warm. Coloration: Skin is jaundiced. Findings: Bruising present. No erythema. Comments: Ecchymosis noted right shoulder anterior posterior as well as into humerus. Skin tear dorsum of hand negative tenderness positive bruising     Neurological:      Mental Status: He is alert and oriented to person, place, and time. Motor: Weakness present. Comments: 3 for 5 strength bilateral lower extremity   Psychiatric:         Mood and Affect: Mood normal.         RESULTS     EKG: All EKG's are interpreted by the Emergency Department Physician who either signs or Co-signsthis chart in the absence of a cardiologist.         RADIOLOGY:   Yolanda Mass such as CT, Ultrasound and MRI are read by the radiologist. Basia Maldonado radiographic images are visualized and preliminarily interpreted by the emergency physician with the below findings:    See rad reports    Interpretation per the Radiologist below, if available at the time ofthis note:    CT Head WO Contrast   Final Result   Impression:      Mild cerebral atrophy. Chronic ischemic white matter disease. All CT scans at this facility use dose modulation, iterative reconstruction, and/or weight based dosing when appropriate to reduce radiation dose to as low as reasonably achievable. CT CERVICAL SPINE WO CONTRAST   Final Result      No fracture. Severe degenerative change cervical spine. Grade 1 C7 spondylolisthesis, unchanged. Bilateral carotid artery calcification. If clinical concern warrants, carotid sonography may be utilized for further evaluation.          All CT scans at this facility use dose modulation, iterative reconstruction, and/or weight based Lymph nodes:        Retroperitoneal:  No enlarged retroperitoneal lymph nodes. Mesenteric:  No enlarged mesenteric lymph nodes. Pelvic: No enlarged pelvic lymph nodes. Ureters: Normal in course and caliber. No calcifications. Bladder: No wall thickening. Reproductive organs: No pelvic masses. Abdominal Wall:      Increased attenuation measuring 4.5 x 4.6 x 8.2 cm within right pelvic soft tissue (series 2, image 89). Bones:  No bone lesions. Lumbar scoliosis convexity to left apex L3. Remote internal fixation L4, and L5. Remote left hip replacement. IMPRESSION:      Soft tissue hematoma, right pelvis, measuring 4.5 x 4.6 x 8.2 cm. Splenectomy. No change, exophytic, calcified gastric nodule as discussed. Remote internal fixation L4 and L5. Left hip replacement. All CT scans at this facility use dose modulation, iterative reconstruction, and/or weight based dosing when appropriate to reduce radiation dose to as low as reasonably achievable. CT HUMERUS RIGHT WO CONTRAST    (Results Pending)   XR HAND RIGHT (MIN 3 VIEWS)    (Results Pending)         ED BEDSIDE ULTRASOUND:   Performed by ED Physician - none    LABS:  Labs Reviewed   COMPREHENSIVE METABOLIC PANEL - Abnormal; Notable for the following components:       Result Value    Sodium 122 (*)     Potassium 5.9 (*)     Chloride 92 (*)     CO2 16 (*)     Glucose 147 (*)     BUN 25 (*)     CREATININE 1.73 (*)     GFR Non- 38.4 (*)     GFR  46.5 (*)     All other components within normal limits   CK - Abnormal; Notable for the following components:     Total  (*)     All other components within normal limits   CBC WITH AUTO DIFFERENTIAL - Abnormal; Notable for the following components:    WBC 16.3 (*)     RBC 2.73 (*)     Hemoglobin 8.6 (*)     Hematocrit 25.8 (*)     MCH 31.7 (*)     Neutrophils Absolute 15.1 (*)     Lymphocytes Absolute 0.5 (*) hospitalist with orthopedic consult    Family note they see [facility] orthopedics they request [facility] orthopedics for Ortho consult. Discussed with Dr. Marta Montero will admit       Amount and/or Complexity of Data Reviewed  Clinical lab tests: reviewed and ordered  Tests in the radiology section of CPT®: ordered        CRITICAL CARE TIME   Total Critical Care time was 31 minutes, excluding separately reportableprocedures. There was a high probability of clinicallysignificant/life threatening deterioration in the patient's condition which required my urgent intervention. CONSULTS:  None    PROCEDURES:  Unless otherwise noted below, none     Procedures    FINAL IMPRESSION      1. Injury of head, initial encounter    2. Fall, initial encounter    3. Hyponatremia    4. Closed fracture of proximal end of right humerus, unspecified fracture morphology, initial encounter    5. Skin tear of right elbow without complication, initial encounter    6. Closed nondisplaced fracture of distal phalanx of right thumb, initial encounter    7. General weakness    8. Acute bilateral thoracic back pain    9. Abdominal pain, unspecified abdominal location    10. Hyperkalemia    11. KEILY (acute kidney injury) ([facility] [address]          DISPOSITION/PLAN   DISPOSITION        PATIENT REFERRED TO:  No follow-up provider specified.     DISCHARGE MEDICATIONS:  New Prescriptions    No medications on file          (Please note that portions of this note were completed with a voice recognition program.  Efforts were made to edit the dictations but occasionally words are mis-transcribed.)    Ramon Coker PA-C (electronically signed)  Attending Emergency Physician       Ramon Coker PA-C  10/11/21 2017       Ramon Coker PA-C  10/11/21 2047

## 2021-10-12 PROBLEM — M86.272 SUBACUTE OSTEOMYELITIS, LEFT ANKLE AND FOOT (HCC): Status: ACTIVE | Noted: 2021-08-17

## 2021-10-12 LAB
ANION GAP SERPL CALCULATED.3IONS-SCNC: 10 MEQ/L (ref 9–15)
ANION GAP SERPL CALCULATED.3IONS-SCNC: 12 MEQ/L (ref 9–15)
BUN BLDV-MCNC: 19 MG/DL (ref 8–23)
BUN BLDV-MCNC: 23 MG/DL (ref 8–23)
C-REACTIVE PROTEIN, HIGH SENSITIVITY: 117.6 MG/L (ref 0–5)
CALCIUM SERPL-MCNC: 8.4 MG/DL (ref 8.5–9.9)
CALCIUM SERPL-MCNC: 8.6 MG/DL (ref 8.5–9.9)
CHLORIDE BLD-SCNC: 102 MEQ/L (ref 95–107)
CHLORIDE BLD-SCNC: 97 MEQ/L (ref 95–107)
CO2: 18 MEQ/L (ref 20–31)
CO2: 18 MEQ/L (ref 20–31)
CORTISOL - AM: 18.4 UG/DL (ref 6.2–19.4)
CREAT SERPL-MCNC: 1.2 MG/DL (ref 0.7–1.2)
CREAT SERPL-MCNC: 1.25 MG/DL (ref 0.7–1.2)
EKG ATRIAL RATE: 85 BPM
EKG P AXIS: 8 DEGREES
EKG P-R INTERVAL: 140 MS
EKG Q-T INTERVAL: 376 MS
EKG QRS DURATION: 82 MS
EKG QTC CALCULATION (BAZETT): 447 MS
EKG R AXIS: 50 DEGREES
EKG T AXIS: 38 DEGREES
EKG VENTRICULAR RATE: 85 BPM
GFR AFRICAN AMERICAN: >60
GFR AFRICAN AMERICAN: >60
GFR NON-AFRICAN AMERICAN: 55.9
GFR NON-AFRICAN AMERICAN: 58.6
GLUCOSE BLD-MCNC: 110 MG/DL (ref 70–99)
GLUCOSE BLD-MCNC: 94 MG/DL (ref 70–99)
HCT VFR BLD CALC: 21.6 % (ref 42–52)
HCT VFR BLD CALC: 22.1 % (ref 42–52)
HEMOGLOBIN: 7.3 G/DL (ref 14–18)
HEMOGLOBIN: 7.4 G/DL (ref 14–18)
POTASSIUM SERPL-SCNC: 5.5 MEQ/L (ref 3.4–4.9)
POTASSIUM SERPL-SCNC: 5.9 MEQ/L (ref 3.4–4.9)
SODIUM BLD-SCNC: 125 MEQ/L (ref 135–144)
SODIUM BLD-SCNC: 132 MEQ/L (ref 135–144)

## 2021-10-12 PROCEDURE — 94761 N-INVAS EAR/PLS OXIMETRY MLT: CPT

## 2021-10-12 PROCEDURE — 99222 1ST HOSP IP/OBS MODERATE 55: CPT | Performed by: INTERNAL MEDICINE

## 2021-10-12 PROCEDURE — 86141 C-REACTIVE PROTEIN HS: CPT

## 2021-10-12 PROCEDURE — 23600 CLTX PROX HUMRL FX W/O MNPJ: CPT | Performed by: ORTHOPAEDIC SURGERY

## 2021-10-12 PROCEDURE — 85652 RBC SED RATE AUTOMATED: CPT

## 2021-10-12 PROCEDURE — 82024 ASSAY OF ACTH: CPT

## 2021-10-12 PROCEDURE — 94664 DEMO&/EVAL PT USE INHALER: CPT

## 2021-10-12 PROCEDURE — 93010 ELECTROCARDIOGRAM REPORT: CPT | Performed by: INTERNAL MEDICINE

## 2021-10-12 PROCEDURE — 87040 BLOOD CULTURE FOR BACTERIA: CPT

## 2021-10-12 PROCEDURE — 6370000000 HC RX 637 (ALT 250 FOR IP): Performed by: INTERNAL MEDICINE

## 2021-10-12 PROCEDURE — 6370000000 HC RX 637 (ALT 250 FOR IP): Performed by: ORTHOPAEDIC SURGERY

## 2021-10-12 PROCEDURE — 80048 BASIC METABOLIC PNL TOTAL CA: CPT

## 2021-10-12 PROCEDURE — 99221 1ST HOSP IP/OBS SF/LOW 40: CPT | Performed by: ORTHOPAEDIC SURGERY

## 2021-10-12 PROCEDURE — 36415 COLL VENOUS BLD VENIPUNCTURE: CPT

## 2021-10-12 PROCEDURE — 99223 1ST HOSP IP/OBS HIGH 75: CPT | Performed by: INTERNAL MEDICINE

## 2021-10-12 PROCEDURE — 1210000000 HC MED SURG R&B

## 2021-10-12 PROCEDURE — 99222 1ST HOSP IP/OBS MODERATE 55: CPT | Performed by: PSYCHIATRY & NEUROLOGY

## 2021-10-12 PROCEDURE — 2580000003 HC RX 258: Performed by: INTERNAL MEDICINE

## 2021-10-12 PROCEDURE — 6360000002 HC RX W HCPCS: Performed by: INTERNAL MEDICINE

## 2021-10-12 PROCEDURE — 82533 TOTAL CORTISOL: CPT

## 2021-10-12 PROCEDURE — 6370000000 HC RX 637 (ALT 250 FOR IP): Performed by: PHYSICIAN ASSISTANT

## 2021-10-12 PROCEDURE — 85018 HEMOGLOBIN: CPT

## 2021-10-12 PROCEDURE — APPSS45 APP SPLIT SHARED TIME 31-45 MINUTES: Performed by: STUDENT IN AN ORGANIZED HEALTH CARE EDUCATION/TRAINING PROGRAM

## 2021-10-12 PROCEDURE — 97167 OT EVAL HIGH COMPLEX 60 MIN: CPT

## 2021-10-12 PROCEDURE — 94640 AIRWAY INHALATION TREATMENT: CPT

## 2021-10-12 PROCEDURE — 85014 HEMATOCRIT: CPT

## 2021-10-12 PROCEDURE — 2580000003 HC RX 258: Performed by: NURSE PRACTITIONER

## 2021-10-12 PROCEDURE — 6370000000 HC RX 637 (ALT 250 FOR IP): Performed by: NURSE PRACTITIONER

## 2021-10-12 RX ORDER — FINASTERIDE 5 MG/1
5 TABLET, FILM COATED ORAL DAILY
Status: DISCONTINUED | OUTPATIENT
Start: 2021-10-12 | End: 2021-10-15 | Stop reason: HOSPADM

## 2021-10-12 RX ORDER — OXYCODONE AND ACETAMINOPHEN 7.5; 325 MG/1; MG/1
1 TABLET ORAL EVERY 4 HOURS PRN
Status: DISCONTINUED | OUTPATIENT
Start: 2021-10-12 | End: 2021-10-13

## 2021-10-12 RX ORDER — OXYCODONE HYDROCHLORIDE AND ACETAMINOPHEN 5; 325 MG/1; MG/1
1 TABLET ORAL EVERY 8 HOURS PRN
Status: DISCONTINUED | OUTPATIENT
Start: 2021-10-12 | End: 2021-10-12

## 2021-10-12 RX ORDER — BUDESONIDE AND FORMOTEROL FUMARATE DIHYDRATE 160; 4.5 UG/1; UG/1
2 AEROSOL RESPIRATORY (INHALATION) 2 TIMES DAILY
Status: DISCONTINUED | OUTPATIENT
Start: 2021-10-12 | End: 2021-10-15 | Stop reason: HOSPADM

## 2021-10-12 RX ORDER — ONDANSETRON 4 MG/1
4 TABLET, ORALLY DISINTEGRATING ORAL EVERY 8 HOURS PRN
Status: DISCONTINUED | OUTPATIENT
Start: 2021-10-12 | End: 2021-10-15 | Stop reason: HOSPADM

## 2021-10-12 RX ORDER — ALBUTEROL SULFATE 90 UG/1
2 AEROSOL, METERED RESPIRATORY (INHALATION) EVERY 6 HOURS PRN
Status: DISCONTINUED | OUTPATIENT
Start: 2021-10-12 | End: 2021-10-12

## 2021-10-12 RX ORDER — TRAMADOL HYDROCHLORIDE 50 MG/1
25 TABLET ORAL EVERY 4 HOURS PRN
Status: DISCONTINUED | OUTPATIENT
Start: 2021-10-12 | End: 2021-10-12

## 2021-10-12 RX ORDER — PANTOPRAZOLE SODIUM 40 MG/1
40 TABLET, DELAYED RELEASE ORAL
Status: DISCONTINUED | OUTPATIENT
Start: 2021-10-13 | End: 2021-10-15 | Stop reason: HOSPADM

## 2021-10-12 RX ORDER — TRAMADOL HYDROCHLORIDE 50 MG/1
50 TABLET ORAL EVERY 4 HOURS PRN
Status: DISCONTINUED | OUTPATIENT
Start: 2021-10-12 | End: 2021-10-12

## 2021-10-12 RX ORDER — ACETAMINOPHEN 500 MG
1000 TABLET ORAL EVERY 8 HOURS SCHEDULED
Status: DISCONTINUED | OUTPATIENT
Start: 2021-10-12 | End: 2021-10-13

## 2021-10-12 RX ORDER — CITALOPRAM 10 MG/1
40 TABLET ORAL DAILY
Status: DISCONTINUED | OUTPATIENT
Start: 2021-10-12 | End: 2021-10-15 | Stop reason: HOSPADM

## 2021-10-12 RX ORDER — SODIUM CHLORIDE 9 MG/ML
INJECTION, SOLUTION INTRAVENOUS CONTINUOUS
Status: DISCONTINUED | OUTPATIENT
Start: 2021-10-12 | End: 2021-10-15

## 2021-10-12 RX ORDER — ATORVASTATIN CALCIUM 20 MG/1
20 TABLET, FILM COATED ORAL NIGHTLY
Status: DISCONTINUED | OUTPATIENT
Start: 2021-10-12 | End: 2021-10-15 | Stop reason: HOSPADM

## 2021-10-12 RX ORDER — ALBUTEROL SULFATE 90 UG/1
2 AEROSOL, METERED RESPIRATORY (INHALATION) EVERY 4 HOURS PRN
Status: DISCONTINUED | OUTPATIENT
Start: 2021-10-12 | End: 2021-10-15 | Stop reason: HOSPADM

## 2021-10-12 RX ORDER — SODIUM CHLORIDE 0.9 % (FLUSH) 0.9 %
5-40 SYRINGE (ML) INJECTION PRN
Status: DISCONTINUED | OUTPATIENT
Start: 2021-10-12 | End: 2021-10-15 | Stop reason: HOSPADM

## 2021-10-12 RX ORDER — POLYETHYLENE GLYCOL 3350 17 G/17G
17 POWDER, FOR SOLUTION ORAL DAILY PRN
Status: DISCONTINUED | OUTPATIENT
Start: 2021-10-12 | End: 2021-10-15 | Stop reason: HOSPADM

## 2021-10-12 RX ORDER — SODIUM CHLORIDE 0.9 % (FLUSH) 0.9 %
5-40 SYRINGE (ML) INJECTION EVERY 12 HOURS SCHEDULED
Status: DISCONTINUED | OUTPATIENT
Start: 2021-10-12 | End: 2021-10-15 | Stop reason: HOSPADM

## 2021-10-12 RX ORDER — SODIUM CHLORIDE 9 MG/ML
25 INJECTION, SOLUTION INTRAVENOUS PRN
Status: DISCONTINUED | OUTPATIENT
Start: 2021-10-12 | End: 2021-10-15 | Stop reason: HOSPADM

## 2021-10-12 RX ORDER — ONDANSETRON 2 MG/ML
4 INJECTION INTRAMUSCULAR; INTRAVENOUS EVERY 6 HOURS PRN
Status: DISCONTINUED | OUTPATIENT
Start: 2021-10-12 | End: 2021-10-15 | Stop reason: HOSPADM

## 2021-10-12 RX ADMIN — BACITRACIN ZINC: 500 OINTMENT TOPICAL at 14:45

## 2021-10-12 RX ADMIN — BACITRACIN ZINC: 500 OINTMENT TOPICAL at 22:05

## 2021-10-12 RX ADMIN — OXYCODONE AND ACETAMINOPHEN 1 TABLET: 7.5; 325 TABLET ORAL at 17:44

## 2021-10-12 RX ADMIN — FINASTERIDE 5 MG: 5 TABLET, FILM COATED ORAL at 14:45

## 2021-10-12 RX ADMIN — SODIUM CHLORIDE: 9 INJECTION, SOLUTION INTRAVENOUS at 11:14

## 2021-10-12 RX ADMIN — BUDESONIDE AND FORMOTEROL FUMARATE DIHYDRATE 2 PUFF: 160; 4.5 AEROSOL RESPIRATORY (INHALATION) at 19:00

## 2021-10-12 RX ADMIN — ATORVASTATIN CALCIUM 20 MG: 20 TABLET, FILM COATED ORAL at 20:25

## 2021-10-12 RX ADMIN — OXYCODONE AND ACETAMINOPHEN 1 TABLET: 5; 325 TABLET ORAL at 02:13

## 2021-10-12 RX ADMIN — TRAMADOL HYDROCHLORIDE 50 MG: 50 TABLET, COATED ORAL at 11:15

## 2021-10-12 RX ADMIN — CEFTRIAXONE SODIUM 1000 MG: 1 INJECTION, POWDER, FOR SOLUTION INTRAMUSCULAR; INTRAVENOUS at 11:14

## 2021-10-12 RX ADMIN — ACETAMINOPHEN 1000 MG: 500 TABLET ORAL at 14:08

## 2021-10-12 RX ADMIN — ACETAMINOPHEN 1000 MG: 500 TABLET ORAL at 22:07

## 2021-10-12 RX ADMIN — OXYCODONE AND ACETAMINOPHEN 1 TABLET: 7.5; 325 TABLET ORAL at 22:07

## 2021-10-12 RX ADMIN — SODIUM CHLORIDE: 9 INJECTION, SOLUTION INTRAVENOUS at 20:36

## 2021-10-12 RX ADMIN — METOPROLOL TARTRATE 25 MG: 25 TABLET, FILM COATED ORAL at 20:25

## 2021-10-12 RX ADMIN — METOPROLOL TARTRATE 25 MG: 25 TABLET, FILM COATED ORAL at 14:48

## 2021-10-12 RX ADMIN — CITALOPRAM HYDROBROMIDE 40 MG: 10 TABLET ORAL at 14:45

## 2021-10-12 ASSESSMENT — PAIN SCALES - GENERAL
PAINLEVEL_OUTOF10: 6
PAINLEVEL_OUTOF10: 7
PAINLEVEL_OUTOF10: 6
PAINLEVEL_OUTOF10: 7
PAINLEVEL_OUTOF10: 6

## 2021-10-12 ASSESSMENT — ENCOUNTER SYMPTOMS
VOMITING: 0
CHEST TIGHTNESS: 0
DIARRHEA: 0
SHORTNESS OF BREATH: 0
PHOTOPHOBIA: 0
COLOR CHANGE: 0
BACK PAIN: 1
TROUBLE SWALLOWING: 0
NAUSEA: 0

## 2021-10-12 ASSESSMENT — PAIN DESCRIPTION - PAIN TYPE: TYPE: ACUTE PAIN

## 2021-10-12 NOTE — PROGRESS NOTES
Hospitalist Progress Note      PCP: Susan Gutierrez MD    Date of Admission: 10/11/2021    Chief Complaint:  No acute events, afebrile, stable HD    Medications:  Reviewed    Infusion Medications    sodium chloride      sodium chloride 100 mL/hr at 10/12/21 1114     Scheduled Medications    sodium chloride flush  5-40 mL IntraVENous 2 times per day    enoxaparin  40 mg SubCUTAneous Daily    acetaminophen  1,000 mg Oral 3 times per day    cefTRIAXone (ROCEPHIN) IV  1,000 mg IntraVENous Q24H    bacitracin zinc   Topical BID     PRN Meds: sodium chloride flush, sodium chloride, ondansetron **OR** ondansetron, polyethylene glycol, traMADol **OR** traMADol      Intake/Output Summary (Last 24 hours) at 10/12/2021 1311  Last data filed at 10/12/2021 1026  Gross per 24 hour   Intake --   Output 475 ml   Net -475 ml       Exam:    /73   Pulse 88   Temp 98 °F (36.7 °C) (Oral)   Resp 18   Ht 5' 6\" (1.676 m)   Wt 150 lb (68 kg)   SpO2 99%   BMI 24.21 kg/m²     General appearance: awake, cooperative. Respiratory:  clear to auscultation, bilaterally  Cardiovascular: Regular rate and rhythm, S1/S2. Abdomen: Soft, active bowel sounds.   Musculoskeletal: large right shoulder hematoma, right hand is wrapped, left ankle wound present       Labs:   Recent Labs     10/11/21  1952 10/12/21  0508   WBC 16.3*  --    HGB 8.6* 7.3*   HCT 25.8* 21.6*     --      Recent Labs     10/11/21  1830 10/12/21  0445   * 125*   K 5.9* 5.9*   CL 92* 97   CO2 16* 18*   BUN 25* 23   CREATININE 1.73* 1.25*   CALCIUM 9.3 8.6     Recent Labs     10/11/21  1830   AST 19   ALT 12   BILITOT 0.7   ALKPHOS 104     Recent Labs     10/11/21  1950   INR 1.2     Recent Labs     10/11/21  1830   CKTOTAL 462*   TROPONINI <0.010       Urinalysis:      Lab Results   Component Value Date    NITRU Negative 10/11/2021    WBCUA 0-2 10/11/2021    BACTERIA Negative 10/11/2021    RBCUA 3-5 10/11/2021    BLOODU MODERATE 10/11/2021    SPECGRAV 1.017 10/11/2021    GLUCOSEU Negative 10/11/2021       Radiology:  XR HAND RIGHT (MIN 3 VIEWS)   Final Result   COMMINUTED FRACTURE OF THE DISTAL PHALANX OF THE RIGHT FIRST FINGER. CT Head WO Contrast   Final Result   Impression:      Mild cerebral atrophy. Chronic ischemic white matter disease. All CT scans at this facility use dose modulation, iterative reconstruction, and/or weight based dosing when appropriate to reduce radiation dose to as low as reasonably achievable. CT CERVICAL SPINE WO CONTRAST   Final Result      No fracture. Severe degenerative change cervical spine. Grade 1 C7 spondylolisthesis, unchanged. Bilateral carotid artery calcification. If clinical concern warrants, carotid sonography may be utilized for further evaluation. All CT scans at this facility use dose modulation, iterative reconstruction, and/or weight based dosing when appropriate to reduce radiation dose to as low as reasonably achievable. CT THORACIC SPINE WO CONTRAST   Final Result      No fracture. Left lower lobe mass. Calcified exophytic gastric mass. All CT scans at this facility use dose modulation, iterative reconstruction, and/or weight based dosing when appropriate to reduce radiation dose to as low as reasonably achievable. .      CT LUMBAR SPINE WO CONTRAST   Final Result      No acute fracture. Postsurgical change discussed. All CT scans at this facility use dose modulation, iterative reconstruction, and/or weight based dosing when appropriate to reduce radiation dose to as low as reasonably achievable. CT ABDOMEN PELVIS WO CONTRAST Additional Contrast? 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 No acute fractures.       Remote fractures left sixth through eighth ribs            CT OF THE ABDOMEN AND PELVIS WITH INTRAVENOUS CONTRAST MEDIUM. FINDINGS:         Liver:  Normal in size, shape, and attenuation. Bile Ducts:  Normal in caliber. Gallbladder:  No stones or wall thickening. Pancreas:  Normal without masses, cysts, ductal dilatation or calcification. Spleen:  Surgically absent. Kidneys:  Normal in size. No hydronephrosis, masses, or stones. Mild bilateral perinephric fat stranding. Adrenals:  Normal.       Stomach: Exophytic, calcified 1.6 cm nodule, gastric cardia again identified, unchanged. Small bowel:  Normal in caliber. Appendix:  Normal.       Colon:  Normal in caliber. Peritoneum:  No ascites, free air, or fluid collections. Vessels: Aorta normal in course and caliber. Lymph nodes:        Retroperitoneal:  No enlarged retroperitoneal lymph nodes. Mesenteric:  No enlarged mesenteric lymph nodes. Pelvic: No enlarged pelvic lymph nodes. Ureters: Normal in course and caliber. No calcifications. Bladder: No wall thickening. Reproductive organs: No pelvic masses. Abdominal Wall:      Increased attenuation measuring 4.5 x 4.6 x 8.2 cm within right pelvic soft tissue (series 2, image 89). Bones:  No bone lesions. Lumbar scoliosis convexity to left apex L3. Remote internal fixation L4, and L5. Remote left hip replacement. IMPRESSION:      Soft tissue hematoma, right pelvis, measuring 4.5 x 4.6 x 8.2 cm. Splenectomy. No change, exophytic, calcified gastric nodule as discussed. Remote internal fixation L4 and L5. Left hip replacement. All CT scans at this facility use dose modulation, iterative reconstruction, and/or weight based dosing when appropriate to reduce radiation dose to as low as reasonably achievable.           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 No acute fractures. Remote fractures left sixth through eighth ribs            CT OF THE ABDOMEN AND PELVIS WITH INTRAVENOUS CONTRAST MEDIUM. FINDINGS:         Liver:  Normal in size, shape, and attenuation. Bile Ducts:  Normal in caliber. Gallbladder:  No stones or wall thickening. Pancreas:  Normal without masses, cysts, ductal dilatation or calcification. Spleen:  Surgically absent. Kidneys:  Normal in size. No hydronephrosis, masses, or stones. Mild bilateral perinephric fat stranding. Adrenals:  Normal.       Stomach: Exophytic, calcified 1.6 cm nodule, gastric cardia again identified, unchanged. Small bowel:  Normal in caliber. Appendix:  Normal.       Colon:  Normal in caliber. Peritoneum:  No ascites, free air, or fluid collections. Vessels: Aorta normal in course and caliber. Lymph nodes:        Retroperitoneal:  No enlarged retroperitoneal lymph nodes. Mesenteric:  No enlarged mesenteric lymph nodes. Pelvic: No enlarged pelvic lymph nodes. Ureters: Normal in course and caliber. No calcifications. Bladder: No wall thickening. Reproductive organs: No pelvic masses. Abdominal Wall:      Increased attenuation measuring 4.5 x 4.6 x 8.2 cm within right pelvic soft tissue (series 2, image 89). Bones:  No bone lesions. Lumbar scoliosis convexity to left apex L3. Remote internal fixation L4, and L5. Remote left hip replacement. IMPRESSION:      Soft tissue hematoma, right pelvis, measuring 4.5 x 4.6 x 8.2 cm. Splenectomy. No change, exophytic, calcified gastric nodule as discussed. Remote internal fixation L4 and L5. Left hip replacement. All CT scans at this facility use dose modulation, iterative reconstruction, and/or weight based dosing when appropriate to reduce radiation dose to as low as reasonably achievable.           CT HUMERUS RIGHT WO CONTRAST    (Results Pending)           Assessment/Plan:    65 y/o man with history of CAD s/p PCI, HTN, LLL NSCLC, COPD, tobacco use, GERD, left ankle wound/OM, ETOH use, lumbar stenosis, s/p splenectomy, frequent falls, chronic pain with opiate dependence, recent left wrist fracture who presented with:    Acute fracture of the right humeral head / recent fracture of the left wrist and right thumb  - conservative therapy per Ortho    KEILY / hyperkalemia / hyponatremia  - in the setting of NSAID, bactrim, diuretics,lung cancer  - on saline infusion  - followed by nephrology    Acute / chronic pain  - resumed Percocet   - pain management consulted    Left ankle wound/OM  - on IV Rocephin per ID     Dizziness,  frequent falls  - CT head was negative  - neurology following    Anemia   - Hb down to 7.3, baseline is around 11-12  - in the setting of right shoulder fracture/hematoma  - hold Plavix and Lovenox  - follow H/H closely    CAD s/p PCI to RCA in 2016  - hold Plavix due to anemia/ hematoma    NSCLC of LLL  - followed by pulmonology as outpatient    Diet: ADULT DIET;  Regular    Code Status: Full Code              Electronically signed by Ed Reyes MD on 10/12/2021 at 1:11 PM

## 2021-10-12 NOTE — ED NOTES
Patient resting in bed with no s/s of distress, rating right knee and right shoulder 7/10. Pt was given fentanyl for pain. Will continue to monitor. Respirations even and unlabored.         Ren Chang RN  10/11/21 8243

## 2021-10-12 NOTE — PROGRESS NOTES
Patient assessment completed and charted on by this RN. VSS. A&Ox4. Home medications list were completed with the help from [facility] [address] [place] [address] in [place]. Patient skin is intact. Patient has a quarter size wound on top of L foot. Patient stated he was doing the dressings on his own at own. Wound was covered with a wet to dry 2x2 and foam dressing. Blister on anterior right shoulder. Covered with a foam dressing. RUE is in sling and ecchymosis is present. No further concerns at this time. 1700: Orthostatic blood pressure and pulse done. See flowcharts. Patient denies dizziness. 1800: MRI Consent form completed with patient and patient`s sister Duke Roche. Patient asked sister to sign MRI form due to him being right handed and because of his right humerus fracture could not sign his signature.

## 2021-10-12 NOTE — CONSULTS
Pulmonary and Critical Care Medicine  Consult Note  Encounter Date: 10/12/2021 5:03 PM    Mr. Kaden Deigo is a 64 y.o. male  : 1945  Requesting Provider: Heath Lesches, MD    Reason for request: Lung cancer            HISTORY OF PRESENT ILLNESS:    Patient is 64 y.o. presents with mechanical fall presented to ED with right shoulder pain, he fell yesterday around 3 AM, he was unable to get up, he was weak, he has been reporting frequent falls, he does report a brief loss of consciousness, he has right shoulder pain currently in sling, no fever no chills, no shortness of breath, no coughing. Patient has history of left lower lobe lung he underwent radiation therapy in the past, however there is signs of recurrence, he met with radiation oncology recently but unfortunately that physician was leaving us he is scheduled to see another physician later, he did not make appointment with medical oncology because he did not want to consider chemotherapy. However I talked to him today and he is agreeable to consider immunotherapy. Past Medical History:        Diagnosis Date    Alcohol abuse     quit drinking 19    CAD (coronary artery disease)     patient states no doctor has told him this / has 1 cardiac stent    Cancer ([address]     lung LLL    Chronic back pain     Chronic kidney disease     Chronic sinusitis     DJD (degenerative joint disease) of knee     left knee DJD    Elevated PSA     History of blood transfusion     History of inferior wall myocardial infarction 2016    1 cardiac stent    HTN (hypertension)     meds .  1 yr    Hyperlipidemia     meds > 12 yrs    NSTEMI (non-ST elevated myocardial infarction) ([address]     Smoker        Past Surgical History:        Procedure Laterality Date    ABDOMEN SURGERY      spleenectomy due to [address]  2009    lumbar disc OR    CARDIAC SURGERY      stents    CARPAL TUNNEL RELEASE Right 2019    RIGHT CARPAL TUNNEL RELEASE performed by María Valderrama MD at [facility] WITH STENT PLACEMENT  1/29/2016    EYE SURGERY      to have Phaco with IOL OU 2/2018    HERNIA REPAIR      JOINT REPLACEMENT Left 1994    LTHR    JOINT REPLACEMENT Right 2009    RTKR    KNEE SURGERY Right 2011    arthroscopy    LA MANIPULATN KNEE JT+ANESTHESIA Right 5/12/2017    RIGHT KNEE MANIPULATION UNDER ANESTHESIA performed by Mark Gardiner MD at [facility] OFFICE/OUTPT VISIT,PROCEDURE ONLY Bilateral 12/29/2017    RIGHT AND BILATERAL L 4-5 LYSIS OF SCAR DISKECTOMY INTERBODY CAGE FUSION POSTEROLATERAL FUSION PEDICLE SCREWS performed by María Valderrama MD at [address] [place] [address]  2004    benign   [address] Right 3/24/2017    RIGHT  KNEE TOTAL ARTHROPLASTY, ELHAM CEMENTED PERSONA  performed by Mark Gardiner MD at [place] [address] History:     reports that he has been smoking cigarettes. He has a 30.00 pack-year smoking history. He has never used smokeless tobacco. He reports previous alcohol use of about 12.0 standard drinks of alcohol per week. He reports that he does not use drugs. Family History:       Problem Relation Age of Onset    Osteoarthritis Mother     Emphysema Mother     Hypertension Father     Hearing Loss Father     Dementia Father     No Known Problems Sister     Prostate Cancer Brother     Colon Polyps Neg Hx        Allergies:  Patient has no known allergies.         MEDICATIONS during current hospitalization:    Continuous Infusions:   sodium chloride      sodium chloride 100 mL/hr at 10/12/21 1114       Scheduled Meds:   sodium chloride flush  5-40 mL IntraVENous 2 times per day    [Held by provider] enoxaparin  40 mg SubCUTAneous Daily    acetaminophen  1,000 mg Oral 3 times per day    cefTRIAXone (ROCEPHIN) IV  1,000 mg IntraVENous Q24H    atorvastatin  20 mg Oral Nightly    citalopram  40 mg Oral Daily    finasteride  5 mg Oral Daily    budesonide-formoterol  2 puff Inhalation BID    metoprolol tartrate  25 mg Oral BID    [START ON 10/13/2021] pantoprazole  40 mg Oral QAM AC    tiotropium  2 puff Inhalation Daily    bacitracin zinc   Topical BID       PRN Meds:sodium chloride flush, sodium chloride, ondansetron **OR** ondansetron, polyethylene glycol, oxyCODONE-acetaminophen, albuterol sulfate HFA        REVIEW OF SYSTEMS:  ROS: 10 organs review of system is done including general, psychological, ENT, hematological, endocrine, respiratory, cardiovascular, gastrointestinal, musculoskeletal, neurological,  allergy and Immunology is done and is otherwise negative. PHYSICAL EXAM:    Vitals:  /73   Pulse 88   Temp 98 °F (36.7 °C) (Oral)   Resp 18   Ht 5' 6\" (1.676 m)   Wt 150 lb (68 kg)   SpO2 99%   BMI 24.21 kg/m²     General: alert, cooperative, no distress  Head: normocephalic, atraumatic  Eyes:No gross abnormalities. ENT:  MMM no lesions  Neck:  supple and no masses  Chest : Good air movement no wheezing, minimal rales at the base, nontender, panic  Heart[de-identified] Heart sounds are normal.  Regular rate and rhythm without murmur, gallop or rub. ABD:  symmetric, soft, non-tender, no guarding or rebound  Musculoskeletal : no cyanosis, no clubbing and no edema, right shoulder with ecchymosis and tenderness, right hand is wrapped  Neuro:  Grossly normal  Skin: No rashes or nodules noted. Lymph node:  no cervical nodes  Urology: No [place]   Psychiatric: appropriate        Data Review  Recent Labs     10/11/21  1952 10/12/21  0508 10/12/21  1526   WBC 16.3*  --   --    HGB 8.6* 7.3* 7.4*   HCT 25.8* 21.6* 22.1*     --   --       Recent Labs     10/11/21  1830 10/12/21  0445 10/12/21  1526   * 125* 132*   K 5.9* 5.9* 5.5*   CL 92* 97 102   CO2 16* 18* 18*   BUN 25* 23 19   CREATININE 1.73* 1.25* 1.20   GLUCOSE 147* 110* 94       MV Settings:           ABGs: No results for input(s): PHART, GBF4QKC, PO2ART, PQE5LLO, BEART, A5WKYMPR, WUK3VPR in the last 72 hours. O2 Device: None (Room air)  No results found for: [address]    Radiology  I personally reviewed imaging studies and CT chest shows left lower lobe nodule, PET positive known squamous cell carcinoma        Assessment, plan:   Patient is at risk due to    · Squamous cell carcinoma, patient will need repeat radiation therapy, and adjuvant chemotherapy, he does not want chemotherapy but he is willing to consider immune therapy.   · COPD, no signs of exacerbation  ·  smoking,  · Frequent falls  · Displaced fracture of the right humeral head      Recommendation  · Oncology consult  · Keep appointment with radiation oncology as outpatient  · Resume home inhalers  · Brain MRI, due to history of frequent falls under the right malignancy, rule out metastasis        Thank you for consultation    Electronically signed by Radha Garcia MD, [facility],  on 10/12/2021 at 5:03 PM

## 2021-10-12 NOTE — CONSULTS
[facility]. Nephrology  Consult Note           Reason for Consult:  Acute renal failure, hyperkalemia  Requesting Physician:  Dr. Sadie Leigh    Chief Complaint:  Frequent falls  History Obtained From:  patient, electronic medical record    History of Present Ilness:    64y.o. year old male admitted with  history of CAD status post stent, CKD, hyperlipidemia, hypertension, osteoarthritis, chronic back pain presents with frequent falls. he has some flucuating of kidney function. B/l cr close to 1 though. Takes diclofenac regularly. Takes bumex. Has been on bactrim and augmentin for foot infection. Had fall with shoulder fracture. Has been having episodes of syncope. Cr 1.7 and na 122. Improving but k 5.9. Bactrim held. Diclofenac held. On ivf. Got CT with contrast.        Past Medical History:        Diagnosis Date    Alcohol abuse     quit drinking 8/18/21    CAD (coronary artery disease)     patient states no doctor has told him this / has 1 cardiac stent    Cancer ([facility] [address]     lung LLL    Chronic back pain     Chronic kidney disease     Chronic sinusitis     DJD (degenerative joint disease) of knee     left knee DJD    Elevated PSA     History of blood transfusion 1980's    History of inferior wall myocardial infarction 01/2016    1 cardiac stent    HTN (hypertension)     meds .  1 yr    Hyperlipidemia     meds > 12 yrs    NSTEMI (non-ST elevated myocardial infarction) ([address]     Smoker        Past Surgical History:        Procedure Laterality Date    ABDOMEN SURGERY  1961    spleenectomy due to [address]  2009    lumbar disc OR    CARDIAC SURGERY      stents    CARPAL TUNNEL RELEASE Right 5/6/2019    RIGHT CARPAL TUNNEL RELEASE performed by Mercy Marcelino MD at [place] [address] WITH STENT PLACEMENT  1/29/2016    EYE SURGERY      to have Phaco with IOL OU 2/2018    HERNIA REPAIR      JOINT REPLACEMENT Left 1994    LTHR    JOINT REPLACEMENT Right 2009    RTKR    KNEE SURGERY Right 2011    arthroscopy    DC MANIPULATN KNEE JT+ANESTHESIA Right 5/12/2017    RIGHT KNEE MANIPULATION UNDER ANESTHESIA performed by Adriano Cardozo MD at [address] OFFICE/OUTPT VISIT,PROCEDURE ONLY Bilateral 12/29/2017    RIGHT AND BILATERAL L 4-5 LYSIS OF SCAR DISKECTOMY INTERBODY CAGE FUSION POSTEROLATERAL FUSION PEDICLE SCREWS performed by Sami Ng MD at [address] [place] [address]  2004    benign   [address] Right 3/24/2017    RIGHT  KNEE TOTAL ARTHROPLASTY, ELHAM CEMENTED PERSONA  performed by Adriano Cardozo MD at [address] [place] [address] Medications:    No current facility-administered medications on file prior to encounter.      Current Outpatient Medications on File Prior to Encounter   Medication Sig Dispense Refill    sulfamethoxazole-trimethoprim (BACTRIM DS) 800-160 MG per tablet Take 1 tablet by mouth 2 times daily 60 tablet 0    omeprazole (PRILOSEC) 10 MG delayed release capsule TAKE ONE CAPSULE BY MOUTH DAILY 90 capsule 2    SODIUM CHLORIDE, EXTERNAL, 0.9 % SOLN Apply 1 Applicatorful topically daily 1000 mL 2    finasteride (PROSCAR) 5 MG tablet Take 1 tablet by mouth daily 90 tablet 3    citalopram (CELEXA) 40 MG tablet TAKE ONE TABLET BY MOUTH nightly 30 tablet 5    diclofenac (VOLTAREN) 50 MG EC tablet TAKE ONE TABLET BY MOUTH TWO TIMES A DAY 60 tablet 5    isosorbide mononitrate (IMDUR) 30 MG extended release tablet TAKE ONE TABLET BY MOUTH EVERY DAY 90 tablet 3    Fluticasone furoate-vilanterol (BREO ELLIPTA) 200-25 MCG/INH AEPB inhaler Inhale 1 puff into the lungs daily 1 each 3    clopidogrel (PLAVIX) 75 MG tablet TAKE ONE TABLET BY MOUTH DAILY 90 tablet 3    atorvastatin (LIPITOR) 20 MG tablet TAKE ONE TABLET BY MOUTH DAILY 90 tablet 3    oxyCODONE-acetaminophen (PERCOCET) 7.5-325 MG per tablet TAKE ONE TABLET BY MOUTH THREE TIMES A DAY      morphine (MS CONTIN) 15 MG extended release tablet Take 15 mg by mouth as needed.  amoxicillin-clavulanate (AUGMENTIN) 875-125 MG per tablet Take 1 tablet by mouth 2 times daily 60 tablet 0    tiotropium (SPIRIVA RESPIMAT) 2.5 MCG/ACT AERS inhaler Inhale 2 puffs into the lungs daily 1 Inhaler 3    metoprolol tartrate (LOPRESSOR) 50 MG tablet Take 0.5 tablets by mouth 2 times daily TAKE ONE TABLET BY MOUTH TWO TIMES A  tablet 3    bumetanide (BUMEX) 1 MG tablet Take 1 tablet by mouth daily 90 tablet 3    albuterol sulfate  (90 Base) MCG/ACT inhaler Inhale 2 puffs into the lungs every 6 hours as needed for Wheezing 1 Inhaler 3    nitroGLYCERIN (NITROSTAT) 0.4 MG SL tablet Place 1 tablet under the tongue every 5 minutes as needed for Chest pain up to max of 3 total doses. If no relief after 1 dose, call 911. 25 tablet 3    DOCUSATE CALCIUM PO Take by mouth as needed         Allergies:  Patient has no known allergies.     Social History:    Social History     Socioeconomic History    Marital status:      Spouse name: Not on file    Number of children: Not on file    Years of education: Not on file    Highest education level: Not on file   Occupational History    Occupation: retired   Tobacco Use    Smoking status: Current Every Day Smoker     Packs/day: 0.50     Years: 60.00     Pack years: 30.00     Types: Cigarettes    Smokeless tobacco: Never Used    Tobacco comment: also smokes cigars   Vaping Use    Vaping Use: Never used   Substance and Sexual Activity    Alcohol use: Not Currently     Alcohol/week: 12.0 standard drinks     Types: 12 Shots of liquor per week     Comment: quit drinking aug 18th, 2021    Drug use: No    Sexual activity: Yes   Other Topics Concern    Not on file   Social History Narrative    Lives alone     Social Determinants of Health     Financial Resource Strain: Low Risk     Difficulty of Paying Living Expenses: Not very hard   Food Insecurity: No Food Insecurity    Worried About Running Out of Food in the Last Year: Never true    Ran Out of Food in the Last Year: Never true   Transportation Needs: No Transportation Needs    Lack of Transportation (Medical): No    Lack of Transportation (Non-Medical): No   Physical Activity:     Days of Exercise per Week:     Minutes of Exercise per Session:    Stress:     Feeling of Stress :    Social Connections:     Frequency of Communication with Friends and Family:     Frequency of Social Gatherings with Friends and Family:     Attends [employer] Services:     Active Member of Clubs or Organizations:     Attends Club or Organization Meetings:     Marital Status:    Intimate Partner Violence:     Fear of Current or Ex-Partner:     Emotionally Abused:     Physically Abused:     Sexually Abused:        Family History:   Family History   Problem Relation Age of Onset    Osteoarthritis Mother     Emphysema Mother     Hypertension Father     Hearing Loss Father     Dementia Father     No Known Problems Sister     Prostate Cancer Brother     Colon Polyps Neg Hx        Review of Systems:   Review of Systems      Physical exam:   Constitutional:  VITALS:  /73   Pulse 88   Temp 98 °F (36.7 °C) (Oral)   Resp 18   Ht 5' 6\" (1.676 m)   Wt 150 lb (68 kg)   SpO2 99%   BMI 24.21 kg/m²   Gen: alert, awake, nad  Skin: no rash, turgor wnl  Heent:  eomi, mmm  Neck: no bruits or jvd noted, thyroid normal  Lungs:  Normal expansion. Clear to auscultation. No rales, rhonchi, or wheezing. Heart:  Heart sounds are normal.  Regular rate and rhythm without murmur, gallop or rub. Abdomen:  +bs, soft, nt, nd, no hepatosplenomegaly   Extremities: Extremities warm to touch, pink, with no edema.   Psychiatric: mood and affect appropriate; judgement and insight intact  Musculoskeletal:  Rom, muscular strength intact; digits, nails normal    Data/  CBC:   Lab Results   Component Value Date    WBC 16.3 10/11/2021    RBC 2.73 10/11/2021    HGB 7.3 10/12/2021    HCT 21.6 10/12/2021    MCV 94.6 10/11/2021    MCH 31.7 10/11/2021    MCHC 33.5 10/11/2021    RDW 13.0 10/11/2021     10/11/2021     BMP:    Lab Results   Component Value Date     10/12/2021    K 5.9 10/12/2021    CL 97 10/12/2021    CO2 18 10/12/2021    BUN 23 10/12/2021    LABALBU 3.9 10/11/2021    CREATININE 1.25 10/12/2021    CALCIUM 8.6 10/12/2021    GFRAA >60.0 10/12/2021    LABGLOM 55.9 10/12/2021    GLUCOSE 110 10/12/2021     CT ABDOMEN PELVIS WO CONTRAST Additional Contrast? None    Result Date: 10/11/2021  CT OF THE CHEST, ABDOMEN, AND PELVIS with intravenous contrast medium. HISTORY: Fall. Loss of consciousness. Receiving anticoagulation. Lower extremity weakness. History squamous cell carcinoma, left lower lobe. TECHNICAL FACTORS: CT imaging of the chest, abdomen, and pelvis, was obtained and formatted as 5 mm contiguous axial images from the thoracic inlet through the symphysis pubis. Sagittal and coronal reconstructions obtained during postprocessing. Intravenous contrast medium:  100 ml of Isovue-370 Comparison:  CT chest, September 7, 2021, January 14, 2021. PET/CT, September 23, 2021. CT abdomen pelvis, March 23, 2020 CT OF THE CHEST FINDINGS: Right lung: No nodules, masses, consolidation, pleural effusion, pneumothorax. Left lung: Pleural-based, noncalcified, mildly stellate 2.6 x 2.9 cm nodule, left lower lobe, again identified, unchanged. No consolidation, pleural effusion, pneumothorax. Lymph nodes: No hilar, mediastinal, or axillary lymph node enlargement. Thoracic aorta: Normal in course and caliber. Cardiac Size: Normal. Pericardial effusion: None. Musculoskeletal:No osteoblastic, and no osteolytic lesions. Remote fractures left sixth through eighth ribs. 2.6 x 2.9 cm pleural-based left lower lobe nodule, in patient with known clinical history squamous cell carcinoma left lower lobe. No acute fractures.  Remote fractures left sixth through eighth ribs CT OF THE ABDOMEN AND PELVIS WITH INTRAVENOUS CONTRAST MEDIUM. FINDINGS: Liver:  Normal in size, shape, and attenuation. Bile Ducts:  Normal in caliber. Gallbladder:  No stones or wall thickening. Pancreas:  Normal without masses, cysts, ductal dilatation or calcification. Spleen:  Surgically absent. Kidneys:  Normal in size. No hydronephrosis, masses, or stones. Mild bilateral perinephric fat stranding. Adrenals:  Normal. Stomach: Exophytic, calcified 1.6 cm nodule, gastric cardia again identified, unchanged. Small bowel:  Normal in caliber. Appendix:  Normal. Colon:  Normal in caliber. Peritoneum:  No ascites, free air, or fluid collections. Vessels: Aorta normal in course and caliber. Lymph nodes:  Retroperitoneal:  No enlarged retroperitoneal lymph nodes. Mesenteric:  No enlarged mesenteric lymph nodes. Pelvic: No enlarged pelvic lymph nodes. Ureters: Normal in course and caliber. No calcifications. Bladder: No wall thickening. Reproductive organs: No pelvic masses. Abdominal Wall: Increased attenuation measuring 4.5 x 4.6 x 8.2 cm within right pelvic soft tissue (series 2, image 89). Bones:  No bone lesions. Lumbar scoliosis convexity to left apex L3. Remote internal fixation L4, and L5. Remote left hip replacement. IMPRESSION: Soft tissue hematoma, right pelvis, measuring 4.5 x 4.6 x 8.2 cm. Splenectomy. No change, exophytic, calcified gastric nodule as discussed. Remote internal fixation L4 and L5. Left hip replacement. All CT scans at this facility use dose modulation, iterative reconstruction, and/or weight based dosing when appropriate to reduce radiation dose to as low as reasonably achievable. XR CHEST (2 VW)    Result Date: 9/17/2021  EXAMINATION: XR CHEST (2 VW), XR WRIST LEFT (MIN 3 VIEWS)  CLINICAL HISTORY:  weight loss COMPARISONS: May 21, 2020  FINDINGS: Two views of the chest are submitted. The cardiac silhouette is of normal size configuration. Pulmonary vascular attenuated Right sided trachea.  There is a faint opacity seen within the posterior aspect of the left lower lobe. Its best appreciated on the lateral view. No Pneumothoraces. THERE IS A FAINT SOFT TISSUE OPACITY SEEN ON THE LATERAL VIEW. PLEASE SEE CT SCAN REPORT CHEST SEPTEMBER 17, 2021 FOR ADDITIONAL DETAILS WHICH WAS IDENTIFIED AS MALIGNANCY UNTIL PROVEN OTHERWISE. Sherryle Moder RADIOGRAPHIC FINDINGS SUGGESTIVE OF COPD. DR. Clifford Albarado EMERGENCY ROOM WAS NOTED IMMEDIATELY ABOVE FINDINGS ON SEPTEMBER 17, 2021 AT 2014 HOURS EXAMINATION: XR CHEST (2 VW), XR WRIST LEFT (MIN 3 VIEWS) CLINICAL HISTORY: Pain COMPARISONS: None. FINDINGS: Four views of the left wrist are submitted. There is a comminuted fracture with intra-articular extension of the left distal radius as well as dislocation of the ulnar styloid process. No dislocations. No focal bony abnormalities. Please                             IMPRESSION: COMMINUTED FRACTURE WITH INTRA-ARTICULAR EXTENSION OF THE LEFT DISTAL RADIUS AS WELL AS DISLOCATION OF THE RADIAL STYLOID PROCESS    XR WRIST LEFT (MIN 3 VIEWS)    Result Date: 9/17/2021  EXAMINATION: XR CHEST (2 VW), XR WRIST LEFT (MIN 3 VIEWS)  CLINICAL HISTORY:  weight loss COMPARISONS: May 21, 2020  FINDINGS: Two views of the chest are submitted. The cardiac silhouette is of normal size configuration. Pulmonary vascular attenuated Right sided trachea. There is a faint opacity seen within the posterior aspect of the left lower lobe. Its best appreciated on the lateral view. No Pneumothoraces. THERE IS A FAINT SOFT TISSUE OPACITY SEEN ON THE LATERAL VIEW. PLEASE SEE CT SCAN REPORT CHEST SEPTEMBER 17, 2021 FOR ADDITIONAL DETAILS WHICH WAS IDENTIFIED AS MALIGNANCY UNTIL PROVEN OTHERWISE. Sherryle Moder RADIOGRAPHIC FINDINGS SUGGESTIVE OF COPD.  DR. Clifford Albarado EMERGENCY ROOM WAS NOTED appear grossly intact without periprosthetic lucency or fracture. Postsurgical and degenerative changes lumbar spine. Degenerative changes SI joints and pubic symphysis which otherwise appear intact. Feces throughout the visualized colon. No other significant abnormality. Avascular necrosis right hip with possible small amount of cortical collapse. Left hip arthroplasty without radiographic complication. CT Head WO Contrast    Result Date: 10/11/2021  CT Brain. Contrast medium:  without contrast.. History:  Fall. Loss of consciousness. Invasive squamous cell carcinoma, left lower lobe. Technical factors: CT imaging of the brain was obtained and formatted as 5 mm contiguous axial images. 2.5 mm contiguous axial images were obtained through the osseous structures. Sagittal and coronal reconstruction obtained during postprocessing. Comparison:  CT brain, August 18, 2021. Xiomara Rast Findings: Extra-axial spaces:  Normal. Intracranial hemorrhage:  None. Ventricular system: Ventricles mildly enlarged. Sulci mildly prominent. Basal Cisterns:  Normal. Cerebral Parenchyma: Bilateral symmetric periventricular areas decreased attenuation. Midline Shift:  None. Cerebellum:  Normal. Paranasal sinuses and mastoid air cells:  Normal. Visualized Orbits:  Normal.     Impression: Mild cerebral atrophy. Chronic ischemic white matter disease. All CT scans at this facility use dose modulation, iterative reconstruction, and/or weight based dosing when appropriate to reduce radiation dose to as low as reasonably achievable. CT CHEST WO CONTRAST    Result Date: 10/11/2021  CT OF THE CHEST, ABDOMEN, AND PELVIS with intravenous contrast medium. HISTORY: Fall. Loss of consciousness. Receiving anticoagulation. Lower extremity weakness. History squamous cell carcinoma, left lower lobe.   TECHNICAL FACTORS: CT imaging of the chest, abdomen, and pelvis, was obtained and formatted as 5 mm contiguous axial images from the thoracic inlet through No wall thickening. Reproductive organs: No pelvic masses. Abdominal Wall: Increased attenuation measuring 4.5 x 4.6 x 8.2 cm within right pelvic soft tissue (series 2, image 89). Bones:  No bone lesions. Lumbar scoliosis convexity to left apex L3. Remote internal fixation L4, and L5. Remote left hip replacement. IMPRESSION: Soft tissue hematoma, right pelvis, measuring 4.5 x 4.6 x 8.2 cm. Splenectomy. No change, exophytic, calcified gastric nodule as discussed. Remote internal fixation L4 and L5. Left hip replacement. All CT scans at this facility use dose modulation, iterative reconstruction, and/or weight based dosing when appropriate to reduce radiation dose to as low as reasonably achievable. CT CERVICAL SPINE WO CONTRAST    Result Date: 10/11/2021  CT cervical spine without intravenous contrast medium. HISTORY: Fall. Loss of consciousness. History of invasive squamous carcinoma left lower lobe. TECHNICAL FACTORS: CT cervical spine obtained and formatted as 2.5 mm contiguous axial images from skull base to the level of. Sagittal and coronal reconstructions were obtained during postprocessing. No contrast medium was utilized. COMPARISON: CT cervical spine, August 18, 2021 FINDINGS: Cervical vertebral bodies are normal in height height. Loss cervical lordosis. 4.3 mm anterolisthesis C7 on T1, unchanged. Atlantooccipital articulation maintained. Atlantoaxial interval preserved. Neuroforaminal narrowing, left C2-3, left C3-4, bilateral C4-5, right 7, right C7-T1. Diffuse disc space narrowing, posterior C2-3. Diffuse narrowing L4-5 T5-6, and C6-7. No fractures, dislocations, bone lesions. Limited imaging lung apices without anomaly. Bilateral carotid artery calcification. Soft tissues are without anomaly. No fracture. Severe degenerative change cervical spine. Grade 1 C7 spondylolisthesis, unchanged. Bilateral carotid artery calcification.  If clinical concern warrants, carotid sonography may be utilized for and L5 using posterior bilateral pedicle screws secured bilaterally to vertically oriented rods. Lumbar scoliosis convexity to left apex L3. Loss of height, L3 and L4 vertebral bodies, unchanged. Small anterior osteophytes lower thoracic and lumbar spine. Disc spaces preserved. No fractures, dislocations, bone lesions. Limited imaging of the abdomen and pelvis without anomaly. No acute fracture. Postsurgical change discussed. All CT scans at this facility use dose modulation, iterative reconstruction, and/or weight based dosing when appropriate to reduce radiation dose to as low as reasonably achievable. PET CT Skull Base To Mid Thigh    Result Date: 9/23/2021  EXAMINATION:  REGIONAL BODY FDG PET/CT SCAN: (9/23/2021 11:50 AM) HISTORY: 66years old Male with  Malignant neoplasm of lower lobe of left lung ([address] ICD10. INDICATION: Study performed for lung nodule follow-up. TECHNIQUE: F18-FDG administered IV was followed about 60 minutes later by PET imaging from skull vertex to proximal thighs. Free breathing low dose CT was performed without contrast for attenuation correction and anatomic localization. Blood glucose before FDG injection: 86 mg/dL FDG radionuclide dose:   18.1 mCi CT Dose-Length Product (DLP): 624.26 mGy*cm. CT Dose Reduction Employed: Yes COMPARISON: CT chest 9/7/2021 and 1/14/2021, PET/CT 4/8/2021. RESULT: HEAD AND NECK: Physiologic uptake seen in the visualized brain, extraocular muscles, parapharyngeal soft tissues, base of tongue, vocal cords, and salivary glands. No FDG avid cervical lymphadenopathy or mass. No FDG avid thyroid lesion. CHEST: Physiologic uptake in the heart and mediastinum. Lungs and tracheobronchial tree: Note that PET/CT is not sensitive for pulmonary nodules less than 8 mm. There is an FDG avid 32 x 26 mm left lower lobe subpleural mass more prominent compared to the prior PET/CT from April 2021, compatible with recurrent disease.  Pleura:  No FDG avid pleural effusion or pleural mass. Mediastinum and Lymph nodes:  No FDG avid mediastinal mass, mediastinal or hilar lymphadenopathy. Heart and great vessels: Physiologic uptake in the heart. Chest wall and axilla: FDG uptake along the left lateral chest wall secondary to  multiple rib fractures. ABDOMEN AND PELVIS: Physiologic uptake seen in the  and GI tracts. Liver: No FDG avid lesion. Biliary: Gallbladder is unremarkable. Spleen: No FDG avid lesion. No splenomegaly. Pancreas: No FDG avid lesion. Adrenals: No FDG avid lesion. Stable left adrenal nodule with peripheral calcification, unchanged since 2020. Kidneys: No stones, hydronephrosis, or FDG avid lesions. GI tract: No dilation or focal suspicious FDG avid lesion. No bowel dilation. Large amount of colonic stool. Lymph nodes: No FDG avid abdominal or pelvic lymphadenopathy. Mesentery/Peritoneum: No ascites or FDG avid mass. Vasculature:  Vascular patency cannot be assessed due to lack of IV contrast.  Atherosclerotic calcification of the abdominal aorta. Pelvis: No ascites, fluid collection or FDG avid process. BONES AND EXTREMITIES:  No suspicious FDG avid osseous lesion. The imaged portions of the skeleton demonstrates age-related degenerative changes. Photopenia in the lower lumbar region and left hip secondary to postoperative prosthesis . No destructive osseous lesions. ============    1. NECK: *No FDG avid neoplastic process. . 2.  CHEST: *Marked FDG uptake in the left lower lobe more prominent since April 2021, compatible with recurrent malignancy. . 3.  ABDOMEN/PELVIS: *No FDG avid neoplastic process. . 4. EXTREMITIES/SKELETON: *No suspicious FDG avid osseous lesion. Assessment/  65 yo man with risk factors of CKD including HTN and CAD s/p stent. Has fluctuating kidney function but cr close to 1 at baseline. Has fall with syncope and fracture of right humerus. Ortho on consult. Has hemodynamic KEILY due to bumex and NSAID.   Has hyperkalemia due to amina and NSAID and possibly bactrim. ua with trace protein, 3-5 rbc. CT A/P showed no obstruction of kidneys. Got IV contrast.  Possible ATN    Plan/  1- agree with checking cortisol but hyponatremia and hyperkalemia likely from causes above  2- hold NSAID, bumex  3- IVF for hyponatremia and amina. 4. Repeat labs today. [address] for surgery if needed today    Thank you for the consultation. Please do not hesitate to call with questions.     Edilberto Gastelum MD

## 2021-10-12 NOTE — PROGRESS NOTES
[facility] OCCUPATIONAL THERAPY EVALUATION - ACUTE     NAME: Bacilio Silva  : 1945 (89 y.o.)  MRN: [id number]  CODE STATUS: Full Code  [id number]    Date of Service: 10/12/2021    Patient Diagnosis(es): Hyperkalemia [E87.5]  Hyponatremia [E87.1]  General weakness [R53.1]  KEILY (acute kidney injury) ([address] [N17.9]  Injury of head, initial encounter [L57.13WD]  Fall, initial encounter [W19. XXXA]  Closed nondisplaced fracture of distal phalanx of right thumb, initial encounter [S62.524A]  Skin tear of right elbow without complication, initial encounter [S51.011A]  Abdominal pain, unspecified abdominal location [R10.9]  Closed fracture of proximal end of right humerus, unspecified fracture morphology, initial encounter [S42.201A]  Acute bilateral thoracic back pain [M54.6]   Chief Complaint   Patient presents with    Fall     Patient Active Problem List    Diagnosis Date Noted    Bilateral carotid artery stenosis 2020    Essential hypertension 2020    Traumatic hematoma of right shoulder     Hyponatremia 10/11/2021    Frequent falls 10/11/2021    Chronic kidney disease     Fracture of right humerus     Hyperkalemia     Leukocytosis     Anemia     Acute hematogenous osteomyelitis, left ankle and foot ([address]     Non-pressure chronic ulcer of left ankle with fat layer exposed ([address] 2021    Atherosclerosis of native arteries of extremities with rest pain, left leg ([address] 2021    Subacute osteomyelitis, left ankle and foot ([address] 2021    Osteomyelitis of ankle ([address] 2021    Heroin abuse ([address] 2021    Fracture, Colles, right, closed 2021    Ataxic gait 2021    Dizziness 2021    NSCLC of left lung ([address] 2020    Lung nodule seen on imaging study 2020    CAD (coronary artery disease) 01/10/2020    Chest pain 01/10/2020    FELDER (dyspnea on exertion) 2019    Angina effort 2019    Dyslipidemia 2019    Carpal tunnel syndrome on right 04/09/2019    Carpal tunnel syndrome on left 04/09/2019    Biliary dyskinesia 03/07/2019    Right upper quadrant abdominal pain 02/15/2019    Gallbladder polyp 02/15/2019    NSTEMI (non-ST elevated myocardial infarction) [facility] 03/29/2018    S/P PTCA (percutaneous transluminal coronary angioplasty) 03/29/2018    Herniated nucleus pulposus, L4-5 12/29/2017    Anesthesia 12/26/2017    HNP (herniated nucleus pulposus), lumbar 11/16/2017    Spondylolisthesis at L4-L5 level 11/16/2017    DDD (degenerative disc disease), lumbar 10/19/2017    Osteoarthritis of spine with myelopathy, lumbosacral region 10/19/2017    Chronic bilateral low back pain with bilateral sciatica 10/19/2017    Postlaminectomy syndrome, lumbar region 10/19/2017    Acquired spondylolisthesis of lumbosacral region 10/19/2017    Spinal stenosis of lumbar region with neurogenic claudication 10/19/2017    Confusion caused by a drug 04/20/2017    Left ankle pain 03/29/2016    Charcot's joint of left ankle 03/24/2016    Pure hypercholesterolemia 01/29/2016    Sacroiliitis, not elsewhere classified ([facility] 01/25/2016    Spondylosis of lumbar region without myelopathy or radiculopathy 12/28/2015    Primary osteoarthritis of right knee 08/25/2015    Elevated PSA     Tobacco use 04/24/2014    Hip pain 04/24/2014    Knee pain 04/24/2014    Chronic back pain 04/24/2014        Past Medical History:   Diagnosis Date    Alcohol abuse     quit drinking 8/18/21    CAD (coronary artery disease)     patient states no doctor has told him this / has 1 cardiac stent    Cancer ([address]     lung LLL    Chronic back pain     Chronic kidney disease     Chronic sinusitis     DJD (degenerative joint disease) of knee     left knee DJD    Elevated PSA     History of blood transfusion 1980's    History of inferior wall myocardial infarction 01/2016    1 cardiac stent    HTN (hypertension)     meds .  1 yr    Hyperlipidemia     meds > 12 yrs    NSTEMI (non-ST elevated myocardial infarction) [facility]     Smoker      Past Surgical History:   Procedure Laterality Date    ABDOMEN SURGERY  1961    spleenectomy due to [address]  2009    lumbar disc OR    CARDIAC SURGERY      stents    CARPAL TUNNEL RELEASE Right 5/6/2019    RIGHT CARPAL TUNNEL RELEASE performed by Tami Galeas MD at [place] [address] WITH STENT PLACEMENT  1/29/2016    EYE SURGERY      to have Phaco with IOL OU 2/2018    HERNIA REPAIR      JOINT REPLACEMENT Left 1994    LTHR    JOINT REPLACEMENT Right 2009    RTKR    KNEE SURGERY Right 2011    arthroscopy    UT MANIPULATN KNEE JT+ANESTHESIA Right 5/12/2017    RIGHT KNEE MANIPULATION UNDER ANESTHESIA performed by Pavel Chaidez MD at [address] OFFICE/OUTPT VISIT,PROCEDURE ONLY Bilateral 12/29/2017    RIGHT AND BILATERAL L 4-5 LYSIS OF SCAR DISKECTOMY INTERBODY CAGE FUSION POSTEROLATERAL FUSION PEDICLE SCREWS performed by Tami Galeas MD at [address] [place] [address]  2004    benign    SPLENECTOMY  1961    TOTAL KNEE ARTHROPLASTY Right 3/24/2017    RIGHT  KNEE TOTAL ARTHROPLASTY, ELHAM CEMENTED PERSONA  performed by Pavel Chaidez MD at [address] to right shoulder only 20x clockwise and 20x counter clockwise  Restrictions/Precautions: ROM Restrictions (may lift 2# on right hand only. No lifting,pushing or pulling on right UE. Left UE wrist brace)  Position Activity Restriction  Other position/activity restrictions: may remove right UE sling for hygiene and skin checks. Safety Devices: Safety Devices  Safety Devices in place: Yes  Type of devices: All fall risk precautions in place   Initially in place: No    Subjective:Pt pleasant and cooperative. \"I think that I can get my son to come stay with me. \"       Pain Reassessment: 0/10 pain reported during session.           Prior Level of Function:  Social/Functional History  Lives With: Alone  Type of Home: House  Home Layout: Able to Live on Main level with bedroom/bathroom, Two level, Performs ADL's on one level  Home Access: Stairs to enter with rails  Entrance Stairs - Number of Steps: 3  Entrance Stairs - Rails: Both  Bathroom Shower/Tub: Tub/Shower unit  Bathroom Equipment: Shower chair, Grab bars in shower (does not use chair)  Home Equipment: Rolling walker, 4 wheeled walker, Oxygen  ADL Assistance: [address] [place] [address] Avenue: Independent  Homemaking Responsibilities: Yes  Ambulation Assistance: Independent  Transfer Assistance: Independent  Active : Yes    OBJECTIVE:     Orientation Status:       Observation:  Observation/Palpation  Posture: Fair  Observation: upper thoracic bruising and shoulder bruisong on right. Right UE sling in place.   Edema: right UE    Cognition Status:  Cognition  Cognition Comment: follows one step commands consistently    Perception Status:  Perception  Overall Perceptual Status: WFL    Sensation Status:  Sensation  Overall Sensation Status: WFL    Vision and Hearing Status:  Vision  Vision: Impaired  Vision Exceptions: Wears glasses at all times  Hearing  Hearing: Within functional limits     ROM:   LUE AROM (degrees)  LUE AROM : WFL  Left Hand AROM (degrees)  Left Hand General AROM: limited flexion of index finger secondary to arthritic changes  RUE AROM (degrees)  RUE AROM : WFL  RUE General AROM: wrist only  Right Hand AROM (degrees)  Right Hand AROM: WFL    Strength:  LUE Strength  Gross LUE Strength: WFL  L Hand General: 3/5  LUE Strength Comment: 3+/5  RUE Strength  R Hand General: 3/5    Coordination, Tone, Quality of Movement:   Coordination  Movements Are Fluid And Coordinated: No  Coordination and Movement description: Fine motor impairments, Decreased speed, Left UE  Quality of Movement Other  Comment: right hand, wrist, and elbow only    Hand Dominance:  Hand Dominance  Hand Dominance: Right    ADL Status:  ADL  Feeding: Minimal assistance  Grooming: Moderate assistance  UE Bathing: Maximum assistance  LE Bathing: Maximum assistance  UE Dressing: Maximum assistance  LE Dressing: Dependent/Total  Toileting: Unable to assess(comment)          Therapy key for assistance levels -   Independent = Pt. is able to perform task with no assistance but may require a device   Stand by assistance = Pt. does not perform task at an independent level but does not need physical assistance, requires verbal cues  Minimal, Moderate, Maximal Assistance = Pt. requires physical assistance (25%, 50%, 75% assist from helper) for task but is able to actively participate in task   Dependent = Pt. requires total assistance with task and is not able to actively participate with task completion     Functional Mobility:     Transfers  Sit to stand: Minimal assistance  Stand to sit: Minimal assistance    Bed Mobility  Bed mobility  Supine to Sit: Moderate assistance  Sit to Supine: Moderate assistance  Scooting: Dependent/Total    Seated and Standing Balance:  Balance  Sitting Balance: Supervision  Standing Balance:  Moderate assistance    Functional Endurance:  Activity Tolerance  Activity Tolerance: Treatment limited secondary to medical complications (free text)    D/C Recommendations:  OT D/C RECOMMENDATIONS  REQUIRES OT FOLLOW UP: Yes    Equipment Recommendations:       OT Education:        OT Follow Up:  OT D/C RECOMMENDATIONS  REQUIRES OT FOLLOW UP: Yes       Assessment/Discharge Disposition:     Performance deficits / Impairments: Decreased functional mobility , Decreased safe awareness, Decreased balance, Decreased ADL status, Decreased endurance, Decreased high-level IADLs, Decreased strength, Decreased ROM, Decreased fine motor control  Prognosis: Fair  Discharge Recommendations: Continue to assess pending progress  Decision Making: High Complexity  History: multiple  Exam: multiple  Assistance / Modification: Max A/D    Six Click Score    How much help for putting on and taking off regular lower body clothing?: Total  How much help for Bathing?: A Lot  How much help for Toileting?: A Lot  How much help for putting on and taking off regular upper body clothing?: A Lot  How much help for taking care of personal grooming?: A Lot  How much help for eating meals?: A Little  AM-Located within [facility] Inpatient Daily Activity Raw Score: 12  AM-PAC Inpatient ADL T-Scale Score : 30.6  ADL Inpatient CMS 0-100% Score: 66.57    Plan:  Plan  Times per week: 1-4x/wk  Current Treatment Recommendations: Strengthening, ROM, Balance Training, Self-Care / ADL, Functional Mobility Training, Neuromuscular Re-education, Endurance Training, Safety Education & Training, Equipment Evaluation, Education, & procurement    Goals:   Patient will:    - Improve functional endurance to tolerate/complete 8-15 mins of ADL's  - Be Mod A in UB ADLs   - Be Mod A in LB ADLs  - Be SBA in ADL transfers without LOB  - Be Mod A in toileting tasks    Patient Goal: Patient goals :  To return to home when ready      Discussed and agreed upon: Yes Comments:     Therapy Time:   OT Individual Minutes  Time In: 1515  Time Out: 1550  Minutes: 35    Eval: 35 minutes     Electronically signed by:    CHARLIE Nicolas, OTR/L  50/73/9912, 4:09 PM Electronically signed by ANITHA Nicolas/L on 52/97/34 at 4:08 PM EDT

## 2021-10-12 NOTE — PROGRESS NOTES
[facility] [place] EMERGENCY [facility] AT [place] Respiratory Therapy Evaluation   Current Order:  Albuterol q6 prn    Home Regimen: prn      Ordering Physician: javier  Re-evaluation Date:  10/15   Diagnosis: head injury   Patient Status: Stable / Unstable + Physician notified    The following MDI Criteria must be met in order to convert aerosol to MDI with spacer. If unable to meet, MDI will be converted to aerosol:  []  Patient able to demonstrate the ability to use MDI effectively  []  Patient alert and cooperative  []  Patient able to take deep breath with 5-10 second hold  []  Medication(s) available in this delivery method   []  Peak flow greater than or equal to 200 ml/min            Current Order Substituted To  (same drug, same frequency)   Aerosol to MDI [] Albuterol Sulfate 0.083% unit dose by aerosol Albuterol Sulfate MDI 2 puffs by inhalation with spacer    [] Levalbuterol 1.25 mg unit dose by aerosol Levalbuterol MDI 2 puffs by inhalation with spacer    [] Levalbuterol 0.63 mg unit dose by aerosol Levalbuterol MDI 2 puffs by inhalation with spacer    [] Ipratropium Bromide 0.02% unit dose by aerosol Ipratropium Bromide MDI 2 puffs by inhalation with spacer    [] Duoneb (Ipratropium + Albuterol) unit dose by aerosol Ipratropium MDI + Albuterol MDI 2 puffs by inhalation w/spacer   MDI to Aerosol [] Albuterol Sulfate MDI Albuterol Sulfate 0.083% unit dose by aerosol    [] Levalbuterol MDI 2 puffs by inhalation Levalbuterol 1.25 mg unit dose by aerosol    [] Ipratropium Bromide MDI by inhalation Ipratropium Bromide 0.02% unit dose by aerosol    [] Combivent (Ipratropium + Albuterol) MDI by inhalation Duoneb (Ipratropium + Albuterol) unit dose by aerosol       Treatment Assessment [Frequency/Schedule]:  Change frequency to: __________________________) Carina Solitario Q4prn  ________________________per Protocol, P&T, MEC      Points 0 1 2 3 4   Pulmonary Status  Non-Smoker  []   Smoking history   < 20 pack years  []   Smoking history  ?  20 pack years  []   Pulmonary Disorder  (acute or chronic)  [x]   Severe or Chronic w/ Exacerbation  []     Surgical Status No [x]   Surgeries     General []   Surgery Lower []   Abdominal Thoracic or []   Upper Abdominal Thoracic with  PulmonaryDisorder  []     Chest X-ray Clear/Not  Ordered     [x]  Chronic Changes  Results Pending  []  Infiltrates, atelectasis, pleural effusion, or edema  []  Infiltrates in more than one lobe []  Infiltrate + Atelectasis, &/or pleural effusion  []    Respiratory Pattern Regular,  RR = 12-20 [x]  Increased,  RR = 21-25 []  FELDER, irregular,  or RR = 26-30 []  Decreased FEV1  or RR = 31-35 []  Severe SOB, use  of accessory muscles, or RR ? 35  []    Mental Status Alert, oriented,  Cooperative [x]  Confused but Follows commands []  Lethargic or unable to follow commands []  Obtunded  []  Comatose  []    Breath Sounds Clear to  auscultation  [x]  Decreased unilaterally or  in bases only []  Decreased  bilaterally  []  Crackles or intermittent wheezes []  Wheezes []    Cough Strong, Spontan., & nonproductive [x]  Strong,  spontaneous, &  productive []  Weak,  Nonproductive []  Weak, productive or  with wheezes []  No spontaneous  cough or may require suctioning []    Level of Activity Ambulatory []  Ambulatory w/ Assist  [x]  Non-ambulatory []  Paraplegic []  Quadriplegic []    Total    Score:____4___     Triage Score:____5____      Tri       Triage:     1. (>20) Freq: Q3    2. (16-20) Freq: Q4   3. (11-15) Freq: QID & Albuterol Q2 PRN    4. (6-10) Freq: TID & Albuterol Q2 PRN    5. (0-5) Freq Q4prn

## 2021-10-12 NOTE — CONSULTS
CARPAL TUNNEL RELEASE Right 5/6/2019    RIGHT CARPAL TUNNEL RELEASE performed by María Valderrama MD at [place] [address] WITH STENT PLACEMENT  1/29/2016    EYE SURGERY      to have Phaco with IOL OU 2/2018    HERNIA REPAIR      JOINT REPLACEMENT Left 1994    LTHR    JOINT REPLACEMENT Right 2009    RTKR    KNEE SURGERY Right 2011    arthroscopy    [place] MANIPULATN KNEE JT+ANESTHESIA Right 5/12/2017    RIGHT KNEE MANIPULATION UNDER ANESTHESIA performed by Mark Gardiner MD at  [facility] OFFICE/OUTPT VISIT,PROCEDURE ONLY Bilateral 12/29/2017    RIGHT AND BILATERAL L 4-5 LYSIS OF SCAR DISKECTOMY INTERBODY CAGE FUSION POSTEROLATERAL FUSION PEDICLE SCREWS performed by María Valderrama MD at [address] [place] [address]  2004    benign    SPLENECTOMY  1961    TOTAL KNEE ARTHROPLASTY Right 3/24/2017    RIGHT  KNEE TOTAL ARTHROPLASTY, ELHAM CEMENTED PERSONA  performed by [facility]iner MD at [facility]       Current Medications:     sodium chloride flush  5-40 mL IntraVENous 2 times per day    enoxaparin  40 mg SubCUTAneous Daily    acetaminophen  1,000 mg Oral 3 times per day    cefTRIAXone (ROCEPHIN) IV  1,000 mg IntraVENous Q24H    bacitracin zinc   Topical BID       Allergies:  Patient has no known allergies.     Social History     Socioeconomic History    Marital status:      Spouse name: Not on file    Number of children: Not on file    Years of education: Not on file    Highest education level: Not on file   Occupational History    Occupation: retired   Tobacco Use    Smoking status: Current Every Day Smoker     Packs/day: 0.50     Years: 60.00     Pack years: 30.00     Types: Cigarettes    Smokeless tobacco: Never Used    Tobacco comment: also smokes cigars   Vaping Use    Vaping Use: Never used   Substance and Sexual Activity    Alcohol use: Not Currently     Alcohol/week: 12.0 standard drinks     Types: 12 Shots of liquor per week     Comment: quit drinking aug 18th, 2021    Drug use: No    Sexual activity: Yes   Other Topics Concern    Not on file   Social History Narrative    Lives alone     Social Determinants of Health     Financial Resource Strain: Low Risk     Difficulty of Paying Living Expenses: Not very hard   Food Insecurity: No Food Insecurity    Worried About Running Out of Food in the Last Year: Never true    Yoav of Food in the Last Year: Never true   Transportation Needs: No Transportation Needs    Lack of Transportation (Medical): No    Lack of Transportation (Non-Medical): No   Physical Activity:     Days of Exercise per Week:     Minutes of Exercise per Session:    Stress:     Feeling of Stress :    Social Connections:     Frequency of Communication with Friends and Family:     Frequency of Social Gatherings with Friends and Family:     Attends [employer] Services:     Active Member of Clubs or Organizations:     Attends Club or Organization Meetings:     Marital Status:    Intimate Partner Violence:     Fear of Current or Ex-Partner:     Emotionally Abused:     Physically Abused:     Sexually Abused:          Family History:   Family History   Problem Relation Age of Onset    Osteoarthritis Mother     Emphysema Mother     Hypertension Father     Hearing Loss Father     Dementia Father     No Known Problems Sister     Prostate Cancer Brother     Colon Polyps Neg Hx        Review of Systems  14 system review is negative other than HPI    Physical Exam  Vitals:    10/12/21 0530 10/12/21 0600 10/12/21 0630 10/12/21 0900   BP: 119/66 119/75 (!) 122/58 136/73   Pulse: 85 90 97 88   Resp: 18 16 16 18   Temp:    98 °F (36.7 °C)   TempSrc:    Oral   SpO2:   99% 99%   Weight:       Height:         General Appearance: alert and oriented to person, place and time, well-developed and well-nourished, in no acute distress  Skin: warm anddry, no rash.    Head: normocephalic and atraumatic  Eyes: extraocular eye movements intact, conjunctivae normal, anicteric sclerae  ENT: oropharynx clear and moist with normal mucous membranes. No thrush  Lungs: normal respiratory effort, left basilar rales  Heart:RRR, nl S1/S2, no murmur  Abdomen: soft, no tenderness, no H-S-megaly, + BS  Large right shoulder hematoma with tenderness  NEUROLOGICAL: alert and oriented x 3, weak all over  No leg edema  No erythema, no warmth, no tenderness  Left ankle anterior aspect ulcer with some granulation tissue, exposed tendon, no significant drainage      DATA:    Lab Results   Component Value Date    WBC 16.3 (H) 10/11/2021    HGB 7.3 (L) 10/12/2021    HCT 21.6 (L) 10/12/2021    MCV 94.6 10/11/2021     10/11/2021     Lab Results   Component Value Date    CREATININE 1.25 (H) 10/12/2021    BUN 23 10/12/2021     (L) 10/12/2021    K 5.9 (H) 10/12/2021    CL 97 10/12/2021    CO2 18 (L) 10/12/2021       Hepatic Function Panel:   Lab Results   Component Value Date    ALKPHOS 104 10/11/2021    ALT 12 10/11/2021    AST 19 10/11/2021    PROT 6.6 10/11/2021    BILITOT 0.7 10/11/2021    LABALBU 3.9 10/11/2021     Imaging:    FINDINGS:  Remote internal fixation L4 and L5 using posterior bilateral pedicle screws secured bilaterally to vertically oriented rods. Lumbar scoliosis convexity to left apex L3. Loss of height, L3 and L4 vertebral bodies, unchanged. Small anterior osteophytes lower thoracic and lumbar spine. Disc spaces preserved. No fractures, dislocations, bone lesions. Limited imaging of the abdomen and pelvis without anomaly. IMPRESSION:  Soft tissue hematoma, right pelvis, measuring 4.5 x 4.6 x 8.2 cm. Splenectomy. No change, exophytic, calcified gastric nodule as discussed. Remote internal fixation L4 and L5. Left hip replacement. Impression       2.6 x 2.9 cm pleural-based left lower lobe nodule, in patient with known clinical history squamous cell carcinoma left lower lobe. No acute fractures.        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    Patient Active Problem List   Diagnosis    Tobacco use    Hip pain    Knee pain    Chronic back pain    Elevated PSA    Primary osteoarthritis of right knee    Spondylosis of lumbar region without myelopathy or radiculopathy    Sacroiliitis, not elsewhere classified ([address]    Pure hypercholesterolemia    Charcot's joint of left ankle    Left ankle pain    Confusion caused by a drug    DDD (degenerative disc disease), lumbar    Osteoarthritis of spine with myelopathy, lumbosacral region    Chronic bilateral low back pain with bilateral sciatica    Postlaminectomy syndrome, lumbar region    Acquired spondylolisthesis of lumbosacral region    Spinal stenosis of lumbar region with neurogenic claudication    HNP (herniated nucleus pulposus), lumbar    Spondylolisthesis at L4-L5 level    Anesthesia    Herniated nucleus pulposus, L4-5    NSTEMI (non-ST elevated myocardial infarction) ([address]    S/P PTCA (percutaneous transluminal coronary angioplasty)    Right upper quadrant abdominal pain    Gallbladder polyp    Biliary dyskinesia    Carpal tunnel syndrome on right    Carpal tunnel syndrome on left    Angina effort    Dyslipidemia    FELDER (dyspnea on exertion)    CAD (coronary artery disease)    Chest pain    Lung nodule seen on imaging study    Bilateral carotid artery stenosis    Essential hypertension    NSCLC of left lung ([address]    Ataxic gait    Dizziness    Fracture, Colles, right, closed    Heroin abuse ([address]    Non-pressure chronic ulcer of left ankle with fat layer exposed [facility]    Atherosclerosis of native arteries of extremities with rest pain, left leg (HCC)    Osteomyelitis (HCC)    Osteomyelitis of ankle (HCC)    Hyponatremia    Frequent falls    Chronic kidney disease    Fracture of right humerus    Hyperkalemia    Leukocytosis    Anemia    Acute hematogenous osteomyelitis, left ankle and foot (HCC)       PLAN:  · Blood cultures  · IV Rocephin  · Local wound care  · Agree with neurology consult  · Follow-up with orthopedics for hematoma  ·     Discussed with patient and family    George Knapp MD
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
## 2021-10-12 NOTE — CONSULTS
[id number] [facility] Neurology Consult Note  Name: Kaden Diego  Age: 64 y.o. Gender: male  CodeStatus: Full Code  Allergies: No Known Allergies    Chief Complaint:Fall    Primary Care Provider: Darek Hilliard MD  InpatientTreatment Team: Treatment Team: Attending Provider: Heath Lesches, MD; Consulting Physician: Jovita Arana MD; Consulting Physician: Meagan Balderas MD; Consulting Physician: Macarena Wiley MD; Consulting Physician: Heath Lesches, MD; Registered Nurse: Adam Villeda, RN; Registered Nurse: Ricardo Mariscal, RN; Utilization Reviewer: Edgar Cruz RN  Admission Date: 10/11/2021      [facility]   Neurology consulted Veronica Gonzales CNP for dizziness. Ivelisse Sanchez is a 75-year-old male with a past medical history of CAD, non-small cell lung cancer of the left lung, Charcot's joint of the left ankle, hypertension, sacroiliitis, severe lumbar stenosis of the lumbar spine (deemed nonoperable by neurosurgery), CKD, and NSTEMI    Patient is well-known to Dr. Lawrence Yang as he was seen as an outpatient for disequilibrium found to be related to severe's lumbar stenosis at L2-3 and L4-L5. Neurosurgery was consulted in June 2021 and surgical intervention not recommended at that time. Per note, surgery would be a consideration if patient develops pseudoclaudication syndrome. Patient presented to the ER on 10/12/2021 for frequent falls. Also incidentally found to have hyponatremia with a sodium of 122. Patient reports that he has fallen 3 times within the past several months. Falls have resulted in left wrist fracture. Falls described as occurring mostly when walking in which his legs give out. Patient reports that his legs are weak. Current situation occurred when patient was ambulated to the bathroom and lost his balance. Found to have fractured his right proximal humerus. Patient has had falls in the past thought to be related to alcohol use, but patient has stopped using alcohol 8 weeks ago.     No Known Allergies    Patient Active Problem List   Diagnosis    Tobacco use    Hip pain    Knee pain    Chronic back pain    Elevated PSA    Primary osteoarthritis of right knee    Spondylosis of lumbar region without myelopathy or radiculopathy    Sacroiliitis, not elsewhere classified ([address]    Pure hypercholesterolemia    Charcot's joint of left ankle    Left ankle pain    Confusion caused by a drug    DDD (degenerative disc disease), lumbar    Osteoarthritis of spine with myelopathy, lumbosacral region    Chronic bilateral low back pain with bilateral sciatica    Postlaminectomy syndrome, lumbar region    Acquired spondylolisthesis of lumbosacral region    Spinal stenosis of lumbar region with neurogenic claudication    HNP (herniated nucleus pulposus), lumbar    Spondylolisthesis at L4-L5 level    Anesthesia    Herniated nucleus pulposus, L4-5    NSTEMI (non-ST elevated myocardial infarction) ([facility])    S/P PTCA (percutaneous transluminal coronary angioplasty)    Right upper quadrant abdominal pain    Gallbladder polyp    Biliary dyskinesia    Carpal tunnel syndrome on right    Carpal tunnel syndrome on left    Angina effort    Dyslipidemia    FELDER (dyspnea on exertion)    CAD (coronary artery disease)    Chest pain    Lung nodule seen on imaging study    Bilateral carotid artery stenosis    Essential hypertension    NSCLC of left lung ([facility])    Ataxic gait    Dizziness    Fracture, Col[place], right, closed    Heroin abuse ([address]    Non-pressure chronic ulcer of left ankle with fat layer exposed ([address]    Atherosclerosis of native arteries of extremities with rest pain, left leg ([facility])    Osteomyelitis ([address]    Osteomyelitis of ankle ([facility])    Hyponatremia    Frequent falls    Chronic kidney disease    Fracture of right humerus    Hyperkalemia    Leukocytosis    Anemia    Acute hematogenous osteomyelitis, left ankle and foot ([address]       Past Medical History:   Diagnosis Date    Alcohol abuse     quit drinking 8/18/21    CAD (coronary artery disease)     patient states no doctor has told him this / has 1 cardiac stent    Cancer ([address]     lung LLL    Chronic back pain     Chronic kidney disease     Chronic sinusitis     DJD (degenerative joint disease) of knee     left knee DJD    Elevated PSA     History of blood transfusion 1980's    History of inferior wall myocardial infarction 01/2016    1 cardiac stent    HTN (hypertension)     meds .  1 yr    Hyperlipidemia     meds > 12 yrs    NSTEMI (non-ST elevated myocardial infarction) ([address]     Smoker        Family History   Problem Relation Age of Onset    Osteoarthritis Mother     Emphysema Mother     Hypertension Father     Hearing Loss Father     Dementia Father     No Known Problems Sister     Prostate Cancer Brother     Colon Polyps Neg Hx        Past Surgical History:   Procedure Laterality Date    ABDOMEN SURGERY  1961    spleenectomy due to [address]  2009    lumbar disc OR    CARDIAC SURGERY      stents    CARPAL TUNNEL RELEASE Right 5/6/2019    RIGHT CARPAL TUNNEL RELEASE performed by Brandie Toscano MD at [address]  1/29/2016    EYE SURGERY      to have Phaco with IOL OU 2/2018    HERNIA REPAIR      JOINT REPLACEMENT Left 1994    LTHR    JOINT REPLACEMENT Right 2009    RTKR    KNEE SURGERY Right 2011    arthroscopy    AR MANIPULATN KNEE JT+ANESTHESIA Right 5/12/2017    RIGHT KNEE MANIPULATION UNDER ANESTHESIA performed by Bk Raman MD at [address] [facility] OFFICE/OUTPT VISIT,PROCEDURE ONLY Bilateral 12/29/2017    RIGHT AND BILATERAL L 4-5 LYSIS OF SCAR DISKECTOMY INTERBODY CAGE FUSION POSTEROLATERAL FUSION PEDICLE SCREWS performed by Brandie Toscano MD at [facility] [address]  2004    [address] Right 3/24/2017    RIGHT  KNEE TOTAL ARTHROPLASTY, ELHAM CEMENTED PERSONA  performed by Bk Raman MD at [address] [place] [address] History     Socioeconomic History Marital status:      Spouse name: None    Number of children: None    Years of education: None    Highest education level: None   Occupational History    Occupation: retired   Tobacco Use    Smoking status: Current Every Day Smoker     Packs/day: 0.50     Years: 60.00     Pack years: 30.00     Types: Cigarettes    Smokeless tobacco: Never Used    Tobacco comment: also smokes cigars   Vaping Use    Vaping Use: Never used   Substance and Sexual Activity    Alcohol use: Not Currently     Alcohol/week: 12.0 standard drinks     Types: 12 Shots of liquor per week     Comment: quit drinking aug 18th, 2021    Drug use: No    Sexual activity: Yes   Other Topics Concern    None   Social History Narrative    Lives alone     Social Determinants of Health     Financial Resource Strain: Low Risk     Difficulty of Paying Living Expenses: Not very hard   Food Insecurity: No Food Insecurity    Worried About Running Out of Food in the Last Year: Never true    Ran Out of Food in the Last Year: Never true   Transportation Needs: No Transportation Needs    Lack of Transportation (Medical): No    Lack of Transportation (Non-Medical): No   Physical Activity:     Days of Exercise per Week:     Minutes of Exercise per Session:    Stress:     Feeling of Stress :    Social Connections:     Frequency of Communication with Friends and Family:     Frequency of Social Gatherings with Friends and Family:     Attends [employer] Services: Active Member of Clubs or Organizations:     Attends Club or Organization Meetings:     Marital Status:    Intimate Partner Violence:     Fear of Current or Ex-Partner:     Emotionally Abused:     Physically Abused:     Sexually Abused:         Vitals:    10/12/21 0900   BP: 136/73   Pulse: 88   Resp: 18   Temp: 98 °F (36.7 °C)   SpO2: 99%       Review of Systems   Constitutional: Negative for diaphoresis and fever. HENT: Negative for congestion and trouble swallowing.     Eyes: Negative for photophobia and visual disturbance. Respiratory: Negative for chest tightness and shortness of breath. Cardiovascular: Negative for chest pain. Gastrointestinal: Negative for diarrhea, nausea and vomiting. Musculoskeletal: Positive for back pain and gait problem. Skin: Negative for color change. Neurological: Positive for weakness and numbness. Negative for dizziness, tremors, seizures, syncope, facial asymmetry, speech difficulty, light-headedness and headaches. Psychiatric/Behavioral: Negative for hallucinations and self-injury. Physical Exam  Vitals and nursing note reviewed. Constitutional:       Appearance: Normal appearance. HENT:      Head: Normocephalic and atraumatic. Eyes:      Conjunctiva/sclera: Conjunctivae normal.   Cardiovascular:      Rate and Rhythm: Normal rate and regular rhythm. Pulses: Normal pulses. Heart sounds: Normal heart sounds. Pulmonary:      Effort: Pulmonary effort is normal.      Breath sounds: Normal breath sounds. Musculoskeletal:         General: Normal range of motion. Skin:     General: Skin is warm and dry. Neurological:      Mental Status: He is alert and oriented to person, place, and time. Mental status is at baseline. Cranial Nerves: No cranial nerve deficit. Sensory: No sensory deficit. Motor: Weakness present.       Coordination: Coordination normal.      Gait: Gait abnormal.      Deep Tendon Reflexes: Reflexes abnormal.   Psychiatric:         Mood and Affect: Mood normal.         Behavior: Behavior normal.     Patient is areflexic in his bilateral lower extremities  Lower extremity strength is 4 -/5  Did not walk patient due to fall risk      Medications:  Reviewed    Infusion Medications:    sodium chloride      sodium chloride       Scheduled Medications:    sodium chloride flush  5-40 mL IntraVENous 2 times per day    enoxaparin  40 mg SubCUTAneous Daily    acetaminophen  1,000 mg Oral 3 times per day    bacitracin zinc Topical BID     PRN Meds: sodium chloride flush, sodium chloride, ondansetron **OR** ondansetron, polyethylene glycol, traMADol **OR** traMADol    Labs:   Recent Labs     10/11/21  1952 10/12/21  0508   WBC 16.3*  --    HGB 8.6* 7.3*   HCT 25.8* 21.6*     --      Recent Labs     10/11/21  1830 10/12/21  0445   * 125*   K 5.9* 5.9*   CL 92* 97   CO2 16* 18*   BUN 25* 23   CREATININE 1.73* 1.25*   CALCIUM 9.3 8.6     Recent Labs     10/11/21  1830   AST 19   ALT 12   BILITOT 0.7   ALKPHOS 104     Recent Labs     10/11/21  1950   INR 1.2     Recent Labs     10/11/21  1830   CKTOTAL 462*   TROPONINI <0.010       Urinalysis:   Lab Results   Component Value Date    NITRU Negative 10/11/2021    WBCUA 0-2 10/11/2021    BACTERIA Negative 10/11/2021    RBCUA 3-5 10/11/2021    BLOODU MODERATE 10/11/2021    SPECGRAV 1.017 10/11/2021    GLUCOSEU Negative 10/11/2021       Radiology:   Most recent    EEG No valid procedures specified. MRI of Brain No results found for this or any previous visit. Results for orders placed during the hospital encounter of 04/09/21    MRI BRAIN WO CONTRAST    Narrative  EXAM: MRI of the brain without contrast    History: Ataxic gait. Dizziness. Technique: Multiplanar multisequence MRI of the brain was performed without contrast.    Comparison: None available    Findings:    Areas of hyperintense T2/FLAIR signal within the bilateral supratentorial white matter and brenna are nonspecific but most compatible with chronic small vessel ischemic changes in a patient of this age. Prominence of the sulci and ventricles compatible with mild generalized parenchymal volume loss. No acute hemorrhage, mass effect, midline shift, or abnormal extra-axial fluid collection. No mass identified MRI without contrast. Cerebellum appears  normal    There is no diffusion restriction. No susceptibility artifact is identified on the gradient echo sequence.     The major intracranial vascular flow voids are maintained. Cranial nerves 7/8 complexes appear grossly unremarkable. The visualized paranasal sinuses and bilateral mastoid air cells are essentially clear. Impression  No acute intracranial process. Generalized parenchymal volume loss and nonspecific white matter findings most compatible with chronic small vessel ischemic changes in a patient of this age. MRA of the Head and Neck: No results found for this or any previous visit. No results found for this or any previous visit. No results found for this or any previous visit. CT of the Head: Results for orders placed during the hospital encounter of 10/11/21    CT Head WO Contrast    Narrative  CT Brain. Contrast medium:  without contrast.. History:  Fall. Loss of consciousness. Invasive squamous cell carcinoma, left lower lobe. Technical factors: CT imaging of the brain was obtained and formatted as 5 mm contiguous axial images. 2.5 mm contiguous axial images were obtained through the osseous structures. Sagittal and coronal reconstruction obtained during postprocessing. Comparison:  CT brain, August 18, 2021. Hansel Rikki Findings:    Extra-axial spaces:  Normal.    Intracranial hemorrhage:  None. Ventricular system: Ventricles mildly enlarged. Sulci mildly prominent. Basal Cisterns:  Normal.    Cerebral Parenchyma: Bilateral symmetric periventricular areas decreased attenuation. Midline Shift:  None. Cerebellum:  Normal.    Paranasal sinuses and mastoid air cells:  Normal.    Visualized Orbits:  Normal.    Impression  Impression:    Mild cerebral atrophy. Chronic ischemic white matter disease. All CT scans at this facility use dose modulation, iterative reconstruction, and/or weight based dosing when appropriate to reduce radiation dose to as low as reasonably achievable. No results found for this or any previous visit.   No results found for this or any previous visit. Carotid duplex: No results found for this or any previous visit. No results found for this or any previous visit. Results for orders placed during the hospital encounter of 02/29/20    US CAROTID ARTERY BILATERAL    Narrative  EXAMINATION:  CAROTID DUPLEX ULTRASONOGRAPHY    CLINICAL HISTORY:  CAROTID STENOSIS    COMPARISONS:  4/18/2018    TECHNIQUE:  B-mode, color flow and spectral Doppler    FINDINGS:    ARTERIAL BLOOD FLOW VELOCITY    RIGHT PS    Prox CCA    99.4 cm/s  Mid CCA     94.4 cm/s  Dist CCA    76.8 cm/s  Prox ICA    57.5 cm/s  Mid ICA     71.3 cm/s  Dist ICA    73.7 cm/s  Prox ECA    110 cm/s  Prox VERT   58 cm/s    ICA/CCA     0.78    LEFT PS    Prox CCA    99.3 cm/s  Mid CCA     93.3 cm/s  Dist CCA    87.3 cm/s  Prox ICA    55.4 cm/s  Mid ICA     74.1 cm/s  Dist ICA    60.9 cm/s  Prox ECA    86.2 cm/s  Prox VERT   41.2 cm/s    ICA/CCA     0.83    Impression  MILD TO MODERATE DEGREE OF ATHEROSCLEROSIS SCATTERED THROUGHOUT THE CAROTID TREE ON BOTH SIDES. SOME ARE CALCIFIED. INTIMAL THICKENING NOTED. BILATERAL ICA LESS THAN 50% STENOSIS. BILATERAL ANTEGRADE VERTEBRAL FLOW. Echo No results found for this or any previous visit. Assessment/Plan:  73-year-old male hospitalized for frequent falls, hyponatremia, and hyperkalemia. Patient is well-known to us has a known history of lumbar stenosis at L2-L3 and L4L5. Previously seen by neurosurgery and not deemed a surgical candidate at that time. Patient has since had increasing weakness and 3 falls, and I will read consult neurosurgery. Falls likely related to disequilibrium caused by severe stenosis. Of note, patient was found to have active lung cancer, possibly squamous cell carcinoma (problems list indicates non-small cell lung cancer of the left lung) based on recent PET scan. Given patient's hyponatremia and hyperkalemia, will obtain ACTH and a.m. cortisol to screen for paraneoplastic syndrome.   Consider SIADH related to lung cancer. However, patient's hyponatremia may be related to to poor p.o. intake. CT of the head was negative for any acute findings but did show chronic ischemic white matter disease. Will obtain orthostatics given that patient had some suggestion of lightheadedness as well.       Electronically signed by Julio Cesar Bermudez PA-C on 10/12/2021 at 10:34 AM

## 2021-10-12 NOTE — ED NOTES
Per hospitalist NP patient can have sips of eater with mediations.       Madonna Duval RN  10/12/21 [id number]

## 2021-10-13 ENCOUNTER — APPOINTMENT (OUTPATIENT)
Dept: MRI IMAGING | Age: 76
DRG: 640 | End: 2021-10-13
Payer: MEDICARE

## 2021-10-13 PROBLEM — F05 SUNDOWNING: Status: ACTIVE | Noted: 2021-10-13

## 2021-10-13 PROBLEM — F05 DELIRIUM DUE TO GENERAL MEDICAL CONDITION: Status: ACTIVE | Noted: 2017-04-20
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
## 2021-10-13 PROBLEM — R25.1 TREMOR OF UNKNOWN ORIGIN: Status: ACTIVE | Noted: 2021-10-13

## 2021-10-13 PROBLEM — N50.1 PELVIC HEMATOMA, MALE: Status: ACTIVE | Noted: 2021-10-13

## 2021-10-13 PROBLEM — G89.11 ACUTE PAIN DUE TO TRAUMA: Status: ACTIVE | Noted: 2021-10-13

## 2021-10-13 LAB
ABO/RH: NORMAL
ALBUMIN SERPL-MCNC: 3.4 G/DL (ref 3.5–4.6)
ALP BLD-CCNC: 76 U/L (ref 35–104)
ALT SERPL-CCNC: 16 U/L (ref 0–41)
ANION GAP SERPL CALCULATED.3IONS-SCNC: 12 MEQ/L (ref 9–15)
ANION GAP SERPL CALCULATED.3IONS-SCNC: 13 MEQ/L (ref 9–15)
ANTIBODY SCREEN: NORMAL
AST SERPL-CCNC: 28 U/L (ref 0–40)
BASOPHILS ABSOLUTE: 0 K/UL (ref 0–0.2)
BASOPHILS RELATIVE PERCENT: 0.3 %
BILIRUB SERPL-MCNC: 0.3 MG/DL (ref 0.2–0.7)
BUN BLDV-MCNC: 15 MG/DL (ref 8–23)
BUN BLDV-MCNC: 19 MG/DL (ref 8–23)
BUN BLDV-MCNC: 19 MG/DL (ref 8–23)
BUN BLDV-MCNC: 23 MG/DL (ref 8–23)
CALCIUM SERPL-MCNC: 8.5 MG/DL (ref 8.5–9.9)
CALCIUM SERPL-MCNC: 8.6 MG/DL (ref 8.5–9.9)
CALCIUM SERPL-MCNC: 8.8 MG/DL (ref 8.5–9.9)
CALCIUM SERPL-MCNC: 8.9 MG/DL (ref 8.5–9.9)
CHLORIDE BLD-SCNC: 102 MEQ/L (ref 95–107)
CHLORIDE BLD-SCNC: 102 MEQ/L (ref 95–107)
CHLORIDE BLD-SCNC: 103 MEQ/L (ref 95–107)
CHLORIDE BLD-SCNC: 105 MEQ/L (ref 95–107)
CO2: 15 MEQ/L (ref 20–31)
CO2: 17 MEQ/L (ref 20–31)
CO2: 18 MEQ/L (ref 20–31)
CO2: 18 MEQ/L (ref 20–31)
CREAT SERPL-MCNC: 0.96 MG/DL (ref 0.7–1.2)
CREAT SERPL-MCNC: 1.06 MG/DL (ref 0.7–1.2)
CREAT SERPL-MCNC: 1.08 MG/DL (ref 0.7–1.2)
CREAT SERPL-MCNC: 1.39 MG/DL (ref 0.7–1.2)
EOSINOPHILS ABSOLUTE: 0 K/UL (ref 0–0.7)
EOSINOPHILS RELATIVE PERCENT: 0.1 %
FOLATE: 5.4 NG/ML (ref 7.3–26.1)
GFR AFRICAN AMERICAN: 59.8
GFR AFRICAN AMERICAN: >60
GFR NON-AFRICAN AMERICAN: 49.4
GFR NON-AFRICAN AMERICAN: >60
GLOBULIN: 2.3 G/DL (ref 2.3–3.5)
GLUCOSE BLD-MCNC: 103 MG/DL (ref 70–99)
GLUCOSE BLD-MCNC: 115 MG/DL (ref 70–99)
GLUCOSE BLD-MCNC: 92 MG/DL (ref 70–99)
GLUCOSE BLD-MCNC: 99 MG/DL (ref 70–99)
HCT VFR BLD CALC: 19 % (ref 42–52)
HCT VFR BLD CALC: 20.6 % (ref 42–52)
HEMOGLOBIN: 6.3 G/DL (ref 14–18)
HEMOGLOBIN: 7 G/DL (ref 14–18)
IRON SATURATION: 21 % (ref 11–46)
IRON: 39 UG/DL (ref 59–158)
LYMPHOCYTES ABSOLUTE: 1.2 K/UL (ref 1–4.8)
LYMPHOCYTES RELATIVE PERCENT: 8 %
MAGNESIUM: 1.9 MG/DL (ref 1.7–2.4)
MCH RBC QN AUTO: 31.6 PG (ref 27–31.3)
MCHC RBC AUTO-ENTMCNC: 33.3 % (ref 33–37)
MCV RBC AUTO: 94.7 FL (ref 80–100)
MONOCYTES ABSOLUTE: 1 K/UL (ref 0.2–0.8)
MONOCYTES RELATIVE PERCENT: 7.1 %
NEUTROPHILS ABSOLUTE: 12.4 K/UL (ref 1.4–6.5)
NEUTROPHILS RELATIVE PERCENT: 84.5 %
PDW BLD-RTO: 12.9 % (ref 11.5–14.5)
PLATELET # BLD: 279 K/UL (ref 130–400)
POTASSIUM REFLEX MAGNESIUM: 5.5 MEQ/L (ref 3.4–4.9)
POTASSIUM SERPL-SCNC: 4.9 MEQ/L (ref 3.4–4.9)
POTASSIUM SERPL-SCNC: 5.4 MEQ/L (ref 3.4–4.9)
POTASSIUM SERPL-SCNC: 5.6 MEQ/L (ref 3.4–4.9)
RBC # BLD: 2 M/UL (ref 4.7–6.1)
SODIUM BLD-SCNC: 132 MEQ/L (ref 135–144)
SODIUM BLD-SCNC: 133 MEQ/L (ref 135–144)
TOTAL IRON BINDING CAPACITY: 189 UG/DL (ref 178–450)
TOTAL PROTEIN: 5.7 G/DL (ref 6.3–8)
TSH SERPL DL<=0.05 MIU/L-ACNC: 0.45 UIU/ML (ref 0.44–3.86)
VITAMIN B-12: 336 PG/ML (ref 232–1245)
WBC # BLD: 14.6 K/UL (ref 4.8–10.8)

## 2021-10-13 PROCEDURE — 6370000000 HC RX 637 (ALT 250 FOR IP): Performed by: ANESTHESIOLOGY

## 2021-10-13 PROCEDURE — 84145 PROCALCITONIN (PCT): CPT

## 2021-10-13 PROCEDURE — 83550 IRON BINDING TEST: CPT

## 2021-10-13 PROCEDURE — 70553 MRI BRAIN STEM W/O & W/DYE: CPT

## 2021-10-13 PROCEDURE — 97162 PT EVAL MOD COMPLEX 30 MIN: CPT

## 2021-10-13 PROCEDURE — 36415 COLL VENOUS BLD VENIPUNCTURE: CPT

## 2021-10-13 PROCEDURE — 94760 N-INVAS EAR/PLS OXIMETRY 1: CPT

## 2021-10-13 PROCEDURE — 86923 COMPATIBILITY TEST ELECTRIC: CPT

## 2021-10-13 PROCEDURE — 85025 COMPLETE CBC W/AUTO DIFF WBC: CPT

## 2021-10-13 PROCEDURE — 83735 ASSAY OF MAGNESIUM: CPT

## 2021-10-13 PROCEDURE — 6370000000 HC RX 637 (ALT 250 FOR IP): Performed by: ORTHOPAEDIC SURGERY

## 2021-10-13 PROCEDURE — P9016 RBC LEUKOCYTES REDUCED: HCPCS

## 2021-10-13 PROCEDURE — 86900 BLOOD TYPING SEROLOGIC ABO: CPT

## 2021-10-13 PROCEDURE — 2580000003 HC RX 258: Performed by: NURSE PRACTITIONER

## 2021-10-13 PROCEDURE — 80053 COMPREHEN METABOLIC PANEL: CPT

## 2021-10-13 PROCEDURE — 82607 VITAMIN B-12: CPT

## 2021-10-13 PROCEDURE — 94640 AIRWAY INHALATION TREATMENT: CPT

## 2021-10-13 PROCEDURE — 6370000000 HC RX 637 (ALT 250 FOR IP): Performed by: INTERNAL MEDICINE

## 2021-10-13 PROCEDURE — 1210000000 HC MED SURG R&B

## 2021-10-13 PROCEDURE — 84425 ASSAY OF VITAMIN B-1: CPT

## 2021-10-13 PROCEDURE — A9577 INJ MULTIHANCE: HCPCS | Performed by: INTERNAL MEDICINE

## 2021-10-13 PROCEDURE — 84443 ASSAY THYROID STIM HORMONE: CPT

## 2021-10-13 PROCEDURE — 82746 ASSAY OF FOLIC ACID SERUM: CPT

## 2021-10-13 PROCEDURE — 99232 SBSQ HOSP IP/OBS MODERATE 35: CPT | Performed by: INTERNAL MEDICINE

## 2021-10-13 PROCEDURE — 2580000003 HC RX 258: Performed by: INTERNAL MEDICINE

## 2021-10-13 PROCEDURE — 86901 BLOOD TYPING SEROLOGIC RH(D): CPT

## 2021-10-13 PROCEDURE — APPSS45 APP SPLIT SHARED TIME 31-45 MINUTES: Performed by: STUDENT IN AN ORGANIZED HEALTH CARE EDUCATION/TRAINING PROGRAM

## 2021-10-13 PROCEDURE — 97535 SELF CARE MNGMENT TRAINING: CPT

## 2021-10-13 PROCEDURE — 94761 N-INVAS EAR/PLS OXIMETRY MLT: CPT

## 2021-10-13 PROCEDURE — 99223 1ST HOSP IP/OBS HIGH 75: CPT | Performed by: NEUROLOGICAL SURGERY

## 2021-10-13 PROCEDURE — 6360000004 HC RX CONTRAST MEDICATION: Performed by: INTERNAL MEDICINE

## 2021-10-13 PROCEDURE — 82728 ASSAY OF FERRITIN: CPT

## 2021-10-13 PROCEDURE — 99233 SBSQ HOSP IP/OBS HIGH 50: CPT | Performed by: PSYCHIATRY & NEUROLOGY

## 2021-10-13 PROCEDURE — 6360000002 HC RX W HCPCS: Performed by: INTERNAL MEDICINE

## 2021-10-13 PROCEDURE — 83540 ASSAY OF IRON: CPT

## 2021-10-13 PROCEDURE — 99213 OFFICE O/P EST LOW 20 MIN: CPT

## 2021-10-13 PROCEDURE — 6370000000 HC RX 637 (ALT 250 FOR IP): Performed by: STUDENT IN AN ORGANIZED HEALTH CARE EDUCATION/TRAINING PROGRAM

## 2021-10-13 PROCEDURE — 86850 RBC ANTIBODY SCREEN: CPT

## 2021-10-13 RX ORDER — GABAPENTIN 100 MG/1
100 CAPSULE ORAL 3 TIMES DAILY
Status: DISCONTINUED | OUTPATIENT
Start: 2021-10-13 | End: 2021-10-15 | Stop reason: HOSPADM

## 2021-10-13 RX ORDER — SODIUM CHLORIDE 9 MG/ML
INJECTION, SOLUTION INTRAVENOUS PRN
Status: DISCONTINUED | OUTPATIENT
Start: 2021-10-13 | End: 2021-10-15 | Stop reason: HOSPADM

## 2021-10-13 RX ORDER — HALOPERIDOL 0.5 MG/1
0.5 TABLET ORAL EVERY 8 HOURS PRN
Status: DISCONTINUED | OUTPATIENT
Start: 2021-10-13 | End: 2021-10-15 | Stop reason: HOSPADM

## 2021-10-13 RX ORDER — ACETAMINOPHEN 80 MG
TABLET,CHEWABLE ORAL ONCE
Status: DISCONTINUED | OUTPATIENT
Start: 2021-10-13 | End: 2021-10-15 | Stop reason: HOSPADM

## 2021-10-13 RX ORDER — OXYCODONE HYDROCHLORIDE 5 MG/1
5 TABLET ORAL EVERY 4 HOURS PRN
Status: DISCONTINUED | OUTPATIENT
Start: 2021-10-13 | End: 2021-10-15

## 2021-10-13 RX ORDER — LIDOCAINE 4 G/G
3 PATCH TOPICAL DAILY
Status: DISCONTINUED | OUTPATIENT
Start: 2021-10-13 | End: 2021-10-15 | Stop reason: HOSPADM

## 2021-10-13 RX ORDER — SODIUM CHLORIDE 0.9 % (FLUSH) 0.9 %
10 SYRINGE (ML) INJECTION ONCE
Status: COMPLETED | OUTPATIENT
Start: 2021-10-13 | End: 2021-10-13

## 2021-10-13 RX ORDER — ACETAMINOPHEN 325 MG/1
650 TABLET ORAL
Status: DISCONTINUED | OUTPATIENT
Start: 2021-10-13 | End: 2021-10-15 | Stop reason: HOSPADM

## 2021-10-13 RX ORDER — LANOLIN ALCOHOL/MO/W.PET/CERES
100 CREAM (GRAM) TOPICAL 3 TIMES DAILY
Status: DISCONTINUED | OUTPATIENT
Start: 2021-10-13 | End: 2021-10-15 | Stop reason: HOSPADM

## 2021-10-13 RX ORDER — FOLIC ACID 1 MG/1
1 TABLET ORAL DAILY
Status: DISCONTINUED | OUTPATIENT
Start: 2021-10-13 | End: 2021-10-15 | Stop reason: HOSPADM

## 2021-10-13 RX ORDER — METAXALONE 800 MG/1
400 TABLET ORAL EVERY 6 HOURS PRN
Status: DISCONTINUED | OUTPATIENT
Start: 2021-10-13 | End: 2021-10-15 | Stop reason: HOSPADM

## 2021-10-13 RX ADMIN — TIOTROPIUM BROMIDE INHALATION SPRAY 2 PUFF: 3.12 SPRAY, METERED RESPIRATORY (INHALATION) at 07:18

## 2021-10-13 RX ADMIN — FOLIC ACID 1 MG: 1 TABLET ORAL at 16:10

## 2021-10-13 RX ADMIN — ACETAMINOPHEN 1000 MG: 500 TABLET ORAL at 16:09

## 2021-10-13 RX ADMIN — METOPROLOL TARTRATE 25 MG: 25 TABLET, FILM COATED ORAL at 21:57

## 2021-10-13 RX ADMIN — CEFTRIAXONE SODIUM 1000 MG: 1 INJECTION, POWDER, FOR SOLUTION INTRAMUSCULAR; INTRAVENOUS at 11:36

## 2021-10-13 RX ADMIN — BUDESONIDE AND FORMOTEROL FUMARATE DIHYDRATE 2 PUFF: 160; 4.5 AEROSOL RESPIRATORY (INHALATION) at 19:11

## 2021-10-13 RX ADMIN — ATORVASTATIN CALCIUM 20 MG: 20 TABLET, FILM COATED ORAL at 21:57

## 2021-10-13 RX ADMIN — ACETAMINOPHEN 650 MG: 325 TABLET ORAL at 21:57

## 2021-10-13 RX ADMIN — ACETAMINOPHEN 1000 MG: 500 TABLET ORAL at 05:11

## 2021-10-13 RX ADMIN — PANTOPRAZOLE SODIUM 40 MG: 40 TABLET, DELAYED RELEASE ORAL at 05:11

## 2021-10-13 RX ADMIN — FINASTERIDE 5 MG: 5 TABLET, FILM COATED ORAL at 09:53

## 2021-10-13 RX ADMIN — Medication 10 ML: at 18:38

## 2021-10-13 RX ADMIN — SODIUM CHLORIDE: 9 INJECTION, SOLUTION INTRAVENOUS at 21:57

## 2021-10-13 RX ADMIN — SODIUM CHLORIDE: 9 INJECTION, SOLUTION INTRAVENOUS at 05:15

## 2021-10-13 RX ADMIN — BUDESONIDE AND FORMOTEROL FUMARATE DIHYDRATE 2 PUFF: 160; 4.5 AEROSOL RESPIRATORY (INHALATION) at 07:17

## 2021-10-13 RX ADMIN — Medication 100 MG: at 21:57

## 2021-10-13 RX ADMIN — METOPROLOL TARTRATE 25 MG: 25 TABLET, FILM COATED ORAL at 09:53

## 2021-10-13 RX ADMIN — OXYCODONE 5 MG: 5 TABLET ORAL at 22:02

## 2021-10-13 RX ADMIN — GADOBENATE DIMEGLUMINE 15 ML: 529 INJECTION, SOLUTION INTRAVENOUS at 14:46

## 2021-10-13 RX ADMIN — CITALOPRAM HYDROBROMIDE 40 MG: 10 TABLET ORAL at 09:53

## 2021-10-13 RX ADMIN — GABAPENTIN 100 MG: 100 CAPSULE ORAL at 21:57

## 2021-10-13 RX ADMIN — SODIUM ZIRCONIUM CYCLOSILICATE 5 G: 5 POWDER, FOR SUSPENSION ORAL at 16:10

## 2021-10-13 RX ADMIN — SODIUM ZIRCONIUM CYCLOSILICATE 5 G: 5 POWDER, FOR SUSPENSION ORAL at 21:58

## 2021-10-13 ASSESSMENT — PAIN DESCRIPTION - DESCRIPTORS: DESCRIPTORS: ACHING;SORE

## 2021-10-13 ASSESSMENT — PAIN SCALES - GENERAL
PAINLEVEL_OUTOF10: 8
PAINLEVEL_OUTOF10: 8
PAINLEVEL_OUTOF10: 5
PAINLEVEL_OUTOF10: 5
PAINLEVEL_OUTOF10: 6
PAINLEVEL_OUTOF10: 8
PAINLEVEL_OUTOF10: 4

## 2021-10-13 ASSESSMENT — ENCOUNTER SYMPTOMS
GASTROINTESTINAL NEGATIVE: 1
COLOR CHANGE: 1
BACK PAIN: 1
RESPIRATORY NEGATIVE: 1
EYES NEGATIVE: 1
ALLERGIC/IMMUNOLOGIC NEGATIVE: 1

## 2021-10-13 ASSESSMENT — PAIN DESCRIPTION - ORIENTATION: ORIENTATION: RIGHT

## 2021-10-13 ASSESSMENT — PAIN DESCRIPTION - FREQUENCY: FREQUENCY: INTERMITTENT

## 2021-10-13 ASSESSMENT — PAIN DESCRIPTION - ONSET: ONSET: SUDDEN

## 2021-10-13 ASSESSMENT — PAIN DESCRIPTION - PROGRESSION: CLINICAL_PROGRESSION: NOT CHANGED

## 2021-10-13 ASSESSMENT — PAIN DESCRIPTION - PAIN TYPE: TYPE: ACUTE PAIN

## 2021-10-13 NOTE — CONSULTS
Patient Name: Ynes Ingram Date: 10/11/2021  6:18 PM  MR #: [id number]  : 1945    Attending Physician: Jesse Griffith MD  Reason for consult: Inability to ambulate    History of Presenting Illness and Review of Carlos Neda is a 64 y.o. male on hospital day 2 . History Obtained From:  patient, electronic medical record  Patient has a multiplicity of issues comorbidities and medications. He is admitted after a fall with right humeral head fracture. Known heavy alcohol abuser. COPD from past history of smoking and lung cancer diagnosed May 2021. Chronically anemic requiring blood transfusions. Has osteomyelitis of the left ankle talus. Metabolically challenged with hyponatremia. Chronic kidney disease dementia and coronary artery disease. History of lumbar fusion L4-5. Right knee surgery which did not help him and he is unable to ambulate because of his knees.  left hip replaced. He is on chronic opioids. He has been in a wheelchair for his balance problems for over a year. He does not have any radicular pain. He has neuropathy balance issues bad knees which keeps him from ambulating keeping him in a wheelchair. Seen on 2021 at which time my evaluation was that he is not a candidate for surgery and there is no significant change for that opinion. 2019 right wrist median nerve neuroplasty     2009 right and bilateral L3-4 and L4-5 decompressive laminectomies.     2017 right and bilateral L4-5 lysis of scar discectomy interbody cage fusion posterior lateral pedicle screw fusion    MRI 2021 shows the postoperative changes only moderate canal stenosis L2-3-4. CT scan lumbar 10/11/2021 does not show significant canal stenosis.     History:      Past Medical History:   Diagnosis Date    Alcohol abuse     quit drinking 19    CAD (coronary artery disease)     patient states no doctor has told him this / has 1 cardiac stent    Cancer ([facility] [address]     lung LLL    Chronic back pain     Chronic kidney disease     Chronic sinusitis     DJD (degenerative joint disease) of knee     left knee DJD    Elevated PSA     History of blood transfusion 1980's    History of inferior wall myocardial infarction 01/2016    1 cardiac stent    HTN (hypertension)     meds .  1 yr    Hyperlipidemia     meds > 12 yrs    NSTEMI (non-ST elevated myocardial infarction) ([address]     Smoker      Past Surgical History:   Procedure Laterality Date    ABDOMEN SURGERY  1961    spleenectomy due to [address]  2009    lumbar disc OR    CARDIAC SURGERY      stents    CARPAL TUNNEL RELEASE Right 5/6/2019    RIGHT CARPAL TUNNEL RELEASE performed by López Hancock MD at [place] [address] WITH STENT PLACEMENT  1/29/2016    EYE SURGERY      to have Phaco with IOL OU 2/2018    HERNIA REPAIR      JOINT REPLACEMENT Left 1994    LTHR    JOINT REPLACEMENT Right 2009    RTKR    KNEE SURGERY Right 2011    arthroscopy    [place] MANIPULATN KNEE JT+ANESTHESIA Right 5/12/2017    RIGHT KNEE MANIPULATION UNDER ANESTHESIA performed by Zeynep Rodriguez MD at [address] OFFICE/OUTPT VISIT,PROCEDURE ONLY Bilateral 12/29/2017    RIGHT AND BILATERAL L 4-5 LYSIS OF SCAR DISKECTOMY INTERBODY CAGE FUSION POSTEROLATERAL FUSION PEDICLE SCREWS performed by López Hancock MD at [address] [place] [address]  2004    benign    SPLENECTOMY  1961    TOTAL KNEE ARTHROPLASTY Right 3/24/2017    RIGHT  KNEE TOTAL ARTHROPLASTY, ELHAM CEMENTED PERSONA  performed by [facility]ynep [facility] at [facility]       Family History  Family History   Problem Relation Age of Onset    Osteoarthritis Mother     Emphysema Mother     Hypertension Father     Hearing Loss Father     Dementia Father     No Known Problems Sister     Prostate Cancer Brother     Colon Polyps Neg Hx      [] Unable to obtain due to ventilated and/ or neurologic status    Social History     Socioeconomic History    Marital status:      Spouse name: Not on file    Number of children: Not on file    Years of education: Not on file    Highest education level: Not on file   Occupational History    Occupation: retired   Tobacco Use    Smoking status: Current Every Day Smoker     Packs/day: 0.50     Years: 60.00     Pack years: 30.00     Types: Cigarettes    Smokeless tobacco: Never Used    Tobacco comment: also smokes cigars   Vaping Use    Vaping Use: Never used   Substance and Sexual Activity    Alcohol use: Not Currently     Alcohol/week: 12.0 standard drinks     Types: 12 Shots of liquor per week     Comment: quit drinking aug 18th, 2021    Drug use: No    Sexual activity: Yes   Other Topics Concern    Not on file   Social History Narrative    Lives alone     Social Determinants of Health     Financial Resource Strain: Low Risk     Difficulty of Paying Living Expenses: Not very hard   Food Insecurity: No Food Insecurity    Worried About Running Out of Food in the Last Year: Never true    Yoav of Food in the Last Year: Never true   Transportation Needs: No Transportation Needs    Lack of Transportation (Medical): No    Lack of Transportation (Non-Medical):  No   Physical Activity:     Days of Exercise per Week:     Minutes of Exercise per Session:    Stress:     Feeling of Stress :    Social Connections:     Frequency of Communication with Friends and Family:     Frequency of Social Gatherings with Friends and Family:     Attends Rastafari Services:     Active Member of Clubs or Organizations:     Attends Club or Organization Meetings:     Marital Status:    Intimate Partner Violence:     Fear of Current or Ex-Partner:     Emotionally Abused:     Physically Abused:     Sexually Abused:       [] Unable to obtain due to ventilated and/ or neurologic status      Home Medications:      Medications Prior to Admission: sulfamethoxazole-trimethoprim (BACTRIM DS) 800-160 MG per tablet, Take 1 tablet by mouth 2 times daily  omeprazole (PRILOSEC) 10 MG delayed release capsule, TAKE ONE CAPSULE BY MOUTH DAILY  SODIUM CHLORIDE, EXTERNAL, 0.9 % SOLN, Apply 1 Applicatorful topically daily  finasteride (PROSCAR) 5 MG tablet, Take 1 tablet by mouth daily  citalopram (CELEXA) 40 MG tablet, TAKE ONE TABLET BY MOUTH nightly  diclofenac (VOLTAREN) 50 MG EC tablet, TAKE ONE TABLET BY MOUTH TWO TIMES A DAY  isosorbide mononitrate (IMDUR) 30 MG extended release tablet, TAKE ONE TABLET BY MOUTH EVERY DAY  Fluticasone furoate-vilanterol (BREO ELLIPTA) 200-25 MCG/INH AEPB inhaler, Inhale 1 puff into the lungs daily  clopidogrel (PLAVIX) 75 MG tablet, TAKE ONE TABLET BY MOUTH DAILY  atorvastatin (LIPITOR) 20 MG tablet, TAKE ONE TABLET BY MOUTH DAILY  oxyCODONE-acetaminophen (PERCOCET) 7.5-325 MG per tablet, TAKE ONE TABLET BY MOUTH THREE TIMES A DAY  morphine (MS CONTIN) 15 MG extended release tablet, Take 15 mg by mouth as needed. amoxicillin-clavulanate (AUGMENTIN) 875-125 MG per tablet, Take 1 tablet by mouth 2 times daily  tiotropium (SPIRIVA RESPIMAT) 2.5 MCG/ACT AERS inhaler, Inhale 2 puffs into the lungs daily  metoprolol tartrate (LOPRESSOR) 50 MG tablet, Take 0.5 tablets by mouth 2 times daily TAKE ONE TABLET BY MOUTH TWO TIMES A DAY  bumetanide (BUMEX) 1 MG tablet, Take 1 tablet by mouth daily  albuterol sulfate  (90 Base) MCG/ACT inhaler, Inhale 2 puffs into the lungs every 6 hours as needed for Wheezing  nitroGLYCERIN (NITROSTAT) 0.4 MG SL tablet, Place 1 tablet under the tongue every 5 minutes as needed for Chest pain up to max of 3 total doses. If no relief after 1 dose, call 911.   DOCUSATE CALCIUM PO, Take by mouth as needed    Current Hospital Medications:     Scheduled Meds:   sodium zirconium cyclosilicate  5 g Oral TID    folic acid  1 mg Oral Daily    sodium chloride flush  10 mL IntraVENous Once    thiamine  100 mg Oral TID    sodium chloride flush  5-40 mL IntraVENous 2 times per day    [Held by provider] enoxaparin  40 mg SubCUTAneous Daily    acetaminophen  1,000 mg Oral 3 times per day    cefTRIAXone (ROCEPHIN) IV  1,000 mg IntraVENous Q24H    atorvastatin  20 mg Oral Nightly    citalopram  40 mg Oral Daily    finasteride  5 mg Oral Daily    budesonide-formoterol  2 puff Inhalation BID    metoprolol tartrate  25 mg Oral BID    pantoprazole  40 mg Oral QAM AC    tiotropium  2 puff Inhalation Daily    bacitracin zinc   Topical BID     Continuous Infusions:   sodium chloride      sodium chloride      sodium chloride 100 mL/hr at 10/13/21 0515     PRN Meds:.sodium chloride, sodium chloride flush, sodium chloride, ondansetron **OR** ondansetron, polyethylene glycol, oxyCODONE-acetaminophen, albuterol sulfate HFA  .    sodium chloride      sodium chloride      sodium chloride 100 mL/hr at 10/13/21 0515        Allergies:     No Known Allergies     Physical Exam         Objective Findings:     Vitals:   Vitals:    10/13/21 0714 10/13/21 0730 10/13/21 1200 10/13/21 1215   BP:  (!) 157/60 138/81    Pulse:  84 75    Resp: 18      Temp:  98.8 °F (37.1 °C)  98.3 °F (36.8 °C)   TempSrc:  Oral     SpO2: 98% 100% 99%    Weight:       Height:              Laboratory, Microbiology, Pathology, Radiology, Cardiology, Medications and Transcriptions reviewed  Scheduled Meds:   sodium zirconium cyclosilicate  5 g Oral TID    folic acid  1 mg Oral Daily    sodium chloride flush  10 mL IntraVENous Once    thiamine  100 mg Oral TID    sodium chloride flush  5-40 mL IntraVENous 2 times per day    [Held by provider] enoxaparin  40 mg SubCUTAneous Daily    acetaminophen  1,000 mg Oral 3 times per day    cefTRIAXone (ROCEPHIN) IV  1,000 mg IntraVENous Q24H    atorvastatin  20 mg Oral Nightly    citalopram  40 mg Oral Daily    finasteride  5 mg Oral Daily    budesonide-formoterol  2 puff Inhalation BID    metoprolol tartrate  25 mg Oral BID    pantoprazole  40 mg Oral QAM AC    tiotropium 2 puff Inhalation Daily    bacitracin zinc   Topical BID     Continuous Infusions:   sodium chloride      sodium chloride      sodium chloride 100 mL/hr at 10/13/21 0515       Recent Labs     10/11/21  1952 10/12/21  0508 10/12/21  1526 10/12/21  2306 10/13/21  0759   WBC 16.3*  --   --   --  14.6*   HGB 8.6*   < > 7.4* 7.0* 6.3*   HCT 25.8*   < > 22.1* 20.6* 19.0*   MCV 94.6  --   --   --  94.7     --   --   --  279    < > = values in this interval not displayed. Recent Labs     10/12/21  1526 10/12/21  2306 10/13/21  0759   * 132* 133*   K 5.5* 5.4* 5.6*    102 102   CO2 18* 17* 18*   BUN 19 23 19   CREATININE 1.20 1.39* 1.06     Recent Labs     10/11/21  1830   AST 19   ALT 12   BILITOT 0.7   ALKPHOS 104     No results for input(s): LIPASE, AMYLASE in the last 72 hours. Recent Labs     10/11/21  1830 10/11/21  1950   PROT 6.6  --    INR  --  1.2     CT ABDOMEN PELVIS WO CONTRAST Additional Contrast? None    Result Date: 10/11/2021  CT OF THE CHEST, ABDOMEN, AND PELVIS with intravenous contrast medium. HISTORY: Fall. Loss of consciousness. Receiving anticoagulation. Lower extremity weakness. History squamous cell carcinoma, left lower lobe. TECHNICAL FACTORS: CT imaging of the chest, abdomen, and pelvis, was obtained and formatted as 5 mm contiguous axial images from the thoracic inlet through the symphysis pubis. Sagittal and coronal reconstructions obtained during postprocessing. Intravenous contrast medium:  100 ml of Isovue-370 Comparison:  CT chest, September 7, 2021, January 14, 2021. PET/CT, September 23, 2021. CT abdomen pelvis, March 23, 2020 CT OF THE CHEST FINDINGS: Right lung: No nodules, masses, consolidation, pleural effusion, pneumothorax. Left lung: Pleural-based, noncalcified, mildly stellate 2.6 x 2.9 cm nodule, left lower lobe, again identified, unchanged. No consolidation, pleural effusion, pneumothorax.  Lymph nodes: No hilar, mediastinal, or axillary lymph node enlargement. Thoracic aorta: Normal in course and caliber. Cardiac Size: Normal. Pericardial effusion: None. Musculoskeletal:No osteoblastic, and no osteolytic lesions. Remote fractures left sixth through eighth ribs. 2.6 x 2.9 cm pleural-based left lower lobe nodule, in patient with known clinical history squamous cell carcinoma left lower lobe. No acute fractures. Remote fractures left sixth through eighth ribs CT OF THE ABDOMEN AND PELVIS WITH INTRAVENOUS CONTRAST MEDIUM. FINDINGS: Liver:  Normal in size, shape, and attenuation. Bile Ducts:  Normal in caliber. Gallbladder:  No stones or wall thickening. Pancreas:  Normal without masses, cysts, ductal dilatation or calcification. Spleen:  Surgically absent. Kidneys:  Normal in size. No hydronephrosis, masses, or stones. Mild bilateral perinephric fat stranding. Adrenals:  Normal. Stomach: Exophytic, calcified 1.6 cm nodule, gastric cardia again identified, unchanged. Small bowel:  Normal in caliber. Appendix:  Normal. Colon:  Normal in caliber. Peritoneum:  No ascites, free air, or fluid collections. Vessels: Aorta normal in course and caliber. Lymph nodes:  Retroperitoneal:  No enlarged retroperitoneal lymph nodes. Mesenteric:  No enlarged mesenteric lymph nodes. Pelvic: No enlarged pelvic lymph nodes. Ureters: Normal in course and caliber. No calcifications. Bladder: No wall thickening. Reproductive organs: No pelvic masses. Abdominal Wall: Increased attenuation measuring 4.5 x 4.6 x 8.2 cm within right pelvic soft tissue (series 2, image 89). Bones:  No bone lesions. Lumbar scoliosis convexity to left apex L3. Remote internal fixation L4, and L5. Remote left hip replacement. IMPRESSION: Soft tissue hematoma, right pelvis, measuring 4.5 x 4.6 x 8.2 cm. Splenectomy. No change, exophytic, calcified gastric nodule as discussed. Remote internal fixation L4 and L5. Left hip replacement.  All CT scans at this facility use dose modulation, iterative reconstruction, and/or weight based dosing when appropriate to reduce radiation dose to as low as reasonably achievable. XR CHEST (2 VW)    Result Date: 9/17/2021  EXAMINATION: XR CHEST (2 VW), XR WRIST LEFT (MIN 3 VIEWS)  CLINICAL HISTORY:  weight loss COMPARISONS: May 21, 2020  FINDINGS: Two views of the chest are submitted. The cardiac silhouette is of normal size configuration. Pulmonary vascular attenuated Right sided trachea. There is a faint opacity seen within the posterior aspect of the left lower lobe. Its best appreciated on the lateral view. No Pneumothoraces. THERE IS A FAINT SOFT TISSUE OPACITY SEEN ON THE LATERAL VIEW. PLEASE SEE CT SCAN REPORT CHEST SEPTEMBER 17, 2021 FOR ADDITIONAL DETAILS WHICH WAS IDENTIFIED AS MALIGNANCY UNTIL PROVEN OTHERWISE. Laura Mejia RADIOGRAPHIC FINDINGS SUGGESTIVE OF COPD. DR. Marcial Mares EMERGENCY ROOM WAS NOTED IMMEDIATELY ABOVE FINDINGS ON SEPTEMBER 17, 2021 AT 2014 HOURS EXAMINATION: XR CHEST (2 VW), XR WRIST LEFT (MIN 3 VIEWS) CLINICAL HISTORY: Pain COMPARISONS: None. FINDINGS: Four views of the left wrist are submitted. There is a comminuted fracture with intra-articular extension of the left distal radius as well as dislocation of the ulnar styloid process. No dislocations. No focal bony abnormalities. Please                             IMPRESSION: COMMINUTED FRACTURE WITH INTRA-ARTICULAR EXTENSION OF THE LEFT DISTAL RADIUS AS WELL AS DISLOCATION OF THE RADIAL STYLOID PROCESS    XR WRIST LEFT (MIN 3 VIEWS)    Result Date: 9/17/2021  EXAMINATION: XR CHEST (2 VW), XR WRIST LEFT (MIN 3 VIEWS)  CLINICAL HISTORY:  weight loss COMPARISONS: May 21, 2020  FINDINGS: Two views of the chest are submitted. The cardiac silhouette is of normal size configuration. Pulmonary vascular attenuated Right sided trachea.  There is a faint opacity seen within the posterior aspect of the left lower lobe. Its best appreciated on the lateral view. No Pneumothoraces. THERE IS A FAINT SOFT TISSUE OPACITY SEEN ON THE LATERAL VIEW. PLEASE SEE CT SCAN REPORT CHEST SEPTEMBER 17, 2021 FOR ADDITIONAL DETAILS WHICH WAS IDENTIFIED AS MALIGNANCY UNTIL PROVEN OTHERWISE. Angel Logan RADIOGRAPHIC FINDINGS SUGGESTIVE OF COPD. DR. Rick Gautam EMERGENCY ROOM WAS NOTED IMMEDIATELY ABOVE FINDINGS ON SEPTEMBER 17, 2021 AT 2014 HOURS EXAMINATION: XR CHEST (2 VW), XR WRIST LEFT (MIN 3 VIEWS) CLINICAL HISTORY: Pain COMPARISONS: None. FINDINGS: Four views of the left wrist are submitted. There is a comminuted fracture with intra-articular extension of the left distal radius as well as dislocation of the ulnar styloid process. No dislocations. No focal bony abnormalities. Please                             IMPRESSION: COMMINUTED FRACTURE WITH INTRA-ARTICULAR EXTENSION OF THE LEFT DISTAL RADIUS AS WELL AS DISLOCATION OF THE RADIAL STYLOID PROCESS    XR HAND RIGHT (MIN 3 VIEWS)    Result Date: 10/12/2021  EXAMINATION: XR HAND RIGHT (MIN 3 VIEWS)  CLINICAL HISTORY:  fall COMPARISONS: AUGUST 18, 2021 1157 HOURS  FINDINGS: Three views of the right hand are submitted. There is diffuse generalized osteopenia. There is a comminuted fracture of the distal phalanx of the right first finger. No dislocations. There are degenerative changes seen at the first carpometacarpal joint space as well as within the region of the radial styloid. COMMINUTED FRACTURE OF THE DISTAL PHALANX OF THE RIGHT FIRST FINGER. XR HIP BILATERAL W AP PELVIS (2 VIEWS)    Result Date: 9/24/2021  EXAMINATION/TECHNIQUE:  XR HIP BILATERAL W AP PELVIS (2 VIEWS) HISTORY:  Bilateral hip pain. COMPARISON: PET/CT 9/23/2021. Abdominal CT 3/23/2020. RESULT: Bony pelvis appears grossly intact without acute fracture. No acute right hip fracture. Left hip arthroplasty. Avascular necrosis of the right femoral head, grossly similar to prior abdominal CT. Linear tract within the right femoral head and neck, likely from prior core decompression with possible small amount of cortical collapse of the right femoral head. Tiny right osteophytes with otherwise maintained joint space. Left hip arthroplasty components appear grossly intact without periprosthetic lucency or fracture. Postsurgical and degenerative changes lumbar spine. Degenerative changes SI joints and pubic symphysis which otherwise appear intact. Feces throughout the visualized colon. No other significant abnormality. Avascular necrosis right hip with possible small amount of cortical collapse. Left hip arthroplasty without radiographic complication. CT Head WO Contrast    Result Date: 10/11/2021  CT Brain. Contrast medium:  without contrast.. History:  Fall. Loss of consciousness. Invasive squamous cell carcinoma, left lower lobe. Technical factors: CT imaging of the brain was obtained and formatted as 5 mm contiguous axial images. 2.5 mm contiguous axial images were obtained through the osseous structures. Sagittal and coronal reconstruction obtained during postprocessing. Comparison:  CT brain, August 18, 2021. Bessie Shoemaker Findings: Extra-axial spaces:  Normal. Intracranial hemorrhage:  None. Ventricular system: Ventricles mildly enlarged. Sulci mildly prominent. Basal Cisterns:  Normal. Cerebral Parenchyma: Bilateral symmetric periventricular areas decreased attenuation. Midline Shift:  None. Cerebellum:  Normal. Paranasal sinuses and mastoid air cells:  Normal. Visualized Orbits:  Normal.     Impression: Mild cerebral atrophy. Chronic ischemic white matter disease.  All CT scans at this facility use dose modulation, iterative reconstruction, and/or weight based dosing when appropriate to reduce radiation nodule, gastric cardia again identified, unchanged. Small bowel:  Normal in caliber. Appendix:  Normal. Colon:  Normal in caliber. Peritoneum:  No ascites, free air, or fluid collections. Vessels: Aorta normal in course and caliber. Lymph nodes:  Retroperitoneal:  No enlarged retroperitoneal lymph nodes. Mesenteric:  No enlarged mesenteric lymph nodes. Pelvic: No enlarged pelvic lymph nodes. Ureters: Normal in course and caliber. No calcifications. Bladder: No wall thickening. Reproductive organs: No pelvic masses. Abdominal Wall: Increased attenuation measuring 4.5 x 4.6 x 8.2 cm within right pelvic soft tissue (series 2, image 89). Bones:  No bone lesions. Lumbar scoliosis convexity to left apex L3. Remote internal fixation L4, and L5. Remote left hip replacement. IMPRESSION: Soft tissue hematoma, right pelvis, measuring 4.5 x 4.6 x 8.2 cm. Splenectomy. No change, exophytic, calcified gastric nodule as discussed. Remote internal fixation L4 and L5. Left hip replacement. All CT scans at this facility use dose modulation, iterative reconstruction, and/or weight based dosing when appropriate to reduce radiation dose to as low as reasonably achievable. CT CERVICAL SPINE WO CONTRAST    Result Date: 10/11/2021  CT cervical spine without intravenous contrast medium. HISTORY: Fall. Loss of consciousness. History of invasive squamous carcinoma left lower lobe. TECHNICAL FACTORS: CT cervical spine obtained and formatted as 2.5 mm contiguous axial images from skull base to the level of. Sagittal and coronal reconstructions were obtained during postprocessing. No contrast medium was utilized. COMPARISON: CT cervical spine, August 18, 2021 FINDINGS: Cervical vertebral bodies are normal in height height. Loss cervical lordosis. 4.3 mm anterolisthesis C7 on T1, unchanged. Atlantooccipital articulation maintained. Atlantoaxial interval preserved.  Neuroforaminal narrowing, left C2-3, left C3-4, bilateral C4-5, right 7, right C7-T1. Diffuse disc space narrowing, posterior C2-3. Diffuse narrowing L4-5 T5-6, and C6-7. No fractures, dislocations, bone lesions. Limited imaging lung apices without anomaly. Bilateral carotid artery calcification. Soft tissues are without anomaly. No fracture. Severe degenerative change cervical spine. Grade 1 C7 spondylolisthesis, unchanged. Bilateral carotid artery calcification. If clinical concern warrants, carotid sonography may be utilized for further evaluation. All CT scans at this facility use dose modulation, iterative reconstruction, and/or weight based dosing when appropriate to reduce radiation dose to as low as reasonably achievable. CT THORACIC SPINE WO CONTRAST    Result Date: 10/11/2021  CT thoracic spine without intravenous contrast medium. HISTORY: Fall. Thoracic and parathoracic tenderness. Loss of consciousness. History squamous cell carcinoma left lower lobe. TECHNICAL FACTORS: CT thoracic spine obtained and formatted as 2.5 mm contiguous axial images from skull base to the level of. Sagittal and coronal reconstructions were obtained during postprocessing. No contrast medium was utilized. COMPARISON: CT chest, December 7, 2021. PET/CT, December 23, 2021. FINDINGS: Thoracic vertebral bodies are normal in height and alignment Diffuse mild disc space narrowing. No fractures, dislocations, bone lesions. Limited imaging right and left lung lung zone shows a partially imaged pleural-based noncalcified mildly stellate shaped mass, similar to that found on CT chest from September 7, 2021. Peripherally calcified, exophytic 1.9 cm gastric soft tissue nodule, unchanged. No fracture. Left lower lobe mass. Calcified exophytic gastric mass. All CT scans at this facility use dose modulation, iterative reconstruction, and/or weight based dosing when appropriate to reduce radiation dose to as low as reasonably achievable.  .    CT LUMBAR SPINE WO CONTRAST    Result Date: 10/11/2021  CT lumbar motion There is diffuse subcutaneous edema/swelling and stranding seen about the shoulder and lateral aspect of the arm. Correlate with patient history and exam.     1. COMMINUTED DISPLACED FRACTURE OF THE RIGHT HUMERAL HEAD. 2. OTHER FINDINGS DETAILED ABOVE All CT scans at this facility use dose modulation, iterative reconstruction, and/or weight based dosing when appropriate to reduce radiation dose to as low as reasonably achievable. PET CT Skull Base To Mid Thigh    Result Date: 9/23/2021  EXAMINATION:  REGIONAL BODY FDG PET/CT SCAN: (9/23/2021 11:50 AM) HISTORY: 66years old Male with  Malignant neoplasm of lower lobe of left lung ([facility] [address] ICD10. INDICATION: Study performed for lung nodule follow-up. TECHNIQUE: F18-FDG administered IV was followed about 60 minutes later by PET imaging from skull vertex to proximal thighs. Free breathing low dose CT was performed without contrast for attenuation correction and anatomic localization. Blood glucose before FDG injection: 86 mg/dL FDG radionuclide dose:   18.1 mCi CT Dose-Length Product (DLP): 624.26 mGy*cm. CT Dose Reduction Employed: Yes COMPARISON: CT chest 9/7/2021 and 1/14/2021, PET/CT 4/8/2021. RESULT: HEAD AND NECK: Physiologic uptake seen in the visualized brain, extraocular muscles, parapharyngeal soft tissues, base of tongue, vocal cords, and salivary glands. No FDG avid cervical lymphadenopathy or mass. No FDG avid thyroid lesion. CHEST: Physiologic uptake in the heart and mediastinum. Lungs and tracheobronchial tree: Note that PET/CT is not sensitive for pulmonary nodules less than 8 mm. There is an FDG avid 32 x 26 mm left lower lobe subpleural mass more prominent compared to the prior PET/CT from April 2021, compatible with recurrent disease. Pleura:  No FDG avid pleural effusion or pleural mass. Mediastinum and Lymph nodes:  No FDG avid mediastinal mass, mediastinal or hilar lymphadenopathy. Heart and great vessels: Physiologic uptake in the heart. Chest wall and axilla: FDG uptake along the left lateral chest wall secondary to  multiple rib fractures. ABDOMEN AND PELVIS: Physiologic uptake seen in the  and GI tracts. Liver: No FDG avid lesion. Biliary: Gallbladder is unremarkable. Spleen: No FDG avid lesion. No splenomegaly. Pancreas: No FDG avid lesion. Adrenals: No FDG avid lesion. Stable left adrenal nodule with peripheral calcification, unchanged since 2020. Kidneys: No stones, hydronephrosis, or FDG avid lesions. GI tract: No dilation or focal suspicious FDG avid lesion. No bowel dilation. Large amount of colonic stool. Lymph nodes: No FDG avid abdominal or pelvic lymphadenopathy. Mesentery/Peritoneum: No ascites or FDG avid mass. Vasculature:  Vascular patency cannot be assessed due to lack of IV contrast.  Atherosclerotic calcification of the abdominal aorta. Pelvis: No ascites, fluid collection or FDG avid process. BONES AND EXTREMITIES:  No suspicious FDG avid osseous lesion. The imaged portions of the skeleton demonstrates age-related degenerative changes. Photopenia in the lower lumbar region and left hip secondary to postoperative prosthesis . No destructive osseous lesions. ============    1. NECK: *No FDG avid neoplastic process. . 2.  CHEST: *Marked FDG uptake in the left lower lobe more prominent since April 2021, compatible with recurrent malignancy. . 3.  ABDOMEN/PELVIS: *No FDG avid neoplastic process. . 4. EXTREMITIES/SKELETON: *No suspicious FDG avid osseous lesion. MRI BRAIN W WO CONTRAST    Result Date: 10/13/2021  EXAMINATION: MRI BRAIN W WO CONTRAST CLINICAL HISTORY:  Extrapontine myelinolysis? Sudden onset encephalopathy. Brain metastasis from lung cancer. COMPARISONS: NONE AVAILABLE TECHNIQUE: Multiplanar multisequence images of the brain were obtained without contrast. Diffusion perfusion imaging was obtained. BRAIN MRI FINDINGS: Limited due to motion artifact. There are no extra-axial collections.  There is no evidence of hemorrhage. There are no areas of perfusion diffusion signal abnormality to suggest ischemia. The susceptibility images do not demonstrate evidence of hemosiderin deposition within the brain parenchyma or the leptomeninges. There is preservation of the gray-white matter differentiation. There are numerous foci of T2/FLAIR hyperintensities in the subcortical and periventricular white matter in a symmetric distribution throughout both hemispheres. The sulci and ventricles are  within normal limits without evidence of hydrocephalus. The midline structures are intact, the corpus callosum is within normal limits. The region of the pineal gland and the sella turcica are unremarkable. There are no space-occupying lesions in the posterior fossa. The basilar cisterns are patent. The craniocervical junction is unremarkable. The visualized portions of the orbits are within normal limits, the globes are intact. The visualized portions of the paranasal sinuses are within normal limits. The calvarium and soft tissues are unremarkable. There are no acute intracranial changes, there is no evidence of hemorrhage or acute ischemia. Impression:     Chronic pain syndrome balance issues bad knees chronic alcoholism with neuropathy coronary artery disease recent fall a fracture of the right humerus, COPD and may 2021 diagnosis of small cell lung cancer. Osteomyelitis left ankle talus. Status post lumbar fusion with progression of his arthritis and development of moderate canal stenosis not requiring surgical decompression. The degree of canal stenosis does not warrant surgical decompression. He also has contraindications for elective surgery including his COPD recent diagnosis of cancer chronic osteomyelitis in the left ankle and addiction personality. Plan:   Discussed he is not a candidate for spine surgery. Comments: Thank you for allowing us to participate in the care of this patient.      Will sign off.    Please call if questions or concerns arise.     Electronically signed by Yue Craft MD on 10/13/2021 at 4:50 PM
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
## 2021-10-13 NOTE — PROGRESS NOTES
[facility] OCCUPATIONAL THERAPY MED SURG TREATMENT NOTE     Date: 10/13/2021  Patient Name: Alexandra Turner        MRN: [id number]  Account: [de-identified]   : 1945  (64 y.o.)  [facility]    Chart Review:  Diagnosis:  The primary encounter diagnosis was Injury of head, initial encounter. Diagnoses of Fall, initial encounter, Hyponatremia, Closed fracture of proximal end of right humerus, unspecified fracture morphology, initial encounter, Skin tear of right elbow without complication, initial encounter, Closed nondisplaced fracture of distal phalanx of right thumb, initial encounter, General weakness, Acute bilateral thoracic back pain, Abdominal pain, unspecified abdominal location, Hyperkalemia, and KEILY (acute kidney injury) ([facility] were also pertinent to this visit. Restrictions:    Restrictions/Precautions: ROM Restrictions, Fall Risk (high fall risk per Suarez score)  Required Braces or Orthoses?: Yes  Other position/activity restrictions: may remove right UE sling for hygiene and skin checks. Safety Devices: Safety Devices in place: Yes  Type of devices: All fall risk precautions in place   No    Subjective:  Patient states: \"I'm sorry. \"  Pain:  Start of tx:  Pre Treatment Pain Screening  Pain at present: 0  Scale Used: Numeric Score  Intervention List: Patient able to continue with treatment    End of tx:  Pain Assessment  Patient Currently in Pain: Denies    Objective:    ADL:  Grooming: Moderate assistance (To wash face and complete oral care. Therapist set pt up with toothbrush. Pt attempts to brush teeth using his right hand. Provided verbal and tactile cues for pt to use his left hand in order to brush teeth. Pt immediately dropped toothbrush with left hand. Placed red built up foam on toothbrush. Pt able to hold toothbrush with built up handle, however, when he begins to brush teeth, he drops toothbrush. Pt declined to have therapist assist for thoroughness.  Pt unable to hold cup in order to take a sip and rinse mouth. Set up/Supervision with VCs to wash face.)    Therapy key for assistance levels -   Independent = Pt. is able to perform task with no assistance but may require a device   Stand by assistance = Pt. does not perform task at an independent level but does not need physical assistance, requires verbal cues  Minimal, Moderate, Maximal Assistance = Pt. requires physical assistance (25%, 50%, 75% assist from helper) for task but is able to actively participate in task   Dependent = Pt. requires total assistance with task and is not able to actively participate with task completion      Cognition:  Cognition  Overall Cognitive Status: Exceptions  Arousal/Alertness: Appropriate responses to stimuli  Following Commands: Inconsistently follows commands  Attention Span: Attends with cues to redirect  Memory: Decreased short term memory;Decreased recall of recent events  Insights: Decreased awareness of deficits  Initiation: Requires cues for all  Sequencing: Requires cues for all    Activity Tolerance:  Activity Tolerance: Treatment limited secondary to decreased cognition   Had required Mod verbal cues to follow directions and remain on task. Pt frequently mentions off topic, nonsensical thing. Treatment Consisted Of:  ADL Training    Assessment/Discharge Disposition: Pt would benefit from continued Occupational Therapy to improve activity tolerance, strength, and Dell with ADL completion.      6-Click   How much help for putting on and taking off regular lower body clothing?: Total  How much help for Bathing?: A Lot  How much help for Toileting?: A Lot  How much help for putting on and taking off regular upper body clothing?: A Lot  How much help for taking care of personal grooming?: A Lot  How much help for eating meals?: A Little  AM-[facility] Inpatient Daily Activity Raw Score: 12  AM-PAC Inpatient ADL T-Scale Score : 30.6  ADL Inpatient CMS 0-100% Score: 66.57    Plan:  Continue OT per POC    Goals/Plan Addressed This session:  Improve ADL [place]    Minutes:  Time In: 1009  Time Out: 1020  Minutes: 11    ADL/IADL trainin minutes    Electronically signed by:    THEODORE Maldonado  10/13/2021, 10:29 AM

## 2021-10-13 NOTE — PROGRESS NOTES
INPATIENT PROGRESS NOTES    PATIENT NAME: Georgie Bird  MRN: [id number]  SERVICE DATE:  October 13, 2021   SERVICE TIME:  11:38 AM      PRIMARY SERVICE: Pulmonary Disease    CHIEF COMPLAINTS: Squamous cell carcinoma of the lung    INTERVAL HPI: Patient seen and examined at bedside, Interval Notes, orders reviewed. Nursing notes noted    Patient report feeling good, no chest pain, no shortness of breath, he has right shoulder pain otherwise no issues, slept well, no nausea no vomiting    Review of system:     GI Abdominal pain No  Skin Rash No    Social History     Tobacco Use    Smoking status: Current Every Day Smoker     Packs/day: 0.50     Years: 60.00     Pack years: 30.00     Types: Cigarettes    Smokeless tobacco: Never Used    Tobacco comment: also smokes cigars   Substance Use Topics    Alcohol use: Not Currently     Alcohol/week: 12.0 standard drinks     Types: 12 Shots of liquor per week     Comment: quit drinking aug 18th, 2021         Problem Relation Age of Onset    Osteoarthritis Mother     Emphysema Mother     Hypertension Father     Hearing Loss Father     Dementia Father     No Known Problems Sister     Prostate Cancer Brother     Colon Polyps Neg Hx          OBJECTIVE    Body mass index is 24.21 kg/m². PHYSICAL EXAM:  Vitals:  BP (!) 157/60   Pulse 84   Temp 98.8 °F (37.1 °C) (Oral)   Resp 18   Ht 5' 6\" (1.676 m)   Wt 150 lb (68 kg)   SpO2 100%   BMI 24.21 kg/m²     General: alert, cooperative, no distress  Head: normocephalic, atraumatic  Eyes:No gross abnormalities. ENT:  MMM no lesions  Neck:  supple and no masses  Chest : clear to auscultation bilaterally- no wheezes, rales or rhonchi, normal air movement, no respiratory distress  Heart[de-identified] Heart sounds are normal.  Regular rate and rhythm without murmur, gallop or rub.   ABD:  symmetric, soft, non-tender, no guarding or rebound  Musculoskeletal : no cyanosis, no clubbing and no edema  Neuro:  Grossly normal  Skin: No rashes or nodules noted. Lymph node:  no cervical nodes  Urology: No Ortega   Psychiatric: appropriate    DATA:   Recent Labs     10/11/21  1952 10/12/21  0508 10/12/21  2306 10/13/21  0759   WBC 16.3*  --   --  14.6*   HGB 8.6*   < > 7.0* 6.3*   HCT 25.8*   < > 20.6* 19.0*   MCV 94.6  --   --  94.7     --   --  279    < > = values in this interval not displayed. Recent Labs     10/11/21  1830 10/12/21  0445 10/12/21  2306 10/12/21  2306 10/13/21  0759   *   < > 132*  --  133*   K 5.9*   < > 5.4*  --  5.6*   CL 92*   < > 102  --  102   CO2 16*   < > 17*  --  18*   BUN 25*   < > 23  --  19   CREATININE 1.73*   < > 1.39*  --  1.06   GLUCOSE 147*   < > 115*  --  99   CALCIUM 9.3   < > 8.8  --  8.6   PROT 6.6  --   --   --   --    LABALBU 3.9  --   --   --   --    BILITOT 0.7  --   --   --   --    ALKPHOS 104  --   --   --   --    AST 19  --   --   --   --    ALT 12  --   --   --   --    LABGLOM 38.4*   < > 49.4*   < > >60.0   GFRAA 46.5*   < > 59.8*   < > >60.0   GLOB 2.7  --   --   --   --     < > = values in this interval not displayed. MV Settings:          No results for input(s): PHART, IVF1VHS, PO2ART, JBS0FNB, BEART, N5FPRNXW in the last 72 hours. O2 Device: None (Room air)    ADULT DIET;  Regular     MEDICATIONS during current hospitalization:    Continuous Infusions:   sodium chloride      sodium chloride      sodium chloride 100 mL/hr at 10/13/21 0515       Scheduled Meds:   sodium zirconium cyclosilicate  5 g Oral TID    sodium chloride flush  5-40 mL IntraVENous 2 times per day    [Held by provider] enoxaparin  40 mg SubCUTAneous Daily    acetaminophen  1,000 mg Oral 3 times per day    cefTRIAXone (ROCEPHIN) IV  1,000 mg IntraVENous Q24H    atorvastatin  20 mg Oral Nightly    citalopram  40 mg Oral Daily    finasteride  5 mg Oral Daily    budesonide-formoterol  2 puff Inhalation BID    metoprolol tartrate  25 mg Oral BID    pantoprazole  40 mg Oral QAM AC    tiotropium  2 puff Inhalation Daily    bacitracin zinc   Topical BID       PRN Meds:sodium chloride, sodium chloride flush, sodium chloride, ondansetron **OR** ondansetron, polyethylene glycol, oxyCODONE-acetaminophen, albuterol sulfate HFA    Radiology  CT ABDOMEN PELVIS WO CONTRAST Additional Contrast? None    Result Date: 10/11/2021  CT OF THE CHEST, ABDOMEN, AND PELVIS with intravenous contrast medium. HISTORY: Fall. Loss of consciousness. Receiving anticoagulation. Lower extremity weakness. History squamous cell carcinoma, left lower lobe. TECHNICAL FACTORS: CT imaging of the chest, abdomen, and pelvis, was obtained and formatted as 5 mm contiguous axial images from the thoracic inlet through the symphysis pubis. Sagittal and coronal reconstructions obtained during postprocessing. Intravenous contrast medium:  100 ml of Isovue-370 Comparison:  CT chest, September 7, 2021, January 14, 2021. PET/CT, September 23, 2021. CT abdomen pelvis, March 23, 2020 CT OF THE CHEST FINDINGS: Right lung: No nodules, masses, consolidation, pleural effusion, pneumothorax. Left lung: Pleural-based, noncalcified, mildly stellate 2.6 x 2.9 cm nodule, left lower lobe, again identified, unchanged. No consolidation, pleural effusion, pneumothorax. Lymph nodes: No hilar, mediastinal, or axillary lymph node enlargement. Thoracic aorta: Normal in course and caliber. Cardiac Size: Normal. Pericardial effusion: None. Musculoskeletal:No osteoblastic, and no osteolytic lesions. Remote fractures left sixth through eighth ribs. 2.6 x 2.9 cm pleural-based left lower lobe nodule, in patient with known clinical history squamous cell carcinoma left lower lobe. No acute fractures. Remote fractures left sixth through eighth ribs CT OF THE ABDOMEN AND PELVIS WITH INTRAVENOUS CONTRAST MEDIUM. FINDINGS: Liver:  Normal in size, shape, and attenuation. Bile Ducts:  Normal in caliber. Gallbladder:  No stones or wall thickening.  Pancreas:  Normal without masses, cysts, ductal dilatation or calcification. Spleen:  Surgically absent. Kidneys:  Normal in size. No hydronephrosis, masses, or stones. Mild bilateral perinephric fat stranding. Adrenals:  Normal. Stomach: Exophytic, calcified 1.6 cm nodule, gastric cardia again identified, unchanged. Small bowel:  Normal in caliber. Appendix:  Normal. Colon:  Normal in caliber. Peritoneum:  No ascites, free air, or fluid collections. Vessels: Aorta normal in course and caliber. Lymph nodes:  Retroperitoneal:  No enlarged retroperitoneal lymph nodes. Mesenteric:  No enlarged mesenteric lymph nodes. Pelvic: No enlarged pelvic lymph nodes. Ureters: Normal in course and caliber. No calcifications. Bladder: No wall thickening. Reproductive organs: No pelvic masses. Abdominal Wall: Increased attenuation measuring 4.5 x 4.6 x 8.2 cm within right pelvic soft tissue (series 2, image 89). Bones:  No bone lesions. Lumbar scoliosis convexity to left apex L3. Remote internal fixation L4, and L5. Remote left hip replacement. IMPRESSION: Soft tissue hematoma, right pelvis, measuring 4.5 x 4.6 x 8.2 cm. Splenectomy. No change, exophytic, calcified gastric nodule as discussed. Remote internal fixation L4 and L5. Left hip replacement. All CT scans at this facility use dose modulation, iterative reconstruction, and/or weight based dosing when appropriate to reduce radiation dose to as low as reasonably achievable. XR CHEST (2 VW)    Result Date: 9/17/2021  EXAMINATION: XR CHEST (2 VW), XR WRIST LEFT (MIN 3 VIEWS)  CLINICAL HISTORY:  weight loss COMPARISONS: May 21, 2020  FINDINGS: Two views of the chest are submitted. The cardiac silhouette is of normal size configuration. Pulmonary vascular attenuated Right sided trachea. There is a faint opacity seen within the posterior aspect of the left lower lobe. Its best appreciated on the lateral view. No Pneumothoraces. THERE IS A FAINT SOFT TISSUE OPACITY SEEN ON THE LATERAL VIEW. PLEASE SEE CT SCAN REPORT CHEST SEPTEMBER 17, 2021 FOR ADDITIONAL DETAILS WHICH WAS IDENTIFIED AS MALIGNANCY UNTIL PROVEN OTHERWISE. Jennie Mcnally RADIOGRAPHIC FINDINGS SUGGESTIVE OF COPD. DR. Rhonda Murphy EMERGENCY ROOM WAS NOTED IMMEDIATELY ABOVE FINDINGS ON SEPTEMBER 17, 2021 AT 2014 HOURS EXAMINATION: XR CHEST (2 VW), XR WRIST LEFT (MIN 3 VIEWS) CLINICAL HISTORY: Pain COMPARISONS: None. FINDINGS: Four views of the left wrist are submitted. There is a comminuted fracture with intra-articular extension of the left distal radius as well as dislocation of the ulnar styloid process. No dislocations. No focal bony abnormalities. Please                             IMPRESSION: COMMINUTED FRACTURE WITH INTRA-ARTICULAR EXTENSION OF THE LEFT DISTAL RADIUS AS WELL AS DISLOCATION OF THE RADIAL STYLOID PROCESS    XR WRIST LEFT (MIN 3 VIEWS)    Result Date: 9/17/2021  EXAMINATION: XR CHEST (2 VW), XR WRIST LEFT (MIN 3 VIEWS)  CLINICAL HISTORY:  weight loss COMPARISONS: May 21, 2020  FINDINGS: Two views of the chest are submitted. The cardiac silhouette is of normal size configuration. Pulmonary vascular attenuated Right sided trachea. There is a faint opacity seen within the posterior aspect of the left lower lobe. Its best appreciated on the lateral view. No Pneumothoraces. THERE IS A FAINT SOFT TISSUE OPACITY SEEN ON THE LATERAL VIEW. PLEASE SEE CT SCAN REPORT CHEST SEPTEMBER 17, 2021 FOR ADDITIONAL DETAILS WHICH WAS IDENTIFIED AS MALIGNANCY UNTIL PROVEN OTHERWISE. Jennie Mcnally RADIOGRAPHIC FINDINGS SUGGESTIVE OF COPD. DR. Rhonda Murphy EMERGENCY ROOM WAS NOTED IMMEDIATELY ABOVE FINDINGS ON SEPTEMBER 17, 2021 AT 2014 HOURS EXAMINATION: XR CHEST (2 VW), XR WRIST LEFT (MIN 3 VIEWS) CLINICAL HISTORY: Pain COMPARISONS: None.   FINDINGS: Four views of the left wrist are submitted. There is a comminuted fracture with intra-articular extension of the left distal radius as well as dislocation of the ulnar styloid process. No dislocations. No focal bony abnormalities. Please                             IMPRESSION: COMMINUTED FRACTURE WITH INTRA-ARTICULAR EXTENSION OF THE LEFT DISTAL RADIUS AS WELL AS DISLOCATION OF THE RADIAL STYLOID PROCESS    XR HAND RIGHT (MIN 3 VIEWS)    Result Date: 10/12/2021  EXAMINATION: XR HAND RIGHT (MIN 3 VIEWS)  CLINICAL HISTORY:  fall COMPARISONS: AUGUST 18, 2021 1157 HOURS  FINDINGS: Three views of the right hand are submitted. There is diffuse generalized osteopenia. There is a comminuted fracture of the distal phalanx of the right first finger. No dislocations. There are degenerative changes seen at the first carpometacarpal joint space as well as within the region of the radial styloid. COMMINUTED FRACTURE OF THE DISTAL PHALANX OF THE RIGHT FIRST FINGER. XR HIP BILATERAL W AP PELVIS (2 VIEWS)    Result Date: 9/24/2021  EXAMINATION/TECHNIQUE:  XR HIP BILATERAL W AP PELVIS (2 VIEWS) HISTORY:  Bilateral hip pain. COMPARISON: PET/CT 9/23/2021. Abdominal CT 3/23/2020. RESULT: Bony pelvis appears grossly intact without acute fracture. No acute right hip fracture. Left hip arthroplasty. Avascular necrosis of the right femoral head, grossly similar to prior abdominal CT. Linear tract within the right femoral head and neck, likely from prior core decompression with possible small amount of cortical collapse of the right femoral head. Tiny right osteophytes with otherwise maintained joint space. Left hip arthroplasty components appear grossly intact without periprosthetic lucency or fracture. Postsurgical and degenerative changes lumbar spine.  Degenerative changes SI joints and pubic symphysis which otherwise appear 2021. PET/CT, September 23, 2021. CT abdomen pelvis, March 23, 2020 CT OF THE CHEST FINDINGS: Right lung: No nodules, masses, consolidation, pleural effusion, pneumothorax. Left lung: Pleural-based, noncalcified, mildly stellate 2.6 x 2.9 cm nodule, left lower lobe, again identified, unchanged. No consolidation, pleural effusion, pneumothorax. Lymph nodes: No hilar, mediastinal, or axillary lymph node enlargement. Thoracic aorta: Normal in course and caliber. Cardiac Size: Normal. Pericardial effusion: None. Musculoskeletal:No osteoblastic, and no osteolytic lesions. Remote fractures left sixth through eighth ribs. 2.6 x 2.9 cm pleural-based left lower lobe nodule, in patient with known clinical history squamous cell carcinoma left lower lobe. No acute fractures. Remote fractures left sixth through eighth ribs CT OF THE ABDOMEN AND PELVIS WITH INTRAVENOUS CONTRAST MEDIUM. FINDINGS: Liver:  Normal in size, shape, and attenuation. Bile Ducts:  Normal in caliber. Gallbladder:  No stones or wall thickening. Pancreas:  Normal without masses, cysts, ductal dilatation or calcification. Spleen:  Surgically absent. Kidneys:  Normal in size. No hydronephrosis, masses, or stones. Mild bilateral perinephric fat stranding. Adrenals:  Normal. Stomach: Exophytic, calcified 1.6 cm nodule, gastric cardia again identified, unchanged. Small bowel:  Normal in caliber. Appendix:  Normal. Colon:  Normal in caliber. Peritoneum:  No ascites, free air, or fluid collections. Vessels: Aorta normal in course and caliber. Lymph nodes:  Retroperitoneal:  No enlarged retroperitoneal lymph nodes. Mesenteric:  No enlarged mesenteric lymph nodes. Pelvic: No enlarged pelvic lymph nodes. Ureters: Normal in course and caliber. No calcifications. Bladder: No wall thickening. Reproductive organs: No pelvic masses. Abdominal Wall: Increased attenuation measuring 4.5 x 4.6 x 8.2 cm within right pelvic soft tissue (series 2, image 89).  Bones:  No bone lesions. Lumbar scoliosis convexity to left apex L3. Remote internal fixation L4, and L5. Remote left hip replacement. IMPRESSION: Soft tissue hematoma, right pelvis, measuring 4.5 x 4.6 x 8.2 cm. Splenectomy. No change, exophytic, calcified gastric nodule as discussed. Remote internal fixation L4 and L5. Left hip replacement. All CT scans at this facility use dose modulation, iterative reconstruction, and/or weight based dosing when appropriate to reduce radiation dose to as low as reasonably achievable. CT CERVICAL SPINE WO CONTRAST    Result Date: 10/11/2021  CT cervical spine without intravenous contrast medium. HISTORY: Fall. Loss of consciousness. History of invasive squamous carcinoma left lower lobe. TECHNICAL FACTORS: CT cervical spine obtained and formatted as 2.5 mm contiguous axial images from skull base to the level of. Sagittal and coronal reconstructions were obtained during postprocessing. No contrast medium was utilized. COMPARISON: CT cervical spine, August 18, 2021 FINDINGS: Cervical vertebral bodies are normal in height height. Loss cervical lordosis. 4.3 mm anterolisthesis C7 on T1, unchanged. Atlantooccipital articulation maintained. Atlantoaxial interval preserved. Neuroforaminal narrowing, left C2-3, left C3-4, bilateral C4-5, right 7, right C7-T1. Diffuse disc space narrowing, posterior C2-3. Diffuse narrowing L4-5 T5-6, and C6-7. No fractures, dislocations, bone lesions. Limited imaging lung apices without anomaly. Bilateral carotid artery calcification. Soft tissues are without anomaly. No fracture. Severe degenerative change cervical spine. Grade 1 C7 spondylolisthesis, unchanged. Bilateral carotid artery calcification. If clinical concern warrants, carotid sonography may be utilized for further evaluation.  All CT scans at this facility use dose modulation, iterative reconstruction, and/or weight based dosing when appropriate to reduce radiation dose to as low as reasonably achievable. CT THORACIC SPINE WO CONTRAST    Result Date: 10/11/2021  CT thoracic spine without intravenous contrast medium. HISTORY: Fall. Thoracic and parathoracic tenderness. Loss of consciousness. History squamous cell carcinoma left lower lobe. TECHNICAL FACTORS: CT thoracic spine obtained and formatted as 2.5 mm contiguous axial images from skull base to the level of. Sagittal and coronal reconstructions were obtained during postprocessing. No contrast medium was utilized. COMPARISON: CT chest, December 7, 2021. PET/CT, December 23, 2021. FINDINGS: Thoracic vertebral bodies are normal in height and alignment Diffuse mild disc space narrowing. No fractures, dislocations, bone lesions. Limited imaging right and left lung lung zone shows a partially imaged pleural-based noncalcified mildly stellate shaped mass, similar to that found on CT chest from September 7, 2021. Peripherally calcified, exophytic 1.9 cm gastric soft tissue nodule, unchanged. No fracture. Left lower lobe mass. Calcified exophytic gastric mass. All CT scans at this facility use dose modulation, iterative reconstruction, and/or weight based dosing when appropriate to reduce radiation dose to as low as reasonably achievable. .    CT LUMBAR SPINE WO CONTRAST    Result Date: 10/11/2021  CT lumbar spine without intravenous contrast medium. HISTORY: Fall. Back pain. Weakness. History squamous cell carcinoma of lung TECHNICAL FACTORS: CT lumbar spine obtained and formatted as 2.5 mm contiguous axial images from skull base to the level of. Sagittal and coronal reconstructions were obtained during postprocessing. No contrast medium was utilized. COMPARISON: PET/CT, September 23, 2021. CT Lumbar Spine, August 30, 2017. FINDINGS: Remote internal fixation L4 and L5 using posterior bilateral pedicle screws secured bilaterally to vertically oriented rods. Lumbar scoliosis convexity to left apex L3.  Loss of height, L3 and L4 vertebral bodies, unchanged. Small anterior osteophytes lower thoracic and lumbar spine. Disc spaces preserved. No fractures, dislocations, bone lesions. Limited imaging of the abdomen and pelvis without anomaly. No acute fracture. Postsurgical change discussed. All CT scans at this facility use dose modulation, iterative reconstruction, and/or weight based dosing when appropriate to reduce radiation dose to as low as reasonably achievable. CT HUMERUS RIGHT WO CONTRAST    Result Date: 10/12/2021  EXAMINATION:  CT SCAN RIGHT SHOULDER. CLINICAL HISTORY:  Pain, injury, fall COMPARISON:  None TECHNIQUE:  Multiple serial axial images of the right shoulder with both sagittal coronal reconstruction was performed without intravenous or oral administration of contrast. Examination is limited by motion artifact. FINDINGS:  There is a comminuted displaced fracture of the humeral head. No dislocation. There is some degenerative changes both of the [facility] joint and of the glenoid. There is a irregularity seen within the proximal right humerus likely reconstruction artifact from motion. The field-of-view of the lungs show no focal parenchymal abnormality no pleural effusions. No pneumothoraces. Likely old healed right eighth rib fracture. The other \"deformities\" likely represent reconstruction artifact from motion There is diffuse subcutaneous edema/swelling and stranding seen about the shoulder and lateral aspect of the arm. Correlate with patient history and exam.     1. COMMINUTED DISPLACED FRACTURE OF THE RIGHT HUMERAL HEAD. 2. OTHER FINDINGS DETAILED ABOVE All CT scans at this facility use dose modulation, iterative reconstruction, and/or weight based dosing when appropriate to reduce radiation dose to as low as reasonably achievable.     PET CT Skull Base To Mid Thigh    Result Date: 9/23/2021  EXAMINATION:  REGIONAL BODY FDG PET/CT SCAN: (9/23/2021 11:50 AM) HISTORY: 66years old Male with  Malignant neoplasm of lower lobe of left lung [facility] ICD10. INDICATION: Study performed for lung nodule follow-up. TECHNIQUE: F18-FDG administered IV was followed about 60 minutes later by PET imaging from skull vertex to proximal thighs. Free breathing low dose CT was performed without contrast for attenuation correction and anatomic localization. Blood glucose before FDG injection: 86 mg/dL FDG radionuclide dose:   18.1 mCi CT Dose-Length Product (DLP): 624.26 mGy*cm. CT Dose Reduction Employed: Yes COMPARISON: CT chest 9/7/2021 and 1/14/2021, PET/CT 4/8/2021. RESULT: HEAD AND NECK: Physiologic uptake seen in the visualized brain, extraocular muscles, parapharyngeal soft tissues, base of tongue, vocal cords, and salivary glands. No FDG avid cervical lymphadenopathy or mass. No FDG avid thyroid lesion. CHEST: Physiologic uptake in the heart and mediastinum. Lungs and tracheobronchial tree: Note that PET/CT is not sensitive for pulmonary nodules less than 8 mm. There is an FDG avid 32 x 26 mm left lower lobe subpleural mass more prominent compared to the prior PET/CT from April 2021, compatible with recurrent disease. Pleura:  No FDG avid pleural effusion or pleural mass. Mediastinum and Lymph nodes:  No FDG avid mediastinal mass, mediastinal or hilar lymphadenopathy. Heart and great vessels: Physiologic uptake in the heart. Chest wall and axilla: FDG uptake along the left lateral chest wall secondary to  multiple rib fractures. ABDOMEN AND PELVIS: Physiologic uptake seen in the  and GI tracts. Liver: No FDG avid lesion. Biliary: Gallbladder is unremarkable. Spleen: No FDG avid lesion. No splenomegaly. Pancreas: No FDG avid lesion. Adrenals: No FDG avid lesion. Stable left adrenal nodule with peripheral calcification, unchanged since 2020. Kidneys: No stones, hydronephrosis, or FDG avid lesions. GI tract: No dilation or focal suspicious FDG avid lesion. No bowel dilation. Large amount of colonic stool.  Lymph nodes: No FDG avid abdominal or pelvic lymphadenopathy. Mesentery/Peritoneum: No ascites or FDG avid mass. Vasculature:  Vascular patency cannot be assessed due to lack of IV contrast.  Atherosclerotic calcification of the abdominal aorta. Pelvis: No ascites, fluid collection or FDG avid process. BONES AND EXTREMITIES:  No suspicious FDG avid osseous lesion. The imaged portions of the skeleton demonstrates age-related degenerative changes. Photopenia in the lower lumbar region and left hip secondary to postoperative prosthesis . No destructive osseous lesions. ============    1. NECK: *No FDG avid neoplastic process. . 2.  CHEST: *Marked FDG uptake in the left lower lobe more prominent since April 2021, compatible with recurrent malignancy. . 3.  ABDOMEN/PELVIS: *No FDG avid neoplastic process. . 4. EXTREMITIES/SKELETON: *No suspicious FDG avid osseous lesion. IMPRESSION AND SUGGESTION:  Patient is at risk due to   · Squamous cell carcinoma, left lower lobe, signs of recurrence, need radiation therapy he does not want chemotherapy but he is agreeable to immunotherapy.   · COPD, no signs of exacerbation  · Smoking  · Frequent fall      Recommendation  · Appreciate oncology  · Continue current inhalers  · MRI of the brain       Electronically signed by Najma Huerta MD,  [facility]   on 10/13/2021 at 11:38 AM

## 2021-10-13 NOTE — CONSULTS
Hematology/Oncology Consult  Encounter Date: 10/13/2021 9:24 AM    Mr. Alexandra Turner is a 64 y.o. male  : 1945  MRN: [id number]  [facility]  Requesting Provider: Dr Carlos Galaviz   Reason for request: lung cancer      CONSULTANT: April Ocampo MD    HPI: Nicolette Woodward was admitted for frequent falls. Ct brain showed no metastasis. He has history of severe COPD and non small cell lung cancer in 2020, located in the left lower lung. He completed SBRT ( 50 GY in 2020). He has severe COPD and not a candidate for surgery with FEV 1.04. Pet scan from 2021 showed increase SUV and size has increased to 3.2 x 2.6 cm from 1.2 in 2020. Smokes1/2 pack /day . Heavy alcohol drinker, ( whiskey but has abstained x several weeks.      Patient Active Problem List   Diagnosis    Tobacco use    Hip pain    Knee pain    Chronic back pain    Elevated PSA    Primary osteoarthritis of right knee    Spondylosis of lumbar region without myelopathy or radiculopathy    Sacroiliitis, not elsewhere classified ([facility] [address] [facility]    Pure hypercholesterolemia    Charcot's joint of left ankle    Left ankle pain    Confusion caused by a drug    DDD (degenerative disc disease), lumbar    Osteoarthritis of spine with myelopathy, lumbosacral region    Chronic bilateral low back pain with bilateral sciatica    Postlaminectomy syndrome, lumbar region    Acquired spondylolisthesis of lumbosacral region    Spinal stenosis of lumbar region with neurogenic claudication    HNP (herniated nucleus pulposus), lumbar    Spondylolisthesis at L4-L5 level    Anesthesia    Herniated nucleus pulposus, L4-5    NSTEMI (non-ST elevated myocardial infarction) ([facility] [address] [facility]    S/P PTCA (percutaneous transluminal coronary angioplasty)    Right upper quadrant abdominal pain    Gallbladder polyp    Biliary dyskinesia    Carpal tunnel syndrome on right    Carpal tunnel syndrome on left    Angina effort    Dyslipidemia    FELDER (dyspnea on exertion)    CAD (coronary artery disease)    Chest pain    Lung nodule seen on imaging study    Bilateral carotid artery stenosis    Essential hypertension    NSCLC of left lung (HCC)    Ataxic gait    Dizziness    Fracture, Colles, right, closed    Heroin abuse ([facility] [address]    Non-pressure chronic ulcer of left ankle with fat layer exposed ([address]    Atherosclerosis of native arteries of extremities with rest pain, left leg (HCC)    Subacute osteomyelitis, left ankle and foot (HCC)    Osteomyelitis of ankle (HCC)    Hyponatremia    Frequent falls    Chronic kidney disease    Fracture of right humerus    Hyperkalemia    Leukocytosis    Anemia    Acute hematogenous osteomyelitis, left ankle and foot (HCC)    Traumatic hematoma of right shoulder    Head injury     Past Medical History:   Diagnosis Date    Alcohol abuse     quit drinking 8/18/21    CAD (coronary artery disease)     patient states no doctor has told him this / has 1 cardiac stent    Cancer ([facility] [address]     lung LLL    Chronic back pain     Chronic kidney disease     Chronic sinusitis     DJD (degenerative joint disease) of knee     left knee DJD    Elevated PSA     History of blood transfusion 1980's    History of inferior wall myocardial infarction 01/2016    1 cardiac stent    HTN (hypertension)     meds .  1 yr    Hyperlipidemia     meds > 12 yrs    NSTEMI (non-ST elevated myocardial infarction) ([facility] [address]     Smoker      @PSH@  Family History   Problem Relation Age of Onset    Osteoarthritis Mother     Emphysema Mother     Hypertension Father     Hearing Loss Father     Dementia Father     No Known Problems Sister     Prostate Cancer Brother     Colon Polyps Neg Hx      Social History     Socioeconomic History    Marital status:      Spouse name: Not on file    Number of children: Not on file    Years of education: Not on file    Highest education level: Not on file   Occupational History    Occupation: retired Tobacco Use    Smoking status: Current Every Day Smoker     Packs/day: 0.50     Years: 60.00     Pack years: 30.00     Types: Cigarettes    Smokeless tobacco: Never Used    Tobacco comment: also smokes cigars   Vaping Use    Vaping Use: Never used   Substance and Sexual Activity    Alcohol use: Not Currently     Alcohol/week: 12.0 standard drinks     Types: 12 Shots of liquor per week     Comment: quit drinking aug 18th, 2021    Drug use: No    Sexual activity: Yes   Other Topics Concern    Not on file   Social History Narrative    Lives alone     Social Determinants of Health     Financial Resource Strain: Low Risk     Difficulty of Paying Living Expenses: Not very hard   Food Insecurity: No Food Insecurity    Worried About Running Out of Food in the Last Year: Never true    Yoav of Food in the Last Year: Never true   Transportation Needs: No Transportation Needs    Lack of Transportation (Medical): No    Lack of Transportation (Non-Medical):  No   Physical Activity:     Days of Exercise per Week:     Minutes of Exercise per Session:    Stress:     Feeling of Stress :    Social Connections:     Frequency of Communication with Friends and Family:     Frequency of Social Gatherings with Friends and Family:     Attends Mandaen Services:     Active Member of Clubs or Organizations:     Attends Club or Organization Meetings:     Marital Status:    Intimate Partner Violence:     Fear of Current or Ex-Partner:     Emotionally Abused:     Physically Abused:     Sexually Abused:          Current Facility-Administered Medications   Medication Dose Route Frequency Provider Last Rate Last Admin    sodium chloride flush 0.9 % injection 5-40 mL  5-40 mL IntraVENous 2 times per day ELOY Tesfaye - CNP        sodium chloride flush 0.9 % injection 5-40 mL  5-40 mL IntraVENous PRN ELOY Tesfaye - CNP        0.9 % sodium chloride infusion  25 mL IntraVENous PRN Adelso Orange ELOY Delatorre - CNP        ondansetron (ZOFRAN-ODT) disintegrating tablet 4 mg  4 mg Oral Q8H PRN Richad RedELOY talbot - SIA        Or    ondansetron [facility]) injection 4 mg  4 mg IntraVENous Q6H PRN Richad Redhead, APRN - CNP        polyethylene glycol (GLYCOLAX) packet 17 g  17 g Oral Daily PRN Richad Reddahiana, APRN - CNP        [Held by provider] enoxaparin (LOVENOX) injection 40 mg  40 mg SubCUTAneous Daily ELOY Alexander CNP        0.9 % sodium chloride infusion   IntraVENous Continuous Richad RedELOY talbot -  mL/hr at 10/13/21 0515 New Bag at 10/13/21 0515    acetaminophen (TYLENOL) tablet 1,000 mg  1,000 mg Oral 3 times per day Lee Guardado MD   1,000 mg at 10/13/21 0511    cefTRIAXone (ROCEPHIN) 1000 mg IVPB in 50 mL D5W minibag  1,000 mg IntraVENous Q24H Esther Terrell MD   Stopped at 10/12/21 1211    atorvastatin (LIPITOR) tablet 20 mg  20 mg Oral Nightly Janet Valentin MD   20 mg at 10/12/21 2025    citalopram (CELEXA) tablet 40 mg  40 mg Oral Daily Janet Valentin MD   40 mg at 10/12/21 1445    finasteride (PROSCAR) tablet 5 mg  5 mg Oral Daily Janet Valentin MD   5 mg at 10/12/21 1445    budesonide-formoterol (SYMBICORT) 160-4.5 MCG/ACT inhaler 2 puff  2 puff Inhalation BID Janet Valentin MD   2 puff at 10/13/21 0717    metoprolol tartrate (LOPRESSOR) tablet 25 mg  25 mg Oral BID Janet Valentin MD   25 mg at 10/12/21 2025    pantoprazole (PROTONIX) tablet 40 mg  40 mg Oral QAM AC Janet Valentin MD   40 mg at 10/13/21 0511    tiotropium (SPIRIVA RESPIMAT) 2.5 MCG/ACT inhaler 2 puff  2 puff Inhalation Daily Janet Valentin MD   2 puff at 10/13/21 0718    oxyCODONE-acetaminophen (PERCOCET) 7.5-325 MG per tablet 1 tablet  1 tablet Oral Q4H PRN Janet Valentin MD   1 tablet at 10/12/21 2207    albuterol sulfate  (90 Base) MCG/ACT inhaler 2 puff  2 puff Inhalation Q4H PRN Janet Valentin MD        bacitracin zinc ointment   Topical BID Roula Posada PA-C   Given at 10/12/21 2205     [unfilled]  No Known Allergies     Review of Systems  All other systems are normal other than ones mentioned in HPI. PHYSICAL EXAMINATION:   VITAL SIGNS: BP (!) 157/60   Pulse 84   Temp 98.8 °F (37.1 °C) (Oral)   Resp 18   Ht 5' 6\" (1.676 m)   Wt 150 lb (68 kg)   SpO2 100%   BMI 24.21 kg/m²         GENERAL: In no acute distress, well- nourished, well- developed,alert and oriented to person place and time. SKIN: Warm and dry, withoutjaundice, ecchymoses, or petechiae. HEENT: Normocephalic, sclera anicteric, oral mucosa moist without lesion or exudate in the visible oral cavity or oropharynx, tongue mid-line with good mobility and no deviation with extension. NODES: No palpable adenopathy in the neck Levels I-V, bilateral   Supraclavicular fossae, axillary chains, or inguinal regions. LUNGS: Good inspiratory effort, no accessory muscle use, clear bilaterally, no focal wheeze, rales or rhonchi. CARDIAC: Regular rate and rhythm, without murmurs, rubs or gallops. ABDOMINAL: Normal bowel soundspresent, soft, non-tender, no mass or  organomegaly.   MUSKL:   LAB RESULTS:  Recent Results (from the past 24 hour(s))   Basic Metabolic Panel    Collection Time: 10/12/21  3:26 PM   Result Value Ref Range    Sodium 132 (L) 135 - 144 mEq/L    Potassium 5.5 (H) 3.4 - 4.9 mEq/L    Chloride 102 95 - 107 mEq/L    CO2 18 (L) 20 - 31 mEq/L    Anion Gap 12 9 - 15 mEq/L    Glucose 94 70 - 99 mg/dL    BUN 19 8 - 23 mg/dL    CREATININE 1.20 0.70 - 1.20 mg/dL    GFR Non-African American 58.6 (L) >60    GFR  >60.0 >60    Calcium 8.4 (L) 8.5 - 9.9 mg/dL   Hemoglobin and Hematocrit, Blood    Collection Time: 10/12/21  3:26 PM   Result Value Ref Range    Hemoglobin 7.4 (L) 14.0 - 18.0 g/dL    Hematocrit 22.1 (L) 42.0 - 52.0 %   HIGH SENSITIVITY CRP    Collection Time: 10/12/21  3:26 PM   Result Value Ref Range    CRP High Sensitivity 117.6 (H) 0.0 - 5.0 mg/L   CORTISOL AM, TOTAL    Collection Time: 10/12/21  3:26 PM   Result Value Ref Range    Cortisol - AM 18.4 6.2 - 19.4 ug/dL   Basic Metabolic Panel    Collection Time: 10/12/21 11:06 PM   Result Value Ref Range    Sodium 132 (L) 135 - 144 mEq/L    Potassium 5.4 (H) 3.4 - 4.9 mEq/L    Chloride 102 95 - 107 mEq/L    CO2 17 (L) 20 - 31 mEq/L    Anion Gap 13 9 - 15 mEq/L    Glucose 115 (H) 70 - 99 mg/dL    BUN 23 8 - 23 mg/dL    CREATININE 1.39 (H) 0.70 - 1.20 mg/dL    GFR Non-African American 49.4 (L) >60    GFR  59.8 (L) >60    Calcium 8.8 8.5 - 9.9 mg/dL   Hemoglobin and Hematocrit, Blood    Collection Time: 10/12/21 11:06 PM   Result Value Ref Range    Hemoglobin 7.0 (LL) 14.0 - 18.0 g/dL    Hematocrit 20.6 (LL) 42.0 - 52.0 %   Basic Metabolic Panel    Collection Time: 10/13/21  7:59 AM   Result Value Ref Range    Sodium 133 (L) 135 - 144 mEq/L    Potassium 5.6 (H) 3.4 - 4.9 mEq/L    Chloride 102 95 - 107 mEq/L    CO2 18 (L) 20 - 31 mEq/L    Anion Gap 13 9 - 15 mEq/L    Glucose 99 70 - 99 mg/dL    BUN 19 8 - 23 mg/dL    CREATININE 1.06 0.70 - 1.20 mg/dL    GFR Non-African American >60.0 >60    GFR  >60.0 >60    Calcium 8.6 8.5 - 9.9 mg/dL     Recent Labs     10/11/21  1830 10/12/21  0445 10/13/21  0759   COLORU Yellow  --   --    PHUR 5.5  --   --    WBCUA 0-2  --   --    RBCUA 3-5*  --   --    BACTERIA Negative  --   --    CLARITYU Clear  --   --    SPECGRAV 1.017  --   --    LEUKOCYTESUR Negative  --   --    UROBILINOGEN 0.2  --   --    BILIRUBINUR Negative  --   --    BLOODU MODERATE*  --   --    GLUCOSE 147*   < > 99    < > = values in this interval not displayed. Pathology:     RADIOLOGY RESULTS:  CT ABDOMEN PELVIS WO CONTRAST Additional Contrast? None    Result Date: 10/11/2021  CT OF THE CHEST, ABDOMEN, AND PELVIS with intravenous contrast medium. HISTORY: Fall. Loss of consciousness. Receiving anticoagulation. Lower extremity weakness.  History squamous cell carcinoma, left lower lobe. TECHNICAL FACTORS: CT imaging of the chest, abdomen, and pelvis, was obtained and formatted as 5 mm contiguous axial images from the thoracic inlet through the symphysis pubis. Sagittal and coronal reconstructions obtained during postprocessing. Intravenous contrast medium:  100 ml of Isovue-370 Comparison:  CT chest, September 7, 2021, January 14, 2021. PET/CT, September 23, 2021. CT abdomen pelvis, March 23, 2020 CT OF THE CHEST FINDINGS: Right lung: No nodules, masses, consolidation, pleural effusion, pneumothorax. Left lung: Pleural-based, noncalcified, mildly stellate 2.6 x 2.9 cm nodule, left lower lobe, again identified, unchanged. No consolidation, pleural effusion, pneumothorax. Lymph nodes: No hilar, mediastinal, or axillary lymph node enlargement. Thoracic aorta: Normal in course and caliber. Cardiac Size: Normal. Pericardial effusion: None. Musculoskeletal:No osteoblastic, and no osteolytic lesions. Remote fractures left sixth through eighth ribs. 2.6 x 2.9 cm pleural-based left lower lobe nodule, in patient with known clinical history squamous cell carcinoma left lower lobe. No acute fractures. Remote fractures left sixth through eighth ribs CT OF THE ABDOMEN AND PELVIS WITH INTRAVENOUS CONTRAST MEDIUM. FINDINGS: Liver:  Normal in size, shape, and attenuation. Bile Ducts:  Normal in caliber. Gallbladder:  No stones or wall thickening. Pancreas:  Normal without masses, cysts, ductal dilatation or calcification. Spleen:  Surgically absent. Kidneys:  Normal in size. No hydronephrosis, masses, or stones. Mild bilateral perinephric fat stranding. Adrenals:  Normal. Stomach: Exophytic, calcified 1.6 cm nodule, gastric cardia again identified, unchanged. Small bowel:  Normal in caliber. Appendix:  Normal. Colon:  Normal in caliber. Peritoneum:  No ascites, free air, or fluid collections. Vessels: Aorta normal in course and caliber.  Lymph nodes:  Retroperitoneal:  No enlarged retroperitoneal lymph nodes. Mesenteric:  No enlarged mesenteric lymph nodes. Pelvic: No enlarged pelvic lymph nodes. Ureters: Normal in course and caliber. No calcifications. Bladder: No wall thickening. Reproductive organs: No pelvic masses. Abdominal Wall: Increased attenuation measuring 4.5 x 4.6 x 8.2 cm within right pelvic soft tissue (series 2, image 89). Bones:  No bone lesions. Lumbar scoliosis convexity to left apex L3. Remote internal fixation L4, and L5. Remote left hip replacement. IMPRESSION: Soft tissue hematoma, right pelvis, measuring 4.5 x 4.6 x 8.2 cm. Splenectomy. No change, exophytic, calcified gastric nodule as discussed. Remote internal fixation L4 and L5. Left hip replacement. All CT scans at this facility use dose modulation, iterative reconstruction, and/or weight based dosing when appropriate to reduce radiation dose to as low as reasonably achievable. XR CHEST (2 VW)    Result Date: 9/17/2021  EXAMINATION: XR CHEST (2 VW), XR WRIST LEFT (MIN 3 VIEWS)  CLINICAL HISTORY:  weight loss COMPARISONS: May 21, 2020  FINDINGS: Two views of the chest are submitted. The cardiac silhouette is of normal size configuration. Pulmonary vascular attenuated Right sided trachea. There is a faint opacity seen within the posterior aspect of the left lower lobe. Its best appreciated on the lateral view. No Pneumothoraces. THERE IS A FAINT SOFT TISSUE OPACITY SEEN ON THE LATERAL VIEW. PLEASE SEE CT SCAN REPORT CHEST SEPTEMBER 17, 2021 FOR ADDITIONAL DETAILS WHICH WAS IDENTIFIED AS MALIGNANCY UNTIL PROVEN OTHERWISE. Kimberly Rivas RADIOGRAPHIC FINDINGS SUGGESTIVE OF COPD. DR. Akash Valle EMERGENCY ROOM WAS NOTED IMMEDIATELY ABOVE FINDINGS ON SEPTEMBER 17, 2021 AT 2014 HOURS EXAMINATION: XR CHEST (2 VW), XR WRIST LEFT (MIN 3 VIEWS) CLINICAL HISTORY: Pain COMPARISONS: None. FINDINGS: Four views of the left wrist are submitted.  There is a comminuted fracture with intra-articular extension of the left distal radius as well as dislocation of the ulnar styloid process. No dislocations. No focal bony abnormalities. Please                             IMPRESSION: COMMINUTED FRACTURE WITH INTRA-ARTICULAR EXTENSION OF THE LEFT DISTAL RADIUS AS WELL AS DISLOCATION OF THE RADIAL STYLOID PROCESS    XR WRIST LEFT (MIN 3 VIEWS)    Result Date: 9/17/2021  EXAMINATION: XR CHEST (2 VW), XR WRIST LEFT (MIN 3 VIEWS)  CLINICAL HISTORY:  weight loss COMPARISONS: May 21, 2020  FINDINGS: Two views of the chest are submitted. The cardiac silhouette is of normal size configuration. Pulmonary vascular attenuated Right sided trachea. There is a faint opacity seen within the posterior aspect of the left lower lobe. Its best appreciated on the lateral view. No Pneumothoraces. THERE IS A FAINT SOFT TISSUE OPACITY SEEN ON THE LATERAL VIEW. PLEASE SEE CT SCAN REPORT CHEST SEPTEMBER 17, 2021 FOR ADDITIONAL DETAILS WHICH WAS IDENTIFIED AS MALIGNANCY UNTIL PROVEN OTHERWISE. Joan Red RADIOGRAPHIC FINDINGS SUGGESTIVE OF COPD. DR. Alicia Gomez EMERGENCY ROOM WAS NOTED IMMEDIATELY ABOVE FINDINGS ON SEPTEMBER 17, 2021 AT 2014 HOURS EXAMINATION: XR CHEST (2 VW), XR WRIST LEFT (MIN 3 VIEWS) CLINICAL HISTORY: Pain COMPARISONS: None. FINDINGS: Four views of the left wrist are submitted. There is a comminuted fracture with intra-articular extension of the left distal radius as well as dislocation of the ulnar styloid process. No dislocations. No focal bony abnormalities.                                                   Please                             IMPRESSION: COMMINUTED FRACTURE WITH INTRA-ARTICULAR EXTENSION OF THE LEFT DISTAL RADIUS AS WELL AS DISLOCATION OF THE RADIAL STYLOID PROCESS    XR HAND RIGHT (MIN 3 VIEWS)    Result Date: 10/12/2021  EXAMINATION: XR HAND RIGHT (MIN 3 VIEWS)  CLINICAL HISTORY:  fall COMPARISONS: AUGUST 18, 2021 1157 HOURS  FINDINGS: Three views of the right hand are submitted. There is diffuse generalized osteopenia. There is a comminuted fracture of the distal phalanx of the right first finger. No dislocations. There are degenerative changes seen at the first carpometacarpal joint space as well as within the region of the radial styloid. COMMINUTED FRACTURE OF THE DISTAL PHALANX OF THE RIGHT FIRST FINGER. XR HIP BILATERAL W AP PELVIS (2 VIEWS)    Result Date: 9/24/2021  EXAMINATION/TECHNIQUE:  XR HIP BILATERAL W AP PELVIS (2 VIEWS) HISTORY:  Bilateral hip pain. COMPARISON: PET/CT 9/23/2021. Abdominal CT 3/23/2020. RESULT: Bony pelvis appears grossly intact without acute fracture. No acute right hip fracture. Left hip arthroplasty. Avascular necrosis of the right femoral head, grossly similar to prior abdominal CT. Linear tract within the right femoral head and neck, likely from prior core decompression with possible small amount of cortical collapse of the right femoral head. Tiny right osteophytes with otherwise maintained joint space. Left hip arthroplasty components appear grossly intact without periprosthetic lucency or fracture. Postsurgical and degenerative changes lumbar spine. Degenerative changes SI joints and pubic symphysis which otherwise appear intact. Feces throughout the visualized colon. No other significant abnormality. Avascular necrosis right hip with possible small amount of cortical collapse. Left hip arthroplasty without radiographic complication. CT Head WO Contrast    Result Date: 10/11/2021  CT Brain. Contrast medium:  without contrast.. History:  Fall. Loss of consciousness. Invasive squamous cell carcinoma, left lower lobe. Technical factors: CT imaging of the brain was obtained and formatted as 5 mm contiguous axial images. 2.5 mm contiguous axial images were obtained through the osseous structures. Sagittal and coronal reconstruction obtained during postprocessing. Comparison:  CT brain, August 18, 2021. Cloteal Pickett Findings: Extra-axial spaces:  Normal. Intracranial hemorrhage:  None. Ventricular system: Ventricles mildly enlarged. Sulci mildly prominent. Basal Cisterns:  Normal. Cerebral Parenchyma: Bilateral symmetric periventricular areas decreased attenuation. Midline Shift:  None. Cerebellum:  Normal. Paranasal sinuses and mastoid air cells:  Normal. Visualized Orbits:  Normal.     Impression: Mild cerebral atrophy. Chronic ischemic white matter disease. All CT scans at this facility use dose modulation, iterative reconstruction, and/or weight based dosing when appropriate to reduce radiation dose to as low as reasonably achievable. CT CHEST WO CONTRAST    Result Date: 10/11/2021  CT OF THE CHEST, ABDOMEN, AND PELVIS with intravenous contrast medium. HISTORY: Fall. Loss of consciousness. Receiving anticoagulation. Lower extremity weakness. History squamous cell carcinoma, left lower lobe. TECHNICAL FACTORS: CT imaging of the chest, abdomen, and pelvis, was obtained and formatted as 5 mm contiguous axial images from the thoracic inlet through the symphysis pubis. Sagittal and coronal reconstructions obtained during postprocessing. Intravenous contrast medium:  100 ml of Isovue-370 Comparison:  CT chest, September 7, 2021, January 14, 2021. PET/CT, September 23, 2021. CT abdomen pelvis, March 23, 2020 CT OF THE CHEST FINDINGS: Right lung: No nodules, masses, consolidation, pleural effusion, pneumothorax. Left lung: Pleural-based, noncalcified, mildly stellate 2.6 x 2.9 cm nodule, left lower lobe, again identified, unchanged. No consolidation, pleural effusion, pneumothorax. Lymph nodes: No hilar, mediastinal, or axillary lymph node enlargement. Thoracic aorta: Normal in course and caliber.  Cardiac Size: Normal. Pericardial effusion: None. Musculoskeletal:No osteoblastic, and no osteolytic lesions. Remote fractures left sixth through eighth ribs. 2.6 x 2.9 cm pleural-based left lower lobe nodule, in patient with known clinical history squamous cell carcinoma left lower lobe. No acute fractures. Remote fractures left sixth through eighth ribs CT OF THE ABDOMEN AND PELVIS WITH INTRAVENOUS CONTRAST MEDIUM. FINDINGS: Liver:  Normal in size, shape, and attenuation. Bile Ducts:  Normal in caliber. Gallbladder:  No stones or wall thickening. Pancreas:  Normal without masses, cysts, ductal dilatation or calcification. Spleen:  Surgically absent. Kidneys:  Normal in size. No hydronephrosis, masses, or stones. Mild bilateral perinephric fat stranding. Adrenals:  Normal. Stomach: Exophytic, calcified 1.6 cm nodule, gastric cardia again identified, unchanged. Small bowel:  Normal in caliber. Appendix:  Normal. Colon:  Normal in caliber. Peritoneum:  No ascites, free air, or fluid collections. Vessels: Aorta normal in course and caliber. Lymph nodes:  Retroperitoneal:  No enlarged retroperitoneal lymph nodes. Mesenteric:  No enlarged mesenteric lymph nodes. Pelvic: No enlarged pelvic lymph nodes. Ureters: Normal in course and caliber. No calcifications. Bladder: No wall thickening. Reproductive organs: No pelvic masses. Abdominal Wall: Increased attenuation measuring 4.5 x 4.6 x 8.2 cm within right pelvic soft tissue (series 2, image 89). Bones:  No bone lesions. Lumbar scoliosis convexity to left apex L3. Remote internal fixation L4, and L5. Remote left hip replacement. IMPRESSION: Soft tissue hematoma, right pelvis, measuring 4.5 x 4.6 x 8.2 cm. Splenectomy. No change, exophytic, calcified gastric nodule as discussed. Remote internal fixation L4 and L5. Left hip replacement.  All CT scans at this facility use dose modulation, iterative reconstruction, and/or weight based dosing when appropriate to reduce radiation dose to as low as reasonably chest, December 7, 2021. PET/CT, December 23, 2021. FINDINGS: Thoracic vertebral bodies are normal in height and alignment Diffuse mild disc space narrowing. No fractures, dislocations, bone lesions. Limited imaging right and left lung lung zone shows a partially imaged pleural-based noncalcified mildly stellate shaped mass, similar to that found on CT chest from September 7, 2021. Peripherally calcified, exophytic 1.9 cm gastric soft tissue nodule, unchanged. No fracture. Left lower lobe mass. Calcified exophytic gastric mass. All CT scans at this facility use dose modulation, iterative reconstruction, and/or weight based dosing when appropriate to reduce radiation dose to as low as reasonably achievable. .    CT LUMBAR SPINE WO CONTRAST    Result Date: 10/11/2021  CT lumbar spine without intravenous contrast medium. HISTORY: Fall. Back pain. Weakness. History squamous cell carcinoma of lung TECHNICAL FACTORS: CT lumbar spine obtained and formatted as 2.5 mm contiguous axial images from skull base to the level of. Sagittal and coronal reconstructions were obtained during postprocessing. No contrast medium was utilized. COMPARISON: PET/CT, September 23, 2021. CT Lumbar Spine, August 30, 2017. FINDINGS: Remote internal fixation L4 and L5 using posterior bilateral pedicle screws secured bilaterally to vertically oriented rods. Lumbar scoliosis convexity to left apex L3. Loss of height, L3 and L4 vertebral bodies, unchanged. Small anterior osteophytes lower thoracic and lumbar spine. Disc spaces preserved. No fractures, dislocations, bone lesions. Limited imaging of the abdomen and pelvis without anomaly. No acute fracture. Postsurgical change discussed. All CT scans at this facility use dose modulation, iterative reconstruction, and/or weight based dosing when appropriate to reduce radiation dose to as low as reasonably achievable.      CT HUMERUS RIGHT WO CONTRAST    Result Date: 10/12/2021  EXAMINATION:  CT SCAN RIGHT SHOULDER. CLINICAL HISTORY:  Pain, injury, fall COMPARISON:  None TECHNIQUE:  Multiple serial axial images of the right shoulder with both sagittal coronal reconstruction was performed without intravenous or oral administration of contrast. Examination is limited by motion artifact. FINDINGS:  There is a comminuted displaced fracture of the humeral head. No dislocation. There is some degenerative changes both of the [facility] joint and of the glenoid. There is a irregularity seen within the proximal right humerus likely reconstruction artifact from motion. The field-of-view of the lungs show no focal parenchymal abnormality no pleural effusions. No pneumothoraces. Likely old healed right eighth rib fracture. The other \"deformities\" likely represent reconstruction artifact from motion There is diffuse subcutaneous edema/swelling and stranding seen about the shoulder and lateral aspect of the arm. Correlate with patient history and exam.     1. COMMINUTED DISPLACED FRACTURE OF THE RIGHT HUMERAL HEAD. 2. OTHER FINDINGS DETAILED ABOVE All CT scans at this facility use dose modulation, iterative reconstruction, and/or weight based dosing when appropriate to reduce radiation dose to as low as reasonably achievable. PET CT Skull Base To Mid Thigh    Result Date: 9/23/2021  EXAMINATION:  REGIONAL BODY FDG PET/CT SCAN: (9/23/2021 11:50 AM) HISTORY: 66years old Male with  Malignant neoplasm of lower lobe of left lung ([facility] [address] ICD10. INDICATION: Study performed for lung nodule follow-up. TECHNIQUE: F18-FDG administered IV was followed about 60 minutes later by PET imaging from skull vertex to proximal thighs. Free breathing low dose CT was performed without contrast for attenuation correction and anatomic localization. Blood glucose before FDG injection: 86 mg/dL FDG radionuclide dose:   18.1 mCi CT Dose-Length Product (DLP): 624.26 mGy*cm.  CT Dose Reduction Employed: Yes COMPARISON: CT chest 9/7/2021 and 1/14/2021, PET/CT 4/8/2021. RESULT: HEAD AND NECK: Physiologic uptake seen in the visualized brain, extraocular muscles, parapharyngeal soft tissues, base of tongue, vocal cords, and salivary glands. No FDG avid cervical lymphadenopathy or mass. No FDG avid thyroid lesion. CHEST: Physiologic uptake in the heart and mediastinum. Lungs and tracheobronchial tree: Note that PET/CT is not sensitive for pulmonary nodules less than 8 mm. There is an FDG avid 32 x 26 mm left lower lobe subpleural mass more prominent compared to the prior PET/CT from April 2021, compatible with recurrent disease. Pleura:  No FDG avid pleural effusion or pleural mass. Mediastinum and Lymph nodes:  No FDG avid mediastinal mass, mediastinal or hilar lymphadenopathy. Heart and great vessels: Physiologic uptake in the heart. Chest wall and axilla: FDG uptake along the left lateral chest wall secondary to  multiple rib fractures. ABDOMEN AND PELVIS: Physiologic uptake seen in the  and GI tracts. Liver: No FDG avid lesion. Biliary: Gallbladder is unremarkable. Spleen: No FDG avid lesion. No splenomegaly. Pancreas: No FDG avid lesion. Adrenals: No FDG avid lesion. Stable left adrenal nodule with peripheral calcification, unchanged since 2020. Kidneys: No stones, hydronephrosis, or FDG avid lesions. GI tract: No dilation or focal suspicious FDG avid lesion. No bowel dilation. Large amount of colonic stool. Lymph nodes: No FDG avid abdominal or pelvic lymphadenopathy. Mesentery/Peritoneum: No ascites or FDG avid mass. Vasculature:  Vascular patency cannot be assessed due to lack of IV contrast.  Atherosclerotic calcification of the abdominal aorta. Pelvis: No ascites, fluid collection or FDG avid process. BONES AND EXTREMITIES:  No suspicious FDG avid osseous lesion. The imaged portions of the skeleton demonstrates age-related degenerative changes. Photopenia in the lower lumbar region and left hip secondary to postoperative prosthesis .  No destructive osseous lesions. ============    1. NECK: *No FDG avid neoplastic process. . 2.  CHEST: *Marked FDG uptake in the left lower lobe more prominent since April 2021, compatible with recurrent malignancy. . 3.  ABDOMEN/PELVIS: *No FDG avid neoplastic process. . 4. EXTREMITIES/SKELETON: *No suspicious FDG avid osseous lesion. .     ASSESSMENT AND PLAN  1. Left lower lung cancer, squamous cell type. Stage 1 5/2020, s/p SBRT completed 12/2020. Now with progressive disease, still confined to left lower lobe. Consideration is consult to radiation therapy to evaluate for additionat XRT, since lesion is peripheral. We will send out the pathology from 5/2020 for neogeneRheonix. He will follow up in our office to discuss treatment option.      Electronically signed by Mairana Prado MD on 10/13/2021 at 9:24 AM

## 2021-10-13 NOTE — PROGRESS NOTES
infarction 01/2016    1 cardiac stent    HTN (hypertension)     meds .  1 yr    Hyperlipidemia     meds > 12 yrs    NSTEMI (non-ST elevated myocardial infarction) ([facility] [address]     Smoker        PAST SURGICAL HISTORY    Past Surgical History:   Procedure Laterality Date    ABDOMEN SURGERY  1961    spleenectomy due to [address]  2009    lumbar disc OR    CARDIAC SURGERY      stents    CARPAL TUNNEL RELEASE Right 5/6/2019    RIGHT CARPAL TUNNEL RELEASE performed by Kelly Nolasco MD at [place] [address] WITH STENT PLACEMENT  1/29/2016    EYE SURGERY      to have Phaco with IOL OU 2/2018    HERNIA REPAIR      JOINT REPLACEMENT Left 1994    LTHR    JOINT REPLACEMENT Right 2009    RTKR    KNEE SURGERY Right 2011    arthroscopy    KS MANIPULATN KNEE JT+ANESTHESIA Right 5/12/2017    RIGHT KNEE MANIPULATION UNDER ANESTHESIA performed by Cassandra Leon MD at [address] OFFICE/OUTPT VISIT,PROCEDURE ONLY Bilateral 12/29/2017    RIGHT AND BILATERAL L 4-5 LYSIS OF SCAR DISKECTOMY INTERBODY CAGE FUSION POSTEROLATERAL FUSION PEDICLE SCREWS performed by Kelly Nolasco MD at [address] [place] [address]  2004    benign    SPLENECTOMY  1961    TOTAL KNEE ARTHROPLASTY Right 3/24/2017    RIGHT  KNEE TOTAL ARTHROPLASTY, ELHAM CEMENTED PERSONA  performed by Cassandra Leon MD at [address] [place] [address] HISTORY    Family History   Problem Relation Age of Onset    Osteoarthritis Mother     Emphysema Mother     Hypertension Father     Hearing Loss Father     Dementia Father     No Known Problems Sister     Prostate Cancer Brother     Colon Polyps Neg Hx        SOCIAL HISTORY    Social History     Tobacco Use    Smoking status: Current Every Day Smoker     Packs/day: 0.50     Years: 60.00     Pack years: 30.00     Types: Cigarettes    Smokeless tobacco: Never Used    Tobacco comment: also smokes cigars   Vaping Use    Vaping Use: Never used   Substance Use Topics    Alcohol use: Not Currently     Alcohol/week: 12.0 standard drinks     Types: 12 Shots of liquor per week     Comment: quit drinking aug 18th, 2021    Drug use: No       ALLERGIES    No Known Allergies    MEDICATIONS    No current facility-administered medications on file prior to encounter. Current Outpatient Medications on File Prior to Encounter   Medication Sig Dispense Refill    sulfamethoxazole-trimethoprim (BACTRIM DS) 800-160 MG per tablet Take 1 tablet by mouth 2 times daily 60 tablet 0    omeprazole (PRILOSEC) 10 MG delayed release capsule TAKE ONE CAPSULE BY MOUTH DAILY 90 capsule 2    SODIUM CHLORIDE, EXTERNAL, 0.9 % SOLN Apply 1 Applicatorful topically daily 1000 mL 2    finasteride (PROSCAR) 5 MG tablet Take 1 tablet by mouth daily 90 tablet 3    citalopram (CELEXA) 40 MG tablet TAKE ONE TABLET BY MOUTH nightly 30 tablet 5    diclofenac (VOLTAREN) 50 MG EC tablet TAKE ONE TABLET BY MOUTH TWO TIMES A DAY 60 tablet 5    isosorbide mononitrate (IMDUR) 30 MG extended release tablet TAKE ONE TABLET BY MOUTH EVERY DAY 90 tablet 3    Fluticasone furoate-vilanterol (BREO ELLIPTA) 200-25 MCG/INH AEPB inhaler Inhale 1 puff into the lungs daily 1 each 3    clopidogrel (PLAVIX) 75 MG tablet TAKE ONE TABLET BY MOUTH DAILY 90 tablet 3    atorvastatin (LIPITOR) 20 MG tablet TAKE ONE TABLET BY MOUTH DAILY 90 tablet 3    oxyCODONE-acetaminophen (PERCOCET) 7.5-325 MG per tablet TAKE ONE TABLET BY MOUTH THREE TIMES A DAY      morphine (MS CONTIN) 15 MG extended release tablet Take 15 mg by mouth as needed.        amoxicillin-clavulanate (AUGMENTIN) 875-125 MG per tablet Take 1 tablet by mouth 2 times daily 60 tablet 0    tiotropium (SPIRIVA RESPIMAT) 2.5 MCG/ACT AERS inhaler Inhale 2 puffs into the lungs daily 1 Inhaler 3    metoprolol tartrate (LOPRESSOR) 50 MG tablet Take 0.5 tablets by mouth 2 times daily TAKE ONE TABLET BY MOUTH TWO TIMES A  tablet 3    bumetanide (BUMEX) 1 MG tablet Take 1 tablet by mouth daily 90 tablet 3    albuterol sulfate  (90 Base) MCG/ACT inhaler Inhale 2 puffs into the lungs every 6 hours as needed for Wheezing 1 Inhaler 3    nitroGLYCERIN (NITROSTAT) 0.4 MG SL tablet Place 1 tablet under the tongue every 5 minutes as needed for Chest pain up to max of 3 total doses.  If no relief after 1 dose, call 911. 25 tablet 3    DOCUSATE CALCIUM PO Take by mouth as needed         Objective    BP (!) 157/60   Pulse 84   Temp 98.8 °F (37.1 °C) (Oral)   Resp 18   Ht 5' 6\" (1.676 m)   Wt 150 lb (68 kg)   SpO2 100%   BMI 24.21 kg/m²     LABS:  WBC:    Lab Results   Component Value Date    WBC 16.3 10/11/2021     H/H:    Lab Results   Component Value Date    HGB 7.0 10/12/2021    HCT 20.6 10/12/2021     PTT:    Lab Results   Component Value Date    APTT 34.3 10/11/2021   [APTT}  PT/INR:    Lab Results   Component Value Date    PROTIME 14.9 10/11/2021    INR 1.2 10/11/2021     HgBA1c:  No results found for: LABA1C    Assessment   Timmy Risk Score: Timmy Scale Score: 18    Patient Active Problem List   Diagnosis    Tobacco use    Hip pain    Knee pain    Chronic back pain    Elevated PSA    Primary osteoarthritis of right knee    Spondylosis of lumbar region without myelopathy or radiculopathy    Sacroiliitis, not elsewhere classified ([facility] [address]    Pure hypercholesterolemia    Charcot's joint of left ankle    Left ankle pain    Confusion caused by a drug    DDD (degenerative disc disease), lumbar    Osteoarthritis of spine with myelopathy, lumbosacral region    Chronic bilateral low back pain with bilateral sciatica    Postlaminectomy syndrome, lumbar region    Acquired spondylolisthesis of lumbosacral region    Spinal stenosis of lumbar region with neurogenic claudication    HNP (herniated nucleus pulposus), lumbar    Spondylolisthesis at L4-L5 level    Anesthesia    Herniated nucleus pulposus, L4-5    NSTEMI (non-ST elevated myocardial infarction) ([address]    S/P PTCA (percutaneous transluminal coronary angioplasty)    Right upper quadrant abdominal pain    Gallbladder polyp    Biliary dyskinesia    Carpal tunnel syndrome on right    Carpal tunnel syndrome on left    Angina effort    Dyslipidemia    FELDER (dyspnea on exertion)    CAD (coronary artery disease)    Chest pain    Lung nodule seen on imaging study    Bilateral carotid artery stenosis    Essential hypertension    NSCLC of left lung (HCC)    Ataxic gait    Dizziness    Fracture, Colles, right, closed    Heroin abuse ([facility] [address]    Non-pressure chronic ulcer of left ankle with fat layer exposed ([facility] [address]    Atherosclerosis of native arteries of extremities with rest pain, left leg (HCC)    Subacute osteomyelitis, left ankle and foot (HCC)    Osteomyelitis of ankle (HCC)    Hyponatremia    Frequent falls    Chronic kidney disease    Fracture of right humerus    Hyperkalemia    Leukocytosis    Anemia    Acute hematogenous osteomyelitis, left ankle and foot (HCC)    Traumatic hematoma of right shoulder    Head injury       Measurements:  Wound 08/17/21 #1 left anterior leg (Active)   Wound Image   10/13/21 0928   Wound Etiology Other 10/13/21 0928   Dressing Status New dressing applied 10/13/21 0928   Wound Cleansed Cleansed with saline 10/13/21 0928   Dressing Change Due 10/14/21 10/13/21 0928   Wound Length (cm) 2.3 cm 10/13/21 0928   Wound Width (cm) 1.2 cm 10/13/21 0928   Wound Depth (cm) 0.2 cm 10/13/21 0928   Wound Surface Area (cm^2) 2.76 cm^2 10/13/21 0928   Change in Wound Size % (l*w) -6.15 10/13/21 0928   Wound Volume (cm^3) 0.552 cm^3 10/13/21 0928   Wound Healing % 29 10/13/21 0928   Post-Procedure Length (cm) 2.1 cm 09/14/21 1518   Post-Procedure Width (cm) 1.3 cm 09/14/21 1518   Post-Procedure Depth (cm) 0.2 cm 09/14/21 1518   Post-Procedure Surface Area (cm^2) 2.73 cm^2 09/14/21 1518   Post-Procedure Volume (cm^3) 0.546 cm^3 09/14/21 1518   Wound Assessment Granulation tissue;Fibrinous 10/13/21 0928   Drainage Amount Small 10/13/21 0928   Drainage Description Serosanguinous 10/13/21 0928   Odor None 10/13/21 0928   Ela-wound Assessment Intact 10/13/21 0928   Margins Defined edges 10/13/21 0928   Wound Thickness Description not for Pressure Injury Full thickness 10/13/21 7848   Number of days: 56     Incision 05/06/19 Wrist Right (Active)   Number of days: 890     Assessment:     Upon entering the room, attempt to obtain wound history but patient is confused - talking to other people in the room, that are not there. When patient is redirected, he does confirm that he thinks he is seeing and hearing people talk to him - Azeem Warner RN is aware. Left distal anterior LE wound has a full-thickness, mixed granular/fibrotic wound bed, defined edges, small amount of serosanguinous drainage, and clean intact ela wound skin. Wound care provided at this time per home regimen, plurogel substituted for the santyl. Patient also has some serum filled blistering to the right shoulder, which [facility] wrote dressing orders and is managing the extremity from fall at home. Spoke with Azeem Warner RN, LLE wound looks stable, no s/sx of infection. Azeem Warner will notify Dr. Sury Nieto that patient follows Dr. Sridhar Gore to see if he would like her to follow here. If not, follow-up in Wound center placed in the discharge instructions. Plan   Plan of Care: Wound 08/17/21 #1 left anterior leg-Dressing/Treatment:  (plurogel, 2x2, border foam)    Specialty Bed Required : N/A   [] Low Air Loss   [] Pressure Redistribution  [] Fluid Immersion  [] Bariatric  [] Other:     Current Diet: ADULT DIET;  Regular  Dietician consult:  N/A    Discharge Plan:  Placement for patient upon discharge: skilled nursing    Patient appropriate for Outpatient [address]: Yes    Referrals:  []   [] [address] [facility]  [] Supplies  [] Other    Patient/Caregiver Teaching:  Level of patient/caregiver understanding able to:   [] Indicates understanding       [] Needs reinforcement  [] Unsuccessful      [] Verbal Understanding  [] Demonstrated understanding       [] No evidence of learning  [] Refused teaching         [x] N/A       Electronically signed by PATY Ledbetter, RN, [place] on 10/13/2021 at 10:04 AM

## 2021-10-13 NOTE — PROGRESS NOTES
[facility] Neurology Consult Note  Name: Concepcion Kearns  Age: 64 y.o. Gender: male  CodeStatus: Full Code  Allergies: No Known Allergies    Chief Complaint:Fall    Primary Care Provider: Samira Swan MD  InpatientTreatment Team: Treatment Team: Attending Provider: Jessica Cardozo MD; Consulting Physician: Dorie Gonzalez MD; Consulting Physician: Brii Peralta MD; Consulting Physician: Edilberto Gastelum MD; Consulting Physician: Jessica Cardozo MD; Registered Nurse: Deloris Soliman, MAXIMILIAN; Registered Nurse: Savita Burt RN; Utilization Reviewer: Angelica Yanes RN  Admission Date: 10/11/2021      HPI   Patient seen and examined for neurology follow-up for lower extremity weakness and frequent falls in the setting of known lumbar stenosis. 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 Patient is currently alert and oriented x1 and has significant confusion. This is an acute change from yesterday when he was alert and oriented and able to provide history. Patient appears to be hallucinating and hearing voices. Exam does not show any obvious focal deficits.   Exam as noted above    No Known Allergies    Patient Active Problem List   Diagnosis    Tobacco use    Hip pain    Knee pain    Chronic back pain    Elevated PSA    Primary osteoarthritis of right knee    Spondylosis of lumbar region without myelopathy or radiculopathy    Sacroiliitis, not elsewhere classified ([address]    Pure hypercholesterolemia    Charcot's joint of left ankle    Left ankle pain    Confusion caused by a drug    DDD (degenerative disc disease), lumbar    Osteoarthritis of spine with myelopathy, lumbosacral region    Chronic bilateral low back pain with bilateral sciatica    Postlaminectomy syndrome, lumbar region    Acquired spondylolisthesis of lumbosacral region    Spinal stenosis of lumbar region with neurogenic claudication    HNP (herniated nucleus pulposus), lumbar    Spondylolisthesis at L4-L5 level    Anesthesia    Herniated nucleus pulposus, L4-5    NSTEMI (non-ST elevated myocardial infarction) ([facility])    S/P PTCA (percutaneous transluminal coronary angioplasty)    Right upper quadrant abdominal pain    Gallbladder polyp    Biliary dyskinesia    Carpal tunnel syndrome on right    Carpal tunnel syndrome on left    Angina effort    Dyslipidemia    FELDER (dyspnea on exertion)    CAD (coronary artery disease)    Chest pain    Lung nodule seen on imaging study    Bilateral carotid artery stenosis    Essential hypertension    NSCLC of left lung (HCC)    Ataxic gait    Dizziness    Fracture, [place], right, closed    Heroin abuse ([address]    Non-pressure chronic ulcer of left ankle with fat layer exposed ([address]    Atherosclerosis of native arteries of extremities with rest pain, left leg ([facility])    Osteomyelitis ([address]    Osteomyelitis of ankle ([facility])    Hyponatremia    Frequent falls    Chronic kidney disease    Fracture of right humerus    Hyperkalemia    Leukocytosis    Anemia    Acute hematogenous osteomyelitis, left ankle and foot ([address]       Past Medical History:   Diagnosis Date    Alcohol abuse     quit drinking 8/18/21    CAD (coronary artery disease)     patient states no doctor has told him this / has 1 cardiac stent    Cancer ([address]     lung LLL    Chronic back pain     Chronic kidney disease     Chronic sinusitis     DJD (degenerative joint disease) of knee     left knee DJD    Elevated PSA     History of blood transfusion 1980's    History of inferior wall myocardial infarction 01/2016    1 cardiac stent    HTN (hypertension)     meds .  1 yr    Hyperlipidemia     meds > 12 yrs    NSTEMI (non-ST elevated myocardial infarction) ([address]     Smoker        Family History   Problem Relation Age of Onset    Osteoarthritis Mother     Emphysema Mother     Hypertension Father     Hearing Loss Father     Dementia Father No Known Problems Sister     Prostate Cancer Brother     Colon Polyps Neg Hx        Past Surgical History:   Procedure Laterality Date    ABDOMEN SURGERY  1961    spleenectomy due to [address]  2009    lumbar disc OR    CARDIAC SURGERY      stents    CARPAL TUNNEL RELEASE Right 5/6/2019    RIGHT CARPAL TUNNEL RELEASE performed by Carolyn Rubin MD at [address] [facility]  1/29/2016    EYE SURGERY      to have Phaco with IOL OU 2/2018    HERNIA REPAIR      JOINT REPLACEMENT Left 1994    LTHR    JOINT REPLACEMENT Right 2009    RTKR    KNEE SURGERY Right 2011    arthroscopy    CA MANIPULATN KNEE JT+ANESTHESIA Right 5/12/2017    RIGHT KNEE MANIPULATION UNDER ANESTHESIA performed by Ortiz Gurrola MD at [address] [facility] OFFICE/OUTPT VISIT,PROCEDURE ONLY Bilateral 12/29/2017    RIGHT AND BILATERAL L 4-5 LYSIS OF SCAR DISKECTOMY INTERBODY CAGE FUSION POSTEROLATERAL FUSION PEDICLE SCREWS performed by Carolyn Rubin MD at [address] Right 3/24/2017    RIGHT  KNEE TOTAL ARTHROPLASTY, Vassie Salk CEMENTED PERSONA  performed by Ortiz Gurrola MD at [facility] History     Socioeconomic History    Marital status:      Spouse name: None    Number of children: None    Years of education: None    Highest education level: None   Occupational History    Occupation: retired   Tobacco Use    Smoking status: Current Every Day Smoker     Packs/day: 0.50     Years: 60.00     Pack years: 30.00     Types: Cigarettes    Smokeless tobacco: Never Used    Tobacco comment: also smokes cigars   Vaping Use    Vaping Use: Never used   Substance and Sexual Activity    Alcohol use: Not Currently     Alcohol/week: 12.0 standard drinks     Types: 12 Shots of liquor per week     Comment: quit drinking aug 18th, 2021    Drug use: No    Sexual activity: Yes   Other Topics Concern    None   Social History Narrative    Lives alone     Social Determinants of Health     Financial Resource Strain: Low Risk     Difficulty of Paying Living Expenses: Not very hard   Food Insecurity: No Food Insecurity    Worried About Running Out of Food in the Last Year: Never true    Ran Out of Food in the Last Year: Never true   Transportation Needs: No Transportation Needs    Lack of Transportation (Medical): No    Lack of Transportation (Non-Medical): No   Physical Activity:     Days of Exercise per Week:     Minutes of Exercise per Session:    Stress:     Feeling of Stress :    Social Connections:     Frequency of Communication with Friends and Family:     Frequency of Social Gatherings with Friends and Family:     Attends Sikhism Services: Active Member of Clubs or Organizations:     Attends Club or Organization Meetings:     Marital Status:    Intimate Partner Violence:     Fear of Current or Ex-Partner:     Emotionally Abused:     Physically Abused:     Sexually Abused:         Vitals:    10/12/21 0900   BP: 136/73   Pulse: 88   Resp: 18   Temp: 98 °F (36.7 °C)   SpO2: 99%       Review of Systems   Constitutional: Negative for diaphoresis and fever. HENT: Negative for congestion and trouble swallowing. Eyes: Negative for photophobia and visual disturbance. Respiratory: Negative for chest tightness and shortness of breath. Cardiovascular: Negative for chest pain. Gastrointestinal: Negative for diarrhea, nausea and vomiting. Musculoskeletal: Positive for back pain and gait problem. Skin: Negative for color change. Neurological: Positive for weakness and numbness. Negative for dizziness, tremors, seizures, syncope, facial asymmetry, speech difficulty, light-headedness and headaches. Psychiatric/Behavioral: Negative for hallucinations and self-injury. Physical Exam  Vitals and nursing note reviewed. Constitutional:       Appearance: Normal appearance. HENT:      Head: Normocephalic and atraumatic. Eyes:      Conjunctiva/sclera: Conjunctivae normal.   Cardiovascular:      Rate and Rhythm: Normal rate and regular rhythm. Pulses: Normal pulses. Heart sounds: Normal heart sounds. Pulmonary:      Effort: Pulmonary effort is normal.      Breath sounds: Normal breath sounds. Musculoskeletal:         General: Normal range of motion. Skin:     General: Skin is warm and dry. Neurological:      Mental Status: He is alert and oriented to person, place, and time. Mental status is at baseline. Cranial Nerves: No cranial nerve deficit. Sensory: No sensory deficit. Motor: Weakness present.       Coordination: Coordination normal.      Gait: Gait abnormal.      Deep Tendon Reflexes: Reflexes abnormal.   Psychiatric:         Mood and Affect: Mood normal.         Behavior: Behavior normal.     Patient is areflexic in his bilateral lower extremities  Lower extremity strength is 4 -/5  Did not walk patient due to fall risk      Medications:  Reviewed    Infusion Medications:    sodium chloride      sodium chloride       Scheduled Medications:    sodium chloride flush  5-40 mL IntraVENous 2 times per day    enoxaparin  40 mg SubCUTAneous Daily    acetaminophen  1,000 mg Oral 3 times per day    bacitracin zinc   Topical BID     PRN Meds: sodium chloride flush, sodium chloride, ondansetron **OR** ondansetron, polyethylene glycol, traMADol **OR** traMADol    Labs:   Recent Labs     10/11/21  1952 10/12/21  0508   WBC 16.3*  --    HGB 8.6* 7.3*   HCT 25.8* 21.6*     --      Recent Labs     10/11/21  1830 10/12/21  0445   * 125*   K 5.9* 5.9*   CL 92* 97   CO2 16* 18*   BUN 25* 23   CREATININE 1.73* 1.25*   CALCIUM 9.3 8.6     Recent Labs     10/11/21  1830   AST 19   ALT 12   BILITOT 0.7   ALKPHOS 104     Recent Labs     10/11/21  1950   INR 1.2     Recent Labs     10/11/21  1830   CKTOTAL 462*   TROPONINI <0.010       Urinalysis:   Lab Results   Component Value Date    NITRU Negative 10/11/2021    WBCUA 0-2 10/11/2021    BACTERIA Negative 10/11/2021    RBCUA 3-5 10/11/2021    BLOODU MODERATE 10/11/2021    SPECGRAV 1.017 10/11/2021    GLUCOSEU Negative 10/11/2021       Radiology:   Most recent    EEG No valid procedures specified. MRI of Brain No results found for this or any previous visit. Results for orders placed during the hospital encounter of 04/09/21    MRI BRAIN WO CONTRAST    Narrative  EXAM: MRI of the brain without contrast    History: Ataxic gait. Dizziness. Technique: Multiplanar multisequence MRI of the brain was performed without contrast.    Comparison: None available    Findings:    Areas of hyperintense T2/FLAIR signal within the bilateral supratentorial white matter and brenna are nonspecific but most compatible with chronic small vessel ischemic changes in a patient of this age. Prominence of the sulci and ventricles compatible with mild generalized parenchymal volume loss. No acute hemorrhage, mass effect, midline shift, or abnormal extra-axial fluid collection. No mass identified MRI without contrast. Cerebellum appears  normal    There is no diffusion restriction. No susceptibility artifact is identified on the gradient echo sequence. The major intracranial vascular flow voids are maintained. Cranial nerves 7/8 complexes appear grossly unremarkable. The visualized paranasal sinuses and bilateral mastoid air cells are essentially clear. Impression  No acute intracranial process. Generalized parenchymal volume loss and nonspecific white matter findings most compatible with chronic small vessel ischemic changes in a patient of this age. MRA of the Head and Neck: No results found for this or any previous visit. No results found for this or any previous visit. No results found for this or any previous visit.                             CT of the Head: Results for orders placed during the hospital encounter of 10/11/21    CT Head WO Contrast    Narrative  CT Brain. Contrast medium:  without contrast.. History:  Fall. Loss of consciousness. Invasive squamous cell carcinoma, left lower lobe. Technical factors: CT imaging of the brain was obtained and formatted as 5 mm contiguous axial images. 2.5 mm contiguous axial images were obtained through the osseous structures. Sagittal and coronal reconstruction obtained during postprocessing. Comparison:  CT brain, August 18, 2021. Romaine Benjonathan Findings:    Extra-axial spaces:  Normal.    Intracranial hemorrhage:  None. Ventricular system: Ventricles mildly enlarged. Sulci mildly prominent. Basal Cisterns:  Normal.    Cerebral Parenchyma: Bilateral symmetric periventricular areas decreased attenuation. Midline Shift:  None. Cerebellum:  Normal.    Paranasal sinuses and mastoid air cells:  Normal.    Visualized Orbits:  Normal.    Impression  Impression:    Mild cerebral atrophy. Chronic ischemic white matter disease. All CT scans at this facility use dose modulation, iterative reconstruction, and/or weight based dosing when appropriate to reduce radiation dose to as low as reasonably achievable. No results found for this or any previous visit. No results found for this or any previous visit. Carotid duplex: No results found for this or any previous visit. No results found for this or any previous visit.   Results for orders placed during the hospital encounter of 02/29/20    US CAROTID ARTERY BILATERAL    Narrative  EXAMINATION:  CAROTID DUPLEX ULTRASONOGRAPHY    CLINICAL HISTORY:  CAROTID STENOSIS    COMPARISONS:  4/18/2018    TECHNIQUE:  B-mode, color flow and spectral Doppler    FINDINGS:    ARTERIAL BLOOD FLOW VELOCITY    RIGHT PS    Prox CCA    99.4 cm/s  Mid CCA     94.4 cm/s  Dist CCA    76.8 cm/s  Prox ICA    57.5 cm/s  Mid ICA     71.3 cm/s  Dist ICA    73.7 cm/s  Prox ECA    110 cm/s  Prox VERT   58 cm/s    ICA/CCA     0.78    LEFT PS    Prox CCA    99.3 cm/s  Mid CCA     93.3 cm/s  Dist CCA    87.3 cm/s  Prox ICA    55.4 cm/s  Mid ICA     74.1 cm/s  Dist ICA    60.9 cm/s  Prox ECA    86.2 cm/s  Prox VERT   41.2 cm/s    ICA/CCA     0.83    Impression  MILD TO MODERATE DEGREE OF ATHEROSCLEROSIS SCATTERED THROUGHOUT THE CAROTID TREE ON BOTH SIDES. SOME ARE CALCIFIED. INTIMAL THICKENING NOTED. BILATERAL ICA LESS THAN 50% STENOSIS. BILATERAL ANTEGRADE VERTEBRAL FLOW. Echo No results found for this or any previous visit. Assessment/Plan:  68-year-old male hospitalized for frequent falls, hyponatremia, and hyperkalemia. Patient is well-known to us has a known history of lumbar stenosis at L2-L3 and L4-L5. Previously seen by neurosurgery and not deemed a surgical candidate at that time. Patient has since had increasing weakness and 3 falls, and I will read consult neurosurgery. Falls likely related to disequilibrium caused by severe stenosis. Of note, patient was found to have active lung cancer, possibly squamous cell carcinoma (problems list indicates non-small cell lung cancer of the left lung) based on recent PET scan. Given patient's hyponatremia and hyperkalemia, will obtain ACTH and a.m. cortisol to screen for paraneoplastic syndrome. Consider SIADH related to lung cancer. However, patient's hyponatremia may be related to to poor p.o. intake. CT of the head was negative for any acute findings but did show chronic ischemic white matter disease. Will obtain orthostatics given that patient had some suggestion of lightheadedness as well. Patient has significant orthostatic hypotension. Causes of this could be related to volume issues related to hyponatremia and severe anemia. Patient has a hemoglobin of 6.3. Consider carotid ultrasound if orthostatic hypotension does not improve after correction of anemia and hyponatremia. Could be related to alcohol abuse or even vagus nerve hypersensitivity related to lung cancer.   This may be difficult to treat given patient's baseline hypertension and I will hold off on medications until severe anemia has resolved. Patient also has osteomyelitis. Neurosurgery has been consulted but has not been able to assess patient. MRI of the brain with and without contrast ordered to assess for mets. Patient also found to have osteomyelitis. Blood cultures pending. CRP elevated procalcitonin pending. Patient does have leukocytosis. Patient is encephalopathic today which is an acute change from yesterday. Will change MRI to stat given sodium fluctuation. Also obtain EEG. I have personally performed a face to face diagnostic evaluation on this patient, reviewed all data and investigations, and am the sole provider of all clinical decisions on the neurological status of this patient. Examined and has significant shoulder issues at this time. His main issues appears to be gait ataxia and hyponatremia. His gait issues appears to be related to his lumbar spine and his falls could be from the same though this time this could have been a different event      Anshu Abrams MD, [place], American Board of Psychiatry & Neurology  Board Certified in Vascular Neurology  Board Certified in Neuromuscular Medicine  Certified in . [place]

## 2021-10-13 NOTE — CONSULTS
INITIAL CONSULT -PAIN MANAGEMENT     SERVICE DATE:  10/13/2021   SERVICE TIME:  6:31 PM  Admission date 10/11/2021  REASON Maribel Lawson: Severe generalized pain, acute on top of chronic pain due to trauma  REQUESTING PHYSICIAN:  Jeanett Hashimoto, MD  [facility] PHYSICIAN:GINETTE Gan MD    Chief Complaint   Patient presents with    Fall         HISTORY OF PRESENTILLNESS:  Mr. Sindy Rose is a 64 y.o. male who presents for [facility] encountered a fall at home. Patient has very complicated comorbidities started with severe COPD, prior alcohol abuse, chronic smoking with diagnosed lung cancer recently in May, patient has also multiple musculoskeletal deformity due to developing osteomyelitis affecting the left ankle causing difficulty with gait with chronic neuropathy, also has bilateral knee replacement and developed chronic neuropathy and gait difficulty since that. Patient is also very complicated lumbar spine pathology with history of lumbar fusion in 2009 and revision recently, also has also hip replacement in the past few years, patient is on chronic opioid therapy with pain management, he does see my partner Dr. Carter James, on daily morphine as well as Percocet for pain. Patient had recent issue with wrist fracture back in August probably due to heavy alcohol drinking and balance issue combining, had surgery at that time, he came at the time with having new fall with fracture to the right shoulder with severe large hematoma with humeral neck fracture, seen by orthopedic recommending nonsurgical options. It was concerning with the patient severe spinal stenosis, he was seen by neurosurgery earlier today who recommended also for conservative treatment at this time given his large comorbidities.     Patient came this morning has some mental status changes, obviously he has anemia due to blood loss, imaging showed some pelvic hematoma and he has large hematoma concealed inside the right shoulder region. PAIN  ASSESSMENT:    constant, waxing and waning, moderate, excruciating    aching, pressure, sharp, shooting, stabbing, throbbing, tight band, tingling and constant    pain is perceived as severe (6-8 pain scale)    PAST MEDICAL HISTORY:    Past Medical History:   Diagnosis Date    Alcohol abuse     quit drinking 8/18/21    CAD (coronary artery disease)     patient states no doctor has told him this / has 1 cardiac stent    Cancer ([facility] [address]     lung LLL    Chronic back pain     Chronic kidney disease     Chronic sinusitis     DJD (degenerative joint disease) of knee     left knee DJD    Elevated PSA     History of blood transfusion 1980's    History of inferior wall myocardial infarction 01/2016    1 cardiac stent    HTN (hypertension)     meds .  1 yr    Hyperlipidemia     meds > 12 yrs    NSTEMI (non-ST elevated myocardial infarction) ([facility] [address]     Smoker      PAST SURGICAL HISTORY:    Past Surgical History:   Procedure Laterality Date    ABDOMEN SURGERY  1961    spleenectomy due to [address]  2009    lumbar disc OR    CARDIAC SURGERY      stents    CARPAL TUNNEL RELEASE Right 5/6/2019    RIGHT CARPAL TUNNEL RELEASE performed by Darnell Martinez MD at [place] [address] WITH STENT PLACEMENT  1/29/2016    EYE SURGERY      to have Phaco with IOL OU 2/2018    HERNIA REPAIR      JOINT REPLACEMENT Left 1994    LTHR    JOINT REPLACEMENT Right 2009    RTKR    KNEE SURGERY Right 2011    arthroscopy    CT MANIPULATN KNEE JT+ANESTHESIA Right 5/12/2017    RIGHT KNEE MANIPULATION UNDER ANESTHESIA performed by Bahman Garay MD at [address] OFFICE/OUTPT VISIT,PROCEDURE ONLY Bilateral 12/29/2017    RIGHT AND BILATERAL L 4-5 LYSIS OF SCAR DISKECTOMY INTERBODY CAGE FUSION POSTEROLATERAL FUSION PEDICLE SCREWS performed by Darnell Martinez MD at [address] [place] [address] Road Right 3/24/2017    RIGHT KNEE TOTAL ARTHROPLASTY, ELHAM CEMENTED PERSONA  performed by Bahman Garay MD at [address]:    Family History   Problem Relation Age of Onset    Osteoarthritis Mother     Emphysema Mother     Hypertension Father     Hearing Loss Father     Dementia Father     No Known Problems Sister     Prostate Cancer Brother     Colon Polyps Neg Hx      SOCIALHISTORY:    Social History     Socioeconomic History    Marital status:      Spouse name: Not on file    Number of children: Not on file    Years of education: Not on file    Highest education level: Not on file   Occupational History    Occupation: retired   Tobacco Use    Smoking status: Current Every Day Smoker     Packs/day: 0.50     Years: 60.00     Pack years: 30.00     Types: Cigarettes    Smokeless tobacco: Never Used    Tobacco comment: also smokes cigars   Vaping Use    Vaping Use: Never used   Substance and Sexual Activity    Alcohol use: Not Currently     Alcohol/week: 12.0 standard drinks     Types: 12 Shots of liquor per week     Comment: quit drinking aug 18th, 2021    Drug use: No    Sexual activity: Yes   Other Topics Concern    Not on file   Social History Narrative    Lives alone     Social Determinants of Health     Financial Resource Strain: Low Risk     Difficulty of Paying Living Expenses: Not very hard   Food Insecurity: No Food Insecurity    Worried About Running Out of Food in the Last Year: Never true    Yoav of Food in the Last Year: Never true   Transportation Needs: No Transportation Needs    Lack of Transportation (Medical): No    Lack of Transportation (Non-Medical):  No   Physical Activity:     Days of Exercise per Week:     Minutes of Exercise per Session:    Stress:     Feeling of Stress :    Social Connections:     Frequency of Communication with Friends and Family:     Frequency of Social Gatherings with Friends and Family:     Attends [employer] Services:     Active Member of Clubs or Organizations:     Attends Club or Organization Meetings:     Marital Status:    Intimate Partner Violence:     Fear of Current or Ex-Partner:     Emotionally Abused:     Physically Abused:     Sexually Abused:      PSYCHOLOGICAL HISTORY: History of drug abuse, current issues delirium. MEDICATIONS:  Medications Prior to Admission: sulfamethoxazole-trimethoprim (BACTRIM DS) 800-160 MG per tablet, Take 1 tablet by mouth 2 times daily  omeprazole (PRILOSEC) 10 MG delayed release capsule, TAKE ONE CAPSULE BY MOUTH DAILY  SODIUM CHLORIDE, EXTERNAL, 0.9 % SOLN, Apply 1 Applicatorful topically daily  finasteride (PROSCAR) 5 MG tablet, Take 1 tablet by mouth daily  citalopram (CELEXA) 40 MG tablet, TAKE ONE TABLET BY MOUTH nightly  diclofenac (VOLTAREN) 50 MG EC tablet, TAKE ONE TABLET BY MOUTH TWO TIMES A DAY  isosorbide mononitrate (IMDUR) 30 MG extended release tablet, TAKE ONE TABLET BY MOUTH EVERY DAY  Fluticasone furoate-vilanterol (BREO ELLIPTA) 200-25 MCG/INH AEPB inhaler, Inhale 1 puff into the lungs daily  clopidogrel (PLAVIX) 75 MG tablet, TAKE ONE TABLET BY MOUTH DAILY  atorvastatin (LIPITOR) 20 MG tablet, TAKE ONE TABLET BY MOUTH DAILY  oxyCODONE-acetaminophen (PERCOCET) 7.5-325 MG per tablet, TAKE ONE TABLET BY MOUTH THREE TIMES A DAY  morphine (MS CONTIN) 15 MG extended release tablet, Take 15 mg by mouth as needed.    amoxicillin-clavulanate (AUGMENTIN) 875-125 MG per tablet, Take 1 tablet by mouth 2 times daily  tiotropium (SPIRIVA RESPIMAT) 2.5 MCG/ACT AERS inhaler, Inhale 2 puffs into the lungs daily  metoprolol tartrate (LOPRESSOR) 50 MG tablet, Take 0.5 tablets by mouth 2 times daily TAKE ONE TABLET BY MOUTH TWO TIMES A DAY  bumetanide (BUMEX) 1 MG tablet, Take 1 tablet by mouth daily  albuterol sulfate  (90 Base) MCG/ACT inhaler, Inhale 2 puffs into the lungs every 6 hours as needed for Wheezing  nitroGLYCERIN (NITROSTAT) 0.4 MG SL tablet, Place 1 tablet under the tongue every 5 minutes as needed for Chest pain up to max of 3 total doses. If no relief after 1 dose, call 911. DOCUSATE CALCIUM PO, Take by mouth as needed  [unfilled]    ALLERGIES:  Patient has no known allergies. COMPLETE REVIEW OF SYSTEMS:  As noted in HPI, 12 point ROS reviewed and otherwise negative. Review of Systems   Constitutional: Positive for activity change, appetite change and fatigue. Negative for chills, diaphoresis, fever and unexpected weight change. HENT: Negative. Eyes: Negative. Respiratory: Negative. Cardiovascular: Negative. Gastrointestinal: Negative. Endocrine: Negative. Genitourinary: Positive for difficulty urinating. Negative for decreased urine volume, dysuria, enuresis, frequency, genital sores, penile swelling and urgency. Musculoskeletal: Positive for arthralgias, back pain, gait problem, joint swelling and myalgias. Negative for neck pain and neck stiffness. Skin: Positive for color change and wound. Negative for pallor and rash. Allergic/Immunologic: Negative. Neurological: Positive for dizziness, tremors, weakness, light-headedness, numbness and headaches. Negative for seizures, syncope, facial asymmetry and speech difficulty. Hematological: Negative for adenopathy. Bruises/bleeds easily. Psychiatric/Behavioral: Positive for agitation, behavioral problems, confusion, decreased concentration, dysphoric mood, hallucinations and sleep disturbance. Negative for self-injury and suicidal ideas. The patient is nervous/anxious. The patient is not hyperactive. OBJECTIVE  PHYSICAL EXAM:  /81   Pulse 75   Temp 98.3 °F (36.8 °C)   Resp 18   Ht 5' 6\" (1.676 m)   Wt 150 lb (68 kg)   SpO2 99%   BMI 24.21 kg/m²   Body mass index is 24.21 kg/m². CONSTITUTIONAL: Patient is awake but alert oriented only x2 confused about his place and time but answering some of the question especially with old memory appropriately.   EYES:  vision intact  ENT: normocepalic, without obvious abnormality, atraumatic  NECK:  supple, symmetrical, trachea midline, skin normal and no stridor  HEMATOLOGIC/LYMPHATICS: Anemia noted  BACK: Generally sore across the whole thoracolumbar region and upper cervical area  LUNGS:  no increased work of breathing  CARDIOVASCULAR:  regular rate and rhythm  ABDOMEN: Soft and nondistended  MUSCULOSKELETAL: Right arm in immobilizers, severe large hematoma across the right shoulder, deferred exam of the right shoulder due to severe pain. Imaging noted for right pelvic hematoma but he has mild pain with hip motion, gait instability  NEUROLOGIC:  Mental status shows delirium and confusion, Not able to fully examine him neurologically  SKIN: Several area for skin bruising noted. DATA:   Diagnostic tests reviewed for today's visit:    All labs and imaging results reviewed. Lab Results   Component Value Date    WBC 14.6 10/13/2021    HGB 6.3 10/13/2021    HCT 19.0 10/13/2021    MCV 94.7 10/13/2021     10/13/2021     10/13/2021    K 4.9 10/13/2021     10/13/2021    CO2 18 10/13/2021    BUN 15 10/13/2021    CREATININE 0.96 10/13/2021    CALCIUM 8.5 10/13/2021    ALKPHOS 104 10/11/2021    ALT 12 10/11/2021    AST 19 10/11/2021    BILITOT 0.7 10/11/2021    LABALBU 3.9 10/11/2021     No results found for: LABAMPH, BARBSCNU, LABBENZ, CANSU, COCAIMETSCRU, OPIATESCREENURINE, PHENCYCLIDINESCREENURINE, LABMETH, PROPOX, OXYCODONEUR, DSCOMMENT  CT ABDOMEN PELVIS WO CONTRAST Additional Contrast? None    Result Date: 10/11/2021  CT OF THE CHEST, ABDOMEN, AND PELVIS with intravenous contrast medium. HISTORY: Fall. Loss of consciousness. Receiving anticoagulation. Lower extremity weakness. History squamous cell carcinoma, left lower lobe. TECHNICAL FACTORS: CT imaging of the chest, abdomen, and pelvis, was obtained and formatted as 5 mm contiguous axial images from the thoracic inlet through the symphysis pubis.  Sagittal and coronal No pelvic masses. Abdominal Wall: Increased attenuation measuring 4.5 x 4.6 x 8.2 cm within right pelvic soft tissue (series 2, image 89). Bones:  No bone lesions. Lumbar scoliosis convexity to left apex L3. Remote internal fixation L4, and L5. Remote left hip replacement. IMPRESSION: Soft tissue hematoma, right pelvis, measuring 4.5 x 4.6 x 8.2 cm. Splenectomy. No change, exophytic, calcified gastric nodule as discussed. Remote internal fixation L4 and L5. Left hip replacement. All CT scans at this facility use dose modulation, iterative reconstruction, and/or weight based dosing when appropriate to reduce radiation dose to as low as reasonably achievable. XR CHEST (2 VW)    Result Date: 9/17/2021  EXAMINATION: XR CHEST (2 VW), XR WRIST LEFT (MIN 3 VIEWS)  CLINICAL HISTORY:  weight loss COMPARISONS: May 21, 2020  FINDINGS: Two views of the chest are submitted. The cardiac silhouette is of normal size configuration. Pulmonary vascular attenuated Right sided trachea. There is a faint opacity seen within the posterior aspect of the left lower lobe. Its best appreciated on the lateral view. No Pneumothoraces. THERE IS A FAINT SOFT TISSUE OPACITY SEEN ON THE LATERAL VIEW. PLEASE SEE CT SCAN REPORT CHEST SEPTEMBER 17, 2021 FOR ADDITIONAL DETAILS WHICH WAS IDENTIFIED AS MALIGNANCY UNTIL PROVEN OTHERWISE. Tatianna Austin RADIOGRAPHIC FINDINGS SUGGESTIVE OF COPD. DR. Malou Montague EMERGENCY ROOM WAS NOTED IMMEDIATELY ABOVE FINDINGS ON SEPTEMBER 17, 2021 AT 2014 HOURS EXAMINATION: XR CHEST (2 VW), XR WRIST LEFT (MIN 3 VIEWS) CLINICAL HISTORY: Pain COMPARISONS: None. FINDINGS: Four views of the left wrist are submitted. There is a comminuted fracture with intra-articular extension of the left distal radius as well as dislocation of the ulnar styloid process. No dislocations. No focal bony abnormalities.                                                   Please IMPRESSION: COMMINUTED FRACTURE WITH INTRA-ARTICULAR EXTENSION OF THE LEFT DISTAL RADIUS AS WELL AS DISLOCATION OF THE RADIAL STYLOID PROCESS    XR WRIST LEFT (MIN 3 VIEWS)    Result Date: 9/17/2021  EXAMINATION: XR CHEST (2 VW), XR WRIST LEFT (MIN 3 VIEWS)  CLINICAL HISTORY:  weight loss COMPARISONS: May 21, 2020  FINDINGS: Two views of the chest are submitted. The cardiac silhouette is of normal size configuration. Pulmonary vascular attenuated Right sided trachea. There is a faint opacity seen within the posterior aspect of the left lower lobe. Its best appreciated on the lateral view. No Pneumothoraces. THERE IS A FAINT SOFT TISSUE OPACITY SEEN ON THE LATERAL VIEW. PLEASE SEE CT SCAN REPORT CHEST SEPTEMBER 17, 2021 FOR ADDITIONAL DETAILS WHICH WAS IDENTIFIED AS MALIGNANCY UNTIL PROVEN OTHERWISE. Teresa Bryan RADIOGRAPHIC FINDINGS SUGGESTIVE OF COPD. DR. Lilian Gil EMERGENCY ROOM WAS NOTED IMMEDIATELY ABOVE FINDINGS ON SEPTEMBER 17, 2021 AT 2014 HOURS EXAMINATION: XR CHEST (2 VW), XR WRIST LEFT (MIN 3 VIEWS) CLINICAL HISTORY: Pain COMPARISONS: None. FINDINGS: Four views of the left wrist are submitted. There is a comminuted fracture with intra-articular extension of the left distal radius as well as dislocation of the ulnar styloid process. No dislocations. No focal bony abnormalities. Please                             IMPRESSION: COMMINUTED FRACTURE WITH INTRA-ARTICULAR EXTENSION OF THE LEFT DISTAL RADIUS AS WELL AS DISLOCATION OF THE RADIAL STYLOID PROCESS    XR HAND RIGHT (MIN 3 VIEWS)    Result Date: 10/12/2021  EXAMINATION: XR HAND RIGHT (MIN 3 VIEWS)  CLINICAL HISTORY:  fall COMPARISONS: AUGUST 18, 2021 1157 HOURS  FINDINGS: Three views of the right hand are submitted. There is diffuse generalized osteopenia.  There is a comminuted fracture of the distal phalanx of the right first finger. No dislocations. There are degenerative changes seen at the first carpometacarpal joint space as well as within the region of the radial styloid. COMMINUTED FRACTURE OF THE DISTAL PHALANX OF THE RIGHT FIRST FINGER. XR HIP BILATERAL W AP PELVIS (2 VIEWS)    Result Date: 9/24/2021  EXAMINATION/TECHNIQUE:  XR HIP BILATERAL W AP PELVIS (2 VIEWS) HISTORY:  Bilateral hip pain. COMPARISON: PET/CT 9/23/2021. Abdominal CT 3/23/2020. RESULT: Bony pelvis appears grossly intact without acute fracture. No acute right hip fracture. Left hip arthroplasty. Avascular necrosis of the right femoral head, grossly similar to prior abdominal CT. Linear tract within the right femoral head and neck, likely from prior core decompression with possible small amount of cortical collapse of the right femoral head. Tiny right osteophytes with otherwise maintained joint space. Left hip arthroplasty components appear grossly intact without periprosthetic lucency or fracture. Postsurgical and degenerative changes lumbar spine. Degenerative changes SI joints and pubic symphysis which otherwise appear intact. Feces throughout the visualized colon. No other significant abnormality. Avascular necrosis right hip with possible small amount of cortical collapse. Left hip arthroplasty without radiographic complication. CT Head WO Contrast    Result Date: 10/11/2021  CT Brain. Contrast medium:  without contrast.. History:  Fall. Loss of consciousness. Invasive squamous cell carcinoma, left lower lobe. Technical factors: CT imaging of the brain was obtained and formatted as 5 mm contiguous axial images. 2.5 mm contiguous axial images were obtained through the osseous structures. Sagittal and coronal reconstruction obtained during postprocessing. Comparison:  CT brain, August 18, 2021. Isra Panchal  Findings: Extra-axial spaces:  Normal. Intracranial hemorrhage:  None. Ventricular system: Ventricles mildly enlarged. Sulci mildly prominent. Basal Cisterns:  Normal. Cerebral Parenchyma: Bilateral symmetric periventricular areas decreased attenuation. Midline Shift:  None. Cerebellum:  Normal. Paranasal sinuses and mastoid air cells:  Normal. Visualized Orbits:  Normal.     Impression: Mild cerebral atrophy. Chronic ischemic white matter disease. All CT scans at this facility use dose modulation, iterative reconstruction, and/or weight based dosing when appropriate to reduce radiation dose to as low as reasonably achievable. CT CHEST WO CONTRAST    Result Date: 10/11/2021  CT OF THE CHEST, ABDOMEN, AND PELVIS with intravenous contrast medium. HISTORY: Fall. Loss of consciousness. Receiving anticoagulation. Lower extremity weakness. History squamous cell carcinoma, left lower lobe. TECHNICAL FACTORS: CT imaging of the chest, abdomen, and pelvis, was obtained and formatted as 5 mm contiguous axial images from the thoracic inlet through the symphysis pubis. Sagittal and coronal reconstructions obtained during postprocessing. Intravenous contrast medium:  100 ml of Isovue-370 Comparison:  CT chest, September 7, 2021, January 14, 2021. PET/CT, September 23, 2021. CT abdomen pelvis, March 23, 2020 CT OF THE CHEST FINDINGS: Right lung: No nodules, masses, consolidation, pleural effusion, pneumothorax. Left lung: Pleural-based, noncalcified, mildly stellate 2.6 x 2.9 cm nodule, left lower lobe, again identified, unchanged. No consolidation, pleural effusion, pneumothorax. Lymph nodes: No hilar, mediastinal, or axillary lymph node enlargement. Thoracic aorta: Normal in course and caliber. Cardiac Size: Normal. Pericardial effusion: None. Musculoskeletal:No osteoblastic, and no osteolytic lesions. Remote fractures left sixth through eighth ribs.      2.6 x 2.9 cm pleural-based left lower lobe nodule, in patient with known clinical history squamous cell carcinoma left lower lobe. No acute fractures. Remote fractures left sixth through eighth ribs CT OF THE ABDOMEN AND PELVIS WITH INTRAVENOUS CONTRAST MEDIUM. FINDINGS: Liver:  Normal in size, shape, and attenuation. Bile Ducts:  Normal in caliber. Gallbladder:  No stones or wall thickening. Pancreas:  Normal without masses, cysts, ductal dilatation or calcification. Spleen:  Surgically absent. Kidneys:  Normal in size. No hydronephrosis, masses, or stones. Mild bilateral perinephric fat stranding. Adrenals:  Normal. Stomach: Exophytic, calcified 1.6 cm nodule, gastric cardia again identified, unchanged. Small bowel:  Normal in caliber. Appendix:  Normal. Colon:  Normal in caliber. Peritoneum:  No ascites, free air, or fluid collections. Vessels: Aorta normal in course and caliber. Lymph nodes:  Retroperitoneal:  No enlarged retroperitoneal lymph nodes. Mesenteric:  No enlarged mesenteric lymph nodes. Pelvic: No enlarged pelvic lymph nodes. Ureters: Normal in course and caliber. No calcifications. Bladder: No wall thickening. Reproductive organs: No pelvic masses. Abdominal Wall: Increased attenuation measuring 4.5 x 4.6 x 8.2 cm within right pelvic soft tissue (series 2, image 89). Bones:  No bone lesions. Lumbar scoliosis convexity to left apex L3. Remote internal fixation L4, and L5. Remote left hip replacement. IMPRESSION: Soft tissue hematoma, right pelvis, measuring 4.5 x 4.6 x 8.2 cm. Splenectomy. No change, exophytic, calcified gastric nodule as discussed. Remote internal fixation L4 and L5. Left hip replacement. All CT scans at this facility use dose modulation, iterative reconstruction, and/or weight based dosing when appropriate to reduce radiation dose to as low as reasonably achievable. CT CERVICAL SPINE WO CONTRAST    Result Date: 10/11/2021  CT cervical spine without intravenous contrast medium. HISTORY: Fall. Loss of consciousness. History of invasive squamous carcinoma left lower lobe.  TECHNICAL FACTORS: CT cervical spine obtained and formatted as 2.5 mm contiguous axial images from skull base to the level of. Sagittal and coronal reconstructions were obtained during postprocessing. No contrast medium was utilized. COMPARISON: CT cervical spine, August 18, 2021 FINDINGS: Cervical vertebral bodies are normal in height height. Loss cervical lordosis. 4.3 mm anterolisthesis C7 on T1, unchanged. Atlantooccipital articulation maintained. Atlantoaxial interval preserved. Neuroforaminal narrowing, left C2-3, left C3-4, bilateral C4-5, right 7, right C7-T1. Diffuse disc space narrowing, posterior C2-3. Diffuse narrowing L4-5 T5-6, and C6-7. No fractures, dislocations, bone lesions. Limited imaging lung apices without anomaly. Bilateral carotid artery calcification. Soft tissues are without anomaly. No fracture. Severe degenerative change cervical spine. Grade 1 C7 spondylolisthesis, unchanged. Bilateral carotid artery calcification. If clinical concern warrants, carotid sonography may be utilized for further evaluation. All CT scans at this facility use dose modulation, iterative reconstruction, and/or weight based dosing when appropriate to reduce radiation dose to as low as reasonably achievable. CT THORACIC SPINE WO CONTRAST    Result Date: 10/11/2021  CT thoracic spine without intravenous contrast medium. HISTORY: Fall. Thoracic and parathoracic tenderness. Loss of consciousness. History squamous cell carcinoma left lower lobe. TECHNICAL FACTORS: CT thoracic spine obtained and formatted as 2.5 mm contiguous axial images from skull base to the level of. Sagittal and coronal reconstructions were obtained during postprocessing. No contrast medium was utilized. COMPARISON: CT chest, December 7, 2021. PET/CT, December 23, 2021. FINDINGS: Thoracic vertebral bodies are normal in height and alignment Diffuse mild disc space narrowing. No fractures, dislocations, bone lesions.  Limited imaging right and left lung lung zone shows a partially imaged pleural-based noncalcified mildly stellate shaped mass, similar to that found on CT chest from September 7, 2021. Peripherally calcified, exophytic 1.9 cm gastric soft tissue nodule, unchanged. No fracture. Left lower lobe mass. Calcified exophytic gastric mass. All CT scans at this facility use dose modulation, iterative reconstruction, and/or weight based dosing when appropriate to reduce radiation dose to as low as reasonably achievable. .    CT LUMBAR SPINE WO CONTRAST    Result Date: 10/11/2021  CT lumbar spine without intravenous contrast medium. HISTORY: Fall. Back pain. Weakness. History squamous cell carcinoma of lung TECHNICAL FACTORS: CT lumbar spine obtained and formatted as 2.5 mm contiguous axial images from skull base to the level of. Sagittal and coronal reconstructions were obtained during postprocessing. No contrast medium was utilized. COMPARISON: PET/CT, September 23, 2021. CT Lumbar Spine, August 30, 2017. FINDINGS: Remote internal fixation L4 and L5 using posterior bilateral pedicle screws secured bilaterally to vertically oriented rods. Lumbar scoliosis convexity to left apex L3. Loss of height, L3 and L4 vertebral bodies, unchanged. Small anterior osteophytes lower thoracic and lumbar spine. Disc spaces preserved. No fractures, dislocations, bone lesions. Limited imaging of the abdomen and pelvis without anomaly. No acute fracture. Postsurgical change discussed. All CT scans at this facility use dose modulation, iterative reconstruction, and/or weight based dosing when appropriate to reduce radiation dose to as low as reasonably achievable. CT HUMERUS RIGHT WO CONTRAST    Result Date: 10/12/2021  EXAMINATION:  CT SCAN RIGHT SHOULDER.  CLINICAL HISTORY:  Pain, injury, fall COMPARISON:  None TECHNIQUE:  Multiple serial axial images of the right shoulder with both sagittal coronal reconstruction was performed without intravenous or oral administration of contrast. Examination is limited by motion artifact. FINDINGS:  There is a comminuted displaced fracture of the humeral head. No dislocation. There is some degenerative changes both of the [facility] joint and of the glenoid. There is a irregularity seen within the proximal right humerus likely reconstruction artifact from motion. The field-of-view of the lungs show no focal parenchymal abnormality no pleural effusions. No pneumothoraces. Likely old healed right eighth rib fracture. The other \"deformities\" likely represent reconstruction artifact from motion There is diffuse subcutaneous edema/swelling and stranding seen about the shoulder and lateral aspect of the arm. Correlate with patient history and exam.     1. COMMINUTED DISPLACED FRACTURE OF THE RIGHT HUMERAL HEAD. 2. OTHER FINDINGS DETAILED ABOVE All CT scans at this facility use dose modulation, iterative reconstruction, and/or weight based dosing when appropriate to reduce radiation dose to as low as reasonably achievable. PET CT Skull Base To Mid Thigh    Result Date: 9/23/2021  EXAMINATION:  REGIONAL BODY FDG PET/CT SCAN: (9/23/2021 11:50 AM) HISTORY: 66years old Male with  Malignant neoplasm of lower lobe of left lung ([facility] [address] ICD10. INDICATION: Study performed for lung nodule follow-up. TECHNIQUE: F18-FDG administered IV was followed about 60 minutes later by PET imaging from skull vertex to proximal thighs. Free breathing low dose CT was performed without contrast for attenuation correction and anatomic localization. Blood glucose before FDG injection: 86 mg/dL FDG radionuclide dose:   18.1 mCi CT Dose-Length Product (DLP): 624.26 mGy*cm. CT Dose Reduction Employed: Yes COMPARISON: CT chest 9/7/2021 and 1/14/2021, PET/CT 4/8/2021. RESULT: HEAD AND NECK: Physiologic uptake seen in the visualized brain, extraocular muscles, parapharyngeal soft tissues, base of tongue, vocal cords, and salivary glands. No FDG avid cervical lymphadenopathy or mass.  No FDG avid thyroid lesion. CHEST: Physiologic uptake in the heart and mediastinum. Lungs and tracheobronchial tree: Note that PET/CT is not sensitive for pulmonary nodules less than 8 mm. There is an FDG avid 32 x 26 mm left lower lobe subpleural mass more prominent compared to the prior PET/CT from April 2021, compatible with recurrent disease. Pleura:  No FDG avid pleural effusion or pleural mass. Mediastinum and Lymph nodes:  No FDG avid mediastinal mass, mediastinal or hilar lymphadenopathy. Heart and great vessels: Physiologic uptake in the heart. Chest wall and axilla: FDG uptake along the left lateral chest wall secondary to  multiple rib fractures. ABDOMEN AND PELVIS: Physiologic uptake seen in the  and GI tracts. Liver: No FDG avid lesion. Biliary: Gallbladder is unremarkable. Spleen: No FDG avid lesion. No splenomegaly. Pancreas: No FDG avid lesion. Adrenals: No FDG avid lesion. Stable left adrenal nodule with peripheral calcification, unchanged since 2020. Kidneys: No stones, hydronephrosis, or FDG avid lesions. GI tract: No dilation or focal suspicious FDG avid lesion. No bowel dilation. Large amount of colonic stool. Lymph nodes: No FDG avid abdominal or pelvic lymphadenopathy. Mesentery/Peritoneum: No ascites or FDG avid mass. Vasculature:  Vascular patency cannot be assessed due to lack of IV contrast.  Atherosclerotic calcification of the abdominal aorta. Pelvis: No ascites, fluid collection or FDG avid process. BONES AND EXTREMITIES:  No suspicious FDG avid osseous lesion. The imaged portions of the skeleton demonstrates age-related degenerative changes. Photopenia in the lower lumbar region and left hip secondary to postoperative prosthesis . No destructive osseous lesions. ============    1. NECK: *No FDG avid neoplastic process. . 2.  CHEST: *Marked FDG uptake in the left lower lobe more prominent since April 2021, compatible with recurrent malignancy. . 3.  ABDOMEN/PELVIS: *No FDG avid neoplastic process. . 4. EXTREMITIES/SKELETON: *No suspicious FDG avid osseous lesion. MRI BRAIN W WO CONTRAST    Result Date: 10/13/2021  EXAMINATION: MRI BRAIN W WO CONTRAST CLINICAL HISTORY:  Extrapontine myelinolysis? Sudden onset encephalopathy. Brain metastasis from lung cancer. COMPARISONS: NONE AVAILABLE TECHNIQUE: Multiplanar multisequence images of the brain were obtained without contrast. Diffusion perfusion imaging was obtained. BRAIN MRI FINDINGS: Limited due to motion artifact. There are no extra-axial collections. There is no evidence of hemorrhage. There are no areas of perfusion diffusion signal abnormality to suggest ischemia. The susceptibility images do not demonstrate evidence of hemosiderin deposition within the brain parenchyma or the leptomeninges. There is preservation of the gray-white matter differentiation. There are numerous foci of T2/FLAIR hyperintensities in the subcortical and periventricular white matter in a symmetric distribution throughout both hemispheres. The sulci and ventricles are  within normal limits without evidence of hydrocephalus. The midline structures are intact, the corpus callosum is within normal limits. The region of the pineal gland and the sella turcica are unremarkable. There are no space-occupying lesions in the posterior fossa. The basilar cisterns are patent. The craniocervical junction is unremarkable. The visualized portions of the orbits are within normal limits, the globes are intact. The visualized portions of the paranasal sinuses are within normal limits. The calvarium and soft tissues are unremarkable. There are no acute intracranial changes, there is no evidence of hemorrhage or acute ischemia. [place] [address] Problems    Diagnosis Date Noted    Pelvic hematoma, male, after a fall [N50.1] 10/13/2021    Cause of injury, accidental fall, initial encounter [W19. XXXA] 10/13/2021    Tremor of unknown origin [R25.1] 10/13/2021    Sundowning [F05] 10/13/2021    Humeral head fracture, right, closed, initial encounter [S42.291A]     Traumatic hematoma of right shoulder [S40.011A]     Head injury [S09.90XA]     Hyponatremia [E87.1] 10/11/2021    Frequent falls [R29.6] 10/11/2021    Chronic kidney disease [N18.9]     Fracture of right humerus [S42.301A]     Hyperkalemia [E87.5]     Leukocytosis [D72.829]     Anemia [D64.9]     Acute hematogenous osteomyelitis, left ankle and foot ([facility]) [M86.072]     Subacute osteomyelitis, left ankle and foot ([address] [M86.272] 08/17/2021    Fracture, [place], right, closed [S52.531A] 05/26/2021    Dizziness [R42] 03/18/2021    Ataxic gait [R26.0] 03/18/2021    NSCLC of left lung ([address] [C34.92] 11/13/2020    CAD (coronary artery disease) [I25.10] 01/10/2020    Herniated nucleus pulposus, L4-5 [M51.26] 12/29/2017    Spondylolisthesis at L4-L5 level [M43.16] 11/16/2017    Chronic bilateral low back pain with bilateral sciatica [M54.42, M54.41, G89.29] 10/19/2017    Spinal stenosis of lumbar region with neurogenic claudication [M48.062] 10/19/2017    Delirium due to general medical condition [F05] 04/20/2017     64years old male who has considerable comorbidities most serious lung cancer, COPD, chronic osteomyelitis for the left ankle with chronic neuropathy and failed knee surgery, severe spinal stenosis with history of lumbar fusion, nonsurgical candidate, gait ataxia due to several days including neuropathy and ankle deformity and muscle skeletal comorbidities. Currently his issue is more of mental status changes, multifactorial encephalopathy could be due to electrolyte, metabolic, anemia unlikely alcohol withdrawal since he quit alcohol back in August however medication interaction and sundowning and delirium due to hospitalization could all be a possibility at this time.   Has been seen by neurology, he has some tremors and shaking, will start him on some low-dose gabapentin

## 2021-10-13 NOTE — PROGRESS NOTES
2200: Assessment complete. Alert and oriented, calm and cooperative. C/o pain-- given Percocet per MAR. Sling in place on R arm. Swelling noted. Mepilex covering blister on R shoulder and L ankle changed. Repositioned. Using urinal currently-- pt states he uses a wheelchair almost exclusively at home. No further complaints. Safety maintained. Call light within reach. 0115: Lab called critical value of H and H 7/20. 6. Dr. Hernando Dumas notified per protocol. No new orders at this time. 0330: Pt has becoming increasingly more confused throughout shift. Bed alarm on with 3 attempts at this time to get out of bed.  Pt alert to person only, stating he is in his kitchen at home that has been remodeled and needs \"Teresa to take him to the hospital.\"     Electronically signed by To Kaplan RN on 10/12/2021 at 10:50 PM

## 2021-10-13 NOTE — PROGRESS NOTES
MRI form and Blood consent obtained from [facility]. MRI form faxed to MRI awaiting time frame from MRI and Type and Screen labs back to transfuse blood. Avasys in place for patient safety as pt is still confused and hallucinating.

## 2021-10-13 NOTE — PROGRESS NOTES
Multiple calls to lab for draw to type and screen. No draw at this time. Call back to lab to draw      650: blood bank called, blood ready. Will pass on to night shift.

## 2021-10-13 NOTE — CONSULTS
[address]                      [address], [address] [facility]                                  CONSULTATION    PATIENT NAME: Radha Olmedo                   :        1945  MED REC NO:   [id number]                            ROOM:       [id number]  ACCOUNT NO:   [de-identified]                           ADMIT DATE: 10/11/2021  PROVIDER:     Leon Arceo DO    CONSULT DATE:  10/13/2021    HISTORY OF PRESENT ILLNESS:  This is a 68-year-old with dementia, unable  to get any type of information from him. All information is from the  chart. He was admitted to the hospital after falling and having a right  humeral head fracture on admission. The patient has nonoperable lower  left lung cancer diagnosed in 2021. He has been a heavy alcohol  abuser in the past.  He is being seen at the request of hospitalist due  to hyponatremia on admission of 122 mEq that has improved to 133 mEq at  the time of my evaluation with noted normal renal function now and  potassium of 5.6. The patient is chronically anemic with a hemoglobin  of 7. Advanced directives to be reviewed. The patient is rambling and  waiving his hand and is unable to answer any questions at this time. The patient is being treated for a left ankle osteomyelitis with  Pasteurella infection with IV Rocephin. PAST MEDICAL HISTORY:  Lung cancer, coronary artery disease,  osteomyelitis of left ankle, CKD II-III, right humeral head fracture,  dementia. PAST SURGICAL HISTORY:  Right knee surgery, carpal tunnel release on the  right, laminectomy, splenectomy, colonoscopy, upper endoscopy, cardiac  surgery, prostate biopsy. MEDICATIONS:  At the time of his admission; OxyContin, Lopressor,  Spiriva, Lipitor, Imdur, Proscar, Celexa, Bactrim DS, Prilosec. ALLERGIES TO MEDICATIONS:  None. PHYSICAL EXAMINATION:  VITAL SIGNS:  5 feet and 6 inches, 150 pounds.   Blood pressure is  160/60, no hypotension since admission; heart rate 80; respirations 18;  afebrile. HEENT:  Pupils appear to be reactive to light. Sclerae are clear. Unable to evaluate throat and mouth. NECK:  Unable to evaluate. CHEST:  Lungs anteriorly decreased few scattered rhonchi. No wheezing. CARDIOVASCULAR:  Heart is regular, 1/6 systolic murmur. EXTREMITIES:  Show left ankle and sterile dressing, muscle wasting. Skin is warm in the lower limbs. IMPRESSION:  1. Hyponatremia, resolved, maybe related to his lung cancer. 2.  Hyperkalemia, possibly related to dehydration and recent KEILY. No  ACE or ARB therapy onboard or potassium sparing diuretics. 3.  Dementia. vs Encephalopathy  4. Organic heart disease. 5.  Osteomyelitis of the left ankle. 6.  Organic heart disease, coronary artery disease. 7.  Anemia. PLAN:  Saline if needed for fluid hydration. Blood transfusion as  needed, pending his prognosis if reasonable. Follow BMPs.         Rohini Emanuel DO    D: 10/13/2021 10:25:39       T: 10/13/2021 10:31:08     [email or web address]  Job#: [id number]     Doc#: [id number]    CC:

## 2021-10-13 NOTE — CONSULTS
PODIATRIC MEDICINE AND SURGERY  CONSULT HISTORY AND PHYSICAL    Consulting Service:  Internal Medicine  Requesting Provider: Deysi Vickers MD  Opinion/advice regarding: left ankle wound  Staff Doctor:  Nancy Lizama DPM    ASSESSMENT:  64 y.o. male with PMH significant for alcohol and tobacco abuse, CAD, COPD, lung cancer 5/2020, CKD, MI s/p cardiac stent, HTN, HLD who was admitted for frequent falls. Patient afebrile with leukocytosis of 16.3 and CRP of 117.6 upon presentation. Patient with hematoma to right shoulder and right pelvis, along with right proximal humerus fracture. Patient with chronic left ankle ulcer with underlying osteomyelitis. No acute surgical intervention warranted. Will continue with local wound care, abx per ID, and offloading. PLAN AND RECOMMENDATIONS[de-identified]  Patient's case to be discussed with staff, Dr. Zaire Garcia, who will provide final recommendations going forward  Patient afebrile with downtrending leukocytosis (16.3 -->14.6) and elevated CRP of 117.6. Chronic left ankle ulcer stable with no progressive changes of osteomyelitis. No acute surgical intervention warranted. Will continue with local wound care and abx per ID. Wound care: daily dressing changes to left ankle ulcer with plurogel, 2x2s or 4x4s, and Kerlix. Nursing orders placed  WB as tolerated to LLE  Elevate LLE at or above heart level while resting  Antibiotic coverage per ID. Previous culture growing Providencia and Pasteurella. On IV rocephin. Plan for PICC line for IV rocephin x 3 weeks. Pain management per primary team   Podiatry to follow while in house   Patient will need follow up with Dr. Zaire Garcia within 7-10 days of discharge      HPI: This 64y.o. year old male was seen today for left ankle ulcer. Patient states that he came in after sustaining a fall. Patient is waxing/waning with confusion. Patient follows with Dr. Zaire Garcia as outpatient at the wound care clinic. Last seen on 9/14/21 for left ankle ulcer. INTERBODY CAGE FUSION POSTEROLATERAL FUSION PEDICLE SCREWS performed by Elizabet Duenas MD at [address] [place] [address] Blvd  [address]    benign   [address] Right 3/24/2017    RIGHT  KNEE TOTAL ARTHROPLASTY, ELHAM CEMENTED PERSONA  performed by Betsy Mahoney MD at [facility]       No current facility-administered medications on file prior to encounter. Current Outpatient Medications on File Prior to Encounter   Medication Sig Dispense Refill    sulfamethoxazole-trimethoprim (BACTRIM DS) 800-160 MG per tablet Take 1 tablet by mouth 2 times daily 60 tablet 0    omeprazole (PRILOSEC) 10 MG delayed release capsule TAKE ONE CAPSULE BY MOUTH DAILY 90 capsule 2    SODIUM CHLORIDE, EXTERNAL, 0.9 % SOLN Apply 1 Applicatorful topically daily 1000 mL 2    finasteride (PROSCAR) 5 MG tablet Take 1 tablet by mouth daily 90 tablet 3    citalopram (CELEXA) 40 MG tablet TAKE ONE TABLET BY MOUTH nightly 30 tablet 5    diclofenac (VOLTAREN) 50 MG EC tablet TAKE ONE TABLET BY MOUTH TWO TIMES A DAY 60 tablet 5    isosorbide mononitrate (IMDUR) 30 MG extended release tablet TAKE ONE TABLET BY MOUTH EVERY DAY 90 tablet 3    Fluticasone furoate-vilanterol (BREO ELLIPTA) 200-25 MCG/INH AEPB inhaler Inhale 1 puff into the lungs daily 1 each 3    clopidogrel (PLAVIX) 75 MG tablet TAKE ONE TABLET BY MOUTH DAILY 90 tablet 3    atorvastatin (LIPITOR) 20 MG tablet TAKE ONE TABLET BY MOUTH DAILY 90 tablet 3    oxyCODONE-acetaminophen (PERCOCET) 7.5-325 MG per tablet TAKE ONE TABLET BY MOUTH THREE TIMES A DAY      morphine (MS CONTIN) 15 MG extended release tablet Take 15 mg by mouth as needed.        amoxicillin-clavulanate (AUGMENTIN) 875-125 MG per tablet Take 1 tablet by mouth 2 times daily 60 tablet 0    tiotropium (SPIRIVA RESPIMAT) 2.5 MCG/ACT AERS inhaler Inhale 2 puffs into the lungs daily 1 Inhaler 3    metoprolol tartrate (LOPRESSOR) 50 MG tablet Take 0.5 tablets by mouth 2 times daily TAKE ONE TABLET BY MOUTH TWO TIMES A  tablet 3    bumetanide (BUMEX) 1 MG tablet Take 1 tablet by mouth daily 90 tablet 3    albuterol sulfate  (90 Base) MCG/ACT inhaler Inhale 2 puffs into the lungs every 6 hours as needed for Wheezing 1 Inhaler 3    nitroGLYCERIN (NITROSTAT) 0.4 MG SL tablet Place 1 tablet under the tongue every 5 minutes as needed for Chest pain up to max of 3 total doses.  If no relief after 1 dose, call 911. 25 tablet 3    DOCUSATE CALCIUM PO Take by mouth as needed         No Known Allergies    Family History   Problem Relation Age of Onset    Osteoarthritis Mother     Emphysema Mother     Hypertension Father     Hearing Loss Father     Dementia Father     No Known Problems Sister     Prostate Cancer Brother     Colon Polyps Neg Hx        Social History     Socioeconomic History    Marital status:      Spouse name: Not on file    Number of children: Not on file    Years of education: Not on file    Highest education level: Not on file   Occupational History    Occupation: retired   Tobacco Use    Smoking status: Current Every Day Smoker     Packs/day: 0.50     Years: 60.00     Pack years: 30.00     Types: Cigarettes    Smokeless tobacco: Never Used    Tobacco comment: also smokes cigars   Vaping Use    Vaping Use: Never used   Substance and Sexual Activity    Alcohol use: Not Currently     Alcohol/week: 12.0 standard drinks     Types: 12 Shots of liquor per week     Comment: quit drinking aug 18th, 2021    Drug use: No    Sexual activity: Yes   Other Topics Concern    Not on file   Social History Narrative    Lives alone     Social Determinants of Health     Financial Resource Strain: Low Risk     Difficulty of Paying Living Expenses: Not very hard   Food Insecurity: No Food Insecurity    Worried About Running Out of Food in the Last Year: Never true    Yoav of Food in the Last Year: Never true   Transportation Needs: No Transportation Needs    Lack of Transportation (Medical): No    Lack of Transportation (Non-Medical): No   Physical Activity:     Days of Exercise per Week:     Minutes of Exercise per Session:    Stress:     Feeling of Stress :    Social Connections:     Frequency of Communication with Friends and Family:     Frequency of Social Gatherings with Friends and Family:     Attends [employer] Services:     Active Member of Clubs or Organizations:     Attends Club or Organization Meetings:     Marital Status:    Intimate Partner Violence:     Fear of Current or Ex-Partner:     Emotionally Abused:     Physically Abused:     Sexually Abused:        Review of Systems  CONSTITUTIONAL: No fevers, chills, diaphoresis  HEENT: No epistaxis, tinnitus  EYES: No diplopia, blurry vision.   CARDIOVASCULAR:  No chest pain, palpitations, lower extremity edema  PULM: No dyspnea, tachypnea, wheezing  GI: No nausea, vomiting, constipation, diarrhea  : No dysuria, gross hematuria, or pyuria  NEURO: No new balance problems, peripheral weakness, paresthesias, numbness  MSK: +pain to left wrist secondary to fracture with dressing intact; +pain to left ankle  PSY: No concerns regarding depression, anxiety  INTEGUMENTARY: +left ankle ulcer; + hematoma right shoulder and pelvis; ecchymosis throughout    OBJECTIVE:  BP (!) 157/60   Pulse 84   Temp 98.8 °F (37.1 °C) (Oral)   Resp 18   Ht 5' 6\" (1.676 m)   Wt 150 lb (68 kg)   SpO2 100%   BMI 24.21 kg/m²   Patient is alert and oriented x2-3, waxing/waning    Vascular:   Palpable Dorsalis Pedis and Palpable Posterior Tibial Pulses B/L   Capillary Fill time < 3 seconds to B/L digits  Skin temperature warm to cool tibial tuberosity to the digits B/L  Hair growth absent to digits  mild edema, Diffuse varicosities, venous stasis dermatitis noted to lower legs B/L    Neurological:   Sensation to light touch intact B/L  Protective sensation via monofilament testing intact B/L    Musculoskeletal/Orthopaedic: 54 cm/s with biphasic waveform. Posterior tibial artery proximal: 87 cm/s with biphasic waveform. Posterior tibial artery mid: 84 cm/s with biphasic waveform. Posterior tibial artery distal: 98 cm/s with biphasic waveform. Peroneal artery proximal: 42 cm/s with biphasic waveform. Peroneal artery mid: 48 cm/s with biphasic waveform. Peroneal artery distal: 45 cm/s with biphasic waveform. Bone scan 8/16/21:   Impression   LEFT TALUS OSTEOMYELITIS.       DIFFUSE DEGENERATIVE CHANGES. NO EVIDENCE OF METASTASIS.              Bridgette Baptiste DPM PGY-2  Podiatric Surgery Resident  Podiatry On Call Pager: [phone]  October 13, 2021  11:59 AM

## 2021-10-13 NOTE — PROGRESS NOTES
Infectious Diseases Inpatient Progress Note          HISTORY OF PRESENT ILLNESS:  Follow up left ankle acute osteomyelitis with Pasteurella and Providencia on IV Rocephin, well tolerated. Patient was admitted after he had a syncopal attack, was found to have right humeral head fracture on admission, recent left wrist fracture, nonoperable left lower lobe lung cancer diagnosed May 2021, history of alcohol abuse with persistently elevated CRP of greater than 100. Had confusion last night. Has moderate pain in right hand and shoulder, has large bruise of right shoulder with swelling  Current Medications:     sodium chloride flush  5-40 mL IntraVENous 2 times per day    [Held by provider] enoxaparin  40 mg SubCUTAneous Daily    acetaminophen  1,000 mg Oral 3 times per day    cefTRIAXone (ROCEPHIN) IV  1,000 mg IntraVENous Q24H    atorvastatin  20 mg Oral Nightly    citalopram  40 mg Oral Daily    finasteride  5 mg Oral Daily    budesonide-formoterol  2 puff Inhalation BID    metoprolol tartrate  25 mg Oral BID    pantoprazole  40 mg Oral QAM AC    tiotropium  2 puff Inhalation Daily    bacitracin zinc   Topical BID       Allergies:  Patient has no known allergies. Review of Systems  Limited 14 system review is negative other than HPI, some confusion. Denies any specific complaints    Physical Exam  Vitals:    10/12/21 1904 10/12/21 1924 10/13/21 0714 10/13/21 0730   BP:  117/74  (!) 157/60   Pulse:  74  84   Resp:  18 18    Temp:  98.4 °F (36.9 °C)  98.8 °F (37.1 °C)   TempSrc:  Oral  Oral   SpO2: 97% 95% 98% 100%   Weight:       Height:         General Appearance: alert and oriented to self and time, well-developed and well-nourished, in no acute distress, on room air  Sitting at bedside  Right shoulder with swelling and ecchymosis  Positive tenderness  Skin: warm and dry, no rash.    Head: normocephalic and atraumatic  Eyes: anicteric sclerae  ENT: oropharynx clear and moist with normal mucous membranes. No oral thrush  Lungs: normal respiratory effort, clear lungs  Heart normal S1-S2 no murmurs  Abdomen: soft, no tenderness  No leg edema  No erythema, no tenderness  Left wrist deformity  Left ankle with intact dressing    DATA:    Lab Results   Component Value Date    WBC 16.3 (H) 10/11/2021    HGB 7.0 (LL) 10/12/2021    HCT 20.6 (LL) 10/12/2021    MCV 94.6 10/11/2021     10/11/2021     Lab Results   Component Value Date    CREATININE 1.06 10/13/2021    BUN 19 10/13/2021     (L) 10/13/2021    K 5.6 (H) 10/13/2021     10/13/2021    CO2 18 (L) 10/13/2021       Hepatic Function Panel:  Lab Results   Component Value Date    ALKPHOS 104 10/11/2021    ALT 12 10/11/2021    AST 19 10/11/2021    PROT 6.6 10/11/2021    BILITOT 0.7 10/11/2021    LABALBU 3.9 10/11/2021     10/11/2021  9:09 PM - [place], Chpo Incoming Lab Results From Soft    Component Value Ref Range & Units Status Collected Lab   Bacteria, UA Negative  Negative /HPF Final 10/11/2021  6:30 PM [address] - [facility]   Hyaline Casts, UA 0-1  0 - 5 /HPF Final 10/11/2021  6:30 PM [address] Lab   WBC, UA 0-2  0 - 5 /HPF Final 10/11/2021  6:30 PM [address] Lab   RBC, UA 3-5Abnormal   0 - 5 /HPF Final 10/11/2021  6:30 PM [address] Lab   Epithelial Cells, UA 0-2  0 - 5 /HPF        10/12/2021  4:37 PM - [place], Chpo Incoming Lab Results From Soft    Component Value Ref Range & Units Status Collected Lab   CRP High Sensitivity 117. 6High   0.0 - 5.0 mg/L Final 10/12/2021  3:26 PM [address] Lab   Testing Performed By    Impression:  1. COMMINUTED DISPLACED FRACTURE OF THE RIGHT HUMERAL HEAD.    2. OTHER FINDINGS DETAILED ABOVE     IMPRESSION:    · Left ankle osteomyelitis  · Left ankle chronic nonhealing wound  · Pasteurella and procidentia infection  · Right shoulder large hematoma with humeral head fracture  · Frequent falls  · Lung cancer of left lower lobe  ·     Patient Active Problem List Diagnosis    Tobacco use    Hip pain    Knee pain    Chronic back pain    Elevated PSA    Primary osteoarthritis of right knee    Spondylosis of lumbar region without myelopathy or radiculopathy    Sacroiliitis, not elsewhere classified ([address]    Pure hypercholesterolemia    Charcot's joint of left ankle    Left ankle pain    Confusion caused by a drug    DDD (degenerative disc disease), lumbar    Osteoarthritis of spine with myelopathy, lumbosacral region    Chronic bilateral low back pain with bilateral sciatica    Postlaminectomy syndrome, lumbar region    Acquired spondylolisthesis of lumbosacral region    Spinal stenosis of lumbar region with neurogenic claudication    HNP (herniated nucleus pulposus), lumbar    Spondylolisthesis at L4-L5 level    Anesthesia    Herniated nucleus pulposus, L4-5    NSTEMI (non-ST elevated myocardial infarction) ([address]    S/P PTCA (percutaneous transluminal coronary angioplasty)    Right upper quadrant abdominal pain    Gallbladder polyp    Biliary dyskinesia    Carpal tunnel syndrome on right    Carpal tunnel syndrome on left    Angina effort    Dyslipidemia    FELDER (dyspnea on exertion)    CAD (coronary artery disease)    Chest pain    Lung nodule seen on imaging study    Bilateral carotid artery stenosis    Essential hypertension    NSCLC of left lung (HCC)    Ataxic gait    Dizziness    Fracture, Colles, right, closed    Heroin abuse ([address]    Non-pressure chronic ulcer of left ankle with fat layer exposed ([address]    Atherosclerosis of native arteries of extremities with rest pain, left leg (HCC)    Subacute osteomyelitis, left ankle and foot (HCC)    Osteomyelitis of ankle (HCC)    Hyponatremia    Frequent falls    Chronic kidney disease    Fracture of right humerus    Hyperkalemia    Leukocytosis    Anemia    Acute hematogenous osteomyelitis, left ankle and foot (HCC)    Traumatic hematoma of right shoulder    Head injury

## 2021-10-13 NOTE — PROGRESS NOTES
Physical Therapy Med Surg Initial Assessment  Facility/Department: [facility]  Room: [place] [id number]       NAME: Kaden Diego  : 1945 (62 y.o.)  MRN: [id number]  CODE STATUS: Full Code    Date of Service: 10/13/2021    Patient Diagnosis(es): Hyperkalemia [E87.5]  Hyponatremia [E87.1]  General weakness [R53.1]  KEILY (acute kidney injury) ([address] [N17.9]  Injury of head, initial encounter [K98.57BF]  Fall, initial encounter [W19. XXXA]  Closed nondisplaced fracture of distal phalanx of right thumb, initial encounter [S62.524A]  Skin tear of right elbow without complication, initial encounter [S51.011A]  Abdominal pain, unspecified abdominal location [R10.9]  Closed fracture of proximal end of right humerus, unspecified fracture morphology, initial encounter [S42.201A]  Acute bilateral thoracic back pain [M54.6]   Chief Complaint   Patient presents with    Fall     Patient Active Problem List    Diagnosis Date Noted    Bilateral carotid artery stenosis 2020    Essential hypertension 2020    Humeral head fracture, right, closed, initial encounter     Traumatic hematoma of right shoulder     Head injury     Hyponatremia 10/11/2021    Frequent falls 10/11/2021    Chronic kidney disease     Fracture of right humerus     Hyperkalemia     Leukocytosis     Anemia     Acute hematogenous osteomyelitis, left ankle and foot ([address]     Non-pressure chronic ulcer of left ankle with fat layer exposed ([address] 2021    Atherosclerosis of native arteries of extremities with rest pain, left leg ([address] 2021    Subacute osteomyelitis, left ankle and foot ([address] 2021    Osteomyelitis of ankle ([address] 2021    Heroin abuse ([address] 2021    Fracture, Colles, right, closed 2021    Ataxic gait 2021    Dizziness 2021    NSCLC of left lung ([address] 2020    Lung nodule seen on imaging study 2020    CAD (coronary artery disease) 01/10/2020    Chest pain 01/10/2020    FELDER (dyspnea on exertion) 08/22/2019    Angina effort 05/01/2019    Dyslipidemia 05/01/2019    Carpal tunnel syndrome on right 04/09/2019    Carpal tunnel syndrome on left 04/09/2019    Biliary dyskinesia 03/07/2019    Right upper quadrant abdominal pain 02/15/2019    Gallbladder polyp 02/15/2019    NSTEMI (non-ST elevated myocardial infarction) ([address] 03/29/2018    S/P PTCA (percutaneous transluminal coronary angioplasty) 03/29/2018    Herniated nucleus pulposus, L4-5 12/29/2017    Anesthesia 12/26/2017    HNP (herniated nucleus pulposus), lumbar 11/16/2017    Spondylolisthesis at L4-L5 level 11/16/2017    DDD (degenerative disc disease), lumbar 10/19/2017    Osteoarthritis of spine with myelopathy, lumbosacral region 10/19/2017    Chronic bilateral low back pain with bilateral sciatica 10/19/2017    Postlaminectomy syndrome, lumbar region 10/19/2017    Acquired spondylolisthesis of lumbosacral region 10/19/2017    Spinal stenosis of lumbar region with neurogenic claudication 10/19/2017    Confusion caused by a drug 04/20/2017    Left ankle pain 03/29/2016    Charcot's joint of left ankle 03/24/2016    Pure hypercholesterolemia 01/29/2016    Sacroiliitis, not elsewhere classified ([address] 01/25/2016    Spondylosis of lumbar region without myelopathy or radiculopathy 12/28/2015    Primary osteoarthritis of right knee 08/25/2015    Elevated PSA     Tobacco use 04/24/2014    Hip pain 04/24/2014    Knee pain 04/24/2014    Chronic back pain 04/24/2014      Past Medical History:   Diagnosis Date    Alcohol abuse     quit drinking 8/18/21    CAD (coronary artery disease)     patient states no doctor has told him this / has 1 cardiac stent    Cancer ([address]     lung LLL    Chronic back pain     Chronic kidney disease     Chronic sinusitis     DJD (degenerative joint disease) of knee     left knee DJD    Elevated PSA     History of blood transfusion 1980's    History of inferior wall myocardial infarction 01/2016    1 cardiac stent    HTN (hypertension)     meds .  1 yr    Hyperlipidemia     meds > 12 yrs    NSTEMI (non-ST elevated myocardial infarction) [facility]     Smoker      Past Surgical History:   Procedure Laterality Date    ABDOMEN SURGERY  1961    spleenectomy due to [address]  2009    lumbar disc OR    CARDIAC SURGERY      stents    CARPAL TUNNEL RELEASE Right 5/6/2019    RIGHT CARPAL TUNNEL RELEASE performed by Nickie Groves MD at [place] [address] WITH STENT PLACEMENT  1/29/2016    EYE SURGERY      to have Phaco with IOL OU 2/2018    HERNIA REPAIR      JOINT REPLACEMENT Left 1994    LTHR    JOINT REPLACEMENT Right 2009    RTKR    KNEE SURGERY Right 2011    arthroscopy    IL MANIPULATN KNEE JT+ANESTHESIA Right 5/12/2017    RIGHT KNEE MANIPULATION UNDER ANESTHESIA performed by Elsi Hernandez MD at [address] [facility] OFFICE/OUTPT [address] Bilateral 12/29/2017    RIGHT AND BILATERAL L 4-5 LYSIS OF SCAR DISKECTOMY INTERBODY CAGE FUSION POSTEROLATERAL FUSION PEDICLE SCREWS performed by Nickie Groves MD at [address] [place] [address]  2004    benign    SPLENECTOMY  1961    TOTAL KNEE ARTHROPLASTY Right 3/24/2017    RIGHT  KNEE TOTAL ARTHROPLASTY, ELHAM CEMENTED PERSONA  [place] [facility] [place]     Chart Reviewed: Yes  Patient assessed for rehabilitation services?: Yes  Additional Pertinent Hx: L UE chronic malunion of L distal radius fx  Family / Caregiver Present: No  General Comment  Comments: Pt resting in bed, agreeable to PT eval     Restrictions:  Restrictions/Precautions: ROM Restrictions, Fall Risk (high fall risk per Suarez score)  Required Braces or Orthoses  Right Upper Extremity Brace/Splint: Sling  Right Upper Extremity Brace/Splint: may perform pendulums with PT/OT; AROM ok for R hand, wrist within comfort;  no lifting R UE  Left Upper Extremity Brace/Splint: chronic malunions L distal radius fx- no lifting >5 #  Position Activity Restriction  Other position/activity restrictions: may remove right UE sling for hygiene and skin checks. SUBJECTIVE: Subjective: Pt reports R thumb pain, denies shoulder pain currently  Response To Previous Treatment: Patient with no complaints from previous session. Pain  Pre Treatment Pain Screening  Pain at present: 4  Scale Used: Numeric Score  Intervention List: Patient able to continue with treatment;Nurse/physician notified  Post Treatment Pain Screening:   Pain Screening  Patient Currently in Pain: Yes  Pain Assessment  Pain Assessment: 0-10  Pain Level: 4  Patient's Stated Pain Goal: No pain  Pain Type: Acute pain  Pain Location:  (thumb)  Pain Orientation: Right  Pain Descriptors: Aching; Sore  Pain Frequency: Intermittent  Pain Onset: Sudden  Clinical Progression: Not changed  Non-Pharmaceutical Pain Intervention(s): Elevation  Prior Level of Function:  Social/Functional History  Lives With: Alone  Type of Home: House  Home Layout: Able to Live on Main level with bedroom/bathroom, Two level, Performs ADL's on one level  Home Access: Stairs to enter with rails  Entrance Stairs - Number of Steps: 3  Entrance Stairs - Rails: Both  Bathroom Shower/Tub: Tub/Shower unit  Bathroom Equipment: Shower chair, Grab bars in shower (does not use chair)  Home Equipment: Rolling walker, 4 wheeled walker, Oxygen  ADL Assistance: Independent  Homemaking Assistance: Independent  Homemaking Responsibilities: Yes  Ambulation Assistance: Independent  Transfer Assistance: Independent  Active : Yes    OBJECTIVE:   Vision: Impaired  Vision Exceptions: Wears glasses at all times  Hearing: Within functional limits    Cognition:  Overall Orientation Status: Impaired  Orientation Level: Oriented to person, Disoriented to situation, Disoriented to time, Disoriented to place (able to state year and month with increased time, redirectio)  Follows Commands: Impaired (with repetition and redirection)    ROM:  RLE General PROM: impaired hip flexor, HS, gastroc mm length  RLE AROM: WFL  LLE General PROM: impaired hip flexor, HS, gastroc mm length  LLE AROM : WFL    Strength:  Strength RLE  Comment: hip 4-/5, knee 4/5, ankle 4/5  Strength LLE  Comment: hip 4-/5, knee 4/5, ankle 4/5  Strength RUE  Comment: shoulder impaired-not tested d/t acute fx  Strength LUE  Comment: limited by 5# lifting restriction -chronic malunion of L distal radius fx  Strength Other  Other: impaired core strength noted during mobility assessment    Neuro:  Balance  Posture: Fair  Sitting - Static: Fair;+  Sitting - Dynamic: Fair  Standing - Static: Poor  Standing - Dynamic: Poor     Tone RLE  RLE Tone: Normotonic  Tone LLE  LLE Tone: Normotonic  Coordination  Movements Are Fluid And Coordinated: No  Motor Control  Gross Motor?: Exceptions  Comments: impaired gross motor sequencing and coordination of mobility tasks  Sensation  Overall Sensation Status: WFL    Bed mobility  Bridging:  (unable to sequence despite VC/TC)  Rolling to Left: Moderate assistance  Rolling to Right: Unable to assess  Supine to Sit: Moderate assistance  Sit to Supine: Moderate assistance  Scooting: Dependent/Total    Transfers  Sit to Stand: Moderate Assistance  Stand to sit: Moderate Assistance  Lateral Transfers: Minimal Assistance (lateral scooting EOB)  Comment: L arm in arm with pt using elbow/bicep to pull to standing. increased spent prior to mobility adjusting sling to improve fit    Ambulation  Ambulation?: Yes  WB Status: R UE NWB-sling; L UE no more than 5# lifting d/t distal radius fx  Ambulation 1  Surface: level tile  Device:  (L arm in arm)  Other Apparatus:  (R sling donned/adjusted)  Assistance:  Moderate assistance  Quality of Gait: wide ABDIEL, increased postural sway, delayed righting response, ineffective righting response with multiple retro LOB  Gait Deviations: Slow Beatrice;Decreased step length;Decreased step height; Increased ABDIEL;Staggers  Distance: 1ft  Comments: sidestepping EOB, unsafe to continue, recommend 2 person assist    Stairs/Curb  Stairs?: No    Activity Tolerance  Activity Tolerance: Patient limited by cognitive status         ASSESSMENT:   Body structures, Functions, Activity limitations: Decreased functional mobility ; Decreased ROM; Decreased strength;Decreased safe awareness;Decreased cognition;Decreased balance;Decreased coordination; Increased pain;Decreased posture  Decision Making: High Complexity  History: high  Exam: high  Clinical Presentation: high  Prognosis: Good  Barriers to Learning: cognitive/memeory deficits    DISCHARGE RECOMMENDATIONS:  Discharge Recommendations: Continue to assess pending progress, Patient would benefit from continued therapy after discharge  Assessment: Pt demonstrates the above deficits and decline in functional mobility status placing them at increased risk for falls. Pt would benefit from physical therapy to address above deficits and allow for safe return home at highest level of function, decrease risk for falls, and improve QOL. REQUIRES PT FOLLOW UP: Yes      PLAN OF CARE:  Plan  Times per week: 3-6  Plan weeks: course of admission  Current Treatment Recommendations: Strengthening, ROM, Balance Training, Functional Mobility Training, Transfer Training, Cognitive/Perceptual Training, Wheelchair Mobility Training, Gait Training, Neuromuscular Re-education, Home Exercise Program, Safety Education & Training, Patient/Caregiver Education & Training, Pain Management, Cognitive Reorientation  Safety Devices  Type of devices: Bed alarm in place, Left in bed, Call light within reach  Restraints  Initially in place: No    Goals:  Patient goals : \"to get home and see my dog. \"  Long term goals  Long term goal 1: Bed mobility with indep   Long term goal 2: Functional transfers with supervision   Long term goal 3: Standing with no UE support x 1 min with SBA  Long term goal 4: Amb 50ft Min A with appropriate AD  Long term goal 5: SBA for HEP to improve LE strength, balance, activity tolerance    AMPAC (6 CLICK) BASIC MOBILITY  AM-PAC Inpatient Mobility Raw Score : 10     Therapy Time:   Individual   Time In 0935   Time Out 0950   Minutes 100 Darron Cazares, DPT 10/13/21 at 10:29 AM     Definitions for assistance levels  Independent = pt does not require any physical supervision or assistance from another person for activity completion. Device may be needed.   Stand by assistance = pt requires verbal cues or instructions from another person, close to but not touching, to perform the activity  Minimal assistance= pt performs 75% or more of the activity; assistance is required to complete the activity  Moderate assistance= pt performs 50% of the activity; assistance is required to complete the activity  Maximal assistance = pt performs 25% of the activity; assistance is required to complete the activity  Dependent = pt requires total physical assistance to accomplish the task

## 2021-10-13 NOTE — PROGRESS NOTES
Hospitalist Progress Note      PCP: Susan Gutierrez MD    Date of Admission: 10/11/2021    Chief Complaint:  Patient is confused and experiencing hallucinations per nursing staff, afebrile, stable HD    Medications:  Reviewed    Infusion Medications    sodium chloride      sodium chloride 100 mL/hr at 10/13/21 0515     Scheduled Medications    iron sucrose  200 mg IntraVENous Once    sodium chloride flush  5-40 mL IntraVENous 2 times per day    [Held by provider] enoxaparin  40 mg SubCUTAneous Daily    acetaminophen  1,000 mg Oral 3 times per day    cefTRIAXone (ROCEPHIN) IV  1,000 mg IntraVENous Q24H    atorvastatin  20 mg Oral Nightly    citalopram  40 mg Oral Daily    finasteride  5 mg Oral Daily    budesonide-formoterol  2 puff Inhalation BID    metoprolol tartrate  25 mg Oral BID    pantoprazole  40 mg Oral QAM AC    tiotropium  2 puff Inhalation Daily    bacitracin zinc   Topical BID     PRN Meds: sodium chloride flush, sodium chloride, ondansetron **OR** ondansetron, polyethylene glycol, oxyCODONE-acetaminophen, albuterol sulfate HFA      Intake/Output Summary (Last 24 hours) at 10/13/2021 1044  Last data filed at 10/13/2021 0446  Gross per 24 hour   Intake 2557 ml   Output 300 ml   Net 2257 ml       Exam:    BP (!) 157/60   Pulse 84   Temp 98.8 °F (37.1 °C) (Oral)   Resp 18   Ht 5' 6\" (1.676 m)   Wt 150 lb (68 kg)   SpO2 100%   BMI 24.21 kg/m²     General appearance: awake, cooperative. Respiratory:  clear to auscultation, bilaterally  Cardiovascular: Regular rate and rhythm, S1/S2. Abdomen: Soft, active bowel sounds.   Musculoskeletal: large right shoulder hematoma, right hand is wrapped, left ankle wound present       Labs:   Recent Labs     10/11/21  1952 10/11/21  1952 10/12/21  0508 10/12/21  1526 10/12/21  2306   WBC 16.3*  --   --   --   --    HGB 8.6*   < > 7.3* 7.4* 7.0*   HCT 25.8*   < > 21.6* 22.1* 20.6*     --   --   --   --     < > = values in this interval not displayed. Recent Labs     10/12/21  1526 10/12/21  2306 10/13/21  0759   * 132* 133*   K 5.5* 5.4* 5.6*    102 102   CO2 18* 17* 18*   BUN 19 23 19   CREATININE 1.20 1.39* 1.06   CALCIUM 8.4* 8.8 8.6     Recent Labs     10/11/21  1830   AST 19   ALT 12   BILITOT 0.7   ALKPHOS 104     Recent Labs     10/11/21  1950   INR 1.2     Recent Labs     10/11/21  1830   CKTOTAL 462*   TROPONINI <0.010       Urinalysis:      Lab Results   Component Value Date    NITRU Negative 10/11/2021    WBCUA 0-2 10/11/2021    BACTERIA Negative 10/11/2021    RBCUA 3-5 10/11/2021    BLOODU MODERATE 10/11/2021    SPECGRAV 1.017 10/11/2021    GLUCOSEU Negative 10/11/2021       Radiology:  XR HAND RIGHT (MIN 3 VIEWS)   Final Result   COMMINUTED FRACTURE OF THE DISTAL PHALANX OF THE RIGHT FIRST FINGER. CT Head WO Contrast   Final Result   Impression:      Mild cerebral atrophy. Chronic ischemic white matter disease. All CT scans at this facility use dose modulation, iterative reconstruction, and/or weight based dosing when appropriate to reduce radiation dose to as low as reasonably achievable. CT CERVICAL SPINE WO CONTRAST   Final Result      No fracture. Severe degenerative change cervical spine. Grade 1 C7 spondylolisthesis, unchanged. Bilateral carotid artery calcification. If clinical concern warrants, carotid sonography may be utilized for further evaluation. All CT scans at this facility use dose modulation, iterative reconstruction, and/or weight based dosing when appropriate to reduce radiation dose to as low as reasonably achievable. CT THORACIC SPINE WO CONTRAST   Final Result      No fracture. Left lower lobe mass. Calcified exophytic gastric mass. All CT scans at this facility use dose modulation, iterative reconstruction, and/or weight based dosing when appropriate to reduce radiation dose to as low as reasonably achievable. Jennie Mcnally CT HUMERUS RIGHT WO CONTRAST   Final Result   1. COMMINUTED DISPLACED FRACTURE OF THE RIGHT HUMERAL HEAD. 2. OTHER FINDINGS DETAILED ABOVE         All CT scans at this facility use dose modulation, iterative reconstruction, and/or weight based dosing when appropriate to reduce radiation dose to as low as reasonably achievable. CT LUMBAR SPINE WO CONTRAST   Final Result      No acute fracture. Postsurgical change discussed. All CT scans at this facility use dose modulation, iterative reconstruction, and/or weight based dosing when appropriate to reduce radiation dose to as low as reasonably achievable. CT ABDOMEN PELVIS WO CONTRAST Additional Contrast? 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 No acute fractures. Remote fractures left sixth through eighth ribs            CT OF THE ABDOMEN AND PELVIS WITH INTRAVENOUS CONTRAST MEDIUM. FINDINGS:         Liver:  Normal in size, shape, and attenuation. Bile Ducts:  Normal in caliber. Gallbladder:  No stones or wall thickening. Pancreas:  Normal without masses, cysts, ductal dilatation or calcification. Spleen:  Surgically absent. Kidneys:  Normal in size. No hydronephrosis, masses, or stones. Mild bilateral perinephric fat stranding. Adrenals:  Normal.       Stomach: Exophytic, calcified 1.6 cm nodule, gastric cardia again identified, unchanged. Small bowel:  Normal in caliber. Appendix:  Normal.       Colon:  Normal in caliber. Peritoneum:  No ascites, free air, or fluid collections. Vessels: Aorta normal in course and caliber. Lymph nodes:        Retroperitoneal:  No enlarged retroperitoneal lymph nodes. Mesenteric:  No enlarged mesenteric lymph nodes. Pelvic: No enlarged pelvic lymph nodes. Ureters: Normal in course and caliber.  No calcifications. Bladder: No wall thickening. Reproductive organs: No pelvic masses. Abdominal Wall:      Increased attenuation measuring 4.5 x 4.6 x 8.2 cm within right pelvic soft tissue (series 2, image 89). Bones:  No bone lesions. Lumbar scoliosis convexity to left apex L3. Remote internal fixation L4, and L5. Remote left hip replacement. IMPRESSION:      Soft tissue hematoma, right pelvis, measuring 4.5 x 4.6 x 8.2 cm. Splenectomy. No change, exophytic, calcified gastric nodule as discussed. Remote internal fixation L4 and L5. Left hip replacement. All CT scans at this facility use dose modulation, iterative reconstruction, and/or weight based dosing when appropriate to reduce radiation dose to as low as reasonably achievable. 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 No acute fractures. Remote fractures left sixth through eighth ribs            CT OF THE ABDOMEN AND PELVIS WITH INTRAVENOUS CONTRAST MEDIUM. FINDINGS:         Liver:  Normal in size, shape, and attenuation. Bile Ducts:  Normal in caliber. Gallbladder:  No stones or wall thickening. Pancreas:  Normal without masses, cysts, ductal dilatation or calcification. Spleen:  Surgically absent. Kidneys:  Normal in size. No hydronephrosis, masses, or stones. Mild bilateral perinephric fat stranding. Adrenals:  Normal.       Stomach: Exophytic, calcified 1.6 cm nodule, gastric cardia again identified, unchanged. Small bowel:  Normal in caliber. Appendix:  Normal.       Colon:  Normal in caliber. Peritoneum:  No ascites, free air, or fluid collections. Vessels: Aorta normal in course and caliber. Lymph nodes:        Retroperitoneal:  No enlarged retroperitoneal lymph nodes. Mesenteric:  No enlarged mesenteric lymph nodes. Pelvic: No enlarged pelvic lymph nodes. Ureters: Normal in course and caliber. No calcifications. Bladder: No wall thickening. Reproductive organs: No pelvic masses. Abdominal Wall:      Increased attenuation measuring 4.5 x 4.6 x 8.2 cm within right pelvic soft tissue (series 2, image 89). Bones:  No bone lesions. Lumbar scoliosis convexity to left apex L3. Remote internal fixation L4, and L5. Remote left hip replacement. IMPRESSION:      Soft tissue hematoma, right pelvis, measuring 4.5 x 4.6 x 8.2 cm. Splenectomy. No change, exophytic, calcified gastric nodule as discussed. Remote internal fixation L4 and L5. Left hip replacement. All CT scans at this facility use dose modulation, iterative reconstruction, and/or weight based dosing when appropriate to reduce radiation dose to as low as reasonably achievable.           MRI BRAIN W WO CONTRAST    (Results Pending)           Assessment/Plan:    65 y/o man with history of CAD s/p PCI, HTN, LLL NSCLC, COPD, tobacco use, GERD, left ankle wound/OM, ETOH use, lumbar stenosis, s/p splenectomy, frequent falls, chronic pain with opiate dependence, recent left wrist fracture who presented with:    Acute fracture of the right humeral head / recent fracture of the left wrist and right thumb  - conservative therapy per Ortho    KEILY / hyponatremia  - in the setting of NSAID, Bactrim, diuretics,lung cancer  - improving with saline infusion  - followed by nephrology    Hyperkalemia   - mild  - Lokelma  - follow K level closely     Acute encephalopathy, dizziness,  frequent falls,  - CT head was negative  - MRI pending  - neurology following    Acute / chronic pain  - resumed Percocet   - pain management consulted    Left ankle wound/OM  - on IV Rocephin x 3 weeks per ID  - followed by podiatry     Acute blood loss anemia   - Hb down to 6.3, baseline is around 11-12  - in the setting of large, right shoulder hematoma  - holding Plavix and Lovenox  - transfuse 1 unit of PRBCs  - follow H/H closely    CAD s/p PCI to RCA in 2016  - hold Plavix due to anemia/ hematoma    NSCLC of LLL  - followed by pulmonology and oncology    Diet: ADULT DIET;  Regular    Code Status: Full Code      Disposition - acute rehab, / to follow          Electronically signed by Chivo Ibarra MD on 10/13/2021 at 10:44 AM

## 2021-10-14 ENCOUNTER — APPOINTMENT (OUTPATIENT)
Dept: INTERVENTIONAL RADIOLOGY/VASCULAR | Age: 76
DRG: 640 | End: 2021-10-14
Payer: MEDICARE

## 2021-10-14 LAB
ALBUMIN SERPL-MCNC: 3 G/DL (ref 3.5–4.6)
ALP BLD-CCNC: 78 U/L (ref 35–104)
ALT SERPL-CCNC: 15 U/L (ref 0–41)
ANION GAP SERPL CALCULATED.3IONS-SCNC: 10 MEQ/L (ref 9–15)
ANION GAP SERPL CALCULATED.3IONS-SCNC: 9 MEQ/L (ref 9–15)
ANION GAP SERPL CALCULATED.3IONS-SCNC: 9 MEQ/L (ref 9–15)
AST SERPL-CCNC: 23 U/L (ref 0–40)
BASOPHILS ABSOLUTE: 0.1 K/UL (ref 0–0.2)
BASOPHILS RELATIVE PERCENT: 1 %
BILIRUB SERPL-MCNC: 0.8 MG/DL (ref 0.2–0.7)
BLOOD BANK DISPENSE STATUS: NORMAL
BLOOD BANK PRODUCT CODE: NORMAL
BPU ID: NORMAL
BUN BLDV-MCNC: 11 MG/DL (ref 8–23)
BUN BLDV-MCNC: 11 MG/DL (ref 8–23)
BUN BLDV-MCNC: 12 MG/DL (ref 8–23)
CALCIUM SERPL-MCNC: 8.4 MG/DL (ref 8.5–9.9)
CALCIUM SERPL-MCNC: 8.4 MG/DL (ref 8.5–9.9)
CALCIUM SERPL-MCNC: 8.9 MG/DL (ref 8.5–9.9)
CHLORIDE BLD-SCNC: 102 MEQ/L (ref 95–107)
CHLORIDE BLD-SCNC: 104 MEQ/L (ref 95–107)
CHLORIDE BLD-SCNC: 106 MEQ/L (ref 95–107)
CO2: 19 MEQ/L (ref 20–31)
CO2: 19 MEQ/L (ref 20–31)
CO2: 20 MEQ/L (ref 20–31)
CREAT SERPL-MCNC: 0.71 MG/DL (ref 0.7–1.2)
CREAT SERPL-MCNC: 0.75 MG/DL (ref 0.7–1.2)
CREAT SERPL-MCNC: 0.83 MG/DL (ref 0.7–1.2)
DESCRIPTION BLOOD BANK: NORMAL
EOSINOPHILS ABSOLUTE: 0.1 K/UL (ref 0–0.7)
EOSINOPHILS RELATIVE PERCENT: 0.8 %
GFR AFRICAN AMERICAN: >60
GFR NON-AFRICAN AMERICAN: >60
GLOBULIN: 2.2 G/DL (ref 2.3–3.5)
GLUCOSE BLD-MCNC: 104 MG/DL (ref 70–99)
GLUCOSE BLD-MCNC: 85 MG/DL (ref 70–99)
GLUCOSE BLD-MCNC: 88 MG/DL (ref 70–99)
HCT VFR BLD CALC: 22.4 % (ref 42–52)
HEMOGLOBIN: 7.6 G/DL (ref 14–18)
LYMPHOCYTES ABSOLUTE: 1.5 K/UL (ref 1–4.8)
LYMPHOCYTES RELATIVE PERCENT: 12 %
MAGNESIUM: 1.8 MG/DL (ref 1.7–2.4)
MCH RBC QN AUTO: 31.1 PG (ref 27–31.3)
MCHC RBC AUTO-ENTMCNC: 33.9 % (ref 33–37)
MCV RBC AUTO: 91.9 FL (ref 80–100)
MONOCYTES ABSOLUTE: 1 K/UL (ref 0.2–0.8)
MONOCYTES RELATIVE PERCENT: 7.8 %
NEUTROPHILS ABSOLUTE: 9.7 K/UL (ref 1.4–6.5)
NEUTROPHILS RELATIVE PERCENT: 78.4 %
PDW BLD-RTO: 15.1 % (ref 11.5–14.5)
PLATELET # BLD: 293 K/UL (ref 130–400)
POTASSIUM REFLEX MAGNESIUM: 4.5 MEQ/L (ref 3.4–4.9)
POTASSIUM SERPL-SCNC: 4.6 MEQ/L (ref 3.4–4.9)
POTASSIUM SERPL-SCNC: 5 MEQ/L (ref 3.4–4.9)
PROCALCITONIN: 0.15 NG/ML (ref 0–0.15)
RBC # BLD: 2.44 M/UL (ref 4.7–6.1)
SODIUM BLD-SCNC: 131 MEQ/L (ref 135–144)
SODIUM BLD-SCNC: 133 MEQ/L (ref 135–144)
SODIUM BLD-SCNC: 134 MEQ/L (ref 135–144)
TOTAL PROTEIN: 5.2 G/DL (ref 6.3–8)
WBC # BLD: 12.4 K/UL (ref 4.8–10.8)

## 2021-10-14 PROCEDURE — 76937 US GUIDE VASCULAR ACCESS: CPT

## 2021-10-14 PROCEDURE — 36430 TRANSFUSION BLD/BLD COMPNT: CPT

## 2021-10-14 PROCEDURE — 2580000003 HC RX 258: Performed by: NURSE PRACTITIONER

## 2021-10-14 PROCEDURE — 1210000000 HC MED SURG R&B

## 2021-10-14 PROCEDURE — 2580000003 HC RX 258: Performed by: INTERNAL MEDICINE

## 2021-10-14 PROCEDURE — 6370000000 HC RX 637 (ALT 250 FOR IP): Performed by: PHYSICIAN ASSISTANT

## 2021-10-14 PROCEDURE — 94640 AIRWAY INHALATION TREATMENT: CPT

## 2021-10-14 PROCEDURE — 99232 SBSQ HOSP IP/OBS MODERATE 35: CPT | Performed by: INTERNAL MEDICINE

## 2021-10-14 PROCEDURE — 97535 SELF CARE MNGMENT TRAINING: CPT

## 2021-10-14 PROCEDURE — 83735 ASSAY OF MAGNESIUM: CPT

## 2021-10-14 PROCEDURE — 95816 EEG AWAKE AND DROWSY: CPT

## 2021-10-14 PROCEDURE — P9016 RBC LEUKOCYTES REDUCED: HCPCS

## 2021-10-14 PROCEDURE — 85025 COMPLETE CBC W/AUTO DIFF WBC: CPT

## 2021-10-14 PROCEDURE — 36410 VNPNXR 3YR/> PHY/QHP DX/THER: CPT

## 2021-10-14 PROCEDURE — 94761 N-INVAS EAR/PLS OXIMETRY MLT: CPT

## 2021-10-14 PROCEDURE — 6370000000 HC RX 637 (ALT 250 FOR IP): Performed by: STUDENT IN AN ORGANIZED HEALTH CARE EDUCATION/TRAINING PROGRAM

## 2021-10-14 PROCEDURE — 99233 SBSQ HOSP IP/OBS HIGH 50: CPT | Performed by: PSYCHIATRY & NEUROLOGY

## 2021-10-14 PROCEDURE — 6360000002 HC RX W HCPCS: Performed by: INTERNAL MEDICINE

## 2021-10-14 PROCEDURE — APPSS30 APP SPLIT SHARED TIME 16-30 MINUTES: Performed by: NURSE PRACTITIONER

## 2021-10-14 PROCEDURE — 2500000003 HC RX 250 WO HCPCS: Performed by: INTERNAL MEDICINE

## 2021-10-14 PROCEDURE — 05HC33Z INSERTION OF INFUSION DEVICE INTO LEFT BASILIC VEIN, PERCUTANEOUS APPROACH: ICD-10-PCS | Performed by: INTERNAL MEDICINE

## 2021-10-14 PROCEDURE — 6370000000 HC RX 637 (ALT 250 FOR IP): Performed by: ANESTHESIOLOGY

## 2021-10-14 PROCEDURE — 80053 COMPREHEN METABOLIC PANEL: CPT

## 2021-10-14 PROCEDURE — 99221 1ST HOSP IP/OBS SF/LOW 40: CPT | Performed by: FAMILY MEDICINE

## 2021-10-14 PROCEDURE — 36415 COLL VENOUS BLD VENIPUNCTURE: CPT

## 2021-10-14 PROCEDURE — 6370000000 HC RX 637 (ALT 250 FOR IP): Performed by: INTERNAL MEDICINE

## 2021-10-14 PROCEDURE — C1751 CATH, INF, PER/CENT/MIDLINE: HCPCS

## 2021-10-14 RX ORDER — LIDOCAINE HYDROCHLORIDE 20 MG/ML
5 INJECTION, SOLUTION INFILTRATION; PERINEURAL ONCE
Status: COMPLETED | OUTPATIENT
Start: 2021-10-14 | End: 2021-10-14

## 2021-10-14 RX ORDER — SODIUM CHLORIDE 9 MG/ML
25 INJECTION, SOLUTION INTRAVENOUS PRN
Status: DISCONTINUED | OUTPATIENT
Start: 2021-10-14 | End: 2021-10-15 | Stop reason: HOSPADM

## 2021-10-14 RX ORDER — SODIUM CHLORIDE 9 MG/ML
250 INJECTION, SOLUTION INTRAVENOUS ONCE
Status: COMPLETED | OUTPATIENT
Start: 2021-10-14 | End: 2021-10-14

## 2021-10-14 RX ORDER — SODIUM CHLORIDE 0.9 % (FLUSH) 0.9 %
5-40 SYRINGE (ML) INJECTION EVERY 12 HOURS SCHEDULED
Status: DISCONTINUED | OUTPATIENT
Start: 2021-10-14 | End: 2021-10-15 | Stop reason: HOSPADM

## 2021-10-14 RX ORDER — SODIUM CHLORIDE 0.9 % (FLUSH) 0.9 %
5-40 SYRINGE (ML) INJECTION PRN
Status: DISCONTINUED | OUTPATIENT
Start: 2021-10-14 | End: 2021-10-15 | Stop reason: HOSPADM

## 2021-10-14 RX ADMIN — FOLIC ACID 1 MG: 1 TABLET ORAL at 10:14

## 2021-10-14 RX ADMIN — SODIUM ZIRCONIUM CYCLOSILICATE 5 G: 5 POWDER, FOR SUSPENSION ORAL at 10:16

## 2021-10-14 RX ADMIN — ACETAMINOPHEN 650 MG: 325 TABLET ORAL at 21:35

## 2021-10-14 RX ADMIN — Medication 100 MG: at 21:36

## 2021-10-14 RX ADMIN — METOPROLOL TARTRATE 25 MG: 25 TABLET, FILM COATED ORAL at 10:15

## 2021-10-14 RX ADMIN — GABAPENTIN 100 MG: 100 CAPSULE ORAL at 15:04

## 2021-10-14 RX ADMIN — SODIUM CHLORIDE 250 ML: 9 INJECTION, SOLUTION INTRAVENOUS at 17:45

## 2021-10-14 RX ADMIN — ACETAMINOPHEN 650 MG: 325 TABLET ORAL at 10:13

## 2021-10-14 RX ADMIN — Medication 100 MG: at 15:03

## 2021-10-14 RX ADMIN — BUDESONIDE AND FORMOTEROL FUMARATE DIHYDRATE 2 PUFF: 160; 4.5 AEROSOL RESPIRATORY (INHALATION) at 07:18

## 2021-10-14 RX ADMIN — CEFTRIAXONE SODIUM 1000 MG: 1 INJECTION, POWDER, FOR SOLUTION INTRAMUSCULAR; INTRAVENOUS at 11:23

## 2021-10-14 RX ADMIN — CITALOPRAM HYDROBROMIDE 40 MG: 10 TABLET ORAL at 10:14

## 2021-10-14 RX ADMIN — BUDESONIDE AND FORMOTEROL FUMARATE DIHYDRATE 2 PUFF: 160; 4.5 AEROSOL RESPIRATORY (INHALATION) at 21:29

## 2021-10-14 RX ADMIN — GABAPENTIN 100 MG: 100 CAPSULE ORAL at 10:14

## 2021-10-14 RX ADMIN — ACETAMINOPHEN 650 MG: 325 TABLET ORAL at 11:28

## 2021-10-14 RX ADMIN — FINASTERIDE 5 MG: 5 TABLET, FILM COATED ORAL at 10:14

## 2021-10-14 RX ADMIN — METOPROLOL TARTRATE 25 MG: 25 TABLET, FILM COATED ORAL at 21:36

## 2021-10-14 RX ADMIN — PANTOPRAZOLE SODIUM 40 MG: 40 TABLET, DELAYED RELEASE ORAL at 05:59

## 2021-10-14 RX ADMIN — LIDOCAINE HYDROCHLORIDE 5 ML: 20 INJECTION, SOLUTION INFILTRATION; PERINEURAL at 17:44

## 2021-10-14 RX ADMIN — Medication 100 MG: at 10:13

## 2021-10-14 RX ADMIN — SODIUM CHLORIDE, PRESERVATIVE FREE 10 ML: 5 INJECTION INTRAVENOUS at 21:51

## 2021-10-14 RX ADMIN — BACITRACIN ZINC 1 G: 500 OINTMENT TOPICAL at 21:50

## 2021-10-14 RX ADMIN — GABAPENTIN 100 MG: 100 CAPSULE ORAL at 21:36

## 2021-10-14 RX ADMIN — ACETAMINOPHEN 650 MG: 325 TABLET ORAL at 18:20

## 2021-10-14 RX ADMIN — ATORVASTATIN CALCIUM 20 MG: 20 TABLET, FILM COATED ORAL at 21:36

## 2021-10-14 RX ADMIN — TIOTROPIUM BROMIDE INHALATION SPRAY 2 PUFF: 3.12 SPRAY, METERED RESPIRATORY (INHALATION) at 07:19

## 2021-10-14 RX ADMIN — SODIUM CHLORIDE: 9 INJECTION, SOLUTION INTRAVENOUS at 11:23

## 2021-10-14 RX ADMIN — SODIUM CHLORIDE: 9 INJECTION, SOLUTION INTRAVENOUS at 21:37

## 2021-10-14 RX ADMIN — OXYCODONE 5 MG: 5 TABLET ORAL at 11:28

## 2021-10-14 RX ADMIN — SODIUM CHLORIDE, PRESERVATIVE FREE 10 ML: 5 INJECTION INTRAVENOUS at 21:36

## 2021-10-14 ASSESSMENT — ENCOUNTER SYMPTOMS
BACK PAIN: 1
DIARRHEA: 0
EYES NEGATIVE: 1
CONSTIPATION: 0
RESPIRATORY NEGATIVE: 1
COUGH: 0
WHEEZING: 0
NAUSEA: 0
ABDOMINAL DISTENTION: 0
SHORTNESS OF BREATH: 0
VOMITING: 0

## 2021-10-14 ASSESSMENT — PAIN SCALES - GENERAL
PAINLEVEL_OUTOF10: 6
PAINLEVEL_OUTOF10: 7
PAINLEVEL_OUTOF10: 0
PAINLEVEL_OUTOF10: 7

## 2021-10-14 ASSESSMENT — PAIN DESCRIPTION - FREQUENCY: FREQUENCY: INTERMITTENT

## 2021-10-14 ASSESSMENT — PAIN DESCRIPTION - PAIN TYPE: TYPE: ACUTE PAIN

## 2021-10-14 ASSESSMENT — PAIN DESCRIPTION - ORIENTATION: ORIENTATION: RIGHT

## 2021-10-14 ASSESSMENT — PAIN DESCRIPTION - LOCATION: LOCATION: ARM

## 2021-10-14 NOTE — CONSULTS
Palliative Care Consult Note  Patient: Etta Ac  Gender: male  YOB: 1945  Unit/Bed: Z841/P195-23  CodeStatus: Full Code  Inpatient Treatment Team: Treatment Team: Attending Provider: Janina Crain MD; Consulting Physician: Troy Fontana MD; Consulting Physician: John Ross MD; Consulting Physician: Jonny Basurto MD; Consulting Physician: Janina Crain MD; Utilization Reviewer: Shira Gibbs RN; Consulting Physician: Deandre You MD; Consulting Physician: Leticia Dooley MD; Consulting Physician: Cookie Almeida MD; Wound Care: Jose Chiang, MAXIMILIAN; Consulting Physician: Jeri Hurt DPM; Consulting Physician: Oleg Patten DO; Registered Nurse: Bailee Tran, RN; Nursing Student: Margoth Aguero; : Frederick Riggins, RN; : Jenna Herring, RN; Patient Care Tech: Shira Vance; Registered Nurse: Suze Haji RN  Admit Date:  10/11/2021    Chief Complaint: Head injury, hyponatremia, fall    History of Presenting Illness:      Etta Ac is a 64 y.o. male on hospital day 3 with a history of  Head injury, hyponatremia, fall. He present to Er on 10/11 post syncope and fall at home with acute shoulder pain post fall. He was having weakness and chronic HA for 1 week PTA. Stated he was having frequent falls x 6 months. 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 Humerous CT showed Closed fracture of proximal end of right humerus. Labs showed Na122 and GFR 38.4 with  HGB 8.6 and WBC 16.3. Patient was admitted for further treatment. Today he presents A+Ox3. It was found post admission that he used to drink alcohol 2-3 drinks daily but he stopped more than 8 weeks ago. Was adequately hydrated with Na increased to 133 and GFR increased to greater than 60. Ortho saw and opted conservative therapy.  Is a little anxious from cancer progression dx and recurrence [pst radiation therapy completed in 12/2020. POC  possible immunotherapy post discharge after FU with oncology. He states he feels symptomatically stable today    Review of Systems:       Review of Systems   Constitutional: Positive for activity change and fatigue. Negative for appetite change, fever and unexpected weight change. HENT: Negative. Eyes: Negative. Respiratory: Negative. Negative for cough, shortness of breath and wheezing. Cardiovascular: Negative. Gastrointestinal: Negative for abdominal distention, constipation, diarrhea, nausea and vomiting. Endocrine: Negative. Genitourinary: Negative. Musculoskeletal: Positive for arthralgias, back pain, gait problem and joint swelling. Skin: Negative. Neurological: Positive for dizziness, tremors and weakness. Negative for syncope. Psychiatric/Behavioral: Negative for confusion, sleep disturbance and suicidal ideas. The patient is not nervous/anxious. Physical Examination:       BP (!) 164/56   Pulse 67   Temp 97.2 °F (36.2 °C)   Resp 18   Ht 5' 6\" (1.676 m)   Wt 150 lb (68 kg)   SpO2 99%   BMI 24.21 kg/m²    Physical Exam  Vitals reviewed. Constitutional:       Appearance: He is ill-appearing. HENT:      Head: Normocephalic and atraumatic. Eyes:      Pupils: Pupils are equal, round, and reactive to light. Cardiovascular:      Rate and Rhythm: Normal rate. Heart sounds: No murmur heard. Pulmonary:      Effort: Pulmonary effort is normal. No respiratory distress. Breath sounds: Normal breath sounds. No wheezing. Abdominal:      General: Bowel sounds are normal. There is no distension. Palpations: Abdomen is soft. Tenderness: There is no abdominal tenderness. There is no guarding. Musculoskeletal:         General: Swelling (RUE), tenderness (RUE) and signs of injury (RUE) present. Cervical back: Normal range of motion. Comments: RUE in sling on examination   Skin:     General: Skin is warm and dry. Neurological:      Mental Status: He is alert and oriented to person, place, and time. Motor: Weakness present.       Gait: Gait abnormal.   Psychiatric:         Behavior: Behavior normal.         Allergies:      No Known Allergies    Medications:      Current Facility-Administered Medications   Medication Dose Route Frequency Provider Last Rate Last Admin    0.9 % sodium chloride infusion   IntraVENous PRN Edvin Bailey MD        folic acid (FOLVITE) tablet 1 mg  1 mg Oral Daily Edvin Bailey MD   1 mg at 10/14/21 1014    thiamine tablet 100 mg  100 mg Oral TID María Mann PA-C   100 mg at 10/14/21 1013    gabapentin (NEURONTIN) capsule 100 mg  100 mg Oral TID Kati Gill MD   100 mg at 10/14/21 1014    metaxalone (SKELAXIN) tablet 400 mg  400 mg Oral Q6H PRN Kati Gill MD        haloperidol (HALDOL) tablet 0.5 mg  0.5 mg Oral Q8H PRN Kati Gill MD        pill splitter   Does not apply Once Kati Gill MD        acetaminophen (TYLENOL) tablet 650 mg  650 mg Oral 4x Daily WC Kati Gill MD   650 mg at 10/14/21 1128    oxyCODONE (ROXICODONE) immediate release tablet 5 mg  5 mg Oral Q4H PRN Kati Gill MD   5 mg at 10/14/21 1128    lidocaine 4 % external patch 3 patch  3 patch TransDERmal Daily Kati Gill MD   3 patch at 10/14/21 1012    sodium chloride flush 0.9 % injection 5-40 mL  5-40 mL IntraVENous 2 times per day ELOY Marrero CNP        sodium chloride flush 0.9 % injection 5-40 mL  5-40 mL IntraVENous PRN ELOY Marrero CNP        0.9 % sodium chloride infusion  25 mL IntraVENous PRN ELOY Marrero CNP        ondansetron (ZOFRAN-ODT) disintegrating tablet 4 mg  4 mg Oral Q8H PRN ELOY Marrero CNP        Or    ondansetron [facility]) injection 4 mg  4 mg IntraVENous Q6H PRN ELOY Marrero CNP  polyethylene glycol (GLYCOLAX) packet 17 g  17 g Oral Daily PRN Bary Nissen, APRN - CNP        [Held by provider] enoxaparin (LOVENOX) injection 40 mg  40 mg SubCUTAneous Daily ELOY Alexander CNP        0.9 % sodium chloride infusion   IntraVENous Continuous Bary Nissen, APRN -  mL/hr at 10/14/21 1123 New Bag at 10/14/21 1123    cefTRIAXone (ROCEPHIN) 1000 mg IVPB in 50 mL D5W minibag  1,000 mg IntraVENous Q24H Nicholas Slaughter MD   Stopped at 10/14/21 1333    atorvastatin (LIPITOR) tablet 20 mg  20 mg Oral Nightly Luis Hope MD   20 mg at 10/13/21 2157    citalopram (CELEXA) tablet 40 mg  40 mg Oral Daily Luis Hope MD   40 mg at 10/14/21 1014    finasteride (PROSCAR) tablet 5 mg  5 mg Oral Daily Luis Hope MD   5 mg at 10/14/21 1014    budesonide-formoterol (SYMBICORT) 160-4.5 MCG/ACT inhaler 2 puff  2 puff Inhalation BID Luis Hope MD   2 puff at 10/14/21 0718    metoprolol tartrate (LOPRESSOR) tablet 25 mg  25 mg Oral BID Luis Hope MD   25 mg at 10/14/21 1015    pantoprazole (PROTONIX) tablet 40 mg  40 mg Oral QAM AC Luis Hope MD   40 mg at 10/14/21 0559    tiotropium (SPIRIVA RESPIMAT) 2.5 MCG/ACT inhaler 2 puff  2 puff Inhalation Daily Luis Hope MD   2 puff at 10/14/21 0719    albuterol sulfate  (90 Base) MCG/ACT inhaler 2 puff  2 puff Inhalation Q4H PRN Luis Hope MD        bacitracin zinc ointment   Topical BID Jyotsna Gambino PA-C   Given at 10/12/21 2205       History:       PMHx:  Past Medical History:   Diagnosis Date    Alcohol abuse     quit drinking 8/18/21    CAD (coronary artery disease)     patient states no doctor has told him this / has 1 cardiac stent    Cancer ([address]     lung LLL    Chronic back pain     Chronic kidney disease     Chronic sinusitis     DJD (degenerative joint disease) of knee     left knee DJD    Elevated PSA     History of blood transfusion 1980's    History of inferior wall myocardial infarction 01/2016    1 cardiac stent    HTN (hypertension)     meds .  1 yr    Hyperlipidemia     meds > 12 yrs    NSTEMI (non-ST elevated myocardial infarction) [facility]     Smoker        PSHx:  Past Surgical History:   Procedure Laterality Date    ABDOMEN SURGERY  1961    spleenectomy due to [address]  2009    lumbar disc OR    CARDIAC SURGERY      stents    CARPAL TUNNEL RELEASE Right 5/6/2019    RIGHT CARPAL TUNNEL RELEASE performed by Katya Lopez MD at [place] [address] WITH STENT PLACEMENT  1/29/2016    EYE SURGERY      to have Phaco with IOL OU 2/2018    HERNIA REPAIR      JOINT REPLACEMENT Left 1994    LTHR    JOINT REPLACEMENT Right 2009    RTKR    KNEE SURGERY Right 2011    arthroscopy    RI MANIPULATN KNEE JT+ANESTHESIA Right 5/12/2017    RIGHT KNEE MANIPULATION UNDER ANESTHESIA performed by Patty Harding MD at [address] [facility] OFFICE/OUTPT VISIT,PROCEDURE ONLY Bilateral 12/29/2017    RIGHT AND BILATERAL L 4-5 LYSIS OF SCAR DISKECTOMY INTERBODY CAGE FUSION POSTEROLATERAL FUSION PEDICLE SCREWS performed by Katya Lopez MD at [address] [place] [address] Road Right 3/24/2017    RIGHT  KNEE TOTAL ARTHROPLASTY, ELHAM CEMENTED PERSONA  performed by Patty Harding MD at [place] [address] Hx:  Social History     Socioeconomic History    Marital status:      Spouse name: None    Number of children: None    Years of education: None    Highest education level: None   Occupational History    Occupation: retired   Tobacco Use    Smoking status: Current Every Day Smoker     Packs/day: 0.50     Years: 60.00     Pack years: 30.00     Types: Cigarettes    Smokeless tobacco: Never Used    Tobacco comment: also smokes cigars   Vaping Use    Vaping Use: Never used   Substance and Sexual Activity    Alcohol use: Not Currently     Alcohol/week: 12.0 standard drinks     Types: 12 Shots of liquor per week     Comment: quit drinking aug 18th, 2021    Drug use: No    Sexual activity: Yes   Other Topics Concern    None   Social History Narrative     He is  and lives alone    Type of Home: [address]: Able to Live on Main level with bedroom/bathroom, Two level, Performs ADL's on one level    Home Access: Stairs to enter with rails    Entrance Stairs - Number of Steps: 3- Rails: Both    Bathroom Shower/Tub: Tub/Shower unit--Shower chair, Grab bars in shower (does not use chair)    Home Equipment: Rolling walker, 4 wheeled walker, Oxygen    ADL Assistance: Independent    Homemaking Assistance: Independent    Homemaking Responsibilities: Yes    Ambulation Assistance: Independent    Transfer Assistance: Independent    Active : Yes    He is retired from [address] [place] [address] Strain: Low Risk     Difficulty of Paying Living Expenses: Not very hard   Food Insecurity: No Food Insecurity    Worried About Running Out of Food in the Last Year: Never true    Yoav of Food in the Last Year: Never true   Transportation Needs: No Transportation Needs    Lack of Transportation (Medical): No    Lack of Transportation (Non-Medical):  No   Physical Activity:     Days of Exercise per Week:     Minutes of Exercise per Session:    Stress:     Feeling of Stress :    Social Connections:     Frequency of Communication with Friends and Family:     Frequency of Social Gatherings with Friends and Family:     Attends Rastafarian Services:     Active Member of Clubs or Organizations:     Attends Club or Organization Meetings:     Marital Status:    Intimate Partner Violence:     Fear of Current or Ex-Partner:     Emotionally Abused:     Physically Abused:     Sexually Abused:        Family Hx:  Family History   Problem Relation Age of Onset    Osteoarthritis Mother     Emphysema Mother     Hypertension Father     Hearing Loss Father     Dementia Father     No Known Problems Sister     Prostate Cancer Brother     Colon Polyps Neg Hx        LABS: Reviewed     CBC:  Lab Results   Component Value Date    WBC 12.4 10/14/2021    RBC 2.44 10/14/2021    HGB 7.6 10/14/2021    HCT 22.4 10/14/2021    MCV 91.9 10/14/2021    MCH 31.1 10/14/2021    MCHC 33.9 10/14/2021    RDW 15.1 10/14/2021     10/14/2021     CBC with Differential:   Lab Results   Component Value Date    WBC 12.4 10/14/2021    RBC 2.44 10/14/2021    HGB 7.6 10/14/2021    HCT 22.4 10/14/2021     10/14/2021    MCV 91.9 10/14/2021    MCH 31.1 10/14/2021    MCHC 33.9 10/14/2021    RDW 15.1 10/14/2021    LYMPHOPCT 12.0 10/14/2021    MONOPCT 7.8 10/14/2021    BASOPCT 1.0 10/14/2021    MONOSABS 1.0 10/14/2021    LYMPHSABS 1.5 10/14/2021    EOSABS 0.1 10/14/2021    BASOSABS 0.1 10/14/2021     CMP:    Lab Results   Component Value Date     10/14/2021    K 4.6 10/14/2021    K 4.5 10/14/2021     10/14/2021    CO2 20 10/14/2021    BUN 11 10/14/2021    CREATININE 0.71 10/14/2021    GFRAA >60.0 10/14/2021    LABGLOM >60.0 10/14/2021    GLUCOSE 88 10/14/2021    PROT 5.2 10/14/2021    LABALBU 3.0 10/14/2021    CALCIUM 8.4 10/14/2021    BILITOT 0.8 10/14/2021    ALKPHOS 78 10/14/2021    AST 23 10/14/2021    ALT 15 10/14/2021     BMP:    Lab Results   Component Value Date     10/14/2021    K 4.6 10/14/2021    K 4.5 10/14/2021     10/14/2021    CO2 20 10/14/2021    BUN 11 10/14/2021    LABALBU 3.0 10/14/2021    CREATININE 0.71 10/14/2021    CALCIUM 8.4 10/14/2021    GFRAA >60.0 10/14/2021    LABGLOM >60.0 10/14/2021    GLUCOSE 88 10/14/2021     TSH:   Lab Results   Component Value Date    TSH 0.447 10/13/2021     Vitamin B12 and Folate: No components found for: FOLIC,  G77  Urinalysis:   Lab Results   Component Value Date    NITRU Negative 10/11/2021    WBCUA 0-2 10/11/2021    BACTERIA Negative 10/11/2021 RBCUA 3-5 10/11/2021    BLOODU MODERATE 10/11/2021    SPECGRAV 1.017 10/11/2021    GLUCOSEU Negative 10/11/2021           FUNCTIONAL ADL´S:     Independent: [ x ] Eating, [ x  ] Dressing, [   ] Transferring, [   ] Jocarla Otero, [   ] Antonella Santoyo, [   ] Continence  Dependent   : [  ] Eating, [   ] Dressing, [   ] Transferring, [   ] Jodillonhan Otero, [   ] Antonella Lord, [   ] Continence  W. assistant : [  ] Eating, [   ] Dressing, [  x ] Transferring, [   x] Toileting, [  x ] Bathing, [  x ] Continence    Radiology: Reviewed      MRI BRAIN W WO CONTRAST    Result Date: 10/13/2021  EXAMINATION: MRI BRAIN W WO CONTRAST CLINICAL HISTORY:  Extrapontine myelinolysis? Sudden onset encephalopathy. Brain metastasis from lung cancer. COMPARISONS: NONE AVAILABLE TECHNIQUE: Multiplanar multisequence images of the brain were obtained without contrast. Diffusion perfusion imaging was obtained. BRAIN MRI FINDINGS: Limited due to motion artifact. There are no extra-axial collections. There is no evidence of hemorrhage. There are no areas of perfusion diffusion signal abnormality to suggest ischemia. The susceptibility images do not demonstrate evidence of hemosiderin deposition within the brain parenchyma or the leptomeninges. There is preservation of the gray-white matter differentiation. There are numerous foci of T2/FLAIR hyperintensities in the subcortical and periventricular white matter in a symmetric distribution throughout both hemispheres. The sulci and ventricles are  within normal limits without evidence of hydrocephalus. The midline structures are intact, the corpus callosum is within normal limits. The region of the pineal gland and the sella turcica are unremarkable. There are no space-occupying lesions in the posterior fossa. The basilar cisterns are patent. The craniocervical junction is unremarkable. The visualized portions of the orbits are within normal limits, the globes are intact.  The visualized portions of the paranasal sinuses are within normal limits. The calvarium and soft tissues are unremarkable. There are no acute intracranial changes, there is no evidence of hemorrhage or acute ischemia. Assessment and plan:  1. Encounter for palliative care  - Explained in detail patient prognosis poor without treatment though with treatment guarded. Strongly advised to FU with oncology as remains having doubts if wants to undergo any treatment other than immunotherapy. Advised in detail benefits of palliative care in symptom management with or without treatment. [facility]. Will schedule for office visit post discharge. Ok to SO from inpatient pall care standpoint as symptomatically stable    -Advance Care Planning  Discussed goals of care with patient. Explained in extensive detail nuances between full code, DNR CCA and DNR CC. Patient has made the decision to be full code      -Goals of Care Discussion:  Disease process and goals of treatment were discussed in basic terms. Ayan's goal is to optimize available comfort care measures. We discussed the palliative care philosophy in light of those goals. We discussed all care options contingent on treatment response and QOL. Much active listening, presence, and emotional support were given.      Electronically signed by ELOY Cash CNP on 10/14/2021 at 2:00 PM

## 2021-10-14 NOTE — CARE COORDINATION
This LSW received message that [facility] will accept patient upon D/C. Patient and family notified by this LSW.   Electronically signed by GAY Morgan, LAURIW on 10/14/21 at 11:33 AM EDT

## 2021-10-14 NOTE — PROGRESS NOTES
Infectious Diseases Inpatient Progress Note          HISTORY OF PRESENT ILLNESS:  Follow up left ankle acute osteomyelitis with Pasteurella and Providencia on IV Rocephin, well tolerated. Patient was admitted after he had a syncopal attack, was found to have right humeral head fracture on admission, recent left wrist fracture, nonoperable left lower lobe lung cancer diagnosed May 2021, history of alcohol abuse with persistently elevated CRP of greater than 100. Resolved confusion   Decreased pain in right hand and shoulder, has large bruise of right shoulder with swelling  Current Medications:     folic acid  1 mg Oral Daily    thiamine  100 mg Oral TID    gabapentin  100 mg Oral TID    pill splitter   Does not apply Once    acetaminophen  650 mg Oral 4x Daily WC    lidocaine  3 patch TransDERmal Daily    sodium chloride flush  5-40 mL IntraVENous 2 times per day    [Held by provider] enoxaparin  40 mg SubCUTAneous Daily    cefTRIAXone (ROCEPHIN) IV  1,000 mg IntraVENous Q24H    atorvastatin  20 mg Oral Nightly    citalopram  40 mg Oral Daily    finasteride  5 mg Oral Daily    budesonide-formoterol  2 puff Inhalation BID    metoprolol tartrate  25 mg Oral BID    pantoprazole  40 mg Oral QAM AC    tiotropium  2 puff Inhalation Daily    bacitracin zinc   Topical BID       Allergies:  Patient has no known allergies. Review of Systems  14 system review is negative other than HPI    Physical Exam  Vitals:    10/14/21 0104 10/14/21 0347 10/14/21 0745 10/14/21 0800   BP: (!) 137/53 (!) 157/81 (!) 164/56 (!) 164/56   Pulse: 65 67 59 67   Resp:       Temp: 98.6 °F (37 °C) 97.9 °F (36.6 °C) 97.2 °F (36.2 °C)    TempSrc:    Oral   SpO2: 100% 100% 99%    Weight:       Height:         General Appearance: alert and oriented to self and time, well-developed and well-nourished, in no acute distress, on room air  Sitting at bedside  Positive tenderness  Skin: warm and dry, no rash.    Head: normocephalic and atraumatic  Eyes: anicteric sclerae  ENT: oropharynx clear and moist with normal mucous membranes. No oral thrush  Lungs: normal respiratory effort, clear lungs  Heart normal S1-S2 no murmurs  Abdomen: soft, no tenderness  No leg edema  No erythema, no tenderness  Left wrist deformity  Right upper extremity sleeve  Decreasing right shoulder of ecchymosis and swelling  Left ankle wound with progressive healing, good granulation tissue    DATA:    Lab Results   Component Value Date    WBC 12.4 (H) 10/14/2021    HGB 7.6 (L) 10/14/2021    HCT 22.4 (L) 10/14/2021    MCV 91.9 10/14/2021     10/14/2021     Lab Results   Component Value Date    CREATININE 0.71 10/14/2021    BUN 11 10/14/2021     (L) 10/14/2021    K 4.6 10/14/2021     10/14/2021    CO2 20 10/14/2021       Hepatic Function Panel:  Lab Results   Component Value Date    ALKPHOS 78 10/14/2021    ALT 15 10/14/2021    AST 23 10/14/2021    PROT 5.2 10/14/2021    BILITOT 0.8 10/14/2021    LABALBU 3.0 10/14/2021     10/11/2021  9:09 PM - Darwin, Chpo Incoming Lab Results From Soft    Component Value Ref Range & Units Status Collected Lab   Bacteria, UA Negative  Negative /HPF Final 10/11/2021  6:30 PM  - [facility]   Hyaline Casts, UA 0-1  0 - 5 /HPF Final 10/11/2021  6:30 PM [address] [facility]   WBC, UA 0-2  0 - 5 /HPF Final 10/11/2021  6:30 PM [address] [facility]   RBC, UA 3-5Abnormal   0 - 5 /HPF Final 10/11/2021  6:30 PM [address] [facility]   Epithelial Cells, UA 0-2  0 - 5 /HPF        10/12/2021  4:37 PM - Darwin, Chpo Incoming Lab Results From Soft    Component Value Ref Range & Units Status Collected Lab   CRP High Sensitivity 117. 6High   0.0 - 5.0 mg/L Final 10/12/2021  3:26 PM [address] [facility]   Testing Performed By    Impression:  1. COMMINUTED DISPLACED FRACTURE OF THE RIGHT HUMERAL HEAD.    2. OTHER FINDINGS DETAILED ABOVE     IMPRESSION:    · Left ankle osteomyelitis  · Left ankle chronic nonhealing wound  · Pasteurella and procidentia infection  · Right shoulder large hematoma with humeral head fracture  · Frequent falls  · Lung cancer of left lower lobe  ·     Patient Active Problem List   Diagnosis    Tobacco use    Hip pain    Knee pain    Chronic back pain    Elevated PSA    Primary osteoarthritis of right knee    Spondylosis of lumbar region without myelopathy or radiculopathy    Sacroiliitis, not elsewhere classified ([address]    Pure hypercholesterolemia    Charcot's joint of left ankle    Left ankle pain    Delirium due to general medical condition    DDD (degenerative disc disease), lumbar    Osteoarthritis of spine with myelopathy, lumbosacral region    Chronic bilateral low back pain with bilateral sciatica    Postlaminectomy syndrome, lumbar region    Acquired spondylolisthesis of lumbosacral region    Spinal stenosis of lumbar region with neurogenic claudication    HNP (herniated nucleus pulposus), lumbar    Spondylolisthesis at L4-L5 level    Anesthesia    Herniated nucleus pulposus, L4-5    NSTEMI (non-ST elevated myocardial infarction) ([address]    S/P PTCA (percutaneous transluminal coronary angioplasty)    Right upper quadrant abdominal pain    Gallbladder polyp    Biliary dyskinesia    Carpal tunnel syndrome on right    Carpal tunnel syndrome on left    Angina effort    Dyslipidemia    FELDER (dyspnea on exertion)    CAD (coronary artery disease)    Chest pain    Lung nodule seen on imaging study    Bilateral carotid artery stenosis    Essential hypertension    NSCLC of left lung (HCC)    Ataxic gait    Dizziness    Fracture, Colles, right, closed    Heroin abuse ([address]    Non-pressure chronic ulcer of left ankle with fat layer exposed ([address]    Atherosclerosis of native arteries of extremities with rest pain, left leg (HCC)    Subacute osteomyelitis, left ankle and foot (HCC)    Osteomyelitis of ankle (HCC)    Hyponatremia    Frequent falls    Chronic kidney disease    Fracture of right humerus    Hyperkalemia    Leukocytosis    Anemia    Acute hematogenous osteomyelitis, left ankle and foot (HCC)    Traumatic hematoma of right shoulder    Head injury    Humeral head fracture, right, closed, initial encounter    Pelvic hematoma, male, after a fall    Cause of injury, accidental fall, initial encounter    Tremor of unknown origin    Sundowning    Acute pain due to trauma       PLAN:  · Patient agreed to midline   · Arrange for discharge to SNF on IV Rocephin x 3 more weeks  · Follow-up CBC BMP weekly  · CRP in 2 weeks  · Follow-up in 3 weeks    Discussed with patient and RN    Violetta Berry MD

## 2021-10-14 NOTE — PROGRESS NOTES
Patient assessment completed and charted on by this RN. Patient is alert and oriented x4. Patient got up to chair with therapy and was up to chair for a couple of hours. AVASYS and bed alarm on for safety precautions. Swelling and severe bruising noted on RUE. Pain medications have been administered per MAR. Pain is controlled. 1500: New dressing was applied to left anterior leg wound per wound order. New foam dressing applied to right anterior shoulder blister.

## 2021-10-14 NOTE — PROGRESS NOTES
Physical Therapy Med Surg Daily Treatment Note  Facility/Department: [facility]  [facility]       NAME: Chang Becker  : 1945 (23 y.o.)  MRN: [id number]  CODE STATUS: Full Code    Date of Service: 10/14/2021    Patient Diagnosis(es): Hyperkalemia [E87.5]  Hyponatremia [E87.1]  General weakness [R53.1]  KEILY (acute kidney injury) ([address] [N17.9]  Injury of head, initial encounter [V25.84CE]  Fall, initial encounter [W19. XXXA]  Closed nondisplaced fracture of distal phalanx of right thumb, initial encounter [S62.524A]  Skin tear of right elbow without complication, initial encounter [S51.011A]  Abdominal pain, unspecified abdominal location [R10.9]  Closed fracture of proximal end of right humerus, unspecified fracture morphology, initial encounter [S42.201A]  Acute bilateral thoracic back pain [M54.6]   Chief Complaint   Patient presents with    Fall     Patient Active Problem List    Diagnosis Date Noted    Bilateral carotid artery stenosis 2020    Essential hypertension 2020    Pelvic hematoma, male, after a fall 10/13/2021    Cause of injury, accidental fall, initial encounter 10/13/2021    Tremor of unknown origin 10/13/2021    Sundowning 10/13/2021    Acute pain due to trauma 10/13/2021    Humeral head fracture, right, closed, initial encounter     Traumatic hematoma of right shoulder     Head injury     Hyponatremia 10/11/2021    Frequent falls 10/11/2021    Chronic kidney disease     Fracture of right humerus     Hyperkalemia     Leukocytosis     Anemia     Acute hematogenous osteomyelitis, left ankle and foot ([address]     Non-pressure chronic ulcer of left ankle with fat layer exposed ([address] 2021    Atherosclerosis of native arteries of extremities with rest pain, left leg ([address] 2021    Subacute osteomyelitis, left ankle and foot ([address] 2021    Osteomyelitis of ankle ([address] 2021    Heroin abuse ([address] 2021    Fracture, Screening  Patient Currently in Pain: Yes       Post-Session Pain Report  Pain Assessment  Pain Assessment: 0-10  Pain Level: 6  Pain Type: Acute pain  Pain Location: Arm  Pain Orientation: Right  Pain Frequency: Intermittent         OBJECTIVE   Orientation  Overall Orientation Status: Within Functional Limits    Bed mobility  Supine to Sit: Moderate assistance;2 Person assistance  Comment: +2 for safety with trunk. Transfers  Sit to Stand: Moderate Assistance;2 Person Assistance  Stand to sit: Moderate Assistance;2 Person Assistance  Bed to Chair: Moderate assistance;2 Person Assistance                                                         ASSESSMENT pt tolerated sitting edge of bed. Was fearful of falling once up on his feet. Definitely needed +2 assist for safe standing at this time. Pt up in chair with alarm and tray table. Family present. Discharge Recommendations:  Continue to assess pending progress, Patient would benefit from continued therapy after discharge    Goals  Long term goals  Long term goal 1: Bed mobility with indep   Long term goal 2: Functional transfers with supervision   Long term goal 3: Standing with no UE support x 1 min with SBA  Long term goal 4: Amb 50ft Min A with appropriate AD  Long term goal 5: SBA for HEP to improve LE strength, balance, activity tolerance  Patient Goals   Patient goals : \"to get home and see my dog. \"    PLAN    Times per week: 3-6        [facility] (6 CLICK) BASIC MOBILITY  AM-PAC Inpatient Mobility Raw Score : 11     Therapy Time   Individual   Time In  1130   Time Out 1153   Minutes 23   5 minutes gt  18 minutes bed mob/tranfers/seated edge of bed balance          Devi Retana PTA, 10/14/21 at 12:55 PM         Definitions for assistance levels  Independent = pt does not require any physical supervision or assistance from another person for activity completion. Device may be needed.   Stand by assistance = pt requires verbal cues or instructions from another person, close to but not touching, to perform the activity  Minimal assistance= pt performs 75% or more of the activity; assistance is required to complete the activity  Moderate assistance= pt performs 50% of the activity; assistance is required to complete the activity  Maximal assistance = pt performs 25% of the activity; assistance is required to complete the activity  Dependent = pt requires total physical assistance to accomplish the task

## 2021-10-14 NOTE — PROGRESS NOTES
Hospitalist Progress Note      PCP: Alla Bishop MD    Date of Admission: 10/11/2021    Chief Complaint:  No acute events, afebrile, stable HD    Medications:  Reviewed    Infusion Medications    sodium chloride      sodium chloride      sodium chloride 100 mL/hr at 10/14/21 1123     Scheduled Medications    folic acid  1 mg Oral Daily    thiamine  100 mg Oral TID    gabapentin  100 mg Oral TID    pill splitter   Does not apply Once    acetaminophen  650 mg Oral 4x Daily WC    lidocaine  3 patch TransDERmal Daily    sodium chloride flush  5-40 mL IntraVENous 2 times per day    [Held by provider] enoxaparin  40 mg SubCUTAneous Daily    cefTRIAXone (ROCEPHIN) IV  1,000 mg IntraVENous Q24H    atorvastatin  20 mg Oral Nightly    citalopram  40 mg Oral Daily    finasteride  5 mg Oral Daily    budesonide-formoterol  2 puff Inhalation BID    metoprolol tartrate  25 mg Oral BID    pantoprazole  40 mg Oral QAM AC    tiotropium  2 puff Inhalation Daily    bacitracin zinc   Topical BID     PRN Meds: sodium chloride, metaxalone, haloperidol, oxyCODONE, sodium chloride flush, sodium chloride, ondansetron **OR** ondansetron, polyethylene glycol, albuterol sulfate HFA      Intake/Output Summary (Last 24 hours) at 10/14/2021 1150  Last data filed at 10/14/2021 0035  Gross per 24 hour   Intake 350 ml   Output --   Net 350 ml       Exam:    BP (!) 164/56   Pulse 67   Temp 97.2 °F (36.2 °C)   Resp 18   Ht 5' 6\" (1.676 m)   Wt 150 lb (68 kg)   SpO2 99%   BMI 24.21 kg/m²     General appearance: awake, cooperative. Respiratory:  clear to auscultation, bilaterally  Cardiovascular: Regular rate and rhythm, S1/S2. Abdomen: Soft, active bowel sounds.   Musculoskeletal: large right shoulder hematoma, right hand is wrapped, left ankle wound present       Labs:   Recent Labs     10/11/21  1952 10/12/21  0508 10/12/21  2306 10/13/21  0759 10/14/21  0728   WBC 16.3*  --   --  14.6* 12.4*   HGB 8.6*   < > 7.0* 6.3* 7.6* HCT 25.8*   < > 20.6* 19.0* 22.4*     --   --  279 293    < > = values in this interval not displayed. Recent Labs     10/13/21  2316 10/14/21  0728 10/14/21  1034   * 134* 133*   K 5.0* 4.5 4.6    106 104   CO2 19* 19* 20   BUN 12 11 11   CREATININE 0.83 0.75 0.71   CALCIUM 8.9 8.4* 8.4*     Recent Labs     10/11/21  1830 10/13/21  0759 10/14/21  0728   AST 19 28 23   ALT 12 16 15   BILITOT 0.7 0.3 0.8*   ALKPHOS 104 76 78     Recent Labs     10/11/21  1950   INR 1.2     Recent Labs     10/11/21  1830   CKTOTAL 462*   TROPONINI <0.010       Urinalysis:      Lab Results   Component Value Date    NITRU Negative 10/11/2021    WBCUA 0-2 10/11/2021    BACTERIA Negative 10/11/2021    RBCUA 3-5 10/11/2021    BLOODU MODERATE 10/11/2021    SPECGRAV 1.017 10/11/2021    GLUCOSEU Negative 10/11/2021       Radiology:  MRI BRAIN W WO CONTRAST   Final Result          There are no acute intracranial changes, there is no evidence of hemorrhage or acute ischemia. XR HAND RIGHT (MIN 3 VIEWS)   Final Result   COMMINUTED FRACTURE OF THE DISTAL PHALANX OF THE RIGHT FIRST FINGER. CT Head WO Contrast   Final Result   Impression:      Mild cerebral atrophy. Chronic ischemic white matter disease. All CT scans at this facility use dose modulation, iterative reconstruction, and/or weight based dosing when appropriate to reduce radiation dose to as low as reasonably achievable. CT CERVICAL SPINE WO CONTRAST   Final Result      No fracture. Severe degenerative change cervical spine. Grade 1 C7 spondylolisthesis, unchanged. Bilateral carotid artery calcification. If clinical concern warrants, carotid sonography may be utilized for further evaluation. All CT scans at this facility use dose modulation, iterative reconstruction, and/or weight based dosing when appropriate to reduce radiation dose to as low as reasonably achievable.                CT THORACIC SPINE WO CONTRAST   Final Result      No fracture. Left lower lobe mass. Calcified exophytic gastric mass. All CT scans at this facility use dose modulation, iterative reconstruction, and/or weight based dosing when appropriate to reduce radiation dose to as low as reasonably achievable. .      CT HUMERUS RIGHT WO CONTRAST   Final Result   1. COMMINUTED DISPLACED FRACTURE OF THE RIGHT HUMERAL HEAD. 2. OTHER FINDINGS DETAILED ABOVE         All CT scans at this facility use dose modulation, iterative reconstruction, and/or weight based dosing when appropriate to reduce radiation dose to as low as reasonably achievable. CT LUMBAR SPINE WO CONTRAST   Final Result      No acute fracture. Postsurgical change discussed. All CT scans at this facility use dose modulation, iterative reconstruction, and/or weight based dosing when appropriate to reduce radiation dose to as low as reasonably achievable. CT ABDOMEN PELVIS WO CONTRAST Additional Contrast? 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 No acute fractures. Remote fractures left sixth through eighth ribs            CT OF THE ABDOMEN AND PELVIS WITH INTRAVENOUS CONTRAST MEDIUM. FINDINGS:         Liver:  Normal in size, shape, and attenuation. Bile Ducts:  Normal in caliber. Gallbladder:  No stones or wall thickening. Pancreas:  Normal without masses, cysts, ductal dilatation or calcification. Spleen:  Surgically absent. Kidneys:  Normal in size. No hydronephrosis, masses, or stones. Mild bilateral perinephric fat stranding. Adrenals:  Normal.       Stomach: Exophytic, calcified 1.6 cm nodule, gastric cardia again identified, unchanged. Small bowel:  Normal in caliber. Appendix:  Normal.       Colon:  Normal in caliber.        Peritoneum:  No ascites, free air, or fluid collections. Vessels: Aorta normal in course and caliber. Lymph nodes:        Retroperitoneal:  No enlarged retroperitoneal lymph nodes. Mesenteric:  No enlarged mesenteric lymph nodes. Pelvic: No enlarged pelvic lymph nodes. Ureters: Normal in course and caliber. No calcifications. Bladder: No wall thickening. Reproductive organs: No pelvic masses. Abdominal Wall:      Increased attenuation measuring 4.5 x 4.6 x 8.2 cm within right pelvic soft tissue (series 2, image 89). Bones:  No bone lesions. Lumbar scoliosis convexity to left apex L3. Remote internal fixation L4, and L5. Remote left hip replacement. IMPRESSION:      Soft tissue hematoma, right pelvis, measuring 4.5 x 4.6 x 8.2 cm. Splenectomy. No change, exophytic, calcified gastric nodule as discussed. Remote internal fixation L4 and L5. Left hip replacement. All CT scans at this facility use dose modulation, iterative reconstruction, and/or weight based dosing when appropriate to reduce radiation dose to as low as reasonably achievable. 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 No acute fractures. Remote fractures left sixth through eighth ribs            CT OF THE ABDOMEN AND PELVIS WITH INTRAVENOUS CONTRAST MEDIUM. FINDINGS:         Liver:  Normal in size, shape, and attenuation. Bile Ducts:  Normal in caliber. Gallbladder:  No stones or wall thickening. Pancreas:  Normal without masses, cysts, ductal dilatation or calcification. Spleen:  Surgically absent. Kidneys:  Normal in size. No hydronephrosis, masses, or stones. Mild bilateral perinephric fat stranding.       Adrenals:  Normal.       Stomach: Exophytic, calcified 1.6 cm nodule, gastric cardia again identified, unchanged. Small bowel:  Normal in caliber. Appendix:  Normal.       Colon:  Normal in caliber. Peritoneum:  No ascites, free air, or fluid collections. Vessels: Aorta normal in course and caliber. Lymph nodes:        Retroperitoneal:  No enlarged retroperitoneal lymph nodes. Mesenteric:  No enlarged mesenteric lymph nodes. Pelvic: No enlarged pelvic lymph nodes. Ureters: Normal in course and caliber. No calcifications. Bladder: No wall thickening. Reproductive organs: No pelvic masses. Abdominal Wall:      Increased attenuation measuring 4.5 x 4.6 x 8.2 cm within right pelvic soft tissue (series 2, image 89). Bones:  No bone lesions. Lumbar scoliosis convexity to left apex L3. Remote internal fixation L4, and L5. Remote left hip replacement. IMPRESSION:      Soft tissue hematoma, right pelvis, measuring 4.5 x 4.6 x 8.2 cm. Splenectomy. No change, exophytic, calcified gastric nodule as discussed. Remote internal fixation L4 and L5. Left hip replacement. All CT scans at this facility use dose modulation, iterative reconstruction, and/or weight based dosing when appropriate to reduce radiation dose to as low as reasonably achievable.                   Assessment/Plan:    65 y/o man with history of CAD s/p PCI, HTN, LLL NSCLC, COPD, tobacco use, GERD, left ankle wound/OM, ETOH use, lumbar stenosis, s/p splenectomy, frequent falls, chronic pain with opiate dependence, recent left wrist fracture who presented with:    Acute fracture of the right humeral head / recent fracture of the left wrist and right thumb  - conservative therapy per Ortho    KEILY / hyponatremia  - in the setting of NSAID, Bactrim, diuretics,lung cancer  - improving with saline infusion  - followed by nephrology    Hyperkalemia   - mild, improved     Acute encephalopathy, dizziness,  frequent falls,  - CT and MRI were negative  - not a surgical candidate per neurosurgery  - neurology following    Acute / chronic pain  - on oxycodone,gabapentin,tylenol   - pain management following    Left ankle wound/OM  - on IV Rocephin x 3 weeks per ID  - followed by podiatry     Acute blood loss anemia   - Hb down to 6.3, baseline is around 11-12  - in the setting of large, right shoulder hematoma  - holding Plavix and Lovenox  - s/p transfusion of 1 unit of PRBCs  - follow H/H closely    CAD s/p PCI to RCA in 2016  - hold Plavix due to anemia/ hematoma    NSCLC of LLL  - followed by pulmonology and oncology    Diet: ADULT DIET;  Regular    Code Status: Full Code,pallative care consulted      Disposition - SNF, / following          Electronically signed by Lulu Bermudez MD on 10/14/2021 at 11:50 AM

## 2021-10-14 NOTE — PROGRESS NOTES
[facility] Neurology Consult Note  Name: Favian Abarca  Age: 64 y.o. Gender: male  CodeStatus: Full Code  Allergies: No Known Allergies    Chief Complaint:Fall    Primary Care Provider: Celia Stover MD  InpatientTreatment Team: Treatment Team: Attending Provider: Jason Benjamin MD; Consulting Physician: Violetta Berry MD; Consulting Physician: Suzie Benitez MD; Consulting Physician: Alaina Navarrete MD; Consulting Physician: Jason Benjamin MD; Registered Nurse: Jocelynn Ramos, MAXIMILIAN; Registered Nurse: Mariano Wilhelm RN; Utilization Reviewer: Bryce Henry RN  Admission Date: 10/11/2021      HPI   Patient seen and examined for neurology follow-up for lower extremity weakness and frequent falls in the setting of known lumbar stenosis. 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 Patient is currently alert and oriented x3. Confusion significantly improved from yesterday. Right shoulder with significant hematoma and right humeral fracture requiring sling. Denies headache, vision changes, dysarthria or dysphagia. Nonfocal. Unable to ambulate due to safety reasons. No seizures. Afebrile.     No new changes,   No Known Allergies    Patient Active Problem List   Diagnosis    Tobacco use    Hip pain    Knee pain    Chronic back pain    Elevated PSA    Primary osteoarthritis of right knee    Spondylosis of lumbar region without myelopathy or radiculopathy    Sacroiliitis, not elsewhere classified ([address]    Pure hypercholesterolemia    Charcot's joint of left ankle    Left ankle pain    Confusion caused by a drug    DDD (degenerative disc disease), lumbar    Osteoarthritis of spine with myelopathy, lumbosacral region    Chronic bilateral low back pain with bilateral sciatica    Postlaminectomy syndrome, lumbar region    Acquired spondylolisthesis of lumbosacral region    Spinal stenosis of lumbar region with neurogenic claudication    HNP (herniated nucleus pulposus), lumbar    Spondylolisthesis at L4-L5 level    Anesthesia    Herniated nucleus pulposus, L4-5    NSTEMI (non-ST elevated myocardial infarction) ([facility])    S/P PTCA (percutaneous transluminal coronary angioplasty)    Right upper quadrant abdominal pain    Gallbladder polyp    Biliary dyskinesia    Carpal tunnel syndrome on right    Carpal tunnel syndrome on left    Angina effort    Dyslipidemia    FELDER (dyspnea on exertion)    CAD (coronary artery disease)    Chest pain    Lung nodule seen on imaging study    Bilateral carotid artery stenosis    Essential hypertension    NSCLC of left lung (HCC)    Ataxic gait    Dizziness    Fracture, Colles, right, closed    Heroin abuse ([address]    Non-pressure chronic ulcer of left ankle with fat layer exposed ([address]    Atherosclerosis of native arteries of extremities with rest pain, left leg ([facility])    Osteomyelitis ([address]    Osteomyelitis of ankle ([facility])    Hyponatremia    Frequent falls    Chronic kidney disease    Fracture of right humerus    Hyperkalemia    Leukocytosis    Anemia    Acute hematogenous osteomyelitis, left ankle and foot ([address]       Past Medical History:   Diagnosis Date    Alcohol abuse     quit drinking 8/18/21    CAD (coronary artery disease)     patient states no doctor has told him this / has 1 cardiac stent    Cancer ([address]     lung LLL    Chronic back pain     Chronic kidney disease     Chronic sinusitis     DJD (degenerative joint disease) of knee     left knee DJD    Elevated PSA     History of blood transfusion 1980's    History of inferior wall myocardial infarction 01/2016    1 cardiac stent    HTN (hypertension)     meds .  1 yr    Hyperlipidemia     meds > 12 yrs    NSTEMI (non-ST elevated myocardial infarction) ([address]     Smoker        Family History   Problem Relation Age of Onset    Osteoarthritis Mother     Emphysema Mother     Hypertension Father     Hearing Loss Father Social History Narrative    Lives alone     Social Determinants of Health     Financial Resource Strain: Low Risk     Difficulty of Paying Living Expenses: Not very hard   Food Insecurity: No Food Insecurity    Worried About Running Out of Food in the Last Year: Never true    Ran Out of Food in the Last Year: Never true   Transportation Needs: No Transportation Needs    Lack of Transportation (Medical): No    Lack of Transportation (Non-Medical): No   Physical Activity:     Days of Exercise per Week:     Minutes of Exercise per Session:    Stress:     Feeling of Stress :    Social Connections:     Frequency of Communication with Friends and Family:     Frequency of Social Gatherings with Friends and Family:     Attends [employer] Services: Active Member of Clubs or Organizations:     Attends Club or Organization Meetings:     Marital Status:    Intimate Partner Violence:     Fear of Current or Ex-Partner:     Emotionally Abused:     Physically Abused:     Sexually Abused:         Vitals:    10/12/21 0900   BP: 136/73   Pulse: 88   Resp: 18   Temp: 98 °F (36.7 °C)   SpO2: 99%       Review of Systems   Constitutional: Negative for diaphoresis and fever. HENT: Negative for congestion and trouble swallowing. Eyes: Negative for photophobia and visual disturbance. Respiratory: Negative for chest tightness and shortness of breath. Cardiovascular: Negative for chest pain. Gastrointestinal: Negative for diarrhea, nausea and vomiting. Musculoskeletal: Positive for back pain and gait problem. Skin: Negative for color change. Neurological: Positive for weakness and numbness. Negative for dizziness, tremors, seizures, syncope, facial asymmetry, speech difficulty, light-headedness and headaches. Psychiatric/Behavioral: Negative for hallucinations and self-injury. Physical Exam  Vitals and nursing note reviewed. Constitutional:       Appearance: Normal appearance.    HENT:      Head: Normocephalic and atraumatic. Eyes:      Conjunctiva/sclera: Conjunctivae normal.   Cardiovascular:      Rate and Rhythm: Normal rate and regular rhythm. Pulses: Normal pulses. Heart sounds: Normal heart sounds. Pulmonary:      Effort: Pulmonary effort is normal.      Breath sounds: Normal breath sounds. Musculoskeletal:         General: Normal range of motion. Skin:     General: Skin is warm and dry. Neurological:      Mental Status: He is alert and oriented to person, place, and time. Mental status is at baseline. Cranial Nerves: No cranial nerve deficit. Sensory: No sensory deficit. Motor: Weakness present.       Coordination: Coordination normal.      Gait: Gait abnormal.      Deep Tendon Reflexes: Reflexes abnormal.   Psychiatric:         Mood and Affect: Mood normal.         Behavior: Behavior normal.     Patient is areflexic in his bilateral lower extremities  Lower extremity strength is 4 -/5  Did not walk patient due to fall risk      Medications:  Reviewed    Infusion Medications:    sodium chloride      sodium chloride       Scheduled Medications:    sodium chloride flush  5-40 mL IntraVENous 2 times per day    enoxaparin  40 mg SubCUTAneous Daily    acetaminophen  1,000 mg Oral 3 times per day    bacitracin zinc   Topical BID     PRN Meds: sodium chloride flush, sodium chloride, ondansetron **OR** ondansetron, polyethylene glycol, traMADol **OR** traMADol    Labs:   Recent Labs     10/11/21  1952 10/12/21  0508   WBC 16.3*  --    HGB 8.6* 7.3*   HCT 25.8* 21.6*     --      Recent Labs     10/11/21  1830 10/12/21  0445   * 125*   K 5.9* 5.9*   CL 92* 97   CO2 16* 18*   BUN 25* 23   CREATININE 1.73* 1.25*   CALCIUM 9.3 8.6     Recent Labs     10/11/21  1830   AST 19   ALT 12   BILITOT 0.7   ALKPHOS 104     Recent Labs     10/11/21  1950   INR 1.2     Recent Labs     10/11/21  1830   CKTOTAL 462*   TROPONINI <0.010       Urinalysis:   Lab Results   Component Value Date NITRU Negative 10/11/2021    WBCUA 0-2 10/11/2021    BACTERIA Negative 10/11/2021    RBCUA 3-5 10/11/2021    BLOODU MODERATE 10/11/2021    SPECGRAV 1.017 10/11/2021    GLUCOSEU Negative 10/11/2021       Radiology:   Most recent    EEG No valid procedures specified. MRI of Brain No results found for this or any previous visit. Results for orders placed during the hospital encounter of 04/09/21    MRI BRAIN WO CONTRAST    Narrative  EXAM: MRI of the brain without contrast    History: Ataxic gait. Dizziness. Technique: Multiplanar multisequence MRI of the brain was performed without contrast.    Comparison: None available    Findings:    Areas of hyperintense T2/FLAIR signal within the bilateral supratentorial white matter and brenna are nonspecific but most compatible with chronic small vessel ischemic changes in a patient of this age. Prominence of the sulci and ventricles compatible with mild generalized parenchymal volume loss. No acute hemorrhage, mass effect, midline shift, or abnormal extra-axial fluid collection. No mass identified MRI without contrast. Cerebellum appears  normal    There is no diffusion restriction. No susceptibility artifact is identified on the gradient echo sequence. The major intracranial vascular flow voids are maintained. Cranial nerves 7/8 complexes appear grossly unremarkable. The visualized paranasal sinuses and bilateral mastoid air cells are essentially clear. Impression  No acute intracranial process. Generalized parenchymal volume loss and nonspecific white matter findings most compatible with chronic small vessel ischemic changes in a patient of this age. MRA of the Head and Neck: No results found for this or any previous visit. No results found for this or any previous visit. No results found for this or any previous visit.                             CT of the Head: Results for orders placed during the hospital encounter of 10/11/21    CT Head WO Contrast    Narrative  CT Brain. Contrast medium:  without contrast.. History:  Fall. Loss of consciousness. Invasive squamous cell carcinoma, left lower lobe. Technical factors: CT imaging of the brain was obtained and formatted as 5 mm contiguous axial images. 2.5 mm contiguous axial images were obtained through the osseous structures. Sagittal and coronal reconstruction obtained during postprocessing. Comparison:  CT brain, August 18, 2021. Pinky Angles Findings:    Extra-axial spaces:  Normal.    Intracranial hemorrhage:  None. Ventricular system: Ventricles mildly enlarged. Sulci mildly prominent. Basal Cisterns:  Normal.    Cerebral Parenchyma: Bilateral symmetric periventricular areas decreased attenuation. Midline Shift:  None. Cerebellum:  Normal.    Paranasal sinuses and mastoid air cells:  Normal.    Visualized Orbits:  Normal.    Impression  Impression:    Mild cerebral atrophy. Chronic ischemic white matter disease. All CT scans at this facility use dose modulation, iterative reconstruction, and/or weight based dosing when appropriate to reduce radiation dose to as low as reasonably achievable. No results found for this or any previous visit. No results found for this or any previous visit. Carotid duplex: No results found for this or any previous visit. No results found for this or any previous visit.   Results for orders placed during the hospital encounter of 02/29/20    US CAROTID ARTERY BILATERAL    Narrative  EXAMINATION:  CAROTID DUPLEX ULTRASONOGRAPHY    CLINICAL HISTORY:  CAROTID STENOSIS    COMPARISONS:  4/18/2018    TECHNIQUE:  B-mode, color flow and spectral Doppler    FINDINGS:    ARTERIAL BLOOD FLOW VELOCITY    RIGHT PS    Prox CCA    99.4 cm/s  Mid CCA     94.4 cm/s  Dist CCA    76.8 cm/s  Prox ICA    57.5 cm/s  Mid ICA     71.3 cm/s  Dist ICA    73.7 cm/s  Prox ECA    110 cm/s  Prox VERT   58 cm/s    ICA/CCA     0.78    LEFT PS    Prox CCA    99.3 cm/s  Mid CCA     93.3 cm/s  Dist CCA    87.3 cm/s  Prox ICA    55.4 cm/s  Mid ICA     74.1 cm/s  Dist ICA    60.9 cm/s  Prox ECA    86.2 cm/s  Prox VERT   41.2 cm/s    ICA/CCA     0.83    Impression  MILD TO MODERATE DEGREE OF ATHEROSCLEROSIS SCATTERED THROUGHOUT THE CAROTID TREE ON BOTH SIDES. SOME ARE CALCIFIED. INTIMAL THICKENING NOTED. BILATERAL ICA LESS THAN 50% STENOSIS. BILATERAL ANTEGRADE VERTEBRAL FLOW. Echo No results found for this or any previous visit. Assessment/Plan:  56-year-old male hospitalized for frequent falls, hyponatremia, and hyperkalemia. Patient is well-known to us has a known history of lumbar stenosis at L2-L3 and L4-L5. Previously seen by neurosurgery and not deemed a surgical candidate at that time. Patient has since had increasing weakness and 3 falls, and I will read consult neurosurgery. Falls likely related to disequilibrium caused by severe stenosis. Of note, patient was found to have active lung cancer, possibly squamous cell carcinoma (problems list indicates non-small cell lung cancer of the left lung) based on recent PET scan. Given patient's hyponatremia and hyperkalemia, will obtain ACTH and a.m. cortisol to screen for paraneoplastic syndrome. Consider SIADH related to lung cancer. However, patient's hyponatremia may be related to to poor p.o. intake. CT of the head was negative for any acute findings but did show chronic ischemic white matter disease. Will obtain orthostatics given that patient had some suggestion of lightheadedness as well. Patient has significant orthostatic hypotension. Causes of this could be related to volume issues related to hyponatremia and severe anemia. Patient has a hemoglobin of 6.3. Consider carotid ultrasound if orthostatic hypotension does not improve after correction of anemia and hyponatremia.   Could be related to alcohol abuse or even vagus nerve hypersensitivity related to lung cancer. This may be difficult to treat given patient's baseline hypertension and I will hold off on medications until severe anemia has resolved. Patient also has osteomyelitis. Neurosurgery has been consulted but has not been able to assess patient. MRI of the brain with and without contrast ordered to assess for mets. Patient also found to have osteomyelitis. Blood cultures pending. CRP elevated procalcitonin pending. Patient does have leukocytosis. Patient is encephalopathic today which is an acute change from yesterday. Will change MRI to stat given sodium fluctuation. Also obtain EEG. Examined and has significant shoulder issues at this time. His main issues appears to be gait ataxia and hyponatremia. His gait issues appears to be related to his lumbar spine and his falls could be from the same though this time this could have been a different event      Encephalopathy improved hyponatremia improved, multifactorial secondary to Bactrim, diuretics, lung cancer, NSAIDs. Nephrology following. Hyperkalemia resolved  Patient with acute fracture of the right humeral head with conservative therapy recommended per orthopedics. Osteomyelitis of the left ankle talus. Blood cultures negative. Currently on IV Rocephin for 3 weeks. Lumbar canal stenosis, neurosurgery consulted and discussed he is not a candidate for surgical intervention. Anemia which is likely secondary to large right shoulder hematoma. I have personally performed a face to face diagnostic evaluation on this patient, reviewed all data and investigations, and am the sole provider of all clinical decisions on the neurological status of this patient. encephalopathy impreved       Anshu Becerra MD, Marcin Leonard, American Board of Psychiatry & Neurology  Board Certified in Vascular Neurology  Board Certified in Neuromuscular Medicine  Certified in [place] [id number] care now on consult

## 2021-10-14 NOTE — PROGRESS NOTES
Shift assessment complete. Patient oriented to self only. Right arm swelling and bruising noted. Medications given per MAR. AVASYS in place for patient safety. Will continue to monitor.

## 2021-10-14 NOTE — PROGRESS NOTES
Hematology/Oncology   Progress Note        CHIEF COMPLAINT/HPI:  Follow up of lung cancer history s/p SBRT. Hemoglobin at 7.6. review of iron studies suggest anemia from chronic disease. Transfuse blood as needed. REVIEW OF SYSTEMS:    Unremarkable except for symptoms mentioned in HPI.     Current Inpatient Medications:    Current Facility-Administered Medications   Medication Dose Route Frequency Provider Last Rate Last Admin    0.9 % sodium chloride infusion   IntraVENous PRN Carla Castillo MD        folic acid (FOLVITE) tablet 1 mg  1 mg Oral Daily Carla Castillo MD   1 mg at 10/14/21 1014    thiamine tablet 100 mg  100 mg Oral TID Theotis Cava, PA-C   100 mg at 10/14/21 1013    gabapentin (NEURONTIN) capsule 100 mg  100 mg Oral TID Carola Bowman MD   100 mg at 10/14/21 1014    metaxalone (SKELAXIN) tablet 400 mg  400 mg Oral Q6H PRN Carola Bowman MD        haloperidol (HALDOL) tablet 0.5 mg  0.5 mg Oral Q8H PRN Carola Bowman MD        pill splitter   Does not apply Once Carola Bowman MD        acetaminophen (TYLENOL) tablet 650 mg  650 mg Oral 4x Daily WC Carola Bowman MD   650 mg at 10/14/21 1013    oxyCODONE (ROXICODONE) immediate release tablet 5 mg  5 mg Oral Q4H PRN Carola Bowman MD   5 mg at 10/13/21 2202    lidocaine 4 % external patch 3 patch  3 patch TransDERmal Daily Carola Bowman MD   3 patch at 10/14/21 1012    sodium chloride flush 0.9 % injection 5-40 mL  5-40 mL IntraVENous 2 times per day Marrenay Santizo APRN - CNP        sodium chloride flush 0.9 % injection 5-40 mL  5-40 mL IntraVENous PRN Marella Sukhdev, APRN - CNP        0.9 % sodium chloride infusion  25 mL IntraVENous PRN Marella Sukhdev, APRN - CNP        ondansetron (ZOFRAN-ODT) disintegrating tablet 4 mg  4 mg Oral Q8H PRN Marella Kind, APRN - CNP        Or    [facility]ron [facility] injection 4 mg  4 mg IntraVENous Q6H PRN Marella Kind, APRN - CNP       [employer] polyethylene glycol (GLYCOLAX) packet 17 g  17 g Oral Daily PRN ELOY Rawls CNP        [Held by provider] enoxaparin (LOVENOX) injection 40 mg  40 mg SubCUTAneous Daily ELOY Alvares CNP        0.9 % sodium chloride infusion   IntraVENous Continuous ELOY Rawls  mL/hr at 10/13/21 2157 New Bag at 10/13/21 2157    cefTRIAXone (ROCEPHIN) 1000 mg IVPB in 50 mL D5W minibag  1,000 mg IntraVENous Q24H Ketty Ferreira MD   Stopped at 10/13/21 1229    atorvastatin (LIPITOR) tablet 20 mg  20 mg Oral Nightly Gillian Cabrales MD   20 mg at 10/13/21 2157    citalopram (CELEXA) tablet 40 mg  40 mg Oral Daily Gillian Cabrales MD   40 mg at 10/14/21 1014    finasteride (PROSCAR) tablet 5 mg  5 mg Oral Daily Gillian Cabrales MD   5 mg at 10/14/21 1014    budesonide-formoterol (SYMBICORT) 160-4.5 MCG/ACT inhaler 2 puff  2 puff Inhalation BID Gillian Cabrales MD   2 puff at 10/14/21 0718    metoprolol tartrate (LOPRESSOR) tablet 25 mg  25 mg Oral BID Gillian Cabrales MD   25 mg at 10/14/21 1015    pantoprazole (PROTONIX) tablet 40 mg  40 mg Oral QAM AC Gillian Cabrales MD   40 mg at 10/14/21 0559    tiotropium (SPIRIVA RESPIMAT) 2.5 MCG/ACT inhaler 2 puff  2 puff Inhalation Daily Gillian Cabrales MD   2 puff at 10/14/21 0719    albuterol sulfate  (90 Base) MCG/ACT inhaler 2 puff  2 puff Inhalation Q4H PRN Gillian Cabrales MD        bacitracin zinc ointment   Topical BID Regan Matthews PA-C   Given at 10/12/21 2205       PHYSICAL EXAM:    EYES:  Lids and lashes normal, pupils equal, round and reactive to light, extra ocular muscles intact, sclera clear, conjunctiva normal    ENT:  Normocephalic, without obvious abnormality, atraumatic, sinuses nontender on palpation, external ears without lesions, oral pharynx with moist mucus membranes, tonsils without erythema or exudates, gums normal and good dentition.     NECK:  Supple, symmetrical, trachea midline, no gait    Dizziness    Fracture, Colles, right, closed    Subacute osteomyelitis, left ankle and foot (HCC)    Frequent falls    Chronic kidney disease    Fracture of right humerus    Hyperkalemia    Leukocytosis    Anemia    Acute hematogenous osteomyelitis, left ankle and foot (HCC)    Traumatic hematoma of right shoulder    Head injury    Humeral head fracture, right, closed, initial encounter    Pelvic hematoma, male, after a fall    Cause of injury, accidental fall, initial encounter    Tremor of unknown origin    Sundowning    Acute pain due to trauma  Resolved Problems:    * No resolved hospital problems.  *    Patient Active Problem List   Diagnosis    Tobacco use    Hip pain    Knee pain    Chronic back pain    Elevated PSA    Primary osteoarthritis of right knee    Spondylosis of lumbar region without myelopathy or radiculopathy    [facility]ro[facility], not elsewhere [facility]    Pure hypercholesterolemia    Charcot's joint of left ankle    Left ankle pain    Delirium due to general medical condition    DDD (degenerative disc disease), lumbar    Osteoarthritis of spine with myelopathy, lumbosacral region    Chronic bilateral low back pain with bilateral sciatica    Postlaminectomy syndrome, lumbar region    Acquired spondylolisthesis of lumbosacral region    Spinal stenosis of lumbar region with neurogenic claudication    HNP (herniated nucleus pulposus), lumbar    Spondylolisthesis at L4-L5 level    Anesthesia    Herniated nucleus pulposus, L4-5    NSTEMI (non-ST elevated myocardial infarction) ([facility]    S/P PTCA (percutaneous transluminal coronary angioplasty)    Right upper quadrant abdominal pain    Gallbladder polyp    Biliary dyskinesia    Carpal tunnel syndrome on right    Carpal tunnel syndrome on left    Angina effort    Dyslipidemia    FELDER (dyspnea on exertion)    CAD (coronary artery disease)    Chest pain    Lung nodule seen on imaging study    Bilateral carotid artery stenosis    Essential hypertension    NSCLC of left lung (HCC)    Ataxic gait    Dizziness    Fracture, Colles, right, closed    [facility]    Non-pressure chronic ulcer of left ankle with fat layer exposed ([facility]    Atherosclerosis of native arteries of extremities with rest pain, left leg (HCC)    Subacute osteomyelitis, left ankle and foot (HCC)    Osteomyelitis of ankle (HCC)    Hyponatremia    Frequent falls    Chronic kidney disease    Fracture of right humerus    Hyperkalemia    Leukocytosis    Anemia    Acute hematogenous osteomyelitis, left ankle and foot (HCC)    Traumatic hematoma of right shoulder    Head injury    Humeral head fracture, right, closed, initial encounter    Pelvic hematoma, male, after a fall    Cause of injury, accidental fall, initial encounter    Tremor of unknown origin    Sundowning    Acute pain due to trauma       PLAN:  Anemia is due to anemia from chronic disease.          Electronically signed by Yareli Arora MD on 10/14/21 at 11:05 AM EDT

## 2021-10-14 NOTE — CARE COORDINATION
This LSW spoke with patient and family at length this am re: D/C planning. Patient is accepting of being transferred to SNF upon D/C. Per patient and family request referral was submitted to [facility]. Awaiting approval at this time.   Electronically signed by GAY Cash, LSW on 10/14/21 at 10:31 AM EDT

## 2021-10-14 NOTE — PROGRESS NOTES
Nephrology Progress Note    Assessment:  Hyponatremia improved  Encephalopathy  impoved  Sq Cell Cancer lung  OHDx CAD  Left ankle Osteo  Anemia   Plan:will give 2nd unit blood if Hgb <7.5 d/t respiratory needs  Patient Active Problem List:     Tobacco use     Hip pain     Knee pain     Chronic back pain     Elevated PSA     Primary osteoarthritis of right knee     Spondylosis of lumbar region without myelopathy or radiculopathy     Sacroiliitis, not elsewhere classified ([address]     Pure hypercholesterolemia     Charcot's joint of left ankle     Left ankle pain     Delirium due to general medical condition     DDD (degenerative disc disease), lumbar     Osteoarthritis of spine with myelopathy, lumbosacral region     Chronic bilateral low back pain with bilateral sciatica     Postlaminectomy syndrome, lumbar region     Acquired spondylolisthesis of lumbosacral region     Spinal stenosis of lumbar region with neurogenic claudication     HNP (herniated nucleus pulposus), lumbar     Spondylolisthesis at L4-L5 level     Anesthesia     Herniated nucleus pulposus, L4-5     NSTEMI (non-ST elevated myocardial infarction) ([facility])     S/P PTCA (percutaneous transluminal coronary angioplasty)     Right upper quadrant abdominal pain     Gallbladder polyp     Biliary dyskinesia     Carpal tunnel syndrome on right     Carpal tunnel syndrome on left     Angina effort     Dyslipidemia     FELDER (dyspnea on exertion)     CAD (coronary artery disease)     Chest pain     Lung nodule seen on imaging study     Bilateral carotid artery stenosis     Essential hypertension     NSCLC of left lung ([facility])     Ataxic gait     Dizziness     Fracture, [place], right, closed     Heroin abuse ([facility])     Non-pressure chronic ulcer of left ankle with fat layer exposed ([address]     Atherosclerosis of native arteries of extremities with rest pain, left leg ([facility])     Subacute osteomyelitis, left ankle and foot ([facility])     Osteomyelitis of ankle ([address] Hyponatremia     Frequent falls     Chronic kidney disease     Fracture of right humerus     Hyperkalemia     Leukocytosis     Anemia     Acute hematogenous osteomyelitis, left ankle and foot ([facility])     Traumatic hematoma of right shoulder     Head injury     Humeral head fracture, right, closed, initial encounter     Pelvic hematoma, male, after a fall     Cause of injury, accidental fall, initial encounter     Tremor of unknown origin     Sundowning     Acute pain due to trauma      Subjective:  Admit Date: 10/11/2021    Interval History: a converse with him today more oriented  Stopped drinking ETOH 2 days ago    Medications:  Scheduled Meds:   sodium zirconium cyclosilicate  5 g Oral TID    folic acid  1 mg Oral Daily    thiamine  100 mg Oral TID    gabapentin  100 mg Oral TID    pill splitter   Does not apply Once    acetaminophen  650 mg Oral 4x Daily WC    lidocaine  3 patch TransDERmal Daily    sodium chloride flush  5-40 mL IntraVENous 2 times per day    [Held by provider] enoxaparin  40 mg SubCUTAneous Daily    cefTRIAXone (ROCEPHIN) IV  1,000 mg IntraVENous Q24H    atorvastatin  20 mg Oral Nightly    citalopram  40 mg Oral Daily    finasteride  5 mg Oral Daily    budesonide-formoterol  2 puff Inhalation BID    metoprolol tartrate  25 mg Oral BID    pantoprazole  40 mg Oral QAM AC    tiotropium  2 puff Inhalation Daily    bacitracin zinc   Topical BID     Continuous Infusions:   sodium chloride      sodium chloride      sodium chloride 100 mL/hr at 10/13/21 2157       CBC:   Recent Labs     10/11/21  1952 10/12/21  0508 10/12/21  2306 10/13/21  0759   WBC 16.3*  --   --  14.6*   HGB 8.6*   < > 7.0* 6.3*     --   --  279    < > = values in this interval not displayed.      CMP:    Recent Labs     10/13/21  0759 10/13/21  1544 10/13/21  2316   *  133* 133* 131*   K 5.6*  5.5* 4.9 5.0*     102 103 102   CO2 15*  18* 18* 19*   BUN 19  19 15 12   CREATININE 1.08  1.06 0. 96 0.83   GLUCOSE 92  99 103* 104*   CALCIUM 8.9  8.6 8.5 8.9   LABGLOM >60.0  >60.0 >60.0 >60.0     Troponin:   Recent Labs     10/11/21  1830   TROPONINI <0.010     BNP: No results for input(s): BNP in the last 72 hours. INR:   Recent Labs     10/11/21  1950   INR 1.2     Lipids: No results for input(s): CHOL, LDLDIRECT, TRIG, HDL, AMYLASE, LIPASE in the last 72 hours. Liver:   Recent Labs     10/13/21  0759   AST 28   ALT 16   ALKPHOS 76   PROT 5.7*   LABALBU 3.4*   BILITOT 0.3     Iron:  No results for input(s): IRONS, FERRITIN in the last 72 hours. Invalid input(s): LABIRONS  Urinalysis: No results for input(s): UA in the last 72 hours.     Objective:  Vitals: BP (!) 164/56   Pulse 67   Temp 97.2 °F (36.2 °C)   Resp 18   Ht 5' 6\" (1.676 m)   Wt 150 lb (68 kg)   SpO2 99%   BMI 24.21 kg/m²    Wt Readings from Last 3 Encounters:   10/11/21 150 lb (68 kg)   09/29/21 150 lb 3.2 oz (68.1 kg)   09/27/21 154 lb 9.6 oz (70.1 kg)      24HR INTAKE/OUTPUT:      Intake/Output Summary (Last 24 hours) at 10/14/2021 0816  Last data filed at 10/14/2021 0035  Gross per 24 hour   Intake 350 ml   Output 400 ml   Net -50 ml       General: alert, in no apparent distress confused  HEENT: normocephalic, atraumatic, anicteric  Neck: supple, no mass  Lungs: non-labored respirations, clear to auscultation bilaterally  Heart: regular rate and rhythm, no murmurs or rubs  Abdomen: soft, non-tender, non-distended  Ext: no cyanosis, no peripheral edema  Neuro: alert and oriented, no gross abnormalities  Psych: normal mood and affect  Skin: no rash      Electronically signed by Aleksandra Vang DO, MD

## 2021-10-14 NOTE — CARE COORDINATION
[facility] Case Management Initial Discharge Assessment    Met with Family to discuss discharge plan. PCP: Chay Abernathy MD                                Date of Last Visit: June 2021    If no PCP, list provided? N/A    Discharge Planning    Living Arrangements: independently at home    Who do you live with? ALONE    Who helps you with your care:  self    If lives at home:     Do you have any barriers navigating in your home? yes - stairs / Patient has had several frequent falls    Patient can perform ADL? No    Current Services (outpatient and in home) :  None    Dialysis: No    Is transportation available to get to your appointments? Yes    DME Equipment:  no    Respiratory equipment: None    Respiratory provider:  no     Pharmacy:  yes - [facility] in [address] with Medication Assistance Program?  No      Patient agreeable to [place] [id number]? No    Patient agreeable to SNF/Rehab? Yes, [address]    Other discharge needs identified? N/A    Does Patient Have a High-Risk for Readmission Diagnosis (CHF, PN, MI, COPD)? No      Initial Discharge Plan? (Note: please see concurrent daily documentation for any updates after initial note). Patient requires SNF placement. Patient lives alone with several recent falls. Family is supportive but is unable to meet care needs of patient.      Readmission Risk              Risk of Unplanned Readmission:  2021 Nohemy Villegas  Electronically signed by GAY Banuelos, CHET on 10/14/2021 at 10:16 AM

## 2021-10-14 NOTE — CARE COORDINATION
INTERDISCIPLINARY ROUNDING    October 14, 2021 at 8:28 AM EDT    Anticipated Discharge Date:   TBD    Anticipated Discharge Disposition: REHAB VS SNF (PT RECOMMENDATION)    Patient Mobility or PT/OT ordered: YES     Readmission Risk              Risk of Unplanned Readmission:  29           Discussed patient goal for the day, patient clinical progression, and barriers to discharge. The following Goal(s) of the Day/Commitment(s) have been identified:    BARRIERS:  10/14 MULTIPLE CONSULTS  LUMBAR NOT A SURGICAL CANDIDATE  LUNG CA AWAITING RADIATION ?  CHEMO  HAVING LABS FOR ADRENAL R/O  MAXIMIZE PAIN MGT   HGB 6.3>>1UPRB               Heladio Ferraro RN  October 14, 2021

## 2021-10-14 NOTE — PROGRESS NOTES
INPATIENT PROGRESS NOTES    PATIENT NAME: Cyril Riedel  MRN: [id number]  SERVICE DATE:  October 14, 2021   SERVICE TIME:  1:15 PM      PRIMARY SERVICE: Pulmonary Disease    CHIEF COMPLAINTS: Squamous cell carcinoma of the lung    INTERVAL HPI: Patient seen and examined at bedside, Interval Notes, orders reviewed. Nursing notes noted    Feels good, has no complaint, no chest pain, no shortness of breath, currently on room air saturation 99%, mainly complain of pain in the right shoulder. Review of system:     GI Abdominal pain No  Skin Rash No    Social History     Tobacco Use    Smoking status: Current Every Day Smoker     Packs/day: 0.50     Years: 60.00     Pack years: 30.00     Types: Cigarettes    Smokeless tobacco: Never Used    Tobacco comment: also smokes cigars   Substance Use Topics    Alcohol use: Not Currently     Alcohol/week: 12.0 standard drinks     Types: 12 Shots of liquor per week     Comment: quit drinking aug 18th, 2021         Problem Relation Age of Onset    Osteoarthritis Mother     Emphysema Mother     Hypertension Father     Hearing Loss Father     Dementia Father     No Known Problems Sister     Prostate Cancer Brother     Colon Polyps Neg Hx          OBJECTIVE    Body mass index is 24.21 kg/m². PHYSICAL EXAM:  Vitals:  BP (!) 164/56   Pulse 67   Temp 97.2 °F (36.2 °C)   Resp 18   Ht 5' 6\" (1.676 m)   Wt 150 lb (68 kg)   SpO2 99%   BMI 24.21 kg/m²     General: alert, cooperative, no distress  Head: normocephalic, atraumatic  Eyes:No gross abnormalities. ENT:  MMM no lesions  Neck:  supple and no masses  Chest : Clear to auscultation, no wheezing, no rales, nontender, tympanic  Heart[de-identified] Heart sounds are normal.  Regular rate and rhythm without murmur, gallop or rub. ABD:  symmetric, soft, non-tender, no guarding or rebound  Musculoskeletal : no cyanosis, no clubbing and no edema  Neuro:  Grossly normal  Skin: No rashes or nodules noted.   Lymph node:  no cervical nodes  Urology: No Ortega   Psychiatric: appropriate    DATA:   Recent Labs     10/13/21  0759 10/14/21  0728   WBC 14.6* 12.4*   HGB 6.3* 7.6*   HCT 19.0* 22.4*   MCV 94.7 91.9    293     Recent Labs     10/13/21  0759 10/13/21  1544 10/14/21  0728 10/14/21  0728 10/14/21  1034   *  133*   < > 134*  --  133*   K 5.6*  5.5*   < > 4.5  --  4.6     102   < > 106  --  104   CO2 15*  18*   < > 19*  --  20   BUN 19  19   < > 11  --  11   CREATININE 1.08  1.06   < > 0.75  --  0.71   GLUCOSE 92  99   < > 85  --  88   CALCIUM 8.9  8.6   < > 8.4*  --  8.4*   PROT 5.7*  --  5.2*  --   --    LABALBU 3.4*  --  3.0*  --   --    BILITOT 0.3  --  0.8*  --   --    ALKPHOS 76  --  78  --   --    AST 28  --  23  --   --    ALT 16  --  15  --   --    LABGLOM >60.0  >60.0   < > >60.0   < > >60.0   GFRAA >60.0  >60.0   < > >60.0   < > >60.0   GLOB 2.3  --  2.2*  --   --     < > = values in this interval not displayed. MV Settings:          No results for input(s): PHART, HFA4KOJ, PO2ART, KWC6MAG, BEART, J1ILQJVZ in the last 72 hours. O2 Device: None (Room air)    ADULT DIET;  Regular     MEDICATIONS during current hospitalization:    Continuous Infusions:   sodium chloride      sodium chloride      sodium chloride 100 mL/hr at 10/14/21 1123       Scheduled Meds:   folic acid  1 mg Oral Daily    thiamine  100 mg Oral TID    gabapentin  100 mg Oral TID    pill splitter   Does not apply Once    acetaminophen  650 mg Oral 4x Daily WC    lidocaine  3 patch TransDERmal Daily    sodium chloride flush  5-40 mL IntraVENous 2 times per day    [Held by provider] enoxaparin  40 mg SubCUTAneous Daily    cefTRIAXone (ROCEPHIN) IV  1,000 mg IntraVENous Q24H    atorvastatin  20 mg Oral Nightly    citalopram  40 mg Oral Daily    finasteride  5 mg Oral Daily    budesonide-formoterol  2 puff Inhalation BID    metoprolol tartrate  25 mg Oral BID    pantoprazole  40 mg Oral QAM AC    tiotropium  2 puff Inhalation Daily    bacitracin zinc   Topical BID       PRN Meds:sodium chloride, metaxalone, haloperidol, oxyCODONE, sodium chloride flush, sodium chloride, ondansetron **OR** ondansetron, polyethylene glycol, albuterol sulfate HFA    Radiology  CT ABDOMEN PELVIS WO CONTRAST Additional Contrast? None    Result Date: 10/11/2021  CT OF THE CHEST, ABDOMEN, AND PELVIS with intravenous contrast medium. HISTORY: Fall. Loss of consciousness. Receiving anticoagulation. Lower extremity weakness. History squamous cell carcinoma, left lower lobe. TECHNICAL FACTORS: CT imaging of the chest, abdomen, and pelvis, was obtained and formatted as 5 mm contiguous axial images from the thoracic inlet through the symphysis pubis. Sagittal and coronal reconstructions obtained during postprocessing. Intravenous contrast medium:  100 ml of Isovue-370 Comparison:  CT chest, September 7, 2021, January 14, 2021. PET/CT, September 23, 2021. CT abdomen pelvis, March 23, 2020 CT OF THE CHEST FINDINGS: Right lung: No nodules, masses, consolidation, pleural effusion, pneumothorax. Left lung: Pleural-based, noncalcified, mildly stellate 2.6 x 2.9 cm nodule, left lower lobe, again identified, unchanged. No consolidation, pleural effusion, pneumothorax. Lymph nodes: No hilar, mediastinal, or axillary lymph node enlargement. Thoracic aorta: Normal in course and caliber. Cardiac Size: Normal. Pericardial effusion: None. Musculoskeletal:No osteoblastic, and no osteolytic lesions. Remote fractures left sixth through eighth ribs. 2.6 x 2.9 cm pleural-based left lower lobe nodule, in patient with known clinical history squamous cell carcinoma left lower lobe. No acute fractures. Remote fractures left sixth through eighth ribs CT OF THE ABDOMEN AND PELVIS WITH INTRAVENOUS CONTRAST MEDIUM. FINDINGS: Liver:  Normal in size, shape, and attenuation. Bile Ducts:  Normal in caliber. Gallbladder:  No stones or wall thickening.  Pancreas:  Normal without masses, cysts, ductal dilatation or calcification. Spleen:  Surgically absent. Kidneys:  Normal in size. No hydronephrosis, masses, or stones. Mild bilateral perinephric fat stranding. Adrenals:  Normal. Stomach: Exophytic, calcified 1.6 cm nodule, gastric cardia again identified, unchanged. Small bowel:  Normal in caliber. Appendix:  Normal. Colon:  Normal in caliber. Peritoneum:  No ascites, free air, or fluid collections. Vessels: Aorta normal in course and caliber. Lymph nodes:  Retroperitoneal:  No enlarged retroperitoneal lymph nodes. Mesenteric:  No enlarged mesenteric lymph nodes. Pelvic: No enlarged pelvic lymph nodes. Ureters: Normal in course and caliber. No calcifications. Bladder: No wall thickening. Reproductive organs: No pelvic masses. Abdominal Wall: Increased attenuation measuring 4.5 x 4.6 x 8.2 cm within right pelvic soft tissue (series 2, image 89). Bones:  No bone lesions. Lumbar scoliosis convexity to left apex L3. Remote internal fixation L4, and L5. Remote left hip replacement. IMPRESSION: Soft tissue hematoma, right pelvis, measuring 4.5 x 4.6 x 8.2 cm. Splenectomy. No change, exophytic, calcified gastric nodule as discussed. Remote internal fixation L4 and L5. Left hip replacement. All CT scans at this facility use dose modulation, iterative reconstruction, and/or weight based dosing when appropriate to reduce radiation dose to as low as reasonably achievable. XR CHEST (2 VW)    Result Date: 9/17/2021  EXAMINATION: XR CHEST (2 VW), XR WRIST LEFT (MIN 3 VIEWS)  CLINICAL HISTORY:  weight loss COMPARISONS: May 21, 2020  FINDINGS: Two views of the chest are submitted. The cardiac silhouette is of normal size configuration. Pulmonary vascular attenuated Right sided trachea. There is a faint opacity seen within the posterior aspect of the left lower lobe. Its best appreciated on the lateral view. No Pneumothoraces. THERE IS A FAINT SOFT TISSUE OPACITY SEEN ON THE LATERAL VIEW. PLEASE SEE CT SCAN REPORT CHEST SEPTEMBER 17, 2021 FOR ADDITIONAL DETAILS WHICH WAS IDENTIFIED AS MALIGNANCY UNTIL PROVEN OTHERWISE. Sadia Bloodgood RADIOGRAPHIC FINDINGS SUGGESTIVE OF COPD. DR. Aracelis Caputo EMERGENCY ROOM WAS NOTED IMMEDIATELY ABOVE FINDINGS ON SEPTEMBER 17, 2021 AT 2014 HOURS EXAMINATION: XR CHEST (2 VW), XR WRIST LEFT (MIN 3 VIEWS) CLINICAL HISTORY: Pain COMPARISONS: None. FINDINGS: Four views of the left wrist are submitted. There is a comminuted fracture with intra-articular extension of the left distal radius as well as dislocation of the ulnar styloid process. No dislocations. No focal bony abnormalities. Please                             IMPRESSION: COMMINUTED FRACTURE WITH INTRA-ARTICULAR EXTENSION OF THE LEFT DISTAL RADIUS AS WELL AS DISLOCATION OF THE RADIAL STYLOID PROCESS    XR WRIST LEFT (MIN 3 VIEWS)    Result Date: 9/17/2021  EXAMINATION: XR CHEST (2 VW), XR WRIST LEFT (MIN 3 VIEWS)  CLINICAL HISTORY:  weight loss COMPARISONS: May 21, 2020  FINDINGS: Two views of the chest are submitted. The cardiac silhouette is of normal size configuration. Pulmonary vascular attenuated Right sided trachea. There is a faint opacity seen within the posterior aspect of the left lower lobe. Its best appreciated on the lateral view. No Pneumothoraces. THERE IS A FAINT SOFT TISSUE OPACITY SEEN ON THE LATERAL VIEW. PLEASE SEE CT SCAN REPORT CHEST SEPTEMBER 17, 2021 FOR ADDITIONAL DETAILS WHICH WAS IDENTIFIED AS MALIGNANCY UNTIL PROVEN OTHERWISE. Sadia Bloodgood RADIOGRAPHIC FINDINGS SUGGESTIVE OF COPD. DR. Aracelis Caputo EMERGENCY ROOM WAS NOTED IMMEDIATELY ABOVE FINDINGS ON SEPTEMBER 17, 2021 AT 2014 HOURS EXAMINATION: XR CHEST (2 VW), XR WRIST LEFT (MIN 3 VIEWS) CLINICAL HISTORY: Pain COMPARISONS: None.   FINDINGS: Four views of the left wrist are submitted. There is a comminuted fracture with intra-articular extension of the left distal radius as well as dislocation of the ulnar styloid process. No dislocations. No focal bony abnormalities. Please                             IMPRESSION: COMMINUTED FRACTURE WITH INTRA-ARTICULAR EXTENSION OF THE LEFT DISTAL RADIUS AS WELL AS DISLOCATION OF THE RADIAL STYLOID PROCESS    XR HAND RIGHT (MIN 3 VIEWS)    Result Date: 10/12/2021  EXAMINATION: XR HAND RIGHT (MIN 3 VIEWS)  CLINICAL HISTORY:  fall COMPARISONS: AUGUST 18, 2021 1157 HOURS  FINDINGS: Three views of the right hand are submitted. There is diffuse generalized osteopenia. There is a comminuted fracture of the distal phalanx of the right first finger. No dislocations. There are degenerative changes seen at the first carpometacarpal joint space as well as within the region of the radial styloid. COMMINUTED FRACTURE OF THE DISTAL PHALANX OF THE RIGHT FIRST FINGER. XR HIP BILATERAL W AP PELVIS (2 VIEWS)    Result Date: 9/24/2021  EXAMINATION/TECHNIQUE:  XR HIP BILATERAL W AP PELVIS (2 VIEWS) HISTORY:  Bilateral hip pain. COMPARISON: PET/CT 9/23/2021. Abdominal CT 3/23/2020. RESULT: Bony pelvis appears grossly intact without acute fracture. No acute right hip fracture. Left hip arthroplasty. Avascular necrosis of the right femoral head, grossly similar to prior abdominal CT. Linear tract within the right femoral head and neck, likely from prior core decompression with possible small amount of cortical collapse of the right femoral head. Tiny right osteophytes with otherwise maintained joint space. Left hip arthroplasty components appear grossly intact without periprosthetic lucency or fracture. Postsurgical and degenerative changes lumbar spine.  Degenerative changes SI joints and pubic symphysis which otherwise appear intact. Feces throughout the visualized colon. No other significant abnormality. Avascular necrosis right hip with possible small amount of cortical collapse. Left hip arthroplasty without radiographic complication. CT Head WO Contrast    Result Date: 10/11/2021  CT Brain. Contrast medium:  without contrast.. History:  Fall. Loss of consciousness. Invasive squamous cell carcinoma, left lower lobe. Technical factors: CT imaging of the brain was obtained and formatted as 5 mm contiguous axial images. 2.5 mm contiguous axial images were obtained through the osseous structures. Sagittal and coronal reconstruction obtained during postprocessing. Comparison:  CT brain, August 18, 2021. [facility] Findings: Extra-axial spaces:  Normal. Intracranial hemorrhage:  None. Ventricular system: Ventricles mildly enlarged. Sulci mildly prominent. Basal Cisterns:  Normal. Cerebral Parenchyma: Bilateral symmetric periventricular areas decreased attenuation. Midline Shift:  None. Cerebellum:  Normal. Paranasal sinuses and mastoid air cells:  Normal. Visualized Orbits:  Normal.     Impression: Mild cerebral atrophy. Chronic ischemic white matter disease. All CT scans at this facility use dose modulation, iterative reconstruction, and/or weight based dosing when appropriate to reduce radiation dose to as low as reasonably achievable. CT CHEST WO CONTRAST    Result Date: 10/11/2021  CT OF THE CHEST, ABDOMEN, AND PELVIS with intravenous contrast medium. HISTORY: Fall. Loss of consciousness. Receiving anticoagulation. Lower extremity weakness. History squamous cell carcinoma, left lower lobe. TECHNICAL FACTORS: CT imaging of the chest, abdomen, and pelvis, was obtained and formatted as 5 mm contiguous axial images from the thoracic inlet through the symphysis pubis. Sagittal and coronal reconstructions obtained during postprocessing.  Intravenous contrast medium:  100 ml of Isovue-370 Comparison:  CT chest, September 7, 2021, January 14, 2021. PET/CT, September 23, 2021. CT abdomen pelvis, March 23, 2020 CT OF THE CHEST FINDINGS: Right lung: No nodules, masses, consolidation, pleural effusion, pneumothorax. Left lung: Pleural-based, noncalcified, mildly stellate 2.6 x 2.9 cm nodule, left lower lobe, again identified, unchanged. No consolidation, pleural effusion, pneumothorax. Lymph nodes: No hilar, mediastinal, or axillary lymph node enlargement. Thoracic aorta: Normal in course and caliber. Cardiac Size: Normal. Pericardial effusion: None. Musculoskeletal:No osteoblastic, and no osteolytic lesions. Remote fractures left sixth through eighth ribs. 2.6 x 2.9 cm pleural-based left lower lobe nodule, in patient with known clinical history squamous cell carcinoma left lower lobe. No acute fractures. Remote fractures left sixth through eighth ribs CT OF THE ABDOMEN AND PELVIS WITH INTRAVENOUS CONTRAST MEDIUM. FINDINGS: Liver:  Normal in size, shape, and attenuation. Bile Ducts:  Normal in caliber. Gallbladder:  No stones or wall thickening. Pancreas:  Normal without masses, cysts, ductal dilatation or calcification. Spleen:  Surgically absent. Kidneys:  Normal in size. No hydronephrosis, masses, or stones. Mild bilateral perinephric fat stranding. Adrenals:  Normal. Stomach: Exophytic, calcified 1.6 cm nodule, gastric cardia again identified, unchanged. Small bowel:  Normal in caliber. Appendix:  Normal. Colon:  Normal in caliber. Peritoneum:  No ascites, free air, or fluid collections. Vessels: Aorta normal in course and caliber. Lymph nodes:  Retroperitoneal:  No enlarged retroperitoneal lymph nodes. Mesenteric:  No enlarged mesenteric lymph nodes. Pelvic: No enlarged pelvic lymph nodes. Ureters: Normal in course and caliber. No calcifications. Bladder: No wall thickening. Reproductive organs: No pelvic masses. Abdominal Wall: Increased attenuation measuring 4.5 x 4.6 x 8.2 cm within right pelvic soft tissue (series 2, image 89).  Bones:  No achievable. CT THORACIC SPINE WO CONTRAST    Result Date: 10/11/2021  CT thoracic spine without intravenous contrast medium. HISTORY: Fall. Thoracic and parathoracic tenderness. Loss of consciousness. History squamous cell carcinoma left lower lobe. TECHNICAL FACTORS: CT thoracic spine obtained and formatted as 2.5 mm contiguous axial images from skull base to the level of. Sagittal and coronal reconstructions were obtained during postprocessing. No contrast medium was utilized. COMPARISON: CT chest, December 7, 2021. PET/CT, December 23, 2021. FINDINGS: Thoracic vertebral bodies are normal in height and alignment Diffuse mild disc space narrowing. No fractures, dislocations, bone lesions. Limited imaging right and left lung lung zone shows a partially imaged pleural-based noncalcified mildly stellate shaped mass, similar to that found on CT chest from September 7, 2021. Peripherally calcified, exophytic 1.9 cm gastric soft tissue nodule, unchanged. No fracture. Left lower lobe mass. Calcified exophytic gastric mass. All CT scans at this facility use dose modulation, iterative reconstruction, and/or weight based dosing when appropriate to reduce radiation dose to as low as reasonably achievable. .    CT LUMBAR SPINE WO CONTRAST    Result Date: 10/11/2021  CT lumbar spine without intravenous contrast medium. HISTORY: Fall. Back pain. Weakness. History squamous cell carcinoma of lung TECHNICAL FACTORS: CT lumbar spine obtained and formatted as 2.5 mm contiguous axial images from skull base to the level of. Sagittal and coronal reconstructions were obtained during postprocessing. No contrast medium was utilized. COMPARISON: PET/CT, September 23, 2021. CT Lumbar Spine, August 30, 2017. FINDINGS: Remote internal fixation L4 and L5 using posterior bilateral pedicle screws secured bilaterally to vertically oriented rods. Lumbar scoliosis convexity to left apex L3.  Loss of height, L3 and L4 vertebral bodies, unchanged. Small anterior osteophytes lower thoracic and lumbar spine. Disc spaces preserved. No fractures, dislocations, bone lesions. Limited imaging of the abdomen and pelvis without anomaly. No acute fracture. Postsurgical change discussed. All CT scans at this facility use dose modulation, iterative reconstruction, and/or weight based dosing when appropriate to reduce radiation dose to as low as reasonably achievable. CT HUMERUS RIGHT WO CONTRAST    Result Date: 10/12/2021  EXAMINATION:  CT SCAN RIGHT SHOULDER. CLINICAL HISTORY:  Pain, injury, fall COMPARISON:  None TECHNIQUE:  Multiple serial axial images of the right shoulder with both sagittal coronal reconstruction was performed without intravenous or oral administration of contrast. Examination is limited by motion artifact. FINDINGS:  There is a comminuted displaced fracture of the humeral head. No dislocation. There is some degenerative changes both of the [facility] joint and of the glenoid. There is a irregularity seen within the proximal right humerus likely reconstruction artifact from motion. The field-of-view of the lungs show no focal parenchymal abnormality no pleural effusions. No pneumothoraces. Likely old healed right eighth rib fracture. The other \"deformities\" likely represent reconstruction artifact from motion There is diffuse subcutaneous edema/swelling and stranding seen about the shoulder and lateral aspect of the arm. Correlate with patient history and exam.     1. COMMINUTED DISPLACED FRACTURE OF THE RIGHT HUMERAL HEAD. 2. OTHER FINDINGS DETAILED ABOVE All CT scans at this facility use dose modulation, iterative reconstruction, and/or weight based dosing when appropriate to reduce radiation dose to as low as reasonably achievable.     PET CT Skull Base To Mid Thigh    Result Date: 9/23/2021  EXAMINATION:  REGIONAL BODY FDG PET/CT SCAN: (9/23/2021 11:50 AM) HISTORY: 66years old Male with  Malignant ne[facility] of lower [facility] ICD10. INDICATION: Study performed for lung nodule follow-up. TECHNIQUE: F18-FDG administered IV was followed about 60 minutes later by PET imaging from skull vertex to proximal thighs. Free breathing low dose CT was performed without contrast for attenuation correction and anatomic localization. Blood glucose before FDG injection: 86 mg/dL FDG radionuclide dose:   18.1 mCi CT Dose-Length Product (DLP): 624.26 mGy*cm. CT Dose Reduction Employed: Yes COMPARISON: CT chest 9/7/2021 and 1/14/2021, PET/CT 4/8/2021. RESULT: HEAD AND NECK: Physiologic uptake seen in the visualized brain, extraocular muscles, parapharyngeal soft tissues, base of tongue, vocal cords, and salivary glands. No FDG avid cervical lymphadenopathy or mass. No FDG avid thyroid lesion. CHEST: Physiologic uptake in the heart and mediastinum. Lungs and tracheobronchial tree: Note that PET/CT is not sensitive for pulmonary nodules less than 8 mm. There is an FDG avid 32 x 26 mm left lower lobe subpleural mass more prominent compared to the prior PET/CT from April 2021, compatible with recurrent disease. Pleura:  No FDG avid pleural effusion or pleural mass. Mediastinum and Lymph nodes:  No FDG avid mediastinal mass, mediastinal or hilar lymphadenopathy. Heart and great vessels: Physiologic uptake in the heart. Chest wall and axilla: FDG uptake along the left lateral chest wall secondary to  multiple rib fractures. ABDOMEN AND PELVIS: Physiologic uptake seen in the  and GI tracts. Liver: No FDG avid lesion. Biliary: Gallbladder is unremarkable. Spleen: No FDG avid lesion. No splenomegaly. Pancreas: No FDG avid lesion. Adrenals: No FDG avid lesion. Stable left adrenal nodule with peripheral calcification, unchanged since 2020. Kidneys: No stones, hydronephrosis, or FDG avid lesions. GI tract: No dilation or focal suspicious FDG avid lesion. No bowel dilation. Large amount of colonic stool.  Lymph nodes: No FDG avid abdominal or pelvic lymphadenopathy. Mesentery/Peritoneum: No ascites or FDG avid mass. Vasculature:  Vascular patency cannot be assessed due to lack of IV contrast.  Atherosclerotic calcification of the abdominal aorta. Pelvis: No ascites, fluid collection or FDG avid process. BONES AND EXTREMITIES:  No suspicious FDG avid osseous lesion. The imaged portions of the skeleton demonstrates age-related degenerative changes. Photopenia in the lower lumbar region and left hip secondary to postoperative prosthesis . No destructive osseous lesions. ============    1. NECK: *No FDG avid neoplastic process. . 2.  CHEST: *Marked FDG uptake in the left lower lobe more prominent since April 2021, compatible with recurrent malignancy. . 3.  ABDOMEN/PELVIS: *No FDG avid neoplastic process. . 4. EXTREMITIES/SKELETON: *No suspicious FDG avid osseous lesion. Brain MRI shows no metastases      IMPRESSION AND SUGGESTION:  Patient is at risk due to   · Squamous cell carcinoma, left lower lobe, signs of recurrence, need radiation therapy he does not want chemotherapy but he is agreeable to immunotherapy. · COPD, no signs of exacerbation  · Smoking  · Frequent fall      Recommendation  · Continue current inhalers  · Smoking cessation counseling done today.       Electronically signed by Marycruz Hunter MD,  FCCP   on 10/14/2021 at 1:15 PM

## 2021-10-15 VITALS
BODY MASS INDEX: 24.11 KG/M2 | WEIGHT: 150 LBS | TEMPERATURE: 99 F | RESPIRATION RATE: 18 BRPM | DIASTOLIC BLOOD PRESSURE: 83 MMHG | SYSTOLIC BLOOD PRESSURE: 164 MMHG | HEIGHT: 66 IN | OXYGEN SATURATION: 100 % | HEART RATE: 66 BPM

## 2021-10-15 LAB
ALBUMIN SERPL-MCNC: 3.1 G/DL (ref 3.5–4.6)
ALP BLD-CCNC: 87 U/L (ref 35–104)
ALT SERPL-CCNC: 16 U/L (ref 0–41)
ANION GAP SERPL CALCULATED.3IONS-SCNC: 13 MEQ/L (ref 9–15)
AST SERPL-CCNC: 19 U/L (ref 0–40)
BASOPHILS ABSOLUTE: 0.1 K/UL (ref 0–0.2)
BASOPHILS RELATIVE PERCENT: 1.3 %
BILIRUB SERPL-MCNC: 0.6 MG/DL (ref 0.2–0.7)
BUN BLDV-MCNC: 10 MG/DL (ref 8–23)
CALCIUM SERPL-MCNC: 8.3 MG/DL (ref 8.5–9.9)
CHLORIDE BLD-SCNC: 104 MEQ/L (ref 95–107)
CO2: 19 MEQ/L (ref 20–31)
CREAT SERPL-MCNC: 0.72 MG/DL (ref 0.7–1.2)
EOSINOPHILS ABSOLUTE: 0.1 K/UL (ref 0–0.7)
EOSINOPHILS RELATIVE PERCENT: 1.1 %
GFR AFRICAN AMERICAN: >60
GFR NON-AFRICAN AMERICAN: >60
GLOBULIN: 2.3 G/DL (ref 2.3–3.5)
GLUCOSE BLD-MCNC: 92 MG/DL (ref 70–99)
HCT VFR BLD CALC: 24 % (ref 42–52)
HEMOGLOBIN: 8.2 G/DL (ref 14–18)
LYMPHOCYTES ABSOLUTE: 1.1 K/UL (ref 1–4.8)
LYMPHOCYTES RELATIVE PERCENT: 9.7 %
MAGNESIUM: 1.6 MG/DL (ref 1.7–2.4)
MCH RBC QN AUTO: 31.7 PG (ref 27–31.3)
MCHC RBC AUTO-ENTMCNC: 34 % (ref 33–37)
MCV RBC AUTO: 93.3 FL (ref 80–100)
MONOCYTES ABSOLUTE: 0.8 K/UL (ref 0.2–0.8)
MONOCYTES RELATIVE PERCENT: 7.3 %
NEUTROPHILS ABSOLUTE: 9.3 K/UL (ref 1.4–6.5)
NEUTROPHILS RELATIVE PERCENT: 80.6 %
PDW BLD-RTO: 14.9 % (ref 11.5–14.5)
PLATELET # BLD: 334 K/UL (ref 130–400)
POTASSIUM REFLEX MAGNESIUM: 4.2 MEQ/L (ref 3.4–4.9)
RBC # BLD: 2.58 M/UL (ref 4.7–6.1)
SARS-COV-2, NAAT: NOT DETECTED
SODIUM BLD-SCNC: 136 MEQ/L (ref 135–144)
TOTAL PROTEIN: 5.4 G/DL (ref 6.3–8)
WBC # BLD: 11.5 K/UL (ref 4.8–10.8)

## 2021-10-15 PROCEDURE — 99233 SBSQ HOSP IP/OBS HIGH 50: CPT | Performed by: PSYCHIATRY & NEUROLOGY

## 2021-10-15 PROCEDURE — 2580000003 HC RX 258: Performed by: INTERNAL MEDICINE

## 2021-10-15 PROCEDURE — 6370000000 HC RX 637 (ALT 250 FOR IP): Performed by: ANESTHESIOLOGY

## 2021-10-15 PROCEDURE — 6370000000 HC RX 637 (ALT 250 FOR IP): Performed by: STUDENT IN AN ORGANIZED HEALTH CARE EDUCATION/TRAINING PROGRAM

## 2021-10-15 PROCEDURE — 6370000000 HC RX 637 (ALT 250 FOR IP): Performed by: INTERNAL MEDICINE

## 2021-10-15 PROCEDURE — 94761 N-INVAS EAR/PLS OXIMETRY MLT: CPT

## 2021-10-15 PROCEDURE — 36415 COLL VENOUS BLD VENIPUNCTURE: CPT

## 2021-10-15 PROCEDURE — 99232 SBSQ HOSP IP/OBS MODERATE 35: CPT | Performed by: INTERNAL MEDICINE

## 2021-10-15 PROCEDURE — 2580000003 HC RX 258: Performed by: NURSE PRACTITIONER

## 2021-10-15 PROCEDURE — 99233 SBSQ HOSP IP/OBS HIGH 50: CPT | Performed by: FAMILY MEDICINE

## 2021-10-15 PROCEDURE — 6360000002 HC RX W HCPCS: Performed by: INTERNAL MEDICINE

## 2021-10-15 PROCEDURE — 85025 COMPLETE CBC W/AUTO DIFF WBC: CPT

## 2021-10-15 PROCEDURE — 80053 COMPREHEN METABOLIC PANEL: CPT

## 2021-10-15 PROCEDURE — 83735 ASSAY OF MAGNESIUM: CPT

## 2021-10-15 PROCEDURE — 87635 SARS-COV-2 COVID-19 AMP PRB: CPT

## 2021-10-15 PROCEDURE — 94640 AIRWAY INHALATION TREATMENT: CPT

## 2021-10-15 PROCEDURE — APPSS45 APP SPLIT SHARED TIME 31-45 MINUTES: Performed by: STUDENT IN AN ORGANIZED HEALTH CARE EDUCATION/TRAINING PROGRAM

## 2021-10-15 PROCEDURE — 97535 SELF CARE MNGMENT TRAINING: CPT

## 2021-10-15 PROCEDURE — 97116 GAIT TRAINING THERAPY: CPT

## 2021-10-15 RX ORDER — LANOLIN ALCOHOL/MO/W.PET/CERES
800 CREAM (GRAM) TOPICAL ONCE
Status: COMPLETED | OUTPATIENT
Start: 2021-10-15 | End: 2021-10-15

## 2021-10-15 RX ORDER — LIDOCAINE 4 G/G
3 PATCH TOPICAL DAILY
Qty: 1 BOX | Refills: 1 | Status: SHIPPED | OUTPATIENT
Start: 2021-10-16 | End: 2022-07-12

## 2021-10-15 RX ORDER — OXYCODONE HYDROCHLORIDE AND ACETAMINOPHEN 5; 325 MG/1; MG/1
1 TABLET ORAL EVERY 6 HOURS PRN
Status: DISCONTINUED | OUTPATIENT
Start: 2021-10-15 | End: 2021-10-15 | Stop reason: HOSPADM

## 2021-10-15 RX ORDER — TIZANIDINE 4 MG/1
2 TABLET ORAL EVERY 6 HOURS PRN
Qty: 30 TABLET | Refills: 0 | Status: SHIPPED | OUTPATIENT
Start: 2021-10-15 | End: 2022-07-12

## 2021-10-15 RX ORDER — DIAPER,BRIEF,INFANT-TODD,DISP
EACH MISCELLANEOUS
Qty: 30 G | Refills: 1 | Status: SHIPPED | OUTPATIENT
Start: 2021-10-15 | End: 2021-10-25

## 2021-10-15 RX ORDER — FOLIC ACID 1 MG/1
1 TABLET ORAL DAILY
Qty: 30 TABLET | Refills: 3 | Status: SHIPPED | OUTPATIENT
Start: 2021-10-16 | End: 2022-07-12

## 2021-10-15 RX ORDER — LANOLIN ALCOHOL/MO/W.PET/CERES
100 CREAM (GRAM) TOPICAL DAILY
Qty: 30 TABLET | Refills: 3 | Status: SHIPPED | OUTPATIENT
Start: 2021-10-15 | End: 2022-07-12

## 2021-10-15 RX ORDER — GABAPENTIN 100 MG/1
100 CAPSULE ORAL 3 TIMES DAILY
Qty: 90 CAPSULE | Refills: 0 | Status: SHIPPED | OUTPATIENT
Start: 2021-10-15 | End: 2022-03-24

## 2021-10-15 RX ORDER — OXYCODONE AND ACETAMINOPHEN 7.5; 325 MG/1; MG/1
1 TABLET ORAL EVERY 6 HOURS PRN
Qty: 12 TABLET | Refills: 0 | Status: SHIPPED | OUTPATIENT
Start: 2021-10-15 | End: 2021-10-18

## 2021-10-15 RX ADMIN — FINASTERIDE 5 MG: 5 TABLET, FILM COATED ORAL at 09:23

## 2021-10-15 RX ADMIN — GABAPENTIN 100 MG: 100 CAPSULE ORAL at 14:43

## 2021-10-15 RX ADMIN — BUDESONIDE AND FORMOTEROL FUMARATE DIHYDRATE 2 PUFF: 160; 4.5 AEROSOL RESPIRATORY (INHALATION) at 07:25

## 2021-10-15 RX ADMIN — CEFTRIAXONE SODIUM 1000 MG: 1 INJECTION, POWDER, FOR SOLUTION INTRAMUSCULAR; INTRAVENOUS at 12:13

## 2021-10-15 RX ADMIN — GABAPENTIN 100 MG: 100 CAPSULE ORAL at 09:23

## 2021-10-15 RX ADMIN — TIOTROPIUM BROMIDE INHALATION SPRAY 2 PUFF: 3.12 SPRAY, METERED RESPIRATORY (INHALATION) at 07:25

## 2021-10-15 RX ADMIN — Medication 100 MG: at 14:43

## 2021-10-15 RX ADMIN — Medication 100 MG: at 09:23

## 2021-10-15 RX ADMIN — Medication 800 MG: at 14:43

## 2021-10-15 RX ADMIN — FOLIC ACID 1 MG: 1 TABLET ORAL at 09:22

## 2021-10-15 RX ADMIN — METOPROLOL TARTRATE 25 MG: 25 TABLET, FILM COATED ORAL at 09:22

## 2021-10-15 RX ADMIN — ACETAMINOPHEN 650 MG: 325 TABLET ORAL at 12:14

## 2021-10-15 RX ADMIN — CITALOPRAM HYDROBROMIDE 40 MG: 10 TABLET ORAL at 09:23

## 2021-10-15 RX ADMIN — ACETAMINOPHEN 650 MG: 325 TABLET ORAL at 09:23

## 2021-10-15 RX ADMIN — SODIUM CHLORIDE: 9 INJECTION, SOLUTION INTRAVENOUS at 09:21

## 2021-10-15 RX ADMIN — PANTOPRAZOLE SODIUM 40 MG: 40 TABLET, DELAYED RELEASE ORAL at 06:19

## 2021-10-15 ASSESSMENT — ENCOUNTER SYMPTOMS
CONSTIPATION: 0
VOMITING: 0
ABDOMINAL DISTENTION: 0
BACK PAIN: 1
COUGH: 0
NAUSEA: 0
SHORTNESS OF BREATH: 0
RESPIRATORY NEGATIVE: 1
WHEEZING: 0
DIARRHEA: 0
EYES NEGATIVE: 1

## 2021-10-15 ASSESSMENT — PAIN SCALES - GENERAL
PAINLEVEL_OUTOF10: 0
PAINLEVEL_OUTOF10: 4
PAINLEVEL_OUTOF10: 5

## 2021-10-15 NOTE — PROGRESS NOTES
INPATIENT PROGRESS NOTES    PATIENT NAME: Alfredo Geiger  MRN: [id number]  SERVICE DATE:  October 15, 2021   SERVICE TIME:  1:56 PM      PRIMARY SERVICE: Pulmonary Disease    CHIEF COMPLAINTS: Squamous cell carcinoma of the lung    INTERVAL HPI: Patient seen and examined at bedside, Interval Notes, orders reviewed. Nursing notes noted    Doing good has no complaint, no chest pain, no shortness of breath, currently on room air saturation 90%, no nausea no vomiting, no abdominal pain. Review of system:     GI Abdominal pain No  Skin Rash No    Social History     Tobacco Use    Smoking status: Current Every Day Smoker     Packs/day: 0.50     Years: 60.00     Pack years: 30.00     Types: Cigarettes    Smokeless tobacco: Never Used    Tobacco comment: also smokes cigars   Substance Use Topics    Alcohol use: Not Currently     Alcohol/week: 12.0 standard drinks     Types: 12 Shots of liquor per week     Comment: quit drinking aug 18th, 2021         Problem Relation Age of Onset    Osteoarthritis Mother     Emphysema Mother     Hypertension Father     Hearing Loss Father     Dementia Father     No Known Problems Sister     Prostate Cancer Brother     Colon Polyps Neg Hx          OBJECTIVE    Body mass index is 24.21 kg/m². PHYSICAL EXAM:  Vitals:  BP (!) 164/83   Pulse 66   Temp 99 °F (37.2 °C)   Resp 18   Ht 5' 6\" (1.676 m)   Wt 150 lb (68 kg)   SpO2 100%   BMI 24.21 kg/m²     General: alert, cooperative, no distress  Head: normocephalic, atraumatic  Eyes:No gross abnormalities. ENT:  MMM no lesions  Neck:  supple and no masses  Chest : Good air movement, no wheezing, no rales, nontender, tympanic  Heart[de-identified] Heart sounds are normal.  Regular rate and rhythm without murmur, gallop or rub. ABD:  symmetric, soft, non-tender, no guarding or rebound  Musculoskeletal : no cyanosis, no clubbing and no edema  Neuro:  Grossly normal  Skin: No rashes or nodules noted.   Lymph node:  no cervical nodes  Urology: No Ortega   Psychiatric: appropriate    DATA:   Recent Labs     10/14/21  0728 10/15/21  1022   WBC 12.4* 11.5*   HGB 7.6* 8.2*   HCT 22.4* 24.0*   MCV 91.9 93.3    334     Recent Labs     10/14/21  0728 10/14/21  0728 10/14/21  1034 10/14/21  1034 10/15/21  1022   *   < > 133*  --  136   K 4.5  --  4.6  --  4.2      < > 104  --  104   CO2 19*   < > 20  --  19*   BUN 11   < > 11  --  10   CREATININE 0.75   < > 0.71  --  0.72   GLUCOSE 85   < > 88  --  92   CALCIUM 8.4*   < > 8.4*  --  8.3*   PROT 5.2*  --   --   --  5.4*   LABALBU 3.0*  --   --   --  3.1*   BILITOT 0.8*  --   --   --  0.6   ALKPHOS 78  --   --   --  87   AST 23  --   --   --  19   ALT 15  --   --    < > 16   LABGLOM >60.0   < > >60.0   < > >60.0   GFRAA >60.0   < > >60.0   < > >60.0   GLOB 2.2*  --   --    < > 2.3    < > = values in this interval not displayed. MV Settings:          No results for input(s): PHART, BZR7ZKW, PO2ART, QVP8OLW, BEART, H1PIYKCA in the last 72 hours. O2 Device: None (Room air)    ADULT DIET;  Regular     MEDICATIONS during current hospitalization:    Continuous Infusions:   sodium chloride      sodium chloride      sodium chloride         Scheduled Meds:   magnesium oxide  800 mg Oral Once    sodium chloride flush  5-40 mL IntraVENous 2 times per day    folic acid  1 mg Oral Daily    thiamine  100 mg Oral TID    gabapentin  100 mg Oral TID    pill splitter   Does not apply Once    acetaminophen  650 mg Oral 4x Daily WC    lidocaine  3 patch TransDERmal Daily    sodium chloride flush  5-40 mL IntraVENous 2 times per day    [Held by provider] enoxaparin  40 mg SubCUTAneous Daily    cefTRIAXone (ROCEPHIN) IV  1,000 mg IntraVENous Q24H    atorvastatin  20 mg Oral Nightly    citalopram  40 mg Oral Daily    finasteride  5 mg Oral Daily    budesonide-formoterol  2 puff Inhalation BID    metoprolol tartrate  25 mg Oral BID    pantoprazole  40 mg Oral QAM AC    tiotropium  2 puff Inhalation Daily    bacitracin zinc   Topical BID       PRN Meds:oxyCODONE-acetaminophen, sodium chloride flush, sodium chloride, sodium chloride, metaxalone, haloperidol, sodium chloride flush, sodium chloride, ondansetron **OR** ondansetron, polyethylene glycol, albuterol sulfate HFA    Radiology  CT ABDOMEN PELVIS WO CONTRAST Additional Contrast? None    Result Date: 10/11/2021  CT OF THE CHEST, ABDOMEN, AND PELVIS with intravenous contrast medium. HISTORY: Fall. Loss of consciousness. Receiving anticoagulation. Lower extremity weakness. History squamous cell carcinoma, left lower lobe. TECHNICAL FACTORS: CT imaging of the chest, abdomen, and pelvis, was obtained and formatted as 5 mm contiguous axial images from the thoracic inlet through the symphysis pubis. Sagittal and coronal reconstructions obtained during postprocessing. Intravenous contrast medium:  100 ml of Isovue-370 Comparison:  CT chest, September 7, 2021, January 14, 2021. PET/CT, September 23, 2021. CT abdomen pelvis, March 23, 2020 CT OF THE CHEST FINDINGS: Right lung: No nodules, masses, consolidation, pleural effusion, pneumothorax. Left lung: Pleural-based, noncalcified, mildly stellate 2.6 x 2.9 cm nodule, left lower lobe, again identified, unchanged. No consolidation, pleural effusion, pneumothorax. Lymph nodes: No hilar, mediastinal, or axillary lymph node enlargement. Thoracic aorta: Normal in course and caliber. Cardiac Size: Normal. Pericardial effusion: None. Musculoskeletal:No osteoblastic, and no osteolytic lesions. Remote fractures left sixth through eighth ribs. 2.6 x 2.9 cm pleural-based left lower lobe nodule, in patient with known clinical history squamous cell carcinoma left lower lobe. No acute fractures. Remote fractures left sixth through eighth ribs CT OF THE ABDOMEN AND PELVIS WITH INTRAVENOUS CONTRAST MEDIUM. FINDINGS: Liver:  Normal in size, shape, and attenuation. Bile Ducts:  Normal in caliber.  Gallbladder:  No stones or wall thickening. Pancreas:  Normal without masses, cysts, ductal dilatation or calcification. Spleen:  Surgically absent. Kidneys:  Normal in size. No hydronephrosis, masses, or stones. Mild bilateral perinephric fat stranding. Adrenals:  Normal. Stomach: Exophytic, calcified 1.6 cm nodule, gastric cardia again identified, unchanged. Small bowel:  Normal in caliber. Appendix:  Normal. Colon:  Normal in caliber. Peritoneum:  No ascites, free air, or fluid collections. Vessels: Aorta normal in course and caliber. Lymph nodes:  Retroperitoneal:  No enlarged retroperitoneal lymph nodes. Mesenteric:  No enlarged mesenteric lymph nodes. Pelvic: No enlarged pelvic lymph nodes. Ureters: Normal in course and caliber. No calcifications. Bladder: No wall thickening. Reproductive organs: No pelvic masses. Abdominal Wall: Increased attenuation measuring 4.5 x 4.6 x 8.2 cm within right pelvic soft tissue (series 2, image 89). Bones:  No bone lesions. Lumbar scoliosis convexity to left apex L3. Remote internal fixation L4, and L5. Remote left hip replacement. IMPRESSION: Soft tissue hematoma, right pelvis, measuring 4.5 x 4.6 x 8.2 cm. Splenectomy. No change, exophytic, calcified gastric nodule as discussed. Remote internal fixation L4 and L5. Left hip replacement. All CT scans at this facility use dose modulation, iterative reconstruction, and/or weight based dosing when appropriate to reduce radiation dose to as low as reasonably achievable. XR CHEST (2 VW)    Result Date: 9/17/2021  EXAMINATION: XR CHEST (2 VW), XR WRIST LEFT (MIN 3 VIEWS)  CLINICAL HISTORY:  weight loss COMPARISONS: May 21, 2020  FINDINGS: Two views of the chest are submitted. The cardiac silhouette is of normal size configuration. Pulmonary vascular attenuated Right sided trachea. There is a faint opacity seen within the posterior aspect of the left lower lobe. Its best appreciated on the lateral view. No Pneumothoraces. THERE IS A FAINT SOFT TISSUE OPACITY SEEN ON THE LATERAL VIEW. PLEASE SEE CT SCAN REPORT CHEST SEPTEMBER 17, 2021 FOR ADDITIONAL DETAILS WHICH WAS IDENTIFIED AS MALIGNANCY UNTIL PROVEN OTHERWISE. Seda Crumble RADIOGRAPHIC FINDINGS SUGGESTIVE OF COPD. DR. Mona Dunaway EMERGENCY ROOM WAS NOTED IMMEDIATELY ABOVE FINDINGS ON SEPTEMBER 17, 2021 AT 2014 HOURS EXAMINATION: XR CHEST (2 VW), XR WRIST LEFT (MIN 3 VIEWS) CLINICAL HISTORY: Pain COMPARISONS: None. FINDINGS: Four views of the left wrist are submitted. There is a comminuted fracture with intra-articular extension of the left distal radius as well as dislocation of the ulnar styloid process. No dislocations. No focal bony abnormalities. Please                             IMPRESSION: COMMINUTED FRACTURE WITH INTRA-ARTICULAR EXTENSION OF THE LEFT DISTAL RADIUS AS WELL AS DISLOCATION OF THE RADIAL STYLOID PROCESS    XR WRIST LEFT (MIN 3 VIEWS)    Result Date: 9/17/2021  EXAMINATION: XR CHEST (2 VW), XR WRIST LEFT (MIN 3 VIEWS)  CLINICAL HISTORY:  weight loss COMPARISONS: May 21, 2020  FINDINGS: Two views of the chest are submitted. The cardiac silhouette is of normal size configuration. Pulmonary vascular attenuated Right sided trachea. There is a faint opacity seen within the posterior aspect of the left lower lobe. Its best appreciated on the lateral view. No Pneumothoraces. THERE IS A FAINT SOFT TISSUE OPACITY SEEN ON THE LATERAL VIEW. PLEASE SEE CT SCAN REPORT CHEST SEPTEMBER 17, 2021 FOR ADDITIONAL DETAILS WHICH WAS IDENTIFIED AS MALIGNANCY UNTIL PROVEN OTHERWISE. Fowler Crumble RADIOGRAPHIC FINDINGS SUGGESTIVE OF COPD.  DR. Mona Dunaway EMERGENCY ROOM WAS NOTED IMMEDIATELY ABOVE FINDINGS ON SEPTEMBER 17, 2021 AT 2014 HOURS EXAMINATION: XR CHEST (2 VW), XR WRIST LEFT (MIN 3 VIEWS) CLINICAL HISTORY: Pain COMPARISONS: None. FINDINGS: Four views of the left wrist are submitted. There is a comminuted fracture with intra-articular extension of the left distal radius as well as dislocation of the ulnar styloid process. No dislocations. No focal bony abnormalities. Please                             IMPRESSION: COMMINUTED FRACTURE WITH INTRA-ARTICULAR EXTENSION OF THE LEFT DISTAL RADIUS AS WELL AS DISLOCATION OF THE RADIAL STYLOID PROCESS    XR HAND RIGHT (MIN 3 VIEWS)    Result Date: 10/12/2021  EXAMINATION: XR HAND RIGHT (MIN 3 VIEWS)  CLINICAL HISTORY:  fall COMPARISONS: AUGUST 18, 2021 1157 HOURS  FINDINGS: Three views of the right hand are submitted. There is diffuse generalized osteopenia. There is a comminuted fracture of the distal phalanx of the right first finger. No dislocations. There are degenerative changes seen at the first carpometacarpal joint space as well as within the region of the radial styloid. COMMINUTED FRACTURE OF THE DISTAL PHALANX OF THE RIGHT FIRST FINGER. XR HIP BILATERAL W AP PELVIS (2 VIEWS)    Result Date: 9/24/2021  EXAMINATION/TECHNIQUE:  XR HIP BILATERAL W AP PELVIS (2 VIEWS) HISTORY:  Bilateral hip pain. COMPARISON: PET/CT 9/23/2021. Abdominal CT 3/23/2020. RESULT: Bony pelvis appears grossly intact without acute fracture. No acute right hip fracture. Left hip arthroplasty. Avascular necrosis of the right femoral head, grossly similar to prior abdominal CT. Linear tract within the right femoral head and neck, likely from prior core decompression with possible small amount of cortical collapse of the right femoral head. Tiny right osteophytes with otherwise maintained joint space. Left hip arthroplasty components appear grossly intact without periprosthetic lucency or fracture. Postsurgical and degenerative changes lumbar spine.  Degenerative changes SI joints and pubic symphysis which otherwise appear intact. Feces throughout the visualized colon. No other significant abnormality. Avascular necrosis right hip with possible small amount of cortical collapse. Left hip arthroplasty without radiographic complication. CT Head WO Contrast    Result Date: 10/11/2021  CT Brain. Contrast medium:  without contrast.. History:  Fall. Loss of consciousness. Invasive squamous cell carcinoma, left lower lobe. Technical factors: CT imaging of the brain was obtained and formatted as 5 mm contiguous axial images. 2.5 mm contiguous axial images were obtained through the osseous structures. Sagittal and coronal reconstruction obtained during postprocessing. Comparison:  CT brain, August 18, 2021. Suman Gutter Findings: Extra-axial spaces:  Normal. Intracranial hemorrhage:  None. Ventricular system: Ventricles mildly enlarged. Sulci mildly prominent. Basal Cisterns:  Normal. Cerebral Parenchyma: Bilateral symmetric periventricular areas decreased attenuation. Midline Shift:  None. Cerebellum:  Normal. Paranasal sinuses and mastoid air cells:  Normal. Visualized Orbits:  Normal.     Impression: Mild cerebral atrophy. Chronic ischemic white matter disease. All CT scans at this facility use dose modulation, iterative reconstruction, and/or weight based dosing when appropriate to reduce radiation dose to as low as reasonably achievable. CT CHEST WO CONTRAST    Result Date: 10/11/2021  CT OF THE CHEST, ABDOMEN, AND PELVIS with intravenous contrast medium. HISTORY: Fall. Loss of consciousness. Receiving anticoagulation. Lower extremity weakness. History squamous cell carcinoma, left lower lobe. TECHNICAL FACTORS: CT imaging of the chest, abdomen, and pelvis, was obtained and formatted as 5 mm contiguous axial images from the thoracic inlet through the symphysis pubis. Sagittal and coronal reconstructions obtained during postprocessing.  Intravenous contrast medium:  100 ml of Isovue-370 Comparison:  CT chest, September 7, 2021, January 14, 2021. PET/CT, September 23, 2021. CT abdomen pelvis, March 23, 2020 CT OF THE CHEST FINDINGS: Right lung: No nodules, masses, consolidation, pleural effusion, pneumothorax. Left lung: Pleural-based, noncalcified, mildly stellate 2.6 x 2.9 cm nodule, left lower lobe, again identified, unchanged. No consolidation, pleural effusion, pneumothorax. Lymph nodes: No hilar, mediastinal, or axillary lymph node enlargement. Thoracic aorta: Normal in course and caliber. Cardiac Size: Normal. Pericardial effusion: None. Musculoskeletal:No osteoblastic, and no osteolytic lesions. Remote fractures left sixth through eighth ribs. 2.6 x 2.9 cm pleural-based left lower lobe nodule, in patient with known clinical history squamous cell carcinoma left lower lobe. No acute fractures. Remote fractures left sixth through eighth ribs CT OF THE ABDOMEN AND PELVIS WITH INTRAVENOUS CONTRAST MEDIUM. FINDINGS: Liver:  Normal in size, shape, and attenuation. Bile Ducts:  Normal in caliber. Gallbladder:  No stones or wall thickening. Pancreas:  Normal without masses, cysts, ductal dilatation or calcification. Spleen:  Surgically absent. Kidneys:  Normal in size. No hydronephrosis, masses, or stones. Mild bilateral perinephric fat stranding. Adrenals:  Normal. Stomach: Exophytic, calcified 1.6 cm nodule, gastric cardia again identified, unchanged. Small bowel:  Normal in caliber. Appendix:  Normal. Colon:  Normal in caliber. Peritoneum:  No ascites, free air, or fluid collections. Vessels: Aorta normal in course and caliber. Lymph nodes:  Retroperitoneal:  No enlarged retroperitoneal lymph nodes. Mesenteric:  No enlarged mesenteric lymph nodes. Pelvic: No enlarged pelvic lymph nodes. Ureters: Normal in course and caliber. No calcifications. Bladder: No wall thickening. Reproductive organs: No pelvic masses.  Abdominal Wall: Increased attenuation measuring 4.5 x 4.6 x 8.2 cm within right appropriate to reduce radiation dose to as low as reasonably achievable. CT THORACIC SPINE WO CONTRAST    Result Date: 10/11/2021  CT thoracic spine without intravenous contrast medium. HISTORY: Fall. Thoracic and parathoracic tenderness. Loss of consciousness. History squamous cell carcinoma left lower lobe. TECHNICAL FACTORS: CT thoracic spine obtained and formatted as 2.5 mm contiguous axial images from skull base to the level of. Sagittal and coronal reconstructions were obtained during postprocessing. No contrast medium was utilized. COMPARISON: CT chest, December 7, 2021. PET/CT, December 23, 2021. FINDINGS: Thoracic vertebral bodies are normal in height and alignment Diffuse mild disc space narrowing. No fractures, dislocations, bone lesions. Limited imaging right and left lung lung zone shows a partially imaged pleural-based noncalcified mildly stellate shaped mass, similar to that found on CT chest from September 7, 2021. Peripherally calcified, exophytic 1.9 cm gastric soft tissue nodule, unchanged. No fracture. Left lower lobe mass. Calcified exophytic gastric mass. All CT scans at this facility use dose modulation, iterative reconstruction, and/or weight based dosing when appropriate to reduce radiation dose to as low as reasonably achievable. .    CT LUMBAR SPINE WO CONTRAST    Result Date: 10/11/2021  CT lumbar spine without intravenous contrast medium. HISTORY: Fall. Back pain. Weakness. History squamous cell carcinoma of lung TECHNICAL FACTORS: CT lumbar spine obtained and formatted as 2.5 mm contiguous axial images from skull base to the level of. Sagittal and coronal reconstructions were obtained during postprocessing. No contrast medium was utilized. COMPARISON: PET/CT, September 23, 2021. CT Lumbar Spine, August 30, 2017. FINDINGS: Remote internal fixation L4 and L5 using posterior bilateral pedicle screws secured bilaterally to vertically oriented rods.  Lumbar scoliosis convexity to left apex L3. Loss of height, L3 and L4 vertebral bodies, unchanged. Small anterior osteophytes lower thoracic and lumbar spine. Disc spaces preserved. No fractures, dislocations, bone lesions. Limited imaging of the abdomen and pelvis without anomaly. No acute fracture. Postsurgical change discussed. All CT scans at this facility use dose modulation, iterative reconstruction, and/or weight based dosing when appropriate to reduce radiation dose to as low as reasonably achievable. CT HUMERUS RIGHT WO CONTRAST    Result Date: 10/12/2021  EXAMINATION:  CT SCAN RIGHT SHOULDER. CLINICAL HISTORY:  Pain, injury, fall COMPARISON:  None TECHNIQUE:  Multiple serial axial images of the right shoulder with both sagittal coronal reconstruction was performed without intravenous or oral administration of contrast. Examination is limited by motion artifact. FINDINGS:  There is a comminuted displaced fracture of the humeral head. No dislocation. There is some degenerative changes both of the [facility] joint and of the glenoid. There is a irregularity seen within the proximal right humerus likely reconstruction artifact from motion. The field-of-view of the lungs show no focal parenchymal abnormality no pleural effusions. No pneumothoraces. Likely old healed right eighth rib fracture. The other \"deformities\" likely represent reconstruction artifact from motion There is diffuse subcutaneous edema/swelling and stranding seen about the shoulder and lateral aspect of the arm. Correlate with patient history and exam.     1. COMMINUTED DISPLACED FRACTURE OF THE RIGHT HUMERAL HEAD. 2. OTHER FINDINGS DETAILED ABOVE All CT scans at this facility use dose modulation, iterative reconstruction, and/or weight based dosing when appropriate to reduce radiation dose to as low as reasonably achievable.     PET CT Skull Base To Mid Thigh    Result Date: 9/23/2021  EXAMINATION:  REGIONAL BODY FDG PET/CT SCAN: (9/23/2021 11:50 AM) HISTORY: 66years old Male with  Malignant neoplasm of lower lobe of left lung ([address] ICD10. INDICATION: Study performed for lung nodule follow-up. TECHNIQUE: F18-FDG administered IV was followed about 60 minutes later by PET imaging from skull vertex to proximal thighs. Free breathing low dose CT was performed without contrast for attenuation correction and anatomic localization. Blood glucose before FDG injection: 86 mg/dL FDG radionuclide dose:   18.1 mCi CT Dose-Length Product (DLP): 624.26 mGy*cm. CT Dose Reduction Employed: Yes COMPARISON: CT chest 9/7/2021 and 1/14/2021, PET/CT 4/8/2021. RESULT: HEAD AND NECK: Physiologic uptake seen in the visualized brain, extraocular muscles, parapharyngeal soft tissues, base of tongue, vocal cords, and salivary glands. No FDG avid cervical lymphadenopathy or mass. No FDG avid thyroid lesion. CHEST: Physiologic uptake in the heart and mediastinum. Lungs and tracheobronchial tree: Note that PET/CT is not sensitive for pulmonary nodules less than 8 mm. There is an FDG avid 32 x 26 mm left lower lobe subpleural mass more prominent compared to the prior PET/CT from April 2021, compatible with recurrent disease. Pleura:  No FDG avid pleural effusion or pleural mass. Mediastinum and Lymph nodes:  No FDG avid mediastinal mass, mediastinal or hilar lymphadenopathy. Heart and great vessels: Physiologic uptake in the heart. Chest wall and axilla: FDG uptake along the left lateral chest wall secondary to  multiple rib fractures. ABDOMEN AND PELVIS: Physiologic uptake seen in the  and GI tracts. Liver: No FDG avid lesion. Biliary: Gallbladder is unremarkable. Spleen: No FDG avid lesion. No splenomegaly. Pancreas: No FDG avid lesion. Adrenals: No FDG avid lesion. Stable left adrenal nodule with peripheral calcification, unchanged since 2020. Kidneys: No stones, hydronephrosis, or FDG avid lesions. GI tract: No dilation or focal suspicious FDG avid lesion. No bowel dilation. Large amount of colonic stool. Lymph nodes: No FDG avid abdominal or pelvic lymphadenopathy. Mesentery/Peritoneum: No ascites or FDG avid mass. Vasculature:  Vascular patency cannot be assessed due to lack of IV contrast.  Atherosclerotic calcification of the abdominal aorta. Pelvis: No ascites, fluid collection or FDG avid process. BONES AND EXTREMITIES:  No suspicious FDG avid osseous lesion. The imaged portions of the skeleton demonstrates age-related degenerative changes. Photopenia in the lower lumbar region and left hip secondary to postoperative prosthesis . No destructive osseous lesions. ============    1. NECK: *No FDG avid neoplastic process. . 2.  CHEST: *Marked FDG uptake in the left lower lobe more prominent since April 2021, compatible with recurrent malignancy. . 3.  ABDOMEN/PELVIS: *No FDG avid neoplastic process. . 4. EXTREMITIES/SKELETON: *No suspicious FDG avid osseous lesion. Brain MRI shows no metastases      IMPRESSION AND SUGGESTION:  Patient is at risk due to   · Squamous cell carcinoma, left lower lobe, signs of recurrence, need radiation therapy he does not want chemotherapy but he is agreeable to immunotherapy.   · COPD, no signs of exacerbation  · Smoking  · Frequent fall      Recommendation  · Continue current inhalers  · Smoking cessation strongly recommended  · Follow-up with me scheduled on November 2    Pulmonary will sign off, please call for any question or concern      Electronically signed by Radha Garcia MD,  FCCP   on 10/15/2021 at 1:56 PM

## 2021-10-15 NOTE — PROGRESS NOTES
Nephrology Progress Note    Assessment:  Mild hyponatremia  Memory improved  Sq cell cancer lung  OHDX CAD  Osteo left ankle  OHDx CAD  Right humeral fracture      Plan:  No changes need restrict fluids    Patient Active Problem List:     Tobacco use     Hip pain     Knee pain     Chronic back pain     Elevated PSA     Primary osteoarthritis of right knee     Spondylosis of lumbar region without myelopathy or radiculopathy     Sacroiliitis, not elsewhere classified ([facility] [address]     Pure hypercholesterolemia     Charcot's joint of left ankle     Left ankle pain     Delirium due to general medical condition     DDD (degenerative disc disease), lumbar     Osteoarthritis of spine with myelopathy, lumbosacral region     Chronic bilateral low back pain with bilateral sciatica     Postlaminectomy syndrome, lumbar region     Acquired spondylolisthesis of lumbosacral region     Spinal stenosis of lumbar region with neurogenic claudication     HNP (herniated nucleus pulposus), lumbar     Spondylolisthesis at L4-L5 level     Anesthesia     Herniated nucleus pulposus, L4-5     NSTEMI (non-ST elevated myocardial infarction) ([facility])     S/P PTCA (percutaneous transluminal coronary angioplasty)     Right upper quadrant abdominal pain     Gallbladder polyp     Biliary dyskinesia     Carpal tunnel syndrome on right     Carpal tunnel syndrome on left     Angina effort     Dyslipidemia     FELDER (dyspnea on exertion)     CAD (coronary artery disease)     Chest pain     Lung nodule seen on imaging study     Bilateral carotid artery stenosis     Essential hypertension     NSCLC of left lung (HCC)     Ataxic gait     Dizziness     Fracture, Colles, right, closed     Heroin abuse (HCC)     Non-pressure chronic ulcer of left ankle with fat layer exposed ([facility] [address]     Atherosclerosis of native arteries of extremities with rest pain, left leg (HCC)     Subacute osteomyelitis, left ankle and foot (HCC)     Osteomyelitis of ankle (HCC)     Hyponatremia Frequent falls     Chronic kidney disease     Fracture of right humerus     Hyperkalemia     Leukocytosis     Anemia     Acute hematogenous osteomyelitis, left ankle and foot (HCC)     Traumatic hematoma of right shoulder     Head injury     Humeral head fracture, right, closed, initial encounter     Pelvic hematoma, male, after a fall     Cause of injury, accidental fall, initial encounter     Tremor of unknown origin     Sundowning     Acute pain due to trauma      Subjective:  Admit Date: 10/11/2021    Interval History: doing fine sluggish    Medications:  Scheduled Meds:   sodium chloride flush  5-40 mL IntraVENous 2 times per day    folic acid  1 mg Oral Daily    thiamine  100 mg Oral TID    gabapentin  100 mg Oral TID    pill splitter   Does not apply Once    acetaminophen  650 mg Oral 4x Daily WC    lidocaine  3 patch TransDERmal Daily    sodium chloride flush  5-40 mL IntraVENous 2 times per day    [Held by provider] enoxaparin  40 mg SubCUTAneous Daily    cefTRIAXone (ROCEPHIN) IV  1,000 mg IntraVENous Q24H    atorvastatin  20 mg Oral Nightly    citalopram  40 mg Oral Daily    finasteride  5 mg Oral Daily    budesonide-formoterol  2 puff Inhalation BID    metoprolol tartrate  25 mg Oral BID    pantoprazole  40 mg Oral QAM AC    tiotropium  2 puff Inhalation Daily    bacitracin zinc   Topical BID     Continuous Infusions:   sodium chloride      sodium chloride      sodium chloride      sodium chloride 100 mL/hr at 10/15/21 0921       CBC:   Recent Labs     10/13/21  0759 10/14/21  0728   WBC 14.6* 12.4*   HGB 6.3* 7.6*    293     CMP:    Recent Labs     10/13/21  2316 10/14/21  0728 10/14/21  1034   * 134* 133*   K 5.0* 4.5 4.6    106 104   CO2 19* 19* 20   BUN 12 11 11   CREATININE 0.83 0.75 0.71   GLUCOSE 104* 85 88   CALCIUM 8.9 8.4* 8.4*   LABGLOM >60.0 >60.0 >60.0     Troponin: No results for input(s): TROPONINI in the last 72 hours.   BNP: No results for input(s): BNP in the last 72 hours. INR: No results for input(s): INR in the last 72 hours. Lipids: No results for input(s): CHOL, LDLDIRECT, TRIG, HDL, AMYLASE, LIPASE in the last 72 hours. Liver:   Recent Labs     10/14/21  0728   AST 23   ALT 15   ALKPHOS 78   PROT 5.2*   LABALBU 3.0*   BILITOT 0.8*     Iron:  No results for input(s): IRONS, FERRITIN in the last 72 hours. Invalid input(s): LABIRONS  Urinalysis: No results for input(s): UA in the last 72 hours.     Objective:  Vitals: BP (!) 164/83   Pulse 66   Temp 99 °F (37.2 °C)   Resp 18   Ht 5' 6\" (1.676 m)   Wt 150 lb (68 kg)   SpO2 100%   BMI 24.21 kg/m²    Wt Readings from Last 3 Encounters:   10/11/21 150 lb (68 kg)   09/29/21 150 lb 3.2 oz (68.1 kg)   09/27/21 154 lb 9.6 oz (70.1 kg)      24HR INTAKE/OUTPUT:      Intake/Output Summary (Last 24 hours) at 10/15/2021 1023  Last data filed at 10/15/2021 0245  Gross per 24 hour   Intake 2218 ml   Output 425 ml   Net 1793 ml       General: alert, in no apparent distress  HEENT: normocephalic, atraumatic, anicteric  Neck: supple, no mass  Lungs: non-labored respirations, clear to auscultation bilaterally  Heart: regular rate and rhythm, no murmurs or rubs  Abdomen: soft, non-tender, non-distended  Ext: no cyanosis, no peripheral edema  Neuro: alert and oriented, no gross abnormalities  Psych: normal mood and affect  Skin: no rash      Electronically signed by Phuong Billy DO, MD

## 2021-10-15 NOTE — PROGRESS NOTES
bedside  Positive tenderness  Skin: warm and dry, no rash. Head: normocephalic and atraumatic  Eyes: anicteric sclerae  ENT: oropharynx clear and moist with normal mucous membranes. No oral thrush  Lungs: normal respiratory effort, clear lungs  Heart normal S1-S2 no murmurs  Abdomen: soft, no tenderness  No leg edema  No erythema, no tenderness  Left wrist deformity  Right upper extremity sleeve  Decreasing right shoulder of ecchymosis and swelling  Left ankle wound with intact dressing    DATA:    Lab Results   Component Value Date    WBC 12.4 (H) 10/14/2021    HGB 7.6 (L) 10/14/2021    HCT 22.4 (L) 10/14/2021    MCV 91.9 10/14/2021     10/14/2021     Lab Results   Component Value Date    CREATININE 0.71 10/14/2021    BUN 11 10/14/2021     (L) 10/14/2021    K 4.6 10/14/2021     10/14/2021    CO2 20 10/14/2021       Hepatic Function Panel:  Lab Results   Component Value Date    ALKPHOS 78 10/14/2021    ALT 15 10/14/2021    AST 23 10/14/2021    PROT 5.2 10/14/2021    BILITOT 0.8 10/14/2021    LABALBU 3.0 10/14/2021     10/11/2021  9:09 PM - [place], Chpo Incoming Lab Results From Soft    Component Value Ref Range & Units Status Collected Lab   Bacteria, UA Negative  Negative /HPF Final 10/11/2021  6:30 PM [facility] - [facility]   Hyaline Casts, UA 0-1  0 - 5 /HPF Final 10/11/2021  6:30 PM [address] [facility]   WBC, UA 0-2  0 - 5 /HPF Final 10/11/2021  6:30 PM [address] [facility]   RBC, UA 3-5Abnormal   0 - 5 /HPF Final 10/11/2021  6:30 PM [address] [facility]   Epithelial Cells, UA 0-2  0 - 5 /HPF        10/12/2021  4:37 PM - Darwin, Chpo Incoming Lab Results From Soft    Component Value Ref Range & Units Status Collected Lab   CRP High Sensitivity 117. 6High   0.0 - 5.0 mg/L Final 10/12/2021  3:26 PM [address] [facility]   Testing Performed By    Impression:  1. COMMINUTED DISPLACED FRACTURE OF THE RIGHT HUMERAL HEAD.    2. OTHER FINDINGS DETAILED ABOVE     IMPRESSION:    · Left ankle osteomyelitis  · Left ankle chronic nonhealing wound  · Pasteurella and procidentia infection  · Right shoulder large hematoma with humeral head fracture  · Frequent falls  · Lung cancer of left lower lobe  ·     Patient Active Problem List   Diagnosis    Tobacco use    Hip pain    Knee pain    Chronic back pain    Elevated PSA    Primary osteoarthritis of right knee    Spondylosis of lumbar region without myelopathy or radiculopathy    Sacroiliitis, not elsewhere classified ([address]    Pure hypercholesterolemia    Charcot's joint of left ankle    Left ankle pain    Delirium due to general medical condition    DDD (degenerative disc disease), lumbar    Osteoarthritis of spine with myelopathy, lumbosacral region    Chronic bilateral low back pain with bilateral sciatica    Postlaminectomy syndrome, lumbar region    Acquired spondylolisthesis of lumbosacral region    Spinal stenosis of lumbar region with neurogenic claudication    HNP (herniated nucleus pulposus), lumbar    Spondylolisthesis at L4-L5 level    Anesthesia    Herniated nucleus pulposus, L4-5    NSTEMI (non-ST elevated myocardial infarction) ([address]    S/P PTCA (percutaneous transluminal coronary angioplasty)    Right upper quadrant abdominal pain    Gallbladder polyp    Biliary dyskinesia    Carpal tunnel syndrome on right    Carpal tunnel syndrome on left    Angina effort    Dyslipidemia    FELDER (dyspnea on exertion)    CAD (coronary artery disease)    Chest pain    Lung nodule seen on imaging study    Bilateral carotid artery stenosis    Essential hypertension    NSCLC of left lung (HCC)    Ataxic gait    Dizziness    Fracture, Colles, right, closed    Heroin abuse ([address]    Non-pressure chronic ulcer of left ankle with fat layer exposed ([address]    Atherosclerosis of native arteries of extremities with rest pain, left leg (HCC)    Subacute osteomyelitis, left ankle and foot (HCC)    Osteomyelitis of ankle ([address]   

## 2021-10-15 NOTE — PROGRESS NOTES
[id number] [facility] Neurology Consult Note  Name: Elpidio Messer  Age: 64 y.o. Gender: male  CodeStatus: Full Code  Allergies: No Known Allergies    Chief Complaint:Fall    Primary Care Provider: Saroj Chopra MD  InpatientTreatment Team: Treatment Team: Attending Provider: Alphonse Gitelman, MD; Consulting Physician: Brian Frances MD; Consulting Physician: Miranda Lara MD; Consulting Physician: Thu Allison MD; Consulting Physician: Alphonse Gitelman, MD; Registered Nurse: Cristiano Hernandez, RN; Registered Nurse: Chelsea Cassidy RN; Utilization Reviewer: Harvey Lopez RN  Admission Date: 10/11/2021      HPI   Patient seen and examined for neurology follow-up for lower extremity weakness and frequent falls in the setting of known lumbar stenosis. 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 Patient is currently alert and oriented x3. Encephalopathy has improved. Patient deemed not a candidate for surgery. Plan is for possible discharge to acute rehab.     Nonew changes,       No Known Allergies    Patient Active Problem List   Diagnosis    Tobacco use    Hip pain    Knee pain    Chronic back pain    Elevated PSA    Primary osteoarthritis of right knee    Spondylosis of lumbar region without myelopathy or radiculopathy    Sacroiliitis, not elsewhere classified ([address]    Pure hypercholesterolemia    Charcot's joint of left ankle    Left ankle pain    Confusion caused by a drug    DDD (degenerative disc disease), lumbar    Osteoarthritis of spine with myelopathy, lumbosacral region    Chronic bilateral low back pain with bilateral sciatica    Postlaminectomy syndrome, lumbar region    Acquired spondylolisthesis of lumbosacral region    Spinal stenosis of lumbar region with neurogenic claudication    HNP (herniated nucleus pulposus), lumbar    Spondylolisthesis at L4-L5 level    Anesthesia Herniated nucleus pulposus, L4-5    NSTEMI (non-ST elevated myocardial infarction) ([facility])    S/P PTCA (percutaneous transluminal coronary angioplasty)    Right upper quadrant abdominal pain    Gallbladder polyp    Biliary dyskinesia    Carpal tunnel syndrome on right    Carpal tunnel syndrome on left    Angina effort    Dyslipidemia    FELDER (dyspnea on exertion)    CAD (coronary artery disease)    Chest pain    Lung nodule seen on imaging study    Bilateral carotid artery stenosis    Essential hypertension    NSCLC of left lung (HCC)    Ataxic gait    Dizziness    Fracture, Col[place], right, closed    Heroin abuse ([address]    Non-pressure chronic ulcer of left ankle with fat layer exposed ([address]    Atherosclerosis of native arteries of extremities with rest pain, left leg (HCC)    Osteomyelitis ([address]    Osteomyelitis of ankle ([facility])    Hyponatremia    Frequent falls    Chronic kidney disease    Fracture of right humerus    Hyperkalemia    Leukocytosis    Anemia    Acute hematogenous osteomyelitis, left ankle and foot ([address]       Past Medical History:   Diagnosis Date    Alcohol abuse     quit drinking 8/18/21    CAD (coronary artery disease)     patient states no doctor has told him this / has 1 cardiac stent    Cancer ([address]     lung LLL    Chronic back pain     Chronic kidney disease     Chronic sinusitis     DJD (degenerative joint disease) of knee     left knee DJD    Elevated PSA     History of blood transfusion 1980's    History of inferior wall myocardial infarction 01/2016    1 cardiac stent    HTN (hypertension)     meds .  1 yr    Hyperlipidemia     meds > 12 yrs    NSTEMI (non-ST elevated myocardial infarction) ([address]     Smoker        Family History   Problem Relation Age of Onset    Osteoarthritis Mother     Emphysema Mother     Hypertension Father     Hearing Loss Father     Dementia Father     No Known Problems Sister     Prostate Cancer Brother     Colon Polyps Neg Hx        Past Surgical History:   Procedure Laterality Date    ABDOMEN SURGERY  1961    spleenectomy due to [address]  2009    lumbar disc OR    CARDIAC SURGERY      stents    CARPAL TUNNEL RELEASE Right 5/6/2019    RIGHT CARPAL TUNNEL RELEASE performed by Chucky Gilman MD at [address]  1/29/2016    EYE SURGERY      to have Phaco with IOL OU 2/2018    HERNIA REPAIR      JOINT REPLACEMENT Left 1994    LTHR    JOINT REPLACEMENT Right 2009    RTKR    KNEE SURGERY Right 2011    arthroscopy    CT MANIPULATN KNEE JT+ANESTHESIA Right 5/12/2017    RIGHT KNEE MANIPULATION UNDER ANESTHESIA performed by David Lu MD at [address] [facility] OFFICE/OUTPT VISIT,PROCEDURE ONLY Bilateral 12/29/2017    RIGHT AND BILATERAL L 4-5 LYSIS OF SCAR DISKECTOMY INTERBODY CAGE FUSION POSTEROLATERAL FUSION PEDICLE SCREWS performed by Chucky Gilman MD at [address] Right 3/24/2017    RIGHT  KNEE TOTAL ARTHROPLASTY, [place] CEMENTED PERSONA  performed by David Lu MD at [address] History     Socioeconomic History    Marital status:      Spouse name: None    Number of children: None    Years of education: None    Highest education level: None   Occupational History    Occupation: retired   Tobacco Use    Smoking status: Current Every Day Smoker     Packs/day: 0.50     Years: 60.00     Pack years: 30.00     Types: Cigarettes    Smokeless tobacco: Never Used    Tobacco comment: also smokes cigars   Vaping Use    Vaping Use: Never used   Substance and Sexual Activity    Alcohol use: Not Currently     Alcohol/week: 12.0 standard drinks     Types: 12 Shots of liquor per week     Comment: quit drinking aug 18th, 2021    Drug use: No    Sexual activity: Yes   Other Topics Concern    None   Social History Narrative    Lives alone     Social Determinants of Health     Financial Resource Strain: Low Risk     Difficulty of Paying Living Expenses: Not very hard   Food Insecurity: No Food Insecurity    Worried About [address] [employer] in the Last Year: Never true    Ran Out of Food in the Last Year: Never true   Transportation Needs: No Transportation Needs    Lack of Transportation (Medical): No    Lack of Transportation (Non-Medical): No   Physical Activity:     Days of Exercise per Week:     Minutes of Exercise per Session:    Stress:     Feeling of Stress :    Social Connections:     Frequency of Communication with Friends and Family:     Frequency of Social Gatherings with Friends and Family:     Attends [employer] Services: Active Member of Clubs or Organizations:     Attends Club or Organization Meetings:     Marital Status:    Intimate Partner Violence:     Fear of Current or Ex-Partner:     Emotionally Abused:     Physically Abused:     Sexually Abused:         Vitals:    10/12/21 0900   BP: 136/73   Pulse: 88   Resp: 18   Temp: 98 °F (36.7 °C)   SpO2: 99%       Review of Systems   Constitutional: Negative for diaphoresis and fever. HENT: Negative for congestion and trouble swallowing. Eyes: Negative for photophobia and visual disturbance. Respiratory: Negative for chest tightness and shortness of breath. Cardiovascular: Negative for chest pain. Gastrointestinal: Negative for diarrhea, nausea and vomiting. Musculoskeletal: Positive for back pain and gait problem. Skin: Negative for color change. Neurological: Positive for weakness and numbness. Negative for dizziness, tremors, seizures, syncope, facial asymmetry, speech difficulty, light-headedness and headaches. Psychiatric/Behavioral: Negative for hallucinations and self-injury. Physical Exam  Vitals and nursing note reviewed. Constitutional:       Appearance: Normal appearance. HENT:      Head: Normocephalic and atraumatic. Eyes:      Conjunctiva/sclera: Conjunctivae normal.   Cardiovascular:      Rate and Rhythm: Normal rate and regular rhythm. Pulses: Normal pulses. Heart sounds: Normal heart sounds. Pulmonary:      Effort: Pulmonary effort is normal.      Breath sounds: Normal breath sounds. Musculoskeletal:         General: Normal range of motion. Skin:     General: Skin is warm and dry. Neurological:      Mental Status: He is alert and oriented to person, place, and time. Mental status is at baseline. Cranial Nerves: No cranial nerve deficit. Sensory: No sensory deficit. Motor: Weakness present.       Coordination: Coordination normal.      Gait: Gait abnormal.      Deep Tendon Reflexes: Reflexes abnormal.   Psychiatric:         Mood and Affect: Mood normal.         Behavior: Behavior normal.     Patient is areflexic in his bilateral lower extremities  Lower extremity strength is 4 -/5  Did not walk patient due to fall risk      Medications:  Reviewed    Infusion Medications:    sodium chloride      sodium chloride       Scheduled Medications:    sodium chloride flush  5-40 mL IntraVENous 2 times per day    enoxaparin  40 mg SubCUTAneous Daily    acetaminophen  1,000 mg Oral 3 times per day    bacitracin zinc   Topical BID     PRN Meds: sodium chloride flush, sodium chloride, ondansetron **OR** ondansetron, polyethylene glycol, traMADol **OR** traMADol    Labs:   Recent Labs     10/11/21  1952 10/12/21  0508   WBC 16.3*  --    HGB 8.6* 7.3*   HCT 25.8* 21.6*     --      Recent Labs     10/11/21  1830 10/12/21  0445   * 125*   K 5.9* 5.9*   CL 92* 97   CO2 16* 18*   BUN 25* 23   CREATININE 1.73* 1.25*   CALCIUM 9.3 8.6     Recent Labs     10/11/21  1830   AST 19   ALT 12   BILITOT 0.7   ALKPHOS 104     Recent Labs     10/11/21  1950   INR 1.2     Recent Labs     10/11/21  1830   CKTOTAL 462*   TROPONINI <0.010       Urinalysis:   Lab Results   Component Value Date    NITRU Negative 10/11/2021    WBCUA 0-2 10/11/2021    BACTERIA Negative 10/11/2021    RBCUA 3-5 10/11/2021    BLOODU MODERATE 10/11/2021 [address] 1.017 10/11/2021    GLUCOSEU Negative 10/11/2021       Radiology:   Most recent    EEG No valid procedures specified. MRI of Brain No results found for this or any previous visit. Results for orders placed during the hospital encounter of 04/09/21    MRI BRAIN WO CONTRAST    Narrative  EXAM: MRI of the brain without contrast    History: Ataxic gait. Dizziness. Technique: Multiplanar multisequence MRI of the brain was performed without contrast.    Comparison: None available    Findings:    Areas of hyperintense T2/FLAIR signal within the bilateral supratentorial white matter and brenna are nonspecific but most compatible with chronic small vessel ischemic changes in a patient of this age. Prominence of the sulci and ventricles compatible with mild generalized parenchymal volume loss. No acute hemorrhage, mass effect, midline shift, or abnormal extra-axial fluid collection. No mass identified MRI without contrast. Cerebellum appears  normal    There is no diffusion restriction. No susceptibility artifact is identified on the gradient echo sequence. The major intracranial vascular flow voids are maintained. Cranial nerves 7/8 complexes appear grossly unremarkable. The visualized paranasal sinuses and bilateral mastoid air cells are essentially clear. Impression  No acute intracranial process. Generalized parenchymal volume loss and nonspecific white matter findings most compatible with chronic small vessel ischemic changes in a patient of this age. MRA of the Head and Neck: No results found for this or any previous visit. No results found for this or any previous visit. No results found for this or any previous visit. CT of the Head: Results for orders placed during the hospital encounter of 10/11/21    CT Head WO Contrast    Narrative  CT Brain. Contrast medium:  without contrast.. History:  Fall. Loss of consciousness.  Invasive squamous cell carcinoma, left lower lobe. Technical factors: CT imaging of the brain was obtained and formatted as 5 mm contiguous axial images. 2.5 mm contiguous axial images were obtained through the osseous structures. Sagittal and coronal reconstruction obtained during postprocessing. Comparison:  CT brain, August 18, 2021. Tatianna Kruse Findings:    Extra-axial spaces:  Normal.    Intracranial hemorrhage:  None. Ventricular system: Ventricles mildly enlarged. Sulci mildly prominent. Basal Cisterns:  Normal.    Cerebral Parenchyma: Bilateral symmetric periventricular areas decreased attenuation. Midline Shift:  None. Cerebellum:  Normal.    Paranasal sinuses and mastoid air cells:  Normal.    Visualized Orbits:  Normal.    Impression  Impression:    Mild cerebral atrophy. Chronic ischemic white matter disease. All CT scans at this facility use dose modulation, iterative reconstruction, and/or weight based dosing when appropriate to reduce radiation dose to as low as reasonably achievable. No results found for this or any previous visit. No results found for this or any previous visit. Carotid duplex: No results found for this or any previous visit. No results found for this or any previous visit.   Results for orders placed during the hospital encounter of 02/29/20    US CAROTID ARTERY BILATERAL    Narrative  EXAMINATION:  CAROTID DUPLEX ULTRASONOGRAPHY    CLINICAL HISTORY:  CAROTID STENOSIS    COMPARISONS:  4/18/2018    TECHNIQUE:  B-mode, color flow and spectral Doppler    FINDINGS:    ARTERIAL BLOOD FLOW VELOCITY    RIGHT PS    Prox CCA    99.4 cm/s  Mid CCA     94.4 cm/s  Dist CCA    76.8 cm/s  Prox ICA    57.5 cm/s  Mid ICA     71.3 cm/s  Dist ICA    73.7 cm/s  Prox ECA    110 cm/s  Prox VERT   58 cm/s    ICA/CCA     0.78    LEFT PS    Prox CCA    99.3 cm/s  Mid CCA     93.3 cm/s  Dist CCA    87.3 cm/s  Prox ICA    55.4 cm/s  Mid ICA     74.1 cm/s  Dist ICA    60.9 cm/s  Prox ECA 86.2 cm/s  Prox VERT   41.2 cm/s    ICA/CCA     0.83    Impression  MILD TO MODERATE DEGREE OF ATHEROSCLEROSIS SCATTERED THROUGHOUT THE CAROTID TREE ON BOTH SIDES. SOME ARE CALCIFIED. INTIMAL THICKENING NOTED. BILATERAL ICA LESS THAN 50% STENOSIS. BILATERAL ANTEGRADE VERTEBRAL FLOW. Echo No results found for this or any previous visit. Assessment/Plan:  73-year-old male hospitalized for frequent falls, hyponatremia, and hyperkalemia. Patient is well-known to us has a known history of lumbar stenosis at L2-L3 and L4-L5. Previously seen by neurosurgery and not deemed a surgical candidate at that time. Patient has since had increasing weakness and 3 falls, and I will read consult neurosurgery. Falls likely related to disequilibrium caused by severe stenosis. Of note, patient was found to have active lung cancer, possibly squamous cell carcinoma (problems list indicates non-small cell lung cancer of the left lung) based on recent PET scan. Given patient's hyponatremia and hyperkalemia, will obtain ACTH and a.m. cortisol to screen for paraneoplastic syndrome. Consider SIADH related to lung cancer. However, patient's hyponatremia may be related to to poor p.o. intake. CT of the head was negative for any acute findings but did show chronic ischemic white matter disease. Will obtain orthostatics given that patient had some suggestion of lightheadedness as well. Patient has significant orthostatic hypotension. Causes of this could be related to volume issues related to hyponatremia and severe anemia. Patient has a hemoglobin of 6.3. Consider carotid ultrasound if orthostatic hypotension does not improve after correction of anemia and hyponatremia. Could be related to alcohol abuse or even vagus nerve hypersensitivity related to lung cancer.   This may be difficult to treat given patient's baseline hypertension and I will hold off on medications until severe anemia has resolved. Patient also has osteomyelitis. Neurosurgery has been consulted but has not been able to assess patient. MRI of the brain with and without contrast ordered to assess for mets. Patient also found to have osteomyelitis. Blood cultures pending. CRP elevated procalcitonin pending. Patient does have leukocytosis. Patient is encephalopathic today which is an acute change from yesterday. Will change MRI to stat given sodium fluctuation. Also obtain EEG. Examined and has significant shoulder issues at this time. His main issues appears to be gait ataxia and hyponatremia. His gait issues appears to be related to his lumbar spine and his falls could be from the same though this time this could have been a different event      Encephalopathy improved hyponatremia improved, multifactorial secondary to Bactrim, diuretics, lung cancer, NSAIDs. Nephrology following. Hyperkalemia resolved  Patient with acute fracture of the right humeral head with conservative therapy recommended per orthopedics. Osteomyelitis of the left ankle talus. Blood cultures negative. Currently on IV Rocephin for 3 weeks. Lumbar canal stenosis, neurosurgery consulted and discussed he is not a candidate for surgical intervention. Anemia which is likely secondary to large right shoulder hematoma. encephalopathy impreved   Palliative care now on consult    Hemoglobin improved. Patient not deemed a surgical candidate per neurosurgery. Patient found to have severe orthostatic hypotension, and I will order daily. Patient has baseline hypertension and is therefor not a candidate for midodrine at this time. Possible plan for acute rehab. I have personally performed a face to face diagnostic evaluation on this patient, reviewed all data and investigations, and am the sole provider of all clinical decisions on the neurological status of this patient. encephalopathy, improved, still dizzy      Anshu Groves MD, [address] [facility] SanchezUp[facility] Board of Psychiatry & Neurology  Board Certified in Vascular Neurology  Board Certified in Neuromuscular Medicine  Certified in Neurorehabilitation

## 2021-10-15 NOTE — PROGRESS NOTES
Hospitalist Progress Note      PCP: Satish Peter MD    Date of Admission: 10/11/2021    Chief Complaint:  No acute events, afebrile, stable HD    Medications:  Reviewed    Infusion Medications    sodium chloride      sodium chloride      sodium chloride       Scheduled Medications    sodium chloride flush  5-40 mL IntraVENous 2 times per day    folic acid  1 mg Oral Daily    thiamine  100 mg Oral TID    gabapentin  100 mg Oral TID    pill splitter   Does not apply Once    acetaminophen  650 mg Oral 4x Daily WC    lidocaine  3 patch TransDERmal Daily    sodium chloride flush  5-40 mL IntraVENous 2 times per day    [Held by provider] enoxaparin  40 mg SubCUTAneous Daily    cefTRIAXone (ROCEPHIN) IV  1,000 mg IntraVENous Q24H    atorvastatin  20 mg Oral Nightly    citalopram  40 mg Oral Daily    finasteride  5 mg Oral Daily    budesonide-formoterol  2 puff Inhalation BID    metoprolol tartrate  25 mg Oral BID    pantoprazole  40 mg Oral QAM AC    tiotropium  2 puff Inhalation Daily    bacitracin zinc   Topical BID     PRN Meds: sodium chloride flush, sodium chloride, sodium chloride, metaxalone, haloperidol, oxyCODONE, sodium chloride flush, sodium chloride, ondansetron **OR** ondansetron, polyethylene glycol, albuterol sulfate HFA      Intake/Output Summary (Last 24 hours) at 10/15/2021 1221  Last data filed at 10/15/2021 0245  Gross per 24 hour   Intake 2218 ml   Output 425 ml   Net 1793 ml       Exam:    BP (!) 164/83   Pulse 66   Temp 99 °F (37.2 °C)   Resp 18   Ht 5' 6\" (1.676 m)   Wt 150 lb (68 kg)   SpO2 100%   BMI 24.21 kg/m²     General appearance: awake, cooperative. Respiratory:  clear to auscultation, bilaterally  Cardiovascular: Regular rate and rhythm, S1/S2. Abdomen: Soft, active bowel sounds.   Musculoskeletal: large right shoulder hematoma, right hand is wrapped, left ankle wound present       Labs:   Recent Labs     10/13/21  0759 10/14/21  0728 10/15/21  1022   WBC 14.6* 12.4* 11.5*   HGB 6.3* 7.6* 8.2*   HCT 19.0* 22.4* 24.0*    293 334     Recent Labs     10/14/21  0728 10/14/21  1034 10/15/21  1022   * 133* 136   K 4.5 4.6 4.2    104 104   CO2 19* 20 19*   BUN 11 11 10   CREATININE 0.75 0.71 0.72   CALCIUM 8.4* 8.4* 8.3*     Recent Labs     10/13/21  0759 10/14/21  0728 10/15/21  1022   AST 28 23 19   ALT 16 15 16   BILITOT 0.3 0.8* 0.6   ALKPHOS 76 78 87     No results for input(s): INR in the last 72 hours. No results for input(s): Kaleen Hope in the last 72 hours. Urinalysis:      Lab Results   Component Value Date    NITRU Negative 10/11/2021    WBCUA 0-2 10/11/2021    BACTERIA Negative 10/11/2021    RBCUA 3-5 10/11/2021    BLOODU MODERATE 10/11/2021    SPECGRAV 1.017 10/11/2021    GLUCOSEU Negative 10/11/2021       Radiology:  MRI BRAIN W WO CONTRAST   Final Result          There are no acute intracranial changes, there is no evidence of hemorrhage or acute ischemia. XR HAND RIGHT (MIN 3 VIEWS)   Final Result   COMMINUTED FRACTURE OF THE DISTAL PHALANX OF THE RIGHT FIRST FINGER. CT Head WO Contrast   Final Result   Impression:      Mild cerebral atrophy. Chronic ischemic white matter disease. All CT scans at this facility use dose modulation, iterative reconstruction, and/or weight based dosing when appropriate to reduce radiation dose to as low as reasonably achievable. CT CERVICAL SPINE WO CONTRAST   Final Result      No fracture. Severe degenerative change cervical spine. Grade 1 C7 spondylolisthesis, unchanged. Bilateral carotid artery calcification. If clinical concern warrants, carotid sonography may be utilized for further evaluation. All CT scans at this facility use dose modulation, iterative reconstruction, and/or weight based dosing when appropriate to reduce radiation dose to as low as reasonably achievable.                CT THORACIC SPINE WO CONTRAST   Final Result      No fracture. Left lower lobe mass. Calcified exophytic gastric mass. All CT scans at this facility use dose modulation, iterative reconstruction, and/or weight based dosing when appropriate to reduce radiation dose to as low as reasonably achievable. .      CT HUMERUS RIGHT WO CONTRAST   Final Result   1. COMMINUTED DISPLACED FRACTURE OF THE RIGHT HUMERAL HEAD. 2. OTHER FINDINGS DETAILED ABOVE         All CT scans at this facility use dose modulation, iterative reconstruction, and/or weight based dosing when appropriate to reduce radiation dose to as low as reasonably achievable. CT LUMBAR SPINE WO CONTRAST   Final Result      No acute fracture. Postsurgical change discussed. All CT scans at this facility use dose modulation, iterative reconstruction, and/or weight based dosing when appropriate to reduce radiation dose to as low as reasonably achievable. CT ABDOMEN PELVIS WO CONTRAST Additional Contrast? 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 No acute fractures. Remote fractures left sixth through eighth ribs            CT OF THE ABDOMEN AND PELVIS WITH INTRAVENOUS CONTRAST MEDIUM. FINDINGS:         Liver:  Normal in size, shape, and attenuation. Bile Ducts:  Normal in caliber. Gallbladder:  No stones or wall thickening. Pancreas:  Normal without masses, cysts, ductal dilatation or calcification. Spleen:  Surgically absent. Kidneys:  Normal in size. No hydronephrosis, masses, or stones. Mild bilateral perinephric fat stranding. Adrenals:  Normal.       Stomach: Exophytic, calcified 1.6 cm nodule, gastric cardia again identified, unchanged. Small bowel:  Normal in caliber. Appendix:  Normal.       Colon:  Normal in caliber. Peritoneum:  No ascites, free air, or fluid collections. Vessels: Aorta normal in course and caliber. Lymph nodes:        Retroperitoneal:  No enlarged retroperitoneal lymph nodes. Mesenteric:  No enlarged mesenteric lymph nodes. Pelvic: No enlarged pelvic lymph nodes. Ureters: Normal in course and caliber. No calcifications. Bladder: No wall thickening. Reproductive organs: No pelvic masses. Abdominal Wall:      Increased attenuation measuring 4.5 x 4.6 x 8.2 cm within right pelvic soft tissue (series 2, image 89). Bones:  No bone lesions. Lumbar scoliosis convexity to left apex L3. Remote internal fixation L4, and L5. Remote left hip replacement. IMPRESSION:      Soft tissue hematoma, right pelvis, measuring 4.5 x 4.6 x 8.2 cm. Splenectomy. No change, exophytic, calcified gastric nodule as discussed. Remote internal fixation L4 and L5. Left hip replacement. All CT scans at this facility use dose modulation, iterative reconstruction, and/or weight based dosing when appropriate to reduce radiation dose to as low as reasonably achievable. 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 No acute fractures. Remote fractures left sixth through eighth ribs            CT OF THE ABDOMEN AND PELVIS WITH INTRAVENOUS CONTRAST MEDIUM. FINDINGS:         Liver:  Normal in size, shape, and attenuation. Bile Ducts:  Normal in caliber. Gallbladder:  No stones or wall thickening. Pancreas:  Normal without masses, cysts, ductal dilatation or calcification. Spleen:  Surgically absent. Kidneys:  Normal in size. No hydronephrosis, masses, or stones. Mild bilateral perinephric fat stranding. Adrenals:  Normal.       Stomach: Exophytic, calcified 1.6 cm nodule, gastric cardia again identified, unchanged.       Small bowel:  Normal in caliber. Appendix:  Normal.       Colon:  Normal in caliber. Peritoneum:  No ascites, free air, or fluid collections. Vessels: Aorta normal in course and caliber. Lymph nodes:        Retroperitoneal:  No enlarged retroperitoneal lymph nodes. Mesenteric:  No enlarged mesenteric lymph nodes. Pelvic: No enlarged pelvic lymph nodes. Ureters: Normal in course and caliber. No calcifications. Bladder: No wall thickening. Reproductive organs: No pelvic masses. Abdominal Wall:      Increased attenuation measuring 4.5 x 4.6 x 8.2 cm within right pelvic soft tissue (series 2, image 89). Bones:  No bone lesions. Lumbar scoliosis convexity to left apex L3. Remote internal fixation L4, and L5. Remote left hip replacement. IMPRESSION:      Soft tissue hematoma, right pelvis, measuring 4.5 x 4.6 x 8.2 cm. Splenectomy. No change, exophytic, calcified gastric nodule as discussed. Remote internal fixation L4 and L5. Left hip replacement. All CT scans at this facility use dose modulation, iterative reconstruction, and/or weight based dosing when appropriate to reduce radiation dose to as low as reasonably achievable.           IR MIDLINE CATH    (Results Pending)           Assessment/Plan:    63 y/o man with history of CAD s/p PCI, HTN, LLL NSCLC, COPD, tobacco use, GERD, left ankle wound/OM, ETOH use, lumbar stenosis, s/p splenectomy, frequent falls, chronic pain with opiate dependence, recent left wrist fracture who presented with:    Acute fracture of the right humeral head / recent fracture of the left wrist and right thumb  - conservative therapy per Ortho    KEILY / hyponatremia  - in the setting of NSAID, Bactrim, diuretics,lung cancer  - resolved with saline infusion  - followed by nephrology    Hyperkalemia   - improved     Acute metabolic encephalopathy, dizziness,  frequent falls  - in the setting of KEILY, hyponatremia, chronic alcohol use  - CT and MRI were negative  - not a surgical candidate per neurosurgery  - neurology following    Acute / chronic pain  - on oxycodone,gabapentin,tylenol   - pain management following    Left ankle wound/OM  - continue IV Rocephin x 3 weeks via Midline per ID  - followed by podiatry     Acute blood loss anemia   - Hb down to 6.3, baseline is around 11-12  - in the setting of large, right shoulder hematoma  - holding Plavix and Lovenox  - Hb improved s/p transfusion of 1 unit of PRBCs    CAD s/p PCI to RCA in 2016  - holding Plavix due to anemia/ hematoma    NSCLC of LLL  - followed by pulmonology and oncology    Diet: ADULT DIET;  Regular    Code Status: Full Code,pallative care consulted      Disposition - SNF, / following          Electronically signed by Vanita Bhakta MD on 10/15/2021 at 12:21 PM

## 2021-10-15 NOTE — PROGRESS NOTES
Palliative Care Consult Note  Patient: Darling Chaudhry  Gender: male  YOB: 1945  Unit/Bed: G559/E472-75  CodeStatus: Full Code  Inpatient Treatment Team: Treatment Team: Attending Provider: Marlyn Haq MD; Consulting Physician: Leti Medley MD; Consulting Physician: Blake Cordova MD; Consulting Physician: Lizzette Alva MD; Consulting Physician: Marlyn Haq MD; Utilization Reviewer: Jackie Ascencio RN; Consulting Physician: Ghulam Vang MD; Consulting Physician: Gabo Mauro MD; Consulting Physician: Milton Machado MD; Wound Care: Selma Phoenix, MAXIMILIAN; Consulting Physician: Isael Ruvalcaba DPM; Consulting Physician: Sara Umanzor DO; Patient Care Tech: Meena Love; : Kwame Bennett, RN; Registered Nurse: Matt Marcelo RN; Tech: Lianne Pollardparish  Admit Date:  10/11/2021    Chief Complaint: Head injury, hyponatremia, fall    History of Presenting Illness:      Darling Chaudhry is a 64 y.o. male on hospital day 4 with a history of  Head injury, hyponatremia, fall. Today he presents A+Ox3. Na increased to 136 from 133 and GFR remains greater than 60. States having increased poain in right shoulder. Is not well controlled on current regime. Rates the pain at a  9 on a scale of 0 to 10. States it occurs in his back and right shoulder. Currently prescribed oxy-IR though states makes to sedated and nauseated michaela has sparinly used. Takes percocet at home. POC  possible immunotherapy post discharge after FU with oncology    Review of Systems:       Review of Systems   Constitutional: Positive for activity change and fatigue. Negative for appetite change, fever and unexpected weight change. HENT: Negative. Eyes: Negative. Respiratory: Negative. Negative for cough, shortness of breath and wheezing. Cardiovascular: Negative. Gastrointestinal: Negative for abdominal distention, constipation, diarrhea, nausea and vomiting. Endocrine: Negative. CARDIAC SURGERY      stents    CARPAL TUNNEL RELEASE Right 5/6/2019    RIGHT CARPAL TUNNEL RELEASE performed by Roxana Tripathi MD at [address] [place] [address] WITH STENT PLACEMENT  1/29/2016    EYE SURGERY      to have Phaco with IOL OU 2/2018    HERNIA REPAIR      JOINT REPLACEMENT Left 1994    LTHR    JOINT REPLACEMENT Right 2009    RTKR    KNEE SURGERY Right 2011    arthroscopy    RI MANIPULATN KNEE JT+ANESTHESIA Right 5/12/2017    RIGHT KNEE MANIPULATION UNDER ANESTHESIA performed by Christine Morfin MD at [address] [facility] OFFICE/OUTPT VISIT,PROCEDURE ONLY Bilateral 12/29/2017    RIGHT AND BILATERAL L 4-5 LYSIS OF SCAR DISKECTOMY INTERBODY CAGE FUSION POSTEROLATERAL FUSION PEDICLE SCREWS performed by Roxana Tripathi MD at [address] [place] [address]  2004    benign    SPLENECTOMY  1961    TOTAL KNEE ARTHROPLASTY Right 3/24/2017    RIGHT  KNEE TOTAL ARTHROPLASTY, Joan Pear CEMENTED PERSONA  performed by Christine Morfin MD at [place] [id number] Hx:  Social History     Socioeconomic History    Marital status:      Spouse name: None    Number of children: None    Years of education: None    Highest education level: None   Occupational History    Occupation: retired   Tobacco Use    Smoking status: Current Every Day Smoker     Packs/day: 0.50     Years: 60.00     Pack years: 30.00     Types: Cigarettes    Smokeless tobacco: Never Used    Tobacco comment: also smokes cigars   Vaping Use    Vaping Use: Never used   Substance and Sexual Activity    Alcohol use: Not Currently     Alcohol/week: 12.0 standard drinks     Types: 12 Shots of liquor per week     Comment: quit drinking aug 18th, 2021    Drug use: No    Sexual activity: Yes   Other Topics Concern    None   Social History Narrative     He is  and lives alone    Type of Home[address]: Able to Live on Main level with bedroom/bathroom, Two level, Performs ADL's on one level Home Access: Stairs to enter with rails    Entrance Stairs - Number of Steps: 3- Rails: Both    Bathroom Shower/Tub: Tub/Shower unit--Shower chair, Grab bars in shower (does not use chair)    Home Equipment: Rolling walker, 4 wheeled walker, Oxygen    ADL Assistance: [place]: Independent    Homemaking Responsibilities: Yes    Ambulation Assistance: Independent    Transfer Assistance: Independent    Active : Yes    He is retired from [address] [place] [address] Strain: Low Risk     Difficulty of Paying Living Expenses: Not very hard   Food Insecurity: No Food Insecurity    Worried About Running Out of Food in the Last Year: Never true    Yoav of Food in the Last Year: Never true   Transportation Needs: No Transportation Needs    Lack of Transportation (Medical): No    Lack of Transportation (Non-Medical):  No   Physical Activity:     Days of Exercise per Week:     Minutes of Exercise per Session:    Stress:     Feeling of Stress :    Social Connections:     Frequency of Communication with Friends and Family:     Frequency of Social Gatherings with Friends and Family:     Attends [employer] Services:     Active Member of Clubs or Organizations:     Attends Club or Organization Meetings:     Marital Status:    Intimate Partner Violence:     Fear of Current or Ex-Partner:     Emotionally Abused:     Physically Abused:     Sexually Abused:        Family Hx:  Family History   Problem Relation Age of Onset    Osteoarthritis Mother     Emphysema Mother     Hypertension Father     Hearing Loss Father     Dementia Father     No Known Problems Sister     Prostate Cancer Brother     Colon Polyps Neg Hx        LABS: Reviewed     CBC:  Lab Results   Component Value Date    WBC 11.5 10/15/2021    RBC 2.58 10/15/2021    HGB 8.2 10/15/2021    HCT 24.0 10/15/2021    MCV 93.3 10/15/2021    MCH 31.7 10/15/2021    [facility] 34.0 10/15/2021    RDW 14.9 10/15/2021     10/15/2021     CBC with Differential:   Lab Results   Component Value Date    WBC 11.5 10/15/2021    RBC 2.58 10/15/2021    HGB 8.2 10/15/2021    HCT 24.0 10/15/2021     10/15/2021    MCV 93.3 10/15/2021    MCH 31.7 10/15/2021    MCHC 34.0 10/15/2021    RDW 14.9 10/15/2021    LYMPHOPCT 9.7 10/15/2021    MONOPCT 7.3 10/15/2021    BASOPCT 1.3 10/15/2021    MONOSABS 0.8 10/15/2021    LYMPHSABS 1.1 10/15/2021    EOSABS 0.1 10/15/2021    BASOSABS 0.1 10/15/2021     CMP:    Lab Results   Component Value Date     10/15/2021    K 4.2 10/15/2021     10/15/2021    CO2 19 10/15/2021    BUN 10 10/15/2021    CREATININE 0.72 10/15/2021    GFRAA >60.0 10/15/2021    LABGLOM >60.0 10/15/2021    GLUCOSE 92 10/15/2021    PROT 5.4 10/15/2021    LABALBU 3.1 10/15/2021    CALCIUM 8.3 10/15/2021    BILITOT 0.6 10/15/2021    ALKPHOS 87 10/15/2021    AST 19 10/15/2021    ALT 16 10/15/2021     BMP:    Lab Results   Component Value Date     10/15/2021    K 4.2 10/15/2021     10/15/2021    CO2 19 10/15/2021    BUN 10 10/15/2021    LABALBU 3.1 10/15/2021    CREATININE 0.72 10/15/2021    CALCIUM 8.3 10/15/2021    GFRAA >60.0 10/15/2021    LABGLOM >60.0 10/15/2021    GLUCOSE 92 10/15/2021     TSH:   Lab Results   Component Value Date    TSH 0.447 10/13/2021     Vitamin B12 and Folate: No components found for: FOLIC,  S11  Urinalysis:   Lab Results   Component Value Date    NITRU Negative 10/11/2021    WBCUA 0-2 10/11/2021    BACTERIA Negative 10/11/2021    RBCUA 3-5 10/11/2021    BLOODU MODERATE 10/11/2021    SPECGRAV 1.017 10/11/2021    GLUCOSEU Negative 10/11/2021           FUNCTIONAL ADL´S:     Independent: [ x ] Eating, [ x  ] Dressing, [   ] Transferring, [   ] [place], [   ] [place] Cancer, [   ] Continence  Dependent   : [  ] Eating, [   ] Dressing, [   ] Transferring, [   ] [place], [   ] [place] Cancer, [   ] Continence  W. assistant : [  ] Eating, [   ] Dressing, [ x ] Transferring, [   x] Toileting, [  x ] Bathing, [  x ] Continence    Radiology: Reviewed      MRI BRAIN W WO CONTRAST    Result Date: 10/13/2021  EXAMINATION: MRI BRAIN W WO CONTRAST CLINICAL HISTORY:  Extrapontine myelinolysis? Sudden onset encephalopathy. Brain metastasis from lung cancer. COMPARISONS: NONE AVAILABLE TECHNIQUE: Multiplanar multisequence images of the brain were obtained without contrast. Diffusion perfusion imaging was obtained. BRAIN MRI FINDINGS: Limited due to motion artifact. There are no extra-axial collections. There is no evidence of hemorrhage. There are no areas of perfusion diffusion signal abnormality to suggest ischemia. The susceptibility images do not demonstrate evidence of hemosiderin deposition within the brain parenchyma or the leptomeninges. There is preservation of the gray-white matter differentiation. There are numerous foci of T2/FLAIR hyperintensities in the subcortical and periventricular white matter in a symmetric distribution throughout both hemispheres. The sulci and ventricles are  within normal limits without evidence of hydrocephalus. The midline structures are intact, the corpus callosum is within normal limits. The region of the pineal gland and the sella turcica are unremarkable. There are no space-occupying lesions in the posterior fossa. The basilar cisterns are patent. The craniocervical junction is unremarkable. The visualized portions of the orbits are within normal limits, the globes are intact. The visualized portions of the paranasal sinuses are within normal limits. The calvarium and soft tissues are unremarkable. There are no acute intracranial changes, there is no evidence of hemorrhage or acute ischemia. Assessment and plan:  1. Encounter for palliative care  - changed pain medication to percocet Po as patient tolerates better. FU with PM or pall care in office post discharge as declines hospice care at this time.  Will SO from inpatient palliative care standpoint.      Electronically signed by ELOY Mann CNP on 10/15/2021 at 1:22 PM

## 2021-10-15 NOTE — PROGRESS NOTES
PODIATRIC MEDICINE AND SURGERY  CONSULT PROGRESS NOTE    Consulting Service:  Internal Medicine  Requesting Provider: Ezequiel Jay MD  Opinion/advice regarding: left ankle wound  Staff Doctor:  Amita Braden DPM    ASSESSMENT:  64 y.o. male with PMH significant for alcohol and tobacco abuse, CAD, COPD, lung cancer 5/2020, CKD, MI s/p cardiac stent, HTN, HLD who was admitted for frequent falls. Patient afebrile with leukocytosis of 16.3 and CRP of 117.6 upon presentation. Patient with hematoma to right shoulder and right pelvis, along with right proximal humerus fracture. Patient with chronic left ankle ulcer with underlying osteomyelitis. No acute surgical intervention warranted. Will continue with local wound care, abx per ID, and offloading. PLAN AND RECOMMENDATIONS[de-identified]  Patient seen and case discussed with staff, Dr. Coreen Rodriguez, who will provide final recommendations going forward  Patient afebrile with downtrending leukocytosis (16.3 -->14.6 --> 11.5) and elevated CRP of 117.6. Chronic left ankle ulcer stable with no progressive changes of osteomyelitis. No acute surgical intervention warranted. Will continue with local wound care and abx per ID. Wound care: daily dressing changes to left ankle ulcer with plurogel, 2x2s or 4x4s, and Kerlix. Nursing orders placed  WB as tolerated to LLE  Elevate LLE at or above heart level while resting  Antibiotic coverage per ID. Previous culture growing Providencia and Pasteurella. On IV rocephin. Plan for PICC line for IV rocephin x 3 weeks.   Pain management per primary team   Podiatry to follow while in house   Patient will need follow up with Dr. Coreen Rodriguez within 7-10 days of discharge  (order placed)    HPI:   Patient afebrile and HDS  WBC 11.5  Hgb stable today at 8.2 after transfusion  Improved mentation today  Reports significant pain to upper extremity secondary to injuries  Denies pain to lower extremity  Remains on IV rocephin ([id number] via Midline per ID)  Patient denies nausea, vomiting, diarrhea, fevers, chills, chest pain, shortness of breath, head ache, or calf pain. No other pedal complaints. Past Medical History:   Diagnosis Date    Alcohol abuse     quit drinking 8/18/21    CAD (coronary artery disease)     patient states no doctor has told him this / has 1 cardiac stent    Cancer ([facility] [address]     lung LLL    Chronic back pain     Chronic kidney disease     Chronic sinusitis     DJD (degenerative joint disease) of knee     left knee DJD    Elevated PSA     History of blood transfusion 1980's    History of inferior wall myocardial infarction 01/2016    1 cardiac stent    HTN (hypertension)     meds .  1 yr    Hyperlipidemia     meds > 12 yrs    NSTEMI (non-ST elevated myocardial infarction) ([facility] [address]     Smoker        Past Surgical History:   Procedure Laterality Date    ABDOMEN SURGERY  1961    spleenectomy due to [address]  2009    lumbar disc OR    CARDIAC SURGERY      stents    CARPAL TUNNEL RELEASE Right 5/6/2019    RIGHT CARPAL TUNNEL RELEASE performed by Elizabet Duenas MD at [place] [address] WITH STENT PLACEMENT  1/29/2016    EYE SURGERY      to have Phaco with IOL OU 2/2018    HERNIA REPAIR      JOINT REPLACEMENT Left 1994    LTHR    JOINT REPLACEMENT Right 2009    RTKR    KNEE SURGERY Right 2011    arthroscopy    HI MANIPULATN KNEE JT+ANESTHESIA Right 5/12/2017    RIGHT KNEE MANIPULATION UNDER ANESTHESIA performed by Betsy Mahoney MD at  [address] OFFICE/OUTPT VISIT,PROCEDURE ONLY Bilateral 12/29/2017    RIGHT AND BILATERAL L 4-5 LYSIS OF SCAR DISKECTOMY INTERBODY CAGE FUSION POSTEROLATERAL FUSION PEDICLE SCREWS performed by Elizabet Duenas MD at [address] [place] [address]  2004    benign    SPLENECTOMY  1961    TOTAL KNEE ARTHROPLASTY Right 3/24/2017    RIGHT  KNEE TOTAL ARTHROPLASTY, ELHAM CEMENTED PERSONA  performed by [facility] at [facility]       No current facility-administered medications on file prior to encounter. Current Outpatient Medications on File Prior to Encounter   Medication Sig Dispense Refill    omeprazole (PRILOSEC) 10 MG delayed release capsule TAKE ONE CAPSULE BY MOUTH DAILY 90 capsule 2    SODIUM CHLORIDE, EXTERNAL, 0.9 % SOLN Apply 1 Applicatorful topically daily 1000 mL 2    finasteride (PROSCAR) 5 MG tablet Take 1 tablet by mouth daily 90 tablet 3    citalopram (CELEXA) 40 MG tablet TAKE ONE TABLET BY MOUTH nightly 30 tablet 5    isosorbide mononitrate (IMDUR) 30 MG extended release tablet TAKE ONE TABLET BY MOUTH EVERY DAY 90 tablet 3    Fluticasone furoate-vilanterol (BREO ELLIPTA) 200-25 MCG/INH AEPB inhaler Inhale 1 puff into the lungs daily 1 each 3    atorvastatin (LIPITOR) 20 MG tablet TAKE ONE TABLET BY MOUTH DAILY 90 tablet 3    morphine (MS CONTIN) 15 MG extended release tablet Take 15 mg by mouth as needed.  tiotropium (SPIRIVA RESPIMAT) 2.5 MCG/ACT AERS inhaler Inhale 2 puffs into the lungs daily 1 Inhaler 3    metoprolol tartrate (LOPRESSOR) 50 MG tablet Take 0.5 tablets by mouth 2 times daily TAKE ONE TABLET BY MOUTH TWO TIMES A  tablet 3    albuterol sulfate  (90 Base) MCG/ACT inhaler Inhale 2 puffs into the lungs every 6 hours as needed for Wheezing 1 Inhaler 3    nitroGLYCERIN (NITROSTAT) 0.4 MG SL tablet Place 1 tablet under the tongue every 5 minutes as needed for Chest pain up to max of 3 total doses.  If no relief after 1 dose, call 911. 25 tablet 3    DOCUSATE CALCIUM PO Take by mouth as needed         No Known Allergies    Family History   Problem Relation Age of Onset    Osteoarthritis Mother     Emphysema Mother     Hypertension Father     Hearing Loss Father     Dementia Father     No Known Problems Sister     Prostate Cancer Brother     Colon Polyps Neg Hx        Social History     Socioeconomic History    Marital status:      Spouse name: Not on file    Number of children: Not on file    Years of education: Not on file    Highest education level: Not on file   Occupational History    Occupation: retired   Tobacco Use    Smoking status: Current Every Day Smoker     Packs/day: 0.50     Years: 60.00     Pack years: 30.00     Types: Cigarettes    Smokeless tobacco: Never Used    Tobacco comment: also smokes cigars   Vaping Use    Vaping Use: Never used   Substance and Sexual Activity    Alcohol use: Not Currently     Alcohol/week: 12.0 standard drinks     Types: 12 Shots of liquor per week     Comment: quit drinking aug 18th, 2021    Drug use: No    Sexual activity: Yes   Other Topics Concern    Not on file   Social History Narrative     He is  and lives alone    Type of Home: [address]: Able to Live on Main level with bedroom/bathroom, Two level, Performs ADL's on one level    Home Access: Stairs to enter with rails    Entrance Stairs - Number of Steps: 3- Rails: Both    Bathroom Shower/Tub: Tub/Shower unit--Shower chair, Grab bars in shower (does not use chair)    Home Equipment: Rolling walker, 4 wheeled walker, Oxygen    ADL Assistance: [facility]: Independent    Homemaking Responsibilities: Yes    Ambulation Assistance: Independent    Transfer Assistance: Independent    Active : Yes    He is retired from [address] [place] [address] Strain: Low Risk     Difficulty of Paying Living Expenses: Not very hard   Food Insecurity: No Food Insecurity    Worried About Running Out of Food in the Last Year: Never true    Yoav of Food in the Last Year: Never true   Transportation Needs: No Transportation Needs    Lack of Transportation (Medical): No    Lack of Transportation (Non-Medical):  No   Physical Activity:     Days of Exercise per Week:     Minutes of Exercise per Session:    Stress:     Feeling of Stress :    Social Connections:     Frequency of Communication with Friends and Family:     Frequency of Social Gatherings with Friends and Family:     Attends [employer] Services:     Active Member of Clubs or Organizations:     Attends Club or Organization Meetings:     Marital Status:    Intimate Partner Violence:     Fear of Current or Ex-Partner:     Emotionally Abused:     Physically Abused:     Sexually Abused:        Review of Systems  As noted in interval HPI    OBJECTIVE:  BP (!) 164/83   Pulse 66   Temp 99 °F (37.2 °C)   Resp 18   Ht 5' 6\" (1.676 m)   Wt 150 lb (68 kg)   SpO2 100%   BMI 24.21 kg/m²   Patient is alert and oriented     Vascular:   Palpable Dorsalis Pedis and Palpable Posterior Tibial Pulses B/L   Capillary Fill time < 3 seconds to B/L digits  Skin temperature warm to cool tibial tuberosity to the digits B/L  Hair growth absent to digits  mild edema, Diffuse varicosities, venous stasis dermatitis noted to lower legs B/L    Neurological:   Sensation to light touch intact B/L  Protective sensation via monofilament testing intact B/L    Musculoskeletal/Orthopaedic:   Structural Deformities: Pes planus foot structure bilateral  5/5 muscle strength Dorsiflexion, Plantarflexion, Inversion, Eversion B/L  + tenderness on palpation of left ankle ulcer    Dermatological:   Skin appears thin, dry, and flaky  Nails 1-5 B/L thickened, normal length, and with subungual debris  Interspaces 1-4 B/L are clear and without debris      Ulceration #1:   Location: left anterior ankle  Measurements: 1.3 cm x 2.5 cm x 0.1 cm  Base: Mixed Granular/Fibrotic  Borders:  Viable, thin  Exudate: no drainage   Comments: minimal periwound erythema and edema noted without extending proximally or distally. No probe deep. No purulence. No drainage. No malodor. No crepitus or fluctuance. No other signs of acute infection.             LABS:   Lab Results   Component Value Date    WBC 11.5 (H) 10/15/2021    HGB 8.2 (L) 10/15/2021    HCT 24.0 (L) 10/15/2021    MCV 93.3 10/15/2021     10/15/2021     Lab Results   Component Value Date     10/15/2021    K 4.2 10/15/2021     10/15/2021    CO2 19 10/15/2021    BUN 10 10/15/2021    CREATININE 0.72 10/15/2021    GLUCOSE 92 10/15/2021    CALCIUM 8.3 10/15/2021      Lab Results   Component Value Date    LABALBU 3.1 (L) 10/15/2021     No results found for: Dorian Duran  No results found for: CRP  No results found for: LABA1C    MICROBIOLOGY:   Wound Culture left ankle 8/17/21: Providencia, Pasteurella, anaerobic GPC  Blood Culture 10/12/21: in process    IMAGING:   Arterial duplex US 9/1/21:    Impression   NEGATIVE STUDY. LEFT:   Common femoral artery: 118 cm/s with triphasic waveform. Femoral artery proximal: 107 cm/s with biphasic waveform. Femoral artery mid: 131 cm/s with biphasic waveform. Femoral artery distal: 97 cm/s with biphasic waveform. Popliteal mid: 86 cm/s with biphasic waveform. Anterior tibial artery proximal: 119 cm/s with triphasic waveform. Anterior tibial artery mid: 54 cm/s with biphasic waveform. Anterior tibial artery distal: 54 cm/s with biphasic waveform. Posterior tibial artery proximal: 87 cm/s with biphasic waveform. Posterior tibial artery mid: 84 cm/s with biphasic waveform. Posterior tibial artery distal: 98 cm/s with biphasic waveform. Peroneal artery proximal: 42 cm/s with biphasic waveform. Peroneal artery mid: 48 cm/s with biphasic waveform. Peroneal artery distal: 45 cm/s with biphasic waveform. Bone scan 8/16/21:   Impression   LEFT TALUS OSTEOMYELITIS.       DIFFUSE DEGENERATIVE CHANGES. NO EVIDENCE OF METASTASIS.              Claudia George DPM PGY-2  Podiatric Surgery Resident  Podiatry On Call Pager: [phone]  October 15, 2021  1:39 PM

## 2021-10-15 NOTE — DISCHARGE SUMMARY
Hospital Medicine Discharge Summary    Marjorie Tam  :  1945  MRN:  [id number]    Admit date:  10/11/2021  Discharge date:  10/15/2021    Admitting Physician: Nando Beltran MD  Primary Care Physician:  Bessie Dixon MD      Discharge Diagnoses:    Principal Problem:    Hyponatremia  Active Problems:    Delirium due to general medical condition    Chronic bilateral low back pain with bilateral sciatica    Spinal stenosis of lumbar region with neurogenic claudication    Spondylolisthesis at L4-L5 level    Herniated nucleus pulposus, L4-5    CAD (coronary artery disease)    NSCLC of left lung (HCC)    Ataxic gait    Dizziness    Fracture, Col[place], right, closed    Subacute osteomyelitis, left ankle and foot (HCC)    Frequent falls    Chronic kidney disease    Fracture of right humerus    Hyperkalemia    Leukocytosis    Anemia    Acute hematogenous osteomyelitis, left ankle and foot (HCC)    Traumatic hematoma of right shoulder    Head injury    Humeral head fracture, right, closed, initial encounter    Pelvic hematoma, male, after a fall    Cause of injury, accidental fall, initial encounter    Tremor of unknown origin    Sundowning    Acute pain due to trauma  Resolved Problems:    * No resolved hospital problems. [facility] Course:    Marjorie Tam is a 64 y.o. male that was admitted and treated at [facility] for the following medical issues:     Acute fracture of the right humeral head / recent fracture of the left wrist and right thumb  - conservative therapy per Ortho    KEILY / hyponatremia  - in the setting of NSAID, Bactrim, diuretics,lung cancer  - resolved with saline infusion    Acute metabolic encephalopathy, dizziness,  frequent falls  - in the setting of KEILY, hyponatremia, chronic alcohol use  - CT and MRI were negative  - not a surgical candidate per neurosurgery  - started on thiamine and folate supplements  - followed by neurology     Acute / chronic pain  - on oxycodone,gabapentin,tylenol   - pain management following    Left ankle wound/OM  - continued IV Rocephin x 3 weeks via Midline per ID     Acute blood loss anemia   - Hb down to 6.3, baseline is around 11-12  - in the setting of large, right shoulder hematoma  - Hb improved s/p transfusion of 1 unit of PRBCs    CAD s/p PCI to RCA in 2016  - stopped Plavix due to anemia/ hematoma    NSCLC of LLL  - followed by pulmonology and oncology      Code Status: Full Code, changed to [facility], followed by pallative care       Disposition - SNF      Patient was seen by the following consultants while admitted to [facility]:   Consults:  [facility]  IP CONSULT TO NEPHROLOGY  IP CONSULT TO NEUROSURGERY  IP CONSULT TO PAIN MANAGEMENT  IP CONSULT TO PULMONOLOGY  IP CONSULT TO ONCOLOGY  IP CONSULT TO PODIATRY  IP CONSULT TO PHYSICAL MEDICINE REHAB  IP CONSULT TO PALLIATIVE CARE    Significant Diagnostic Studies:    CT ABDOMEN PELVIS WO CONTRAST Additional Contrast? None    Result Date: 10/11/2021  CT OF THE CHEST, ABDOMEN, AND PELVIS with intravenous contrast medium. HISTORY: Fall. Loss of consciousness. Receiving anticoagulation. Lower extremity weakness. History squamous cell carcinoma, left lower lobe. TECHNICAL FACTORS: CT imaging of the chest, abdomen, and pelvis, was obtained and formatted as 5 mm contiguous axial images from the thoracic inlet through the symphysis pubis. Sagittal and coronal reconstructions obtained during postprocessing. Intravenous contrast medium:  100 ml of Isovue-370 Comparison:  CT chest, September 7, 2021, January 14, 2021. PET/CT, September 23, 2021. CT abdomen pelvis, March 23, 2020 CT OF THE CHEST FINDINGS: Right lung: No nodules, masses, consolidation, pleural effusion, pneumothorax. Left lung: Pleural-based, noncalcified, mildly stellate 2.6 x 2.9 cm nodule, left lower lobe, again identified, unchanged.  No consolidation, pleural replacement. All CT scans at this facility use dose modulation, iterative reconstruction, and/or weight based dosing when appropriate to reduce radiation dose to as low as reasonably achievable. XR HAND RIGHT (MIN 3 VIEWS)    Result Date: 10/12/2021  EXAMINATION: XR HAND RIGHT (MIN 3 VIEWS)  CLINICAL HISTORY:  fall COMPARISONS: AUGUST 18, 2021 1157 HOURS  FINDINGS: Three views of the right hand are submitted. There is diffuse generalized osteopenia. There is a comminuted fracture of the distal phalanx of the right first finger. No dislocations. There are degenerative changes seen at the first carpometacarpal joint space as well as within the region of the radial styloid. COMMINUTED FRACTURE OF THE DISTAL PHALANX OF THE RIGHT FIRST FINGER. CT Head WO Contrast    Result Date: 10/11/2021  CT Brain. Contrast medium:  without contrast.. History:  Fall. Loss of consciousness. Invasive squamous cell carcinoma, left lower lobe. Technical factors: CT imaging of the brain was obtained and formatted as 5 mm contiguous axial images. 2.5 mm contiguous axial images were obtained through the osseous structures. Sagittal and coronal reconstruction obtained during postprocessing. Comparison:  CT brain, August 18, 2021. Teresa Bryan Findings: Extra-axial spaces:  Normal. Intracranial hemorrhage:  None. Ventricular system: Ventricles mildly enlarged. Sulci mildly prominent. Basal Cisterns:  Normal. Cerebral Parenchyma: Bilateral symmetric periventricular areas decreased attenuation. Midline Shift:  None. Cerebellum:  Normal. Paranasal sinuses and mastoid air cells:  Normal. Visualized Orbits:  Normal.     Impression: Mild cerebral atrophy. Chronic ischemic white matter disease. All CT scans at this facility use dose modulation, iterative reconstruction, and/or weight based dosing when appropriate to reduce radiation dose to as low as reasonably achievable. CT CHEST WO CONTRAST    Result Date: 10/11/2021  CT OF THE CHEST, ABDOMEN, AND PELVIS with intravenous contrast medium. HISTORY: Fall. Loss of consciousness. Receiving anticoagulation. Lower extremity weakness. History squamous cell carcinoma, left lower lobe. TECHNICAL FACTORS: CT imaging of the chest, abdomen, and pelvis, was obtained and formatted as 5 mm contiguous axial images from the thoracic inlet through the symphysis pubis. Sagittal and coronal reconstructions obtained during postprocessing. Intravenous contrast medium:  100 ml of Isovue-370 Comparison:  CT chest, September 7, 2021, January 14, 2021. PET/CT, September 23, 2021. CT abdomen pelvis, March 23, 2020 CT OF THE CHEST FINDINGS: Right lung: No nodules, masses, consolidation, pleural effusion, pneumothorax. Left lung: Pleural-based, noncalcified, mildly stellate 2.6 x 2.9 cm nodule, left lower lobe, again identified, unchanged. No consolidation, pleural effusion, pneumothorax. Lymph nodes: No hilar, mediastinal, or axillary lymph node enlargement. Thoracic aorta: Normal in course and caliber. Cardiac Size: Normal. Pericardial effusion: None. Musculoskeletal:No osteoblastic, and no osteolytic lesions. Remote fractures left sixth through eighth ribs. 2.6 x 2.9 cm pleural-based left lower lobe nodule, in patient with known clinical history squamous cell carcinoma left lower lobe. No acute fractures. Remote fractures left sixth through eighth ribs CT OF THE ABDOMEN AND PELVIS WITH INTRAVENOUS CONTRAST MEDIUM. FINDINGS: Liver:  Normal in size, shape, and attenuation. Bile Ducts:  Normal in caliber. Gallbladder:  No stones or wall thickening. Pancreas:  Normal without masses, cysts, ductal dilatation or calcification. Spleen:  Surgically absent. Kidneys:  Normal in size. No hydronephrosis, masses, or stones. Mild bilateral perinephric fat stranding.  Adrenals:  Normal. Stomach: Exophytic, calcified 1.6 cm nodule, gastric cardia again identified, unchanged. Small bowel:  Normal in caliber. Appendix:  Normal. Colon:  Normal in caliber. Peritoneum:  No ascites, free air, or fluid collections. Vessels: Aorta normal in course and caliber. Lymph nodes:  Retroperitoneal:  No enlarged retroperitoneal lymph nodes. Mesenteric:  No enlarged mesenteric lymph nodes. Pelvic: No enlarged pelvic lymph nodes. Ureters: Normal in course and caliber. No calcifications. Bladder: No wall thickening. Reproductive organs: No pelvic masses. Abdominal Wall: Increased attenuation measuring 4.5 x 4.6 x 8.2 cm within right pelvic soft tissue (series 2, image 89). Bones:  No bone lesions. Lumbar scoliosis convexity to left apex L3. Remote internal fixation L4, and L5. Remote left hip replacement. IMPRESSION: Soft tissue hematoma, right pelvis, measuring 4.5 x 4.6 x 8.2 cm. Splenectomy. No change, exophytic, calcified gastric nodule as discussed. Remote internal fixation L4 and L5. Left hip replacement. All CT scans at this facility use dose modulation, iterative reconstruction, and/or weight based dosing when appropriate to reduce radiation dose to as low as reasonably achievable. CT CERVICAL SPINE WO CONTRAST    Result Date: 10/11/2021  CT cervical spine without intravenous contrast medium. HISTORY: Fall. Loss of consciousness. History of invasive squamous carcinoma left lower lobe. TECHNICAL FACTORS: CT cervical spine obtained and formatted as 2.5 mm contiguous axial images from skull base to the level of. Sagittal and coronal reconstructions were obtained during postprocessing. No contrast medium was utilized. COMPARISON: CT cervical spine, August 18, 2021 FINDINGS: Cervical vertebral bodies are normal in height height. Loss cervical lordosis. 4.3 mm anterolisthesis C7 on T1, unchanged. Atlantooccipital articulation maintained. Atlantoaxial interval preserved. Neuroforaminal narrowing, left C2-3, left C3-4, bilateral C4-5, right 7, right C7-T1.  Diffuse disc space narrowing, posterior C2-3. Diffuse narrowing L4-5 T5-6, and C6-7. No fractures, dislocations, bone lesions. Limited imaging lung apices without anomaly. Bilateral carotid artery calcification. Soft tissues are without anomaly. No fracture. Severe degenerative change cervical spine. Grade 1 C7 spondylolisthesis, unchanged. Bilateral carotid artery calcification. If clinical concern warrants, carotid sonography may be utilized for further evaluation. All CT scans at this facility use dose modulation, iterative reconstruction, and/or weight based dosing when appropriate to reduce radiation dose to as low as reasonably achievable. CT THORACIC SPINE WO CONTRAST    Result Date: 10/11/2021  CT thoracic spine without intravenous contrast medium. HISTORY: Fall. Thoracic and parathoracic tenderness. Loss of consciousness. History squamous cell carcinoma left lower lobe. TECHNICAL FACTORS: CT thoracic spine obtained and formatted as 2.5 mm contiguous axial images from skull base to the level of. Sagittal and coronal reconstructions were obtained during postprocessing. No contrast medium was utilized. COMPARISON: CT chest, December 7, 2021. PET/CT, December 23, 2021. FINDINGS: Thoracic vertebral bodies are normal in height and alignment Diffuse mild disc space narrowing. No fractures, dislocations, bone lesions. Limited imaging right and left lung lung zone shows a partially imaged pleural-based noncalcified mildly stellate shaped mass, similar to that found on CT chest from September 7, 2021. Peripherally calcified, exophytic 1.9 cm gastric soft tissue nodule, unchanged. No fracture. Left lower lobe mass. Calcified exophytic gastric mass. All CT scans at this facility use dose modulation, iterative reconstruction, and/or weight based dosing when appropriate to reduce radiation dose to as low as reasonably achievable.  .    CT LUMBAR SPINE WO CONTRAST    Result Date: 10/11/2021  CT lumbar spine without intravenous contrast subcutaneous edema/swelling and stranding seen about the shoulder and lateral aspect of the arm. Correlate with patient history and exam.     1. COMMINUTED DISPLACED FRACTURE OF THE RIGHT HUMERAL HEAD. 2. OTHER FINDINGS DETAILED ABOVE All CT scans at this facility use dose modulation, iterative reconstruction, and/or weight based dosing when appropriate to reduce radiation dose to as low as reasonably achievable. Discharge Medications:       [facility]   Home Medication Instructions ZOF:[id number]    Printed on:10/15/21 5237   Medication Information                      albuterol sulfate  (90 Base) MCG/ACT inhaler  Inhale 2 puffs into the lungs every 6 hours as needed for Wheezing             atorvastatin (LIPITOR) 20 MG tablet  TAKE ONE TABLET BY MOUTH DAILY             bacitracin zinc 500 UNIT/GM ointment  Apply topically 2 times daily. cefTRIAXone (ROCEPHIN) infusion  Infuse 1,000 mg intravenously every 24 hours for 21 days Compound per protocol  CBC BMP weekly  CRP in 2 weeks  Fax results to [phone]  Follow-up with me in 3 weeks at [phone]             citalopram (CELEXA) 40 MG tablet  TAKE ONE TABLET BY MOUTH nightly             DOCUSATE CALCIUM PO  Take by mouth as needed             finasteride (PROSCAR) 5 MG tablet  Take 1 tablet by mouth daily             Fluticasone furoate-vilanterol (BREO ELLIPTA) 200-25 MCG/INH AEPB inhaler  Inhale 1 puff into the lungs daily             folic acid (FOLVITE) 1 MG tablet  Take 1 tablet by mouth daily             gabapentin (NEURONTIN) 100 MG capsule  Take 1 capsule by mouth 3 times daily for 30 days.              isosorbide mononitrate (IMDUR) 30 MG extended release tablet  TAKE ONE TABLET BY MOUTH EVERY DAY             lidocaine 4 % external patch  Place 3 patches onto the skin daily             metoprolol tartrate (LOPRESSOR) 50 MG tablet  Take 0.5 tablets by mouth 2 times daily TAKE ONE TABLET BY MOUTH TWO TIMES A DAY morphine (MS CONTIN) 15 MG extended release tablet  Take 15 mg by mouth as needed. nitroGLYCERIN (NITROSTAT) 0.4 MG SL tablet  Place 1 tablet under the tongue every 5 minutes as needed for Chest pain up to max of 3 total doses. If no relief after 1 dose, call 911. omeprazole (PRILOSEC) 10 MG delayed release capsule  TAKE ONE CAPSULE BY MOUTH DAILY             oxyCODONE-acetaminophen (PERCOCET) 7.5-325 MG per tablet  Take 1 tablet by mouth every 6 hours as needed for Pain for up to 3 days. SODIUM CHLORIDE, EXTERNAL, 0.9 % SOLN  Apply 1 Applicatorful topically daily             thiamine 100 MG tablet  Take 1 tablet by mouth daily             tiotropium (SPIRIVA RESPIMAT) 2.5 MCG/ACT AERS inhaler  Inhale 2 puffs into the lungs daily             tiZANidine (ZANAFLEX) 4 MG tablet  Take 0.5 tablets by mouth every 6 hours as needed (muscle spasm)                 Disposition:   Discharged to SNF. Any [facility] needs that were indicated and/or required as been addressed and set up by Social Work. Condition at discharge: Pt was medically stable at the time of discharge. Activity: activity as tolerated, fall precautions. Total time taken for discharging this patient: 40 minutes. Greater than 70% of time was spent focused exclusively on this patient. Time was taken to review chart, discuss plans with consultants, reconciling medications, discussing plan answering questions with patient. Signed:  Andrzej Louis MD  10/15/2021, 12:45 PM  ----------------------------------------------------------------------------------------------------------------------    Nicole Cleary,     Please return to ER or call 911 if you develop any significant signs or symptoms.      I may not have addressed all of your medical illnesses or the abnormal blood work or imaging therefore please ask your PCP Ally Fuentes MD and other out patient specialists and providers  to obtain [facility] record entirely to follow up on all of the abnormal labs, imaging and findings that I have and have not addressed during your hospitalization. Discharging you from the hospital does not mean that your medical care ends here and now. You may still need additional work up, investigation, monitoring, and treatment to be handled from this point on by outside providers including your PCP, Bessie Dixon MD , Specialists and other healthcare providers. Please review your list of discharge medications prior to resuming medications you might still have at home, as the medications you need to be taking, dosages or how often you must take them may have changed. For medication questions, contact your retail pharmacy and your PCP, Bessie Dixon MD .     ** I STRONGLY RECOMMEND that you follow up with Bessie Dixon MD within 3 to 5 days for a post hospitalization evaluation. This specific office visit is covered by your insurance, and is not the same as your annual doctor visit/ check up. This office visit is important, as it may prevent need for repeat and/or future hospitalizations. **    Your medical team at [facility] ([facility]) appreciates the opportunity to work with you to get well!     Sincerely,  Ed Reyes MD

## 2021-10-15 NOTE — PROGRESS NOTES
Physical Therapy Med Surg Daily Treatment Note  Facility/Department: [facility]  [facility]       NAME: Char Gonzalez  : 1945 (3 y.o.)  MRN: [id number]  CODE STATUS: Full Code    Date of Service: 10/15/2021    Patient Diagnosis(es): Hyperkalemia [E87.5]  Hyponatremia [E87.1]  General weakness [R53.1]  KEILY (acute kidney injury) ([address] [N17.9]  Injury of head, initial encounter [Q88.61DN]  Fall, initial encounter [W19. XXXA]  Closed nondisplaced fracture of distal phalanx of right thumb, initial encounter [S62.524A]  Skin tear of right elbow without complication, initial encounter [S51.011A]  Abdominal pain, unspecified abdominal location [R10.9]  Closed fracture of proximal end of right humerus, unspecified fracture morphology, initial encounter [S42.201A]  Acute bilateral thoracic back pain [M54.6]   Chief Complaint   Patient presents with    Fall     Patient Active Problem List    Diagnosis Date Noted    Bilateral carotid artery stenosis 2020    Essential hypertension 2020    Pelvic hematoma, male, after a fall 10/13/2021    Cause of injury, accidental fall, initial encounter 10/13/2021    Tremor of unknown origin 10/13/2021    Sundowning 10/13/2021    Acute pain due to trauma 10/13/2021    Humeral head fracture, right, closed, initial encounter     Traumatic hematoma of right shoulder     Head injury     Hyponatremia 10/11/2021    Frequent falls 10/11/2021    Chronic kidney disease     Fracture of right humerus     Hyperkalemia     Leukocytosis     Anemia     Acute hematogenous osteomyelitis, left ankle and foot ([address]     Non-pressure chronic ulcer of left ankle with fat layer exposed ([address] 2021    Atherosclerosis of native arteries of extremities with rest pain, left leg ([address] 2021    Subacute osteomyelitis, left ankle and foot ([address] 2021    Osteomyelitis of ankle ([address] 2021    Heroin abuse ([address] 2021    Fracture, Colles, right, closed 05/26/2021    Ataxic gait 03/18/2021    Dizziness 03/18/2021    NSCLC of left lung ([facility] 11/13/2020    Lung nodule seen on imaging study 03/27/2020    CAD (coronary artery disease) 01/10/2020    Chest pain 01/10/2020    FELDER (dyspnea on exertion) 08/22/2019    Angina effort 05/01/2019    Dyslipidemia 05/01/2019    Carpal tunnel syndrome on right 04/09/2019    Carpal tunnel syndrome on left 04/09/2019    Biliary dyskinesia 03/07/2019    Right upper quadrant abdominal pain 02/15/2019    Gallbladder polyp 02/15/2019    NSTEMI (non-ST elevated myocardial infarction) ([facility] 03/29/2018    S/P PTCA (percutaneous transluminal coronary angioplasty) 03/29/2018    Herniated nucleus pulposus, L4-5 12/29/2017    Anesthesia 12/26/2017    HNP (herniated nucleus pulposus), lumbar 11/16/2017    Spondylolisthesis at L4-L5 level 11/16/2017    DDD (degenerative disc disease), lumbar 10/19/2017    Osteoarthritis of spine with myelopathy, lumbosacral region 10/19/2017    Chronic bilateral low back pain with bilateral sciatica 10/19/2017    Postlaminectomy syndrome, lumbar region 10/19/2017    Acquired spondylolisthesis of lumbosacral region 10/19/2017    Spinal stenosis of lumbar region with neurogenic claudication 10/19/2017    Delirium due to general medical condition 04/20/2017    Left ankle pain 03/29/2016    Charcot's joint of left ankle 03/24/2016    Pure hypercholesterolemia 01/29/2016    Sacroiliitis, not elsewhere classified ([facility] 01/25/2016    Spondylosis of lumbar region without myelopathy or radiculopathy 12/28/2015    Primary osteoarthritis of right knee 08/25/2015    Elevated PSA     Tobacco use 04/24/2014    Hip pain 04/24/2014    Knee pain 04/24/2014    Chronic back pain 04/24/2014        Past Medical History:   Diagnosis Date    Alcohol abuse     quit drinking 8/18/21    CAD (coronary artery disease)     patient states no doctor has told him this / has 1 cardiac stent    Cancer ([facility]     lung LLL    Chronic back pain     Chronic kidney disease     Chronic sinusitis     DJD (degenerative joint disease) of knee     left knee DJD    Elevated PSA     History of blood transfusion 1980's    History of inferior wall myocardial infarction 01/2016    1 cardiac stent    HTN (hypertension)     meds . 1 yr    Hyperlipidemia     meds > 12 yrs    NSTEMI (non-ST elevated myocardial infarction) ([facility]     Smoker      Past Surgical History:   Procedure Laterality Date    ABDOMEN SURGERY  1961    spleenectomy due to [address]  2009    lumbar disc OR    CARDIAC SURGERY      stents    CARPAL TUNNEL RELEASE Right 5/6/2019    RIGHT CARPAL TUNNEL RELEASE performed by Chan Gonzalez MD at [place] [address] WITH STENT PLACEMENT  1/29/2016    EYE SURGERY      to have Phaco with IOL OU 2/2018    HERNIA REPAIR      JOINT REPLACEMENT Left 1994    LTHR    JOINT REPLACEMENT Right 2009    RTKR    KNEE SURGERY Right 2011    arthroscopy    PA MANIPULATN KNEE JT+ANESTHESIA Right 5/12/2017    RIGHT KNEE MANIPULATION UNDER ANESTHESIA performed by Osvaldo Gerard MD at  [address] OFFICE/OUTPT VISIT,PROCEDURE ONLY Bilateral 12/29/2017    RIGHT AND BILATERAL L 4-5 LYSIS OF SCAR DISKECTOMY INTERBODY CAGE FUSION POSTEROLATERAL FUSION PEDICLE SCREWS performed by Chan Gonzalez MD at [address] [place] [address]  2004    benign    [address] Right 3/24/2017    RIGHT  KNEE TOTAL ARTHROPLASTY, ELHAM CEMENTED PERSONA  performed by Osvaldo Gerard MD at [facility]       Restrictions  Restrictions/Precautions: ROM Restrictions; Fall Risk  Required Braces or Orthoses  Right Upper Extremity Brace/Splint: Sling    SUBJECTIVE   General  Chart Reviewed:  Yes  Additional Pertinent Hx: L UE chronic malunion of L distal radius fx  Subjective  Subjective: \"i sat up in the chair a bit yesterday\"  General Comment  Comments: reports no hallucinations today    Pre-Session Pain Report     Pain Screening  Patient Currently in Pain: Denies       Post-Session Pain Report  Pain Assessment  Pain Assessment: 0-10  Pain Level: 0         OBJECTIVE   Orientation  Overall Orientation Status: Within Functional Limits    Bed mobility  Supine to Sit: Moderate assistance;2 Person assistance;Minimal assistance  Sit to Supine: Moderate assistance  Comment: pt with improved technique with bed mobility despite pain    Transfers  Sit to Stand: Moderate Assistance;2 Person Assistance  Stand to sit: Moderate Assistance;2 Person Assistance  Comment: very retropulsive with wbos. pt very fearful of falling    Ambulation  Ambulation?: Yes  Ambulation 1  Surface: level tile  Device: Hand-Held Assist (left hand held assistance. 2nd person with hand supporting under right elbow)  Assistance: Moderate assistance;2 Person assistance  Quality of Gait: retropulsive, wbos, unsteady , short step length  Distance: 6 steps x 2. Comments: pt sat up in chair and was uncomfortable. returned back to bed. Stairs/Curb  Stairs?: No           ASSESSMENT pt agreeable to get up but was fearful when he was standing that he would fall. Needed to sit down and regroup before standing again. Pt was not comfortable in the bedside chair and requested to go back to bed. Discharge Recommendations:  Continue to assess pending progress, Patient would benefit from continued therapy after discharge    Goals  Long term goals  Long term goal 1: Bed mobility with indep   Long term goal 2: Functional transfers with supervision   Long term goal 3: Standing with no UE support x 1 min with SBA  Long term goal 4: Amb 50ft Min A with appropriate AD  Long term goal 5: SBA for HEP to improve LE strength, balance, activity tolerance  Patient Goals   Patient goals : \"to get home and see my dog. \"    PLAN    Times per week: 3-6        AMPA (6 CLICK) BASIC MOBILITY  AM-PAC Inpatient Mobility Raw Score : 12 Therapy Time   Individual   Time In  1358   Time Out 1421   Minutes 23   8 minutes gt  15 minutes bed mob/transfers          [facility] CUAUHTEMOC MINNIEANIBALADILSON, 10/15/21 at 2:38 PM         Definitions for assistance levels  Independent = pt does not require any physical supervision or assistance from another person for activity completion. Device may be needed.   Stand by assistance = pt requires verbal cues or instructions from another person, close to but not touching, to perform the activity  Minimal assistance= pt performs 75% or more of the activity; assistance is required to complete the activity  Moderate assistance= pt performs 50% of the activity; assistance is required to complete the activity  Maximal assistance = pt performs 25% of the activity; assistance is required to complete the activity  Dependent = pt requires total physical assistance to accomplish the task

## 2021-10-16 LAB — FERRITIN: 310.5 NG/ML (ref 30–400)

## 2021-10-16 PROCEDURE — 95816 EEG AWAKE AND DROWSY: CPT | Performed by: PSYCHIATRY & NEUROLOGY

## 2021-10-17 LAB — VITAMIN B1 WHOLE BLOOD: 71 NMOL/L (ref 70–180)

## 2021-10-17 NOTE — PROCEDURES
[address]                      [address] [facility], [address] [facility]                          ELECTROENCEPHALOGRAM REPORT    PATIENT NAME: Jun García                   :        1945  MED REC NO:   [id number]                            ROOM:       [facility]  ACCOUNT NO:   [de-identified]                           ADMIT DATE: 10/11/2021  PROVIDER:     Constance gNuyen MD    DATE OF EEG:  10/13/2021    MEDICATIONS:  Neurontin, Celexa, Lopressor. EEG FINDINGS:  This is a spontaneous 21-channel EEG recording for this  patient with questionable seizures. The background rhythm of this EEG  shows well-regulated 8-9 Hz posterior dominant rhythm of alpha. This is  symmetrical and attenuates with eye opening. EKG is monitored and this  is regular. Photic stimulation is performed without any photic  responses. Hyperventilation is performed with good effort without any  change in frequencies. The record remains in an awake phase mostly. IMPRESSION:  This is an essentially normal EEG recording. Clinical  correlation is recommended.         Bhumi Mauricio MD    D: 10/16/2021 12:58:26       T: 10/16/2021 14:20:26     [id number]  Job#: [id number]     Doc#: [id number]    CC:

## 2021-10-18 LAB
BLOOD CULTURE, ROUTINE: NORMAL
CULTURE, BLOOD 2: NORMAL
GFR AFRICAN AMERICAN: 44
GFR NON-AFRICAN AMERICAN: 37
PERFORMED ON: ABNORMAL
POC CREATININE: 1.8 MG/DL (ref 0.8–1.3)
POC SAMPLE TYPE: ABNORMAL

## 2021-10-18 NOTE — PROGRESS NOTES
Physical Therapy  Facility/Department: [facility] N345/[id number]  Physical Therapy Discharge      NAME: Cody Khan    : 1945 (89 y.o.)  MRN: [id number]    Account: [de-identified]  Gender: male      Patient has been discharged from acute care hospital. DC patient from current PT program.      Electronically signed by Kayden Johns PT on 10/18/21 at 12:45 PM EDT

## 2021-10-26 ENCOUNTER — HOSPITAL ENCOUNTER (OUTPATIENT)
Dept: ORTHOPEDIC SURGERY | Age: 76
Discharge: HOME | End: 2021-10-28
Payer: COMMERCIAL

## 2021-10-26 ENCOUNTER — OFFICE VISIT (OUTPATIENT)
Dept: ORTHOPEDIC SURGERY | Age: 76
End: 2021-10-26
Payer: MEDICARE

## 2021-10-26 VITALS
BODY MASS INDEX: 24.11 KG/M2 | RESPIRATION RATE: 16 BRPM | OXYGEN SATURATION: 85 % | TEMPERATURE: 97.1 F | HEART RATE: 63 BPM | WEIGHT: 150 LBS | HEIGHT: 66 IN

## 2021-10-26 DIAGNOSIS — S42.291D OTHER CLOSED DISPLACED FRACTURE OF PROXIMAL END OF RIGHT HUMERUS WITH ROUTINE HEALING, SUBSEQUENT ENCOUNTER: ICD-10-CM

## 2021-10-26 DIAGNOSIS — S42.291D OTHER CLOSED DISPLACED FRACTURE OF PROXIMAL END OF RIGHT HUMERUS WITH ROUTINE HEALING, SUBSEQUENT ENCOUNTER: Primary | ICD-10-CM

## 2021-10-26 PROCEDURE — 73030 X-RAY EXAM OF SHOULDER: CPT | Performed by: ORTHOPAEDIC SURGERY

## 2021-10-26 PROCEDURE — 73030 X-RAY EXAM OF SHOULDER: CPT

## 2021-10-26 PROCEDURE — 99024 POSTOP FOLLOW-UP VISIT: CPT | Performed by: ORTHOPAEDIC SURGERY

## 2021-10-26 PROCEDURE — L3670 SO ACRO/CLAV CAN WEB PRE OTS: HCPCS | Performed by: ORTHOPAEDIC SURGERY

## 2021-10-26 NOTE — PROGRESS NOTES
Subjective:      Patient ID: Karan Michele is a 64 y.o. male who presents today for:  Chief Complaint   Patient presents with   [facility] ED Follow-up     right humerus fracture done 10/11/2021. Pt blacked out and fell. Pt rates pain 7/10 today. HPI:    The patient is seen for initial follow-up of right proximal humerus fracture. He was seen as hospital consult approximately 2 weeks ago. Also has history of remote left wrist fracture with associated malunion and functional limitation. He is company by his family today. Patient has had difficulty utilizing sling with swath. Notes that he has a \"popping\" sensation with attempts at range of motion. Has been restricting his activity with respect to lifting and shoulder motion specifically. Has been previously provided with instruction on pendulum exercises to minimize arthrofibrosis in the setting of immobilization. Past Medical History:   Diagnosis Date    Alcohol abuse     quit drinking 8/18/21    CAD (coronary artery disease)     patient states no doctor has told him this / has 1 cardiac stent    Cancer ([facility]     lung LLL    Chronic back pain     Chronic kidney disease     Chronic sinusitis     DJD (degenerative joint disease) of knee     left knee DJD    Elevated PSA     History of blood transfusion 1980's    History of inferior wall myocardial infarction 01/2016    1 cardiac stent    HTN (hypertension)     meds .  1 yr    Hyperlipidemia     meds > 12 yrs    NSTEMI (non-ST elevated myocardial infarction) ([address]     Smoker      Past Surgical History:   Procedure Laterality Date    ABDOMEN SURGERY  1961    spleenectomy due to [address]  2009    lumbar disc OR    CARDIAC SURGERY      stents    CARPAL TUNNEL RELEASE Right 5/6/2019    RIGHT CARPAL TUNNEL RELEASE performed by Mnaju Feliz MD at [address] [place] [address] WITH STENT PLACEMENT  1/29/2016    EYE SURGERY      to have Phaco with IOL OU 2/2018    HERNIA REPAIR      JOINT REPLACEMENT Left 1994    LTHR    JOINT REPLACEMENT Right 2009    RTKR    KNEE SURGERY Right 2011    arthroscopy    [place] MANIPULATN KNEE JT+ANESTHESIA Right 5/12/2017    RIGHT KNEE MANIPULATION UNDER ANESTHESIA [facility] [address] [facility]PT [facility]ISIT,PROCEDURE ONLY Bilateral 12/29/2017    RIGHT AND BILATERAL L 4-5 LYSIS OF SCAR DISKECTOMY INTERBODY CAGE FUSION POSTEROLATERAL FUSION PEDICLE SCREWS performed by María Valderrama MD at [address] [place] [address]  2004    benign    SPLENECTOMY  1961    TOTAL KNEE ARTHROPLASTY Right 3/24/2017    RIGHT  KNEE TOTAL ARTHROPLASTY, Sandra Spittle CEMENTED PERSONA  performed by Mark Gardiner MD at [address] [facility] History     Socioeconomic History    Marital status:      Spouse name: Not on file    Number of children: Not on file    Years of education: Not on file    Highest education level: Not on file   Occupational History    Occupation: retired   Tobacco Use    Smoking status: Current Every Day Smoker     Packs/day: 0.50     Years: 60.00     Pack years: 30.00     Types: Cigarettes    Smokeless tobacco: Never Used    Tobacco comment: also smokes cigars   Vaping Use    Vaping Use: Never used   Substance and Sexual Activity    Alcohol use: Not Currently     Alcohol/week: 12.0 standard drinks     Types: 12 Shots of liquor per week     Comment: quit drinking aug 18th, 2021    Drug use: No    Sexual activity: Yes   Other Topics Concern    Not on file   Social History Narrative     He is  and lives alone    Type of Home: [address] [facility] in [address]: Able to Live on Main level with bedroom/bathroom, Two level, Performs ADL's on one level    Home Access: Stairs to enter with rails    Entrance Stairs - Number of Steps: 3- Rails: Both    Bathroom Shower/Tub: Tub/Shower unit--Shower chair, Grab bars in shower (does not use chair)    Home Equipment: Rolling walker, 4 wheeled walker, Oxygen    ADL Assistance: Independent    Homemaking Assistance: Independent    Homemaking Responsibilities: Yes    Ambulation Assistance: Independent    Transfer Assistance: Independent    Active : Yes    He is retired from [address] [place] [address] Strain: Low Risk     Difficulty of Paying Living Expenses: Not very hard   Food Insecurity: No Food Insecurity    Worried About Running Out of Food in the Last Year: Never true    Yoav of Food in the Last Year: Never true   Transportation Needs: No Transportation Needs    Lack of Transportation (Medical): No    Lack of Transportation (Non-Medical): No   Physical Activity:     Days of Exercise per Week:     Minutes of Exercise per Session:    Stress:     Feeling of Stress :    Social Connections:     Frequency of Communication with Friends and Family:     Frequency of Social Gatherings with Friends and Family:     Attends Christianity Services:     Active Member of Clubs or Organizations:     Attends Club or Organization Meetings:     Marital Status:    Intimate Partner Violence:     Fear of Current or Ex-Partner:     Emotionally Abused:     Physically Abused:     Sexually Abused:      Family History   Problem Relation Age of Onset    Osteoarthritis Mother     Emphysema Mother     Hypertension Father     Hearing Loss Father     Dementia Father     No Known Problems Sister     Prostate Cancer Brother     Colon Polyps Neg Hx      No Known Allergies  Current Outpatient Medications on File Prior to Visit   Medication Sig Dispense Refill    folic acid (FOLVITE) 1 MG tablet Take 1 tablet by mouth daily 30 tablet 3    gabapentin (NEURONTIN) 100 MG capsule Take 1 capsule by mouth 3 times daily for 30 days.  90 capsule 0    lidocaine 4 % external patch Place 3 patches onto the skin daily 1 box 1    tiZANidine (ZANAFLEX) 4 MG tablet Take 0.5 tablets by mouth every 6 hours as needed (muscle spasm) 30 tablet 0    thiamine 100 MG tablet Take 1 tablet by mouth daily 30 tablet 3    cefTRIAXone (ROCEPHIN) infusion Infuse 1,000 mg intravenously every 24 hours for 21 days Compound per protocol  CBC BMP weekly  CRP in 2 weeks  Fax results to [phone]  Follow-up with me in 3 weeks at [id number] 21 g 0    omeprazole (PRILOSEC) 10 MG delayed release capsule TAKE ONE CAPSULE BY MOUTH DAILY 90 capsule 2    SODIUM CHLORIDE, EXTERNAL, 0.9 % SOLN Apply 1 Applicatorful topically daily 1000 mL 2    finasteride (PROSCAR) 5 MG tablet Take 1 tablet by mouth daily 90 tablet 3    citalopram (CELEXA) 40 MG tablet TAKE ONE TABLET BY MOUTH nightly 30 tablet 5    tiotropium (SPIRIVA RESPIMAT) 2.5 MCG/ACT AERS inhaler Inhale 2 puffs into the lungs daily 1 Inhaler 3    isosorbide mononitrate (IMDUR) 30 MG extended release tablet TAKE ONE TABLET BY MOUTH EVERY DAY 90 tablet 3    metoprolol tartrate (LOPRESSOR) 50 MG tablet Take 0.5 tablets by mouth 2 times daily TAKE ONE TABLET BY MOUTH TWO TIMES A  tablet 3    Fluticasone furoate-vilanterol (BREO ELLIPTA) 200-25 MCG/INH AEPB inhaler Inhale 1 puff into the lungs daily 1 each 3    atorvastatin (LIPITOR) 20 MG tablet TAKE ONE TABLET BY MOUTH DAILY 90 tablet 3    albuterol sulfate  (90 Base) MCG/ACT inhaler Inhale 2 puffs into the lungs every 6 hours as needed for Wheezing 1 Inhaler 3    morphine (MS CONTIN) 15 MG extended release tablet Take 15 mg by mouth as needed.  nitroGLYCERIN (NITROSTAT) 0.4 MG SL tablet Place 1 tablet under the tongue every 5 minutes as needed for Chest pain up to max of 3 total doses. If no relief after 1 dose, call 911. 25 tablet 3    DOCUSATE CALCIUM PO Take by mouth as needed       No current facility-administered medications on file prior to visit.         Acute and Chronic Pain Monitoring:   RX Monitoring 10/12/2021   Attestation -   Periodic Controlled Substance Monitoring Possible medication side effects, risk of tolerance/dependence & alternative treatments discussed. ;No signs of potential drug abuse or diversion identified. Objective--Physical Examination:     Pulse 63   Temp 97.1 °F (36.2 °C) (Temporal)   Resp 16   Ht 5' 6\" (1.676 m)   Wt 150 lb (68 kg)   SpO2 (!) 85%   BMI 24.21 kg/m²     Physical Examination:  Pleasant 73-year-old male in mild to moderate distress, alert and cooperative. Examination of the right upper extremity reveals good gross alignment of the shoulder with the arm maintained against the torso. No skin tenting or necrosis. Resolving contusion along the proximal aspect of the upper arm. No biceps asymmetry. Fires anterior, posterior, lateral heads of the right deltoid. Sensory intact light touch over the lateral aspect of the shoulder as well as in the radial, median, ulnar nerve distributions. Right elbow, wrist, and hand range of motion are well-tolerated.  strength, finger abduction, EPL strength, wrist flexion, wrist extension are 4+/5. Radial pulse 2+ with capillary refill less than 2 seconds. Radiographs and Laboratory Studies:     Diagnostic Imaging Studies:    XR SHOULDER RIGHT (MIN 2 VIEWS)    Result Date: 10/26/2021  AP, Grashey, scapular Y view of the right shoulder were obtained to evaluate right proximal humerus fracture. Demonstrate interval translational displacement of right proximal humerus fracture which is most evident on the lateral views. AP view demonstrates reasonable approximation of the glenohumeral articulation. Limited consolidation noted at this early juncture. Impression: Right proximal humerus fracture with interval displacement of associated fragments. No dislocation.       Laboratory Studies:   Lab Results   Component Value Date    WBC 11.5 (H) 10/15/2021    HGB 8.2 (L) 10/15/2021    HCT 24.0 (L) 10/15/2021    MCV 93.3 10/15/2021     10/15/2021     No results found for: SEDRATE  No results found for: CRP    Assessment / Plan: Diagnosis Orders   1. Other closed displaced fracture of proximal end of right humerus with routine healing, subsequent encounter  XR SHOULDER RIGHT (MIN 2 VIEWS)    So acro/clav can web pre ots      Orders Placed This Encounter   Procedures    XR SHOULDER RIGHT (MIN 2 VIEWS)     AP, grashey, scapular y of right shoulder     Standing Status:   Future     Number of Occurrences:   1     Standing Expiration Date:   10/26/2022     Order Specific Question:   Reason for exam:     Answer:   right proximal humerus fracture    So acro/clav can web pre ots     Patient was prescribed a Breg Essential Shoulder Immobilizer brace. The right shoulder will require stabilization / immobilization from this orthosis. The orthosis will assist in protecting the affected area, provide functional support and facilitate healing. The device was ordered and fit on 10/26/2021. The patient was educated and fit by a healthcare professional with expert knowledge and specialization in brace application while under the direct supervision of the treating physician. Verbal and written instructions for the use of and application of this item were provided. They were instructed to contact the office immediately should the brace result in increased pain, decreased sensation, increased swelling or worsening of the condition. No orders of the defined types were placed in this encounter.      -Right proximal humerus fracture with associated translational displacement: Findings were discussed with the patient and emphasized that although he is translational displacement on 2 of his images today, this is likely a transient phenomenon based on his clinical examination. Reasonable approximation of the glenohumeral articulation. We discussed the option of continued immobilization and he was provided with a simple sling today.   Have him continue with pendulum exercises, no range of motion for the right shoulder actively will maintain the upper arm against the torso at all times. May actively range the elbow, wrist, and hand within the realm of comfort. Discussed potential for malunion and nonunion at the fracture site. We did discuss the option of referral for open reduction internal fixation with bone grafting as well as shoulder replacement and could contemplate those options in the future if he progresses to a delayed or nonunion at the fracture sites. Neither the family nor the patient are interested in pursuing surgical opinion at this point. Would defer physical therapy until he demonstrates objective consolidation on his radiographs. May continue with pendulum exercises twice daily as previously instructed.  -Nonunion of left distal radius fracture: Incidental issue noted upon evaluation in the hospital.  Patient will continue with bracing we will initiate physical therapy to incorporate left wrist range of motion once he is cleared to do so from the standpoint of his right shoulder. Discussed the option of referral for corrective osteotomy and the family does not wish to pursue that, nor does the patient. We will continue to follow him prospectively. Procedures Performed Today:     Patient provided with simple sling for right upper extremity. Follow-up (with Radiographs) / Referral:     The patient will follow up in 4 weeks for clinical and radiographic assessment of right proximal humerus fracture. We will update AP, Grashey, scapular Y views of the right shoulder at that visit and determine potential to initiate physical therapy and discontinue the sling.     Pam Reyes MD  Orthopaedic Surgery

## 2021-10-26 NOTE — PATIENT INSTRUCTIONS
Diagnosis:  -Displaced fracture of right upper arm (humerus)    Recommendations:  -Continue to avoid active motion of right shoulder. May actively move the elbow, wrist, and hand but need to keep your upper arm firmly supported against her torso. -Recommend wearing sling for comfort purposes when standing and walking, but may remove while seated or lying down.  -No lifting over 2 pounds with right hand. -May apply ice packs to right shoulder and upper arm for 15-20 minutes every 4 hours while awake, as needed to control inflammation/swelling.  -May take acetaminophen (Tylenol) 650 mg by mouth every 6 hours as needed to control pain.    -Follow-up in Dr. Marlon Jay office in 4 weeks for assessment of right shoulder (humerus) fracture. [phone].

## 2021-11-01 ENCOUNTER — VIRTUAL VISIT (OUTPATIENT)
Dept: INFECTIOUS DISEASES | Age: 76
End: 2021-11-01
Payer: MEDICARE

## 2021-11-01 DIAGNOSIS — M86.272 SUBACUTE OSTEOMYELITIS, LEFT ANKLE AND FOOT (HCC): Primary | ICD-10-CM

## 2021-11-01 PROCEDURE — 1123F ACP DISCUSS/DSCN MKR DOCD: CPT | Performed by: INTERNAL MEDICINE

## 2021-11-01 PROCEDURE — 4040F PNEUMOC VAC/ADMIN/RCVD: CPT | Performed by: INTERNAL MEDICINE

## 2021-11-01 PROCEDURE — G8484 FLU IMMUNIZE NO ADMIN: HCPCS | Performed by: INTERNAL MEDICINE

## 2021-11-01 PROCEDURE — 1111F DSCHRG MED/CURRENT MED MERGE: CPT | Performed by: INTERNAL MEDICINE

## 2021-11-01 PROCEDURE — G8420 CALC BMI NORM PARAMETERS: HCPCS | Performed by: INTERNAL MEDICINE

## 2021-11-01 PROCEDURE — 99213 OFFICE O/P EST LOW 20 MIN: CPT | Performed by: INTERNAL MEDICINE

## 2021-11-01 PROCEDURE — G8428 CUR MEDS NOT DOCUMENT: HCPCS | Performed by: INTERNAL MEDICINE

## 2021-11-01 PROCEDURE — 4004F PT TOBACCO SCREEN RCVD TLK: CPT | Performed by: INTERNAL MEDICINE

## 2021-11-01 ASSESSMENT — ENCOUNTER SYMPTOMS
RESPIRATORY NEGATIVE: 1
GASTROINTESTINAL NEGATIVE: 1

## 2021-11-01 NOTE — PROGRESS NOTES
Infectious Disease     Patient Name: Geovanny Garcia  Date: 11/1/2021  YOB: 1945  Medical Record Number: [id number]        Geovanny Garcia, was evaluated through a synchronous (real-time) audio-video encounter. The patient (or guardian if applicable) is aware that this is a billable service. Verbal consent to proceed has been obtained within the past 12 months. The visit was conducted pursuant to the emergency declaration under the [address] [place] [address] and the [employer]. Patient identification was verified, and a caregiver was present when appropriate. The patient was located in a state where the provider was credentialed to provide care. Total time spent for this encounter: 15 min    --Jessy Jones MD on 11/1/2021 at 1:08 PM    An electronic signature was used to authenticate this note. Pasteurella procidentia osteomyelitis of left ankle  Initially treated with vancomycin and Augmentin  He was from ankle wound superficial    Hospitalized and discharged on 3 weeks of ceftriaxone      Gram Stain Result Abnormal  08/17/2021  4:04 PM [address] [facility]   No WBC's   No epithelial cells   Many Gram positive cocci   Moderate Gram negative rods    Organism Abnormal  08/17/2021  4:04 PM [address] [facility]   Providencia rettgeri    WOUND/ABSCESS 08/17/2021  4:04 PM [address] [facility]   Moderate growth    Organism Abnormal  08/17/2021  4:04 PM [address] [facility]   Pasteurella canis    WOUND/ABSCESS 08/17/2021  4:04 PM [address] [facility]   Heavy growth   Sensitivities not routinely done. Drugs of choice are:   Ampicillin, Augmentin, Ceftriaxone, or Cefepime.     Organism Abnormal  08/17/2021  4:04 PM [address] [facility]   Anaerobic gram positive cocci    Anaerobic Culture 08/17/2021  4:04 PM [address] [facility]   Heavy growth   Sensitivities not routinely done. Drugs of choice are:   Penicillin G, Metronidazole, Clindamycin or   Piperacillin/Tazobactam.    Testing Performed By    Lab - Abbreviation Name Director Address Valid Date Range   [address] [facility] Ranjit Bansal MD [address] [facility] [address] 07/12/18 8899-99/38/19 1056   Narrative  Performed by: [facility]  ORDER#: [id number]                          ORDERED BY: Smith Osman   SOURCE: Leg                                COLLECTED:  08/17/21 16:04   ANTIBIOTICS AT [facility]. :                      RECEIVED :  08/17/21 16:04   Susceptibility    Providencia rettgeri (1)    Antibiotic Interpretation VALERY Status    ampicillin Resistant <=2 mcg/mL     ceFAZolin Resistant <=4 mcg/mL     cefTRIAXone Sensitive <=1 mcg/mL     ciprofloxacin Sensitive <=0.25 mcg/mL     gentamicin Sensitive <=1 mcg/mL     trimethoprim-sulfamethoxazole Sensitive <=20 mcg/mL         Right shoulder hematoma fracture    Discharge IV ceftriaxone for 3 weeks 10/15/2021    Left ankle subacute osteomyelitis  Left ankle chronic nonhealing ulcer  Polymicrobial bacterial infection including Pasteurella and Providencia          Review of Systems   Constitutional: Negative. Respiratory: Negative. Cardiovascular: Negative. Gastrointestinal: Negative. Genitourinary: Negative. Musculoskeletal: Negative. Skin: Negative.          Review of Systems: All 14 review of systems negative other than as stated above    Social History     Tobacco Use    Smoking status: Current Every Day Smoker     Packs/day: 0.50     Years: 60.00     Pack years: 30.00     Types: Cigarettes    Smokeless tobacco: Never Used    Tobacco comment: also smokes cigars   Vaping Use    Vaping Use: Never used   Substance Use Topics    Alcohol use: Not Currently     Alcohol/week: 12.0 standard drinks     Types: 12 Shots of liquor per week     Comment: quit drinking aug 18th, 2021    Drug use: No         Past Medical History:   Diagnosis Date    Alcohol abuse     quit drinking 8/18/21    CAD (coronary artery disease)     patient states no doctor has told him this / has 1 cardiac stent    Cancer ([facility]     lung LLL    Chronic back pain     Chronic kidney disease     Chronic sinusitis     DJD (degenerative joint disease) of knee     left knee DJD    Elevated PSA     History of blood transfusion 1980's    History of inferior wall myocardial infarction 01/2016    1 cardiac stent    HTN (hypertension)     meds .  1 yr    Hyperlipidemia     meds > 12 yrs    NSTEMI (non-ST elevated myocardial infarction) ([facility]     Smoker            Past Surgical History:   Procedure Laterality Date    ABDOMEN SURGERY  1961    spleenectomy due to [address]  2009    lumbar disc OR    CARDIAC SURGERY      stents    CARPAL TUNNEL RELEASE Right 5/6/2019    RIGHT CARPAL TUNNEL RELEASE performed by Cristhian Klein MD at [place] [address] WITH STENT PLACEMENT  1/29/2016    EYE SURGERY      to have Phaco with IOL OU 2/2018    HERNIA REPAIR      JOINT REPLACEMENT Left 1994    LTHR    JOINT REPLACEMENT Right 2009    RTKR    KNEE SURGERY Right 2011    arthroscopy    IA MANIPULATN KNEE JT+ANESTHESIA Right 5/12/2017    RIGHT KNEE MANIPULATION UNDER ANESTHESIA performed [address] Ene Hall MD [address] OFFICE/OUTPT VISIT,PROCEDURE ONLY Bilateral 12/29/2017    RIGHT AND BILATERAL L 4-5 LYSIS OF SCAR DISKECTOMY INTERBODY CAGE FUSION POSTEROLATERAL FUSION PEDICLE SCREWS performed by Cristhian Klein MD at [address] [place] [address]  2004    benign    SPLENECTOMY  1961    TOTAL KNEE ARTHROPLASTY Right 3/24/2017    RIGHT  KNEE TOTAL ARTHROPLASTY, ELHAM CEMENTED PERSONA  performed by Ene Hall [facility] at [facility]         Current Outpatient Medications on File Prior to Visit   Medication Sig Dispense Refill    folic acid (FOLVITE) 1 MG tablet Take 1 tablet by mouth daily 30 tablet 3    gabapentin (NEURONTIN) 100 MG capsule Take 1 capsule by mouth 3 times daily for 30 days. 90 capsule 0    lidocaine 4 % external patch Place 3 patches onto the skin daily 1 box 1    tiZANidine (ZANAFLEX) 4 MG tablet Take 0.5 tablets by mouth every 6 hours as needed (muscle spasm) 30 tablet 0    thiamine 100 MG tablet Take 1 tablet by mouth daily 30 tablet 3    cefTRIAXone (ROCEPHIN) infusion Infuse 1,000 mg intravenously every 24 hours for 21 days Compound per protocol  CBC BMP weekly  CRP in 2 weeks  Fax results to [phone]  Follow-up with me in 3 weeks at [id number] 21 g 0    omeprazole (PRILOSEC) 10 MG delayed release capsule TAKE ONE CAPSULE BY MOUTH DAILY 90 capsule 2    SODIUM CHLORIDE, EXTERNAL, 0.9 % SOLN Apply 1 Applicatorful topically daily 1000 mL 2    finasteride (PROSCAR) 5 MG tablet Take 1 tablet by mouth daily 90 tablet 3    citalopram (CELEXA) 40 MG tablet TAKE ONE TABLET BY MOUTH nightly 30 tablet 5    tiotropium (SPIRIVA RESPIMAT) 2.5 MCG/ACT AERS inhaler Inhale 2 puffs into the lungs daily 1 Inhaler 3    isosorbide mononitrate (IMDUR) 30 MG extended release tablet TAKE ONE TABLET BY MOUTH EVERY DAY 90 tablet 3    metoprolol tartrate (LOPRESSOR) 50 MG tablet Take 0.5 tablets by mouth 2 times daily TAKE ONE TABLET BY MOUTH TWO TIMES A  tablet 3    Fluticasone furoate-vilanterol (BREO ELLIPTA) 200-25 MCG/INH AEPB inhaler Inhale 1 puff into the lungs daily 1 each 3    atorvastatin (LIPITOR) 20 MG tablet TAKE ONE TABLET BY MOUTH DAILY 90 tablet 3    albuterol sulfate  (90 Base) MCG/ACT inhaler Inhale 2 puffs into the lungs every 6 hours as needed for Wheezing 1 Inhaler 3    morphine (MS CONTIN) 15 MG extended release tablet Take 15 mg by mouth as needed.  nitroGLYCERIN (NITROSTAT) 0.4 MG SL tablet Place 1 tablet under the tongue every 5 minutes as needed for Chest pain up to max of 3 total doses.  If no relief after 1 dose, call 911. 25 tablet 3    DOCUSATE CALCIUM PO Take by mouth as needed       No current facility-administered medications on file prior to visit. No Known Allergies      Family History   Problem Relation Age of Onset    Osteoarthritis Mother     Emphysema Mother     Hypertension Father     Hearing Loss Father     Dementia Father     No Known Problems Sister     Prostate Cancer Brother     Colon Polyps Neg Hx      EXAMINATION: Three-phase and total body bone scan.       CLINICAL HISTORY: Colon cancer. Open sore left leg.       FINDINGS: Patient received 29.8 mCi of technetium 99 M-MDP. Three-phase bone scan of the lower extremities performed. Total body bone scan included.       Three-phase bone scan: There is increased perfusion to the left lower leg and left foot with soft tissue swelling about the left ankle and hindfoot and diffuse increase activity about the left ankle on blood pool images. Delayed images reveal    localization of activity diffusely throughout the talus. Correlation with plain films and/or CT recommended.       Total body bone scan: Total body images were obtained 3 hours after the injection described above.       Dextroscoliosis in the upper lumbar spine with compensatory dextroscoliosis in the lower lumbar spine. Activity in the lower lumbar spine likely related to previous surgery. There is evidence of total hip prosthesis on the left and total knee prosthesis    on the right. There is increased activity along the lateral aspect of the left knee at the level of the tibia likely related to arthritis. Increased activity in the wrists also felt to be related to joint arthritis.       No suspicious uptake in the axial or appendicular skeleton to suggest metastasis.         Impression   LEFT TALUS OSTEOMYELITIS.       DIFFUSE DEGENERATIVE CHANGES. NO EVIDENCE OF METASTASIS.          Physical Exam:      Physical Exam  Nurses able to show me patient's ankle the ulceration is granulating well skin is growing over the wound          .    Lab Results   Component Value Date    WBC 11.5 (H) 10/15/2021    HGB 8.2 (L) 10/15/2021    HCT 24.0 (L) 10/15/2021    MCV 93.3 10/15/2021     10/15/2021     Lab Results   Component Value Date     10/15/2021    K 4.2 10/15/2021     10/15/2021    CO2 19 10/15/2021    BUN 10 10/15/2021    CREATININE 0.72 10/15/2021    GLUCOSE 92 10/15/2021    CALCIUM 8.3 10/15/2021                ASSESSMENT:  Patient Active Problem List   Diagnosis    Tobacco use    Hip pain    Knee pain    Chronic back pain    Elevated PSA    Primary osteoarthritis of right knee    Spondylosis of lumbar region without myelopathy or radiculopathy    Sacroiliitis, not elsewhere classified ([address]    Pure hypercholesterolemia    Charcot's joint of left ankle    Left ankle pain    Delirium due to general medical condition    DDD (degenerative disc disease), lumbar    Osteoarthritis of spine with myelopathy, lumbosacral region    Chronic bilateral low back pain with bilateral sciatica    Postlaminectomy syndrome, lumbar region    Acquired spondylolisthesis of lumbosacral region    Spinal stenosis of lumbar region with neurogenic claudication    HNP (herniated nucleus pulposus), lumbar    Spondylolisthesis at L4-L5 level    Anesthesia    Herniated nucleus pulposus, L4-5    NSTEMI (non-ST elevated myocardial infarction) ([address]    S/P PTCA (percutaneous transluminal coronary angioplasty)    Right upper quadrant abdominal pain    Gallbladder polyp    Biliary dyskinesia    Carpal tunnel syndrome on right    Carpal tunnel syndrome on left    Angina effort    Dyslipidemia    FELDER (dyspnea on exertion)    CAD (coronary artery disease)    Chest pain    Lung nodule seen on imaging study    Bilateral carotid artery stenosis    Essential hypertension    NSCLC of left lung (HCC)    Ataxic gait    Dizziness    Fracture, Col[place], right, closed    Heroin abuse ([address]    Non-pressure chronic ulcer of left ankle with fat layer exposed ([facility]    Atherosclerosis of native arteries of extremities with rest pain, left leg (HCC)    Subacute osteomyelitis, left ankle and foot (HCC)    Osteomyelitis of ankle (HCC)    Hyponatremia    Frequent falls    Chronic kidney disease    Fracture of right humerus    Hyperkalemia    Leukocytosis    Anemia    Acute hematogenous osteomyelitis, left ankle and foot (HCC)    Traumatic hematoma of right shoulder    Head injury    Humeral head fracture, right, closed, initial encounter    Pelvic hematoma, male, after a fall    Cause of injury, accidental fall, initial encounter    Tremor of unknown origin    Sundowning    Acute pain due to trauma         PLAN:  Left ankle subacute osteomyelitis  Polymicrobial bacterial infection including Pasteurella and Providencia    Extend treatment 2 additional weeks on ceftriaxone and follow-up

## 2021-11-10 ENCOUNTER — TELEPHONE (OUTPATIENT)
Dept: WOUND CARE | Age: 76
End: 2021-11-10

## 2021-11-10 NOTE — TELEPHONE ENCOUNTER
Patient at [address] [place] [address], will be discharged from [facility].

## 2021-11-19 ENCOUNTER — OFFICE VISIT (OUTPATIENT)
Dept: INFECTIOUS DISEASES | Age: 76
End: 2021-11-19
Payer: MEDICARE

## 2021-11-19 VITALS — HEART RATE: 50 BPM | TEMPERATURE: 97.7 F | DIASTOLIC BLOOD PRESSURE: 75 MMHG | SYSTOLIC BLOOD PRESSURE: 157 MMHG

## 2021-11-19 DIAGNOSIS — M86.272 SUBACUTE OSTEOMYELITIS, LEFT ANKLE AND FOOT (HCC): Primary | ICD-10-CM

## 2021-11-19 DIAGNOSIS — A49.9 POLYMICROBIAL BACTERIAL INFECTION: ICD-10-CM

## 2021-11-19 PROCEDURE — 4040F PNEUMOC VAC/ADMIN/RCVD: CPT | Performed by: INTERNAL MEDICINE

## 2021-11-19 PROCEDURE — 1123F ACP DISCUSS/DSCN MKR DOCD: CPT | Performed by: INTERNAL MEDICINE

## 2021-11-19 PROCEDURE — G8420 CALC BMI NORM PARAMETERS: HCPCS | Performed by: INTERNAL MEDICINE

## 2021-11-19 PROCEDURE — 4004F PT TOBACCO SCREEN RCVD TLK: CPT | Performed by: INTERNAL MEDICINE

## 2021-11-19 PROCEDURE — 99213 OFFICE O/P EST LOW 20 MIN: CPT | Performed by: INTERNAL MEDICINE

## 2021-11-19 PROCEDURE — G8427 DOCREV CUR MEDS BY ELIG CLIN: HCPCS | Performed by: INTERNAL MEDICINE

## 2021-11-19 PROCEDURE — G8484 FLU IMMUNIZE NO ADMIN: HCPCS | Performed by: INTERNAL MEDICINE

## 2021-11-19 RX ORDER — OXYCODONE AND ACETAMINOPHEN 7.5; 325 MG/1; MG/1
1 TABLET ORAL EVERY 6 HOURS PRN
COMMUNITY
End: 2022-03-02

## 2021-11-19 NOTE — PROGRESS NOTES
Claire Guzmán (:  1945) is a 64 y.o. male,Established patient, here for evaluation of the following chief complaint(s):  Follow-up (2 wek f/u- L ankle acute OM)         ASSESSMENT/PLAN:  Left ankle subacute osteomyelitis  Left ankle chronic nonhealing ulcer  Polymicrobial bacterial infection including Pasteurella and Providencia  Right shoulder fracture     DC IV Rocephin  DC PICC line  May go for right shoulder surgery    Subjective   SUBJECTIVE/OBJECTIVE:  HPI  Follow-up [facility] hospitalization status post fall and ended up with right shoulder fracture. Needs surgery. Needs a clearance. Left ankle wound is healed. On IV Rocephin that is well-tolerated. No complaints. Review of Systems   All other systems reviewed and are negative. Objective   Physical Exam  Vitals and nursing note reviewed. Constitutional:       General: He is not in acute distress. Appearance: Normal appearance. HENT:      Head: Normocephalic and atraumatic. Nose: No congestion. Eyes:      General: No scleral icterus. Pulmonary:      Effort: Pulmonary effort is normal. No respiratory distress. Abdominal:      General: There is no distension. Musculoskeletal:         General: No swelling. Right lower leg: No edema. Left lower leg: No edema. Skin:     Coloration: Skin is not jaundiced. Findings: No erythema or rash. Neurological:      Mental Status: He is alert and oriented to person, place, and time.    Psychiatric:         Mood and Affect: Mood normal.         Behavior: Behavior normal.     Left ankle wound is healed  No warmth or drainage or tenderness  Right shoulder in a sleeve  Photo was obtained with patient's permission             On this date 2021 I have spent 20 minutes reviewing previous notes, test results and face to face with the patient discussing the diagnosis and importance of compliance with the treatment plan as well as documenting on

## 2021-12-03 ENCOUNTER — HOSPITAL ENCOUNTER (OUTPATIENT)
Dept: ORTHOPEDIC SURGERY | Age: 76
Discharge: HOME | End: 2021-12-05
Payer: MEDICARE

## 2021-12-03 ENCOUNTER — OFFICE VISIT (OUTPATIENT)
Dept: ORTHOPEDIC SURGERY | Age: 76
End: 2021-12-03

## 2021-12-03 VITALS
WEIGHT: 150 LBS | HEIGHT: 66 IN | HEART RATE: 82 BPM | TEMPERATURE: 98.6 F | OXYGEN SATURATION: 98 % | BODY MASS INDEX: 24.11 KG/M2

## 2021-12-03 DIAGNOSIS — S52.531A CLOSED COLLES' FRACTURE OF RIGHT RADIUS, INITIAL ENCOUNTER: ICD-10-CM

## 2021-12-03 DIAGNOSIS — S42.291D OTHER CLOSED DISPLACED FRACTURE OF PROXIMAL END OF RIGHT HUMERUS WITH ROUTINE HEALING, SUBSEQUENT ENCOUNTER: Primary | ICD-10-CM

## 2021-12-03 DIAGNOSIS — S42.291D OTHER CLOSED DISPLACED FRACTURE OF PROXIMAL END OF RIGHT HUMERUS WITH ROUTINE HEALING, SUBSEQUENT ENCOUNTER: ICD-10-CM

## 2021-12-03 PROCEDURE — 99024 POSTOP FOLLOW-UP VISIT: CPT | Performed by: PHYSICIAN ASSISTANT

## 2021-12-03 PROCEDURE — 73030 X-RAY EXAM OF SHOULDER: CPT | Performed by: PHYSICIAN ASSISTANT

## 2021-12-03 PROCEDURE — 73030 X-RAY EXAM OF SHOULDER: CPT

## 2021-12-03 NOTE — PATIENT INSTRUCTIONS
Pt/OT - start a/p ROM / prox humerus fx protocol. Sling as needed.  OK to go home from ortho standpoint but would like a week of therapy before leaving next week   Please ensure therapy at home once leaving facility is set up

## 2021-12-03 NOTE — PROGRESS NOTES
[address] [place] [address] and Sports Medicine      Subjective:      Chief Complaint   Patient presents with    Follow-up     4 wk Other closed displaced fracture of proximal end of right humerus with routine healing. pt states his legs are in \"bad pain, acheing all the time\" requesting antonio. inj. HPI: Etta Ac is a 64 y.o. male who is here after a displaced fracture of the surgical neck of the right proximal humerus. There is significant displacement. There is good deal of pain. Is been in a sling. Nonweightbearing. Past Medical History:   Diagnosis Date    Alcohol abuse     quit drinking 8/18/21    CAD (coronary artery disease)     patient states no doctor has told him this / has 1 cardiac stent    Cancer ([facility]     lung LLL    Chronic back pain     Chronic kidney disease     Chronic sinusitis     DJD (degenerative joint disease) of knee     left knee DJD    Elevated PSA     History of blood transfusion 1980's    History of inferior wall myocardial infarction 01/2016    1 cardiac stent    HTN (hypertension)     meds .  1 yr    Hyperlipidemia     meds > 12 yrs    NSTEMI (non-ST elevated myocardial infarction) ([facility]     Smoker       Past Surgical History:   Procedure Laterality Date    ABDOMEN SURGERY  1961    spleenectomy due to [address]  2009    lumbar disc OR    CARDIAC SURGERY      stents    CARPAL TUNNEL RELEASE Right 5/6/2019    RIGHT CARPAL TUNNEL RELEASE performed by Elizabet Duenas MD at [place] [address] WITH STENT PLACEMENT  1/29/2016    EYE SURGERY      to have Phaco with IOL OU 2/2018    HERNIA REPAIR      JOINT REPLACEMENT Left 1994    LTHR    JOINT REPLACEMENT Right 2009    RTKR    KNEE SURGERY Right 2011    arthroscopy    VT MANIPULATN KNEE JT+ANESTHESIA Right 5/12/2017    RIGHT KNEE MANIPULATION UNDER ANESTHESIA performed by Betsy Mahoney MD at [address] OFFICE/OUTPT VISIT,PROCEDURE ONLY Bilateral 12/29/2017    RIGHT AND BILATERAL L 4-5 LYSIS OF SCAR DISKECTOMY INTERBODY CAGE FUSION POSTEROLATERAL FUSION PEDICLE SCREWS performed by Arnold Dill MD at [address] [place] [address]  2004    benign   [address] Right 3/24/2017    RIGHT  KNEE TOTAL ARTHROPLASTY, ELHAM CEMENTED PERSONA  performed by Simon Pagan MD at [facility] History     Socioeconomic History    Marital status:      Spouse name: Not on file    Number of children: Not on file    Years of education: Not on file    Highest education level: Not on file   Occupational History    Occupation: retired   Tobacco Use    Smoking status: Current Every Day Smoker     Packs/day: 0.50     Years: 60.00     Pack years: 30.00     Types: Cigarettes    Smokeless tobacco: Never Used    Tobacco comment: also smokes cigars   Vaping Use    Vaping Use: Never used   Substance and Sexual Activity    Alcohol use: Not Currently     Alcohol/week: 12.0 standard drinks     Types: 12 Shots of liquor per week     Comment: quit drinking aug 18th, 2021    Drug use: No    Sexual activity: Yes   Other Topics Concern    Not on file   Social History Narrative     He is  and lives alone    Type of Home: House[address] [facility] [address]: Able to Live on Main level with bedroom/bathroom, Two level, Performs ADL's on one level    Home Access: Stairs to enter with rails    Entrance Stairs - Number of Steps: 3- Rails: Both    Bathroom Shower/Tub: Tub/Shower unit--Shower chair, Grab bars in shower (does not use chair)    Home Equipment: Rolling walker, 4 wheeled walker, Oxygen    ADL Assistance: Independent    Homemaking Assistance: Independent    Homemaking Responsibilities: Yes    Ambulation Assistance: Independent    Transfer Assistance: Independent    Active : Yes    He is retired from [address] [place] [address] Strain: [address] Difficulty of Paying Living Expenses: Not very hard   Food Insecurity: No Food Insecurity    Worried About Running Out of Food in the Last Year: Never true    Ran Out of Food in the Last Year: Never true   Transportation Needs: No Transportation Needs    Lack of Transportation (Medical): No    Lack of Transportation (Non-Medical): No   Physical Activity:     Days of Exercise per Week: Not on file    Minutes of Exercise per Session: Not on file   Stress:     Feeling of Stress : Not on file   Social Connections:     Frequency of Communication with Friends and Family: Not on file    Frequency of Social Gatherings with Friends and Family: Not on file    Attends Congregational Services: Not on file    Active Member of Clubs or Organizations: Not on file    Attends Club or Organization Meetings: Not on file    Marital Status: Not on file   Intimate Partner Violence:     Fear of Current or Ex-Partner: Not on file    Emotionally Abused: Not on file    Physically Abused: Not on file    Sexually Abused: Not on file   Housing Stability:     Unable to Pay for Housing in the Last Year: Not on file    Number of [place] in the Last Year: Not on file    Unstable Housing in the Last Year: Not on file     Family History   Problem Relation Age of Onset    Osteoarthritis Mother     Emphysema Mother     Hypertension Father     Hearing Loss Father     Dementia Father     No Known Problems Sister     Prostate Cancer Brother     Colon Polyps Neg Hx      No Known Allergies  Current Outpatient Medications on File Prior to Visit   Medication Sig Dispense Refill    oxyCODONE-acetaminophen (PERCOCET) 7.5-325 MG per tablet Take 1 tablet by mouth every 6 hours as needed for Pain.       folic acid (FOLVITE) 1 MG tablet Take 1 tablet by mouth daily 30 tablet 3    lidocaine 4 % external patch Place 3 patches onto the skin daily 1 box 1    tiZANidine (ZANAFLEX) 4 MG tablet Take 0.5 tablets by mouth every 6 hours as needed (muscle spasm) 30 tablet 0    thiamine 100 MG tablet Take 1 tablet by mouth daily 30 tablet 3    omeprazole (PRILOSEC) 10 MG delayed release capsule TAKE ONE CAPSULE BY MOUTH DAILY 90 capsule 2    SODIUM CHLORIDE, EXTERNAL, 0.9 % SOLN Apply 1 Applicatorful topically daily 1000 mL 2    finasteride (PROSCAR) 5 MG tablet Take 1 tablet by mouth daily 90 tablet 3    citalopram (CELEXA) 40 MG tablet TAKE ONE TABLET BY MOUTH nightly 30 tablet 5    tiotropium (SPIRIVA RESPIMAT) 2.5 MCG/ACT AERS inhaler Inhale 2 puffs into the lungs daily 1 Inhaler 3    isosorbide mononitrate (IMDUR) 30 MG extended release tablet TAKE ONE TABLET BY MOUTH EVERY DAY 90 tablet 3    metoprolol tartrate (LOPRESSOR) 50 MG tablet Take 0.5 tablets by mouth 2 times daily TAKE ONE TABLET BY MOUTH TWO TIMES A  tablet 3    Fluticasone furoate-vilanterol (BREO ELLIPTA) 200-25 MCG/INH AEPB inhaler Inhale 1 puff into the lungs daily 1 each 3    atorvastatin (LIPITOR) 20 MG tablet TAKE ONE TABLET BY MOUTH DAILY 90 tablet 3    albuterol sulfate  (90 Base) MCG/ACT inhaler Inhale 2 puffs into the lungs every 6 hours as needed for Wheezing 1 Inhaler 3    morphine (MS CONTIN) 15 MG extended release tablet Take 15 mg by mouth as needed.  nitroGLYCERIN (NITROSTAT) 0.4 MG SL tablet Place 1 tablet under the tongue every 5 minutes as needed for Chest pain up to max of 3 total doses. If no relief after 1 dose, call 911. 25 tablet 3    DOCUSATE CALCIUM PO Take by mouth as needed      gabapentin (NEURONTIN) 100 MG capsule Take 1 capsule by mouth 3 times daily for 30 days. 90 capsule 0     No current facility-administered medications on file prior to visit. Objective: There were no vitals taken for this visit. Right shoulder of significant loss of motion in the setting of immobilization and pain. Special testing deferred. Radiographs and Laboratory Studies:   Previous diagnostic imaging studies were reviewed. Laboratory Studies:   Lab Results   Component Value Date    WBC 11.5 (H) 10/15/2021    HGB 8.2 (L) 10/15/2021    HCT 24.0 (L) 10/15/2021    MCV 93.3 10/15/2021     10/15/2021     No results found for: Virgil Pedlar  No results found for: CRP    Assessment and Plan:      Diagnosis Orders   1. Other closed displaced fracture of proximal end of right humerus with routine healing, subsequent encounter  XR SHOULDER RIGHT (MIN 2 VIEWS)   2. Closed Colles' fracture of right radius, initial encounter         6-8 weeks since suffering a significant displaced proximal humerus fracture of the surgical neck. The hope was for a bonus union but will have significant posttraumatic arthritis as well as difficulties with motion. He is currently in a good deal of pain. Not take any pain medication. He has been using a sling and not pushing or pulling or lifting getting heavy. Surgical intervention was discussed with the patient Dr. Heladio Guerrero his last visit he decided to go the nonoperative route. He may be a candidate for a shoulder replacement in the future but for now wants to continue this route. We will initiate therapy at the nursing facility. We will also set up home therapy form so we can leave sometime next week. Sling as needed at this point. Pain is well tolerated. Back in 6 weeks for another x-ray and assess his motion     The above plan was discussed in thorough detail with Dr. Juan Pablo Sanchez and the patient. No orders of the defined types were placed in this encounter. No orders of the defined types were placed in this encounter. No follow-ups on file.     Aiden Howard PA-C  [facility] and Sports Medicine  [phone]

## 2021-12-08 ENCOUNTER — TELEPHONE (OUTPATIENT)
Dept: FAMILY MEDICINE CLINIC | Age: 76
End: 2021-12-08

## 2021-12-08 NOTE — TELEPHONE ENCOUNTER
Pt will be discharged from a nursing home on 12/7/21, home health care asking if you will follow pt, they will also need orders on PT,OT,Skilled nursing, please fax to [phone] or verbal.

## 2021-12-13 ENCOUNTER — TELEPHONE (OUTPATIENT)
Dept: FAMILY MEDICINE CLINIC | Age: 76
End: 2021-12-13

## 2021-12-14 ENCOUNTER — TELEPHONE (OUTPATIENT)
Dept: FAMILY MEDICINE CLINIC | Age: 76
End: 2021-12-14

## 2021-12-14 NOTE — TELEPHONE ENCOUNTER
Jessica [address]  Ph. [phone]    Pt difficulty with personal care - showers etc.   Wanting an order for home health aid for bathing and personal care  Why therapy works to get pt stronger. Not sure if addressed.    Pt wanting medical SSW consult for community resources

## 2021-12-22 PROBLEM — R42 DISEQUILIBRIUM: Status: ACTIVE | Noted: 2021-12-22

## 2021-12-23 ENCOUNTER — OFFICE VISIT (OUTPATIENT)
Dept: FAMILY MEDICINE CLINIC | Age: 76
End: 2021-12-23
Payer: MEDICARE

## 2021-12-23 VITALS
HEART RATE: 65 BPM | TEMPERATURE: 97.9 F | BODY MASS INDEX: 23.98 KG/M2 | WEIGHT: 149.2 LBS | OXYGEN SATURATION: 97 % | SYSTOLIC BLOOD PRESSURE: 118 MMHG | DIASTOLIC BLOOD PRESSURE: 80 MMHG | HEIGHT: 66 IN

## 2021-12-23 DIAGNOSIS — E87.1 HYPONATREMIA: ICD-10-CM

## 2021-12-23 DIAGNOSIS — S40.011A TRAUMATIC HEMATOMA OF RIGHT SHOULDER, INITIAL ENCOUNTER: ICD-10-CM

## 2021-12-23 DIAGNOSIS — C34.92 NSCLC OF LEFT LUNG (HCC): ICD-10-CM

## 2021-12-23 DIAGNOSIS — G89.11 ACUTE PAIN DUE TO TRAUMA: Primary | ICD-10-CM

## 2021-12-23 DIAGNOSIS — S42.291A HUMERAL HEAD FRACTURE, RIGHT, CLOSED, INITIAL ENCOUNTER: ICD-10-CM

## 2021-12-23 DIAGNOSIS — N18.30 STAGE 3 CHRONIC KIDNEY DISEASE, UNSPECIFIED WHETHER STAGE 3A OR 3B CKD (HCC): ICD-10-CM

## 2021-12-23 DIAGNOSIS — R42 DISEQUILIBRIUM: ICD-10-CM

## 2021-12-23 PROCEDURE — 1123F ACP DISCUSS/DSCN MKR DOCD: CPT | Performed by: FAMILY MEDICINE

## 2021-12-23 PROCEDURE — 4040F PNEUMOC VAC/ADMIN/RCVD: CPT | Performed by: FAMILY MEDICINE

## 2021-12-23 PROCEDURE — G8427 DOCREV CUR MEDS BY ELIG CLIN: HCPCS | Performed by: FAMILY MEDICINE

## 2021-12-23 PROCEDURE — G8484 FLU IMMUNIZE NO ADMIN: HCPCS | Performed by: FAMILY MEDICINE

## 2021-12-23 PROCEDURE — 4004F PT TOBACCO SCREEN RCVD TLK: CPT | Performed by: FAMILY MEDICINE

## 2021-12-23 PROCEDURE — 99214 OFFICE O/P EST MOD 30 MIN: CPT | Performed by: FAMILY MEDICINE

## 2021-12-23 PROCEDURE — G8420 CALC BMI NORM PARAMETERS: HCPCS | Performed by: FAMILY MEDICINE

## 2021-12-23 NOTE — PROGRESS NOTES
Chief Complaint   Patient presents with    Follow-Up from Hospital     went to ER after labs, fall/blacked out, went to hosptal m-f, hurt rigth shoulder, hit head, went to nursing home , wants know why he passed out     Other     dosciss cancer, follow up       HPI:  Angelica Wynne is a 64 y.o. male     Here for follow up after SNF stay at [facility]  Was being followed by [facility]  Notes reviewed    Principal Problem:    Hyponatremia  Active Problems:    Delirium due to general medical condition    Chronic bilateral low back pain with bilateral sciatica    Spinal stenosis of lumbar region with neurogenic claudication    Spondylolisthesis at L4-L5 level    Herniated nucleus pulposus, L4-5    CAD (coronary artery disease)    NSCLC of left lung (HCC)    Ataxic gait    Dizziness    Fracture, Colles, right, closed    Subacute osteomyelitis, left ankle and foot ([address]    Frequent falls    Chronic kidney disease    Fracture of right humerus    Hyperkalemia    Leukocytosis    Anemia    Acute hematogenous osteomyelitis, left ankle and foot ([address]    Traumatic hematoma of right shoulder    Head injury    Humeral head fracture, right, closed, initial encounter    Pelvic hematoma, male, after a fall    Cause of injury, accidental fall, initial encounter    Tremor of unknown origin    Sundowning    Acute pain due to trauma  Resolved Problems:    * No resolved hospital problems. *        Hospital Course:    Angelica Wynne is a 64 y.o. male that was admitted and treated at [facility] for the following medical issues:      Acute fracture of the right humeral head / recent fracture of the left wrist and right thumb  - conservative therapy per Ortho     KEILY / hyponatremia  - in the setting of NSAID, Bactrim, diuretics,lung cancer  - resolved with saline infusion     Acute metabolic encephalopathy, dizziness,  frequent falls  - in the setting of KEILY, hyponatremia, chronic alcohol use  - CT and MRI were negative  - not a surgical candidate per neurosurgery  - started on thiamine and folate supplements  - followed by neurology      Acute / chronic pain  - on oxycodone,gabapentin,tylenol   - pain management following     Left ankle wound/OM  - continued IV Rocephin x 3 weeks via Midline per ID     Acute blood loss anemia   - Hb down to 6.3, baseline is around 11-12  - in the setting of large, right shoulder hematoma  - Hb improved s/p transfusion of 1 unit of PRBCs     CAD s/p PCI to RCA in 2016  - stopped Plavix due to anemia/ hematoma     NSCLC of LLL  - followed by pulmonology and oncology        Code Status: Full Code, changed to [facility], followed by pallative care         Disposition - SNF     Wrist pain/fall      Currently has home health coming out for PT     Recurrence of lung cancer   Following with pulm/oncology       Chronic pain pt  Sees Dr. Josemanuel Davalos  Morphine and percocet      Sees pulm and cardio and urology and rad/onc  Multiple appts coming up all set in EPIC      Past Medical History:   Diagnosis Date    Alcohol abuse     quit drinking 8/18/21    CAD (coronary artery disease)     patient states no doctor has told him this / has 1 cardiac stent    Cancer ([facility]     lung LLL    Chronic back pain     Chronic kidney disease     Chronic sinusitis     DJD (degenerative joint disease) of knee     left knee DJD    Elevated PSA     History of blood transfusion 1980's    History of inferior wall myocardial infarction 01/2016    1 cardiac stent    HTN (hypertension)     meds .  1 yr    Hyperlipidemia     meds > 12 yrs    NSTEMI (non-ST elevated myocardial infarction) ([facility]     Smoker      Past Surgical History:   Procedure Laterality Date    ABDOMEN SURGERY  1961    spleenectomy due to [address]  2009    lumbar disc OR    CARDIAC SURGERY      stents    CARPAL TUNNEL RELEASE Right 5/6/2019    RIGHT CARPAL TUNNEL RELEASE performed by Nidhi Mathis MD at [facility] CORONARY ANGIOPLASTY WITH STENT PLACEMENT  1/29/2016    EYE SURGERY      to have Phaco with IOL OU 2/2018    HERNIA REPAIR      JOINT REPLACEMENT Left 1994    LTHR    JOINT REPLACEMENT Right 2009    RTKR    KNEE SURGERY Right 2011    arthroscopy    SC MANIPULATN KNEE JT+ANESTHESIA Right 5/12/2017    RIGHT KNEE MANIPULATION UNDER ANESTHESIA [facility]ik [facility] [address] [facility] OFF[facility]/OUTPT [address] Bilateral 12/29/2017    RIGHT AND BILATERAL L 4-5 LYSIS OF SCAR DISKECTOMY INTERBODY CAGE FUSION POSTEROLATERAL FUSION PEDICLE SCREWS performed by Berwyn Cushing, MD at [address] [place] [address]  2004    benign    SPLENECTOMY  1961    TOTAL KNEE ARTHROPLASTY Right 3/24/2017    RIGHT  KNEE TOTAL ARTHROPLASTY, ELHAM CEMENTED PERSONA  performed by Tarik Cooper MD at [address] History   Problem Relation Age of Onset    Osteoarthritis Mother     Emphysema Mother     Hypertension Father     Hearing Loss Father     Dementia Father     No Known Problems Sister     Prostate Cancer Brother     Colon Polyps Neg Hx      Social History     Socioeconomic History    Marital status:      Spouse name: None    Number of children: None    Years of education: None    Highest education level: None   Occupational History    Occupation: retired   Tobacco Use    Smoking status: Current Every Day Smoker     Packs/day: 0.50     Years: 60.00     Pack years: 30.00     Types: Cigarettes    Smokeless tobacco: Never Used    Tobacco comment: also smokes cigars   Vaping Use    Vaping Use: Never used   Substance and Sexual Activity    Alcohol use: Not Currently     Alcohol/week: 12.0 standard drinks     Types: 12 Shots of liquor per week     Comment: quit drinking aug 18th, 2021    Drug use: No    Sexual activity: Yes   Other Topics Concern    None   Social History Narrative     He is  and lives alone    Type of Home: [address] [facility] in [address] [place] [address] to Live on Main level with bedroom/bathroom, Two level, Performs ADL's on one level    Home Access: Stairs to enter with rails    Entrance Stairs - Number of Steps: 3- Rails: Both    Bathroom Shower/Tub: Tub/Shower unit--Shower chair, Grab bars in shower (does not use chair)    Home Equipment: Rolling walker, 4 wheeled walker, Oxygen    ADL Assistance: [address]: Independent    Homemaking Responsibilities: Yes    Ambulation Assistance: Independent    Transfer Assistance: Independent    Active : Yes    He is retired from [address] [place] [address] Strain: Low Risk     Difficulty of Paying Living Expenses: Not very hard   Food Insecurity: No Food Insecurity    Worried About Running Out of Food in the Last Year: Never true    Yoav of Food in the Last Year: Never true   Transportation Needs: No Transportation Needs    Lack of Transportation (Medical): No    Lack of Transportation (Non-Medical):  No   Physical Activity:     Days of Exercise per Week: Not on file    Minutes of Exercise per Session: Not on file   Stress:     Feeling of Stress : Not on file   Social Connections:     Frequency of Communication with Friends and Family: Not on file    Frequency of Social Gatherings with Friends and Family: Not on file    Attends Hinduism Services: Not on file    Active Member of [address] [place] [address] or Organizations: Not on file    Attends Club or Organization Meetings: Not on file    Marital Status: Not on file   Intimate Partner Violence:     Fear of Current or Ex-Partner: Not on file    Emotionally Abused: Not on file    Physically Abused: Not on file    Sexually Abused: Not on file   Housing Stability:     Unable to Pay for Housing in the Last Year: Not on file    Number of [place] in the Last Year: Not on file    Unstable Housing in the Last Year: Not on file     Current Outpatient Medications   Medication Sig Dispense Refill  oxyCODONE-acetaminophen (PERCOCET) 7.5-325 MG per tablet Take 1 tablet by mouth every 6 hours as needed for Pain.  folic acid (FOLVITE) 1 MG tablet Take 1 tablet by mouth daily 30 tablet 3    lidocaine 4 % external patch Place 3 patches onto the skin daily 1 box 1    tiZANidine (ZANAFLEX) 4 MG tablet Take 0.5 tablets by mouth every 6 hours as needed (muscle spasm) 30 tablet 0    thiamine 100 MG tablet Take 1 tablet by mouth daily 30 tablet 3    omeprazole (PRILOSEC) 10 MG delayed release capsule TAKE ONE CAPSULE BY MOUTH DAILY 90 capsule 2    SODIUM CHLORIDE, EXTERNAL, 0.9 % SOLN Apply 1 Applicatorful topically daily 1000 mL 2    finasteride (PROSCAR) 5 MG tablet Take 1 tablet by mouth daily 90 tablet 3    citalopram (CELEXA) 40 MG tablet TAKE ONE TABLET BY MOUTH nightly 30 tablet 5    tiotropium (SPIRIVA RESPIMAT) 2.5 MCG/ACT AERS inhaler Inhale 2 puffs into the lungs daily 1 Inhaler 3    isosorbide mononitrate (IMDUR) 30 MG extended release tablet TAKE ONE TABLET BY MOUTH EVERY DAY 90 tablet 3    metoprolol tartrate (LOPRESSOR) 50 MG tablet Take 0.5 tablets by mouth 2 times daily TAKE ONE TABLET BY MOUTH TWO TIMES A  tablet 3    Fluticasone furoate-vilanterol (BREO ELLIPTA) 200-25 MCG/INH AEPB inhaler Inhale 1 puff into the lungs daily 1 each 3    atorvastatin (LIPITOR) 20 MG tablet TAKE ONE TABLET BY MOUTH DAILY 90 tablet 3    albuterol sulfate  (90 Base) MCG/ACT inhaler Inhale 2 puffs into the lungs every 6 hours as needed for Wheezing 1 Inhaler 3    morphine (MS CONTIN) 15 MG extended release tablet Take 15 mg by mouth as needed.  nitroGLYCERIN (NITROSTAT) 0.4 MG SL tablet Place 1 tablet under the tongue every 5 minutes as needed for Chest pain up to max of 3 total doses. If no relief after 1 dose, call 911. 25 tablet 3    DOCUSATE CALCIUM PO Take by mouth as needed      gabapentin (NEURONTIN) 100 MG capsule Take 1 capsule by mouth 3 times daily for 30 days.  80 capsule 0     No current facility-administered medications for this visit. No Known Allergies    Review of Systems:   General ROS: negative for - chills, fatigue, fever, malaise, weight gain or weight loss  Respiratory ROS: no cough, shortness of breath, or wheezing  Cardiovascular ROS: no chest pain or dyspnea on exertion  Gastrointestinal ROS: no abdominal pain, change in bowel habits, or black or bloody stools  Genito-Urinary ROS: no dysuria, trouble voiding  Musculoskeletal ROS: see HPI  Neurological ROS: gait ataxia    In general patient otherwise reports feeling well. Physical Exam:  /80 (Site: Left Upper Arm)   Pulse 65   Temp 97.9 °F (36.6 °C)   Ht 5' 6\" (1.676 m)   Wt 149 lb 3.2 oz (67.7 kg)   SpO2 97%   BMI 24.08 kg/m²     Gen: Well, NAD, Alert, Oriented x 3   HEENT: EOMI, eyes clear, MMM  Skin: without rash or jaundice  Neck: no significant lymphadenopathy or thyromegaly  Lungs: CTAB   Heart: RRR, S1S2, w/out M/R/G, non-displaced PMI   Ext: prominent varicosities bilaterally. 2+ pedal edema  Neuro:altered light touch in feet  Obvious deformity of left wrist      A&P   Diagnosis Orders   1. Acute pain due to trauma     2. Disequilibrium     3. Humeral head fracture, right, closed, initial encounter     4. Traumatic hematoma of right shoulder, initial encounter     5. Stage 3 chronic kidney disease, unspecified whether stage 3a or 3b CKD ([facility]     6. NSCLC of left lung ([facility]     7. Hyponatremia  CBC    Comprehensive Metabolic Panel       Home PT with [facility]    F/u with ortho    Re--referral to oncology     Answered questions    Recheck CBC, CMP  C to draw   Add salt to foot    Majority of f/u with oncology/pulm/pain Hermes Garcia MD                On the basis of positive falls risk screening, assessment and plan is as follows: home safety tips provided.

## 2022-01-03 DIAGNOSIS — Z76.0 MEDICATION REFILL: ICD-10-CM

## 2022-01-03 DIAGNOSIS — I25.119 CORONARY ARTERY DISEASE INVOLVING NATIVE CORONARY ARTERY OF NATIVE HEART WITH ANGINA PECTORIS (HCC): ICD-10-CM

## 2022-01-03 RX ORDER — METOPROLOL TARTRATE 50 MG/1
25 TABLET, FILM COATED ORAL 2 TIMES DAILY
Qty: 180 TABLET | Refills: 3 | Status: SHIPPED | OUTPATIENT
Start: 2022-01-03

## 2022-01-03 NOTE — TELEPHONE ENCOUNTER
LOV: 12/23/21  Next Appt: 3/24/22  [facility]arm[facility]    Please add if appropriate.    Diclofenac - 50 mgs  States dr. Kyara Barnard ordered this previously form him

## 2022-01-14 ENCOUNTER — HOSPITAL ENCOUNTER (OUTPATIENT)
Dept: ORTHOPEDIC SURGERY | Age: 77
Discharge: HOME | End: 2022-01-16
Payer: MEDICARE

## 2022-01-14 ENCOUNTER — OFFICE VISIT (OUTPATIENT)
Dept: ORTHOPEDIC SURGERY | Age: 77
End: 2022-01-14
Payer: MEDICARE

## 2022-01-14 VITALS
WEIGHT: 149 LBS | TEMPERATURE: 96.9 F | OXYGEN SATURATION: 98 % | HEART RATE: 72 BPM | BODY MASS INDEX: 23.95 KG/M2 | HEIGHT: 66 IN

## 2022-01-14 DIAGNOSIS — S42.291D OTHER CLOSED DISPLACED FRACTURE OF PROXIMAL END OF RIGHT HUMERUS WITH ROUTINE HEALING, SUBSEQUENT ENCOUNTER: Primary | ICD-10-CM

## 2022-01-14 DIAGNOSIS — S42.291K CLOSED 3-PART FRACTURE OF PROXIMAL HUMERUS WITH NONUNION, RIGHT: ICD-10-CM

## 2022-01-14 DIAGNOSIS — S42.291D OTHER CLOSED DISPLACED FRACTURE OF PROXIMAL END OF RIGHT HUMERUS WITH ROUTINE HEALING, SUBSEQUENT ENCOUNTER: ICD-10-CM

## 2022-01-14 DIAGNOSIS — E87.1 HYPONATREMIA: ICD-10-CM

## 2022-01-14 LAB
ALBUMIN SERPL-MCNC: 4 G/DL (ref 3.5–4.6)
ALP BLD-CCNC: 137 U/L (ref 35–104)
ALT SERPL-CCNC: 9 U/L (ref 0–41)
ANION GAP SERPL CALCULATED.3IONS-SCNC: 15 MEQ/L (ref 9–15)
AST SERPL-CCNC: 13 U/L (ref 0–40)
BILIRUB SERPL-MCNC: 0.4 MG/DL (ref 0.2–0.7)
BUN BLDV-MCNC: 19 MG/DL (ref 8–23)
CALCIUM SERPL-MCNC: 9.3 MG/DL (ref 8.5–9.9)
CHLORIDE BLD-SCNC: 98 MEQ/L (ref 95–107)
CO2: 22 MEQ/L (ref 20–31)
CREAT SERPL-MCNC: 0.77 MG/DL (ref 0.7–1.2)
GFR AFRICAN AMERICAN: >60
GFR NON-AFRICAN AMERICAN: >60
GLOBULIN: 2.9 G/DL (ref 2.3–3.5)
GLUCOSE BLD-MCNC: 85 MG/DL (ref 70–99)
HCT VFR BLD CALC: 32 % (ref 42–52)
HEMOGLOBIN: 10.4 G/DL (ref 14–18)
MCH RBC QN AUTO: 31.2 PG (ref 27–31.3)
MCHC RBC AUTO-ENTMCNC: 32.5 % (ref 33–37)
MCV RBC AUTO: 96.2 FL (ref 80–100)
PDW BLD-RTO: 17.9 % (ref 11.5–14.5)
PLATELET # BLD: 364 K/UL (ref 130–400)
POTASSIUM SERPL-SCNC: 5 MEQ/L (ref 3.4–4.9)
RBC # BLD: 3.33 M/UL (ref 4.7–6.1)
SODIUM BLD-SCNC: 135 MEQ/L (ref 135–144)
TOTAL PROTEIN: 6.9 G/DL (ref 6.3–8)
WBC # BLD: 11.6 K/UL (ref 4.8–10.8)

## 2022-01-14 PROCEDURE — G8484 FLU IMMUNIZE NO ADMIN: HCPCS | Performed by: ORTHOPAEDIC SURGERY

## 2022-01-14 PROCEDURE — 4004F PT TOBACCO SCREEN RCVD TLK: CPT | Performed by: ORTHOPAEDIC SURGERY

## 2022-01-14 PROCEDURE — 1123F ACP DISCUSS/DSCN MKR DOCD: CPT | Performed by: ORTHOPAEDIC SURGERY

## 2022-01-14 PROCEDURE — 99214 OFFICE O/P EST MOD 30 MIN: CPT | Performed by: ORTHOPAEDIC SURGERY

## 2022-01-14 PROCEDURE — G8427 DOCREV CUR MEDS BY ELIG CLIN: HCPCS | Performed by: ORTHOPAEDIC SURGERY

## 2022-01-14 PROCEDURE — G8420 CALC BMI NORM PARAMETERS: HCPCS | Performed by: ORTHOPAEDIC SURGERY

## 2022-01-14 PROCEDURE — 4040F PNEUMOC VAC/ADMIN/RCVD: CPT | Performed by: ORTHOPAEDIC SURGERY

## 2022-01-14 PROCEDURE — 73030 X-RAY EXAM OF SHOULDER: CPT

## 2022-01-14 PROCEDURE — 73030 X-RAY EXAM OF SHOULDER: CPT | Performed by: ORTHOPAEDIC SURGERY
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
## 2022-01-21 ENCOUNTER — VIRTUAL VISIT (OUTPATIENT)
Dept: CARDIOLOGY CLINIC | Age: 77
End: 2022-01-21
Payer: MEDICARE

## 2022-01-21 DIAGNOSIS — E78.5 DYSLIPIDEMIA: ICD-10-CM

## 2022-01-21 DIAGNOSIS — I25.119 CORONARY ARTERY DISEASE INVOLVING NATIVE CORONARY ARTERY OF NATIVE HEART WITH ANGINA PECTORIS (HCC): Primary | ICD-10-CM

## 2022-01-21 DIAGNOSIS — I70.222 ATHEROSCLEROSIS OF NATIVE ARTERIES OF EXTREMITIES WITH REST PAIN, LEFT LEG (HCC): ICD-10-CM

## 2022-01-21 DIAGNOSIS — I10 ESSENTIAL HYPERTENSION: ICD-10-CM

## 2022-01-21 PROCEDURE — G8428 CUR MEDS NOT DOCUMENT: HCPCS | Performed by: INTERNAL MEDICINE

## 2022-01-21 PROCEDURE — 1123F ACP DISCUSS/DSCN MKR DOCD: CPT | Performed by: INTERNAL MEDICINE

## 2022-01-21 PROCEDURE — 4004F PT TOBACCO SCREEN RCVD TLK: CPT | Performed by: INTERNAL MEDICINE

## 2022-01-21 PROCEDURE — G8420 CALC BMI NORM PARAMETERS: HCPCS | Performed by: INTERNAL MEDICINE

## 2022-01-21 PROCEDURE — 99214 OFFICE O/P EST MOD 30 MIN: CPT | Performed by: INTERNAL MEDICINE

## 2022-01-21 PROCEDURE — G8484 FLU IMMUNIZE NO ADMIN: HCPCS | Performed by: INTERNAL MEDICINE

## 2022-01-21 PROCEDURE — 4040F PNEUMOC VAC/ADMIN/RCVD: CPT | Performed by: INTERNAL MEDICINE

## 2022-01-21 RX ORDER — BUMETANIDE 2 MG/1
2 TABLET ORAL DAILY
Qty: 90 TABLET | Refills: 3 | Status: SHIPPED | OUTPATIENT
Start: 2022-01-21

## 2022-01-21 ASSESSMENT — ENCOUNTER SYMPTOMS
COUGH: 0
NAUSEA: 0
CHEST TIGHTNESS: 0
EYES NEGATIVE: 1
STRIDOR: 0
SHORTNESS OF BREATH: 1
WHEEZING: 0
GASTROINTESTINAL NEGATIVE: 1
BLOOD IN STOOL: 0

## 2022-01-21 NOTE — PROGRESS NOTES
Subsequent Progress Note  Patient: Eliane Cardozo  YOB: 1945  MRN: [id number]    Chief Complaint: cad dizzy htn felder rash  Chief Complaint   Patient presents with    Coronary Artery Disease       CV Data:  1/2016 NSTEMI RCA KRISTA  4/2018 echo  55 1TR  4/2018 CUS MIld palque  4/2018 Abd US neg AAA  4/2019 Spect negative   2/2020 CUS mild  9/21 LE Art Duplex - negative. Subjective/HPI: occ dizzy + felder no cp no falls no bleed rash right temple for 6 weeks      1/10/2020 More FELDER and more frequent Chest tightness. No falls no bleed. Takes meds. Feels gaseous. 2/7/2020 chest tightness and felder much less since Imdur. Compliant with meds    5/14/2020 TELEHEALTH EVALUATION -- Audio/Visual (During TXFWD-92 public health emergency)    disocered isolated LLL nodule irregualr 1.2 cm suspicious for cancer. PET scan Negative of Mets. No CP no SOB No Bleed    7/14/2020 is declining to have localized LLL lung cancer to be removed. No cp breathing is ok. No falls. No bleeds    10/14/2020 no cp no sob no falls no bleed. Takes meds. Gait is wobbly but no falls. Taking Bumex prn.  lE edema worse. 1/13/21 no cp no sob no falls no bleed. Takes meds. Eats well.     4/22/21 no cp no sob occ dizzy no falls no bleed. Takes a lot of salt    9/9/21 tired all the time. Eats well no cp same FELDER. No worse. No palps no bleed no falls    1/21/22 TELEHEALTH EVALUATION -- Audio/Visual (During ATTNX-29 public health emergency)    Recent fall with shoulder injury and may need surgery. No cp no sob no bleed. Having little edema. Smokes 1/2 ppd + cigars. 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    EKG: SR 63  QTc 445    Past Medical History:   Diagnosis Date    Alcohol abuse     quit drinking 8/18/21    CAD (coronary artery disease)     patient states no doctor has told him this / has 1 cardiac stent    Cancer ([address]     lung LLL    Chronic back pain     Chronic kidney disease     Chronic sinusitis     DJD (degenerative joint disease) of knee     left knee DJD    Elevated PSA     History of blood transfusion 1980's    History of inferior wall myocardial infarction 01/2016    1 cardiac stent    HTN (hypertension)     meds .  1 yr    Hyperlipidemia     meds > 12 yrs    [facility]STEM[facility] (non-ST elevated [facility] infarction) [facility]     Smoker        Past Surgical History:   Procedure Laterality Date    ABDOMEN SURGERY  1961    spleenectomy due to [address]  2009    lumbar disc OR    CARDIAC SURGERY      stents    CARPAL TUNNEL RELEASE Right 5/6/2019    RIGHT CARPAL TUNNEL RELEASE performed by Sai Flores MD at [place] [address] WITH STENT PLACEMENT  1/29/2016    EYE SURGERY      to have Phaco with IOL OU 2/2018    HERNIA REPAIR      JOINT REPLACEMENT Left 1994    LTHR    JOINT REPLACEMENT Right 2009    RTKR    KNEE SURGERY Right 2011    arthroscopy    NV MANIPULATN KNEE JT+ANESTHESIA Right 5/12/2017    RIGHT KNEE MANIPULATION UNDER ANESTHESIA performed by Noemy Rucker MD at [address] OFFICE/OUTPT VISIT,PROCEDURE ONLY Bilateral 12/29/2017    RIGHT AND BILATERAL L 4-5 LYSIS OF SCAR DISKECTOMY INTERBODY CAGE FUSION POSTEROLATERAL FUSION PEDICLE SCREWS performed by Sai Flores MD at [address] [place] [address]  2004    benign    SPLENECTOMY  1961    TOTAL KNEE ARTHROPLASTY Right 3/24/2017    RIGHT  KNEE TOTAL ARTHROPLASTY, ELHAM CEMENTED PERSONA  performed by [facility]ucker [facility] at [facility]       Family History   Problem Relation Age of Onset    Osteoarthritis Mother     Emphysema Mother     Hypertension Father     Hearing Loss Father     Dementia Father     No Known Problems Sister     Prostate Cancer Brother     Colon Polyps Neg Hx        Social History     Socioeconomic History    Marital status:      Spouse name: Not on file    Number of children: Not on file    Years of education: Not on file    Highest education level: Not on file Occupational History    Occupation: retired   Tobacco Use    Smoking status: Current Every Day Smoker     Packs/day: 0.50     Years: 60.00     Pack years: 30.00     Types: Cigarettes    Smokeless tobacco: Never Used    Tobacco comment: also smokes cigars   Vaping Use    Vaping Use: Never used   Substance and Sexual Activity    Alcohol use: Not Currently     Alcohol/week: 12.0 standard drinks     Types: 12 Shots of liquor per week     Comment: quit drinking aug 18th, 2021    Drug use: No    Sexual activity: Yes   Other Topics Concern    Not on file   Social History Narrative     He is  and lives alone    Type of Home: [address]: Able to Live on Main level with bedroom/bathroom, Two level, Performs ADL's on one level    Home Access: Stairs to enter with rails    Entrance Stairs - Number of Steps: 3- Rails: Both    Bathroom Shower/Tub: Tub/Shower unit--Shower chair, Grab bars in shower (does not use chair)    Home Equipment: Rolling walker, 4 wheeled walker, Oxygen    ADL Assistance: Independent    Homemaking Assistance: Independent    Homemaking Responsibilities: Yes    Ambulation Assistance: Independent    Transfer Assistance: Independent    Active : Yes    He is retired from [address] [place] [address] Strain: Low Risk     Difficulty of Paying Living Expenses: Not very hard   Food Insecurity: No Food Insecurity    Worried About Running Out of Food in the Last Year: Never true    Yoav of Food in the Last Year: Never true   Transportation Needs: No Transportation Needs    Lack of Transportation (Medical): No    Lack of Transportation (Non-Medical):  No   Physical Activity:     Days of Exercise per Week: Not on file    Minutes of Exercise per Session: Not on file   Stress:     Feeling of Stress : Not on file   Social Connections:     Frequency of Communication with Friends and Family: Not on file  Frequency of Social Gatherings with Friends and Family: Not on file    Attends Taoism Services: Not on file    Active Member of Clubs or Organizations: Not on file    Attends Club or Organization Meetings: Not on file    Marital Status: Not on file   Intimate Partner Violence:     Fear of Current or Ex-Partner: Not on file    Emotionally Abused: Not on file    Physically Abused: Not on file    Sexually Abused: Not on file   Housing Stability:     Unable to Pay for Housing in the Last Year: Not on file    Number of [place] in the Last Year: Not on file    Unstable Housing in the Last Year: Not on file       No Known Allergies    Current Outpatient Medications   Medication Sig Dispense Refill    bumetanide (BUMEX) 2 MG tablet Take 1 tablet by mouth daily 90 tablet 3    metoprolol tartrate (LOPRESSOR) 50 MG tablet Take 0.5 tablets by mouth 2 times daily TAKE ONE TABLET BY MOUTH TWO TIMES A  tablet 3    diclofenac (VOLTAREN) 50 MG EC tablet TAKE ONE TABLET BY MOUTH TWO TIMES A DAY 60 tablet 0    oxyCODONE-acetaminophen (PERCOCET) 7.5-325 MG per tablet Take 1 tablet by mouth every 6 hours as needed for Pain.  folic acid (FOLVITE) 1 MG tablet Take 1 tablet by mouth daily 30 tablet 3    gabapentin (NEURONTIN) 100 MG capsule Take 1 capsule by mouth 3 times daily for 30 days.  90 capsule 0    lidocaine 4 % external patch Place 3 patches onto the skin daily 1 box 1    tiZANidine (ZANAFLEX) 4 MG tablet Take 0.5 tablets by mouth every 6 hours as needed (muscle spasm) 30 tablet 0    thiamine 100 MG tablet Take 1 tablet by mouth daily 30 tablet 3    omeprazole (PRILOSEC) 10 MG delayed release capsule TAKE ONE CAPSULE BY MOUTH DAILY 90 capsule 2    SODIUM CHLORIDE, EXTERNAL, 0.9 % SOLN Apply 1 Applicatorful topically daily 1000 mL 2    finasteride (PROSCAR) 5 MG tablet Take 1 tablet by mouth daily 90 tablet 3    citalopram (CELEXA) 40 MG tablet TAKE ONE TABLET BY MOUTH nightly 30 tablet 5    tiotropium (SPIRIVA RESPIMAT) 2.5 MCG/ACT AERS inhaler Inhale 2 puffs into the lungs daily 1 Inhaler 3    isosorbide mononitrate (IMDUR) 30 MG extended release tablet TAKE ONE TABLET BY MOUTH EVERY DAY 90 tablet 3    Fluticasone furoate-vilanterol (BREO ELLIPTA) 200-25 MCG/INH AEPB inhaler Inhale 1 puff into the lungs daily 1 each 3    atorvastatin (LIPITOR) 20 MG tablet TAKE ONE TABLET BY MOUTH DAILY 90 tablet 3    albuterol sulfate  (90 Base) MCG/ACT inhaler Inhale 2 puffs into the lungs every 6 hours as needed for Wheezing 1 Inhaler 3    morphine (MS CONTIN) 15 MG extended release tablet Take 15 mg by mouth as needed.  nitroGLYCERIN (NITROSTAT) 0.4 MG SL tablet Place 1 tablet under the tongue every 5 minutes as needed for Chest pain up to max of 3 total doses. If no relief after 1 dose, call 911. 25 tablet 3    DOCUSATE CALCIUM PO Take by mouth as needed       No current facility-administered medications for this visit. Review of Systems:   Review of Systems   Constitutional: Negative. Negative for diaphoresis and fatigue. HENT: Negative. Eyes: Negative. Respiratory: Positive for shortness of breath. Negative for cough, chest tightness, wheezing and stridor. Cardiovascular: Negative. Negative for chest pain, palpitations and leg swelling. Gastrointestinal: Negative. Negative for blood in stool and nausea. Genitourinary: Negative. Musculoskeletal: Negative. Skin: Negative. Neurological: Positive for light-headedness. Negative for dizziness, syncope and weakness. Hematological: Negative. Psychiatric/Behavioral: Negative. Physical Examination:    There were no vitals taken for this visit.    Physical Exam    LABS:  CBC:   Lab Results   Component Value Date    WBC 11.6 01/14/2022    RBC 3.33 01/14/2022    HGB 10.4 01/14/2022    HCT 32.0 01/14/2022    MCV 96.2 01/14/2022    MCH 31.2 01/14/2022    MCHC 32.5 01/14/2022    RDW 17.9 01/14/2022  01/14/2022     Lipids:  Lab Results   Component Value Date    CHOL 118 09/14/2021    CHOL 130 07/07/2020    CHOL 165 03/28/2016     Lab Results   Component Value Date    TRIG 84 09/14/2021    TRIG 63 07/07/2020    TRIG 64 03/28/2016     Lab Results   Component Value Date    HDL 54 09/14/2021    HDL 71 (H) 07/07/2020     (H) 03/28/2016     Lab Results   Component Value Date    LDLCALC 47 09/14/2021    LDLCALC 46 07/07/2020    LDLCALC 40 03/28/2016     No results found for: LABVLDL, VLDL  No results found for: CHOLHDLRATIO  CMP:    Lab Results   Component Value Date     01/14/2022    K 5.0 01/14/2022    K 4.2 10/15/2021    CL 98 01/14/2022    CO2 22 01/14/2022    BUN 19 01/14/2022    CREATININE 0.77 01/14/2022    GFRAA >60.0 01/14/2022    LABGLOM >60.0 01/14/2022    GLUCOSE 85 01/14/2022    PROT 6.9 01/14/2022    LABALBU 4.0 01/14/2022    CALCIUM 9.3 01/14/2022    BILITOT 0.4 01/14/2022    ALKPHOS 137 01/14/2022    AST 13 01/14/2022    ALT 9 01/14/2022     BMP:    Lab Results   Component Value Date     01/14/2022    K 5.0 01/14/2022    K 4.2 10/15/2021    CL 98 01/14/2022    CO2 22 01/14/2022    BUN 19 01/14/2022    LABALBU 4.0 01/14/2022    CREATININE 0.77 01/14/2022    CALCIUM 9.3 01/14/2022    GFRAA >60.0 01/14/2022    LABGLOM >60.0 01/14/2022    GLUCOSE 85 01/14/2022     Magnesium:    Lab Results   Component Value Date    MG 1.6 10/15/2021     TSH:  Lab Results   Component Value Date    TSH 0.447 10/13/2021       Patient Active Problem List   Diagnosis    Tobacco use    Hip pain    Knee pain    Chronic back pain    Elevated PSA    Primary osteoarthritis of right knee    Spondylosis of lumbar region without myelopathy or radiculopathy    Sacroiliitis, not elsewhere classified ([facility] [facility]    Pure hypercholesterolemia    Charcot's joint of left ankle    Left ankle pain    Delirium due to general medical condition    DDD (degenerative disc disease), lumbar    Osteoarthritis of spine with myelopathy, lumbosacral region    Chronic bilateral low back pain with bilateral sciatica    Postlaminectomy syndrome, lumbar region    Acquired spondylolisthesis of lumbosacral region    Spinal stenosis of lumbar region with neurogenic claudication    HNP (herniated nucleus pulposus), lumbar    Spondylolisthesis at L4-L5 level    Anesthesia    Herniated nucleus pulposus, L4-5    NSTEM[facility]non-ST elevated [facility] infarction) [facility]    S/P PTCA (percutaneous transluminal coronary angioplasty)    Right upper quadrant abdominal pain    Gallbladder polyp    Biliary dyskinesia    Carpal tunnel syndrome on right    Carpal tunnel syndrome on left    Angina effort    Dyslipidemia    FELDER (dyspnea on exertion)    CAD (coronary artery disease)    Chest pain    Lung nodule seen on imaging study    Bilateral carotid artery stenosis    Essential hypertension    NSCLC of left lung (HCC)    Ataxic gait    Dizziness    Fracture, [place], right, closed    Heroin abuse ([address]    Non-pressure chronic ulcer of left ankle with fat layer exposed ([address]    Atherosclerosis of native arteries of extremities with rest pain, left leg (HCC)    Subacute osteomyelitis, left ankle and foot ([facility])    Osteomyelitis of ankle ([facility]    Hyponatremia    Frequent falls    Chronic kidney disease    Fracture of right humerus    Hyperkalemia    Leukocytosis    Anemia    Acute hematogenous osteomyelitis, left ankle and foot (HCC)    Traumatic hematoma of right shoulder    Head injury    Humeral head fracture, right, closed, initial encounter    Pelvic hematoma, male, after a fall    Cause of injury, accidental fall, initial encounter    Tremor of unknown origin    Sundowning    Acute pain due to trauma    Disequilibrium    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     Modified Medications    No medications on file       Orders Placed This Encounter   Medications    bumetanide (BUMEX) 2 MG tablet     Sig: Take 1 tablet by mouth daily     Dispense:  90 tablet     Refill:  3       Assessment/Plan:    1. Essential hypertension  Stable- continue meds. Low salt diet    2. NSTEMI (non-ST elevated myocardial infarction) ([facility]   no angina continue CV meds. 3. Dyslipidemia  Statin - continue same low fat diet. Check labs. 4. FELDER (dyspnea on exertion)  Stop smoking   Possible angina - Imdur made a big difference- if worse let me know. 5. Rash- did not see Derm. Facial rash gone now. No further occurrence. 6. Lung Nodule - OK to hold Plavix 5 days for Biopsy. - revealed Malignancy. PET negative but pt refusing to have surgery. - f/u Dr. Patty Oden. - finished XRT f/u.     7. Wobbly gait- need to use rubin/walker. No recent falls. 8. [place]- TAKE bUMEX 1 MG qd. - decrease salt intake     9. Tires all the time- see Dr. Jose Antonio Jimenes and get screening labs. 10 LE edema - resume Bumex. RTO 3 weeks. Will need to recheck labs      Counseling:  Heart Healthy Lifestyle, Stop Smoking, Low Salt Diet, Take Precautions to Prevent Falls, Regular Exercise and Walk Daily    Return in about 3 weeks (around 2/11/2022). Pursuant to the emergency declaration under the [address] [place] [address] [facility] authority and the [employer] and [employer] Act, this Virtual Visit was conducted, with patient's consent, to reduce the patient's risk of exposure to COVID-19 and provide continuity of care for an established patient. Services were provided through a video synchronous discussion virtually to substitute for in-person clinic visit. Visit completed using [facility]. me. Patient was located at home and I was located in the clinic.   Electronically signed by Juanjose Avilez MD on 1/21/2022 at 3:00 PM

## 2022-02-04 DIAGNOSIS — Z76.0 MEDICATION REFILL: ICD-10-CM

## 2022-02-04 DIAGNOSIS — I25.119 CORONARY ARTERY DISEASE INVOLVING NATIVE CORONARY ARTERY OF NATIVE HEART WITH ANGINA PECTORIS (HCC): ICD-10-CM

## 2022-02-04 RX ORDER — ISOSORBIDE MONONITRATE 30 MG/1
TABLET, EXTENDED RELEASE ORAL
Qty: 90 TABLET | Refills: 3 | Status: SHIPPED | OUTPATIENT
Start: 2022-02-04

## 2022-02-04 RX ORDER — CITALOPRAM 40 MG/1
TABLET ORAL
Qty: 30 TABLET | Refills: 5 | Status: SHIPPED | OUTPATIENT
Start: 2022-02-04 | End: 2022-05-24 | Stop reason: SDUPTHER

## 2022-02-04 NOTE — TELEPHONE ENCOUNTER
Patient requesting refill on isosorbide and clopidogrel. Clopidogrel was previously DC'd during hosp adm 10/2021 for hematoma/anemia. Patient is still taking. Please advise OK to refill clopidogrel. Please approve or deny this refill request. The order is pended. Thank you.     LOV 1/21/2022    Next Visit Date:  Future Appointments   Date Time Provider Jeimy Fairchildi   2/7/2022  1:15 PM Raquel Gould MD [facility]   2/25/2022  2:00 PM Luca Johnston MD [address] [place] [address]   3/17/2022  3:30 PM Chloé Hooks MD [facility]   3/24/2022  1:30 PM Ej Jorgensen MD [facility] [place]

## 2022-02-04 NOTE — TELEPHONE ENCOUNTER
----- Message from Bibi Cisse sent at 2/4/2022  2:25 PM EST -----  Subject: Refill Request    QUESTIONS  Name of Medication? citalopram (CELEXA) 40 MG tablet  Patient-reported dosage and instructions? 40mg tab once daily  How many days do you have left? 0  Preferred Pharmacy? [id number]  Pharmacy phone number (if available)? [phone]  ---------------------------------------------------------------------------  --------------  Karoline PATEL  What is the best way for the office to contact you? OK to leave message on   voicemail  Preferred Call Back Phone Number?  [phone]

## 2022-02-07 ENCOUNTER — OFFICE VISIT (OUTPATIENT)
Dept: ORTHOPEDIC SURGERY | Age: 77
End: 2022-02-07
Payer: MEDICARE

## 2022-02-07 VITALS
OXYGEN SATURATION: 91 % | HEIGHT: 66 IN | BODY MASS INDEX: 25.71 KG/M2 | HEART RATE: 72 BPM | WEIGHT: 160 LBS | TEMPERATURE: 97 F

## 2022-02-07 DIAGNOSIS — S42.291K: Primary | ICD-10-CM

## 2022-02-07 PROCEDURE — G8427 DOCREV CUR MEDS BY ELIG CLIN: HCPCS | Performed by: ORTHOPAEDIC SURGERY

## 2022-02-07 PROCEDURE — 4040F PNEUMOC VAC/ADMIN/RCVD: CPT | Performed by: ORTHOPAEDIC SURGERY

## 2022-02-07 PROCEDURE — G8417 CALC BMI ABV UP PARAM F/U: HCPCS | Performed by: ORTHOPAEDIC SURGERY

## 2022-02-07 PROCEDURE — 99214 OFFICE O/P EST MOD 30 MIN: CPT | Performed by: ORTHOPAEDIC SURGERY

## 2022-02-07 PROCEDURE — G8484 FLU IMMUNIZE NO ADMIN: HCPCS | Performed by: ORTHOPAEDIC SURGERY

## 2022-02-07 PROCEDURE — 4004F PT TOBACCO SCREEN RCVD TLK: CPT | Performed by: ORTHOPAEDIC SURGERY

## 2022-02-07 PROCEDURE — 1123F ACP DISCUSS/DSCN MKR DOCD: CPT | Performed by: ORTHOPAEDIC SURGERY

## 2022-02-07 NOTE — PROGRESS NOTES
Subjective:      Patient ID: Mateo Abarca is a 64 y.o. male who presents today for:  Chief Complaint   Patient presents with    Shoulder Pain     The patient c/o right proximal humerus fx. He wants to discuss surgery with Dr. Shaneka Siegel. He rates his pain as a 8/10 today. HPI  Unfortunately, patient has significant medical comorbidities including recent diagnosis of recurrent lung cancer after previously undergoing radiation therapy. Patient admits to moderate pain in his right shoulder and significant difficulty using his right arm. Specifically with activities of daily living including wiping during bowel movements. Patient denies any numbness or tingling of his affected extremity. Denies any new trauma at this time. Past Medical History:   Diagnosis Date    Alcohol abuse     quit drinking 8/18/21    CAD (coronary artery disease)     patient states no doctor has told him this / has 1 cardiac stent    Cancer ([facility] [address]     lung LLL    Chronic back pain     Chronic kidney disease     Chronic sinusitis     DJD (degenerative joint disease) of knee     left knee DJD    Elevated PSA     History of blood transfusion 1980's    History of inferior wall myocardial infarction 01/2016    1 cardiac stent    HTN (hypertension)     meds .  1 yr    Hyperlipidemia     meds > 12 yrs    NSTEMI (non-ST elevated myocardial infarction) ([facility] [address]     Smoker       Past Surgical History:   Procedure Laterality Date    ABDOMEN SURGERY  1961    spleenectomy due to [address]  2009    lumbar disc OR    CARDIAC SURGERY      stents    CARPAL TUNNEL RELEASE Right 5/6/2019    RIGHT CARPAL TUNNEL RELEASE performed by Laney Tanner MD at [place] [address] WITH STENT PLACEMENT  1/29/2016    EYE SURGERY      to have Phaco with IOL OU 2/2018    HERNIA REPAIR      JOINT REPLACEMENT Left 1994    LTHR    JOINT REPLACEMENT Right 2009    RTKR    KNEE SURGERY Right 2011    arthroscopy    GA MANIPULATN KNEE JT+ANESTHESIA Right 5/12/2017    RIGHT KNEE MANIPULATION UNDER ANESTHESIA performed by Luis Armando Obregon MD at [address] OFFICE/OUTPT VISIT,PROCEDURE ONLY Bilateral 12/29/2017    RIGHT AND BILATERAL L 4-5 LYSIS OF SCAR DISKECTOMY INTERBODY CAGE FUSION POSTEROLATERAL FUSION PEDICLE SCREWS performed by David Oh MD at [address] [place], [place] [address]  2004    benign   [address] Right 3/24/2017    RIGHT  KNEE TOTAL ARTHROPLASTY, [place] CEMENTED PERSONA  performed by Luis Armando Obregon MD at [facility] History     Socioeconomic History    Marital status:      Spouse name: Not on file    Number of children: Not on file    Years of education: Not on file    Highest education level: Not on file   Occupational History    Occupation: retired   Tobacco Use    Smoking status: Current Every Day Smoker     Packs/day: 0.50     Years: 60.00     Pack years: 30.00     Types: Cigarettes    Smokeless tobacco: Never Used    Tobacco comment: also smokes cigars   Vaping Use    Vaping Use: Never used   Substance and Sexual Activity    Alcohol use: Not Currently     Alcohol/week: 12.0 standard drinks     Types: 12 Shots of liquor per week     Comment: quit drinking aug 18th, 2021    Drug use: No    Sexual activity: Yes   Other Topics Concern    Not on file   Social History Narrative     He is  and lives alone    Type of Home: House-[facility] in [address]: Able to Live on Main level with bedroom/bathroom, Two level, Performs ADL's on one level    Home Access: Stairs to enter with rails    Entrance Stairs - Number of Steps: 3- Rails: Both    Bathroom Shower/Tub: Tub/Shower unit--Shower chair, Grab bars in shower (does not use chair)    Home Equipment: Rolling walker, 4 wheeled walker, Oxygen    ADL Assistance: Independent    Homemaking Assistance: Independent    Homemaking Responsibilities: Yes    Ambulation Assistance: Independent Transfer Assistance: Independent    Active : Yes    He is retired from [address] [place] [address] Strain: Low Risk     Difficulty of Paying Living Expenses: Not very hard   Food Insecurity: No Food Insecurity    Worried About Running Out of Food in the Last Year: Never true    Yoav of Food in the Last Year: Never true   Transportation Needs: No Transportation Needs    Lack of Transportation (Medical): No    Lack of Transportation (Non-Medical):  No   Physical Activity:     Days of Exercise per Week: Not on file    Minutes of Exercise per Session: Not on file   Stress:     Feeling of Stress : Not on file   Social Connections:     Frequency of Communication with Friends and Family: Not on file    Frequency of Social Gatherings with Friends and Family: Not on file    Attends Zoroastrianism Services: Not on file    Active Member of Clubs or Organizations: Not on file    Attends Club or Organization Meetings: Not on file    Marital Status: Not on file   Intimate Partner Violence:     Fear of Current or Ex-Partner: Not on file    Emotionally Abused: Not on file    Physically Abused: Not on file    Sexually Abused: Not on file   Housing Stability:     Unable to Pay for Housing in the Last Year: Not on file    Number of [place] in the Last Year: Not on file    Unstable Housing in the Last Year: Not on file     Family History   Problem Relation Age of Onset    Osteoarthritis Mother     Emphysema Mother     Hypertension Father     Hearing Loss Father     Dementia Father     No Known Problems Sister     Prostate Cancer Brother     Colon Polyps Neg Hx      No Known Allergies  Current Outpatient Medications on File Prior to Visit   Medication Sig Dispense Refill    isosorbide mononitrate (IMDUR) 30 MG extended release tablet TAKE ONE TABLET BY MOUTH EVERY DAY 90 tablet 3    citalopram (CELEXA) 40 MG tablet TAKE ONE TABLET BY MOUTH nightly 30 tablet 5    bumetanide (BUMEX) 2 MG tablet Take 1 tablet by mouth daily 90 tablet 3    metoprolol tartrate (LOPRESSOR) 50 MG tablet Take 0.5 tablets by mouth 2 times daily TAKE ONE TABLET BY MOUTH TWO TIMES A  tablet 3    diclofenac (VOLTAREN) 50 MG EC tablet TAKE ONE TABLET BY MOUTH TWO TIMES A DAY 60 tablet 0    oxyCODONE-acetaminophen (PERCOCET) 7.5-325 MG per tablet Take 1 tablet by mouth every 6 hours as needed for Pain.  folic acid (FOLVITE) 1 MG tablet Take 1 tablet by mouth daily 30 tablet 3    lidocaine 4 % external patch Place 3 patches onto the skin daily 1 box 1    tiZANidine (ZANAFLEX) 4 MG tablet Take 0.5 tablets by mouth every 6 hours as needed (muscle spasm) 30 tablet 0    thiamine 100 MG tablet Take 1 tablet by mouth daily 30 tablet 3    omeprazole (PRILOSEC) 10 MG delayed release capsule TAKE ONE CAPSULE BY MOUTH DAILY 90 capsule 2    SODIUM CHLORIDE, EXTERNAL, 0.9 % SOLN Apply 1 Applicatorful topically daily 1000 mL 2    finasteride (PROSCAR) 5 MG tablet Take 1 tablet by mouth daily 90 tablet 3    tiotropium (SPIRIVA RESPIMAT) 2.5 MCG/ACT AERS inhaler Inhale 2 puffs into the lungs daily 1 Inhaler 3    Fluticasone furoate-vilanterol (BREO ELLIPTA) 200-25 MCG/INH AEPB inhaler Inhale 1 puff into the lungs daily 1 each 3    atorvastatin (LIPITOR) 20 MG tablet TAKE ONE TABLET BY MOUTH DAILY 90 tablet 3    albuterol sulfate  (90 Base) MCG/ACT inhaler Inhale 2 puffs into the lungs every 6 hours as needed for Wheezing 1 Inhaler 3    morphine (MS CONTIN) 15 MG extended release tablet Take 15 mg by mouth as needed.  nitroGLYCERIN (NITROSTAT) 0.4 MG SL tablet Place 1 tablet under the tongue every 5 minutes as needed for Chest pain up to max of 3 total doses. If no relief after 1 dose, call 911. 25 tablet 3    DOCUSATE CALCIUM PO Take by mouth as needed      gabapentin (NEURONTIN) 100 MG capsule Take 1 capsule by mouth 3 times daily for 30 days.  90 capsule 0     No current facility-administered medications on file prior to visit. Review of Systems  General: Denies fever, chills  Cardiovascular: Denies chest pain  Pulmonary: Denies shortness of breath  GI: Denies nausea or vomiting  Neuro: Denies numbness or tingling    Objective:   Pulse 72   Temp 97 °F (36.1 °C) (Temporal)   Ht 5' 6\" (1.676 m)   Wt 160 lb (72.6 kg)   SpO2 91%   BMI 25.82 kg/m²     Ortho Exam    Right upper extremity:  Skin: Intact circumferentially, no swelling, ecchymosis or edema is appreciated  Neuro: Sensation intact light touch in the radial, ulnar and median nerve distribution. Able to perform all cardinal hand movements. Vascular: Strong palpable radial pulse, brisk cap refill in all digits. MSK: Patient is tender to palpation of the proximal humerus with gross motion appreciated the fracture site consistent with a current nonunion. Patient has significant difficulty with any active flexion or passive flexion of the shoulder or abduction essentially to only 20 degrees. Essentially this is a flail extremity from the shoulder up, does have flexion and extension of the elbow and wrist however. Radiographs and Laboratory Studies:     Diagnostic Imaging Studies:    Prior imaging includes AP and scapular Y views of the right shoulder dated 1/14/2022    Radiographs demonstrate mottled callus formation and displaced humeral shaft relative to the humeral head consistent with a proximal humerus nonunion    Laboratory Studies:   Lab Results   Component Value Date    WBC 11.6 (H) 01/14/2022    HGB 10.4 (L) 01/14/2022    HCT 32.0 (L) 01/14/2022    MCV 96.2 01/14/2022     01/14/2022     No results found for: SEDRATE  No results found for: CRP    Assessment:     Nonunion right proximal humerus     Plan:     Patient with a nonunion of the right proximal humerus that is currently symptomatic.   However, patient significant medical issues including recurrent lung cancer diagnosis complicates the picture. Patient is currently in the process of undergoing work-up and possible progression toward radiation treatment of his lungs. Pending results of this patient may wish to pursue surgical intervention in the form of right reverse total shoulder arthroplasty to address right proximal humerus nonunion. Given patient's significant lack of function as well as need to use his dominant arm I do not feel that this is an appropriate surgery. However, patient must be medically optimized and should follow-up and obtain a better define plan of his lung cancer and ultimate prognosis prior to undergoing any major intervention of his right shoulder.     Xander Reyes MD

## 2022-02-14 DIAGNOSIS — Z76.0 MEDICATION REFILL: ICD-10-CM

## 2022-02-14 NOTE — TELEPHONE ENCOUNTER
LOV: 12/23/21  Next Appt: 3/24/22  [facility]  Pt would like to know if script can have refills

## 2022-02-18 ENCOUNTER — TELEPHONE (OUTPATIENT)
Dept: FAMILY MEDICINE CLINIC | Age: 77
End: 2022-02-18

## 2022-03-02 ENCOUNTER — OFFICE VISIT (OUTPATIENT)
Dept: CARDIOLOGY CLINIC | Age: 77
End: 2022-03-02
Payer: MEDICARE

## 2022-03-02 VITALS
DIASTOLIC BLOOD PRESSURE: 68 MMHG | BODY MASS INDEX: 25.23 KG/M2 | OXYGEN SATURATION: 94 % | HEART RATE: 86 BPM | HEIGHT: 66 IN | SYSTOLIC BLOOD PRESSURE: 124 MMHG | WEIGHT: 157 LBS

## 2022-03-02 DIAGNOSIS — E78.5 DYSLIPIDEMIA: ICD-10-CM

## 2022-03-02 DIAGNOSIS — I21.4 NSTEMI (NON-ST ELEVATED MYOCARDIAL INFARCTION) (HCC): ICD-10-CM

## 2022-03-02 DIAGNOSIS — I70.222 ATHEROSCLEROSIS OF NATIVE ARTERIES OF EXTREMITIES WITH REST PAIN, LEFT LEG (HCC): ICD-10-CM

## 2022-03-02 DIAGNOSIS — R06.09 DOE (DYSPNEA ON EXERTION): ICD-10-CM

## 2022-03-02 DIAGNOSIS — Z98.61 S/P PTCA (PERCUTANEOUS TRANSLUMINAL CORONARY ANGIOPLASTY): ICD-10-CM

## 2022-03-02 DIAGNOSIS — I65.23 BILATERAL CAROTID ARTERY STENOSIS: ICD-10-CM

## 2022-03-02 DIAGNOSIS — I10 ESSENTIAL HYPERTENSION: ICD-10-CM

## 2022-03-02 DIAGNOSIS — E78.00 PURE HYPERCHOLESTEROLEMIA: ICD-10-CM

## 2022-03-02 DIAGNOSIS — R60.9 DEPENDENT EDEMA: ICD-10-CM

## 2022-03-02 DIAGNOSIS — I25.119 CORONARY ARTERY DISEASE INVOLVING NATIVE CORONARY ARTERY OF NATIVE HEART WITH ANGINA PECTORIS (HCC): Primary | ICD-10-CM

## 2022-03-02 PROCEDURE — 4004F PT TOBACCO SCREEN RCVD TLK: CPT | Performed by: INTERNAL MEDICINE

## 2022-03-02 PROCEDURE — 1123F ACP DISCUSS/DSCN MKR DOCD: CPT | Performed by: INTERNAL MEDICINE

## 2022-03-02 PROCEDURE — 99214 OFFICE O/P EST MOD 30 MIN: CPT | Performed by: INTERNAL MEDICINE

## 2022-03-02 PROCEDURE — 4040F PNEUMOC VAC/ADMIN/RCVD: CPT | Performed by: INTERNAL MEDICINE

## 2022-03-02 PROCEDURE — G8417 CALC BMI ABV UP PARAM F/U: HCPCS | Performed by: INTERNAL MEDICINE

## 2022-03-02 PROCEDURE — G8427 DOCREV CUR MEDS BY ELIG CLIN: HCPCS | Performed by: INTERNAL MEDICINE

## 2022-03-02 PROCEDURE — G8484 FLU IMMUNIZE NO ADMIN: HCPCS | Performed by: INTERNAL MEDICINE

## 2022-03-02 RX ORDER — CLOPIDOGREL BISULFATE 75 MG/1
75 TABLET ORAL DAILY
COMMUNITY
End: 2022-03-02 | Stop reason: SDUPTHER

## 2022-03-02 RX ORDER — CLOPIDOGREL BISULFATE 75 MG/1
75 TABLET ORAL DAILY
Qty: 90 TABLET | Refills: 3 | Status: SHIPPED | OUTPATIENT
Start: 2022-03-02

## 2022-03-02 ASSESSMENT — ENCOUNTER SYMPTOMS
GASTROINTESTINAL NEGATIVE: 1
BLOOD IN STOOL: 0
EYES NEGATIVE: 1
WHEEZING: 0
COUGH: 0
CHEST TIGHTNESS: 0
NAUSEA: 0
STRIDOR: 0
SHORTNESS OF BREATH: 1

## 2022-03-02 NOTE — PROGRESS NOTES
Subsequent Progress Note  Patient: Eliane Cardozo  YOB: 1945  MRN: [id number]    Chief Complaint: cad dizzy htn felder rash  Chief Complaint   Patient presents with    Follow-up    Coronary Artery Disease    Hypertension       CV Data:  1/2016 NSTEMI RCA KRISTA  4/2018 echo  55 1TR  4/2018 CUS MIld palque  4/2018 Abd US neg AAA  4/2019 Spect negative   2/2020 CUS mild  9/21 LE Art Duplex - negative. Subjective/HPI: occ dizzy + felder no cp no falls no bleed rash right temple for 6 weeks      1/10/2020 More FELDER and more frequent Chest tightness. No falls no bleed. Takes meds. Feels gaseous. 2/7/2020 chest tightness and felder much less since Imdur. Compliant with meds    5/14/2020 TELEHEALTH EVALUATION -- Audio/Visual (During [facility] public health emergency)    disocered isolated LLL nodule irregualr 1.2 cm suspicious for cancer. PET scan Negative of Mets. No CP no SOB No Bleed    7/14/2020 is declining to have localized LLL lung cancer to be removed. No cp breathing is ok. No falls. No bleeds    10/14/2020 no cp no sob no falls no bleed. Takes meds. Gait is wobbly but no falls. Taking Bumex prn.  lE edema worse. 1/13/21 no cp no sob no falls no bleed. Takes meds. Eats well.     4/22/21 no cp no sob occ dizzy no falls no bleed. Takes a lot of salt    9/9/21 tired all the time. Eats well no cp same FELDER. No worse. No palps no bleed no falls    1/21/22 TELEHEALTH EVALUATION -- Audio/Visual (During [facility] public health emergency)    Recent fall with shoulder injury and may need surgery. No cp no sob no bleed. Having little edema. 3/2/22  Ankle edema same no worse. Taking a lot of salt no cp no sob no falls no bleed. Smokes 1/2 ppd + cigars. 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  1 yr    Hyperlipidemia     meds > 12 yrs    NSTEMI (non-ST elevated myocardial infarction) ([address]     Smoker        Past Surgical History:   Procedure Laterality Date    ABDOMEN SURGERY  1961    spleenectomy due to [address]  2009    lumbar disc OR    CARDIAC SURGERY      stents    CARPAL TUNNEL RELEASE Right 5/6/2019    RIGHT CARPAL TUNNEL RELEASE performed by Michelle Ma MD at [place] [address] WITH STENT PLACEMENT  1/29/2016    EYE SURGERY      to have Phaco with IOL OU 2/2018    HERNIA REPAIR      JOINT REPLACEMENT Left 1994    LTHR    JOINT REPLACEMENT Right 2009    RTKR    KNEE SURGERY Right 2011    arthroscopy    PA MANIPULATN KNEE JT+ANESTHESIA Right 5/12/2017    RIGHT KNEE MANIPULATION UNDER ANESTHESIA [facility] [address] [facility] Bilateral 12/29/2017    RIGHT AND BILATERAL L 4-5 LYSIS OF SCAR DISKECTOMY INTERBODY CAGE FUSION POSTEROLATERAL FUSION PEDICLE SCREWS performed by Michelle Ma MD at [address] [place] [address]  2004    benign    SPLENECTOMY  1961    TOTAL KNEE ARTHROPLASTY Right 3/24/2017    RIGHT  KNEE TOTAL ARTHROPLASTY, ELHAM CEMENTED PERSONA  performed by John Cheng MD at [address] [place] [address] History   Problem Relation Age of Onset    Osteoarthritis Mother     Emphysema Mother     Hypertension Father     Hearing Loss Father     Dementia Father     No Known Problems Sister     Prostate Cancer Brother     Colon Polyps Neg Hx        Social History     Socioeconomic History    Marital status:      Spouse name: None  Number of children: None    Years of education: None    Highest education level: None   Occupational History    Occupation: retired   Tobacco Use    Smoking status: Current Every Day Smoker     Packs/day: 0.50     Years: 60.00     Pack years: 30.00     Types: Cigarettes    Smokeless tobacco: Never Used    Tobacco comment: also smokes cigars   Vaping Use    Vaping Use: Never used   Substance and Sexual Activity    Alcohol use: Not Currently     Alcohol/week: 12.0 standard drinks     Types: 12 Shots of liquor per week     Comment: quit drinking aug 18th, 2021    Drug use: No    Sexual activity: Yes   Other Topics Concern    None   Social History Narrative     He is  and lives alone    Type of Home: [address]: Able to Live on Main level with bedroom/bathroom, Two level, Performs ADL's on one level    Home Access: Stairs to enter with rails    Entrance Stairs - Number of Steps: 3- Rails: Both    Bathroom Shower/Tub: Tub/Shower unit--Shower chair, Grab bars in shower (does not use chair)    Home Equipment: Rolling walker, 4 wheeled walker, Oxygen    ADL Assistance: [address]: Independent    Homemaking Responsibilities: Yes    Ambulation Assistance: Independent    Transfer Assistance: Independent    Active : Yes    He is retired from [address] [place] [address] Strain: Low Risk     Difficulty of Paying Living Expenses: Not very hard   Food Insecurity: No Food Insecurity    Worried About Running Out of Food in the Last Year: Never true    Yoav of Food in the Last Year: Never true   Transportation Needs: No Transportation Needs    Lack of Transportation (Medical): No    Lack of Transportation (Non-Medical):  No   Physical Activity:     Days of Exercise per Week: Not on file    Minutes of Exercise per Session: Not on file   Stress:     Feeling of Stress : Not on file Social Connections:     Frequency of Communication with Friends and Family: Not on file    Frequency of Social Gatherings with Friends and Family: Not on file    Attends Yazidi Services: Not on file    Active Member of Clubs or Organizations: Not on file    Attends Club or Organization Meetings: Not on file    Marital Status: Not on file   Intimate Partner Violence:     Fear of Current or Ex-Partner: Not on file    Emotionally Abused: Not on file    Physically Abused: Not on file    Sexually Abused: Not on file   Housing Stability:     Unable to Pay for Housing in the Last Year: Not on file    Number of [place] in the Last Year: Not on file    Unstable Housing in the Last Year: Not on file       Allergies   Allergen Reactions    Morphine        Current Outpatient Medications   Medication Sig Dispense Refill    clopidogrel (PLAVIX) 75 MG tablet Take 1 tablet by mouth daily 90 tablet 3    diclofenac (VOLTAREN) 50 MG EC tablet TAKE ONE TABLET BY MOUTH TWO TIMES A DAY 60 tablet 5    isosorbide mononitrate (IMDUR) 30 MG extended release tablet TAKE ONE TABLET BY MOUTH EVERY DAY 90 tablet 3    citalopram (CELEXA) 40 MG tablet TAKE ONE TABLET BY MOUTH nightly 30 tablet 5    bumetanide (BUMEX) 2 MG tablet Take 1 tablet by mouth daily 90 tablet 3    metoprolol tartrate (LOPRESSOR) 50 MG tablet Take 0.5 tablets by mouth 2 times daily TAKE ONE TABLET BY MOUTH TWO TIMES A  tablet 3    folic acid (FOLVITE) 1 MG tablet Take 1 tablet by mouth daily 30 tablet 3    lidocaine 4 % external patch Place 3 patches onto the skin daily 1 box 1    tiZANidine (ZANAFLEX) 4 MG tablet Take 0.5 tablets by mouth every 6 hours as needed (muscle spasm) 30 tablet 0    thiamine 100 MG tablet Take 1 tablet by mouth daily 30 tablet 3    omeprazole (PRILOSEC) 10 MG delayed release capsule TAKE ONE CAPSULE BY MOUTH DAILY 90 capsule 2    SODIUM CHLORIDE, EXTERNAL, 0.9 % SOLN Apply 1 Applicatorful topically daily 1000 mL 2    finasteride (PROSCAR) 5 MG tablet Take 1 tablet by mouth daily 90 tablet 3    tiotropium (SPIRIVA RESPIMAT) 2.5 MCG/ACT AERS inhaler Inhale 2 puffs into the lungs daily 1 Inhaler 3    Fluticasone furoate-vilanterol (BREO ELLIPTA) 200-25 MCG/INH AEPB inhaler Inhale 1 puff into the lungs daily 1 each 3    atorvastatin (LIPITOR) 20 MG tablet TAKE ONE TABLET BY MOUTH DAILY 90 tablet 3    albuterol sulfate  (90 Base) MCG/ACT inhaler Inhale 2 puffs into the lungs every 6 hours as needed for Wheezing 1 Inhaler 3    nitroGLYCERIN (NITROSTAT) 0.4 MG SL tablet Place 1 tablet under the tongue every 5 minutes as needed for Chest pain up to max of 3 total doses. If no relief after 1 dose, call 911. 25 tablet 3    DOCUSATE CALCIUM PO Take by mouth as needed      gabapentin (NEURONTIN) 100 MG capsule Take 1 capsule by mouth 3 times daily for 30 days. 90 capsule 0     No current facility-administered medications for this visit. Review of Systems:   Review of Systems   Constitutional: Negative. Negative for diaphoresis and fatigue. HENT: Negative. Eyes: Negative. Respiratory: Positive for shortness of breath. Negative for cough, chest tightness, wheezing and stridor. Cardiovascular: Negative. Negative for chest pain, palpitations and leg swelling. Gastrointestinal: Negative. Negative for blood in stool and nausea. Genitourinary: Negative. Musculoskeletal: Negative. Skin: Negative. Neurological: Positive for light-headedness. Negative for dizziness, syncope and weakness. Hematological: Negative. Psychiatric/Behavioral: Negative.           Physical Examination:    /68 (Site: Left Upper Arm, Position: Sitting, Cuff Size: Medium Adult)   Pulse 86   Ht 5' 6\" (1.676 m)   Wt 157 lb (71.2 kg)   SpO2 94%   BMI 25.34 kg/m²    Physical Exam    LABS:  CBC:   Lab Results   Component Value Date    WBC 11.6 01/14/2022    RBC 3.33 01/14/2022    HGB 10.4 01/14/2022    HCT 32.0 01/14/2022    MCV 96.2 01/14/2022    MCH 31.2 01/14/2022    MCHC 32.5 01/14/2022    RDW 17.9 01/14/2022     01/14/2022     Lipids:  Lab Results   Component Value Date    CHOL 118 09/14/2021    CHOL 130 07/07/2020    CHOL 165 03/28/2016     Lab Results   Component Value Date    TRIG 84 09/14/2021    TRIG 63 07/07/2020    TRIG 64 03/28/2016     Lab Results   Component Value Date    HDL 54 09/14/2021    HDL 71 (H) 07/07/2020     (H) 03/28/2016     Lab Results   Component Value Date    LDLCALC 47 09/14/2021    LDLCALC 46 07/07/2020    LDLCALC 40 03/28/2016     No results found for: LABVLDL, VLDL  No results found for: CHOLHDLRATIO  CMP:    Lab Results   Component Value Date     01/14/2022    K 5.0 01/14/2022    K 4.2 10/15/2021    CL 98 01/14/2022    CO2 22 01/14/2022    BUN 19 01/14/2022    CREATININE 0.77 01/14/2022    GFRAA >60.0 01/14/2022    LABGLOM >60.0 01/14/2022    GLUCOSE 85 01/14/2022    PROT 6.9 01/14/2022    LABALBU 4.0 01/14/2022    CALCIUM 9.3 01/14/2022    BILITOT 0.4 01/14/2022    ALKPHOS 137 01/14/2022    AST 13 01/14/2022    ALT 9 01/14/2022     BMP:    Lab Results   Component Value Date     01/14/2022    K 5.0 01/14/2022    K 4.2 10/15/2021    CL 98 01/14/2022    CO2 22 01/14/2022    BUN 19 01/14/2022    LABALBU 4.0 01/14/2022    CREATININE 0.77 01/14/2022    CALCIUM 9.3 01/14/2022    GFRAA >60.0 01/14/2022    LABGLOM >60.0 01/14/2022    GLUCOSE 85 01/14/2022     Magnesium:    Lab Results   Component Value Date    MG 1.6 10/15/2021     TSH:  Lab Results   Component Value Date    TSH 0.447 10/13/2021       Patient Active Problem List   Diagnosis    Tobacco use    Hip pain    Knee pain    Chronic back pain    Elevated PSA    Primary osteoarthritis of right knee    Spondylosis of lumbar region without myelopathy or radiculopathy    [facility], not elsewhere classified [facility]    Pure hypercholesterolemia    Charcot's joint of left ankle    Left ankle pain    Delirium due to general medical condition    DDD (degenerative disc disease), lumbar    Osteoarthritis of spine with myelopathy, lumbosacral region    Chronic bilateral low back pain with bilateral sciatica    Postlaminectomy syndrome, lumbar region    Acquired spondylolisthesis of lumbosacral region    Spinal stenosis of lumbar region with neurogenic claudication    HNP (herniated nucleus pulposus), lumbar    Spondylolisthesis at L4-L5 level    Anesthesia    Herniated nucleus pulposus, L4-5    NSTEM[facility] (non-ST elevated [facility] infarction) [facility]    S/P PTCA (percutaneous transluminal coronary angioplasty)    Right upper quadrant abdominal pain    Gallbladder polyp    Biliary dyskinesia    Carpal tunnel syndrome on right    Carpal tunnel syndrome on left    Angina effort    Dyslipidemia    FELDER (dyspnea on exertion)    CAD (coronary artery disease)    Chest pain    Lung nodule seen on imaging study    Bilateral carotid artery stenosis    Essential hypertension    NSCLC of left lung ([facility]    Ataxic gait    Dizziness    Fracture, Colles, right, closed    Heroin abuse ([address]    Non-pressure chronic ulcer of left ankle with fat layer exposed ([address]    Atherosclerosis of native arteries of extremities with rest pain, left leg ([facility])    Subacute osteomyelitis, left ankle and foot ([facility])    Osteomyelitis of ankle (HCC)    Hyponatremia    Frequent falls    Chronic kidney disease    Fracture of right humerus    Hyperkalemia    Leukocytosis    Anemia    Acute hematogenous osteomyelitis, left ankle and foot (HCC)    Traumatic hematoma of right shoulder    Head injury    Humeral head fracture, right, closed, initial encounter    Pelvic hematoma, male, after a fall    Cause of injury, accidental fall, initial encounter    Tremor of unknown origin    Sundowning    Acute pain due to trauma    Disequilibrium    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, right Medications Discontinued During This Encounter   Medication Reason    morphine (MS CONTIN) 15 MG extended release tablet LIST CLEANUP    oxyCODONE-acetaminophen (PERCOCET) 7.5-325 MG per tablet LIST CLEANUP    clopidogrel (PLAVIX) 75 MG tablet REORDER       Modified Medications    Modified Medication Previous Medication    CLOPIDOGREL (PLAVIX) 75 MG TABLET clopidogrel (PLAVIX) 75 MG tablet       Take 1 tablet by mouth daily    Take 75 mg by mouth daily       Orders Placed This Encounter   Medications    clopidogrel (PLAVIX) 75 MG tablet     Sig: Take 1 tablet by mouth daily     Dispense:  90 tablet     Refill:  3       Assessment/Plan:    1. Essential hypertension  Stable- continue meds. Low salt diet    2. NSTEMI (non-ST elevated myocardial infarction) ([facility]   no angina continue CV meds. 3. Dyslipidemia  Statin - continue same low fat diet. Check labs. 4. FELDER (dyspnea on exertion)  Stop smoking   Possible angina - Imdur made a big difference- if worse let me know. 5. Rash- did not see Derm. Facial rash gone now. No further occurrence. 6. Lung Nodule - OK to hold Plavix 5 days for Biopsy. - revealed Malignancy. PET negative but pt refusing to have surgery. - f/u Dr. Lucia Coe. - finished XRT f/u.     7. Wobbly gait- need to use rubin/walker. No recent falls. 8. [place]- TAKE bUMEX 1 MG qd. - decrease salt intake     9. Tires all the time- see Dr. Rianna Garcia and get screening labs. 10 LE edema - resume Bumex. Labs noted. Reviewed with pt. Low salt. RTo 3 months. Compression. Counseling:  Heart Healthy Lifestyle, Stop Smoking, Low Salt Diet, Take Precautions to Prevent Falls, Regular Exercise and Walk Daily    Return in about 3 months (around 6/2/2022).      Electronically signed by Jax Rivera MD on 3/2/2022 at 1:54 PM

## 2022-03-10 ENCOUNTER — HOSPITAL ENCOUNTER (OUTPATIENT)
Dept: CT IMAGING | Age: 77
Discharge: HOME | End: 2022-03-12
Payer: MEDICARE

## 2022-03-10 DIAGNOSIS — C34.32 CANCER OF BRONCHUS OF LEFT LOWER LOBE (HCC): ICD-10-CM

## 2022-03-10 PROCEDURE — A9552 F18 FDG: HCPCS | Performed by: INTERNAL MEDICINE

## 2022-03-10 PROCEDURE — 78815 PET IMAGE W/CT SKULL-THIGH: CPT

## 2022-03-10 PROCEDURE — 3430000000 HC RX DIAGNOSTIC RADIOPHARMACEUTICAL: Performed by: INTERNAL MEDICINE

## 2022-03-10 RX ORDER — FLUDEOXYGLUCOSE F 18 200 MCI/ML
14 INJECTION, SOLUTION INTRAVENOUS
Status: COMPLETED | OUTPATIENT
Start: 2022-03-10 | End: 2022-03-10

## 2022-03-10 RX ADMIN — FLUDEOXYGLUCOSE F 18 14 MILLICURIE: 200 INJECTION, SOLUTION INTRAVENOUS at 12:33

## 2022-03-21 DIAGNOSIS — E78.5 DYSLIPIDEMIA: ICD-10-CM

## 2022-03-21 NOTE — TELEPHONE ENCOUNTER
Please approve or deny this refill request. The order is pended. Thank you.     LOV 3/2/2022    Next Visit Date:  Future Appointments   Date Time Provider Jeimy Jerry   3/24/2022  1:30 PM Collette Nugent MD [facility]   6/2/2022  1:15 PM Safia Haji MD [facility]

## 2022-03-22 RX ORDER — ATORVASTATIN CALCIUM 20 MG/1
TABLET, FILM COATED ORAL
Qty: 90 TABLET | Refills: 3 | Status: SHIPPED | OUTPATIENT
Start: 2022-03-22

## 2022-03-24 ENCOUNTER — TELEMEDICINE (OUTPATIENT)
Dept: FAMILY MEDICINE CLINIC | Age: 77
End: 2022-03-24
Payer: MEDICARE

## 2022-03-24 DIAGNOSIS — C34.92 NSCLC OF LEFT LUNG (HCC): ICD-10-CM

## 2022-03-24 DIAGNOSIS — M46.1 SACROILIITIS, NOT ELSEWHERE CLASSIFIED (HCC): ICD-10-CM

## 2022-03-24 DIAGNOSIS — J44.9 CHRONIC OBSTRUCTIVE PULMONARY DISEASE, UNSPECIFIED COPD TYPE (HCC): Primary | ICD-10-CM

## 2022-03-24 DIAGNOSIS — L97.322 NON-PRESSURE CHRONIC ULCER OF LEFT ANKLE WITH FAT LAYER EXPOSED (HCC): ICD-10-CM

## 2022-03-24 DIAGNOSIS — F11.10 HEROIN ABUSE (HCC): ICD-10-CM

## 2022-03-24 DIAGNOSIS — N18.30 STAGE 3 CHRONIC KIDNEY DISEASE, UNSPECIFIED WHETHER STAGE 3A OR 3B CKD (HCC): ICD-10-CM

## 2022-03-24 PROCEDURE — 99442 PR PHYS/QHP TELEPHONE EVALUATION 11-20 MIN: CPT | Performed by: FAMILY MEDICINE

## 2022-03-24 NOTE — PROGRESS NOTES
Chief Complaint   Patient presents with    Coronary Artery Disease     3 month       HPI:  Royal Mar is a 64 y.o. male       Lung cancer   Following with pulm/oncology   Recent PET CT shows stable, no major changes     Chronic pain pt  Sees Dr. Darin Prakash  Morphine and percocet      Sees pulm and cardio and urology and rad/onc  Multiple appts coming up all set in EPIC    Notes reviewed    Doing ok on current medication regimen    He will be requiring a shoulder replacement     Past Medical History:   Diagnosis Date    Alcohol abuse     quit drinking 8/18/21    CAD (coronary artery disease)     patient states no doctor has told him this / has 1 cardiac stent    Cancer ([facility]     lung LLL    Chronic back pain     Chronic kidney disease     Chronic obstructive pulmonary disease, unspecified COPD type ([address] 3/24/2022    Chronic sinusitis     DJD (degenerative joint disease) of knee     left knee DJD    Elevated PSA     History of blood transfusion 1980's    History of inferior wall myocardial infarction 01/2016    1 cardiac stent    HTN (hypertension)     meds .  1 yr    Hyperlipidemia     meds > 12 yrs    NSTEMI (non-ST elevated myocardial infarction) ([facility]     Smoker      Past Surgical History:   Procedure Laterality Date    ABDOMEN SURGERY  1961    spleenectomy due to [address]  2009    lumbar disc OR    CARDIAC SURGERY      stents    CARPAL TUNNEL RELEASE Right 5/6/2019    RIGHT CARPAL TUNNEL RELEASE performed by Milka Martinez MD at [place] [address] WITH STENT PLACEMENT  1/29/2016    EYE SURGERY      to have Phaco with IOL OU 2/2018    HERNIA REPAIR      JOINT REPLACEMENT Left 1994    LTHR    JOINT REPLACEMENT Right 2009    RTKR    KNEE SURGERY Right 2011    arthroscopy    SD MANIPULATN KNEE JT+ANESTHESIA Right 5/12/2017    RIGHT KNEE MANIPULATION UNDER ANESTHESIA performed by Farzaneh Gonzaelz MD at [address] OFFICE/OUTPT [facility] Bilateral 12/29/2017    RIGHT AND BILATERAL L 4-5 LYSIS OF SCAR DISKECTOMY INTERBODY CAGE FUSION POSTEROLATERAL FUSION PEDICLE SCREWS performed by Olivia Cool MD at [address] [place] [address]  2004    benign    SPLENECTOMY  1961    TOTAL KNEE ARTHROPLASTY Right 3/24/2017    RIGHT  KNEE TOTAL ARTHROPLASTY, ELHAM CEMENTED PERSONA  performed by Johnathan Olivier MD at [address] History   Problem Relation Age of Onset    Osteoarthritis Mother     Emphysema Mother     Hypertension Father     Hearing Loss Father     Dementia Father     No Known Problems Sister     Prostate Cancer Brother     Colon Polyps Neg Hx      Social History     Socioeconomic History    Marital status:      Spouse name: Not on file    Number of children: Not on file    Years of education: Not on file    Highest education level: Not on file   Occupational History    Occupation: retired   Tobacco Use    Smoking status: Current Every Day Smoker     Packs/day: 0.50     Years: 60.00     Pack years: 30.00     Types: Cigarettes    Smokeless tobacco: Never Used    Tobacco comment: also smokes cigars   Vaping Use    Vaping Use: Never used   Substance and Sexual Activity    Alcohol use: Not Currently     Alcohol/week: 12.0 standard drinks     Types: 12 Shots of liquor per week     Comment: quit drinking aug 18th, 2021    Drug use: No    Sexual activity: Yes   Other Topics Concern    Not on file   Social History Narrative     He is  and lives alone    Type of Home: [address] [facility] [address] [place] [address] Ave: Able to Live on Main level with bedroom/bathroom, Two level, Performs ADL's on one level    Home Access: Stairs to enter with rails    Entrance Stairs - Number of Steps: 3- Rails: Both    Bathroom Shower/Tub: Tub/Shower unit--Shower chair, Grab bars in shower (does not use chair)    Home Equipment: Rolling walker, 4 wheeled walker, Oxygen    ADL Assistance: [address]: Independent    Homemaking Responsibilities: Yes    Ambulation Assistance: Independent    Transfer Assistance: Independent    Active : Yes    He is retired from [address] [place] [address] Strain: Low Risk     Difficulty of Paying Living Expenses: Not very hard   Food Insecurity: No Food Insecurity    Worried About Running Out of Food in the Last Year: Never true    Yoav of Food in the Last Year: Never true   Transportation Needs: No Transportation Needs    Lack of Transportation (Medical): No    Lack of Transportation (Non-Medical):  No   Physical Activity:     Days of Exercise per Week: Not on file    Minutes of Exercise per Session: Not on file   Stress:     Feeling of Stress : Not on file   Social Connections:     Frequency of Communication with Friends and Family: Not on file    Frequency of Social Gatherings with Friends and Family: Not on file    Attends Yazidi Services: Not on file    Active Member of [address] [place] [address] or Organizations: Not on file    Attends Club or Organization Meetings: Not on file    Marital Status: Not on file   Intimate Partner Violence:     Fear of Current or Ex-Partner: Not on file    Emotionally Abused: Not on file    Physically Abused: Not on file    Sexually Abused: Not on file   Housing Stability:     Unable to Pay for Housing in the Last Year: Not on file    Number of [facility] in the Last Year: Not on file    Unstable Housing in the Last Year: Not on file     Current Outpatient Medications   Medication Sig Dispense Refill    atorvastatin (LIPITOR) 20 MG tablet TAKE ONE TABLET BY MOUTH DAILY 90 tablet 3    clopidogrel (PLAVIX) 75 MG tablet Take 1 tablet by mouth daily 90 tablet 3    diclofenac (VOLTAREN) 50 MG EC tablet TAKE ONE TABLET BY MOUTH TWO TIMES A DAY 60 tablet 5    isosorbide mononitrate (IMDUR) 30 MG extended release tablet TAKE ONE TABLET BY MOUTH EVERY DAY 90 tablet 3    citalopram (CELEXA) 40 MG tablet TAKE ONE TABLET BY MOUTH nightly 30 tablet 5    bumetanide (BUMEX) 2 MG tablet Take 1 tablet by mouth daily 90 tablet 3    metoprolol tartrate (LOPRESSOR) 50 MG tablet Take 0.5 tablets by mouth 2 times daily TAKE ONE TABLET BY MOUTH TWO TIMES A  tablet 3    folic acid (FOLVITE) 1 MG tablet Take 1 tablet by mouth daily 30 tablet 3    lidocaine 4 % external patch Place 3 patches onto the skin daily 1 box 1    tiZANidine (ZANAFLEX) 4 MG tablet Take 0.5 tablets by mouth every 6 hours as needed (muscle spasm) 30 tablet 0    thiamine 100 MG tablet Take 1 tablet by mouth daily 30 tablet 3    omeprazole (PRILOSEC) 10 MG delayed release capsule TAKE ONE CAPSULE BY MOUTH DAILY 90 capsule 2    SODIUM CHLORIDE, EXTERNAL, 0.9 % SOLN Apply 1 Applicatorful topically daily 1000 mL 2    finasteride (PROSCAR) 5 MG tablet Take 1 tablet by mouth daily 90 tablet 3    tiotropium (SPIRIVA RESPIMAT) 2.5 MCG/ACT AERS inhaler Inhale 2 puffs into the lungs daily 1 Inhaler 3    Fluticasone furoate-vilanterol (BREO ELLIPTA) 200-25 MCG/INH AEPB inhaler Inhale 1 puff into the lungs daily 1 each 3    albuterol sulfate  (90 Base) MCG/ACT inhaler Inhale 2 puffs into the lungs every 6 hours as needed for Wheezing 1 Inhaler 3    nitroGLYCERIN (NITROSTAT) 0.4 MG SL tablet Place 1 tablet under the tongue every 5 minutes as needed for Chest pain up to max of 3 total doses. If no relief after 1 dose, call 911. 25 tablet 3    DOCUSATE CALCIUM PO Take by mouth as needed      gabapentin (NEURONTIN) 100 MG capsule Take 1 capsule by mouth 3 times daily for 30 days. 90 capsule 0     No current facility-administered medications for this visit.      Allergies   Allergen Reactions    Morphine        Review of Systems:   General ROS: negative for - chills, fatigue, fever, malaise, weight gain or weight loss  Respiratory ROS: no cough, shortness of breath, or wheezing  Cardiovascular ROS: no chest pain or dyspnea on exertion  Gastrointestinal ROS: no abdominal pain, change in bowel habits, or black or bloody stools  Genito-Urinary ROS: no dysuria, trouble voiding  Musculoskeletal ROS: see HPI  Neurological ROS: gait ataxia    In general patient otherwise reports feeling well. Physical Exam:  Patient-Reported Vitals 3/24/2022   Patient-Reported Weight 150 lb   Patient-Reported Height 5'6        Gen: Well, NAD, Alert, Oriented x 3         A&P   Diagnosis Orders   1. Chronic obstructive pulmonary disease, unspecified COPD type ([address]     2. NSCLC of left lung ([address]     3. Non-pressure chronic ulcer of left ankle with fat layer exposed ([address]     4. Sacroiliitis, not elsewhere classified ([address]     5. Heroin abuse ([address]     6. Stage 3 chronic kidney disease, unspecified whether stage 3a or 3b CKD ([address]         Doing well at this time     [de-identified] of f/u with oncology/pulm/pain mgmt      MD Krista Lance, was evaluated through a synchronous (real-time) audio-video encounter. The patient (or guardian if applicable) is aware that this is a billable service, which includes applicable co-pays. This Virtual Visit was conducted with patient's (and/or legal guardian's) consent. The visit was conducted pursuant to the emergency declaration under the [address] [place] [address] authority and the "[employer] and [employer] General Act. Patient identification was verified, and a caregiver was present when appropriate. The patient was located at home in a state where the provider was licensed to provide care. Total time spent for this encounter: 11 minutes     --Jourdan Casas MD on 3/24/2022 at 1:39 PM    An electronic signature was used to authenticate this note.

## 2022-03-25 ENCOUNTER — HOSPITAL ENCOUNTER (OUTPATIENT)
Dept: WOUND CARE | Age: 77
Discharge: HOME | End: 2022-03-25
Payer: MEDICARE

## 2022-03-25 VITALS
DIASTOLIC BLOOD PRESSURE: 67 MMHG | SYSTOLIC BLOOD PRESSURE: 139 MMHG | RESPIRATION RATE: 16 BRPM | HEART RATE: 63 BPM | TEMPERATURE: 97.8 F

## 2022-03-25 DIAGNOSIS — L97.322 NON-PRESSURE CHRONIC ULCER OF LEFT ANKLE WITH FAT LAYER EXPOSED (HCC): Primary | ICD-10-CM

## 2022-03-25 PROCEDURE — 87186 SC STD MICRODIL/AGAR DIL: CPT

## 2022-03-25 PROCEDURE — 87176 TISSUE HOMOGENIZATION CULTR: CPT

## 2022-03-25 PROCEDURE — 87185 SC STD ENZYME DETCJ PER NZM: CPT

## 2022-03-25 PROCEDURE — 87070 CULTURE OTHR SPECIMN AEROBIC: CPT

## 2022-03-25 PROCEDURE — 99213 OFFICE O/P EST LOW 20 MIN: CPT

## 2022-03-25 PROCEDURE — 87077 CULTURE AEROBIC IDENTIFY: CPT

## 2022-03-25 PROCEDURE — 6370000000 HC RX 637 (ALT 250 FOR IP): Performed by: PODIATRIST

## 2022-03-25 PROCEDURE — 87075 CULTR BACTERIA EXCEPT BLOOD: CPT

## 2022-03-25 PROCEDURE — 11042 DBRDMT SUBQ TIS 1ST 20SQCM/<: CPT

## 2022-03-25 PROCEDURE — 87076 CULTURE ANAEROBE IDENT EACH: CPT

## 2022-03-25 RX ORDER — BACITRACIN, NEOMYCIN, POLYMYXIN B 400; 3.5; 5 [USP'U]/G; MG/G; [USP'U]/G
OINTMENT TOPICAL ONCE
Status: CANCELLED | OUTPATIENT
Start: 2022-03-25 | End: 2022-03-25

## 2022-03-25 RX ORDER — LIDOCAINE HYDROCHLORIDE 40 MG/ML
SOLUTION TOPICAL ONCE
Status: CANCELLED | OUTPATIENT
Start: 2022-03-25 | End: 2022-03-25

## 2022-03-25 RX ORDER — BETAMETHASONE DIPROPIONATE 0.05 %
OINTMENT (GRAM) TOPICAL ONCE
Status: CANCELLED | OUTPATIENT
Start: 2022-03-25 | End: 2022-03-25

## 2022-03-25 RX ORDER — BACITRACIN ZINC AND POLYMYXIN B SULFATE 500; 1000 [USP'U]/G; [USP'U]/G
OINTMENT TOPICAL ONCE
Status: CANCELLED | OUTPATIENT
Start: 2022-03-25 | End: 2022-03-25

## 2022-03-25 RX ORDER — LIDOCAINE HYDROCHLORIDE 20 MG/ML
JELLY TOPICAL ONCE
Status: COMPLETED | OUTPATIENT
Start: 2022-03-25 | End: 2022-03-25

## 2022-03-25 RX ORDER — GINSENG 100 MG
CAPSULE ORAL ONCE
Status: CANCELLED | OUTPATIENT
Start: 2022-03-25 | End: 2022-03-25

## 2022-03-25 RX ORDER — LIDOCAINE HYDROCHLORIDE 20 MG/ML
JELLY TOPICAL ONCE
Status: CANCELLED | OUTPATIENT
Start: 2022-03-25 | End: 2022-03-25

## 2022-03-25 RX ORDER — CLOBETASOL PROPIONATE 0.5 MG/G
OINTMENT TOPICAL ONCE
Status: CANCELLED | OUTPATIENT
Start: 2022-03-25 | End: 2022-03-25

## 2022-03-25 RX ORDER — LIDOCAINE 40 MG/G
CREAM TOPICAL ONCE
Status: CANCELLED | OUTPATIENT
Start: 2022-03-25 | End: 2022-03-25

## 2022-03-25 RX ORDER — CEPHALEXIN 500 MG/1
500 CAPSULE ORAL 3 TIMES DAILY
Qty: 30 CAPSULE | Refills: 0 | Status: SHIPPED | OUTPATIENT
Start: 2022-03-25 | End: 2022-07-05

## 2022-03-25 RX ORDER — GENTAMICIN SULFATE 1 MG/G
OINTMENT TOPICAL ONCE
Status: CANCELLED | OUTPATIENT
Start: 2022-03-25 | End: 2022-03-25

## 2022-03-25 RX ORDER — LIDOCAINE 50 MG/G
OINTMENT TOPICAL ONCE
Status: CANCELLED | OUTPATIENT
Start: 2022-03-25 | End: 2022-03-25

## 2022-03-25 RX ADMIN — LIDOCAINE HYDROCHLORIDE: 20 JELLY TOPICAL at 11:41

## 2022-03-25 ASSESSMENT — PAIN DESCRIPTION - DESCRIPTORS: DESCRIPTORS: ACHING

## 2022-03-25 ASSESSMENT — PAIN SCALES - GENERAL: PAINLEVEL_OUTOF10: 5

## 2022-03-25 ASSESSMENT — PAIN DESCRIPTION - ORIENTATION: ORIENTATION: LEFT

## 2022-03-25 ASSESSMENT — PAIN DESCRIPTION - PAIN TYPE: TYPE: ACUTE PAIN

## 2022-03-25 ASSESSMENT — PAIN DESCRIPTION - LOCATION: LOCATION: ANKLE

## 2022-03-25 ASSESSMENT — PAIN DESCRIPTION - FREQUENCY: FREQUENCY: CONTINUOUS

## 2022-03-25 NOTE — PROGRESS NOTES
[facility]                                                   Progress Note and Procedure Note      Denise Retana RECORD NUMBER:  [id number]  AGE: 64 y.o. GENDER: male  : 1945  EPISODE DATE:  3/25/2022    Subjective:     Chief Complaint   Patient presents with    Wound Check     left ankle          HISTORY of PRESENT ILLNESS HPI      Mily Cardenas is a 64 y.o. male who presents today for wound/ulcer evaluation. History of Wound Context: Patient presents for a reoccurrence of an non healing ulcer of the left ankle. The patient was last seen in 2021 with the wound doing very well and then he went to a AL and never followed up. The wound is large deep and malodorous. He denies any NVFC. Wound/Ulcer Pain Timing/Severity: intermittent  Quality of pain: tender  Severity:  4 / 10   Modifying Factors: None  Associated Signs/Symptoms: drainage, odor and pain    Ulcer Identification:  Ulcer Type: venous and undetermined  Contributing Factors: edema and venous stasis    Wound: N/A        PAST MEDICAL HISTORY        Diagnosis Date    Alcohol abuse     quit drinking 21    CAD (coronary artery disease)     patient states no doctor has told him this / has 1 cardiac stent    Cancer ([address]     lung LLL    Chronic back pain     Chronic kidney disease     Chronic obstructive pulmonary disease, unspecified COPD type ([address] 3/24/2022    Chronic sinusitis     DJD (degenerative joint disease) of knee     left knee DJD    Elevated PSA     History of blood transfusion     History of inferior wall myocardial infarction 2016    1 cardiac stent    HTN (hypertension)     meds .  1 yr    Hyperlipidemia     meds > 12 yrs    NSTEMI (non-ST elevated myocardial infarction) ([address]     Smoker        PAST SURGICAL HISTORY    Past Surgical History:   Procedure Laterality Date    ABDOMEN SURGERY      spleenectomy due to [address]      lumbar disc OR    CARDIAC SURGERY      stents    CARPAL TUNNEL RELEASE Right 5/6/2019    RIGHT CARPAL TUNNEL RELEASE performed by Sundeep Samuels MD at [place] [address] WITH STENT PLACEMENT  1/29/2016    EYE SURGERY      to have Phaco with IOL OU 2/2018    HERNIA REPAIR      JOINT REPLACEMENT Left 1994    LTHR    JOINT REPLACEMENT Right 2009    RTKR    KNEE SURGERY Right 2011    arthroscopy    AL MANIPULATN KNEE JT+ANESTHESIA Right 5/12/2017    RIGHT KNEE MANIPULATION UNDER ANESTHESIA [facility] [address]PROCEDURE ONLY Bilateral 12/29/2017    RIGHT AND BILATERAL L 4-5 LYSIS OF SCAR DISKECTOMY INTERBODY CAGE FUSION POSTEROLATERAL FUSION PEDICLE SCREWS performed by Sundeep Samuels MD at [address] [place] [address]  2004    benign    SPLENECTOMY  1961    TOTAL KNEE ARTHROPLASTY Right 3/24/2017    RIGHT  KNEE TOTAL ARTHROPLASTY, ELHAM CEMENTED PERSONA  performed by Yovani Barillas MD at [address] [place] [address] HISTORY    Family History   Problem Relation Age of Onset    Osteoarthritis Mother     Emphysema Mother     Hypertension Father     Hearing Loss Father     Dementia Father     No Known Problems Sister     Prostate Cancer Brother     Colon Polyps Neg Hx        SOCIAL HISTORY    Social History     Tobacco Use    Smoking status: Current Every Day Smoker     Packs/day: 0.00     Years: 60.00     Pack years: 0.00     Types: Cigarettes    Smokeless tobacco: Never Used    Tobacco comment:  smokes 2 cigars a day   Vaping Use    Vaping Use: Never used   Substance Use Topics    Alcohol use: Not Currently     Alcohol/week: 12.0 standard drinks     Types: 12 Shots of liquor per week     Comment: socail drinking    Drug use: No       ALLERGIES    Allergies   Allergen Reactions    Morphine        MEDICATIONS    Current Outpatient Medications on File Prior to Encounter   Medication Sig Dispense Refill    atorvastatin (LIPITOR) 20 MG tablet TAKE ONE TABLET BY MOUTH DAILY 90 tablet 3    clopidogrel (PLAVIX) 75 MG tablet Take 1 tablet by mouth daily 90 tablet 3    diclofenac (VOLTAREN) 50 MG EC tablet TAKE ONE TABLET BY MOUTH TWO TIMES A DAY 60 tablet 5    isosorbide mononitrate (IMDUR) 30 MG extended release tablet TAKE ONE TABLET BY MOUTH EVERY DAY 90 tablet 3    citalopram (CELEXA) 40 MG tablet TAKE ONE TABLET BY MOUTH nightly 30 tablet 5    bumetanide (BUMEX) 2 MG tablet Take 1 tablet by mouth daily 90 tablet 3    metoprolol tartrate (LOPRESSOR) 50 MG tablet Take 0.5 tablets by mouth 2 times daily TAKE ONE TABLET BY MOUTH TWO TIMES A  tablet 3    folic acid (FOLVITE) 1 MG tablet Take 1 tablet by mouth daily 30 tablet 3    lidocaine 4 % external patch Place 3 patches onto the skin daily 1 box 1    tiZANidine (ZANAFLEX) 4 MG tablet Take 0.5 tablets by mouth every 6 hours as needed (muscle spasm) 30 tablet 0    thiamine 100 MG tablet Take 1 tablet by mouth daily 30 tablet 3    omeprazole (PRILOSEC) 10 MG delayed release capsule TAKE ONE CAPSULE BY MOUTH DAILY 90 capsule 2    SODIUM CHLORIDE, EXTERNAL, 0.9 % SOLN Apply 1 Applicatorful topically daily 1000 mL 2    finasteride (PROSCAR) 5 MG tablet Take 1 tablet by mouth daily 90 tablet 3    tiotropium (SPIRIVA RESPIMAT) 2.5 MCG/ACT AERS inhaler Inhale 2 puffs into the lungs daily 1 Inhaler 3    Fluticasone furoate-vilanterol (BREO ELLIPTA) 200-25 MCG/INH AEPB inhaler Inhale 1 puff into the lungs daily 1 each 3    albuterol sulfate  (90 Base) MCG/ACT inhaler Inhale 2 puffs into the lungs every 6 hours as needed for Wheezing 1 Inhaler 3    nitroGLYCERIN (NITROSTAT) 0.4 MG SL tablet Place 1 tablet under the tongue every 5 minutes as needed for Chest pain up to max of 3 total doses. If no relief after 1 dose, call 911. 25 tablet 3    DOCUSATE CALCIUM PO Take by mouth as needed       No current facility-administered medications on file prior to encounter.        REVIEW OF SYSTEMS    Pertinent items are noted in HPI. Objective:      /67   Pulse 63   Temp 97.8 °F (36.6 °C) (Temporal)   Resp 16     Wt Readings from Last 3 Encounters:   03/02/22 157 lb (71.2 kg)   02/07/22 160 lb (72.6 kg)   01/14/22 149 lb (67.6 kg)       PHYSICAL EXAM    Constitutional:   Well nourished and well developed. Appears neat and clean. Patient is alert, oriented x3, and in no apparent distress. Respiratory:  Respiratory effort is easy and symmetric bilaterally. Rate is normal at rest and on room air. Vascular:  Pedal Pulses is palpable and audible with doppler. Capillary refill is <3 sec to digits bilateral.  Extremities positive for 1+ pitting edema. Neurological:   Sensation is intact to lower extremities. Dermatological:  Wound description noted in wound assessment. The wound is necrotic with gelatinous slough and mal odor. Mild circumferential erythema. Psychiatric:  Judgement and insight intact. Short and long term memory intact. No evidence of depression, anxiety, or agitation. Patient is calm, cooperative, and communicative. Appropriate interactions and affect. Assessment:      Active Hospital Problems    Diagnosis Date Noted    Osteomyelitis of ankle ([facility] [M86.9] 08/17/2021    Non-pressure chronic ulcer of left ankle with fat layer exposed [facility]) [L97.322] 08/17/2021        Procedure Note  Indications:  Based on my examination of this patient's wound(s)/ulcer(s) today, debridement is required to promote healing and evaluate the wound base. Performed by: Abel Elizabeth DPM    Consent obtained:  Yes    Time out taken:  Yes    Pain Control:2% lidocaine gel  thin layer applied topically to wound bed      Debridement:Excisional Debridement    Using curette the wound(s)/ulcer(s) was/were sharply debrided down through and including the removal of epidermis, dermis and subcutaneous tissue.         Devitalized Tissue Debrided:  exudate    Pre Debridement Measurements:  Are located in the [facility]  Documentation Flow Sheet    Wound/Ulcer #: 1    Post Debridement Measurements:  Wound/Ulcer Descriptions are Pre Debridement except measurements:    Wound 03/25/22 Ankle Left; Anterior #1 (Active)   Wound Image   03/25/22 1130   Wound Cleansed Cleansed with saline 03/25/22 1130   Wound Length (cm) 2 cm 03/25/22 1130   Wound Width (cm) 1.6 cm 03/25/22 1130   Wound Depth (cm) 0.3 cm 03/25/22 1130   Wound Surface Area (cm^2) 3.2 cm^2 03/25/22 1130   Wound Volume (cm^3) 0.96 cm^3 03/25/22 1130   Wound Assessment Slough;Eschar moist 03/25/22 1130   Drainage Amount Moderate 03/25/22 1130   Drainage Description Yellow 03/25/22 1130   Odor Mild 03/25/22 1130   Ela-wound Assessment Intact 03/25/22 1130   Margins Defined edges 03/25/22 1130   Wound Thickness Description not for Pressure Injury Full thickness 03/25/22 1130   Number of days: 0     Incision 05/06/19 Wrist Right (Active)   Number of days: 8713         Percent of Wound/Ulcer Debrided: 100%    Total Surface Area Debrided:  3.2 sq cm     Diabetic/Pressure/Non Pressure Ulcers:  Ulcer: Non-Pressure ulcer, fat layer exposed      Bleeding:  Minimal    Hemostasis Achieved:  by pressure    Procedural Pain:  5  / 10     Post Procedural Pain:  1 / 10     Response to treatment:  Well tolerated by patient. Plan:     Patient examined and debrided  Tissue culture taken  Santyl and DSD  Rx for saline and keflex  Follow up in one week      Treatment Note please see attached Discharge Instructions    Written patient dismissal instructions given to patient and signed by patient or POA.          Discharge [address] and Hyperbaric Medicine   Physician Orders and Discharge Instructions  [facility]  2 [facility]  Telephone: [phone]      NAME:  Elena Bryan          YOB: 1945  MEDICAL RECORD NUMBER: [id number]    Your  is:  [facility]. Care/Facility:   NONE    Wound Location:   LEFT ANTERIOR ANKLE  Dressing orders: 1. Cleanse wound(s) with normal saline. 2. Apply a nickel thickness layer of SANTYL OINTMENT to the wound bed for enzymatic debridement purposes. 3. Apply a moistened saline 4x4 (gauze pad) over the Santyl Ointment. 4. Cover with additional dry 4x4's and wrap with gauze (graciela or kerlix). 5. Change Every Day. TODAY IN WOUND CENTER APPLY PLUROGEL AND DRY DRESSING. Compression:  Apply 5-10 LOW compression hose to  LEFT lower leg(s), may remove at bedtime, reapply first thing in the morning, avoid prolonged standing, elevate legs when sitting. Offloading Device:    Other Instructions:  ANTIBIOTIC AND TAKE AS DIRECTED,  SALINE AT YOUR PHARMACY, DRESSINGS WILL BE ORDERED AND DELIVERED TO YOUR HOME. PLEASE CALL IF YOU DO NOT RECEIVE THEM IN A TIMELY MANNER. Keep all dressings clean, dry and intact. Keep pressure off the wound(s) at all times. Follow up visit   1 WEEK   April 1, 2022 AT      Please give 24 hour notice if unable to keep appointment. [phone]    If you experience any of the following, please call the Wound Care Service at  [phone] or go to the nearest emergency room. *Increase in pain *Temperature over 101 *Increase in drainage from your wound or a foul odor  *Uncontrolled swelling *Need for compression bandage changes due to slippage, breakthrough drainage       PLEASE NOTE: IF YOU ARE UNABLE TO OBTAIN WOUND SUPPLIES, CONTINUE TO USE THE SUPPLIES YOU HAVE AVAILABLE UNTIL YOU ARE ABLE TO REACH US.  IT IS MOST IMPORTANT TO KEEP THE WOUND COVERED AT ALL TIMES  Electronically signed by Laura Perez DPM on 3/25/2022 at 11:56 AM                    Electronically signed by Laura Perez DPM on 3/25/2022 at 11:59 AM

## 2022-03-25 NOTE — PLAN OF CARE
[address]:     [employer] [id number] [facility], [address] [place], [place] [address]e Medical Anurag p: [phone] f: [phone]     Ordering Center:     [address] [place] [address]  Estrellita Huff [address]  [phone]  WOUND CARE [phone]  FAX NUMBER [phone]    Patient Information:      Shelia M[address] [id number] [phone]   : 1945  AGE: 64 y.o. GENDER: male   TODAYS DATE:  3/25/2022    Insurance:      PRIMARY INSURANCE:  Plan: MEDICARE PART A AND B  Coverage: MEDICARE  Effective Date: 2018  [id number] - (Medicare)    SECONDARY INSURANCE:  Plan: MEDICAL MUTUAL [address]  Coverage: MEDICAL MUTUAL  Effective Date: 2018  [unfilled]    [unfilled]   [unfilled]     Patient Wound Information:      Problem List Items Addressed This Visit     Non-pressure chronic ulcer of left ankle with fat layer exposed ([address] - Primary (Chronic)    Relevant Orders    Initiate Outpatient Wound Care Protocol          WOUNDS REQUIRING DRESSING SUPPLIES:      Non-pressure chronic ulcer of left ankle with fat layer exposed ([address] [L97.322]         Wound 22 Ankle Left; Anterior #1 (Active)   Wound Image   22 1130   Wound Cleansed Cleansed with saline 22 1130   Dressing/Treatment Dry dressing; Other (comment) 22 1204   Wound Length (cm) 2 cm 22 1130   Wound Width (cm) 1.6 cm 22 1130   Wound Depth (cm) 0.3 cm 22 1130   Wound Surface Area (cm^2) 3.2 cm^2 22 1130   Wound Volume (cm^3) 0.96 cm^3 22 1130   Post-Procedure Length (cm) 2 cm 22 1204   Post-Procedure Width (cm) 1.6 cm 22 1204   Post-Procedure Depth (cm) 0.3 cm 22 1204   Post-Procedure Surface Area (cm^2) 3.2 cm^2 22 1204   Post-Procedure Volume (cm^3) 0.96 cm^3 22 1204   Wound Assessment Slough;Eschar moist 22 1130   Drainage Amount Moderate 22 1130   Drainage Description Yellow 22 1130 Odor Mild 03/25/22 1130   Ela-wound Assessment Intact 03/25/22 1130   Margins Defined edges 03/25/22 1130   Wound Thickness Description not for Pressure Injury Full thickness 03/25/22 1130   Number of days: 0     Incision 05/06/19 Wrist Right (Active)   Number of days: 1054       Supplies Requested :      WOUND #: 1   PRIMARY DRESSING:  None   Cover and Secure with: 4X4 gauze pad  Conforming roll gauze     FREQUENCY OF DRESSING CHANGES:  Daily           ADDITIONAL ITEMS:  [] Gloves Small  [x] Gloves Medium [] Gloves Large [] Gloves XLarge  [] Tape 1\" [x] Tape 2\" [] Tape 3\"  [] Medipore Tape  [x] Saline  [] Skin Prep   [] Adhesive Remover   [] Cotton Tip Applicators   [] Other:    Patient Wound(s) Debrided: [x] Yes   [] No    Debribement Type: subcutaneous tissue    Debridement Date: 03/25/22    Patient currently being seen by Home Health: [] Yes   [x] No    Duration for needed supplies:  []15  [x]30  []60  []90 Days    Provider Information:      PROVIDER'S Aleks Glover DPM    NPI: [id number]

## 2022-03-31 ENCOUNTER — TELEPHONE (OUTPATIENT)
Dept: CARDIOLOGY CLINIC | Age: 77
End: 2022-03-31

## 2022-03-31 NOTE — TELEPHONE ENCOUNTER
Eliot Jameson CNP requested last office visit notes for [facility] 3/31/2022 ([phone])

## 2022-04-01 LAB
CULTURE WOUND: ABNORMAL
ORGANISM: ABNORMAL

## 2022-04-05 ENCOUNTER — HOSPITAL ENCOUNTER (OUTPATIENT)
Dept: WOUND CARE | Age: 77
Discharge: HOME | End: 2022-04-05
Payer: MEDICARE

## 2022-04-05 VITALS
TEMPERATURE: 98.3 F | RESPIRATION RATE: 16 BRPM | SYSTOLIC BLOOD PRESSURE: 105 MMHG | HEART RATE: 65 BPM | DIASTOLIC BLOOD PRESSURE: 66 MMHG

## 2022-04-05 DIAGNOSIS — L97.322 NON-PRESSURE CHRONIC ULCER OF LEFT ANKLE WITH FAT LAYER EXPOSED (HCC): Primary | ICD-10-CM

## 2022-04-05 PROCEDURE — 11042 DBRDMT SUBQ TIS 1ST 20SQCM/<: CPT

## 2022-04-05 PROCEDURE — 6370000000 HC RX 637 (ALT 250 FOR IP): Performed by: PODIATRIST

## 2022-04-05 RX ORDER — LIDOCAINE HYDROCHLORIDE 40 MG/ML
SOLUTION TOPICAL ONCE
Status: CANCELLED | OUTPATIENT
Start: 2022-04-05 | End: 2022-04-05

## 2022-04-05 RX ORDER — LIDOCAINE HYDROCHLORIDE 20 MG/ML
JELLY TOPICAL ONCE
Status: CANCELLED | OUTPATIENT
Start: 2022-04-05 | End: 2022-04-05

## 2022-04-05 RX ORDER — BACITRACIN ZINC AND POLYMYXIN B SULFATE 500; 1000 [USP'U]/G; [USP'U]/G
OINTMENT TOPICAL ONCE
Status: CANCELLED | OUTPATIENT
Start: 2022-04-05 | End: 2022-04-05

## 2022-04-05 RX ORDER — CLOBETASOL PROPIONATE 0.5 MG/G
OINTMENT TOPICAL ONCE
Status: CANCELLED | OUTPATIENT
Start: 2022-04-05 | End: 2022-04-05

## 2022-04-05 RX ORDER — BACITRACIN, NEOMYCIN, POLYMYXIN B 400; 3.5; 5 [USP'U]/G; MG/G; [USP'U]/G
OINTMENT TOPICAL ONCE
Status: CANCELLED | OUTPATIENT
Start: 2022-04-05 | End: 2022-04-05

## 2022-04-05 RX ORDER — LIDOCAINE HYDROCHLORIDE 20 MG/ML
JELLY TOPICAL ONCE
Status: COMPLETED | OUTPATIENT
Start: 2022-04-05 | End: 2022-04-05

## 2022-04-05 RX ORDER — LIDOCAINE HYDROCHLORIDE 20 MG/ML
JELLY TOPICAL ONCE
Status: DISCONTINUED | OUTPATIENT
Start: 2022-04-05 | End: 2022-04-06 | Stop reason: HOSPADM

## 2022-04-05 RX ORDER — LIDOCAINE 40 MG/G
CREAM TOPICAL ONCE
Status: CANCELLED | OUTPATIENT
Start: 2022-04-05 | End: 2022-04-05

## 2022-04-05 RX ORDER — CIPROFLOXACIN 500 MG/1
500 TABLET, FILM COATED ORAL 2 TIMES DAILY
Qty: 20 TABLET | Refills: 0 | Status: SHIPPED | OUTPATIENT
Start: 2022-04-05 | End: 2022-04-15

## 2022-04-05 RX ORDER — GENTAMICIN SULFATE 1 MG/G
OINTMENT TOPICAL ONCE
Status: CANCELLED | OUTPATIENT
Start: 2022-04-05 | End: 2022-04-05

## 2022-04-05 RX ORDER — BETAMETHASONE DIPROPIONATE 0.05 %
OINTMENT (GRAM) TOPICAL ONCE
Status: CANCELLED | OUTPATIENT
Start: 2022-04-05 | End: 2022-04-05

## 2022-04-05 RX ORDER — LIDOCAINE 50 MG/G
OINTMENT TOPICAL ONCE
Status: CANCELLED | OUTPATIENT
Start: 2022-04-05 | End: 2022-04-05

## 2022-04-05 RX ORDER — GINSENG 100 MG
CAPSULE ORAL ONCE
Status: CANCELLED | OUTPATIENT
Start: 2022-04-05 | End: 2022-04-05

## 2022-04-05 RX ADMIN — LIDOCAINE HYDROCHLORIDE: 20 JELLY TOPICAL at 16:32

## 2022-04-05 NOTE — PROGRESS NOTES
[facility]                                                   Progress Note and Procedure Note      Lore Mackay RECORD NUMBER:  [id number]  AGE: 64 y.o. GENDER: male  : 1945  EPISODE DATE:  2022    Subjective:     Chief Complaint   Patient presents with    Wound Check     left ankle wound recheck         HISTORY of PRESENT ILLNESS HPI      Marlin Cardona is a 64 y.o. male who presents today for wound/ulcer evaluation. History of Wound Context: Patient presents for a reoccurrence of an non healing ulcer of the left ankle. The patient was last seen in 2021 with the wound doing very well and then he went to a AL and never followed up. The wound is large deep and malodorous. He denies any NVFC. Wound/Ulcer Pain Timing/Severity: intermittent  Quality of pain: tender  Severity:   10   Modifying Factors: None  Associated Signs/Symptoms: drainage, odor and pain    Ulcer Identification:  Ulcer Type: venous and undetermined  Contributing Factors: edema and venous stasis    Wound: N/A        PAST MEDICAL HISTORY        Diagnosis Date    Alcohol abuse     quit drinking 19    CAD (coronary artery disease)     patient states no doctor has told him this / has 1 cardiac stent    Cancer ([address]     lung LLL    Chronic back pain     Chronic kidney disease     Chronic obstructive pulmonary disease, unspecified COPD type ([address] 3/24/2022    Chronic sinusitis     DJD (degenerative joint disease) of knee     left knee DJD    Elevated PSA     History of blood transfusion     History of inferior wall myocardial infarction 2016    1 cardiac stent    HTN (hypertension)     meds .  1 yr    Hyperlipidemia     meds > 12 yrs    NSTEMI (non-ST elevated myocardial infarction) ([address]     Smoker        PAST SURGICAL HISTORY    Past Surgical History:   Procedure Laterality Date    ABDOMEN SURGERY      spleenectomy due to [address]      lumbar disc OR    CARDIAC SURGERY      stents    CARPAL TUNNEL RELEASE Right 5/6/2019    RIGHT CARPAL TUNNEL RELEASE performed by Jose David Henriquez MD at [place] [address] WITH STENT PLACEMENT  1/29/2016    EYE SURGERY      to have Phaco with IOL OU 2/2018    HERNIA REPAIR      JOINT REPLACEMENT Left 1994    LTHR    JOINT REPLACEMENT Right 2009    RTKR    KNEE SURGERY Right 2011    arthroscopy    OH MANIPULATN KNEE JT+ANESTHESIA Right 5/12/2017    RIGHT KNEE MANIPULATION UNDER ANESTHESIA performed [facility] OFFICE/OUTPT VISIT,PROCEDURE ONLY Bilateral 12/29/2017    RIGHT AND BILATERAL L 4-5 LYSIS OF SCAR DISKECTOMY INTERBODY CAGE FUSION POSTEROLATERAL FUSION PEDICLE SCREWS performed by Jose David Henriquez MD at [address] [place] [address]  2004    benign    SPLENECTOMY  1961    TOTAL KNEE ARTHROPLASTY Right 3/24/2017    RIGHT  KNEE TOTAL ARTHROPLASTY, ELHAM CEMENTED PERSONA  performed by Christie Mae MD at [address] [place] [address] HISTORY    Family History   Problem Relation Age of Onset    Osteoarthritis Mother     Emphysema Mother     Hypertension Father     Hearing Loss Father     Dementia Father     No Known Problems Sister     Prostate Cancer Brother     Colon Polyps Neg Hx        SOCIAL HISTORY    Social History     Tobacco Use    Smoking status: Current Every Day Smoker     Packs/day: 0.00     Years: 60.00     Pack years: 0.00     Types: Cigarettes    Smokeless tobacco: Never Used    Tobacco comment:  smokes 2 cigars a day   Vaping Use    Vaping Use: Never used   Substance Use Topics    Alcohol use: Not Currently     Alcohol/week: 12.0 standard drinks     Types: 12 Shots of liquor per week     Comment: socail drinking    Drug use: No       ALLERGIES    Allergies   Allergen Reactions    Morphine        MEDICATIONS    Current Outpatient Medications on File Prior to Encounter   Medication Sig Dispense Refill    cephALEXin (KEFLEX) 500 MG capsule Take 1 capsule by mouth 3 times daily 30 capsule 0    SODIUM CHLORIDE, EXTERNAL, 0.9 % SOLN Apply 1 Applicatorful topically daily 1000 mL 2    atorvastatin (LIPITOR) 20 MG tablet TAKE ONE TABLET BY MOUTH DAILY 90 tablet 3    clopidogrel (PLAVIX) 75 MG tablet Take 1 tablet by mouth daily 90 tablet 3    diclofenac (VOLTAREN) 50 MG EC tablet TAKE ONE TABLET BY MOUTH TWO TIMES A DAY 60 tablet 5    isosorbide mononitrate (IMDUR) 30 MG extended release tablet TAKE ONE TABLET BY MOUTH EVERY DAY 90 tablet 3    citalopram (CELEXA) 40 MG tablet TAKE ONE TABLET BY MOUTH nightly 30 tablet 5    bumetanide (BUMEX) 2 MG tablet Take 1 tablet by mouth daily 90 tablet 3    metoprolol tartrate (LOPRESSOR) 50 MG tablet Take 0.5 tablets by mouth 2 times daily TAKE ONE TABLET BY MOUTH TWO TIMES A  tablet 3    folic acid (FOLVITE) 1 MG tablet Take 1 tablet by mouth daily 30 tablet 3    lidocaine 4 % external patch Place 3 patches onto the skin daily 1 box 1    tiZANidine (ZANAFLEX) 4 MG tablet Take 0.5 tablets by mouth every 6 hours as needed (muscle spasm) 30 tablet 0    thiamine 100 MG tablet Take 1 tablet by mouth daily 30 tablet 3    omeprazole (PRILOSEC) 10 MG delayed release capsule TAKE ONE CAPSULE BY MOUTH DAILY 90 capsule 2    SODIUM CHLORIDE, EXTERNAL, 0.9 % SOLN Apply 1 Applicatorful topically daily 1000 mL 2    finasteride (PROSCAR) 5 MG tablet Take 1 tablet by mouth daily 90 tablet 3    tiotropium (SPIRIVA RESPIMAT) 2.5 MCG/ACT AERS inhaler Inhale 2 puffs into the lungs daily 1 Inhaler 3    Fluticasone furoate-vilanterol (BREO ELLIPTA) 200-25 MCG/INH AEPB inhaler Inhale 1 puff into the lungs daily 1 each 3    albuterol sulfate  (90 Base) MCG/ACT inhaler Inhale 2 puffs into the lungs every 6 hours as needed for Wheezing 1 Inhaler 3    nitroGLYCERIN (NITROSTAT) 0.4 MG SL tablet Place 1 tablet under the tongue every 5 minutes as needed for Chest pain up to max of 3 total doses.  If no relief after 1 dose, call 911. 25 tablet 3    DOCUSATE CALCIUM PO Take by mouth as needed       No current facility-administered medications on file prior to encounter. REVIEW OF SYSTEMS    Pertinent items are noted in HPI. Objective:      /66   Pulse 65   Temp 98.3 °F (36.8 °C) (Temporal)   Resp 16     Wt Readings from Last 3 Encounters:   03/02/22 157 lb (71.2 kg)   02/07/22 160 lb (72.6 kg)   01/14/22 149 lb (67.6 kg)       PHYSICAL EXAM    Constitutional:   Well nourished and well developed. Appears neat and clean. Patient is alert, oriented x3, and in no apparent distress. Respiratory:  Respiratory effort is easy and symmetric bilaterally. Rate is normal at rest and on room air. Vascular:  Pedal Pulses is palpable and audible with doppler. Capillary refill is <3 sec to digits bilateral.  Extremities positive for 1+ pitting edema. Neurological:   Sensation is intact to lower extremities. Dermatological:  Wound description noted in wound assessment. The wound is much improved with decreased slough and erythema. Psychiatric:  Judgement and insight intact. Short and long term memory intact. No evidence of depression, anxiety, or agitation. Patient is calm, cooperative, and communicative. Appropriate interactions and affect. Assessment:      Active Hospital Problems    Diagnosis Date Noted    Non-pressure chronic ulcer of left ankle with fat layer exposed ([facility] [L97.322] 08/17/2021    Osteomyelitis of ankle [facility]) [M86.9] 08/17/2021        Procedure Note  Indications:  Based on my examination of this patient's wound(s)/ulcer(s) today, debridement is required to promote healing and evaluate the wound base.     Performed by: Mary Ann Macias DPM    Consent obtained:  Yes    Time out taken:  Yes    Pain Control:2% lidocaine gel  thin layer applied topically to wound bed      Debridement:Excisional Debridement    Using curette the wound(s)/ulcer(s) was/were sharply debrided down through and including the removal of epidermis, dermis and subcutaneous tissue. Devitalized Tissue Debrided:  exudate    Pre Debridement Measurements:  Are located in the [place]  Documentation Flow Sheet    Wound/Ulcer #: 1    Post Debridement Measurements:  Wound/Ulcer Descriptions are Pre Debridement except measurements:    Wound 03/25/22 Ankle Left; Anterior #1 (Active)   Wound Image   04/05/22 1615   Wound Cleansed Cleansed with saline 04/05/22 1615   Dressing/Treatment Dry dressing; Other (comment) 03/25/22 1204   Wound Length (cm) 2.2 cm 04/05/22 1615   Wound Width (cm) 1.7 cm 04/05/22 1615   Wound Depth (cm) 0.3 cm 04/05/22 1615   Wound Surface Area (cm^2) 3.74 cm^2 04/05/22 1615   Change in Wound Size % (l*w) -16.88 04/05/22 1615   Wound Volume (cm^3) 1.122 cm^3 04/05/22 1615   Wound Healing % -17 04/05/22 1615   Post-Procedure Length (cm) 2.2 cm 04/05/22 1639   Post-Procedure Width (cm) 1.7 cm 04/05/22 1639   Post-Procedure Depth (cm) 0.3 cm 04/05/22 1639   Post-Procedure Surface Area (cm^2) 3.74 cm^2 04/05/22 1639   Post-Procedure Volume (cm^3) 1.122 cm^3 04/05/22 1639   Wound Assessment Slough;Eschar moist 03/25/22 1130   Drainage Amount Large 04/05/22 1615   Drainage Description Yellow 04/05/22 1615   Odor None 04/05/22 1615   Ela-wound Assessment Intact 04/05/22 1615   Margins Defined edges 04/05/22 1615   Wound Thickness Description not for Pressure Injury Full thickness 04/05/22 1615   Number of days: 11     Incision 05/06/19 Wrist Right (Active)   Number of days: 1065         Percent of Wound/Ulcer Debrided: 100%    Total Surface Area Debrided:  3.2 sq cm     Diabetic/Pressure/Non Pressure Ulcers:  Ulcer: Non-Pressure ulcer, fat layer exposed      Bleeding:  Minimal    Hemostasis Achieved:  by pressure    Procedural Pain:  5  / 10     Post Procedural Pain:  1 / 10     Response to treatment:  Well tolerated by patient.        Plan:     Patient examined and debrided  Tissue culture MARIANNE Moran DPM on 4/5/2022 at 5:25 PM

## 2022-04-05 NOTE — CODE DOCUMENTATION
[id number] Billing Sheet. Vicki Ramos  AGE: 64 y.o.    GENDER: male  : 1945  TODAY'S DATE:  2022    ICD-10 CODES  Active Hospital Problems    Diagnosis Date Noted    Non-pressure chronic ulcer of left ankle with fat layer exposed ([facility] [L97.322] 2021    Osteomyelitis of ankle ([facility] [M86.9] 2021       PHYSICIAN PROCEDURES    CPT CODE  68100 LT      Electronically signed by Camila Chao DPM on 2022 at 5:24 PM

## 2022-04-26 ENCOUNTER — HOSPITAL ENCOUNTER (OUTPATIENT)
Dept: WOUND CARE | Age: 77
Discharge: HOME | End: 2022-04-26
Payer: MEDICARE

## 2022-04-26 VITALS
SYSTOLIC BLOOD PRESSURE: 121 MMHG | DIASTOLIC BLOOD PRESSURE: 61 MMHG | HEART RATE: 62 BPM | TEMPERATURE: 97.7 F | RESPIRATION RATE: 20 BRPM

## 2022-04-26 DIAGNOSIS — L97.322 NON-PRESSURE CHRONIC ULCER OF LEFT ANKLE WITH FAT LAYER EXPOSED (HCC): Primary | ICD-10-CM

## 2022-04-26 PROCEDURE — 6370000000 HC RX 637 (ALT 250 FOR IP): Performed by: PODIATRIST

## 2022-04-26 PROCEDURE — 11042 DBRDMT SUBQ TIS 1ST 20SQCM/<: CPT

## 2022-04-26 RX ORDER — BETAMETHASONE DIPROPIONATE 0.05 %
OINTMENT (GRAM) TOPICAL ONCE
Status: CANCELLED | OUTPATIENT
Start: 2022-04-26 | End: 2022-04-26

## 2022-04-26 RX ORDER — LIDOCAINE HYDROCHLORIDE 20 MG/ML
JELLY TOPICAL ONCE
Status: CANCELLED | OUTPATIENT
Start: 2022-04-26 | End: 2022-04-26

## 2022-04-26 RX ORDER — GENTAMICIN SULFATE 1 MG/G
OINTMENT TOPICAL ONCE
Status: CANCELLED | OUTPATIENT
Start: 2022-04-26 | End: 2022-04-26

## 2022-04-26 RX ORDER — LIDOCAINE 50 MG/G
OINTMENT TOPICAL ONCE
Status: CANCELLED | OUTPATIENT
Start: 2022-04-26 | End: 2022-04-26

## 2022-04-26 RX ORDER — GINSENG 100 MG
CAPSULE ORAL ONCE
Status: CANCELLED | OUTPATIENT
Start: 2022-04-26 | End: 2022-04-26

## 2022-04-26 RX ORDER — LIDOCAINE HYDROCHLORIDE 20 MG/ML
JELLY TOPICAL ONCE
Status: COMPLETED | OUTPATIENT
Start: 2022-04-26 | End: 2022-04-26

## 2022-04-26 RX ORDER — CLOBETASOL PROPIONATE 0.5 MG/G
OINTMENT TOPICAL ONCE
Status: CANCELLED | OUTPATIENT
Start: 2022-04-26 | End: 2022-04-26

## 2022-04-26 RX ORDER — LIDOCAINE HYDROCHLORIDE 40 MG/ML
SOLUTION TOPICAL ONCE
Status: CANCELLED | OUTPATIENT
Start: 2022-04-26 | End: 2022-04-26

## 2022-04-26 RX ORDER — BACITRACIN, NEOMYCIN, POLYMYXIN B 400; 3.5; 5 [USP'U]/G; MG/G; [USP'U]/G
OINTMENT TOPICAL ONCE
Status: CANCELLED | OUTPATIENT
Start: 2022-04-26 | End: 2022-04-26

## 2022-04-26 RX ORDER — LIDOCAINE 40 MG/G
CREAM TOPICAL ONCE
Status: CANCELLED | OUTPATIENT
Start: 2022-04-26 | End: 2022-04-26

## 2022-04-26 RX ORDER — BACITRACIN ZINC AND POLYMYXIN B SULFATE 500; 1000 [USP'U]/G; [USP'U]/G
OINTMENT TOPICAL ONCE
Status: CANCELLED | OUTPATIENT
Start: 2022-04-26 | End: 2022-04-26

## 2022-04-26 RX ADMIN — LIDOCAINE HYDROCHLORIDE: 20 JELLY TOPICAL at 16:30
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
## 2022-04-26 NOTE — PLAN OF CARE
Problem: Skin/Tissue Integrity  Goal: Absence of new skin breakdown  Description: 1. Monitor for areas of redness and/or skin breakdown  2. Assess vascular access sites hourly  3. Every 4-6 hours minimum:  Change oxygen saturation probe site  4. Every 4-6 hours:  If on nasal continuous positive airway pressure, respiratory therapy assess nares and determine need for appliance change or resting period.   Outcome: Progressing

## 2022-04-26 NOTE — CODE DOCUMENTATION
[facility] Billing Sheet. Shanda Casas  AGE: 64 y.o.    GENDER: male  : 1945  TODAY'S DATE:  2022    ICD-10 CODES  Active Hospital Problems    Diagnosis Date Noted    Non-pressure chronic ulcer of left ankle with fat layer exposed [facility]) [L97.322] 2021       PHYSICIAN PROCEDURES    CPT CODE  61748 LT      Electronically signed by Reagan Cueto DPM on 2022 at 5:01 PM

## 2022-05-10 ENCOUNTER — CARE COORDINATION (OUTPATIENT)
Dept: CARE COORDINATION | Age: 77
End: 2022-05-10

## 2022-05-17 ENCOUNTER — HOSPITAL ENCOUNTER (OUTPATIENT)
Dept: WOUND CARE | Age: 77
Discharge: HOME | End: 2022-05-17
Payer: MEDICARE

## 2022-05-17 VITALS
TEMPERATURE: 97.8 F | SYSTOLIC BLOOD PRESSURE: 114 MMHG | HEART RATE: 59 BPM | DIASTOLIC BLOOD PRESSURE: 61 MMHG | RESPIRATION RATE: 20 BRPM

## 2022-05-17 DIAGNOSIS — L97.322 NON-PRESSURE CHRONIC ULCER OF LEFT ANKLE WITH FAT LAYER EXPOSED (HCC): Primary | ICD-10-CM

## 2022-05-17 PROCEDURE — 87186 SC STD MICRODIL/AGAR DIL: CPT

## 2022-05-17 PROCEDURE — 87075 CULTR BACTERIA EXCEPT BLOOD: CPT

## 2022-05-17 PROCEDURE — 87077 CULTURE AEROBIC IDENTIFY: CPT

## 2022-05-17 PROCEDURE — 87176 TISSUE HOMOGENIZATION CULTR: CPT

## 2022-05-17 PROCEDURE — 87070 CULTURE OTHR SPECIMN AEROBIC: CPT

## 2022-05-17 PROCEDURE — 6370000000 HC RX 637 (ALT 250 FOR IP): Performed by: PODIATRIST

## 2022-05-17 PROCEDURE — 11042 DBRDMT SUBQ TIS 1ST 20SQCM/<: CPT

## 2022-05-17 RX ORDER — BETAMETHASONE DIPROPIONATE 0.05 %
OINTMENT (GRAM) TOPICAL ONCE
Status: CANCELLED | OUTPATIENT
Start: 2022-05-17 | End: 2022-05-17

## 2022-05-17 RX ORDER — LIDOCAINE HYDROCHLORIDE 20 MG/ML
JELLY TOPICAL ONCE
Status: CANCELLED | OUTPATIENT
Start: 2022-05-17 | End: 2022-05-17

## 2022-05-17 RX ORDER — LIDOCAINE HYDROCHLORIDE 20 MG/ML
JELLY TOPICAL ONCE
Status: COMPLETED | OUTPATIENT
Start: 2022-05-17 | End: 2022-05-17

## 2022-05-17 RX ORDER — BACITRACIN, NEOMYCIN, POLYMYXIN B 400; 3.5; 5 [USP'U]/G; MG/G; [USP'U]/G
OINTMENT TOPICAL ONCE
Status: CANCELLED | OUTPATIENT
Start: 2022-05-17 | End: 2022-05-17

## 2022-05-17 RX ORDER — BACITRACIN ZINC AND POLYMYXIN B SULFATE 500; 1000 [USP'U]/G; [USP'U]/G
OINTMENT TOPICAL ONCE
Status: CANCELLED | OUTPATIENT
Start: 2022-05-17 | End: 2022-05-17

## 2022-05-17 RX ORDER — LIDOCAINE 40 MG/G
CREAM TOPICAL ONCE
Status: CANCELLED | OUTPATIENT
Start: 2022-05-17 | End: 2022-05-17

## 2022-05-17 RX ORDER — LIDOCAINE 50 MG/G
OINTMENT TOPICAL ONCE
Status: CANCELLED | OUTPATIENT
Start: 2022-05-17 | End: 2022-05-17

## 2022-05-17 RX ORDER — GINSENG 100 MG
CAPSULE ORAL ONCE
Status: CANCELLED | OUTPATIENT
Start: 2022-05-17 | End: 2022-05-17

## 2022-05-17 RX ORDER — GENTAMICIN SULFATE 1 MG/G
OINTMENT TOPICAL ONCE
Status: CANCELLED | OUTPATIENT
Start: 2022-05-17 | End: 2022-05-17

## 2022-05-17 RX ORDER — LIDOCAINE HYDROCHLORIDE 40 MG/ML
SOLUTION TOPICAL ONCE
Status: CANCELLED | OUTPATIENT
Start: 2022-05-17 | End: 2022-05-17

## 2022-05-17 RX ORDER — CLOBETASOL PROPIONATE 0.5 MG/G
OINTMENT TOPICAL ONCE
Status: CANCELLED | OUTPATIENT
Start: 2022-05-17 | End: 2022-05-17

## 2022-05-17 RX ADMIN — LIDOCAINE HYDROCHLORIDE: 20 JELLY TOPICAL at 16:18

## 2022-05-17 NOTE — CODE DOCUMENTATION
[facility] Billing Sheet. Rehan Gerard  AGE: 64 y.o.    GENDER: male  : 1945  TODAY'S DATE:  2022    ICD-10 CODES  Active Hospital Problems    Diagnosis Date Noted    Non-pressure chronic ulcer of left ankle with fat layer exposed [facility]) [L97.322] 2021       PHYSICIAN PROCEDURES    CPT CODE  06807 LT      Electronically signed by Aníbal Chu DPM on 2022 at 4:34 PM

## 2022-05-17 NOTE — PROGRESS NOTES
CARPAL TUNNEL RELEASE Right 5/6/2019    RIGHT CARPAL TUNNEL RELEASE performed by Marixa Carr MD at [place] [address] WITH STENT PLACEMENT  1/29/2016    EYE SURGERY      to have Phaco with IOL OU 2/2018    HERNIA REPAIR      JOINT REPLACEMENT Left 1994    LTHR    JOINT REPLACEMENT Right 2009    RTKR    KNEE SURGERY Right 2011    arthroscopy    MN MANIPULATN KNEE JT+ANESTHESIA Right 5/12/2017    RIGHT KNEE MANIPULATION UNDER ANESTHESIA [facility] [address] [facility] OFF[facility]/OUTPT VISIT,PROCEDURE ONLY Bilateral 12/29/2017    RIGHT AND BILATERAL L 4-5 LYSIS OF SCAR DISKECTOMY INTERBODY CAGE FUSION POSTEROLATERAL FUSION PEDICLE SCREWS performed by Marixa Carr MD at [address] [place] [address]  2004    benign    SPLENECTOMY  1961    TOTAL KNEE ARTHROPLASTY Right 3/24/2017    RIGHT  KNEE TOTAL ARTHROPLASTY, ELHAM CEMENTED PERSONA  performed by Dung Brunner MD at [address] [place] [address] HISTORY    Family History   Problem Relation Age of Onset    Osteoarthritis Mother     Emphysema Mother     Hypertension Father     Hearing Loss Father     Dementia Father     No Known Problems Sister     Prostate Cancer Brother     Colon Polyps Neg Hx        SOCIAL HISTORY    Social History     Tobacco Use    Smoking status: Current Every Day Smoker     Packs/day: 0.00     Years: 60.00     Pack years: 0.00     Types: Cigarettes    Smokeless tobacco: Never Used    Tobacco comment:  smokes 2 cigars a day   Vaping Use    Vaping Use: Never used   Substance Use Topics    Alcohol use: Not Currently     Alcohol/week: 12.0 standard drinks     Types: 12 Shots of liquor per week     Comment: socail drinking    Drug use: No       ALLERGIES    Allergies   Allergen Reactions    Morphine        MEDICATIONS    Current Outpatient Medications on File Prior to Encounter   Medication Sig Dispense Refill    cephALEXin (KEFLEX) 500 MG capsule Take 1 capsule by mouth 3 times daily 30 DOCUSATE CALCIUM PO Take by mouth as needed       No current facility-administered medications on file prior to encounter. REVIEW OF SYSTEMS    Pertinent items are noted in HPI. Objective:      /61   Pulse 59   Temp 97.8 °F (36.6 °C) (Temporal)   Resp 20     Wt Readings from Last 3 Encounters:   03/02/22 157 lb (71.2 kg)   02/07/22 160 lb (72.6 kg)   01/14/22 149 lb (67.6 kg)       PHYSICAL EXAM    Constitutional:   Well nourished and well developed. Appears neat and clean. Patient is alert, oriented x3, and in no apparent distress. Respiratory:  Respiratory effort is easy and symmetric bilaterally. Rate is normal at rest and on room air. Vascular:  Pedal Pulses is palpable and audible with doppler. Capillary refill is <3 sec to digits bilateral.  Extremities positive for 1+ pitting edema. Neurological:   Sensation is intact to lower extremities. Dermatological:  Wound description noted in wound assessment. The wound appearnace is much improved again with decreased slough and erythema. Granulation buds are noted. Size remains stable     Psychiatric:  Judgement and insight intact. Short and long term memory intact. No evidence of depression, anxiety, or agitation. Patient is calm, cooperative, and communicative. Appropriate interactions and affect. Assessment:      Active Hospital Problems    Diagnosis Date Noted    Non-pressure chronic ulcer of left ankle with fat layer exposed [facility]) [L97.322] 08/17/2021        Procedure Note  Indications:  Based on my examination of this patient's wound(s)/ulcer(s) today, debridement is required to promote healing and evaluate the wound base.     Performed by: Camila Chao DPM    Consent obtained:  Yes    Time out taken:  Yes    Pain Control:2% lidocaine gel  thin layer applied topically to wound bed      Debridement:Excisional Debridement    Using curette the wound(s)/ulcer(s) was/were sharply debrided down through and including the removal of epidermis, dermis and subcutaneous tissue. Devitalized Tissue Debrided:  exudate    Pre Debridement Measurements:  Are located in the [facility]  Documentation Flow Sheet    Wound/Ulcer #: 1    Post Debridement Measurements:  Wound/Ulcer Descriptions are Pre Debridement except measurements:    Wound 03/25/22 Ankle Left; Anterior #1 (Active)   Wound Image   05/17/22 1614   Wound Etiology Other 05/17/22 1614   Wound Cleansed Cleansed with saline 05/17/22 1614   Dressing/Treatment Other (comment); Hydrating gel;Dry dressing 05/17/22 1631   Wound Length (cm) 2.1 cm 05/17/22 1614   Wound Width (cm) 1.5 cm 05/17/22 1614   Wound Depth (cm) 0.4 cm 05/17/22 1614   Wound Surface Area (cm^2) 3.15 cm^2 05/17/22 1614   Change in Wound Size % (l*w) 1.56 05/17/22 1614   Wound Volume (cm^3) 1.26 cm^3 05/17/22 1614   Wound Healing % -31 05/17/22 1614   Post-Procedure Length (cm) 2.1 cm 05/17/22 1627   Post-Procedure Width (cm) 1.5 cm 05/17/22 1627   Post-Procedure Depth (cm) 0.4 cm 05/17/22 1627   Post-Procedure Surface Area (cm^2) 3.15 cm^2 05/17/22 1627   Post-Procedure Volume (cm^3) 1.26 cm^3 05/17/22 1627   Wound Assessment [place]/red;Slough 05/17/22 1614   Drainage Amount Small 05/17/22 1614   Drainage Description Yellow 05/17/22 1614   Odor None 05/17/22 1614   Ela-wound Assessment Intact 05/17/22 1614   Margins Defined edges 05/17/22 1614   Wound Thickness Description not for Pressure Injury Full thickness 05/17/22 1614   Number of days: 53         Percent of Wound/Ulcer Debrided: 100%    Total Surface Area Debrided:  3.15 sq cm     Diabetic/Pressure/Non Pressure Ulcers:  Ulcer: Non-Pressure ulcer, fat layer exposed      Bleeding:  Minimal    Hemostasis Achieved:  by pressure    Procedural Pain:  5  / 10     Post Procedural Pain:  1 / 10     Response to treatment:  Well tolerated by patient.        Plan:     Patient examined and debrided  Triad and DSD  Culture obtained for pre auth for Negro  Follow up in two weeks      Treatment Note please see attached Discharge Instructions    Written patient dismissal instructions given to patient and signed by patient or POA. Discharge [address] and Hyperbaric Medicine   [facility] Orders and Discharge [address] [facility] [address] [place] [address]  Telephone: [phone]      [phone]        NAME: Alize Sorto  DATE OF BIRTH:  1945  MEDICAL RECORD NUMBER:  [id number]     Your  is:  Marie     Home Care/Facility:   NONE     Wound Location:   LEFT ANTERIOR ANKLE  Dressing orders: 1. Cleanse wound(s) with normal saline. 2. Apply TRIAD COLOPLAST TO WOUND BED. 3. Cover with DRY DRESSING. 4. Change Every Day.     TODAY IN WOUND CENTER APPLY PLUROGEL AND DRY DRESSING.     Compression:      Offloading Device:     Other Instructions:  Keep all dressings clean, dry and intact.  Keep pressure off the wound(s) at all times.      Follow up visit   3 WEEKS  June 7, 2022 AT       Please give 24 hour notice if unable to keep appointment. [phone]     If you experience any of the following, please call the Wound Care Service at  [phone] or go to the nearest emergency room.        *Increase in pain         *Temperature over 101           *Increase in drainage from your wound or a foul odor  *Uncontrolled swelling            *Need for compression bandage changes due to slippage, breakthrough drainage       PLEASE NOTE: IF YOU ARE UNABLE TO OBTAIN WOUND SUPPLIES, CONTINUE TO USE THE SUPPLIES YOU HAVE AVAILABLE UNTIL YOU ARE ABLE TO [facility].  IT IS MOST IMPORTANT TO KEEP THE WOUND COVERED AT ALL TIMES  Electronically signed by Ailyn Delaney DPM on 5/17/2022 at 4:32 PM        Electronically signed by Ailyn Delaney DPM on 5/17/2022 at 4:34 PM

## 2022-05-20 RX ORDER — GENTAMICIN SULFATE 1 MG/G
OINTMENT TOPICAL
Qty: 30 G | Refills: 1 | Status: SHIPPED | OUTPATIENT
Start: 2022-05-20 | End: 2022-07-12

## 2022-05-20 RX ORDER — CIPROFLOXACIN 500 MG/1
500 TABLET, FILM COATED ORAL 2 TIMES DAILY
Qty: 20 TABLET | Refills: 0 | Status: SHIPPED | OUTPATIENT
Start: 2022-05-20 | End: 2022-05-30

## 2022-05-23 LAB
CULTURE WOUND: ABNORMAL
ORGANISM: ABNORMAL
ORGANISM: ABNORMAL

## 2022-05-24 ENCOUNTER — CARE COORDINATION (OUTPATIENT)
Dept: CARE COORDINATION | Age: 77
End: 2022-05-24

## 2022-05-24 DIAGNOSIS — Z76.0 MEDICATION REFILL: ICD-10-CM

## 2022-05-24 RX ORDER — CITALOPRAM 40 MG/1
TABLET ORAL
Qty: 30 TABLET | Refills: 5 | Status: SHIPPED | OUTPATIENT
Start: 2022-05-24

## 2022-05-24 ASSESSMENT — PATIENT HEALTH QUESTIONNAIRE - PHQ9
SUM OF ALL RESPONSES TO PHQ QUESTIONS 1-9: 7

## 2022-05-24 NOTE — LETTER
5/24/2022    [address] [place] [address] [address] [facility] [address]      Dear Bhupendra Harper,    My name is Lorena Amin RN and I am a registered nurse who partners with Mir Nichols MD to improve patients' health. Mir Nichols MD believes you would benefit from working with me. As a member of your health care team, I would work with other providers involved in your care, offer education for your specific health conditions, and connect you with additional resources as needed. I will collaborate with Mir Nichols MD to support you in following your treatment plan. The additional support I provide is no additional cost to you. My primary focus is to help you achieve specific goals and improve your health. We are committed to walk with you on this journey and look forward to working with you. Please call me to further discuss your healthcare needs. I am available by phone or for appointments at the office. You can reach me at [phone].         In good health,       Diego Huntley RN, BSN      Lorena Amin RN            ENC: COPD zone tools

## 2022-05-24 NOTE — CARE COORDINATION
Attempted to contact patient for care coordination discussion. Unable to reach patient by phone. Message left regarding purpose of call. Number provided and call back requested.

## 2022-05-24 NOTE — TELEPHONE ENCOUNTER
----- Message from Mc Parra, RN sent at 5/24/2022  3:24 PM EDT -----  Regarding: Medication Refill  Devin French is asking for refill for Citalopram 40 mg tab one at   He uses [email or web address] in [place].     Thank you,  Leslye Lind

## 2022-05-24 NOTE — CARE COORDINATION
Ambulatory Care Coordination Note  5/24/2022  CM Risk Score: 7  Charlson 10 Year Mortality Risk Score: 100%     ACC: Chance Duong, RN    Summary Note:     I called to discuss care coordination with Eddi  I discussed my role and the process of care coordination. He says that he is interested in this program.  We have reviewed, allergies, current medical history, falls screening, initiated CC protocol, completed full medication review, travel screening, and ACP review. He tells me that he is very unsteady on his feet and he is frequently afraid of falling. He does use a walker for mobility most of the time  He adds that he does have a life alert as a fall left him on the floor alone for 8 hours. I spoke with him about physical therapy to increase strength with focus on falls prevention. He is mildly depressed that he has so many things wrong with his overall health. He is very interested in making improvements where he can. PHQ- 9 = 7 = mild depression. He spoke at length about having oxygen delivered to his home last year. He says [facility] dropped it off but he did not know that it was ordered and he is not sure how to use it . I will follow up to determine origin of order and Plan   He tells me that since he hasn't used it yet is this something that he needs to do. I will follow up to obtain information for his questions. He is not vaccinated for COVID. He does have a living will but no Durable POA as he is not sure who to give this to. He tells me that he wants his son Katiuska to be the first contact if he is unable to speak for himself and his sister Satnam Sears second. I have provided him with my contact information and I have asked him to call me with any questions or concerns.       Lab Results     None          Care Coordination Interventions    Referral from Primary Care Provider: No  Suggested Interventions and Community Resources         Goals Addressed    None         Prior to Admission medications    Medication Sig Start Date End Date Taking? Authorizing Provider   atorvastatin (LIPITOR) 20 MG tablet TAKE ONE TABLET BY MOUTH DAILY 3/22/22  Yes Abraham Guzman MD   clopidogrel (PLAVIX) 75 MG tablet Take 1 tablet by mouth daily 3/2/22  Yes Abraham Guzman MD   diclofenac (VOLTAREN) 50 MG EC tablet TAKE ONE TABLET BY MOUTH TWO TIMES A DAY 2/14/22  Yes Andrea Doyle MD   isosorbide mononitrate (IMDUR) 30 MG extended release tablet TAKE ONE TABLET BY MOUTH EVERY DAY 2/4/22  Yes Abraham Guzman MD   citalopram (CELEXA) 40 MG tablet TAKE ONE TABLET BY MOUTH nightly 2/4/22  Yes Andrea Doyle MD   bumetanide (BUMEX) 2 MG tablet Take 1 tablet by mouth daily 1/21/22  Yes Abraham Guzman MD   metoprolol tartrate (LOPRESSOR) 50 MG tablet Take 0.5 tablets by mouth 2 times daily TAKE ONE TABLET BY MOUTH TWO TIMES A DAY 1/3/22  Yes Andrea Doyle MD   omeprazole (PRILOSEC) 10 MG delayed release capsule TAKE ONE CAPSULE BY MOUTH DAILY 9/23/21  Yes Abraham Guzman MD   finasteride (PROSCAR) 5 MG tablet Take 1 tablet by mouth daily 8/12/21  Yes Neyda Ramirez MD   tiotropium (SPIRIVA RESPIMAT) 2.5 MCG/ACT AERS inhaler Inhale 2 puffs into the lungs daily 5/19/21  Yes Sheng Hoff MD   Fluticasone furoate-vilanterol (BREO ELLIPTA) 200-25 MCG/INH AEPB inhaler Inhale 1 puff into the lungs daily 2/23/21  Yes Sheng Hoff MD   albuterol sulfate  (90 Base) MCG/ACT inhaler Inhale 2 puffs into the lungs every 6 hours as needed for Wheezing 7/2/20  Yes Sheng Hoff MD   DOCUSATE CALCIUM PO Take by mouth as needed   Yes Historical Provider, MD   ciprofloxacin (CIPRO) 500 MG tablet Take 1 tablet by mouth 2 times daily for 10 days  Patient not taking: Reported on 5/24/2022 5/20/22 5/30/22  Sara Moran DPM   gentamicin (GARAMYCIN) 0.1 % ointment Apply topically  daily.   Patient not taking: Reported on 5/24/2022 5/20/22   Sara Moran DPM   cephALEXin (KEFLEX) 500 MG capsule Take 1 capsule by mouth 3 times daily  Patient not taking: Reported on 5/24/2022 3/25/22   Sara Moran DPM   SODIUM CHLORIDE, EXTERNAL, 0.9 % SOLN Apply 1 Applicatorful topically daily  Patient not taking: Reported on 5/24/2022 3/25/22   Sara Moran DPM   folic acid (FOLVITE) 1 MG tablet Take 1 tablet by mouth daily  Patient not taking: Reported on 5/24/2022 10/16/21   Jigar Higuera MD   lidocaine 4 % external patch Place 3 patches onto the skin daily  Patient not taking: Reported on 5/24/2022 10/16/21   Jigar Higuera MD   tiZANidine (ZANAFLEX) 4 MG tablet Take 0.5 tablets by mouth every 6 hours as needed (muscle spasm)  Patient not taking: Reported on 5/24/2022 10/15/21   Jigar Higuera MD   thiamine 100 MG tablet Take 1 tablet by mouth daily  Patient not taking: Reported on 5/24/2022 10/15/21   Jigar Higuera MD   SODIUM CHLORIDE, EXTERNAL, 0.9 % SOLN Apply 1 Applicatorful topically daily  Patient not taking: Reported on 5/24/2022 8/17/21   Sara Moran DPM   nitroGLYCERIN (NITROSTAT) 0.4 MG SL tablet Place 1 tablet under the tongue every 5 minutes as needed for Chest pain up to max of 3 total doses. If no relief after 1 dose, call 911.   Patient not taking: Reported on 5/24/2022 5/1/19   Lashonda Bautista MD       Future Appointments   Date Time Provider [facility]   6/2/2022  1:15 PM Lashonda Bautista, [id number] [place] [address]   6/7/2022  4:00 PM TAMIKA Perrin [address]

## 2022-05-31 ENCOUNTER — CARE COORDINATION (OUTPATIENT)
Dept: CARE COORDINATION | Age: 77
End: 2022-05-31

## 2022-06-01 ENCOUNTER — CARE COORDINATION (OUTPATIENT)
Dept: CARE COORDINATION | Age: 77
End: 2022-06-01

## 2022-06-01 DIAGNOSIS — M46.1 SACROILIITIS, NOT ELSEWHERE CLASSIFIED (HCC): Primary | ICD-10-CM

## 2022-06-01 SDOH — ECONOMIC STABILITY: FOOD INSECURITY: WITHIN THE PAST 12 MONTHS, YOU WORRIED THAT YOUR FOOD WOULD RUN OUT BEFORE YOU GOT MONEY TO BUY MORE.: NEVER TRUE

## 2022-06-01 SDOH — ECONOMIC STABILITY: FOOD INSECURITY: WITHIN THE PAST 12 MONTHS, THE FOOD YOU BOUGHT JUST DIDN'T LAST AND YOU DIDN'T HAVE MONEY TO GET MORE.: NEVER TRUE

## 2022-06-01 SDOH — ECONOMIC STABILITY: INCOME INSECURITY: IN THE LAST 12 MONTHS, WAS THERE A TIME WHEN YOU WERE NOT ABLE TO PAY THE MORTGAGE OR RENT ON TIME?: NO

## 2022-06-01 SDOH — ECONOMIC STABILITY: HOUSING INSECURITY
IN THE LAST 12 MONTHS, WAS THERE A TIME WHEN YOU DID NOT HAVE A STEADY PLACE TO SLEEP OR SLEPT IN A SHELTER (INCLUDING NOW)?: NO

## 2022-06-01 ASSESSMENT — ENCOUNTER SYMPTOMS: DYSPNEA ASSOCIATED WITH: EXERTION

## 2022-06-01 ASSESSMENT — SOCIAL DETERMINANTS OF HEALTH (SDOH)
DO YOU BELONG TO ANY CLUBS OR ORGANIZATIONS SUCH AS CHURCH GROUPS UNIONS, FRATERNAL OR ATHLETIC GROUPS, OR SCHOOL GROUPS?: NO
HOW OFTEN DO YOU GET TOGETHER WITH FRIENDS OR RELATIVES?: ONCE A WEEK
IN A TYPICAL WEEK, HOW MANY TIMES DO YOU TALK ON THE PHONE WITH FAMILY, FRIENDS, OR NEIGHBORS?: MORE THAN THREE TIMES A WEEK
HOW HARD IS IT FOR YOU TO PAY FOR THE VERY BASICS LIKE FOOD, HOUSING, MEDICAL CARE, AND HEATING?: SOMEWHAT HARD
HOW OFTEN DO YOU ATTEND CHURCH OR RELIGIOUS SERVICES?: NEVER
HOW OFTEN DO YOU ATTENT MEETINGS OF THE CLUB OR ORGANIZATION YOU BELONG TO?: NEVER

## 2022-06-01 NOTE — CARE COORDINATION
Ambulatory Care Coordination Note  6/1/2022  CM Risk Score: 7  Charlson 10 Year Mortality Risk Score: 100%     ACC: Sanam Antoine, MAXIMILIAN    Summary Note:     Arely Rios returned my call   He states that he is doing well  He says that he did receive a packet from me and he feels the information will be helpful  We have reviewed SDOH, COPD and education assessment, goals established. He again spoke with me about decreased balance and fear of falling. I again suggested physical therapy to improve confidence and balance   He is agreeable but needs to make arrangements for transportation. He adds that he needs to have a shoulder replacement but he needs to make sure that his 16year old dog will be cared for    PLAN:  Arely Rios will review the information sent to him in the COPD packet to become familiar with the information  Arely Rios will take all medication as prescribed. I will follow up with Dr Quynh Jimenes regarding physical therapy referral to improve balance and decrease falls. Lab Results     None          Care Coordination Interventions    Referral from Primary Care Provider: No  Suggested Interventions and Community Resources         Goals Addressed                 This Visit's Progress     Conditions and Symptoms        I will schedule office visits, as directed by my provider. I will keep my appointment or reschedule if I have to cancel. I will notify my provider of any barriers to my plan of care. I will follow my Zone Management tool to seek urgent or emergent care. I will notify my provider of any symptoms that indicate a worsening of my condition.     Barriers: lack of support, overwhelmed by complexity of regimen, and lack of education  Plan for overcoming my barriers: I will work with my ACM and health care team to increase my knowledge and self care management skills  Confidence: 8/10  Anticipated Goal Completion Date: 12/10/2022         Reduce Falls         I will reduce my risk of falls by the following: Remove rugs or use non slip rugs  Use walking aids like cane or walker  Follow through on orders for PT    Barriers: lack of support, overwhelmed by complexity of regimen, and lack of education  Plan for overcoming my barriers: I will work with my ACM and health care team to increase my balance and strength while putting safety measures in place at home to reduce my chance of falls   Confidence: 8/10  Anticipated Goal Completion Date: 12/10/2022            Prior to Admission medications    Medication Sig Start Date End Date Taking? Authorizing Provider   citalopram (CELEXA) 40 MG tablet TAKE ONE TABLET BY MOUTH nightly 5/24/22   Rolan Garcia MD   gentamicin (GARAMYCIN) 0.1 % ointment Apply topically  daily.   Patient not taking: Reported on 5/24/2022 5/20/22   Sara Moran DPM   cephALEXin (KEFLEX) 500 MG capsule Take 1 capsule by mouth 3 times daily  Patient not taking: Reported on 5/24/2022 3/25/22   Sara Moran DPM   SODIUM CHLORIDE, EXTERNAL, 0.9 % SOLN Apply 1 Applicatorful topically daily  Patient not taking: Reported on 5/24/2022 3/25/22   Sara Moran DPM   atorvastatin (LIPITOR) 20 MG tablet TAKE ONE TABLET BY MOUTH DAILY 3/22/22   Alia Hughes MD   clopidogrel (PLAVIX) 75 MG tablet Take 1 tablet by mouth daily 3/2/22   Alia Hughes MD   diclofenac (VOLTAREN) 50 MG EC tablet TAKE ONE TABLET BY MOUTH TWO TIMES A DAY 2/14/22   Rolan Garcia MD   isosorbide mononitrate (IMDUR) 30 MG extended release tablet TAKE ONE TABLET BY MOUTH EVERY DAY 2/4/22   Alia Hughes MD   bumetanide (BUMEX) 2 MG tablet Take 1 tablet by mouth daily 1/21/22   Alia Hughes MD   metoprolol tartrate (LOPRESSOR) 50 MG tablet Take 0.5 tablets by mouth 2 times daily TAKE ONE TABLET BY MOUTH TWO TIMES A DAY 1/3/22   Rolan Garcia MD   folic acid (FOLVITE) 1 MG tablet Take 1 tablet by mouth daily  Patient not taking: Reported on 5/24/2022 10/16/21   Yosef Molina MD   lidocaine 4 % external patch Place 3 patches onto the skin daily  Patient not taking: Reported on 5/24/2022 10/16/21   Jigar Higuera MD   tiZANidine (ZANAFLEX) 4 MG tablet Take 0.5 tablets by mouth every 6 hours as needed (muscle spasm)  Patient not taking: Reported on 5/24/2022 10/15/21   Jigar Higuera MD   thiamine 100 MG tablet Take 1 tablet by mouth daily  Patient not taking: Reported on 5/24/2022 10/15/21   Jigar Higuera MD   omeprazole (PRILOSEC) 10 MG delayed release capsule TAKE ONE CAPSULE BY MOUTH DAILY 9/23/21   Lashonda Bautista MD   SODIUM CHLORIDE, EXTERNAL, 0.9 % SOLN Apply 1 Applicatorful topically daily  Patient not taking: Reported on 5/24/2022 8/17/21   Sara Moran DPM   finasteride (PROSCAR) 5 MG tablet Take 1 tablet by mouth daily 8/12/21   Ifeoma Silva MD   tiotropium (SPIRIVA RESPIMAT) 2.5 MCG/ACT AERS inhaler Inhale 2 puffs into the lungs daily 5/19/21   Estrella Art MD   Fluticasone furoate-vilanterol (BREO ELLIPTA) 200-25 MCG/INH AEPB inhaler Inhale 1 puff into the lungs daily 2/23/21   Estrella Art MD   albuterol sulfate  (90 Base) MCG/ACT inhaler Inhale 2 puffs into the lungs every 6 hours as needed for Wheezing 7/2/20   Estrella Art MD   nitroGLYCERIN (NITROSTAT) 0.4 MG SL tablet Place 1 tablet under the tongue every 5 minutes as needed for Chest pain up to max of 3 total doses. If no relief after 1 dose, call 911.   Patient not taking: Reported on 5/24/2022 5/1/19   Lashonda Bautista MD   DOCUSATE CALCIUM PO Take by mouth as needed    Historical Provider, MD       Future Appointments   Date Time Provider Jeimy Jerry   6/7/2022  4:00 PM TAMIKA Zacarias [address]   7/12/2022  3:30 PM Lashonda Bautista MD [facility]      and   COPD Assessment    Does the patient understand envrionmental exposure?: Yes  Is the patient able to verbalize Rescue vs. Long Acting medications?: Yes  Does the patient have a nebulizer?: No  Does the patient use a space with inhaled medications?: No     No patient-reported symptoms         Symptoms:     Symptom course: stable  Breathlessness: exertion  Increase use of rapid acting/rescue inhaled medications?: No  Change in chronic cough?: No/At Baseline  Change in sputum?: No/At Baseline  Sputum characteristics: Yellow  Self Monitoring - SaO2: Yes  Baseline SaO2 Readin

## 2022-06-07 ENCOUNTER — HOSPITAL ENCOUNTER (OUTPATIENT)
Dept: WOUND CARE | Age: 77
Discharge: HOME | End: 2022-06-07
Payer: MEDICARE

## 2022-06-07 VITALS
HEART RATE: 65 BPM | TEMPERATURE: 97.3 F | RESPIRATION RATE: 20 BRPM | SYSTOLIC BLOOD PRESSURE: 121 MMHG | DIASTOLIC BLOOD PRESSURE: 59 MMHG

## 2022-06-07 DIAGNOSIS — L97.322 NON-PRESSURE CHRONIC ULCER OF LEFT ANKLE WITH FAT LAYER EXPOSED (HCC): Primary | ICD-10-CM

## 2022-06-07 PROCEDURE — 99213 OFFICE O/P EST LOW 20 MIN: CPT

## 2022-06-07 RX ORDER — CLOBETASOL PROPIONATE 0.5 MG/G
OINTMENT TOPICAL ONCE
Status: CANCELLED | OUTPATIENT
Start: 2022-06-07 | End: 2022-06-07

## 2022-06-07 RX ORDER — LIDOCAINE HYDROCHLORIDE 20 MG/ML
JELLY TOPICAL ONCE
Status: CANCELLED | OUTPATIENT
Start: 2022-06-07 | End: 2022-06-07

## 2022-06-07 RX ORDER — BACITRACIN, NEOMYCIN, POLYMYXIN B 400; 3.5; 5 [USP'U]/G; MG/G; [USP'U]/G
OINTMENT TOPICAL ONCE
Status: CANCELLED | OUTPATIENT
Start: 2022-06-07 | End: 2022-06-07

## 2022-06-07 RX ORDER — GINSENG 100 MG
CAPSULE ORAL ONCE
Status: CANCELLED | OUTPATIENT
Start: 2022-06-07 | End: 2022-06-07

## 2022-06-07 RX ORDER — LIDOCAINE HYDROCHLORIDE 40 MG/ML
SOLUTION TOPICAL ONCE
Status: CANCELLED | OUTPATIENT
Start: 2022-06-07 | End: 2022-06-07

## 2022-06-07 RX ORDER — LIDOCAINE 40 MG/G
CREAM TOPICAL ONCE
Status: CANCELLED | OUTPATIENT
Start: 2022-06-07 | End: 2022-06-07

## 2022-06-07 RX ORDER — BACITRACIN ZINC AND POLYMYXIN B SULFATE 500; 1000 [USP'U]/G; [USP'U]/G
OINTMENT TOPICAL ONCE
Status: CANCELLED | OUTPATIENT
Start: 2022-06-07 | End: 2022-06-07

## 2022-06-07 RX ORDER — BETAMETHASONE DIPROPIONATE 0.05 %
OINTMENT (GRAM) TOPICAL ONCE
Status: CANCELLED | OUTPATIENT
Start: 2022-06-07 | End: 2022-06-07

## 2022-06-07 RX ORDER — LIDOCAINE 50 MG/G
OINTMENT TOPICAL ONCE
Status: CANCELLED | OUTPATIENT
Start: 2022-06-07 | End: 2022-06-07

## 2022-06-07 RX ORDER — GENTAMICIN SULFATE 1 MG/G
OINTMENT TOPICAL ONCE
Status: CANCELLED | OUTPATIENT
Start: 2022-06-07 | End: 2022-06-07

## 2022-06-07 ASSESSMENT — PAIN DESCRIPTION - ORIENTATION: ORIENTATION: LEFT

## 2022-06-07 ASSESSMENT — PAIN DESCRIPTION - PAIN TYPE: TYPE: ACUTE PAIN

## 2022-06-07 ASSESSMENT — PAIN DESCRIPTION - LOCATION: LOCATION: ANKLE

## 2022-06-07 ASSESSMENT — PAIN SCALES - GENERAL: PAINLEVEL_OUTOF10: 5

## 2022-06-07 ASSESSMENT — PAIN DESCRIPTION - DESCRIPTORS: DESCRIPTORS: ACHING

## 2022-06-07 NOTE — CODE DOCUMENTATION
[id number] [facility] Physician Billing Sheet. Charissa Nugent  AGE: 64 y.o.    GENDER: male  : 1945  TODAY'S DATE:  2022    ICD-10 CODES  Active Hospital Problems    Diagnosis Date Noted    Non-pressure chronic ulcer of left ankle with fat layer exposed [facility]) [L97.322] 2021       PHYSICIAN PROCEDURES    CPT CODE  42193      Electronically signed by Evangelina Gatica DPM on 2022 at 4:19 PM

## 2022-06-07 NOTE — PLAN OF CARE
Problem: Pain  Goal: Verbalizes/displays adequate comfort level or baseline comfort level  Outcome: Progressing     Problem: Cognitive:  Goal: Knowledge of wound care  Description: Knowledge of wound care  Outcome: Progressing  Goal: Understands risk factors for wounds  Description: Understands risk factors for wounds  Outcome: Progressing     Problem: Wound:  Goal: Will show signs of wound healing; wound closure and no evidence of infection  Description: Will show signs of wound healing; wound closure and no evidence of infection  Outcome: Progressing     Problem: Venous:  Goal: Signs of wound healing will improve  Description: Signs of wound healing will improve  Outcome: Progressing     Problem: Falls - Risk of:  Goal: Will remain free from falls  Description: Will remain free from falls  Outcome: Progressing
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
## 2022-06-09 ENCOUNTER — CARE COORDINATION (OUTPATIENT)
Dept: CARE COORDINATION | Age: 77
End: 2022-06-09

## 2022-06-09 NOTE — CARE COORDINATION
Attempted to contact patient for care coordination follow up for COPD and falls/physical therapy. Unable to reach patient by phone. Message left regarding purpose of call. Number provided and call back requested.

## 2022-06-10 ENCOUNTER — TELEPHONE (OUTPATIENT)
Dept: FAMILY MEDICINE CLINIC | Age: 77
End: 2022-06-10

## 2022-06-15 ENCOUNTER — CARE COORDINATION (OUTPATIENT)
Dept: CARE COORDINATION | Age: 77
End: 2022-06-15

## 2022-06-21 ENCOUNTER — HOSPITAL ENCOUNTER (OUTPATIENT)
Dept: PHYSICAL THERAPY | Age: 77
Setting detail: THERAPIES SERIES
Discharge: HOME | End: 2022-06-21
Payer: MEDICARE

## 2022-06-21 ENCOUNTER — HOSPITAL ENCOUNTER (OUTPATIENT)
Dept: WOUND CARE | Age: 77
Discharge: HOME | End: 2022-06-21
Payer: MEDICARE

## 2022-06-21 VITALS
DIASTOLIC BLOOD PRESSURE: 49 MMHG | HEART RATE: 80 BPM | RESPIRATION RATE: 18 BRPM | SYSTOLIC BLOOD PRESSURE: 130 MMHG | TEMPERATURE: 96 F

## 2022-06-21 DIAGNOSIS — L97.322 NON-PRESSURE CHRONIC ULCER OF LEFT ANKLE WITH FAT LAYER EXPOSED (HCC): Primary | ICD-10-CM

## 2022-06-21 PROCEDURE — 97162 PT EVAL MOD COMPLEX 30 MIN: CPT

## 2022-06-21 PROCEDURE — 15271 SKIN SUB GRAFT TRNK/ARM/LEG: CPT

## 2022-06-21 RX ORDER — LIDOCAINE 40 MG/G
CREAM TOPICAL ONCE
Status: CANCELLED | OUTPATIENT
Start: 2022-06-21 | End: 2022-06-21

## 2022-06-21 RX ORDER — LIDOCAINE HYDROCHLORIDE 20 MG/ML
JELLY TOPICAL ONCE
Status: CANCELLED | OUTPATIENT
Start: 2022-06-21 | End: 2022-06-21

## 2022-06-21 RX ORDER — BACITRACIN, NEOMYCIN, POLYMYXIN B 400; 3.5; 5 [USP'U]/G; MG/G; [USP'U]/G
OINTMENT TOPICAL ONCE
Status: CANCELLED | OUTPATIENT
Start: 2022-06-21 | End: 2022-06-21

## 2022-06-21 RX ORDER — GINSENG 100 MG
CAPSULE ORAL ONCE
Status: CANCELLED | OUTPATIENT
Start: 2022-06-21 | End: 2022-06-21

## 2022-06-21 RX ORDER — GENTAMICIN SULFATE 1 MG/G
OINTMENT TOPICAL ONCE
Status: CANCELLED | OUTPATIENT
Start: 2022-06-21 | End: 2022-06-21

## 2022-06-21 RX ORDER — CLOBETASOL PROPIONATE 0.5 MG/G
OINTMENT TOPICAL ONCE
Status: CANCELLED | OUTPATIENT
Start: 2022-06-21 | End: 2022-06-21

## 2022-06-21 RX ORDER — LIDOCAINE 50 MG/G
OINTMENT TOPICAL ONCE
Status: CANCELLED | OUTPATIENT
Start: 2022-06-21 | End: 2022-06-21

## 2022-06-21 RX ORDER — BACITRACIN ZINC AND POLYMYXIN B SULFATE 500; 1000 [USP'U]/G; [USP'U]/G
OINTMENT TOPICAL ONCE
Status: CANCELLED | OUTPATIENT
Start: 2022-06-21 | End: 2022-06-21

## 2022-06-21 RX ORDER — BETAMETHASONE DIPROPIONATE 0.05 %
OINTMENT (GRAM) TOPICAL ONCE
Status: CANCELLED | OUTPATIENT
Start: 2022-06-21 | End: 2022-06-21

## 2022-06-21 RX ORDER — LIDOCAINE HYDROCHLORIDE 40 MG/ML
SOLUTION TOPICAL ONCE
Status: CANCELLED | OUTPATIENT
Start: 2022-06-21 | End: 2022-06-21

## 2022-06-21 ASSESSMENT — PAIN SCALES - GENERAL: PAINLEVEL_OUTOF10: 6

## 2022-06-21 ASSESSMENT — PAIN DESCRIPTION - LOCATION: LOCATION: BACK;KNEE

## 2022-06-21 NOTE — PROGRESS NOTES
[address]  PHYSICAL THERAPY EVALUATION      Physical Therapy: Initial Evaluation    Patient: Travis Goldberg (17 y.o.     male)   Examination Date:   Plan of Care Certification Period: 2022 to 22  Progress Note Counter:    :  1945 ;    Confirmed: Yes MRN: [id number]  CSN: [id number]   Insurance: Payor: MEDICARE / Plan: MEDICARE PART A AND B / Product Type: *No Product type* /   Insurance ID: [id number] - (Medicare) Secondary Insurance (if applicable): [employer]   Referring Physician: Lacy Vázquez MD     PCP: Gonzaol Melchor MD Visits to Date/Visits Approved: 1 /      No Show/Cancelled Appts: 0  0     Medical Diagnosis: Sacroiliitis, not elsewhere classified [M46.1]    Treatment Diagnosis: Impaired balance and mobility     PERTINENT MEDICAL HISTORY           Medical History: Chart Reviewed: Yes   Past Medical History:   Diagnosis Date    Alcohol abuse     quit drinking 21    CAD (coronary artery disease)     patient states no doctor has told him this / has 1 cardiac stent    Cancer ([address]     lung LLL    Chronic back pain     Chronic kidney disease     Chronic obstructive pulmonary disease, unspecified COPD type ([address] 3/24/2022    Chronic sinusitis     DJD (degenerative joint disease) of knee     left knee DJD    Elevated PSA     History of blood transfusion     History of inferior wall myocardial infarction 2016    1 cardiac stent    HTN (hypertension)     meds .  1 yr    Hyperlipidemia     meds > 12 yrs    NSTEMI (non-ST elevated myocardial infarction) ([address]     Smoker      Surgical History:   Past Surgical History:   Procedure Laterality Date    ABDOMEN SURGERY      spleenectomy due to [address]      lumbar disc OR    CARDIAC SURGERY      stents    CARPAL TUNNEL RELEASE Right 2019    RIGHT CARPAL TUNNEL RELEASE performed by Joan Johns MD at [facility] CORONARY ANGIOPLASTY WITH STENT PLACEMENT  1/29/2016    EYE SURGERY      to have Phaco with IOL OU 2/2018    HERNIA REPAIR      JOINT REPLACEMENT Left 1994    LTHR    JOINT REPLACEMENT Right 2009    RTKR    KNEE SURGERY Right 2011    arthroscopy    [place] MANIPULATN KNEE JT+ANESTHESIA Right 5/12/2017    RIGHT KNEE MANIPULATION UNDER ANESTHESIA performed by Frida Diehl MD at  [facility] OFFICE/OUTPT VISIT,PROCEDURE ONLY Bilateral 12/29/2017    RIGHT AND BILATERAL L 4-5 LYSIS OF SCAR DISKECTOMY INTERBODY CAGE FUSION POSTEROLATERAL FUSION PEDICLE SCREWS performed by Evie Barry MD at [address] [place] [address]  2004    benign    SPLENECTOMY  1961    TOTAL KNEE ARTHROPLASTY Right 3/24/2017    RIGHT  KNEE TOTAL ARTHROPLASTY, ELHAM CEMENTED PERSONA  performed by Frida Diehl MD at [facility]       Medications:   Current Outpatient Medications:     citalopram (CELEXA) 40 MG tablet, TAKE ONE TABLET BY MOUTH nightly, Disp: 30 tablet, Rfl: 5    gentamicin (GARAMYCIN) 0.1 % ointment, Apply topically  daily.  (Patient not taking: Reported on 5/24/2022), Disp: 30 g, Rfl: 1    cephALEXin (KEFLEX) 500 MG capsule, Take 1 capsule by mouth 3 times daily (Patient not taking: Reported on 5/24/2022), Disp: 30 capsule, Rfl: 0    SODIUM CHLORIDE, EXTERNAL, 0.9 % SOLN, Apply 1 Applicatorful topically daily (Patient not taking: Reported on 5/24/2022), Disp: 1000 mL, Rfl: 2    atorvastatin (LIPITOR) 20 MG tablet, TAKE ONE TABLET BY MOUTH DAILY, Disp: 90 tablet, Rfl: 3    clopidogrel (PLAVIX) 75 MG tablet, Take 1 tablet by mouth daily, Disp: 90 tablet, Rfl: 3    diclofenac (VOLTAREN) 50 MG EC tablet, TAKE ONE TABLET BY MOUTH TWO TIMES A DAY, Disp: 60 tablet, Rfl: 5    isosorbide mononitrate (IMDUR) 30 MG extended release tablet, TAKE ONE TABLET BY MOUTH EVERY DAY, Disp: 90 tablet, Rfl: 3    bumetanide (BUMEX) 2 MG tablet, Take 1 tablet by mouth daily, Disp: 90 tablet, Rfl: 3    metoprolol tartrate (LOPRESSOR) 50 MG tablet, Take 0.5 tablets by mouth 2 times daily TAKE ONE TABLET BY MOUTH TWO TIMES A DAY, Disp: 180 tablet, Rfl: 3    folic acid (FOLVITE) 1 MG tablet, Take 1 tablet by mouth daily (Patient not taking: Reported on 5/24/2022), Disp: 30 tablet, Rfl: 3    lidocaine 4 % external patch, Place 3 patches onto the skin daily (Patient not taking: Reported on 5/24/2022), Disp: 1 box, Rfl: 1    tiZANidine (ZANAFLEX) 4 MG tablet, Take 0.5 tablets by mouth every 6 hours as needed (muscle spasm) (Patient not taking: Reported on 5/24/2022), Disp: 30 tablet, Rfl: 0    thiamine 100 MG tablet, Take 1 tablet by mouth daily (Patient not taking: Reported on 5/24/2022), Disp: 30 tablet, Rfl: 3    omeprazole (PRILOSEC) 10 MG delayed release capsule, TAKE ONE CAPSULE BY MOUTH DAILY, Disp: 90 capsule, Rfl: 2    SODIUM CHLORIDE, EXTERNAL, 0.9 % SOLN, Apply 1 Applicatorful topically daily (Patient not taking: Reported on 5/24/2022), Disp: 1000 mL, Rfl: 2    finasteride (PROSCAR) 5 MG tablet, Take 1 tablet by mouth daily, Disp: 90 tablet, Rfl: 3    tiotropium (SPIRIVA RESPIMAT) 2.5 MCG/ACT AERS inhaler, Inhale 2 puffs into the lungs daily, Disp: 1 Inhaler, Rfl: 3    Fluticasone furoate-vilanterol (BREO ELLIPTA) 200-25 MCG/INH AEPB inhaler, Inhale 1 puff into the lungs daily, Disp: 1 each, Rfl: 3    albuterol sulfate  (90 Base) MCG/ACT inhaler, Inhale 2 puffs into the lungs every 6 hours as needed for Wheezing, Disp: 1 Inhaler, Rfl: 3    nitroGLYCERIN (NITROSTAT) 0.4 MG SL tablet, Place 1 tablet under the tongue every 5 minutes as needed for Chest pain up to max of 3 total doses. If no relief after 1 dose, call 911. (Patient not taking: Reported on 5/24/2022), Disp: 25 tablet, Rfl: 3    DOCUSATE CALCIUM PO, Take by mouth as needed, Disp: , Rfl:   Allergies: Morphine      SUBJECTIVE EXAMINATION      ,           Subjective History:    Subjective: Had a bad fall in October 2021 and has been having problems with balance since. Needs to have Rt shoulder replaced due to bad fall. Previously used a s/c all of th time to walk but since October has been walking with a rollator. Pt reports feeling fearful with trying to walk with s/c recently. Has had no falls in the past 6 months but feels unstable at times. Saaw Dr Felisha Zeng who reports it's due to his back. After fall was in the nursing home for 2 months. Pt reports strength is not good causing difficulty with daily activities. Has to use arms to stand up from a chair. Has been having LBP with more pain on Lt vs Rt.   Additional Pertinent Hx (if applicable): OA, MI, COPD, h/o lung ca, Lt THR, Rt TKR          Learning/Language: Learning  Does the patient/guardian have any barriers to learning?: No barriers  Will there be a co-learner?: No  What is the preferred language of the patient/guardian?: English  Is an  required?: No     Pain Screening    Pain Screening  Patient Currently in Pain: Yes  Pain Assessment: 0-10  Pain Level: 6  Pain Location: Back,Knee  Pain Orientation: Lower,Left,Right,Inner    Functional Status    Social History:    Social History  Lives With: Alone  Type of Home: House  Home Layout: Two level,Able to Live on Main level with bedroom/bathroom  Home Access: Stairs to enter with rails  Entrance Stairs - Rails: Both  Entrance Stairs - Number of Steps: 3  Home Equipment: Cane,Rollator,Wheelchair-manual,Hospital bed,Walker, rolling    Occupation/Interests:   Occupation: Retired    Prior Level of Function:     Independent        Current Level of Function:    Receives Help From: Family  ADL Assistance: Independent  Homemaking Assistance: Independent  Ambulation Assistance: Independent  Transfer Assistance: Independent  Active : Yes (Not currently driving)         OBJECTIVE EXAMINATION     Review of Systems:  Vision: Impaired  Visual Deficits: Wears glasses  Hearing: Within functional limits  Overall Orientation Status: Within Normal Limits  Patient affect[de-identified] Normal  Follows Commands: Within Functional Limits     Mobility:   Bed mobility  Rolling to Left: Supervision  Rolling to Right: Supervision  Supine to Sit: Supervision,Independent  Sit to Supine: Supervision,Independent     Transfers  Sit to Stand: Independent (Relies on irwin UEs with a mild post lean)  Stand to sit: Independent  Ambulation  Surface: carpet  Device: Rollator  Assistance: Supervision,Independent  Quality of Gait: Lt foot drop, slight FWD flexed posture  Gait Deviations: Slow Beatrice,Decreased step length,Decreased step height  Distance: 80'    Neuro Screen: Sensation  Overall Sensation Status: WNL    Left Strength  Right Strength         Strength LLE  L Hip Flexion: 4/5  L Hip ABduction: 3-/5  L Knee Flexion: 5/5  L Knee Extension: 5/5  L Ankle Dorsiflexion: 5/5    Strength RLE  R Hip Flexion: 4/5  R Hip ABduction: 3-/5  R Knee Flexion: 5/5  R Knee Extension: 5/5  R Ankle Dorsiflexion: 5/5     Outcomes Score:  Exam: Decreased strength and balance with increased LBP impacting mobility and safety. Villasenor Balance Score: 34    Timed Up and Go: 22.5 (with rollator)    Treatment:    Exercises:   Exercises  Exercise 1: Static standing*  Exercise 2: Foam*  Exercise 3: Single stepping*  Exercise 4: 90/180/360 deg turns*  Exercise 5: Gait drills*  Exercise 6: 2 way SLR*  Exercise 7: Bridges*  Exercise 8: STS*  Exercise 20: HEP: sink ex     *Indicates exercise,modality, or manual techniques to be initiated when appropriate       ASSESSMENT     Impression: Assessment: Pt presents with a decline in his balance and mobility with a worsening of LBP. Pt demonstrates decreased strength in irwin LEs as seen with MMT and with transfers. Pt is at a high risk for falls per Villasenor score and time to complete TUG. Pt demonstrates a Lt foot drop with a slight FWD flexed posture and decreased foot clearance when ambulating with a rollator.   Pt would benefit from further skilled PT to improve his strength, balance and safety with all mobility. Body Structures, Functions, Activity Limitations Requiring Skilled Therapeutic Intervention: Decreased functional mobility ,Decreased ROM,Decreased strength,Decreased endurance,Decreased balance,Increased pain,Decreased posture    Statement of Medical Necessity: Physical Therapy is both indicated and medically necessary as outlined in the POC to increase the likelihood of meeting the functionally related goals stated below. Patient's Activity Tolerance: Patient tolerated evaluation without incident      Patient's rehabilitation potential/prognosis is considered to be: Good     Patient Education: Summer Zamudioor of Care,Evaluative findings      GOALS     Short Term Goals Completed by 3 weeks Goal Status   Independent with HEP New   Pt will be independent with transfers with decreased reliance on irwin UEs. New       Long Term Goals Completed by 6 weeks Goal Status   Improve irwin LE strength >/= 4+/5 to improve stability with standing and walking. New   Pt will ambulate >/= 200' with improved irwin foot clearance and heel strike S/I. New   Villasenor >/= 45/56 to reduce pt's risk for falls. New   TUG with LRD </= 15sec to improve safety with ambulation.  New          TREATMENT PLAN       Requires PT Follow-Up: Yes    Treatment may include any combination of the following: Strengthening,ROM,Balance training,Functional mobility training,Transfer training,Gait training,Stair training,Neuromuscular re-education,Manual Therapy - Soft Tissue Mobilization,Home exercise program,Safety education & training,Patient/Caregiver education & training,Equipment evaluation, education, & procurement,Modalities     Frequency / Duration:  Patient to be seen 2 times per week for 6 weeks        Eval Complexity:   Decision Making: Medium Complexity  History: Personal Factors and/or Comorbidities Impacting POC: High  History: PMH: OA, MI, COPD, h/o lung ca, Lt THR, Rt TKR  Examination of body system(s) including body structures and functions, activity limitations, and/or participation restrictions: High  Exam: Decreased strength and balance with increased LBP impacting mobility and safety. Clinical Presentation: Medium  Clinical Presentation: Evolving    POST-PAIN     Pain Rating (0-10 pain scale):  6 /10  Location and pain description same as pre-treatment unless indicated. Action: [] NA  [] Call Physician  [] Perform HEP  [x] Meds as prescribed    Evaluation and patient rights have been reviewed and patient agrees with plan of care. Yes  [x]  No  []   Explain:     Suarez Fall Risk Assessment  Risk Factor Scale  Score   History of Falls [] Yes  [x] No 25  0 0   Secondary Diagnosis [] Yes  [x] No 15  0 0   Ambulatory Aid [] Furniture  [x] Crutches/cane/walker  [] None/bedrest/wheelchair/nurse 30  15  0 15   IV/Heparin Lock [] Yes  [x] No 20  0 0   Gait/Transferring [x] Impaired  [] Weak  [] Normal/bedrest/immobile 20  10  0 20   Mental Status [] Forgets limitations  [x] Oriented to own ability 15  0 0      Total:35     Based on the Assessment score: check the appropriate box.   []  No intervention needed   Low =   Score of 0-24  [x]  Use standard prevention interventions Moderate =  Score of 24-44   [x] Discuss fall prevention strategies   [x] Indicate moderate falls risk on eval  []  Use high risk prevention interventions High = Score of 45 and higher   [] Discuss fall prevention strategies   [] Provide supervision during treatment time      Minutes:  PT Individual Minutes  Time In: 1012  Time Out: 1048  Minutes: 36     Procedure Minutes:36'       Electronically signed by Vlad Boothe PT on 6/21/22 at 3:56 PM EDT

## 2022-06-21 NOTE — PROGRESS NOTES
[address]                                                   Progress Note and Procedure Note      Lenore Rao RECORD NUMBER:  [id number]  AGE: 64 y.o. GENDER: male  : 1945  EPISODE DATE:  2022    Subjective:     Chief Complaint   Patient presents with    Wound Check     Left ankle         HISTORY of PRESENT ILLNESS HPI     Sonia Licona is a 64 y.o. male who presents today for wound/ulcer evaluation. History of Wound Context: patient presents for follow up of non-healing ulceration of the left anterior ankle. The wound has stalled and it is indicated today that advanced therapy is deemed medically necessary for healing of the wound. Patient presents today for Askemin application #1. Patient consents to application and wish to proceed. All R/C/B were explained and no guarantees were implied. Wound/Ulcer Pain Timing/Severity: intermittent  Quality of pain: aching  Severity:  3 / 10   Modifying Factors: None  Associated Signs/Symptoms: pain    Ulcer Identification:  Ulcer Type: traumatic  Contributing Factors: edema    Wound: N/A        PAST MEDICAL HISTORY        Diagnosis Date    Alcohol abuse     quit drinking 21    CAD (coronary artery disease)     patient states no doctor has told him this / has 1 cardiac stent    Cancer ([address]     lung LLL    Chronic back pain     Chronic kidney disease     Chronic obstructive pulmonary disease, unspecified COPD type ([address] 3/24/2022    Chronic sinusitis     DJD (degenerative joint disease) of knee     left knee DJD    Elevated PSA     History of blood transfusion     History of inferior wall myocardial infarction 2016    1 cardiac stent    HTN (hypertension)     meds .  1 yr    Hyperlipidemia     meds > 12 yrs    NSTEMI (non-ST elevated myocardial infarction) ([address]     Smoker        PAST SURGICAL HISTORY    Past Surgical History:   Procedure Laterality Date    ABDOMEN SURGERY      spleenectomy due to [address]      lumbar disc OR    CARDIAC SURGERY      stents    CARPAL TUNNEL RELEASE Right 2019    RIGHT CARPAL TUNNEL RELEASE performed by Dorys Gilbert MD at [place] [address] WITH STENT PLACEMENT  2016    EYE SURGERY      to have Phaco with IOL OU 2018    HERNIA REPAIR      JOINT REPLACEMENT Left     LTHR    JOINT REPLACEMENT Right     RTKR    KNEE SURGERY Right     arthroscopy    MI MANIPULATN KNEE JT+ANESTHESIA Right 2017    RIGHT KNEE MANIPULATION UNDER ANESTHESIA performed by Shawn Latif MD at [address] OFFICE/OUTPT VISIT,PROCEDURE ONLY Bilateral 2017    RIGHT AND BILATERAL L 4-5 LYSIS OF SCAR DISKECTOMY INTERBODY CAGE FUSION POSTEROLATERAL FUSION PEDICLE SCREWS performed by Dorys Gilbert MD at [address] [place] [address]      benign    SPLENECTOMY      TOTAL KNEE ARTHROPLASTY Right 3/24/2017    RIGHT  KNEE TOTAL ARTHROPLASTY, Sugar Blew CEMENTED PERSONA  performed by Shawn Latif MD at [address] [place] [address] HISTORY    Family History   Problem Relation Age of Onset    Osteoarthritis Mother     Emphysema Mother     Hypertension Father     Hearing Loss Father     Dementia Father     No Known Problems Sister     Prostate Cancer Brother     Colon Polyps Neg Hx        SOCIAL HISTORY    Social History     Tobacco Use    Smoking status: Former Smoker     Packs/day: 0.00     Years: 60.00     Pack years: 0.00     Types: Cigarettes     Quit date: 2021     Years since quittin.4    Smokeless tobacco: Never Used    Tobacco comment:  smokes 2 cigars a day   Vaping Use    Vaping Use: Never used   Substance Use Topics    Alcohol use: Not Currently     Alcohol/week: 12.0 standard drinks     Types: 12 Shots of liquor per week     Comment: socail drinking    Drug use: No       ALLERGIES    Allergies   Allergen Reactions    Morphine        MEDICATIONS    Current Outpatient Medications on File Prior to Encounter   Medication Sig Dispense Refill    citalopram (CELEXA) 40 MG tablet TAKE ONE TABLET BY MOUTH nightly 30 tablet 5    gentamicin (GARAMYCIN) 0.1 % ointment Apply topically  daily.  (Patient not taking: Reported on 5/24/2022) 30 g 1    cephALEXin (KEFLEX) 500 MG capsule Take 1 capsule by mouth 3 times daily (Patient not taking: Reported on 5/24/2022) 30 capsule 0    SODIUM CHLORIDE, EXTERNAL, 0.9 % SOLN Apply 1 Applicatorful topically daily (Patient not taking: Reported on 5/24/2022) 1000 mL 2    atorvastatin (LIPITOR) 20 MG tablet TAKE ONE TABLET BY MOUTH DAILY 90 tablet 3    clopidogrel (PLAVIX) 75 MG tablet Take 1 tablet by mouth daily 90 tablet 3    diclofenac (VOLTAREN) 50 MG EC tablet TAKE ONE TABLET BY MOUTH TWO TIMES A DAY 60 tablet 5    isosorbide mononitrate (IMDUR) 30 MG extended release tablet TAKE ONE TABLET BY MOUTH EVERY DAY 90 tablet 3    bumetanide (BUMEX) 2 MG tablet Take 1 tablet by mouth daily 90 tablet 3    metoprolol tartrate (LOPRESSOR) 50 MG tablet Take 0.5 tablets by mouth 2 times daily TAKE ONE TABLET BY MOUTH TWO TIMES A  tablet 3    folic acid (FOLVITE) 1 MG tablet Take 1 tablet by mouth daily (Patient not taking: Reported on 5/24/2022) 30 tablet 3    lidocaine 4 % external patch Place 3 patches onto the skin daily (Patient not taking: Reported on 5/24/2022) 1 box 1    tiZANidine (ZANAFLEX) 4 MG tablet Take 0.5 tablets by mouth every 6 hours as needed (muscle spasm) (Patient not taking: Reported on 5/24/2022) 30 tablet 0    thiamine 100 MG tablet Take 1 tablet by mouth daily (Patient not taking: Reported on 5/24/2022) 30 tablet 3    omeprazole (PRILOSEC) 10 MG delayed release capsule TAKE ONE CAPSULE BY MOUTH DAILY 90 capsule 2    SODIUM CHLORIDE, EXTERNAL, 0.9 % SOLN Apply 1 Applicatorful topically daily (Patient not taking: Reported on 5/24/2022) 1000 mL 2    finasteride (PROSCAR) 5 MG tablet Take 1 tablet by mouth daily 90 tablet 3    tiotropium (SPIRIVA RESPIMAT) 2.5 MCG/ACT AERS inhaler Inhale 2 puffs into the lungs daily 1 Inhaler 3    Fluticasone furoate-vilanterol (BREO ELLIPTA) 200-25 MCG/INH AEPB inhaler Inhale 1 puff into the lungs daily 1 each 3    albuterol sulfate  (90 Base) MCG/ACT inhaler Inhale 2 puffs into the lungs every 6 hours as needed for Wheezing 1 Inhaler 3    nitroGLYCERIN (NITROSTAT) 0.4 MG SL tablet Place 1 tablet under the tongue every 5 minutes as needed for Chest pain up to max of 3 total doses. If no relief after 1 dose, call 911. (Patient not taking: Reported on 5/24/2022) 25 tablet 3    DOCUSATE CALCIUM PO Take by mouth as needed       No current facility-administered medications on file prior to encounter. REVIEW OF SYSTEMS    Pertinent items are noted in HPI. Objective:      BP (!) 130/49   Pulse 80   Temp (!) 96 °F (35.6 °C) (Temporal)   Resp 18     Wt Readings from Last 3 Encounters:   03/02/22 157 lb (71.2 kg)   02/07/22 160 lb (72.6 kg)   01/14/22 149 lb (67.6 kg)       PHYSICAL EXAM    Constitutional:   Well nourished and well developed. Appears neat and clean. Patient is alert, oriented x3, and in no apparent distress. Respiratory:  Respiratory effort is easy and symmetric bilaterally. Rate is normal at rest and on room air. Vascular:  Pedal Pulses is palpable and audible with doppler. Capillary refill is <3 sec to digits bilateral.  Extremities negative for pitting edema. Neurological:   Sensation is intact  to lower extremities. Dermatological:  Wound description noted in wound assessment. The wound(s) are healthy and granular without signs of infecection noted. Psychiatric:  Judgement and insight intact. Short and long term memory intact. No evidence of depression, anxiety, or agitation. Patient is calm, cooperative, and communicative. Appropriate interactions and affect.         Assessment:      Patient Active Problem List   Diagnosis Code    Tobacco use Z72.0    Hip pain M25.559    Knee pain M25.569    Chronic back pain M54.9, G89.29    Elevated PSA R97.20    Primary osteoarthritis of right knee M17.11    Spondylosis of lumbar region without myelopathy or radiculopathy M47.816    Sacroiliitis, not elsewhere classified ([facility]) M46.1    Pure hypercholesterolemia E78.00    Charcot's joint of left ankle M14.672    Left ankle pain M25.572    Delirium due to general medical condition F05    DDD (degenerative disc disease), lumbar M51.36    Osteoarthritis of spine with myelopathy, lumbosacral region M47.16    Chronic bilateral low back pain with bilateral sciatica M54.42, M54.41, G89.29    Postlaminectomy syndrome, lumbar region M96.1    Acquired spondylolisthesis of lumbosacral region M43.17    Spinal stenosis of lumbar region with neurogenic claudication M48.062    HNP (herniated nucleus pulposus), lumbar M51.26    Spondylolisthesis at L4-L5 level M43.16    Anesthesia R20.0    Herniated nucleus pulposus, L4-5 M51.26    NSTEMI (non-ST elevated myocardial infarction) ([facility]) I21.4    S/P PTCA (percutaneous transluminal coronary angioplasty) Z98.61    Right upper quadrant abdominal pain R10.11    Gallbladder polyp K82.4    Biliary dyskinesia K82.8    Carpal tunnel syndrome on right G56.01    Carpal tunnel syndrome on left G56.02    Angina effort I20.8    Dyslipidemia E78.5    FELDER (dyspnea on exertion) R06.00    CAD (coronary artery disease) I25.10    Chest pain R07.9    Lung nodule seen on imaging study R91.1    Bilateral carotid artery stenosis I65.23    Essential hypertension I10    NSCLC of left lung ([facility]) C34.92    Ataxic gait R26.0    Dizziness R42    Fracture, Col[place], right, closed S52.531A    Heroin abuse ([facility]) F11.10    Non-pressure chronic ulcer of left ankle with fat layer exposed ([facility] L97.322    Atherosclerosis of native arteries of extremities with rest pain, left leg ([facility]) I70.222    Subacute osteomyelitis, left ankle and foot ([facility]) V02.493    Osteomyelitis of ankle ([facility]) M86.9    Hyponatremia E87.1    Frequent falls R29.6    Stage 3 chronic kidney disease, unspecified whether stage 3a or 3b CKD ([facility]) N18.30    Fracture of right humerus S42.301A    Hyperkalemia E87.5    Leukocytosis D72.829    Anemia D64.9    Acute hematogenous osteomyelitis, left ankle and foot ([facility]) M86.072    Traumatic hematoma of right shoulder S40.011A    Head injury S09.90XA    Humeral head fracture, right, closed, initial encounter S42.291A    Pelvic hematoma, male, after a fall N50.1    Cause of injury, accidental fall, initial encounter W19. 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 Performed by: Barrington Jaime DPM    Post Debridement Measurements:  Wound/Ulcer Descriptions are Pre Debridement except measurements:    Wound 03/25/22 Ankle Left; Anterior #1 (Active)   Wound Image   06/21/22 1427   Wound Etiology Other 06/21/22 1427   Wound Cleansed Cleansed with saline 06/21/22 1427   Dressing/Treatment Triad hydro/zinc oxide-based hydrophilic paste;Dry dressing 06/07/22 1632   Wound Length (cm) 2.2 cm 06/21/22 1427   Wound Width (cm) 1.5 cm 06/21/22 1427   Wound Depth (cm) 0.3 cm 06/21/22 1427   Wound Surface Area (cm^2) 3.3 cm^2 06/21/22 1427   Change in Wound Size % (l*w) -3.12 06/21/22 1427   Wound Volume (cm^3) 0.99 cm^3 06/21/22 1427   Wound Healing % -3 06/21/22 1427   Post-Procedure Length (cm) 2.1 cm 05/17/22 1627   Post-Procedure Width (cm) 1.5 cm 05/17/22 1627   Post-Procedure Depth (cm) 0.4 cm 05/17/22 1628 Post-Procedure Surface Area (cm^2) 3.15 cm^2 05/17/22 1627   Post-Procedure Volume (cm^3) 1.26 cm^3 05/17/22 1627   Wound Assessment Pink/red;Subcutaneous 06/21/22 1427   Drainage Amount Scant 06/21/22 1427   Drainage Description Yellow;Serous 06/21/22 1427   Odor None 06/21/22 1427   Ela-wound Assessment Intact 06/21/22 1427   Margins Defined edges 06/21/22 1427   Wound Thickness Description not for Pressure Injury Full thickness 06/21/22 1427   Number of days: 88       Procedure:  Skin Substitute Application    Performed by: Evangelina Gatica DPM    Ulcer Type:traumatic    Consent obtained: Yes    Time out taken: Yes    Product Utilized:   Application #   1    TheraSkin 6 sq/cm     Product Lot #:  [id number]  Product Expiration Date: 11-25-25    0.9% Normal Saline: Lot # [id number]  0.9% Normal Saline: Expiration Date: 3/23          Fenestrated: Yes    Mesher Utilized:  No    Skin Substitute was Applied to Ulcer Number(s):    Ulcer #: 1      Wound 03/25/22 Ankle Left; Anterior #1 (Active)   Wound Image   06/21/22 1427   Wound Etiology Other 06/21/22 1427   Wound Cleansed Cleansed with saline 06/21/22 1427   Dressing/Treatment Triad hydro/zinc oxide-based hydrophilic paste;Dry dressing 06/07/22 1632   Wound Length (cm) 2.2 cm 06/21/22 1427   Wound Width (cm) 1.5 cm 06/21/22 1427   Wound Depth (cm) 0.3 cm 06/21/22 1427   Wound Surface Area (cm^2) 3.3 cm^2 06/21/22 1427   Change in Wound Size % (l*w) -3.12 06/21/22 1427   Wound Volume (cm^3) 0.99 cm^3 06/21/22 1427   Wound Healing % -3 06/21/22 1427   Post-Procedure Length (cm) 2.1 cm 05/17/22 1627   Post-Procedure Width (cm) 1.5 cm 05/17/22 1627   Post-Procedure Depth (cm) 0.4 cm 05/17/22 1627   Post-Procedure Surface Area (cm^2) 3.15 cm^2 05/17/22 1627   Post-Procedure Volume (cm^3) 1.26 cm^3 05/17/22 1627   Wound Assessment Pink/red;Subcutaneous 06/21/22 1427   Drainage Amount Scant 06/21/22 1427   Drainage Description Yellow;Serous 06/21/22 1427   Odor None 06/21/22 1427   Ela-wound Assessment Intact 06/21/22 1427   Margins Defined edges 06/21/22 1427   Wound Thickness Description not for Pressure Injury Full thickness 06/21/22 1427   Number of days: 88          Total Surface Area of Ulcer(s) Covered 3.3 sq/cm    Was the Product Layered  No     Amount of Product Applied 6 sq/cm     Amount of Product Wasted 0 sq/cm     Reason for Waste N/A      Surgically Fixated: Yes    Secured With: Steri Strips and Mepitel     Procedural Pain: 2/10     Post Procedural Pain: 0 / 10    Response to Treatment:  Well tolerated by patient. Problem List Items Addressed This Visit     Non-pressure chronic ulcer of left ankle with fat layer exposed ([facility] - Primary (Chronic)    Relevant Orders    Initiate Outpatient Wound Care Protocol            Plan:     Patient examined and Theraskin #1 applied  Patient will change dressing on Friday as directed  Follow up in one week        Treatment Note please see attached Discharge Instructions    Written patient dismissal instructions given to patient and signed by patient or POA. Discharge [address] and [facility]   Physician Orders and Discharge [address] [place] [address]  Telephone: [phone]      [phone]        NAME: Jane Xavier  DATE OF BIRTH:  1945  MEDICAL RECORD NUMBER:  [id number]     Your  is:  Marie     Home Care/Facility:   NONE     Wound Location:   LEFT ANTERIOR ANKLE  Dressing orders: 1. Cleanse wound(s) with normal saline. 2. Apply TRIAD COLOPLAST TO WOUND BED. 3. Cover with DRY DRESSING.   4. Change Every Day.     TODAY IN WOUND CENTER APPLY PLUROGEL AND DRY DRESSING.     Compression:      Offloading Device:     Other Instructions:  Keep all dressings clean, dry and intact.  Keep pressure off the wound(s) at all times.      Follow up visit [facility] 7, 2022 AT       Please give 24 hour notice if unable to keep appointment. [phone]     If you experience any of the following, please call the Wound Care Service at  [phone] or go to the nearest emergency room.        *Increase in pain         *Temperature over 101           *Increase in drainage from your wound or a foul odor  *Uncontrolled swelling            *Need for compression bandage changes due to slippage, breakthrough drainage       PLEASE NOTE: IF YOU ARE UNABLE TO OBTAIN WOUND SUPPLIES, CONTINUE TO USE THE SUPPLIES YOU HAVE AVAILABLE UNTIL YOU ARE ABLE TO [address].  IT IS MOST IMPORTANT TO KEEP THE WOUND COVERED AT ALL TIMES        Electronically signed by Jus Bartlett DPM on 6/21/2022 at 2:57 PM

## 2022-06-21 NOTE — PLAN OF CARE
Problem: Wound:  Goal: Will show signs of wound healing; wound closure and no evidence of infection  Outcome: Progressing     Problem: Venous:  Goal: Signs of wound healing will improve  Outcome: Progressing

## 2022-06-21 NOTE — PROGRESS NOTES
Bernardino edwards, [address]     Ph: [phone]  Fax: [phone]      [x] Certification  [] Recertification []  Plan of Care  [] Progress Note [] Discharge      Referring Provider: Jason Cintron MD      From:  Don Gonzalez, PT   Patient: Raven Choi (64 y.o. male) : 1945 Date: 2022   Medical Diagnosis: Sacroiliitis, not elsewhere classified [M46.1]    Treatment Diagnosis: Impaired balance and mobility    Plan of Care/Certification Expiration Date: : 22   Progress Report Period from:  2022  to 2022    Visits to Date: 1 No Show: 0 Cancelled Appts: 0    OBJECTIVE:   Short Term Goals - Time Frame for Short term goals: 3 weeks    Goals Current/Discharge status  Status   Short term goal 1: Independent with HEP  ongoing New   Short term goal 2: Pt will be independent with transfers with decreased reliance on irwin UEs. Transfers  Sit to Stand: Independent (Relies on irwin UEs with a mild post lean)  Stand to sit: Independent New     Long Term Goals - Time Frame for Long term goals : 6 weeks  Goals Current/ Discharge status Status   Long term goal 1: Improve irwin LE strength >/= 4+/5 to improve stability with standing and walking. Strength LLE  L Hip Flexion: 4/5  L Hip ABduction: 3-/5  L Knee Flexion: 5/5  L Knee Extension: 5/5  L Ankle Dorsiflexion: 5/5  Strength RLE  R Hip Flexion: 4/5  R Hip ABduction: 3-/5  R Knee Flexion: 5/5  R Knee Extension: 5/5  R Ankle Dorsiflexion: 5/5  New   Long term goal 2: Pt will ambulate >/= 200' with improved irwin foot clearance and heel strike S/I. Ambulation  Surface: carpet  Device: Rollator  Assistance: Supervision,Independent  Quality of Gait: Lt foot drop, slight FWD flexed posture  Gait Deviations: Slow Beatrice,Decreased step length,Decreased step height  Distance: [de-identified]'   New   Long term goal 3: Villasenor >/= 45/56 to reduce pt's risk for falls.  Villasenor Balance Score: 29 New   Long term goal 4: TUG with LRD </= 15sec to improve safety with ambulation. Timed Up and Go: 22.5 (with rollator) New     Body Structures, Functions, Activity Limitations Requiring Skilled Therapeutic Intervention: Decreased functional mobility ,Decreased ROM,Decreased strength,Decreased endurance,Decreased balance,Increased pain,Decreased posture  Assessment: Pt presents with a decline in his balance and mobility with a worsening of LBP. Pt demonstrates decreased strength in irwin LEs as seen with MMT and with transfers. Pt is at a high risk for falls per Villasenor score and time to complete TUG. Pt demonstrates a Lt foot drop with a slight FWD flexed posture and decreased foot clearance when ambulating with a rollator. Pt would benefit from further skilled PT to improve his strength, balance and safety with all mobility. Therapy Prognosis: Good      PT Education: Goals;PT Role;Plan of Care;Evaluative findings    PLAN: [x] Evaluate and Treat  Frequency/Duration:  Plan Frequency: 2  Plan weeks: 6  Current Treatment Recommendations: Strengthening,ROM,Balance training,Functional mobility training,Transfer training,Gait training,Stair training,Neuromuscular re-education,Manual Therapy - Soft Tissue Mobilization,Home exercise program,Safety education & training,Patient/Caregiver education & training,Equipment evaluation, education, & procurement,Modalities     Precautions:   falls                         Patient Status:[x] Continue/ Initiate plan of Care    [] Discharge PT. Recommend pt continue with HEP. [] Additional visits requested, Please re-certify for additional visits:    [] Hold         Signature: Electronically signed by Gretchen Blackwell PT on 6/21/22 at 4:00 PM EDT      If you have any questions or concerns, please don't hesitate to call.   Thank you for your referral.

## 2022-06-21 NOTE — CODE DOCUMENTATION
[id number] Physician Billing Sheet. Marlin Cardona  AGE: 64 y.o.    GENDER: male  : 1945  TODAY'S DATE:  2022    ICD-10 CODES  Active Hospital Problems    Diagnosis Date Noted    Non-pressure chronic ulcer of left ankle with fat layer exposed [facility]) [L97.322] 2021       PHYSICIAN PROCEDURES    CPT CODE  92773 LT      Electronically signed by Linh Capps DPM on 2022 at 3:15 PM

## 2022-06-22 ENCOUNTER — CARE COORDINATION (OUTPATIENT)
Dept: CARE COORDINATION | Age: 77
End: 2022-06-22

## 2022-06-22 NOTE — FLOWSHEET NOTE
TheraSkin Treatment Note    NAME:  Saray Knight  YOB: 1945  MEDICAL RECORD NUMBER:  [id number]  DATE:  6/21/2022    Goal:  Patient will receive safe and proper application of skin substitute. Patient will comply with caring for dressing, offloading and reporting complications. Expiration date of TheraSkin checked immediately prior to use. Package intact prior to use and no damage noted. Transport temperature controlled and acceptable. TheraSkin was prepared for application by removing from package. TheraSkin was rinsed 2 times in room temperature normal saline for 2 minutes each time. A 2nd saline rinse was left on the TheraSkin until the physician was ready to apply it within 120 minutes of thawing. White side goes against ulcer bed. TheraSkin was applied to left ankle wound and affixed with steri-strips by the physician.  calcium alginate was applied on top of non-adherent dressing. Fluffed gauze was applied. Dressing was secured with cover roll type tape to stabilize graft. Coban or ace wrap was applied to secure graft and decrease edema. Patient/caregiver was instructed not to remove dressing and to keep it clean and dry. Pt/family/caregiver was instructed on signs and symptoms of complications to report such as draining through, falling down/slipping, getting wet, or severe pain or tingling in toes. Pt/family/caregiver was instructed on need for offloading and elevation of affected extremity (if applicable) and on use of prescribed offloading device.  Thera Skin may be applied a total of 10 times per wound over a 12 week period.  Date of first application of Thera Skin for this current wound is June 21, 2022.                   Guidelines followed      Electronically signed by Sandy Acosta RN on 6/22/2022 at 8:15 AM

## 2022-06-22 NOTE — CARE COORDINATION
Ambulatory Care Coordination Note  6/23/2022  CM Risk Score: 7  Charlson 10 Year Mortality Risk Score: 100%     ACC: Wynonia League, RN    Summary Note:     Janay Collins returned my call leaving me message. He stated that he will be going to PT in [place] two times per week   He does also continue to participate in wound care for the open wound on his ankle  I will follow up with him next week     Lab Results     None          Care Coordination Interventions    Referral from Primary Care Provider: No  Suggested Interventions and Community Resources         Goals Addressed    None         Prior to Admission medications    Medication Sig Start Date End Date Taking? Authorizing Provider   citalopram (CELEXA) 40 MG tablet TAKE ONE TABLET BY MOUTH nightly 5/24/22   Geovanna Cali MD   gentamicin (GARAMYCIN) 0.1 % ointment Apply topically  daily.   Patient not taking: Reported on 5/24/2022 5/20/22   Sara Moran DPM   cephALEXin (KEFLEX) 500 MG capsule Take 1 capsule by mouth 3 times daily  Patient not taking: Reported on 5/24/2022 3/25/22   Sara Moran DPM   SODIUM CHLORIDE, EXTERNAL, 0.9 % SOLN Apply 1 Applicatorful topically daily  Patient not taking: Reported on 5/24/2022 3/25/22   Sara Moran DPM   atorvastatin (LIPITOR) 20 MG tablet TAKE ONE TABLET BY MOUTH DAILY 3/22/22   Melissa Apodaca MD   clopidogrel (PLAVIX) 75 MG tablet Take 1 tablet by mouth daily 3/2/22   Melissa Apodaca MD   diclofenac (VOLTAREN) 50 MG EC tablet TAKE ONE TABLET BY MOUTH TWO TIMES A DAY 2/14/22   Geovanna Cali MD   isosorbide mononitrate (IMDUR) 30 MG extended release tablet TAKE ONE TABLET BY MOUTH EVERY DAY 2/4/22   Melissa Apodaca MD   bumetanide (BUMEX) 2 MG tablet Take 1 tablet by mouth daily 1/21/22   Melissa Apodaca MD   metoprolol tartrate (LOPRESSOR) 50 MG tablet Take 0.5 tablets by mouth 2 times daily TAKE ONE TABLET BY MOUTH TWO TIMES A DAY 1/3/22   Geovanna Cali MD   folic acid (FOLVITE) 1 MG tablet Take 1 tablet by mouth daily  Patient not taking: Reported on 5/24/2022 10/16/21   Micha Dillon MD   lidocaine 4 % external patch Place 3 patches onto the skin daily  Patient not taking: Reported on 5/24/2022 10/16/21   Micha Dillon MD   tiZANidine (ZANAFLEX) 4 MG tablet Take 0.5 tablets by mouth every 6 hours as needed (muscle spasm)  Patient not taking: Reported on 5/24/2022 10/15/21   Micha Dillon MD   thiamine 100 MG tablet Take 1 tablet by mouth daily  Patient not taking: Reported on 5/24/2022 10/15/21   Micha Dillon MD   omeprazole (PRILOSEC) 10 MG delayed release capsule TAKE ONE CAPSULE BY MOUTH DAILY 9/23/21   Lisette Pimentel MD   SODIUM CHLORIDE, EXTERNAL, 0.9 % SOLN Apply 1 Applicatorful topically daily  Patient not taking: Reported on 5/24/2022 8/17/21   Sara Moran DPM   finasteride (PROSCAR) 5 MG tablet Take 1 tablet by mouth daily 8/12/21   Mathew Wang MD   tiotropium (SPIRIVA RESPIMAT) 2.5 MCG/ACT AERS inhaler Inhale 2 puffs into the lungs daily 5/19/21   Marylin Lance MD   Fluticasone furoate-vilanterol (BREO ELLIPTA) 200-25 MCG/INH AEPB inhaler Inhale 1 puff into the lungs daily 2/23/21   Marylin Lance MD   albuterol sulfate  (90 Base) MCG/ACT inhaler Inhale 2 puffs into the lungs every 6 hours as needed for Wheezing 7/2/20   Marylin Lance MD   nitroGLYCERIN (NITROSTAT) 0.4 MG SL tablet Place 1 tablet under the tongue every 5 minutes as needed for Chest pain up to max of 3 total doses. If no relief after 1 dose, call 911.   Patient not taking: Reported on 5/24/2022 5/1/19   Lisette Pimentel MD   DOCUSATE CALCIUM PO Take by mouth as needed    Historical Provider, MD       Future Appointments   Date Time Provider Jeimy Jerry   6/28/2022  4:15 PM TAMIKA Ibanez [id number]   7/12/2022  3:30 PM Lisette Pimentel MD [address] [place] [address]

## 2022-06-28 ENCOUNTER — HOSPITAL ENCOUNTER (OUTPATIENT)
Dept: WOUND CARE | Age: 77
Discharge: HOME | End: 2022-06-28
Payer: MEDICARE

## 2022-06-28 VITALS
TEMPERATURE: 96.2 F | HEART RATE: 63 BPM | DIASTOLIC BLOOD PRESSURE: 58 MMHG | SYSTOLIC BLOOD PRESSURE: 120 MMHG | RESPIRATION RATE: 18 BRPM

## 2022-06-28 DIAGNOSIS — L97.322 NON-PRESSURE CHRONIC ULCER OF LEFT ANKLE WITH FAT LAYER EXPOSED (HCC): Primary | ICD-10-CM

## 2022-06-28 PROCEDURE — 99213 OFFICE O/P EST LOW 20 MIN: CPT

## 2022-06-28 RX ORDER — BETAMETHASONE DIPROPIONATE 0.05 %
OINTMENT (GRAM) TOPICAL ONCE
Status: CANCELLED | OUTPATIENT
Start: 2022-06-28 | End: 2022-06-28

## 2022-06-28 RX ORDER — LIDOCAINE 40 MG/G
CREAM TOPICAL ONCE
Status: CANCELLED | OUTPATIENT
Start: 2022-06-28 | End: 2022-06-28

## 2022-06-28 RX ORDER — LIDOCAINE HYDROCHLORIDE 40 MG/ML
SOLUTION TOPICAL ONCE
Status: CANCELLED | OUTPATIENT
Start: 2022-06-28 | End: 2022-06-28

## 2022-06-28 RX ORDER — BACITRACIN, NEOMYCIN, POLYMYXIN B 400; 3.5; 5 [USP'U]/G; MG/G; [USP'U]/G
OINTMENT TOPICAL ONCE
Status: CANCELLED | OUTPATIENT
Start: 2022-06-28 | End: 2022-06-28

## 2022-06-28 RX ORDER — LIDOCAINE HYDROCHLORIDE 20 MG/ML
JELLY TOPICAL ONCE
Status: CANCELLED | OUTPATIENT
Start: 2022-06-28 | End: 2022-06-28

## 2022-06-28 RX ORDER — GINSENG 100 MG
CAPSULE ORAL ONCE
Status: CANCELLED | OUTPATIENT
Start: 2022-06-28 | End: 2022-06-28

## 2022-06-28 RX ORDER — GENTAMICIN SULFATE 1 MG/G
OINTMENT TOPICAL ONCE
Status: CANCELLED | OUTPATIENT
Start: 2022-06-28 | End: 2022-06-28

## 2022-06-28 RX ORDER — BACITRACIN ZINC AND POLYMYXIN B SULFATE 500; 1000 [USP'U]/G; [USP'U]/G
OINTMENT TOPICAL ONCE
Status: CANCELLED | OUTPATIENT
Start: 2022-06-28 | End: 2022-06-28

## 2022-06-28 RX ORDER — CLOBETASOL PROPIONATE 0.5 MG/G
OINTMENT TOPICAL ONCE
Status: CANCELLED | OUTPATIENT
Start: 2022-06-28 | End: 2022-06-28

## 2022-06-28 RX ORDER — LIDOCAINE 50 MG/G
OINTMENT TOPICAL ONCE
Status: CANCELLED | OUTPATIENT
Start: 2022-06-28 | End: 2022-06-28

## 2022-06-28 NOTE — PROGRESS NOTES
[facility] [address]                                                   Progress Note and Procedure Note      Cassie Calero RECORD NUMBER:  [id number]  AGE: 64 y.o. GENDER: male  : 1945  EPISODE DATE:  2022    Subjective:     Chief Complaint   Patient presents with    Wound Check     left ankle         HISTORY of PRESENT ILLNESS HPI      Jorge Cruz is a 64 y.o. male who presents today for wound/ulcer evaluation. History of Wound Context: Patient presents for a reoccurrence of an non healing ulcer of the left ankle. Patient is s/p [place] #1  No new complaints noted. Wound/Ulcer Pain Timing/Severity: intermittent  Quality of pain: tender  Severity:  4 / 10   Modifying Factors: None  Associated Signs/Symptoms: drainage, odor and pain    Ulcer Identification:  Ulcer Type: venous and undetermined  Contributing Factors: edema and venous stasis    Wound: N/A        PAST MEDICAL HISTORY        Diagnosis Date    Alcohol abuse     quit drinking 19    CAD (coronary artery disease)     patient states no doctor has told him this / has 1 cardiac stent    Cancer ([address]     lung LLL    Chronic back pain     Chronic kidney disease     Chronic obstructive pulmonary disease, unspecified COPD type ([address] 3/24/2022    Chronic sinusitis     DJD (degenerative joint disease) of knee     left knee DJD    Elevated PSA     History of blood transfusion     History of inferior wall myocardial infarction 2016    1 cardiac stent    HTN (hypertension)     meds .  1 yr    Hyperlipidemia     meds > 12 yrs    NSTEMI (non-ST elevated myocardial infarction) ([address]     Smoker        PAST SURGICAL HISTORY    Past Surgical History:   Procedure Laterality Date    ABDOMEN SURGERY      spleenectomy due to [address]      lumbar disc OR    CARDIAC SURGERY      stents    CARPAL TUNNEL RELEASE Right 2019    RIGHT CARPAL TUNNEL RELEASE performed by [facility]quez [facility]enes [facility]    COLONOSCOPY      CORONARY ANGIOPLASTY WITH STENT PLACEMENT  2016    EYE SURGERY      to have Phaco with IOL OU 2018    HERNIA REPAIR      JOINT REPLACEMENT Left     LTHR    JOINT REPLACEMENT Right     RTKR    KNEE SURGERY Right     arthroscopy    IL MANIPULATN KNEE JT+ANESTHESIA Right 2017    RIGHT KNEE MANIPULATION UNDER ANESTHESIA [facility] [address] [facility],PROCEDURE ONLY Bilateral 2017    RIGHT AND BILATERAL L 4-5 LYSIS OF SCAR DISKECTOMY INTERBODY CAGE FUSION POSTEROLATERAL FUSION PEDICLE SCREWS performed by Agustin Woods MD at [address] [place] [address]      benign    SPLENECTOMY      TOTAL KNEE ARTHROPLASTY Right 3/24/2017    RIGHT  KNEE TOTAL ARTHROPLASTY, ELHAM CEMENTED PERSONA  performed by Mayito Olmedo MD at [address] [place] [address] HISTORY    Family History   Problem Relation Age of Onset    Osteoarthritis Mother     Emphysema Mother     Hypertension Father     Hearing Loss Father     Dementia Father     No Known Problems Sister     Prostate Cancer Brother     Colon Polyps Neg Hx        SOCIAL HISTORY    Social History     Tobacco Use    Smoking status: Former Smoker     Packs/day: 0.00     Years: 60.00     Pack years: 0.00     Types: Cigarettes     Quit date: 2021     Years since quittin.4    Smokeless tobacco: Never Used    Tobacco comment:  smokes 2 cigars a day   Vaping Use    Vaping Use: Never used   Substance Use Topics    Alcohol use: Not Currently     Alcohol/week: 12.0 standard drinks     Types: 12 Shots of liquor per week     Comment: socail drinking    Drug use: No       ALLERGIES    Allergies   Allergen Reactions    Morphine        MEDICATIONS    Current Outpatient Medications on File Prior to Encounter   Medication Sig Dispense Refill    citalopram (CELEXA) 40 MG tablet TAKE ONE TABLET BY MOUTH nightly 30 tablet 5    gentamicin (GARAMYCIN) 0.1 % ointment Apply topically  daily.  (Patient not taking: Reported on 5/24/2022) 30 g 1    cephALEXin (KEFLEX) 500 MG capsule Take 1 capsule by mouth 3 times daily (Patient not taking: Reported on 5/24/2022) 30 capsule 0    SODIUM CHLORIDE, EXTERNAL, 0.9 % SOLN Apply 1 Applicatorful topically daily (Patient not taking: Reported on 5/24/2022) 1000 mL 2    atorvastatin (LIPITOR) 20 MG tablet TAKE ONE TABLET BY MOUTH DAILY 90 tablet 3    clopidogrel (PLAVIX) 75 MG tablet Take 1 tablet by mouth daily 90 tablet 3    diclofenac (VOLTAREN) 50 MG EC tablet TAKE ONE TABLET BY MOUTH TWO TIMES A DAY 60 tablet 5    isosorbide mononitrate (IMDUR) 30 MG extended release tablet TAKE ONE TABLET BY MOUTH EVERY DAY 90 tablet 3    bumetanide (BUMEX) 2 MG tablet Take 1 tablet by mouth daily 90 tablet 3    metoprolol tartrate (LOPRESSOR) 50 MG tablet Take 0.5 tablets by mouth 2 times daily TAKE ONE TABLET BY MOUTH TWO TIMES A  tablet 3    folic acid (FOLVITE) 1 MG tablet Take 1 tablet by mouth daily (Patient not taking: Reported on 5/24/2022) 30 tablet 3    lidocaine 4 % external patch Place 3 patches onto the skin daily (Patient not taking: Reported on 5/24/2022) 1 box 1    tiZANidine (ZANAFLEX) 4 MG tablet Take 0.5 tablets by mouth every 6 hours as needed (muscle spasm) (Patient not taking: Reported on 5/24/2022) 30 tablet 0    thiamine 100 MG tablet Take 1 tablet by mouth daily (Patient not taking: Reported on 5/24/2022) 30 tablet 3    omeprazole (PRILOSEC) 10 MG delayed release capsule TAKE ONE CAPSULE BY MOUTH DAILY 90 capsule 2    SODIUM CHLORIDE, EXTERNAL, 0.9 % SOLN Apply 1 Applicatorful topically daily (Patient not taking: Reported on 5/24/2022) 1000 mL 2    finasteride (PROSCAR) 5 MG tablet Take 1 tablet by mouth daily 90 tablet 3    tiotropium (SPIRIVA RESPIMAT) 2.5 MCG/ACT AERS inhaler Inhale 2 puffs into the lungs daily 1 Inhaler 3    Fluticasone furoate-vilanterol (BREO ELLIPTA) 200-25 MCG/INH AEPB inhaler Inhale 1 puff into the lungs daily 1 each 3    albuterol sulfate  (90 Base) MCG/ACT inhaler Inhale 2 puffs into the lungs every 6 hours as needed for Wheezing 1 Inhaler 3    nitroGLYCERIN (NITROSTAT) 0.4 MG SL tablet Place 1 tablet under the tongue every 5 minutes as needed for Chest pain up to max of 3 total doses. If no relief after 1 dose, call 911. (Patient not taking: Reported on 5/24/2022) 25 tablet 3    DOCUSATE CALCIUM PO Take by mouth as needed       No current facility-administered medications on file prior to encounter. REVIEW OF SYSTEMS    Pertinent items are noted in HPI. Objective:      BP (!) 120/58   Pulse 63   Temp (!) 96.2 °F (35.7 °C) (Temporal)   Resp 18     Wt Readings from Last 3 Encounters:   03/02/22 157 lb (71.2 kg)   02/07/22 160 lb (72.6 kg)   01/14/22 149 lb (67.6 kg)       PHYSICAL EXAM    Constitutional:   Well nourished and well developed. Appears neat and clean. Patient is alert, oriented x3, and in no apparent distress. Respiratory:  Respiratory effort is easy and symmetric bilaterally. Rate is normal at rest and on room air. Vascular:  Pedal Pulses is palpable and audible with doppler. Capillary refill is <3 sec to digits bilateral.  Extremities positive for 1+ pitting edema. Neurological:   Sensation is intact to lower extremities. Dermatological:  Wound description noted in wound assessment. The graft is intact. No signs of infection noted. Psychiatric:  Judgement and insight intact. Short and long term memory intact. No evidence of depression, anxiety, or agitation. Patient is calm, cooperative, and communicative. Appropriate interactions and affect.         Assessment:      Active Hospital Problems    Diagnosis Date Noted    Non-pressure chronic ulcer of left ankle with fat layer exposed [facility]) [L97.322] 08/17/2021        Procedure Note  Indications:  Based on my examination of this patient's wound(s)/ulcer(s) today, debridement is not required to promote healing and evaluate the wound base. Wound/Ulcer #: 1    Post Debridement Measurements:  Wound 03/25/22 Ankle Left; Anterior #1 (Active)   Wound Image   06/28/22 1647   Wound Etiology Other 06/28/22 1618   Wound Cleansed Cleansed with saline 06/21/22 1427   Dressing/Treatment Triad hydro/zinc oxide-based hydrophilic paste;Dry dressing 06/07/22 1632   Wound Length (cm) 2.2 cm 06/21/22 1427   Wound Width (cm) 1.5 cm 06/21/22 1427   Wound Depth (cm) 0.3 cm 06/21/22 1427   Wound Surface Area (cm^2) 3.3 cm^2 06/21/22 1427   Change in Wound Size % (l*w) -3.12 06/21/22 1427   Wound Volume (cm^3) 0.99 cm^3 06/21/22 1427   Wound Healing % -3 06/21/22 1427   Post-Procedure Length (cm) 2.1 cm 05/17/22 1627   Post-Procedure Width (cm) 1.5 cm 05/17/22 1627   Post-Procedure Depth (cm) 0.4 cm 05/17/22 1627   Post-Procedure Surface Area (cm^2) 3.15 cm^2 05/17/22 1627   Post-Procedure Volume (cm^3) 1.26 cm^3 05/17/22 1627   Wound Assessment Pink/red;Subcutaneous 06/21/22 1427   Drainage Amount Scant 06/21/22 1427   Drainage Description Yellow;Serous 06/21/22 1427   Odor None 06/21/22 1427   Ela-wound Assessment Intact 06/21/22 1427   Margins Defined edges 06/21/22 1427   Wound Thickness Description not for Pressure Injury Full thickness 06/21/22 1427   Number of days: 95         Plan:     Patient examined   Patient will change dressing on Friday. Follow up in one  week      Treatment Note please see attached Discharge Instructions    Written patient dismissal instructions given to patient and signed by patient or POA.          Discharge Instructions       [address] [place] [address] and Hyperbaric Medicine   Physician Orders and Discharge Instructions  [address]  Telephone: [phone]      [phone]        [facility]  DATE OF BIRTH:  1945  MEDICAL RECORD NUMBER:  [id number]     Your  is:  Marie     Home Care/Facility:   NONE     Wound Location:   LEFT ANTERIOR ANKLE  Dressing orders:  TODAY IN WOUND CENTER APPLY VERSATEL AND SECURE WITH STERI STRIPS, APPLY OPTILOCK AND SECURE WITH DRY DRESSING. LEAVE THESE DRESSINGS IN PLACE UNTIL SEEN IN WOUND CENTER IN ONE WEEK.    Compression:      Offloading Device:     Other Instructions:  Keep all dressings clean, dry and intact.  Keep pressure off the wound(s) at all times.      Follow up visit   1 WEEK  July 5, 2022 AT  4:00pm     Please give 24 hour notice if unable to keep appointment. [phone]     If you experience any of the following, please call the Wound Care Service at  [phone] or go to the nearest emergency room.        *Increase in pain         *Temperature over 101           *Increase in drainage from your wound or a foul odor  *Uncontrolled swelling            *Need for compression bandage changes due to slippage, breakthrough drainage       PLEASE NOTE: IF YOU ARE UNABLE TO OBTAIN WOUND SUPPLIES, CONTINUE TO USE THE SUPPLIES YOU HAVE AVAILABLE UNTIL YOU ARE ABLE TO [facility].  IT IS MOST IMPORTANT TO KEEP THE WOUND COVERED AT ALL TIMES  Electronically signed by Gio Carter DPM on 6/28/2022 at 5:14 PM        Electronically signed by Gio Carter DPM on 6/28/2022 at 5:57 PM

## 2022-06-28 NOTE — CODE DOCUMENTATION
[id number] Physician Billing Sheet. Shanda Casas  AGE: 64 y.o.    GENDER: male  : 1945  TODAY'S DATE:  2022    ICD-10 CODES  Active Hospital Problems    Diagnosis Date Noted    Non-pressure chronic ulcer of left ankle with fat layer exposed [facility]) [L97.322] 2021       PHYSICIAN PROCEDURES    CPT CODE  99953      Electronically signed by Reagan Cueto DPM on 2022 at 5:15 PM

## 2022-07-01 ENCOUNTER — CARE COORDINATION (OUTPATIENT)
Dept: CARE COORDINATION | Age: 77
End: 2022-07-01

## 2022-07-05 ENCOUNTER — HOSPITAL ENCOUNTER (OUTPATIENT)
Dept: WOUND CARE | Age: 77
Discharge: HOME | End: 2022-07-05
Payer: MEDICARE

## 2022-07-05 VITALS
TEMPERATURE: 96.9 F | HEART RATE: 74 BPM | RESPIRATION RATE: 18 BRPM | SYSTOLIC BLOOD PRESSURE: 95 MMHG | DIASTOLIC BLOOD PRESSURE: 47 MMHG

## 2022-07-05 DIAGNOSIS — L97.322 NON-PRESSURE CHRONIC ULCER OF LEFT ANKLE WITH FAT LAYER EXPOSED (HCC): Primary | ICD-10-CM

## 2022-07-05 PROCEDURE — 99213 OFFICE O/P EST LOW 20 MIN: CPT

## 2022-07-05 RX ORDER — BACITRACIN, NEOMYCIN, POLYMYXIN B 400; 3.5; 5 [USP'U]/G; MG/G; [USP'U]/G
OINTMENT TOPICAL ONCE
Status: CANCELLED | OUTPATIENT
Start: 2022-07-05 | End: 2022-07-05

## 2022-07-05 RX ORDER — LIDOCAINE HYDROCHLORIDE 20 MG/ML
JELLY TOPICAL ONCE
Status: CANCELLED | OUTPATIENT
Start: 2022-07-05 | End: 2022-07-05

## 2022-07-05 RX ORDER — GINSENG 100 MG
CAPSULE ORAL ONCE
Status: CANCELLED | OUTPATIENT
Start: 2022-07-05 | End: 2022-07-05

## 2022-07-05 RX ORDER — LIDOCAINE 50 MG/G
OINTMENT TOPICAL ONCE
Status: CANCELLED | OUTPATIENT
Start: 2022-07-05 | End: 2022-07-05

## 2022-07-05 RX ORDER — LIDOCAINE 40 MG/G
CREAM TOPICAL ONCE
Status: CANCELLED | OUTPATIENT
Start: 2022-07-05 | End: 2022-07-05

## 2022-07-05 RX ORDER — BACITRACIN ZINC AND POLYMYXIN B SULFATE 500; 1000 [USP'U]/G; [USP'U]/G
OINTMENT TOPICAL ONCE
Status: CANCELLED | OUTPATIENT
Start: 2022-07-05 | End: 2022-07-05

## 2022-07-05 RX ORDER — LIDOCAINE HYDROCHLORIDE 40 MG/ML
SOLUTION TOPICAL ONCE
Status: CANCELLED | OUTPATIENT
Start: 2022-07-05 | End: 2022-07-05

## 2022-07-05 RX ORDER — CLOBETASOL PROPIONATE 0.5 MG/G
OINTMENT TOPICAL ONCE
Status: CANCELLED | OUTPATIENT
Start: 2022-07-05 | End: 2022-07-05

## 2022-07-05 RX ORDER — GENTAMICIN SULFATE 1 MG/G
OINTMENT TOPICAL ONCE
Status: CANCELLED | OUTPATIENT
Start: 2022-07-05 | End: 2022-07-05

## 2022-07-05 RX ORDER — BETAMETHASONE DIPROPIONATE 0.05 %
OINTMENT (GRAM) TOPICAL ONCE
Status: CANCELLED | OUTPATIENT
Start: 2022-07-05 | End: 2022-07-05

## 2022-07-05 ASSESSMENT — PAIN DESCRIPTION - DESCRIPTORS: DESCRIPTORS: ACHING

## 2022-07-05 ASSESSMENT — PAIN DESCRIPTION - LOCATION: LOCATION: BACK

## 2022-07-05 ASSESSMENT — PAIN DESCRIPTION - ORIENTATION: ORIENTATION: LOWER

## 2022-07-05 ASSESSMENT — PAIN SCALES - GENERAL: PAINLEVEL_OUTOF10: 6

## 2022-07-05 NOTE — CODE DOCUMENTATION
[id number] Billing Sheet. America Jak  AGE: 64 y.o.    

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
## 2022-07-12 ENCOUNTER — OFFICE VISIT (OUTPATIENT)
Dept: CARDIOLOGY CLINIC | Age: 77
End: 2022-07-12
Payer: MEDICARE

## 2022-07-12 VITALS
DIASTOLIC BLOOD PRESSURE: 62 MMHG | SYSTOLIC BLOOD PRESSURE: 118 MMHG | OXYGEN SATURATION: 93 % | WEIGHT: 168 LBS | BODY MASS INDEX: 27 KG/M2 | HEART RATE: 60 BPM | HEIGHT: 66 IN

## 2022-07-12 DIAGNOSIS — I10 ESSENTIAL HYPERTENSION: ICD-10-CM

## 2022-07-12 DIAGNOSIS — I70.222 ATHEROSCLEROSIS OF NATIVE ARTERIES OF EXTREMITIES WITH REST PAIN, LEFT LEG (HCC): ICD-10-CM

## 2022-07-12 DIAGNOSIS — I65.23 BILATERAL CAROTID ARTERY STENOSIS: ICD-10-CM

## 2022-07-12 DIAGNOSIS — E78.00 PURE HYPERCHOLESTEROLEMIA: ICD-10-CM

## 2022-07-12 DIAGNOSIS — E78.5 DYSLIPIDEMIA: ICD-10-CM

## 2022-07-12 DIAGNOSIS — I25.119 CORONARY ARTERY DISEASE INVOLVING NATIVE CORONARY ARTERY OF NATIVE HEART WITH ANGINA PECTORIS (HCC): Primary | ICD-10-CM

## 2022-07-12 DIAGNOSIS — R06.09 DOE (DYSPNEA ON EXERTION): ICD-10-CM

## 2022-07-12 DIAGNOSIS — I21.4 NSTEMI (NON-ST ELEVATED MYOCARDIAL INFARCTION) (HCC): ICD-10-CM

## 2022-07-12 PROCEDURE — 1036F TOBACCO NON-USER: CPT | Performed by: INTERNAL MEDICINE

## 2022-07-12 PROCEDURE — 93000 ELECTROCARDIOGRAM COMPLETE: CPT | Performed by: INTERNAL MEDICINE

## 2022-07-12 PROCEDURE — 1124F ACP DISCUSS-NO DSCNMKR DOCD: CPT | Performed by: INTERNAL MEDICINE

## 2022-07-12 PROCEDURE — G8417 CALC BMI ABV UP PARAM F/U: HCPCS | Performed by: INTERNAL MEDICINE

## 2022-07-12 PROCEDURE — G8427 DOCREV CUR MEDS BY ELIG CLIN: HCPCS | Performed by: INTERNAL MEDICINE

## 2022-07-12 PROCEDURE — 99214 OFFICE O/P EST MOD 30 MIN: CPT | Performed by: INTERNAL MEDICINE

## 2022-07-12 RX ORDER — OXYCODONE AND ACETAMINOPHEN 7.5; 325 MG/1; MG/1
TABLET ORAL
COMMUNITY
Start: 2022-07-08

## 2022-07-12 ASSESSMENT — ENCOUNTER SYMPTOMS
WHEEZING: 0
STRIDOR: 0
CHEST TIGHTNESS: 0
GASTROINTESTINAL NEGATIVE: 1
SHORTNESS OF BREATH: 1
COUGH: 0
NAUSEA: 0
BLOOD IN STOOL: 0
EYES NEGATIVE: 1

## 2022-07-12 NOTE — PROGRESS NOTES
Subsequent Progress Note  Patient: Amarjit Hanson  YOB: 1945  MRN: [id number]    Chief Complaint: cad dizzy htn felder rash  Chief Complaint   Patient presents with    Follow-up    Coronary Artery Disease       CV Data:  1/2016 NSTEMI RCA KRISTA  4/2018 echo  55 1TR  4/2018 CUS MIld palque  4/2018 Abd US neg AAA  4/2019 Spect negative   2/2020 CUS mild  9/21 LE Art Duplex - negative. Subjective/HPI: occ dizzy + felder no cp no falls no bleed rash right temple for 6 weeks      1/10/2020 More FELDER and more frequent Chest tightness. No falls no bleed. Takes meds. Feels gaseous. 2/7/2020 chest tightness and felder much less since Imdur. Compliant with meds    5/14/2020 TELEHEALTH EVALUATION -- Audio/Visual (During TZX-11 public health emergency)    disocered isolated LLL nodule irregualr 1.2 cm suspicious for cancer. PET scan Negative of Mets. No CP no SOB No Bleed    7/14/2020 is declining to have localized LLL lung cancer to be removed. No cp breathing is ok. No falls. No bleeds    10/14/2020 no cp no sob no falls no bleed. Takes meds. Gait is wobbly but no falls. Taking Bumex prn.  lE edema worse. 1/13/21 no cp no sob no falls no bleed. Takes meds. Eats well.     4/22/21 no cp no sob occ dizzy no falls no bleed. Takes a lot of salt    9/9/21 tired all the time. Eats well no cp same FELDER. No worse. No palps no bleed no falls    1/21/22 TELEHEALTH EVALUATION -- Audio/Visual (During ZAJLG-14 public health emergency)    Recent fall with shoulder injury and may need surgery. No cp no sob no bleed. Having little edema. 3/2/22  Ankle edema same no worse. Taking a lot of salt no cp no sob no falls no bleed. 7/12/22 doing weel still going to wound clinic for right ankle wound no cp still same felder. No falls no bleed. Smokes  cigars. 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    EKG: SR 61  QTc 433    Past Medical History:   Diagnosis Date    Alcohol abuse     quit drinking 8/18/21    CAD (coronary artery disease)     patient states no doctor has told him this / has 1 cardiac stent    Cancer ([facility]     lung LLL    Chronic back pain     Chronic kidney disease     Chronic obstructive pulmonary disease, unspecified COPD type ([facility] 3/24/2022    Chronic sinusitis     DJD (degenerative joint disease) of knee     left knee DJD    Elevated PSA     History of blood transfusion 1980's    History of inferior wall myocardial infarction 01/2016    1 cardiac stent    HTN (hypertension)     meds .  1 yr    Hyperlipidemia     meds > 12 yrs    NSTEMI (non-ST elevated myocardial infarction) ([facility]     Smoker        Past Surgical History:   Procedure Laterality Date    ABDOMEN SURGERY  1961    spleenectomy due to [address]  2009    lumbar disc OR    CARDIAC SURGERY      stents    CARPAL TUNNEL RELEASE Right 5/6/2019    RIGHT CARPAL TUNNEL RELEASE performed by Berwyn Cushing, MD at [place] [address] WITH STENT PLACEMENT  1/29/2016    EYE SURGERY      to have Phaco with IOL OU 2/2018    HERNIA REPAIR      JOINT REPLACEMENT Left 1994    LTHR    JOINT REPLACEMENT Right 2009    RTKR    KNEE SURGERY Right 2011    arthroscopy    [place] MANIPULATN KNEE JT+ANESTHESIA Right 5/12/2017    RIGHT KNEE MANIPULATION UNDER ANESTHESIA performed by Tarik Cooper MD at [address] OFFICE/OUTPT VISIT,PROCEDURE ONLY Bilateral 12/29/2017    RIGHT AND BILATERAL L 4-5 LYSIS OF SCAR DISKECTOMY INTERBODY CAGE FUSION POSTEROLATERAL FUSION PEDICLE SCREWS performed by Berwyn Cushing, MD at [address] [place] [address]  2004    benign    SPLENECTOMY  1961    TOTAL KNEE ARTHROPLASTY Right 3/24/2017    RIGHT  KNEE TOTAL ARTHROPLASTY, ELHAM CEMENTED PERSONA  performed [facility] at [facility]       Family History   Problem Relation Age of Onset    Osteoarthritis Mother     Emphysema Mother     Hypertension Father     Hearing Loss Father     Dementia Father     No Known Lack of Transportation (Non-Medical):  No   Physical Activity:     Days of Exercise per Week: Not on file    Minutes of Exercise per Session: Not on file   Stress: No Stress Concern Present    Feeling of Stress : Not at all   Social Connections: Socially Isolated    Frequency of Communication with Friends and Family: More than three times a week    Frequency of Social Gatherings with Friends and Family: Once a week    Attends Hinduism Services: Never    Active Member of Clubs or Organizations: No    Attends Club or Organization Meetings: Never    Marital Status:    Intimate Partner Violence:     Fear of Current or Ex-Partner: Not on file    Emotionally Abused: Not on file    Physically Abused: Not on file    Sexually Abused: Not on file   Housing Stability: Unknown    Unable to Pay for Housing in the Last Year: No    Number of [place] in the Last Year: Not on file    Unstable Housing in the Last Year: No       Allergies   Allergen Reactions    Morphine        Current Outpatient Medications   Medication Sig Dispense Refill    oxyCODONE-acetaminophen (PERCOCET) 7.5-325 MG per tablet Take 1 tablet by mouth five times a day as needed for pain      citalopram (CELEXA) 40 MG tablet TAKE ONE TABLET BY MOUTH nightly 30 tablet 5    atorvastatin (LIPITOR) 20 MG tablet TAKE ONE TABLET BY MOUTH DAILY 90 tablet 3    clopidogrel (PLAVIX) 75 MG tablet Take 1 tablet by mouth daily 90 tablet 3    diclofenac (VOLTAREN) 50 MG EC tablet TAKE ONE TABLET BY MOUTH TWO TIMES A DAY 60 tablet 5    isosorbide mononitrate (IMDUR) 30 MG extended release tablet TAKE ONE TABLET BY MOUTH EVERY DAY 90 tablet 3    metoprolol tartrate (LOPRESSOR) 50 MG tablet Take 0.5 tablets by mouth 2 times daily TAKE ONE TABLET BY MOUTH TWO TIMES A  tablet 3    omeprazole (PRILOSEC) 10 MG delayed release capsule TAKE ONE CAPSULE BY MOUTH DAILY 90 capsule 2    finasteride (PROSCAR) 5 MG tablet Take 1 tablet by mouth daily 90 tablet 3    tiotropium (SPIRIVA RESPIMAT) 2.5 MCG/ACT AERS inhaler Inhale 2 puffs into the lungs daily 1 Inhaler 3    Fluticasone furoate-vilanterol (BREO ELLIPTA) 200-25 MCG/INH AEPB inhaler Inhale 1 puff into the lungs daily 1 each 3    albuterol sulfate  (90 Base) MCG/ACT inhaler Inhale 2 puffs into the lungs every 6 hours as needed for Wheezing 1 Inhaler 3    nitroGLYCERIN (NITROSTAT) 0.4 MG SL tablet Place 1 tablet under the tongue every 5 minutes as needed for Chest pain up to max of 3 total doses. If no relief after 1 dose, call 911. 25 tablet 3    DOCUSATE CALCIUM PO Take by mouth as needed      bumetanide (BUMEX) 2 MG tablet Take 1 tablet by mouth daily (Patient not taking: Reported on 7/12/2022) 90 tablet 3     No current facility-administered medications for this visit. Review of Systems:   Review of Systems   Constitutional: Negative. Negative for diaphoresis and fatigue. HENT: Negative. Eyes: Negative. Respiratory: Positive for shortness of breath. Negative for cough, chest tightness, wheezing and stridor. Cardiovascular: Negative. Negative for chest pain, palpitations and leg swelling. Gastrointestinal: Negative. Negative for blood in stool and nausea. Genitourinary: Negative. Musculoskeletal: Negative. Skin: Negative. Neurological: Positive for light-headedness. Negative for dizziness, syncope and weakness. Hematological: Negative. Psychiatric/Behavioral: Negative. Physical Examination:    /62 (Site: Right Upper Arm, Position: Sitting, Cuff Size: Medium Adult)   Pulse 60   Ht 5' 6\" (1.676 m)   Wt 168 lb (76.2 kg)   SpO2 93%   BMI 27.12 kg/m²    Physical Exam   Constitutional: He appears healthy. No distress. HENT:   Normal cephalic and Atraumatic   Eyes: Pupils are equal, round, and reactive to light. Neck: Thyroid normal. No JVD present. No neck adenopathy. No thyromegaly present.    Cardiovascular: Normal rate, regular rhythm, intact distal pulses and normal pulses. Murmur heard. Pulmonary/Chest: Effort normal and breath sounds normal. He has no wheezes. He has no rales. He exhibits no tenderness. Abdominal: Soft. Bowel sounds are normal. There is no abdominal tenderness. Musculoskeletal:         General: No tenderness or edema. Normal range of motion. Cervical back: Normal range of motion and neck supple. Neurological: He is alert and oriented to person, place, and time. Skin: Skin is warm. No cyanosis. Nails show no clubbing.        LABS:  CBC:   Lab Results   Component Value Date/Time    WBC 11.6 01/14/2022 12:43 PM    RBC 3.33 01/14/2022 12:43 PM    HGB 10.4 01/14/2022 12:43 PM    HCT 32.0 01/14/2022 12:43 PM    MCV 96.2 01/14/2022 12:43 PM    MCH 31.2 01/14/2022 12:43 PM    MCHC 32.5 01/14/2022 12:43 PM    RDW 17.9 01/14/2022 12:43 PM     01/14/2022 12:43 PM     Lipids:  Lab Results   Component Value Date    CHOL 118 09/14/2021    CHOL 130 07/07/2020    CHOL 165 03/28/2016     Lab Results   Component Value Date    TRIG 84 09/14/2021    TRIG 63 07/07/2020    TRIG 64 03/28/2016     Lab Results   Component Value Date    HDL 54 09/14/2021    HDL 71 (H) 07/07/2020     (H) 03/28/2016     Lab Results   Component Value Date    LDLCALC 47 09/14/2021    LDLCALC 46 07/07/2020    LDLCALC 40 03/28/2016     No results found for: LABVLDL, VLDL  No results found for: CHOLHDLRATIO  CMP:    Lab Results   Component Value Date/Time     01/14/2022 12:43 PM    K 5.0 01/14/2022 12:43 PM    K 4.2 10/15/2021 10:22 AM    CL 98 01/14/2022 12:43 PM    CO2 22 01/14/2022 12:43 PM    BUN 19 01/14/2022 12:43 PM    CREATININE 0.77 01/14/2022 12:43 PM    GFRAA >60.0 01/14/2022 12:43 PM    LABGLOM >60.0 01/14/2022 12:43 PM    GLUCOSE 85 01/14/2022 12:43 PM    PROT 6.9 01/14/2022 12:43 PM    LABALBU 4.0 01/14/2022 12:43 PM    CALCIUM 9.3 01/14/2022 12:43 PM    BILITOT 0.4 01/14/2022 12:43 PM    ALKPHOS 137 01/14/2022 12:43 PM    AST 13 01/14/2022 12:43 PM    ALT 9 01/14/2022 12:43 PM     BMP:    Lab Results   Component Value Date/Time     01/14/2022 12:43 PM    K 5.0 01/14/2022 12:43 PM    K 4.2 10/15/2021 10:22 AM    CL 98 01/14/2022 12:43 PM    CO2 22 01/14/2022 12:43 PM    BUN 19 01/14/2022 12:43 PM    LABALBU 4.0 01/14/2022 12:43 PM    CREATININE 0.77 01/14/2022 12:43 PM    CALCIUM 9.3 01/14/2022 12:43 PM    GFRAA >60.0 01/14/2022 12:43 PM    LABGLOM >60.0 01/14/2022 12:43 PM    GLUCOSE 85 01/14/2022 12:43 PM     Magnesium:    Lab Results   Component Value Date/Time    MG 1.6 10/15/2021 10:22 AM     TSH:  Lab Results   Component Value Date    TSH 0.447 10/13/2021       Patient Active Problem List   Diagnosis    Tobacco use    Hip pain    Knee pain    Chronic back pain    Elevated PSA    Primary osteoarthritis of right knee    Spondylosis of lumbar region without myelopathy or radiculopathy    Sacroiliitis, not elsewhere classified ([address]    Pure hypercholesterolemia    Charcot's joint of left ankle    Left ankle pain    Delirium due to general medical condition    DDD (degenerative disc disease), lumbar    Osteoarthritis of spine with myelopathy, lumbosacral region    Chronic bilateral low back pain with bilateral sciatica    Postlaminectomy syndrome, lumbar region    Acquired spondylolisthesis of lumbosacral region    Spinal stenosis of lumbar region with neurogenic claudication    HNP (herniated nucleus pulposus), lumbar    Spondylolisthesis at L4-L5 level    Anesthesia    Herniated nucleus pulposus, L4-5    NSTEMI (non-ST elevated myocardial infarction) ([address]    S/P PTCA (percutaneous transluminal coronary angioplasty)    Right upper quadrant abdominal pain    Gallbladder polyp    Biliary dyskinesia    Carpal tunnel syndrome on right    Carpal tunnel syndrome on left    Angina effort    Dyslipidemia    FELDER (dyspnea on exertion)    CAD (coronary artery disease)    Chest pain    Lung nodule seen on imaging study    Bilateral carotid artery stenosis    Essential hypertension    NSCLC of left lung (HCC)    Ataxic gait    Dizziness    Fracture, [place], right, closed    Heroin abuse (HCC)    Non-pressure chronic ulcer of left ankle with fat layer exposed ([facility]    Atherosclerosis of native arteries of extremities with rest pain, left leg (HCC)    Subacute osteomyelitis, left ankle and foot (HCC)    Osteomyelitis of ankle (HCC)    Hyponatremia    Frequent falls    Stage 3 chronic kidney disease, unspecified whether stage 3a or 3b CKD (HCC)    Fracture of right humerus    Hyperkalemia    Leukocytosis    Anemia    Acute hematogenous osteomyelitis, left ankle and foot (HCC)    Traumatic hematoma of right shoulder    Head injury    Humeral head fracture, right, closed, initial encounter    Pelvic hematoma, male, after a fall    Cause of injury, accidental fall, initial encounter    Tremor of unknown origin    Sundowning    Acute pain due to trauma    Disequilibrium    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, right    Chronic obstructive pulmonary disease, unspecified COPD type ([facility]       Medications Discontinued During This Encounter   Medication Reason    SODIUM CHLORIDE, EXTERNAL, 0.9 % SOLN LIST CLEANUP    SODIUM CHLORIDE, EXTERNAL, 0.9 % SOLN LIST CLEANUP    thiamine 100 MG tablet LIST CLEANUP    lidocaine 4 % external patch LIST CLEANUP    gentamicin (GARAMYCIN) 0.1 % ointment LIST CLEANUP    tiZANidine (ZANAFLEX) 4 MG tablet LIST CLEANUP    folic acid (FOLVITE) 1 MG tablet LIST CLEANUP       Modified Medications    No medications on file       No orders of the defined types were placed in this encounter. Assessment/Plan:    1. Essential hypertension  Stable- continue meds. Low salt diet    2. NSTEMI (non-ST elevated myocardial infarction) ([facility]   no angina continue CV meds. 3. Dyslipidemia  Statin - continue same low fat diet.  Check labs.     4. FELDER (dyspnea on exertion)  Stop smoking   Possible angina - Imdur made a big difference- if worse let me know. 5. Rash- did not see Derm. Facial rash gone now. No further occurrence. 6. Lung Nodule - OK to hold Plavix 5 days for Biopsy. - revealed Malignancy. PET negative but pt refusing to have surgery. - f/u Dr. Connie Sierra. - finished XRT f/u.     7. Wobbly gait- need to use rubin/walker. No recent falls. 8. [place]- TAKE bUMEX 1 MG qd. - decrease salt intake     9. Tires all the time- see Dr. Niko Walker and get screening labs. 10 LE edema - resume Bumex. Labs noted. Reviewed with pt. Low salt. RTo 3 months. He did not tolerate compression as he could not take them off. He had to cut them off. Counseling:  Heart Healthy Lifestyle, Stop Smoking, Low Salt Diet, Take Precautions to Prevent Falls, Regular Exercise and Walk Daily    Return in about 3 months (around 10/12/2022).      Electronically signed by Kelton Montiel MD on 7/12/2022 at 4:26 PM

## 2022-07-13 ENCOUNTER — CARE COORDINATION (OUTPATIENT)
Dept: CARE COORDINATION | Age: 77
End: 2022-07-13

## 2022-07-19 ENCOUNTER — HOSPITAL ENCOUNTER (OUTPATIENT)
Dept: WOUND CARE | Age: 77
Discharge: HOME | End: 2022-07-19
Payer: MEDICARE

## 2022-07-19 VITALS
HEART RATE: 62 BPM | RESPIRATION RATE: 16 BRPM | TEMPERATURE: 97.6 F | SYSTOLIC BLOOD PRESSURE: 131 MMHG | DIASTOLIC BLOOD PRESSURE: 65 MMHG

## 2022-07-19 DIAGNOSIS — L97.322 NON-PRESSURE CHRONIC ULCER OF LEFT ANKLE WITH FAT LAYER EXPOSED (HCC): Primary | ICD-10-CM

## 2022-07-19 PROCEDURE — 6370000000 HC RX 637 (ALT 250 FOR IP): Performed by: PODIATRIST

## 2022-07-19 PROCEDURE — 87070 CULTURE OTHR SPECIMN AEROBIC: CPT

## 2022-07-19 PROCEDURE — 87077 CULTURE AEROBIC IDENTIFY: CPT

## 2022-07-19 PROCEDURE — 87075 CULTR BACTERIA EXCEPT BLOOD: CPT

## 2022-07-19 PROCEDURE — 11042 DBRDMT SUBQ TIS 1ST 20SQCM/<: CPT

## 2022-07-19 PROCEDURE — 87186 SC STD MICRODIL/AGAR DIL: CPT

## 2022-07-19 PROCEDURE — 86403 PARTICLE AGGLUT ANTBDY SCRN: CPT

## 2022-07-19 RX ORDER — GENTAMICIN SULFATE 1 MG/G
OINTMENT TOPICAL ONCE
Status: CANCELLED | OUTPATIENT
Start: 2022-07-19 | End: 2022-07-19

## 2022-07-19 RX ORDER — LIDOCAINE HYDROCHLORIDE 20 MG/ML
JELLY TOPICAL ONCE
Status: COMPLETED | OUTPATIENT
Start: 2022-07-19 | End: 2022-07-19

## 2022-07-19 RX ORDER — LIDOCAINE HYDROCHLORIDE 20 MG/ML
JELLY TOPICAL ONCE
Status: CANCELLED | OUTPATIENT
Start: 2022-07-19 | End: 2022-07-19

## 2022-07-19 RX ORDER — LIDOCAINE 40 MG/G
CREAM TOPICAL ONCE
Status: CANCELLED | OUTPATIENT
Start: 2022-07-19 | End: 2022-07-19

## 2022-07-19 RX ORDER — BETAMETHASONE DIPROPIONATE 0.05 %
OINTMENT (GRAM) TOPICAL ONCE
Status: CANCELLED | OUTPATIENT
Start: 2022-07-19 | End: 2022-07-19

## 2022-07-19 RX ORDER — LIDOCAINE 50 MG/G
OINTMENT TOPICAL ONCE
Status: CANCELLED | OUTPATIENT
Start: 2022-07-19 | End: 2022-07-19

## 2022-07-19 RX ORDER — BACITRACIN, NEOMYCIN, POLYMYXIN B 400; 3.5; 5 [USP'U]/G; MG/G; [USP'U]/G
OINTMENT TOPICAL ONCE
Status: CANCELLED | OUTPATIENT
Start: 2022-07-19 | End: 2022-07-19

## 2022-07-19 RX ORDER — GINSENG 100 MG
CAPSULE ORAL ONCE
Status: CANCELLED | OUTPATIENT
Start: 2022-07-19 | End: 2022-07-19

## 2022-07-19 RX ORDER — CLOBETASOL PROPIONATE 0.5 MG/G
OINTMENT TOPICAL ONCE
Status: CANCELLED | OUTPATIENT
Start: 2022-07-19 | End: 2022-07-19

## 2022-07-19 RX ORDER — BACITRACIN ZINC AND POLYMYXIN B SULFATE 500; 1000 [USP'U]/G; [USP'U]/G
OINTMENT TOPICAL ONCE
Status: CANCELLED | OUTPATIENT
Start: 2022-07-19 | End: 2022-07-19

## 2022-07-19 RX ORDER — LIDOCAINE HYDROCHLORIDE 40 MG/ML
SOLUTION TOPICAL ONCE
Status: CANCELLED | OUTPATIENT
Start: 2022-07-19 | End: 2022-07-19

## 2022-07-19 RX ADMIN — LIDOCAINE HYDROCHLORIDE: 20 JELLY TOPICAL at 16:28

## 2022-07-19 ASSESSMENT — PAIN SCALES - GENERAL: PAINLEVEL_OUTOF10: 5

## 2022-07-19 NOTE — DISCHARGE INSTRUCTIONS
[facility]   Physician Orders and Discharge Instructions  [address]  Telephone: [phone]        NAME:  Romaine Alvarez OF BIRTH:  1945  MEDICAL RECORD NUMBER:  [id number]     Your  is:  Nicholas Myers Care/Facility:   NONE     Wound Location:   LEFT ANTERIOR ANKLE  Dressing orders:  1. CLEANSE WOUND GENTLY WITH NORMAL SALINE. 2. APPLY TRIAD COLOPLAST TO WOUND. 3. COVER AND SECURE WITH DRY DRESSING. 4. CHANGE DRESSING EVERY DAY. Compression:     Offloading Device:     Other Instructions:  Keep all dressings clean, dry and intact. Keep pressure off the wound(s) at all times. Follow up visit  2 WEEKS  AUGUST 2, 2022 AT       Please give 24 hour notice if unable to keep appointment. [phone]     If you experience any of the following, please call the Wound Care Service at  [phone] or go to the nearest emergency room. *Increase in pain         *Temperature over 101           *Increase in drainage from your wound or a foul odor  *Uncontrolled swelling            *Need for compression bandage changes due to slippage, breakthrough drainage       PLEASE NOTE: IF YOU ARE UNABLE TO OBTAIN WOUND SUPPLIES, CONTINUE TO USE THE SUPPLIES YOU HAVE AVAILABLE UNTIL YOU ARE ABLE TO REACH US.  IT IS MOST IMPORTANT TO KEEP THE WOUND COVERED AT ALL TIMES

## 2022-07-19 NOTE — PLAN OF CARE
Problem: Pain  Goal: Verbalizes/displays adequate comfort level or baseline comfort level  Outcome: Progressing     Problem: Wound:  Goal: Will show signs of wound healing; wound closure and no evidence of infection  Outcome: Progressing     Problem: Venous:  Goal: Signs of wound healing will improve  Outcome: Progressing

## 2022-07-19 NOTE — PROGRESS NOTES
[facility] [address]                                                   Progress Note and Procedure Note      Henrique Parks RECORD NUMBER:  [id number]  AGE: 64 y.o. GENDER: male  : 1945  EPISODE DATE:  2022    Subjective:     Chief Complaint   Patient presents with    Wound Check     Left leg         HISTORY of PRESENT ILLNESS HPI      Rudy You is a 64 y.o. male who presents today for wound/ulcer evaluation. History of Wound Context: Patient presents for a reoccurrence of an non healing ulcer of the left ankle. The patient was last seen in 2021 with the wound doing very well and then he went to a AL and never followed up. He denies any NVFC. Wound/Ulcer Pain Timing/Severity: intermittent  Quality of pain: tender  Severity:  4 / 10   Modifying Factors: None  Associated Signs/Symptoms: drainage, odor and pain    Ulcer Identification:  Ulcer Type: venous and undetermined  Contributing Factors: edema and venous stasis    Wound: N/A        PAST MEDICAL HISTORY        Diagnosis Date    Alcohol abuse     quit drinking 21    CAD (coronary artery disease)     patient states no doctor has told him this / has 1 cardiac stent    Cancer ([address]     lung LLL    Chronic back pain     Chronic kidney disease     Chronic obstructive pulmonary disease, unspecified COPD type ([address] 3/24/2022    Chronic sinusitis     DJD (degenerative joint disease) of knee     left knee DJD    Elevated PSA     History of blood transfusion     History of inferior wall myocardial infarction 2016    1 cardiac stent    HTN (hypertension)     meds .  1 yr    Hyperlipidemia     meds > 12 yrs    NSTEMI (non-ST elevated myocardial infarction) ([address]     Smoker        PAST SURGICAL HISTORY    Past Surgical History:   Procedure Laterality Date    ABDOMEN SURGERY      spleenectomy due to [address]      lumbar disc OR    CARDIAC SURGERY      stents    CARPAL TUNNEL RELEASE Right 2019 RIGHT CARPAL TUNNEL RELEASE performed by Laura Lozano MD at [address] [facility] [address]  2016    EYE SURGERY      to have Phaco with IOL OU 2018    HERNIA REPAIR      JOINT REPLACEMENT Left     LTHR    JOINT REPLACEMENT Right     RTKR    KNEE SURGERY Right     arthroscopy    [place] MANIPULATN KNEE JT+ANESTHESIA Right 2017    RIGHT KNEE MANIPULATION UNDER ANESTHESIA performed by Kortney Wagner MD at [address] OFFICE/OUTPT VISIT,PROCEDURE ONLY Bilateral 2017    RIGHT AND BILATERAL L 4-5 LYSIS OF SCAR DISKECTOMY INTERBODY CAGE FUSION POSTEROLATERAL FUSION PEDICLE SCREWS performed by Laura Lozano MD at [address]      benign    [address]  Right 3/24/2017    RIGHT  KNEE TOTAL ARTHROPLASTY, Jorden Guillener CEMENTED PERSONA  performed by Kortney Wagner MD at [address] [facility] HISTORY    Family History   Problem Relation Age of Onset    Osteoarthritis Mother     Emphysema Mother     Hypertension Father     Hearing Loss Father     Dementia Father     No Known Problems Sister     Prostate Cancer Brother     Colon Polyps Neg Hx        SOCIAL HISTORY    Social History     Tobacco Use    Smoking status: Former     Packs/day: 0.00     Years: 60.00     Pack years: 0.00     Types: Cigarettes     Quit date: 2021     Years since quittin.5    Smokeless tobacco: Never    Tobacco comments:      smokes 2 cigars a day   Vaping Use    Vaping Use: Never used   Substance Use Topics    Alcohol use: Not Currently     Alcohol/week: 12.0 standard drinks     Types: 12 Shots of liquor per week     Comment: socail drinking    Drug use: No       ALLERGIES    Allergies   Allergen Reactions    Morphine        MEDICATIONS    Current Outpatient Medications on File Prior to Encounter   Medication Sig Dispense Refill    oxyCODONE-acetaminophen (PERCOCET) 7.5-325 MG per tablet Take 1 tablet by mouth five times a day as needed for pain      citalopram (CELEXA) 40 MG tablet TAKE ONE TABLET BY MOUTH nightly 30 tablet 5    atorvastatin (LIPITOR) 20 MG tablet TAKE ONE TABLET BY MOUTH DAILY 90 tablet 3    clopidogrel (PLAVIX) 75 MG tablet Take 1 tablet by mouth daily 90 tablet 3    diclofenac (VOLTAREN) 50 MG EC tablet TAKE ONE TABLET BY MOUTH TWO TIMES A DAY 60 tablet 5    isosorbide mononitrate (IMDUR) 30 MG extended release tablet TAKE ONE TABLET BY MOUTH EVERY DAY 90 tablet 3    bumetanide (BUMEX) 2 MG tablet Take 1 tablet by mouth daily (Patient not taking: Reported on 7/12/2022) 90 tablet 3    metoprolol tartrate (LOPRESSOR) 50 MG tablet Take 0.5 tablets by mouth 2 times daily TAKE ONE TABLET BY MOUTH TWO TIMES A  tablet 3    omeprazole (PRILOSEC) 10 MG delayed release capsule TAKE ONE CAPSULE BY MOUTH DAILY 90 capsule 2    finasteride (PROSCAR) 5 MG tablet Take 1 tablet by mouth daily 90 tablet 3    tiotropium (SPIRIVA RESPIMAT) 2.5 MCG/ACT AERS inhaler Inhale 2 puffs into the lungs daily 1 Inhaler 3    Fluticasone furoate-vilanterol (BREO ELLIPTA) 200-25 MCG/INH AEPB inhaler Inhale 1 puff into the lungs daily 1 each 3    albuterol sulfate  (90 Base) MCG/ACT inhaler Inhale 2 puffs into the lungs every 6 hours as needed for Wheezing 1 Inhaler 3    nitroGLYCERIN (NITROSTAT) 0.4 MG SL tablet Place 1 tablet under the tongue every 5 minutes as needed for Chest pain up to max of 3 total doses. If no relief after 1 dose, call 911. 25 tablet 3    DOCUSATE CALCIUM PO Take by mouth as needed       No current facility-administered medications on file prior to encounter. REVIEW OF SYSTEMS    Pertinent items are noted in HPI. Objective:      /65   Pulse 62   Temp 97.6 °F (36.4 °C) (Temporal)   Resp 16     Wt Readings from Last 3 Encounters:   07/12/22 168 lb (76.2 kg)   03/02/22 157 lb (71.2 kg)   02/07/22 160 lb (72.6 kg)       PHYSICAL EXAM    Constitutional:   Well nourished and well developed.   Appears neat and clean. Patient is alert, oriented x3, and in no apparent distress. Respiratory:  Respiratory effort is easy and symmetric bilaterally. Rate is normal at rest and on room air. Vascular:  Pedal Pulses is palpable and audible with doppler. Capillary refill is <3 sec to digits bilateral.  Extremities positive for 2+ pitting edema. Patient did not take his diuretic today. Neurological:   Sensation is intact to lower extremities. Dermatological:  Wound description noted in wound assessment. The wound appearnace is with increased slough and erythema. Granulation buds are noted. Size has increased in addition to edema     Psychiatric:  Judgement and insight intact. Short and long term memory intact. No evidence of depression, anxiety, or agitation. Patient is calm, cooperative, and communicative. Appropriate interactions and affect. Assessment:      Active Hospital Problems    Diagnosis Date Noted    Non-pressure chronic ulcer of left ankle with fat layer exposed ([facility] [L97.322] 08/17/2021    Tobacco use [Z72.0] 04/24/2014        Procedure Note  Indications:  Based on my examination of this patient's wound(s)/ulcer(s) today, debridement is required to promote healing and evaluate the wound base. Performed by: Amy Briceño DPM    Consent obtained:  Yes    Time out taken:  Yes    Pain Control:2% lidocaine gel  thin layer applied topically to wound bed      Debridement:Excisional Debridement    Using curette the wound(s)/ulcer(s) was/were sharply debrided down through and including the removal of epidermis, dermis and subcutaneous tissue. Devitalized Tissue Debrided:  exudate    Pre Debridement Measurements:  Are located in the [place]  Documentation Flow Sheet    Wound/Ulcer #: 1    Post Debridement Measurements:  Wound/Ulcer Descriptions are Pre Debridement except measurements:  Wound 03/25/22 Ankle Left; Anterior #1 (Active)   Wound Image   07/19/22 1621   Wound Etiology Other 07/19/22 1621   Wound Cleansed Cleansed with saline 07/19/22 1621   Dressing/Treatment Other (comment) 07/05/22 1642   Wound Length (cm) 2.5 cm 07/19/22 1621   Wound Width (cm) 2.5 cm 07/19/22 1621   Wound Depth (cm) 0.25 cm 07/19/22 1621   Wound Surface Area (cm^2) 6.25 cm^2 07/19/22 1621   Change in Wound Size % (l*w) -95.31 07/19/22 1621   Wound Volume (cm^3) 1.5625 cm^3 07/19/22 1621   Wound Healing % -63 07/19/22 1621   Post-Procedure Length (cm) 2.5 cm 07/19/22 1633   Post-Procedure Width (cm) 2.5 cm 07/19/22 1633   Post-Procedure Depth (cm) 0.2 cm 07/19/22 1633   Post-Procedure Surface Area (cm^2) 6.25 cm^2 07/19/22 1633   Post-Procedure Volume (cm^3) 1.25 cm^3 07/19/22 1633   Wound Assessment [place]/red;Slough 07/19/22 1621   Drainage Amount Small 07/19/22 1621   Drainage Description Serosanguinous; Yellow 07/19/22 1621   Odor None 07/19/22 1621   Ela-wound Assessment Dry/flaky; Intact 07/19/22 1621   Margins Defined edges 07/19/22 1621   Wound Thickness Description not for Pressure Injury Full thickness 07/19/22 1621   Number of days: 116       Percent of Wound/Ulcer Debrided: 100%    Total Surface Area Debrided:  6.25sq cm     Diabetic/Pressure/Non Pressure Ulcers:  Ulcer: Non-Pressure ulcer, fat layer exposed      Bleeding:  Minimal    Hemostasis Achieved:  by pressure    Procedural Pain:  5  / 10     Post Procedural Pain:  1 / 10     Response to treatment:  Well tolerated by patient. Plan:     Patient examined and debrided  Triad and DSD  Culture obtained   Follow up in two weeks      Treatment Note please see attached Discharge Instructions    Written patient dismissal instructions given to patient and signed by patient or POA.          Discharge [facility]   Physician Orders and Discharge Instructions  [address]  Telephone: [phone]      [phone] NAME:  Nicole Cleary                                                                                              YOB: 1945  MEDICAL RECORD NUMBER:  [id number]     Your  is:  Nicholas Oseguera [facility] Care/Facility:   NONE     Wound Location:   LEFT ANTERIOR ANKLE  Dressing orders:  1. CLEANSE WOUND GENTLY WITH NORMAL SALINE. 2. APPLY TRIAD COLOPLAST TO WOUND. 3. COVER AND SECURE WITH DRY DRESSING. 4. CHANGE DRESSING EVERY DAY. Compression:     Offloading Device:     Other Instructions:  Keep all dressings clean, dry and intact. Keep pressure off the wound(s) at all times. Follow up visit  2 WEEKS  AUGUST 2, 2022 AT       Please give 24 hour notice if unable to keep appointment. [phone]     If you experience any of the following, please call the Wound Care Service at  [phone] or go to the nearest emergency room. *Increase in pain         *Temperature over 101           *Increase in drainage from your wound or a foul odor  *Uncontrolled swelling            *Need for compression bandage changes due to slippage, breakthrough drainage       PLEASE NOTE: IF YOU ARE UNABLE TO OBTAIN WOUND SUPPLIES, CONTINUE TO USE THE SUPPLIES YOU HAVE AVAILABLE UNTIL YOU ARE ABLE TO REACH US.  IT IS MOST IMPORTANT TO KEEP THE WOUND COVERED AT ALL TIMES      Electronically signed by Sugar Haro DPM on 7/19/2022 at 4:44 PM

## 2022-07-20 ENCOUNTER — CARE COORDINATION (OUTPATIENT)
Dept: CARE COORDINATION | Age: 77
End: 2022-07-20

## 2022-07-20 NOTE — CARE COORDINATION
Third and final attempt to contact patient for care coordination follow up for COPD and wound   Unable to reach patient by phone. Message left regarding purpose of call. Number provided and call back requested.    Episode completed/resolved

## 2022-07-25 LAB
CULTURE WOUND: ABNORMAL
ORGANISM: ABNORMAL

## 2022-07-29 RX ORDER — GENTAMICIN SULFATE 1 MG/G
OINTMENT TOPICAL
Qty: 30 G | Refills: 1 | Status: SHIPPED | OUTPATIENT
Start: 2022-07-29

## 2022-08-02 ENCOUNTER — HOSPITAL ENCOUNTER (OUTPATIENT)
Dept: WOUND CARE | Age: 77
Discharge: HOME | End: 2022-08-02
Payer: MEDICARE

## 2022-08-02 ENCOUNTER — HOSPITAL ENCOUNTER (OUTPATIENT)
Dept: GENERAL RADIOLOGY | Age: 77
Discharge: HOME | End: 2022-08-04
Payer: MEDICARE

## 2022-08-02 VITALS
DIASTOLIC BLOOD PRESSURE: 61 MMHG | SYSTOLIC BLOOD PRESSURE: 123 MMHG | RESPIRATION RATE: 18 BRPM | HEART RATE: 68 BPM | TEMPERATURE: 96.5 F

## 2022-08-02 DIAGNOSIS — L97.322 NON-PRESSURE CHRONIC ULCER OF LEFT ANKLE WITH FAT LAYER EXPOSED (HCC): Primary | ICD-10-CM

## 2022-08-02 DIAGNOSIS — C34.32 CANCER OF BRONCHUS OF LEFT LOWER LOBE (HCC): ICD-10-CM

## 2022-08-02 PROCEDURE — 6370000000 HC RX 637 (ALT 250 FOR IP): Performed by: PODIATRIST

## 2022-08-02 PROCEDURE — 11042 DBRDMT SUBQ TIS 1ST 20SQCM/<: CPT

## 2022-08-02 PROCEDURE — 71046 X-RAY EXAM CHEST 2 VIEWS: CPT

## 2022-08-02 RX ORDER — LIDOCAINE 40 MG/G
CREAM TOPICAL ONCE
Status: CANCELLED | OUTPATIENT
Start: 2022-08-02 | End: 2022-08-02

## 2022-08-02 RX ORDER — LIDOCAINE HYDROCHLORIDE 20 MG/ML
JELLY TOPICAL ONCE
Status: CANCELLED | OUTPATIENT
Start: 2022-08-02 | End: 2022-08-02

## 2022-08-02 RX ORDER — LIDOCAINE HYDROCHLORIDE 20 MG/ML
JELLY TOPICAL ONCE
Status: COMPLETED | OUTPATIENT
Start: 2022-08-02 | End: 2022-08-02

## 2022-08-02 RX ORDER — GINSENG 100 MG
CAPSULE ORAL ONCE
Status: CANCELLED | OUTPATIENT
Start: 2022-08-02 | End: 2022-08-02

## 2022-08-02 RX ORDER — CLOBETASOL PROPIONATE 0.5 MG/G
OINTMENT TOPICAL ONCE
Status: CANCELLED | OUTPATIENT
Start: 2022-08-02 | End: 2022-08-02

## 2022-08-02 RX ORDER — LIDOCAINE 50 MG/G
OINTMENT TOPICAL ONCE
Status: CANCELLED | OUTPATIENT
Start: 2022-08-02 | End: 2022-08-02

## 2022-08-02 RX ORDER — BETAMETHASONE DIPROPIONATE 0.05 %
OINTMENT (GRAM) TOPICAL ONCE
Status: CANCELLED | OUTPATIENT
Start: 2022-08-02 | End: 2022-08-02

## 2022-08-02 RX ORDER — LIDOCAINE HYDROCHLORIDE 40 MG/ML
SOLUTION TOPICAL ONCE
Status: CANCELLED | OUTPATIENT
Start: 2022-08-02 | End: 2022-08-02

## 2022-08-02 RX ORDER — GENTAMICIN SULFATE 1 MG/G
OINTMENT TOPICAL ONCE
Status: CANCELLED | OUTPATIENT
Start: 2022-08-02 | End: 2022-08-02

## 2022-08-02 RX ORDER — BACITRACIN, NEOMYCIN, POLYMYXIN B 400; 3.5; 5 [USP'U]/G; MG/G; [USP'U]/G
OINTMENT TOPICAL ONCE
Status: CANCELLED | OUTPATIENT
Start: 2022-08-02 | End: 2022-08-02

## 2022-08-02 RX ORDER — BACITRACIN ZINC AND POLYMYXIN B SULFATE 500; 1000 [USP'U]/G; [USP'U]/G
OINTMENT TOPICAL ONCE
Status: CANCELLED | OUTPATIENT
Start: 2022-08-02 | End: 2022-08-02

## 2022-08-02 RX ORDER — GENTAMICIN SULFATE 1 MG/G
OINTMENT TOPICAL ONCE
Status: COMPLETED | OUTPATIENT
Start: 2022-08-02 | End: 2022-08-02

## 2022-08-02 RX ADMIN — GENTAMICIN SULFATE: 1 OINTMENT TOPICAL at 16:54

## 2022-08-02 RX ADMIN — LIDOCAINE HYDROCHLORIDE: 20 JELLY TOPICAL at 16:19

## 2022-08-02 NOTE — CODE DOCUMENTATION
[facility] Physician Billing Sheet. Karan Michele  AGE: 64 y.o.    GENDER: male  : 1945  TODAY'S DATE:  2022    ICD-10 CODES  Active Hospital Problems    Diagnosis Date Noted    Non-pressure chronic ulcer of left ankle with fat layer exposed ([facility] [L97.322] 2021    Atherosclerosis of native arteries of extremities with rest pain, left leg ([facility] [I70.222] 2021    Osteomyelitis of ankle ([facility] [M86.9] 2021    Tobacco use [Z72.0] 2014       PHYSICIAN PROCEDURES    CPT CODE  90046 LT      Electronically signed by Christian Dominguez DPM on 2022 at 4:54 PM

## 2022-08-02 NOTE — DISCHARGE INSTRUCTIONS
[facility]   Physician Orders and Discharge Instructions  [address]  Telephone: [phone]        NAME:  Judy Hill OF BIRTH:  1945  MEDICAL RECORD NUMBER:  [id number]     Your  is:  Nicholas Oseguera [facility] Care/Facility:   NONE     Wound Location:   LEFT ANTERIOR ANKLE  Dressing orders:  1. CLEANSE WOUND GENTLY WITH NORMAL SALINE. 2. APPLY GENTAMICIN OINTMENT TO WOUND. 3. COVER AND SECURE WITH DRY DRESSING. 4. CHANGE DRESSING EVERY DAY. TODAY IN WOUND CENTER APPLY GENTAMICIN OINTMENT, MANPREET AND A DRY DRESSING. Compression:     Offloading Device:     Other Instructions:  Keep all dressings clean, dry and intact. Keep pressure off the wound(s) at all times. Follow up visit  2 WEEKS  AUGUST 16, 2022 AT       Please give 24 hour notice if unable to keep appointment. [phone]     If you experience any of the following, please call the Wound Care Service at  [phone] or go to the nearest emergency room. *Increase in pain         *Temperature over 101           *Increase in drainage from your wound or a foul odor  *Uncontrolled swelling            *Need for compression bandage changes due to slippage, breakthrough drainage       PLEASE NOTE: IF YOU ARE UNABLE TO OBTAIN WOUND SUPPLIES, CONTINUE TO USE THE SUPPLIES YOU HAVE AVAILABLE UNTIL YOU ARE ABLE TO REACH US.  IT IS MOST IMPORTANT TO KEEP THE WOUND COVERED AT ALL TIMES  Electronically signed by Johanna Burleson DPM on 8/2/2022 at 4:49 PM

## 2022-08-02 NOTE — PROGRESS NOTES
[facility] [address]                                                   Progress Note and Procedure Note      Jay Steven RECORD NUMBER:  [id number]  AGE: 64 y.o. GENDER: male  : 1945  EPISODE DATE:  2022    Subjective:     Chief Complaint   Patient presents with    Wound Check     LEFT ANKLE WOUND           HISTORY of PRESENT ILLNESS HPI      Marjorie Tam is a 64 y.o. male who presents today for wound/ulcer evaluation. History of Wound Context: Patient presents for a reoccurrence of an non healing ulcer of the left ankle. The patient was last seen in 2021 with the wound doing very well and then he went to a AL and never followed up. He denies any NVFC. Wound/Ulcer Pain Timing/Severity: intermittent  Quality of pain: tender  Severity:  4 / 10   Modifying Factors: None  Associated Signs/Symptoms: drainage, odor and pain    Ulcer Identification:  Ulcer Type: venous and undetermined  Contributing Factors: edema and venous stasis    Wound: N/A        PAST MEDICAL HISTORY        Diagnosis Date    Alcohol abuse     quit drinking 19    CAD (coronary artery disease)     patient states no doctor has told him this / has 1 cardiac stent    Cancer ([address]     lung LLL    Chronic back pain     Chronic kidney disease     Chronic obstructive pulmonary disease, unspecified COPD type ([address] 3/24/2022    Chronic sinusitis     DJD (degenerative joint disease) of knee     left knee DJD    Elevated PSA     History of blood transfusion     History of inferior wall myocardial infarction 2016    1 cardiac stent    HTN (hypertension)     meds .  1 yr    Hyperlipidemia     meds > 12 yrs    NSTEMI (non-ST elevated myocardial infarction) ([address]     Smoker        PAST SURGICAL HISTORY    Past Surgical History:   Procedure Laterality Date    ABDOMEN SURGERY      spleenectomy due to [address]      lumbar disc OR    CARDIAC SURGERY      stents    CARPAL TUNNEL RELEASE Right oxyCODONE-acetaminophen (PERCOCET) 7.5-325 MG per tablet Take 1 tablet by mouth five times a day as needed for pain      citalopram (CELEXA) 40 MG tablet TAKE ONE TABLET BY MOUTH nightly 30 tablet 5    atorvastatin (LIPITOR) 20 MG tablet TAKE ONE TABLET BY MOUTH DAILY 90 tablet 3    clopidogrel (PLAVIX) 75 MG tablet Take 1 tablet by mouth daily 90 tablet 3    diclofenac (VOLTAREN) 50 MG EC tablet TAKE ONE TABLET BY MOUTH TWO TIMES A DAY 60 tablet 5    isosorbide mononitrate (IMDUR) 30 MG extended release tablet TAKE ONE TABLET BY MOUTH EVERY DAY 90 tablet 3    bumetanide (BUMEX) 2 MG tablet Take 1 tablet by mouth daily (Patient not taking: Reported on 7/12/2022) 90 tablet 3    metoprolol tartrate (LOPRESSOR) 50 MG tablet Take 0.5 tablets by mouth 2 times daily TAKE ONE TABLET BY MOUTH TWO TIMES A  tablet 3    omeprazole (PRILOSEC) 10 MG delayed release capsule TAKE ONE CAPSULE BY MOUTH DAILY 90 capsule 2    finasteride (PROSCAR) 5 MG tablet Take 1 tablet by mouth daily 90 tablet 3    tiotropium (SPIRIVA RESPIMAT) 2.5 MCG/ACT AERS inhaler Inhale 2 puffs into the lungs daily 1 Inhaler 3    Fluticasone furoate-vilanterol (BREO ELLIPTA) 200-25 MCG/INH AEPB inhaler Inhale 1 puff into the lungs daily 1 each 3    albuterol sulfate  (90 Base) MCG/ACT inhaler Inhale 2 puffs into the lungs every 6 hours as needed for Wheezing 1 Inhaler 3    nitroGLYCERIN (NITROSTAT) 0.4 MG SL tablet Place 1 tablet under the tongue every 5 minutes as needed for Chest pain up to max of 3 total doses. If no relief after 1 dose, call 911. 25 tablet 3    DOCUSATE CALCIUM PO Take by mouth as needed       No current facility-administered medications on file prior to encounter. REVIEW OF SYSTEMS    Pertinent items are noted in HPI.     Objective:      /61   Pulse 68   Temp (!) 96.5 °F (35.8 °C) (Temporal)   Resp 18     Wt Readings from Last 3 Encounters:   07/12/22 168 lb (76.2 kg)   03/02/22 157 lb (71.2 kg)   02/07/22 160 lb (72.6 kg)       PHYSICAL EXAM    Constitutional:   Well nourished and well developed. Appears neat and clean. Patient is alert, oriented x3, and in no apparent distress. Respiratory:  Respiratory effort is easy and symmetric bilaterally. Rate is normal at rest and on room air. Vascular:  Pedal Pulses is palpable and audible with doppler. Capillary refill is <3 sec to digits bilateral.  Extremities positive for 1+ pitting edema. Neurological:   Sensation is intact to lower extremities. Dermatological:  Wound description noted in wound assessment. The wound appearnace is much improved again with decreased slough and erythema. Granulation buds are noted. Size remains stable   Depth is much improved. Psychiatric:  Judgement and insight intact. Short and long term memory intact. No evidence of depression, anxiety, or agitation. Patient is calm, cooperative, and communicative. Appropriate interactions and affect. Assessment:      Active Hospital Problems    Diagnosis Date Noted    Non-pressure chronic ulcer of left ankle with fat layer exposed ([address] [L97.322] 08/17/2021    Atherosclerosis of native arteries of extremities with rest pain, left leg ([address] [I70.222] 08/17/2021    Osteomyelitis of ankle ([address] [M86.9] 08/17/2021    Tobacco use [Z72.0] 04/24/2014        Procedure Note  Indications:  Based on my examination of this patient's wound(s)/ulcer(s) today, debridement is required to promote healing and evaluate the wound base. Performed by: Michael Bueno DPM    Consent obtained:  Yes    Time out taken:  Yes    Pain Control:2% lidocaine gel  thin layer applied topically to wound bed      Debridement:Excisional Debridement    Using curette the wound(s)/ulcer(s) was/were sharply debrided down through and including the removal of epidermis, dermis and subcutaneous tissue.         Devitalized Tissue Debrided:  exudate    Pre Debridement Measurements:  Are located in the Wound/Ulcer Documentation Flow Sheet    Wound/Ulcer #: 1    Post Debridement Measurements:  Wound/Ulcer Descriptions are Pre Debridement except measurements:    Wound 03/25/22 Ankle Left; Anterior #1 (Active)     Wound 03/25/22 Ankle Left; Anterior #1 (Active)   Wound Image   08/02/22 1620   Wound Etiology Other 08/02/22 1620   Wound Cleansed Cleansed with saline 08/02/22 1620   Dressing/Treatment Other (comment) 07/05/22 1642   Wound Length (cm) 2.7 cm 08/02/22 1620   Wound Width (cm) 2 cm 08/02/22 1620   Wound Depth (cm) 0.2 cm 08/02/22 1620   Wound Surface Area (cm^2) 5.4 cm^2 08/02/22 1620   Change in Wound Size % (l*w) -68.75 08/02/22 1620   Wound Volume (cm^3) 1.08 cm^3 08/02/22 1620   Wound Healing % -13 08/02/22 1620   Post-Procedure Length (cm) 2.5 cm 08/02/22 1644   Post-Procedure Width (cm) 2 cm 08/02/22 1644   Post-Procedure Depth (cm) 0.2 cm 08/02/22 1644   Post-Procedure Surface Area (cm^2) 5 cm^2 08/02/22 1644   Post-Procedure Volume (cm^3) 1 cm^3 08/02/22 1644   Wound Assessment Sauget/red;Slough 08/02/22 1620   Drainage Amount Moderate 08/02/22 1620   Drainage Description Serous; Yellow;Green 08/02/22 1620   Odor None 08/02/22 1620   Ela-wound Assessment Dry/flaky; Intact 08/02/22 1620   Margins Defined edges 08/02/22 1620   Wound Thickness Description not for Pressure Injury Full thickness 08/02/22 1620   Number of days: 130         Percent of Wound/Ulcer Debrided: 100%    Total Surface Area Debrided:  5.0 sq cm     Diabetic/Pressure/Non Pressure Ulcers:  Ulcer: Non-Pressure ulcer, fat layer exposed      Bleeding:  Minimal    Hemostasis Achieved:  by pressure    Procedural Pain:  5  / 10     Post Procedural Pain:  1 / 10     Response to treatment:  Well tolerated by patient.        Plan:     Patient examined and debrided  Gentamicin and DSD  Follow up in two weeks for possible theraskin      Treatment Note please see attached Discharge Instructions    Written patient dismissal instructions given to patient and signed by patient or POA. Discharge [facility]   Physician Orders and Discharge Instructions  [address]  Telephone: [phone]        NAME:  Romaine Alvarez OF BIRTH:  1945  MEDICAL RECORD NUMBER:  [id number]     Your  is:  Nicholas Courtney Care/Facility:   NONE     Wound Location:   LEFT ANTERIOR ANKLE  Dressing orders:  1. CLEANSE WOUND GENTLY WITH NORMAL SALINE. 2. APPLY GENTAMICIN OINTMENT TO WOUND. 3. COVER AND SECURE WITH DRY DRESSING. 4. CHANGE DRESSING EVERY DAY. TODAY IN WOUND CENTER APPLY GENTAMICIN OINTMENT, MANPREET AND A DRY DRESSING. Compression:     Offloading Device:     Other Instructions:  Keep all dressings clean, dry and intact. Keep pressure off the wound(s) at all times. Follow up visit  2 WEEKS  AUGUST 16, 2022 AT       Please give 24 hour notice if unable to keep appointment. [phone]     If you experience any of the following, please call the Wound Care Service at  [phone] or go to the nearest emergency room. *Increase in pain         *Temperature over 101           *Increase in drainage from your wound or a foul odor  *Uncontrolled swelling            *Need for compression bandage changes due to slippage, breakthrough drainage       PLEASE NOTE: IF YOU ARE UNABLE TO OBTAIN WOUND SUPPLIES, CONTINUE TO USE THE SUPPLIES YOU HAVE AVAILABLE UNTIL YOU ARE ABLE TO REACH US.  IT IS MOST IMPORTANT TO KEEP THE WOUND COVERED AT ALL TIMES  Electronically signed by Miguel Adam DPM on 8/2/2022 at 4:49 PM        Electronically signed by Miguel Adam DPM on 8/2/2022 at 4:52 PM

## 2022-08-02 NOTE — PLAN OF CARE
Problem: Wound:  Goal: Will show signs of wound healing; wound closure and no evidence of infection  Description: Will show signs of wound healing; wound closure and no evidence of infection  Outcome: Progressing     Problem: Venous:  Goal: Signs of wound healing will improve  Description: Signs of wound healing will improve  Outcome: Progressing     Problem: Cognitive:  Goal: Knowledge of wound care  Description: Knowledge of wound care  Outcome: Progressing  Goal: Understands risk factors for wounds  Description: Understands risk factors for wounds  Outcome: Progressing

## 2022-08-11 ENCOUNTER — OFFICE VISIT (OUTPATIENT)
Dept: FAMILY MEDICINE CLINIC | Age: 77
End: 2022-08-11
Payer: MEDICARE

## 2022-08-11 VITALS
WEIGHT: 160 LBS | SYSTOLIC BLOOD PRESSURE: 118 MMHG | BODY MASS INDEX: 25.71 KG/M2 | OXYGEN SATURATION: 95 % | DIASTOLIC BLOOD PRESSURE: 76 MMHG | HEIGHT: 66 IN | TEMPERATURE: 98.3 F | HEART RATE: 61 BPM

## 2022-08-11 DIAGNOSIS — E78.00 PURE HYPERCHOLESTEROLEMIA: ICD-10-CM

## 2022-08-11 DIAGNOSIS — C34.92 NSCLC OF LEFT LUNG (HCC): ICD-10-CM

## 2022-08-11 DIAGNOSIS — N18.30 STAGE 3 CHRONIC KIDNEY DISEASE, UNSPECIFIED WHETHER STAGE 3A OR 3B CKD (HCC): ICD-10-CM

## 2022-08-11 DIAGNOSIS — Z00.00 MEDICARE ANNUAL WELLNESS VISIT, SUBSEQUENT: Primary | ICD-10-CM

## 2022-08-11 DIAGNOSIS — I25.119 CORONARY ARTERY DISEASE INVOLVING NATIVE CORONARY ARTERY OF NATIVE HEART WITH ANGINA PECTORIS (HCC): ICD-10-CM

## 2022-08-11 DIAGNOSIS — I70.222 ATHEROSCLEROSIS OF NATIVE ARTERIES OF EXTREMITIES WITH REST PAIN, LEFT LEG (HCC): ICD-10-CM

## 2022-08-11 DIAGNOSIS — J44.9 CHRONIC OBSTRUCTIVE PULMONARY DISEASE, UNSPECIFIED COPD TYPE (HCC): ICD-10-CM

## 2022-08-11 PROCEDURE — 1123F ACP DISCUSS/DSCN MKR DOCD: CPT | Performed by: STUDENT IN AN ORGANIZED HEALTH CARE EDUCATION/TRAINING PROGRAM

## 2022-08-11 PROCEDURE — 99497 ADVNCD CARE PLAN 30 MIN: CPT | Performed by: STUDENT IN AN ORGANIZED HEALTH CARE EDUCATION/TRAINING PROGRAM

## 2022-08-11 PROCEDURE — G0439 PPPS, SUBSEQ VISIT: HCPCS | Performed by: STUDENT IN AN ORGANIZED HEALTH CARE EDUCATION/TRAINING PROGRAM

## 2022-08-11 ASSESSMENT — PATIENT HEALTH QUESTIONNAIRE - PHQ9
1. LITTLE INTEREST OR PLEASURE IN DOING THINGS: 0
SUM OF ALL RESPONSES TO PHQ QUESTIONS 1-9: 0
SUM OF ALL RESPONSES TO PHQ9 QUESTIONS 1 & 2: 0
2. FEELING DOWN, DEPRESSED OR HOPELESS: 0

## 2022-08-11 ASSESSMENT — LIFESTYLE VARIABLES
HAVE YOU OR SOMEONE ELSE BEEN INJURED AS A RESULT OF YOUR DRINKING: 0
HOW MANY STANDARD DRINKS CONTAINING ALCOHOL DO YOU HAVE ON A TYPICAL DAY: 1 OR 2
HAS A RELATIVE, FRIEND, DOCTOR, OR ANOTHER HEALTH PROFESSIONAL EXPRESSED CONCERN ABOUT YOUR DRINKING OR SUGGESTED YOU CUT DOWN: 0
HOW OFTEN DO YOU HAVE A DRINK CONTAINING ALCOHOL: 4 OR MORE TIMES A WEEK
HOW OFTEN DURING THE LAST YEAR HAVE YOU NEEDED AN ALCOHOLIC DRINK FIRST THING IN THE MORNING TO GET YOURSELF GOING AFTER A NIGHT OF HEAVY DRINKING: 0
HOW OFTEN DURING THE LAST YEAR HAVE YOU FAILED TO DO WHAT WAS NORMALLY EXPECTED FROM YOU BECAUSE OF DRINKING: 0
HOW OFTEN DURING THE LAST YEAR HAVE YOU HAD A FEELING OF GUILT OR REMORSE AFTER DRINKING: 0
HOW OFTEN DURING THE LAST YEAR HAVE YOU FOUND THAT YOU WERE NOT ABLE TO STOP DRINKING ONCE YOU HAD STARTED: 0
HOW OFTEN DURING THE LAST YEAR HAVE YOU BEEN UNABLE TO REMEMBER WHAT HAPPENED THE NIGHT BEFORE BECAUSE YOU HAD BEEN DRINKING: 0

## 2022-08-11 NOTE — ACP (ADVANCE CARE PLANNING)
Advance Care Planning     Advance Care Planning (ACP) Physician/NP/PA Conversation    Date of Conversation: 8/11/2022  Conducted with: Patient with Decision Making Capacity    Healthcare Decision Maker:      Primary Decision Maker: Tamanna - Child - [phone]    Primary Decision Maker: Micki Hollis - Child - [phone]    Secondary Decision Maker: Fernando Villagomez - Brother/Sister - [phone]    Click here to complete Healthcare Decision Makers including selection of the Healthcare Decision Maker Relationship (ie \"Primary\")  Today we documented Decision Maker(s) consistent with ACP documents on file. Care Preferences:  Hospitalization: \"If your health worsens and it becomes clear that your chance of recovery is unlikely, what would be your preference regarding hospitalization? \"  The patient is unsure. Ventilation: \"If you were unable to breath on your own and your chance of recovery was unlikely, what would be your preference about the use of a ventilator (breathing machine) if it was available to you? \"  The patient is unsure. Resuscitation: \"In the event your heart stopped as a result of an underlying serious health condition, would you want attempts made to restart your heart, or would you prefer a natural death? \"  No, do NOT attempt to resuscitate.     ventilation preferences, hospitalization preferences, and resuscitation preferences    Conversation Outcomes / Follow-Up Plan:  ACP complete - no further action today  Reviewed DNR/DNI and patient confirms current DNR status - completed forms on file (place new order if needed)    Length of Voluntary ACP Conversation in minutes:  16 minutes    Cesar Diane DO

## 2022-08-11 NOTE — PROGRESS NOTES
Medicare Annual Wellness Visit    Cintia Painter is here for Medicare AWV    Assessment & Plan   Medicare annual wellness visit, subsequent  Chronic obstructive pulmonary disease, unspecified COPD type ([facility]  Stage 3 chronic kidney disease, unspecified whether stage 3a or 3b CKD ([facility]  NSCLC of left lung ([facility]  Coronary artery disease involving native coronary artery of native heart with angina pectoris ([facility]  Atherosclerosis of native arteries of extremities with rest pain, left leg ([facility]  Pure hypercholesterolemia    Recommendations for Preventive Services Due: see orders and patient instructions/AVS.  Recommended screening schedule for the next 5-10 years is provided to the patient in written form: see Patient Instructions/AVS.     Return if symptoms worsen or fail to improve, for Medicare Annual Wellness Visit in 1 year. Subjective       Patient's complete Health Risk Assessment and screening values have been reviewed and are found in Flowsheets. The following problems were reviewed today and where indicated follow up appointments were made and/or referrals ordered.     Positive Risk Factor Screenings with Interventions:    Fall Risk:  Do you feel unsteady or are you worried about falling? : (!) yes  2 or more falls in past year?: no  Fall with injury in past year?: (!) yes   Fall Risk Interventions:    Home safety tips provided            General Health and ACP:  General  In general, how would you say your health is?: Good  In the past 7 days, have you experienced any of the following: New or Increased Pain, New or Increased Fatigue, Loneliness, Social Isolation, Stress or Anger?: (!) Yes  Select all that apply: (!) New or Increased Pain  Do you get the social and emotional support that you need?: Yes  Do you have a Living Will?: Yes    Advance Directives       Power of  Living Will ACP-Advance Directive ACP-Power of     Not on File Filed on 12/29/17 Filed Not on File          General Health Risk Interventions:  Pain issues: pt following up with ortho for right shoulder injury    Health Habits/Nutrition:  Physical Activity: Insufficiently Active    Days of Exercise per Week: 7 days    Minutes of Exercise per Session: 20 min     Have you lost any weight without trying in the past 3 months?: No  Body mass index: (!) 25.82  Have you seen the dentist within the past year?: (!) No  Health Habits/Nutrition Interventions:  Dental exam overdue:  patient encouraged to make appointment with his/her dentist    Hearing/Vision:  Do you or your family notice any trouble with your hearing that hasn't been managed with hearing aids?: No  Do you have difficulty driving, watching TV, or doing any of your daily activities because of your eyesight?: No  Have you had an eye exam within the past year?: (!) No  No results found.   Hearing/Vision Interventions:  Vision concerns:  patient encouraged to make appointment with his/her eye specialist    Safety:  Do you have working smoke detectors?: (!) No  Do you have any tripping hazards - loose or unsecured carpets or rugs?: No  Do you have any tripping hazards - clutter in doorways, halls, or stairs?: No  Do you have either shower bars, grab bars, non-slip mats or non-slip surfaces in your shower or bathtub?: Yes  Do all of your stairways have a railing or banister?: Yes  Do you always fasten your seatbelt when you are in a car?: Yes  Safety Interventions:  Home safety tips provided    ADLs:  In the past 7 days, did you need help from others to perform any of the following everyday activities: Eating, dressing, grooming, bathing, toileting, or walking/balance?: No  In the past 7 days, did you need help from others to take care of any of the following: Laundry, housekeeping, banking/finances, shopping, telephone use, food preparation, transportation, or taking medications?: (!) Yes  Select all that apply: (!) Transportation  ADL Interventions:  Patient declines any further evaluation/treatment for this issue          Objective   Vitals:    08/11/22 1438   BP: 118/76   Pulse: 61   Temp: 98.3 °F (36.8 °C)   SpO2: 95%   Weight: 160 lb (72.6 kg)   Height: 5' 6\" (1.676 m)      Body mass index is 25.82 kg/m². Allergies   Allergen Reactions    Morphine      Prior to Visit Medications    Medication Sig Taking? Authorizing Provider   gentamicin (GARAMYCIN) 0.1 % ointment Apply topically  daily.  Yes Sara Moran DPM   oxyCODONE-acetaminophen (PERCOCET) 7.5-325 MG per tablet Take 1 tablet by mouth five times a day as needed for pain Yes Historical Provider, MD   citalopram (CELEXA) 40 MG tablet TAKE ONE TABLET BY MOUTH nightly Yes Harish Mix MD   atorvastatin (LIPITOR) 20 MG tablet TAKE ONE TABLET BY MOUTH DAILY Yes Gama Trujillo MD   clopidogrel (PLAVIX) 75 MG tablet Take 1 tablet by mouth daily Yes Gama Trujillo MD   diclofenac (VOLTAREN) 50 MG EC tablet TAKE ONE TABLET BY MOUTH TWO TIMES A DAY Yes Harish Mix MD   isosorbide mononitrate (IMDUR) 30 MG extended release tablet TAKE ONE TABLET BY MOUTH EVERY DAY Yes Gama Trujillo MD   bumetanide (BUMEX) 2 MG tablet Take 1 tablet by mouth daily Yes Gama Trujillo MD   metoprolol tartrate (LOPRESSOR) 50 MG tablet Take 0.5 tablets by mouth 2 times daily TAKE ONE TABLET BY MOUTH TWO TIMES A DAY Yes Harish Mix MD   omeprazole (PRILOSEC) 10 MG delayed release capsule TAKE ONE CAPSULE BY MOUTH DAILY Yes Gama Trujillo MD   finasteride (PROSCAR) 5 MG tablet Take 1 tablet by mouth daily Yes Ilda Kelly MD   tiotropium (SPIRIVA RESPIMAT) 2.5 MCG/ACT AERS inhaler Inhale 2 puffs into the lungs daily Yes Willy Huerta MD   Fluticasone furoate-vilanterol (BREO ELLIPTA) 200-25 MCG/INH AEPB inhaler Inhale 1 puff into the lungs daily Yes Willy Huerta MD   albuterol sulfate  (90 Base) MCG/ACT inhaler Inhale 2 puffs into the lungs every 6 hours as needed for Wheezing Yes Willy Huerta MD   nitroGLYCERIN (NITROSTAT) 0.4 MG SL tablet Place 1 tablet under the tongue every 5 minutes as needed for Chest pain up to max of 3 total doses. If no relief after 1 dose, call 911.  Yes Maryse Chacko MD   DOCUSATE CALCIUM PO Take by mouth as needed Yes Historical Provider, MD Cole (Including outside providers/suppliers regularly involved in providing care):   Patient Care Team:  Mega Mata MD as PCP - General (Family Medicine)  Mega Mata MD as PCP - [facility]  Mark Gardiner MD (Orthopedic Surgery)  Maryse Chacko MD as Cardiologist (Cardiology)  Amanda Price MD as Consulting Physician (Neurology)     Reviewed and updated this visit:  Tobacco  Allergies  Meds  Problems  Med Hx  Surg Hx  Soc Hx  Fam Hx

## 2022-08-16 ENCOUNTER — HOSPITAL ENCOUNTER (OUTPATIENT)
Dept: WOUND CARE | Age: 77
Discharge: HOME | End: 2022-08-16
Payer: MEDICARE

## 2022-08-16 VITALS — DIASTOLIC BLOOD PRESSURE: 55 MMHG | TEMPERATURE: 97 F | HEART RATE: 61 BPM | SYSTOLIC BLOOD PRESSURE: 92 MMHG

## 2022-08-16 DIAGNOSIS — L97.322 NON-PRESSURE CHRONIC ULCER OF LEFT ANKLE WITH FAT LAYER EXPOSED (HCC): Primary | ICD-10-CM

## 2022-08-16 PROCEDURE — 11042 DBRDMT SUBQ TIS 1ST 20SQCM/<: CPT

## 2022-08-16 PROCEDURE — 6370000000 HC RX 637 (ALT 250 FOR IP): Performed by: PODIATRIST

## 2022-08-16 RX ORDER — LIDOCAINE HYDROCHLORIDE 40 MG/ML
SOLUTION TOPICAL ONCE
Status: CANCELLED | OUTPATIENT
Start: 2022-08-16 | End: 2022-08-16

## 2022-08-16 RX ORDER — GENTAMICIN SULFATE 1 MG/G
OINTMENT TOPICAL ONCE
Status: CANCELLED | OUTPATIENT
Start: 2022-08-16 | End: 2022-08-16

## 2022-08-16 RX ORDER — LIDOCAINE HYDROCHLORIDE 20 MG/ML
JELLY TOPICAL ONCE
Status: COMPLETED | OUTPATIENT
Start: 2022-08-16 | End: 2022-08-16

## 2022-08-16 RX ORDER — LIDOCAINE HYDROCHLORIDE 20 MG/ML
JELLY TOPICAL ONCE
Status: CANCELLED | OUTPATIENT
Start: 2022-08-16 | End: 2022-08-16

## 2022-08-16 RX ORDER — BETAMETHASONE DIPROPIONATE 0.05 %
OINTMENT (GRAM) TOPICAL ONCE
Status: CANCELLED | OUTPATIENT
Start: 2022-08-16 | End: 2022-08-16

## 2022-08-16 RX ORDER — LIDOCAINE 40 MG/G
CREAM TOPICAL ONCE
Status: CANCELLED | OUTPATIENT
Start: 2022-08-16 | End: 2022-08-16

## 2022-08-16 RX ORDER — BACITRACIN, NEOMYCIN, POLYMYXIN B 400; 3.5; 5 [USP'U]/G; MG/G; [USP'U]/G
OINTMENT TOPICAL ONCE
Status: CANCELLED | OUTPATIENT
Start: 2022-08-16 | End: 2022-08-16

## 2022-08-16 RX ORDER — CLOBETASOL PROPIONATE 0.5 MG/G
OINTMENT TOPICAL ONCE
Status: CANCELLED | OUTPATIENT
Start: 2022-08-16 | End: 2022-08-16

## 2022-08-16 RX ORDER — BACITRACIN ZINC AND POLYMYXIN B SULFATE 500; 1000 [USP'U]/G; [USP'U]/G
OINTMENT TOPICAL ONCE
Status: CANCELLED | OUTPATIENT
Start: 2022-08-16 | End: 2022-08-16

## 2022-08-16 RX ORDER — GINSENG 100 MG
CAPSULE ORAL ONCE
Status: CANCELLED | OUTPATIENT
Start: 2022-08-16 | End: 2022-08-16

## 2022-08-16 RX ORDER — LIDOCAINE 50 MG/G
OINTMENT TOPICAL ONCE
Status: CANCELLED | OUTPATIENT
Start: 2022-08-16 | End: 2022-08-16

## 2022-08-16 RX ADMIN — LIDOCAINE HYDROCHLORIDE: 20 JELLY TOPICAL at 16:30

## 2022-08-16 NOTE — PROGRESS NOTES
[facility] [address]                                                   Progress Note and Procedure Note      Reagan Cagle RECORD NUMBER:  [id number]  AGE: 64 y.o. GENDER: male  : 1945  EPISODE DATE:  2022    Subjective:     Chief Complaint   Patient presents with    Wound Check     Left ankle         HISTORY of PRESENT ILLNESS HPI      Cyril Riedel is a 64 y.o. male who presents today for wound/ulcer evaluation. History of Wound Context: Patient presents for a reoccurrence of an non healing ulcer of the left ankle. The patient was last seen in 2021 with the wound doing very well and then he went to a AL and never followed up. He denies any NVFC. Wound/Ulcer Pain Timing/Severity: intermittent  Quality of pain: tender  Severity:  4 / 10   Modifying Factors: None  Associated Signs/Symptoms: drainage, odor and pain    Ulcer Identification:  Ulcer Type: venous and undetermined  Contributing Factors: edema and venous stasis    Wound: N/A        PAST MEDICAL HISTORY        Diagnosis Date    Alcohol abuse     quit drinking 19    CAD (coronary artery disease)     patient states no doctor has told him this / has 1 cardiac stent    Cancer ([address]     lung LLL    Chronic back pain     Chronic kidney disease     Chronic obstructive pulmonary disease, unspecified COPD type ([address] 3/24/2022    Chronic sinusitis     DJD (degenerative joint disease) of knee     left knee DJD    Elevated PSA     History of blood transfusion     History of inferior wall myocardial infarction 2016    1 cardiac stent    HTN (hypertension)     meds .  1 yr    Hyperlipidemia     meds > 12 yrs    NSTEMI (non-ST elevated myocardial infarction) ([address]     Smoker        PAST SURGICAL HISTORY    Past Surgical History:   Procedure Laterality Date    ABDOMEN SURGERY      spleenectomy due to [address]      lumbar disc OR    CARDIAC SURGERY      stents    CARPAL TUNNEL RELEASE Right 2019 (PERCOCET) 7.5-325 MG per tablet Take 1 tablet by mouth five times a day as needed for pain      citalopram (CELEXA) 40 MG tablet TAKE ONE TABLET BY MOUTH nightly 30 tablet 5    atorvastatin (LIPITOR) 20 MG tablet TAKE ONE TABLET BY MOUTH DAILY 90 tablet 3    clopidogrel (PLAVIX) 75 MG tablet Take 1 tablet by mouth daily 90 tablet 3    diclofenac (VOLTAREN) 50 MG EC tablet TAKE ONE TABLET BY MOUTH TWO TIMES A DAY 60 tablet 5    isosorbide mononitrate (IMDUR) 30 MG extended release tablet TAKE ONE TABLET BY MOUTH EVERY DAY 90 tablet 3    bumetanide (BUMEX) 2 MG tablet Take 1 tablet by mouth daily 90 tablet 3    metoprolol tartrate (LOPRESSOR) 50 MG tablet Take 0.5 tablets by mouth 2 times daily TAKE ONE TABLET BY MOUTH TWO TIMES A  tablet 3    omeprazole (PRILOSEC) 10 MG delayed release capsule TAKE ONE CAPSULE BY MOUTH DAILY 90 capsule 2    finasteride (PROSCAR) 5 MG tablet Take 1 tablet by mouth daily 90 tablet 3    tiotropium (SPIRIVA RESPIMAT) 2.5 MCG/ACT AERS inhaler Inhale 2 puffs into the lungs daily 1 Inhaler 3    Fluticasone furoate-vilanterol (BREO ELLIPTA) 200-25 MCG/INH AEPB inhaler Inhale 1 puff into the lungs daily 1 each 3    albuterol sulfate  (90 Base) MCG/ACT inhaler Inhale 2 puffs into the lungs every 6 hours as needed for Wheezing 1 Inhaler 3    nitroGLYCERIN (NITROSTAT) 0.4 MG SL tablet Place 1 tablet under the tongue every 5 minutes as needed for Chest pain up to max of 3 total doses. If no relief after 1 dose, call 911. 25 tablet 3    DOCUSATE CALCIUM PO Take by mouth as needed       No current facility-administered medications on file prior to encounter. REVIEW OF SYSTEMS    Pertinent items are noted in HPI.     Objective:      BP (!) 92/55   Pulse 61   Temp 97 °F (36.1 °C) (Temporal)     Wt Readings from Last 3 Encounters:   08/11/22 160 lb (72.6 kg)   07/12/22 168 lb (76.2 kg)   03/02/22 157 lb (71.2 kg)       PHYSICAL EXAM    Constitutional:   Well nourished and well developed. Appears neat and clean. Patient is alert, oriented x3, and in no apparent distress. Respiratory:  Respiratory effort is easy and symmetric bilaterally. Rate is normal at rest and on room air. Vascular:  Pedal Pulses is palpable and audible with doppler. Capillary refill is <3 sec to digits bilateral.  Extremities positive for 1+ pitting edema. Neurological:   Sensation is intact to lower extremities. Dermatological:  Wound description noted in wound assessment. The wound appearnace is stable  slough and no pain or erythema. Granulation buds are noted. Size is slightly smaller   Depth is much improved. Psychiatric:  Judgement and insight intact. Short and long term memory intact. No evidence of depression, anxiety, or agitation. Patient is calm, cooperative, and communicative. Appropriate interactions and affect. Assessment:      Active Hospital Problems    Diagnosis Date Noted    Non-pressure chronic ulcer of left ankle with fat layer exposed ([facility] [L97.322] 08/17/2021    Tobacco use [Z72.0] 04/24/2014        Procedure Note  Indications:  Based on my examination of this patient's wound(s)/ulcer(s) today, debridement is required to promote healing and evaluate the wound base. Performed by: Jeri Hurt DPM    Consent obtained:  Yes    Time out taken:  Yes    Pain Control:2% lidocaine gel  thin layer applied topically to wound bed      Debridement:Excisional Debridement    Using curette the wound(s)/ulcer(s) was/were sharply debrided down through and including the removal of epidermis, dermis and subcutaneous tissue. Devitalized Tissue Debrided:  exudate    Pre Debridement Measurements:  Are located in the [facility]  Documentation Flow Sheet    Wound/Ulcer #: 1    Post Debridement Measurements:  Wound/Ulcer Descriptions are Pre Debridement except measurements:    Wound 03/25/22 Ankle Left; Anterior #1 (Active)   Wound Image   08/16/22 1265   Wound Etiology Other 08/16/22 1619   Wound Cleansed Cleansed with saline 08/16/22 1619   Dressing/Treatment Antibacterial ointment;Collagen with Ag;Dry dressing 08/02/22 1654   Wound Length (cm) 2.5 cm 08/16/22 1619   Wound Width (cm) 1.9 cm 08/16/22 1619   Wound Depth (cm) 0.25 cm 08/16/22 1619   Wound Surface Area (cm^2) 4.75 cm^2 08/16/22 1619   Change in Wound Size % (l*w) -48.44 08/16/22 1619   Wound Volume (cm^3) 1.1875 cm^3 08/16/22 1619   Wound Healing % -24 08/16/22 1619   Post-Procedure Length (cm) 2.5 cm 08/16/22 1638   Post-Procedure Width (cm) 1.9 cm 08/16/22 1638   Post-Procedure Depth (cm) 0.3 cm 08/16/22 1638   Post-Procedure Surface Area (cm^2) 4.75 cm^2 08/16/22 1638   Post-Procedure Volume (cm^3) 1.425 cm^3 08/16/22 1638   Wound Assessment [place]/red;Slough 08/16/22 1619   Drainage Amount Moderate 08/16/22 1619   Drainage Description Serous; Yellow 08/16/22 1619   Odor None 08/16/22 1619   Ela-wound Assessment Intact 08/16/22 1619   Margins Defined edges 08/16/22 1619   Wound Thickness Description not for Pressure Injury Full thickness 08/16/22 1619   Number of days: 144         Percent of Wound/Ulcer Debrided: 100%    Total Surface Area Debrided:  4.75 sq cm     Diabetic/Pressure/Non Pressure Ulcers:  Ulcer: Non-Pressure ulcer, fat layer exposed      Bleeding:  Minimal    Hemostasis Achieved:  by pressure    Procedural Pain:  5  / 10     Post Procedural Pain:  1 / 10     Response to treatment:  Well tolerated by patient. Plan:     Patient examined and debrided  Medihoney and alginate and DSD  Repeat art duplex  Cigar smoking cessation emphasized  Follow up in two weeks for possible theraskin      Treatment Note please see attached Discharge Instructions    Written patient dismissal instructions given to patient and signed by patient or POA.          [facility]   Physician Orders and Discharge [facility]  [facility]  Telephone: [phone]        NAME:  Denia Esquivel OF BIRTH:  1945  MEDICAL RECORD NUMBER:  [id number]     Your  is:  Nicholas Courtney Care/Facility:   NONE     Wound Location:   LEFT ANTERIOR ANKLE  Dressing orders:  1. CLEANSE WOUND GENTLY WITH NORMAL SALINE. 2. APPLY MEDIHONEY GEL THEN CALCIUM ALGINATE. 3. COVER AND SECURE WITH DRY DRESSING. 4. CHANGE DRESSING EVERY OTHER DAY. TODAY IN WOUND CENTER APPLY GENTAMICIN OINTMENT, MANPREET AND A DRY DRESSING. Compression:     Offloading Device:     Other Instructions:  DR. Angle Escobedo TO ORDER AN ULTRASOUND OF THE LEFT LEG - PLEASE CALL [phone] TO SCHEDULE. Keep all dressings clean, dry and intact. Keep pressure off the wound(s) at all times. Follow up visit  2 WEEKS  AUGUST 30, 2022 AT       Please give 24 hour notice if unable to keep appointment. [phone]     If you experience any of the following, please call the Wound Care Service at  [phone] or go to the nearest emergency room. *Increase in pain         *Temperature over 101           *Increase in drainage from your wound or a foul odor  *Uncontrolled swelling            *Need for compression bandage changes due to slippage, breakthrough drainage       PLEASE NOTE: IF YOU ARE UNABLE TO OBTAIN WOUND SUPPLIES, CONTINUE TO USE THE SUPPLIES YOU HAVE AVAILABLE UNTIL YOU ARE ABLE TO REACH US.  IT IS MOST IMPORTANT TO KEEP THE WOUND COVERED AT ALL TIMES      Electronically signed by Joanna Valencia DPM on 8/16/2022 at 4:49 PM

## 2022-08-16 NOTE — CODE DOCUMENTATION
[address] [facility] Physician Billing Sheet. Char Gonzalez  AGE: 64 y.o.    GENDER: male  : 1945  TODAY'S DATE:  2022    ICD-10 CODES  Active Hospital Problems    Diagnosis Date Noted    Non-pressure chronic ulcer of left ankle with fat layer exposed [facility] [L97.322] 2021    Tobacco use [Z72.0] 2014       PHYSICIAN PROCEDURES    CPT CODE  37777  LT      Electronically signed by Rene Grover DPM on 2022 at 4:48 PM

## 2022-08-16 NOTE — PLAN OF CARE
Problem: Cognitive:  Goal: Knowledge of wound care  Outcome: Progressing     Problem: Cognitive:  Goal: Understands risk factors for wounds  Outcome: Progressing     Problem: Wound:  Goal: Will show signs of wound healing; wound closure and no evidence of infection  Outcome: Progressing     Problem: Venous:  Goal: Signs of wound healing will improve  Outcome: Progressing

## 2022-08-16 NOTE — DISCHARGE INSTRUCTIONS
[facility]   Physician Orders and Discharge Instructions  [facility]  [address] [facility]  Telephone: [phone]        NAME:  Asya Bradford OF BIRTH:  1945  MEDICAL RECORD NUMBER:  [id number]     Your  is:  Nicholas Oseguera [facility] Care/Facility:   NONE     Wound Location:   LEFT ANTERIOR ANKLE  Dressing orders:  1. CLEANSE WOUND GENTLY WITH NORMAL SALINE. 2. APPLY MEDIHONEY GEL THEN CALCIUM ALGINATE. 3. COVER AND SECURE WITH DRY DRESSING. 4. CHANGE DRESSING EVERY OTHER DAY. TODAY IN WOUND CENTER APPLY GENTAMICIN OINTMENT, MANPREET AND A DRY DRESSING. Compression:     Offloading Device:     Other Instructions:  DR. Elena Mcconnell TO ORDER AN ULTRASOUND OF THE LEFT LEG - PLEASE CALL [phone] TO SCHEDULE. Keep all dressings clean, dry and intact. Keep pressure off the wound(s) at all times. Follow up visit  2 WEEKS  AUGUST 30, 2022 AT       Please give 24 hour notice if unable to keep appointment. [phone]     If you experience any of the following, please call the Wound Care Service at  [phone] or go to the nearest emergency room. *Increase in pain         *Temperature over 101           *Increase in drainage from your wound or a foul odor  *Uncontrolled swelling            *Need for compression bandage changes due to slippage, breakthrough drainage       PLEASE NOTE: IF YOU ARE UNABLE TO OBTAIN WOUND SUPPLIES, CONTINUE TO USE THE SUPPLIES YOU HAVE AVAILABLE UNTIL YOU ARE ABLE TO REACH US.  IT IS MOST IMPORTANT TO KEEP THE WOUND COVERED AT ALL TIMES

## 2022-08-30 ENCOUNTER — HOSPITAL ENCOUNTER (OUTPATIENT)
Dept: WOUND CARE | Age: 77
Discharge: HOME | End: 2022-08-30
Payer: MEDICARE

## 2022-08-30 VITALS
TEMPERATURE: 98 F | SYSTOLIC BLOOD PRESSURE: 132 MMHG | DIASTOLIC BLOOD PRESSURE: 62 MMHG | HEART RATE: 66 BPM | RESPIRATION RATE: 18 BRPM

## 2022-08-30 DIAGNOSIS — L97.322 NON-PRESSURE CHRONIC ULCER OF LEFT ANKLE WITH FAT LAYER EXPOSED (HCC): Primary | ICD-10-CM

## 2022-08-30 PROCEDURE — 87075 CULTR BACTERIA EXCEPT BLOOD: CPT

## 2022-08-30 PROCEDURE — 6370000000 HC RX 637 (ALT 250 FOR IP): Performed by: PODIATRIST

## 2022-08-30 PROCEDURE — 87070 CULTURE OTHR SPECIMN AEROBIC: CPT

## 2022-08-30 PROCEDURE — 86403 PARTICLE AGGLUT ANTBDY SCRN: CPT

## 2022-08-30 PROCEDURE — 99213 OFFICE O/P EST LOW 20 MIN: CPT

## 2022-08-30 RX ORDER — GINSENG 100 MG
CAPSULE ORAL ONCE
Status: CANCELLED | OUTPATIENT
Start: 2022-08-30 | End: 2022-08-30

## 2022-08-30 RX ORDER — CLOBETASOL PROPIONATE 0.5 MG/G
OINTMENT TOPICAL ONCE
Status: CANCELLED | OUTPATIENT
Start: 2022-08-30 | End: 2022-08-30

## 2022-08-30 RX ORDER — GENTAMICIN SULFATE 1 MG/G
OINTMENT TOPICAL ONCE
Status: COMPLETED | OUTPATIENT
Start: 2022-08-30 | End: 2022-08-30

## 2022-08-30 RX ORDER — GENTAMICIN SULFATE 1 MG/G
OINTMENT TOPICAL ONCE
Status: CANCELLED | OUTPATIENT
Start: 2022-08-30 | End: 2022-08-30

## 2022-08-30 RX ORDER — LIDOCAINE HYDROCHLORIDE 40 MG/ML
SOLUTION TOPICAL ONCE
Status: CANCELLED | OUTPATIENT
Start: 2022-08-30 | End: 2022-08-30

## 2022-08-30 RX ORDER — BETAMETHASONE DIPROPIONATE 0.05 %
OINTMENT (GRAM) TOPICAL ONCE
Status: CANCELLED | OUTPATIENT
Start: 2022-08-30 | End: 2022-08-30

## 2022-08-30 RX ORDER — LIDOCAINE HYDROCHLORIDE 20 MG/ML
JELLY TOPICAL ONCE
Status: COMPLETED | OUTPATIENT
Start: 2022-08-30 | End: 2022-08-30

## 2022-08-30 RX ORDER — LIDOCAINE 50 MG/G
OINTMENT TOPICAL ONCE
Status: CANCELLED | OUTPATIENT
Start: 2022-08-30 | End: 2022-08-30

## 2022-08-30 RX ORDER — LIDOCAINE HYDROCHLORIDE 20 MG/ML
JELLY TOPICAL ONCE
Status: CANCELLED | OUTPATIENT
Start: 2022-08-30 | End: 2022-08-30

## 2022-08-30 RX ORDER — BACITRACIN ZINC AND POLYMYXIN B SULFATE 500; 1000 [USP'U]/G; [USP'U]/G
OINTMENT TOPICAL ONCE
Status: CANCELLED | OUTPATIENT
Start: 2022-08-30 | End: 2022-08-30

## 2022-08-30 RX ORDER — BACITRACIN, NEOMYCIN, POLYMYXIN B 400; 3.5; 5 [USP'U]/G; MG/G; [USP'U]/G
OINTMENT TOPICAL ONCE
Status: CANCELLED | OUTPATIENT
Start: 2022-08-30 | End: 2022-08-30

## 2022-08-30 RX ORDER — LIDOCAINE 40 MG/G
CREAM TOPICAL ONCE
Status: CANCELLED | OUTPATIENT
Start: 2022-08-30 | End: 2022-08-30

## 2022-08-30 RX ADMIN — LIDOCAINE HYDROCHLORIDE: 20 JELLY TOPICAL at 16:33

## 2022-08-30 RX ADMIN — GENTAMICIN SULFATE: 1 OINTMENT TOPICAL at 16:58

## 2022-08-30 ASSESSMENT — PAIN DESCRIPTION - ORIENTATION: ORIENTATION: LEFT

## 2022-08-30 ASSESSMENT — PAIN DESCRIPTION - LOCATION: LOCATION: ANKLE

## 2022-08-30 ASSESSMENT — PAIN DESCRIPTION - DESCRIPTORS: DESCRIPTORS: THROBBING

## 2022-08-30 ASSESSMENT — PAIN SCALES - GENERAL: PAINLEVEL_OUTOF10: 7

## 2022-08-30 NOTE — DISCHARGE INSTRUCTIONS
[facility]   Physician Orders and Discharge Instructions  [address]  Telephone: [phone]        NAME:  Deja Garcia OF BIRTH:  1945  MEDICAL RECORD NUMBER:  [id number]     Your  is:  Nicholas Courtney Care/Facility:  NEW REFERRAL TO [address] [place] [address] Rd[address] CARE     Wound Location:   LEFT ANTERIOR ANKLE  Dressing orders:  1. CLEANSE WOUND GENTLY WITH NORMAL SALINE. 2. APPLY GENTAMICIN OINTMENT THEN HYDROFERA BLUE READY. 3. COVER AND SECURE WITH DRY DRESSING. 4. CHANGE DRESSING THREE TIMES A WEEK. Compression:     Offloading Device:     Other Instructions:  DR. Elizabeth Shrestha TO ORDER AN ULTRASOUND OF THE LEFT LEG - PLEASE CALL [phone] TO SCHEDULE. Keep all dressings clean, dry and intact. Keep pressure off the wound(s) at all times. Follow up visit  2 WEEKS         Please give 24 hour notice if unable to keep appointment. [phone]     If you experience any of the following, please call the Wound Care Service at  [phone] or go to the nearest emergency room. *Increase in pain         *Temperature over 101           *Increase in drainage from your wound or a foul odor  *Uncontrolled swelling            *Need for compression bandage changes due to slippage, breakthrough drainage       PLEASE NOTE: IF YOU ARE UNABLE TO OBTAIN WOUND SUPPLIES, CONTINUE TO USE THE SUPPLIES YOU HAVE AVAILABLE UNTIL YOU ARE ABLE TO REACH US.  IT IS MOST IMPORTANT TO KEEP THE WOUND COVERED AT ALL TIMES  Electronically signed by Oitlio Jorgensen DPM on 8/30/2022 at 4:49 PM

## 2022-08-30 NOTE — PROGRESS NOTES
[address] [address]                                                   Progress Note and Procedure Note      Pauline Holland RECORD NUMBER:  [id number]  AGE: 64 y.o. GENDER: male  : 1945  EPISODE DATE:  2022    Subjective:     Chief Complaint   Patient presents with    Wound Check     Left lower ext wound         HISTORY of PRESENT ILLNESS HPI      Darling Chaudhry is a 64 y.o. male who presents today for wound/ulcer evaluation. History of Wound Context: Patient presents for a reoccurrence of an non healing ulcer of the left ankle. Patient is using adhesive dressing on his leg and skin is inflamed with new ullcer today. Wound/Ulcer Pain Timing/Severity: intermittent  Quality of pain: tender  Severity:  4 / 10   Modifying Factors: None  Associated Signs/Symptoms: drainage, odor and pain    Ulcer Identification:  Ulcer Type: venous and undetermined  Contributing Factors: edema and venous stasis    Wound: N/A        PAST MEDICAL HISTORY        Diagnosis Date    Alcohol abuse     quit drinking 21    CAD (coronary artery disease)     patient states no doctor has told him this / has 1 cardiac stent    Cancer ([address]     lung LLL    Chronic back pain     Chronic kidney disease     Chronic obstructive pulmonary disease, unspecified COPD type ([address] 3/24/2022    Chronic sinusitis     DJD (degenerative joint disease) of knee     left knee DJD    Elevated PSA     History of blood transfusion     History of inferior wall myocardial infarction 2016    1 cardiac stent    HTN (hypertension)     meds .  1 yr    Hyperlipidemia     meds > 12 yrs    NSTEMI (non-ST elevated myocardial infarction) ([address]     Smoker        PAST SURGICAL HISTORY    Past Surgical History:   Procedure Laterality Date    ABDOMEN SURGERY      spleenectomy due to [address]      lumbar disc OR    CARDIAC SURGERY      stents    CARPAL TUNNEL RELEASE Right 2019    RIGHT CARPAL TUNNEL RELEASE performed by Omi [facility]ambula MD at [facility]  2016    EYE SURGERY      to have Phaco with IOL OU 2018    HERNIA REPAIR      JOINT REPLACEMENT Left     LTHR    JOINT REPLACEMENT Right     RTKR    KNEE SURGERY Right     arthroscopy    [place] MANIPULAT[place] KNEE JT+ANESTHESIA Right 2017    RIGHT KNEE MANIPULATION UNDER ANESTHESIA performed by Kamron Wayne MD at [facility] OFFICE/OUTPT VISIT,PROCEDURE ONLY Bilateral 2017    RIGHT AND BILATERAL L 4-5 LYSIS OF SCAR DISKECTOMY INTERBODY CAGE FUSION POSTEROLATERAL FUSION PEDICLE SCREWS performed by Omi Arambula MD at [address]  2004    benign    [address] Right 3/24/2017    RIGHT  KNEE TOTAL ARTHROPLASTY, [place] CEMENTED PERSONA  performed by Kamron Wayne MD at [address] [facility] HISTORY    Family History   Problem Relation Age of Onset    Osteoarthritis Mother     Emphysema Mother     Hypertension Father     Hearing Loss Father     Dementia Father     No Known Problems Sister     Prostate Cancer Brother     Colon Polyps Neg Hx        SOCIAL HISTORY    Social History     Tobacco Use    Smoking status: Former     Packs/day: 0.00     Years: 60.00     Pack years: 0.00     Types: Cigarettes     Quit date: 2021     Years since quittin.6    Smokeless tobacco: Never    Tobacco comments:      smokes 2 cigars a day   Vaping Use    Vaping Use: Never used   Substance Use Topics    Alcohol use: Not Currently     Alcohol/week: 12.0 standard drinks     Types: 12 Shots of liquor per week     Comment: socail drinking    Drug use: No       ALLERGIES    Allergies   Allergen Reactions    Morphine        MEDICATIONS    Current Outpatient Medications on File Prior to Encounter   Medication Sig Dispense Refill    gentamicin (GARAMYCIN) 0.1 % ointment Apply topically  daily.  30 g 1    oxyCODONE-acetaminophen (PERCOCET) 7.5-325 MG per tablet Take 1 tablet by mouth five times a day as needed for pain      citalopram (CELEXA) 40 MG tablet TAKE ONE TABLET BY MOUTH nightly 30 tablet 5    atorvastatin (LIPITOR) 20 MG tablet TAKE ONE TABLET BY MOUTH DAILY 90 tablet 3    clopidogrel (PLAVIX) 75 MG tablet Take 1 tablet by mouth daily 90 tablet 3    diclofenac (VOLTAREN) 50 MG EC tablet TAKE ONE TABLET BY MOUTH TWO TIMES A DAY 60 tablet 5    isosorbide mononitrate (IMDUR) 30 MG extended release tablet TAKE ONE TABLET BY MOUTH EVERY DAY 90 tablet 3    bumetanide (BUMEX) 2 MG tablet Take 1 tablet by mouth daily 90 tablet 3    metoprolol tartrate (LOPRESSOR) 50 MG tablet Take 0.5 tablets by mouth 2 times daily TAKE ONE TABLET BY MOUTH TWO TIMES A  tablet 3    omeprazole (PRILOSEC) 10 MG delayed release capsule TAKE ONE CAPSULE BY MOUTH DAILY 90 capsule 2    finasteride (PROSCAR) 5 MG tablet Take 1 tablet by mouth daily 90 tablet 3    tiotropium (SPIRIVA RESPIMAT) 2.5 MCG/ACT AERS inhaler Inhale 2 puffs into the lungs daily 1 Inhaler 3    Fluticasone furoate-vilanterol (BREO ELLIPTA) 200-25 MCG/INH AEPB inhaler Inhale 1 puff into the lungs daily 1 each 3    albuterol sulfate  (90 Base) MCG/ACT inhaler Inhale 2 puffs into the lungs every 6 hours as needed for Wheezing 1 Inhaler 3    nitroGLYCERIN (NITROSTAT) 0.4 MG SL tablet Place 1 tablet under the tongue every 5 minutes as needed for Chest pain up to max of 3 total doses. If no relief after 1 dose, call 911. 25 tablet 3    DOCUSATE CALCIUM PO Take by mouth as needed       No current facility-administered medications on file prior to encounter. REVIEW OF SYSTEMS    Pertinent items are noted in HPI. Objective:      /62   Pulse 66   Temp 98 °F (36.7 °C) (Temporal)   Resp 18     Wt Readings from Last 3 Encounters:   08/11/22 160 lb (72.6 kg)   07/12/22 168 lb (76.2 kg)   03/02/22 157 lb (71.2 kg)       PHYSICAL EXAM    Constitutional:   Well nourished and well developed. Appears neat and clean. Patient is alert, oriented x3, and in no apparent distress. Respiratory:  Respiratory effort is easy and symmetric bilaterally. Rate is normal at rest and on room air. Vascular:  Pedal Pulses is palpable and audible with doppler. Capillary refill is <3 sec to digits bilateral.  Extremities positive for 1+ pitting edema. Neurological:   Sensation is intact to lower extremities. Dermatological:  Wound description noted in wound assessment. The original wound is still with yellow fibrotic tissue as well as the new medial ulceration. Are is red appearing like adhesive contact dermatitis  Psychiatric:  Judgement and insight intact. Short and long term memory intact. No evidence of depression, anxiety, or agitation. Patient is calm, cooperative, and communicative. Appropriate interactions and affect. Assessment:      Active Hospital Problems    Diagnosis Date Noted    Non-pressure chronic ulcer of left ankle with fat layer exposed ([facility] [L97.322] 08/17/2021    Left ankle pain [M25.572] 03/29/2016        Procedure Note  Indications:  Based on my examination of this patient's wound(s)/ulcer(s) today, debridement is not  required to promote healing and evaluate the wound base. Wound/Ulcer #: 1    Wound 03/25/22 Ankle Left; Anterior #1 (Active)   Wound Image   08/30/22 1622   Wound Etiology Other 08/30/22 1622   Wound Cleansed Cleansed with saline 08/30/22 1622   Dressing/Treatment Honey gel/honey paste; Alginate;Dry dressing 08/16/22 1658   Wound Length (cm) 2.6 cm 08/30/22 1622   Wound Width (cm) 2 cm 08/30/22 1622   Wound Depth (cm) 0.4 cm 08/30/22 1622   Wound Surface Area (cm^2) 5.2 cm^2 08/30/22 1622   Change in Wound Size % (l*w) -62.5 08/30/22 1622   Wound Volume (cm^3) 2.08 cm^3 08/30/22 1622   Wound Healing % -117 08/30/22 1622   Post-Procedure Length (cm) 2.5 cm 08/16/22 1638   Post-Procedure Width (cm) 1.9 cm 08/16/22 1638   Post-Procedure Depth (cm) 0.3 cm 08/16/22 1638 Post-Procedure Surface Area (cm^2) 4.75 cm^2 08/16/22 1638   Post-Procedure Volume (cm^3) 1.425 cm^3 08/16/22 1638   Wound Assessment Rossie/red;Slough 08/30/22 1622   Drainage Amount Moderate 08/30/22 1622   Drainage Description Serous; Yellow 08/30/22 1622   Odor None 08/30/22 1622   Ela-wound Assessment Intact 08/30/22 1622   Margins Defined edges 08/30/22 1622   Wound Thickness Description not for Pressure Injury Full thickness 08/30/22 1622   Number of days: 158       Wound 08/30/22 Ankle Left;Medial #2 (Active)   Wound Image   08/30/22 1622   Wound Etiology Other 08/30/22 1622   Wound Cleansed Cleansed with saline 08/30/22 1622   Wound Length (cm) 1.5 cm 08/30/22 1622   Wound Width (cm) 1.3 cm 08/30/22 1622   Wound Depth (cm) 0.2 cm 08/30/22 1622   Wound Surface Area (cm^2) 1.95 cm^2 08/30/22 1622   Wound Volume (cm^3) 0.39 cm^3 08/30/22 1622   Wound Assessment Rossie/red;Slough 08/30/22 1622   Drainage Amount Small 08/30/22 1622   Drainage Description Serous; Yellow 08/30/22 1622   Odor None 08/30/22 1622   Ela-wound Assessment Intact 08/30/22 1622   Margins Defined edges 08/30/22 1622   Wound Thickness Description not for Pressure Injury Full thickness 08/30/22 1622   Number of days: 0       Plan:     Patient examined   Culture taken  Art duplex order still pending  [facility] ordered  Follow up in 10-14 days    Treatment Note please see attached Discharge Instructions    Written patient dismissal instructions given to patient and signed by patient or POA.          Discharge [facility]   Physician Orders and Discharge Instructions  [address]  Telephone: [phone]        NAME:  Lori Moreau OF BIRTH:  1945  MEDICAL RECORD NUMBER:  [id number]     Your Case Manager is:  [id number] Care/Facility:  NEW REFERRAL TO [address] [place] [address] Rd., [address] CARE     Wound Location:   LEFT ANTERIOR ANKLE  Dressing orders:  1. CLEANSE WOUND GENTLY WITH NORMAL SALINE. 2. APPLY GENTAMICIN OINTMENT THEN HYDROFERA BLUE READY. 3. COVER AND SECURE WITH DRY DRESSING. 4. CHANGE DRESSING THREE TIMES A WEEK. Compression:     Offloading Device:     Other Instructions:  DR. Ronnie Valdovinos TO ORDER AN ULTRASOUND OF THE LEFT LEG - PLEASE CALL [phone] TO SCHEDULE. Keep all dressings clean, dry and intact. Keep pressure off the wound(s) at all times. Follow up visit  2 WEEKS         Please give 24 hour notice if unable to keep appointment. [phone]     If you experience any of the following, please call the Wound Care Service at  [phone] or go to the nearest emergency room. *Increase in pain         *Temperature over 101           *Increase in drainage from your wound or a foul odor  *Uncontrolled swelling            *Need for compression bandage changes due to slippage, breakthrough drainage       PLEASE NOTE: IF YOU ARE UNABLE TO OBTAIN WOUND SUPPLIES, CONTINUE TO USE THE SUPPLIES YOU HAVE AVAILABLE UNTIL YOU ARE ABLE TO REACH US.  IT IS MOST IMPORTANT TO KEEP THE WOUND COVERED AT ALL TIMES  Electronically signed by Carmina Rothman DPM on 8/30/2022 at 4:49 PM        Electronically signed by Carmina Rothman DPM on 8/30/2022 at 4:51 PM

## 2022-08-30 NOTE — CODE DOCUMENTATION
[id number] Physician Billing Sheet. Alfredo Geiger  AGE: 64 y.o.    GENDER: male  : 1945  TODAY'S DATE:  2022    ICD-10 CODES  Active Hospital Problems    Diagnosis Date Noted    Non-pressure chronic ulcer of left ankle with fat layer exposed ([facility] [L97.322] 2021    Left ankle pain [M25.572] 2016       PHYSICIAN PROCEDURES    CPT CODE  35521      Electronically signed by Johanna Burleson DPM on 2022 at 4:50 PM

## 2022-09-05 LAB
CULTURE WOUND: ABNORMAL
CULTURE WOUND: ABNORMAL
ORGANISM: ABNORMAL

## 2022-09-06 RX ORDER — CEPHALEXIN 500 MG/1
500 CAPSULE ORAL 2 TIMES DAILY
Qty: 20 CAPSULE | Refills: 0 | Status: SHIPPED | OUTPATIENT
Start: 2022-09-06 | End: 2022-10-18

## 2022-09-07 ENCOUNTER — HOSPITAL ENCOUNTER (OUTPATIENT)
Dept: ULTRASOUND IMAGING | Age: 77
Discharge: HOME | End: 2022-09-09
Payer: MEDICARE

## 2022-09-07 DIAGNOSIS — L97.322 NON-PRESSURE CHRONIC ULCER OF LEFT ANKLE WITH FAT LAYER EXPOSED (HCC): ICD-10-CM

## 2022-09-07 PROCEDURE — 93926 LOWER EXTREMITY STUDY: CPT

## 2022-09-09 ENCOUNTER — HOSPITAL ENCOUNTER (OUTPATIENT)
Dept: WOUND CARE | Age: 77
Discharge: HOME | End: 2022-09-09
Payer: MEDICARE

## 2022-09-09 VITALS
SYSTOLIC BLOOD PRESSURE: 158 MMHG | RESPIRATION RATE: 18 BRPM | HEART RATE: 66 BPM | DIASTOLIC BLOOD PRESSURE: 72 MMHG | TEMPERATURE: 97.2 F

## 2022-09-09 DIAGNOSIS — L97.322 NON-PRESSURE CHRONIC ULCER OF LEFT ANKLE WITH FAT LAYER EXPOSED (HCC): Primary | ICD-10-CM

## 2022-09-09 PROCEDURE — 6370000000 HC RX 637 (ALT 250 FOR IP): Performed by: PODIATRIST

## 2022-09-09 PROCEDURE — 11042 DBRDMT SUBQ TIS 1ST 20SQCM/<: CPT

## 2022-09-09 RX ORDER — BETAMETHASONE DIPROPIONATE 0.05 %
OINTMENT (GRAM) TOPICAL ONCE
Status: CANCELLED | OUTPATIENT
Start: 2022-09-09 | End: 2022-09-09

## 2022-09-09 RX ORDER — LIDOCAINE HYDROCHLORIDE 20 MG/ML
JELLY TOPICAL ONCE
Status: CANCELLED | OUTPATIENT
Start: 2022-09-09 | End: 2022-09-09

## 2022-09-09 RX ORDER — GENTAMICIN SULFATE 1 MG/G
OINTMENT TOPICAL ONCE
Status: CANCELLED | OUTPATIENT
Start: 2022-09-09 | End: 2022-09-09

## 2022-09-09 RX ORDER — LIDOCAINE HYDROCHLORIDE 40 MG/ML
SOLUTION TOPICAL ONCE
Status: CANCELLED | OUTPATIENT
Start: 2022-09-09 | End: 2022-09-09

## 2022-09-09 RX ORDER — BACITRACIN ZINC AND POLYMYXIN B SULFATE 500; 1000 [USP'U]/G; [USP'U]/G
OINTMENT TOPICAL ONCE
Status: CANCELLED | OUTPATIENT
Start: 2022-09-09 | End: 2022-09-09

## 2022-09-09 RX ORDER — GINSENG 100 MG
CAPSULE ORAL ONCE
Status: CANCELLED | OUTPATIENT
Start: 2022-09-09 | End: 2022-09-09

## 2022-09-09 RX ORDER — LIDOCAINE 50 MG/G
OINTMENT TOPICAL ONCE
Status: CANCELLED | OUTPATIENT
Start: 2022-09-09 | End: 2022-09-09

## 2022-09-09 RX ORDER — LIDOCAINE HYDROCHLORIDE 20 MG/ML
JELLY TOPICAL ONCE
Status: COMPLETED | OUTPATIENT
Start: 2022-09-09 | End: 2022-09-09

## 2022-09-09 RX ORDER — LIDOCAINE 40 MG/G
CREAM TOPICAL ONCE
Status: CANCELLED | OUTPATIENT
Start: 2022-09-09 | End: 2022-09-09

## 2022-09-09 RX ORDER — GENTAMICIN SULFATE 1 MG/G
OINTMENT TOPICAL ONCE
Status: COMPLETED | OUTPATIENT
Start: 2022-09-09 | End: 2022-09-09

## 2022-09-09 RX ORDER — CLOBETASOL PROPIONATE 0.5 MG/G
OINTMENT TOPICAL ONCE
Status: CANCELLED | OUTPATIENT
Start: 2022-09-09 | End: 2022-09-09

## 2022-09-09 RX ORDER — BACITRACIN, NEOMYCIN, POLYMYXIN B 400; 3.5; 5 [USP'U]/G; MG/G; [USP'U]/G
OINTMENT TOPICAL ONCE
Status: CANCELLED | OUTPATIENT
Start: 2022-09-09 | End: 2022-09-09

## 2022-09-09 RX ADMIN — GENTAMICIN SULFATE: 1 OINTMENT TOPICAL at 12:06

## 2022-09-09 RX ADMIN — LIDOCAINE HYDROCHLORIDE: 20 JELLY TOPICAL at 11:27

## 2022-09-09 NOTE — PROGRESS NOTES
E[facility] [address]                                                   Progress Note and Procedure Note      Desmond Santos RECORD NUMBER:  [id number]  AGE: 64 y.o. GENDER: male  : 1945  EPISODE DATE:  2022    Subjective:     Chief Complaint   Patient presents with    Wound Check     Left foot/leg         HISTORY of PRESENT ILLNESS HPI      Georgie Bird is a 64 y.o. male who presents today for wound/ulcer evaluation. History of Wound Context: Patient presents for a reoccurrence of an non healing ulcer of the left ankle. The patient was last seen in 2021 with the wound doing very well and then he went to a AL and never followed up. He denies any NVFC. Wound/Ulcer Pain Timing/Severity: intermittent  Quality of pain: tender  Severity:  4 / 10   Modifying Factors: None  Associated Signs/Symptoms: drainage, odor and pain    Ulcer Identification:  Ulcer Type: venous and undetermined  Contributing Factors: edema and venous stasis    Wound: N/A        PAST MEDICAL HISTORY        Diagnosis Date    Alcohol abuse     quit drinking 21    CAD (coronary artery disease)     patient states no doctor has told him this / has 1 cardiac stent    Cancer ([address]     lung LLL    Chronic back pain     Chronic kidney disease     Chronic obstructive pulmonary disease, unspecified COPD type ([address] 3/24/2022    Chronic sinusitis     DJD (degenerative joint disease) of knee     left knee DJD    Elevated PSA     History of blood transfusion     History of inferior wall myocardial infarction 2016    1 cardiac stent    HTN (hypertension)     meds .  1 yr    Hyperlipidemia     meds > 12 yrs    NSTEMI (non-ST elevated myocardial infarction) ([address]     Smoker        PAST SURGICAL HISTORY    Past Surgical History:   Procedure Laterality Date    ABDOMEN SURGERY      spleenectomy due to [address]      lumbar disc OR    CARDIAC SURGERY      stents    CARPAL TUNNEL RELEASE Right 2019    RIGHT CARPAL TUNNEL RELEASE performed by Elizabet Duenas MD at [facility] [address]  2016    EYE SURGERY      to have Phaco with IOL OU 2018    HERNIA REPAIR      JOINT REPLACEMENT Left     LTHR    JOINT REPLACEMENT Right     RTKR    KNEE SURGERY Right     arthroscopy    [place] MANIPULATN KNEE JT+ANESTHESIA Right 2017    RIGHT KNEE MANIPULATION UNDER ANESTHESIA performed by Betsy Mahoney MD at [facility] OFFICE/OUTPT VISIT,PROCEDURE ONLY Bilateral 2017    RIGHT AND BILATERAL L 4-5 LYSIS OF SCAR DISKECTOMY INTERBODY CAGE FUSION POSTEROLATERAL FUSION PEDICLE SCREWS performed by Elizabet Duenas MD at [address]  2004    benign    [facility] Right 3/24/2017    RIGHT  KNEE TOTAL ARTHROPLASTY, Christi Pelletier CEMENTED PERSONA  performed by Betsy Mahoney MD at [facility] HISTORY    Family History   Problem Relation Age of Onset    Osteoarthritis Mother     Emphysema Mother     Hypertension Father     Hearing Loss Father     Dementia Father     No Known Problems Sister     Prostate Cancer Brother     Colon Polyps Neg Hx        SOCIAL HISTORY    Social History     Tobacco Use    Smoking status: Former     Packs/day: 0.00     Years: 60.00     Pack years: 0.00     Types: Cigarettes     Quit date: 2021     Years since quittin.6    Smokeless tobacco: Never    Tobacco comments:      smokes 2 cigars a day   Vaping Use    Vaping Use: Never used   Substance Use Topics    Alcohol use: Not Currently     Alcohol/week: 12.0 standard drinks     Types: 12 Shots of liquor per week     Comment: socail drinking    Drug use: No       ALLERGIES    Allergies   Allergen Reactions    Morphine        MEDICATIONS    Current Outpatient Medications on File Prior to Encounter   Medication Sig Dispense Refill    cephALEXin (KEFLEX) 500 MG capsule Take 1 capsule by mouth 2 times daily 20 capsule 0 gentamicin (GARAMYCIN) 0.1 % ointment Apply topically  daily. 30 g 1    oxyCODONE-acetaminophen (PERCOCET) 7.5-325 MG per tablet Take 1 tablet by mouth five times a day as needed for pain      citalopram (CELEXA) 40 MG tablet TAKE ONE TABLET BY MOUTH nightly 30 tablet 5    atorvastatin (LIPITOR) 20 MG tablet TAKE ONE TABLET BY MOUTH DAILY 90 tablet 3    clopidogrel (PLAVIX) 75 MG tablet Take 1 tablet by mouth daily 90 tablet 3    diclofenac (VOLTAREN) 50 MG EC tablet TAKE ONE TABLET BY MOUTH TWO TIMES A DAY 60 tablet 5    isosorbide mononitrate (IMDUR) 30 MG extended release tablet TAKE ONE TABLET BY MOUTH EVERY DAY 90 tablet 3    bumetanide (BUMEX) 2 MG tablet Take 1 tablet by mouth daily 90 tablet 3    metoprolol tartrate (LOPRESSOR) 50 MG tablet Take 0.5 tablets by mouth 2 times daily TAKE ONE TABLET BY MOUTH TWO TIMES A  tablet 3    omeprazole (PRILOSEC) 10 MG delayed release capsule TAKE ONE CAPSULE BY MOUTH DAILY 90 capsule 2    finasteride (PROSCAR) 5 MG tablet Take 1 tablet by mouth daily 90 tablet 3    tiotropium (SPIRIVA RESPIMAT) 2.5 MCG/ACT AERS inhaler Inhale 2 puffs into the lungs daily 1 Inhaler 3    Fluticasone furoate-vilanterol (BREO ELLIPTA) 200-25 MCG/INH AEPB inhaler Inhale 1 puff into the lungs daily 1 each 3    albuterol sulfate  (90 Base) MCG/ACT inhaler Inhale 2 puffs into the lungs every 6 hours as needed for Wheezing 1 Inhaler 3    nitroGLYCERIN (NITROSTAT) 0.4 MG SL tablet Place 1 tablet under the tongue every 5 minutes as needed for Chest pain up to max of 3 total doses. If no relief after 1 dose, call 911. 25 tablet 3    DOCUSATE CALCIUM PO Take by mouth as needed       No current facility-administered medications on file prior to encounter. REVIEW OF SYSTEMS    Pertinent items are noted in HPI.     Objective:      BP (!) 158/72   Pulse 66   Temp 97.2 °F (36.2 °C) (Temporal)   Resp 18     Wt Readings from Last 3 Encounters:   08/11/22 160 lb (72.6 kg)   07/12/22 168 lb (76.2 kg)   03/02/22 157 lb (71.2 kg)       PHYSICAL EXAM    Constitutional:   Well nourished and well developed. Appears neat and clean. Patient is alert, oriented x3, and in no apparent distress. Respiratory:  Respiratory effort is easy and symmetric bilaterally. Rate is normal at rest and on room air. Vascular:  Pedal Pulses is palpable and audible with doppler. Capillary refill is <3 sec to digits bilateral.  Extremities positive for 1+ pitting edema. Art duplex reviewed no occlusion noted. Neurological:   Sensation is intact to lower extremities. Dermatological:  Wound description noted in wound assessment. The wound appearnace is stable  with slough and no pain or erythema. Granulation buds are noted. Size is slightly smaller   Depth is much improved. Psychiatric:  Judgement and insight intact. Short and long term memory intact. No evidence of depression, anxiety, or agitation. Patient is calm, cooperative, and communicative. Appropriate interactions and affect. Assessment:      Active Hospital Problems    Diagnosis Date Noted    Non-pressure chronic ulcer of left ankle with fat layer exposed ([facility] [L97.322] 08/17/2021    Tobacco use [Z72.0] 04/24/2014        Procedure Note  Indications:  Based on my examination of this patient's wound(s)/ulcer(s) today, debridement is required to promote healing and evaluate the wound base. Performed by: Schuyler House DPM    Consent obtained:  Yes    Time out taken:  Yes    Pain Control:2% lidocaine gel  thin layer applied topically to wound bed      Debridement:Excisional Debridement    Using curette the wound(s)/ulcer(s) was/were sharply debrided down through and including the removal of epidermis, dermis and subcutaneous tissue.         Devitalized Tissue Debrided:  exudate    Pre Debridement Measurements:  Are located in the Wound/Ulcer Documentation Flow Sheet    Wound/Ulcer #: 1 and 2    Post Debridement Measurements:  Wound/Ulcer Descriptions are Pre Debridement except measurements:    Wound 03/25/22 Ankle Left; Anterior #1 (Active)   Wound Image   09/09/22 1118   Wound Etiology Other 09/09/22 1118   Wound Cleansed Cleansed with saline 09/09/22 1118   Dressing/Treatment Antibacterial ointment; Hydrofera blue;Dry dressing; Pharmaceutical agent (see MAR) 09/09/22 1214   Wound Length (cm) 2.7 cm 09/09/22 1118   Wound Width (cm) 1.9 cm 09/09/22 1118   Wound Depth (cm) 0.2 cm 09/09/22 1118   Wound Surface Area (cm^2) 5.13 cm^2 09/09/22 1118   Change in Wound Size % (l*w) -60.31 09/09/22 1118   Wound Volume (cm^3) 1.026 cm^3 09/09/22 1118   Wound Healing % -7 09/09/22 1118   Post-Procedure Length (cm) 2.7 cm 09/09/22 1151   Post-Procedure Width (cm) 1.9 cm 09/09/22 1151   Post-Procedure Depth (cm) 0.2 cm 09/09/22 1151   Post-Procedure Surface Area (cm^2) 5.13 cm^2 09/09/22 1151   Post-Procedure Volume (cm^3) 1.026 cm^3 09/09/22 1151   Wound Assessment [address]/red;Slough 09/09/22 1118   Drainage Amount Moderate 09/09/22 1118   Drainage Description Serous; Yellow 09/09/22 1118   Odor Fecal 09/09/22 1118   Ela-wound Assessment Intact;Fragile 09/09/22 1118   Margins Defined edges 09/09/22 1118   Wound Thickness Description not for Pressure Injury Full thickness 09/09/22 1118   Number of days: 168       Wound 08/30/22 Ankle Left;Medial #2 (Active)   Wound Image   09/09/22 1118   Wound Etiology Other 09/09/22 1118   Wound Cleansed Cleansed with saline 09/09/22 1118   Dressing/Treatment Antibacterial ointment; Hydrofera blue;Dry dressing; Pharmaceutical agent (see MAR) 09/09/22 1214   Wound Length (cm) 1.3 cm 09/09/22 1118   Wound Width (cm) 1.3 cm 09/09/22 1118   Wound Depth (cm) 0.2 cm 09/09/22 1118   Wound Surface Area (cm^2) 1.69 cm^2 09/09/22 1118   Change in Wound Size % (l*w) 13.33 09/09/22 1118   Wound Volume (cm^3) 0.338 cm^3 09/09/22 1118   Wound Healing % 13 09/09/22 1118   Post-Procedure Length (cm) 1.3 cm 09/09/22 1151 Post-Procedure Width (cm) 1.3 cm 09/09/22 1151   Post-Procedure Depth (cm) 0.2 cm 09/09/22 1151   Post-Procedure Surface Area (cm^2) 1.69 cm^2 09/09/22 1151   Post-Procedure Volume (cm^3) 0.338 cm^3 09/09/22 1151   Wound Assessment Donald/red;Slough 09/09/22 1118   Drainage Amount Small 09/09/22 1118   Drainage Description Serous; Yellow 09/09/22 1118   Odor None 09/09/22 1118   Ela-wound Assessment Intact;Fragile 09/09/22 1118   Margins Defined edges 09/09/22 1118   Wound Thickness Description not for Pressure Injury Full thickness 09/09/22 1118   Number of days: 10         Percent of Wound/Ulcer Debrided: 100%    Total Surface Area Debrided:  6.82 sq cm     Diabetic/Pressure/Non Pressure Ulcers:  Ulcer: Non-Pressure ulcer, fat layer exposed      Bleeding:  Minimal    Hemostasis Achieved:  by pressure    Procedural Pain:  5  / 10     Post Procedural Pain:  1 / 10     Response to treatment:  Well tolerated by patient. Plan:     Patient examined and debrided  Gentamicin and Hydrofera blue and DSD by [facility] emphasized  Follow up in two weeks for possible theraskin      Treatment Note please see attached Discharge Instructions    Written patient dismissal instructions given to patient and signed by patient or POA. Discharge [facility]   Physician Orders and Discharge Instructions  [address]  Telephone: [phone]        NAME:  Deja Garcia OF BIRTH:  1945  MEDICAL RECORD NUMBER:  [id number]     Your  is:  Nicholas Courtney Care/Facility:  [facility] CARE     Wound Location:   LEFT ANTERIOR ANKLE AND LEFT MEDIAL ANKLE  Dressing orders:  1. CLEANSE WOUND GENTLY WITH NORMAL SALINE.   2. APPLY GENTAMICIN OINTMENT THEN HYDROFERA BLUE READY. 3. COVER AND SECURE WITH DRY DRESSING. 4. CHANGE DRESSING THREE TIMES A WEEK. Compression:     Offloading Device:     Other Instructions:   Keep all dressings clean, dry and intact. Keep pressure off the wound(s) at all times. Follow up visit  2 WEEKS    September 23, 2022 AT       Please give 24 hour notice if unable to keep appointment. [phone]     If you experience any of the following, please call the Wound Care Service at  [phone] or go to the nearest emergency room. *Increase in pain         *Temperature over 101           *Increase in drainage from your wound or a foul odor  *Uncontrolled swelling            *Need for compression bandage changes due to slippage, breakthrough drainage       PLEASE NOTE: IF YOU ARE UNABLE TO OBTAIN WOUND SUPPLIES, CONTINUE TO USE THE SUPPLIES YOU HAVE AVAILABLE UNTIL YOU ARE ABLE TO REACH US.  IT IS MOST IMPORTANT TO KEEP THE WOUND COVERED AT ALL TIMES  Electronically signed by Edmund Suarez DPM on 9/9/2022 at 12:17 PM        Electronically signed by Edmund Suarez DPM on 9/9/2022 at 12:19 PM

## 2022-09-09 NOTE — DISCHARGE INSTRUCTIONS
[facility]   Physician Orders and Discharge Instructions  [facility]  [address] [facility]  [facility]  Telephone: [phone]        NAME:  Janet Ken OF BIRTH:  1945  MEDICAL RECORD NUMBER:  [id number]     Your  is:  Nicholas Courtney Care/Facility:  [facility] CARE     Wound Location:   LEFT ANTERIOR ANKLE AND LEFT MEDIAL ANKLE  Dressing orders:  1. CLEANSE WOUND GENTLY WITH NORMAL SALINE. 2. APPLY GENTAMICIN OINTMENT THEN HYDROFERA BLUE READY. 3. COVER AND SECURE WITH DRY DRESSING. 4. CHANGE DRESSING THREE TIMES A WEEK. Compression:     Offloading Device:     Other Instructions:   Keep all dressings clean, dry and intact. Keep pressure off the wound(s) at all times. Follow up visit  2 WEEKS    September 23, 2022 AT       Please give 24 hour notice if unable to keep appointment. [phone]     If you experience any of the following, please call the Wound Care Service at  [phone] or go to the nearest emergency room. *Increase in pain         *Temperature over 101           *Increase in drainage from your wound or a foul odor  *Uncontrolled swelling            *Need for compression bandage changes due to slippage, breakthrough drainage       PLEASE NOTE: IF YOU ARE UNABLE TO OBTAIN WOUND SUPPLIES, CONTINUE TO USE THE SUPPLIES YOU HAVE AVAILABLE UNTIL YOU ARE ABLE TO REACH US.  IT IS MOST IMPORTANT TO KEEP THE WOUND COVERED AT ALL TIMES  Electronically signed by Damian Samuels DPM on 9/9/2022 at 12:17 PM

## 2022-09-09 NOTE — CODE DOCUMENTATION
[facility] Physician Billing Sheet. Amarjit Hanson  AGE: 64 y.o.    GENDER: male  : 1945  TODAY'S DATE:  2022    ICD-10 CODES  Active Hospital Problems    Diagnosis Date Noted    Non-pressure chronic ulcer of left ankle with fat layer exposed ([facility] [address] [L97.322] 2021    Tobacco use [Z72.0] 2014       PHYSICIAN PROCEDURES    CPT CODE  05014 LT      Electronically signed by Joanna Valencia DPM on 2022 at 12:29 PM

## 2022-09-20 ENCOUNTER — HOSPITAL ENCOUNTER (OUTPATIENT)
Dept: WOUND CARE | Age: 77
Discharge: HOME | End: 2022-09-20
Payer: MEDICARE

## 2022-09-20 VITALS
RESPIRATION RATE: 18 BRPM | DIASTOLIC BLOOD PRESSURE: 60 MMHG | TEMPERATURE: 97.6 F | SYSTOLIC BLOOD PRESSURE: 123 MMHG | HEART RATE: 61 BPM

## 2022-09-20 DIAGNOSIS — L97.322 NON-PRESSURE CHRONIC ULCER OF LEFT ANKLE WITH FAT LAYER EXPOSED (HCC): Primary | ICD-10-CM

## 2022-09-20 PROCEDURE — 6370000000 HC RX 637 (ALT 250 FOR IP): Performed by: PODIATRIST

## 2022-09-20 PROCEDURE — 11042 DBRDMT SUBQ TIS 1ST 20SQCM/<: CPT

## 2022-09-20 RX ORDER — CLOBETASOL PROPIONATE 0.5 MG/G
OINTMENT TOPICAL ONCE
Status: CANCELLED | OUTPATIENT
Start: 2022-09-20 | End: 2022-09-20

## 2022-09-20 RX ORDER — GENTAMICIN SULFATE 1 MG/G
OINTMENT TOPICAL ONCE
Status: CANCELLED | OUTPATIENT
Start: 2022-09-20 | End: 2022-09-20

## 2022-09-20 RX ORDER — LIDOCAINE HYDROCHLORIDE 20 MG/ML
JELLY TOPICAL ONCE
Status: CANCELLED | OUTPATIENT
Start: 2022-09-20 | End: 2022-09-20

## 2022-09-20 RX ORDER — BETAMETHASONE DIPROPIONATE 0.05 %
OINTMENT (GRAM) TOPICAL ONCE
Status: CANCELLED | OUTPATIENT
Start: 2022-09-20 | End: 2022-09-20

## 2022-09-20 RX ORDER — LIDOCAINE 40 MG/G
CREAM TOPICAL ONCE
Status: CANCELLED | OUTPATIENT
Start: 2022-09-20 | End: 2022-09-20

## 2022-09-20 RX ORDER — LIDOCAINE HYDROCHLORIDE 40 MG/ML
SOLUTION TOPICAL ONCE
Status: CANCELLED | OUTPATIENT
Start: 2022-09-20 | End: 2022-09-20

## 2022-09-20 RX ORDER — BACITRACIN ZINC AND POLYMYXIN B SULFATE 500; 1000 [USP'U]/G; [USP'U]/G
OINTMENT TOPICAL ONCE
Status: CANCELLED | OUTPATIENT
Start: 2022-09-20 | End: 2022-09-20

## 2022-09-20 RX ORDER — LIDOCAINE HYDROCHLORIDE 20 MG/ML
JELLY TOPICAL ONCE
Status: COMPLETED | OUTPATIENT
Start: 2022-09-20 | End: 2022-09-20

## 2022-09-20 RX ORDER — GINSENG 100 MG
CAPSULE ORAL ONCE
Status: CANCELLED | OUTPATIENT
Start: 2022-09-20 | End: 2022-09-20

## 2022-09-20 RX ORDER — LIDOCAINE 50 MG/G
OINTMENT TOPICAL ONCE
Status: CANCELLED | OUTPATIENT
Start: 2022-09-20 | End: 2022-09-20

## 2022-09-20 RX ORDER — BACITRACIN, NEOMYCIN, POLYMYXIN B 400; 3.5; 5 [USP'U]/G; MG/G; [USP'U]/G
OINTMENT TOPICAL ONCE
Status: CANCELLED | OUTPATIENT
Start: 2022-09-20 | End: 2022-09-20

## 2022-09-20 RX ADMIN — LIDOCAINE HYDROCHLORIDE: 20 JELLY TOPICAL at 16:03

## 2022-09-20 NOTE — CODE DOCUMENTATION
[address] Physician Billing Sheet. Katya Tomlin  AGE: 64 y.o.    GENDER: male  : 1945  TODAY'S DATE:  2022    ICD-10 CODES  Active Hospital Problems    Diagnosis Date Noted    Non-pressure chronic ulcer of left ankle with fat layer exposed [facility]) [L97.322] 2021       PHYSICIAN PROCEDURES    CPT CODE  99242 LT      Electronically signed by Teresa Erickson DPM on 2022 at 4:15 PM

## 2022-09-20 NOTE — PROGRESS NOTES
RIGHT CARPAL TUNNEL RELEASE performed by Latrice Ojeda MD at [address]  2016    EYE SURGERY      to have Phaco with IOL OU 2018    HERNIA REPAIR      JOINT REPLACEMENT Left     LTHR    JOINT REPLACEMENT Right     RTKR    KNEE SURGERY Right     arthroscopy    NM MANIPULATN KNEE JT+ANESTHESIA Right 2017    RIGHT KNEE MANIPULATION UNDER ANESTHESIA performed by Neeta Noel MD at [address] [facility] OFFICE/OUTPT VISIT,PROCEDURE ONLY Bilateral 2017    RIGHT AND BILATERAL L 4-5 LYSIS OF SCAR DISKECTOMY INTERBODY CAGE FUSION POSTEROLATERAL FUSION PEDICLE SCREWS performed by Latrice Ojeda MD at [address]      benign    [facility] Right 3/24/2017    RIGHT  KNEE TOTAL ARTHROPLASTY, Ranny Fallow CEMENTED PERSONA  performed by Neeta Noel MD at [address] [facility] HISTORY    Family History   Problem Relation Age of Onset    Osteoarthritis Mother     Emphysema Mother     Hypertension Father     Hearing Loss Father     Dementia Father     No Known Problems Sister     Prostate Cancer Brother     Colon Polyps Neg Hx        SOCIAL HISTORY    Social History     Tobacco Use    Smoking status: Former     Packs/day: 0.00     Years: 60.00     Pack years: 0.00     Types: Cigarettes     Quit date: 2021     Years since quittin.7    Smokeless tobacco: Never    Tobacco comments:      smokes 2 cigars a day   Vaping Use    Vaping Use: Never used   Substance Use Topics    Alcohol use: Not Currently     Alcohol/week: 12.0 standard drinks     Types: 12 Shots of liquor per week     Comment: socail drinking    Drug use: No       ALLERGIES    Allergies   Allergen Reactions    Morphine        MEDICATIONS    Current Outpatient Medications on File Prior to Encounter   Medication Sig Dispense Refill    cephALEXin (KEFLEX) 500 MG capsule Take 1 capsule by mouth 2 times daily 20 capsule 0    gentamicin (GARAMYCIN) 0.1 % ointment Apply topically  daily. 30 g 1    oxyCODONE-acetaminophen (PERCOCET) 7.5-325 MG per tablet Take 1 tablet by mouth five times a day as needed for pain      citalopram (CELEXA) 40 MG tablet TAKE ONE TABLET BY MOUTH nightly 30 tablet 5    atorvastatin (LIPITOR) 20 MG tablet TAKE ONE TABLET BY MOUTH DAILY 90 tablet 3    clopidogrel (PLAVIX) 75 MG tablet Take 1 tablet by mouth daily 90 tablet 3    diclofenac (VOLTAREN) 50 MG EC tablet TAKE ONE TABLET BY MOUTH TWO TIMES A DAY 60 tablet 5    isosorbide mononitrate (IMDUR) 30 MG extended release tablet TAKE ONE TABLET BY MOUTH EVERY DAY 90 tablet 3    bumetanide (BUMEX) 2 MG tablet Take 1 tablet by mouth daily 90 tablet 3    metoprolol tartrate (LOPRESSOR) 50 MG tablet Take 0.5 tablets by mouth 2 times daily TAKE ONE TABLET BY MOUTH TWO TIMES A  tablet 3    omeprazole (PRILOSEC) 10 MG delayed release capsule TAKE ONE CAPSULE BY MOUTH DAILY 90 capsule 2    finasteride (PROSCAR) 5 MG tablet Take 1 tablet by mouth daily 90 tablet 3    tiotropium (SPIRIVA RESPIMAT) 2.5 MCG/ACT AERS inhaler Inhale 2 puffs into the lungs daily 1 Inhaler 3    Fluticasone furoate-vilanterol (BREO ELLIPTA) 200-25 MCG/INH AEPB inhaler Inhale 1 puff into the lungs daily 1 each 3    albuterol sulfate  (90 Base) MCG/ACT inhaler Inhale 2 puffs into the lungs every 6 hours as needed for Wheezing 1 Inhaler 3    nitroGLYCERIN (NITROSTAT) 0.4 MG SL tablet Place 1 tablet under the tongue every 5 minutes as needed for Chest pain up to max of 3 total doses. If no relief after 1 dose, call 911. 25 tablet 3    DOCUSATE CALCIUM PO Take by mouth as needed       No current facility-administered medications on file prior to encounter. REVIEW OF SYSTEMS    Pertinent items are noted in HPI.     Objective:      /60   Pulse 61   Temp 97.6 °F (36.4 °C) (Temporal)   Resp 18     Wt Readings from Last 3 Encounters:   08/11/22 160 lb (72.6 kg)   07/12/22 168 lb (76.2 kg) measurements:    Wound 03/25/22 Ankle Left; Anterior #1 (Active)   Wound Image   09/20/22 1551   Wound Etiology Other 09/20/22 1551   Wound Cleansed Cleansed with saline 09/20/22 1551   Dressing/Treatment Other (comment) 09/20/22 1617   Wound Length (cm) 2.7 cm 09/20/22 1551   Wound Width (cm) 1.8 cm 09/20/22 1551   Wound Depth (cm) 0.2 cm 09/20/22 1551   Wound Surface Area (cm^2) 4.86 cm^2 09/20/22 1551   Change in Wound Size % (l*w) -51.88 09/20/22 1551   Wound Volume (cm^3) 0.972 cm^3 09/20/22 1551   Wound Healing % -1 09/20/22 1551   Post-Procedure Length (cm) 2.7 cm 09/20/22 1613   Post-Procedure Width (cm) 1.8 cm 09/20/22 1613   Post-Procedure Depth (cm) 0.2 cm 09/20/22 1613   Post-Procedure Surface Area (cm^2) 4.86 cm^2 09/20/22 1613   Post-Procedure Volume (cm^3) 0.972 cm^3 09/20/22 1613   Wound Assessment [place]/red;Slough 09/20/22 1551   Drainage Amount Small 09/20/22 1551   Drainage Description Serous; Yellow 09/20/22 1551   Odor None 09/20/22 1551   Ela-wound Assessment Intact;Fragile 09/20/22 1551   Margins Defined edges 09/20/22 1551   Wound Thickness Description not for Pressure Injury Full thickness 09/20/22 1551   Number of days: 179       Wound 08/30/22 Ankle Left;Medial #2 (Active)   Wound Image   09/20/22 1551   Wound Etiology Other 09/20/22 1551   Wound Cleansed Cleansed with saline 09/20/22 1551   Dressing/Treatment Other (comment) 09/20/22 1617   Wound Length (cm) 1.1 cm 09/20/22 1551   Wound Width (cm) 1.2 cm 09/20/22 1551   Wound Depth (cm) 0.2 cm 09/20/22 1551   Wound Surface Area (cm^2) 1.32 cm^2 09/20/22 1551   Change in Wound Size % (l*w) 32.31 09/20/22 1551   Wound Volume (cm^3) 0.264 cm^3 09/20/22 1551   Wound Healing % 32 09/20/22 1551   Post-Procedure Length (cm) 1.1 cm 09/20/22 1613   Post-Procedure Width (cm) 1.2 cm 09/20/22 1613   Post-Procedure Depth (cm) 0.2 cm 09/20/22 1613   Post-Procedure Surface Area (cm^2) 1.32 cm^2 09/20/22 1613   Post-Procedure Volume (cm^3) 0.264 cm^3 09/20/22 1613   Wound Assessment [place]/red;Slough 09/20/22 1551   Drainage Amount Small 09/20/22 1551   Drainage Description Serous 09/20/22 1551   Odor None 09/20/22 1551   Ela-wound Assessment Intact;Fragile 09/20/22 1551   Margins Defined edges 09/20/22 1551   Wound Thickness Description not for Pressure Injury Full thickness 09/20/22 1551   Number of days: 21         Percent of Wound/Ulcer Debrided: 100%    Total Surface Area Debrided:  6.18 sq cm     Diabetic/Pressure/Non Pressure Ulcers:  Ulcer: Non-Pressure ulcer, fat layer exposed      Bleeding:  Minimal    Hemostasis Achieved:  by pressure    Procedural Pain:  5  / 10     Post Procedural Pain:  1 / 10     Response to treatment:  Well tolerated by patient. Plan:     Patient examined and debrided  Santyl and moistened saline dressing daily  Cigar smoking cessation emphasized  Follow up in two weeks for possible theraskin      Treatment Note please see attached Discharge Instructions    Written patient dismissal instructions given to patient and signed by patient or POA. Discharge [address]   Physician Orders and Discharge Instructions  [address]  Telephone: [phone]        NAME:  Angelo Somers OF BIRTH:  1945  MEDICAL RECORD NUMBER:  [id number]     Your  is:  Nicholas Courtney Care/Facility:  [facility] CARE     Wound Location:   LEFT ANTERIOR ANKLE AND LEFT MEDIAL ANKLE  Dressing orders: 1. Cleanse wound(s) with normal saline. 2. Apply a nickel thickness layer of SANTYL OINTMENT to the wound bed for enzymatic debridement purposes. 3. Apply a moistened saline 4x4 (gauze pad) over the Santyl Ointment.   4. Cover with additional dry 4x4's and wrap with gauze (graciela or kerlix). 5. Change Every Day. Compression:  NONE     Offloading Device:     Other Instructions:   Keep all dressings clean, dry and intact. Keep pressure off the wound(s) at all times. Follow up visit  2 WEEKS    October 4, 2022 AT       Please give 24 hour notice if unable to keep appointment. [phone]     If you experience any of the following, please call the Wound Care Service at  [phone] or go to the nearest emergency room. *Increase in pain         *Temperature over 101           *Increase in drainage from your wound or a foul odor  *Uncontrolled swelling            *Need for compression bandage changes due to slippage, breakthrough drainage       PLEASE NOTE: IF YOU ARE UNABLE TO OBTAIN WOUND SUPPLIES, CONTINUE TO USE THE SUPPLIES YOU HAVE AVAILABLE UNTIL YOU ARE ABLE TO REACH US.  IT IS MOST IMPORTANT TO KEEP THE WOUND COVERED AT ALL TIMES      Electronically signed by David Rivero DPM on 9/20/2022 at 4:16 PM

## 2022-09-20 NOTE — DISCHARGE INSTRUCTIONS
[address] [facility]   Physician Orders and Discharge Instructions  [address]  Telephone: [phone]        NAME:  Olayinka Polanco OF BIRTH:  1945  MEDICAL RECORD NUMBER:  [id number]     Your  is:  Nicholas Courtney Care/Facility:  [facility] CARE     Wound Location:   LEFT ANTERIOR ANKLE AND LEFT MEDIAL ANKLE  Dressing orders: 1. Cleanse wound(s) with normal saline. 2. Apply a nickel thickness layer of SANTYL OINTMENT to the wound bed for enzymatic debridement purposes. 3. Apply a moistened saline 4x4 (gauze pad) over the Santyl Ointment. 4. Cover with additional dry 4x4's and wrap with gauze (graciela or kerlix). 5. Change Every Day. Compression:  NONE     Offloading Device:     Other Instructions:   Keep all dressings clean, dry and intact. Keep pressure off the wound(s) at all times. Follow up visit  2 WEEKS    October 4, 2022 AT       Please give 24 hour notice if unable to keep appointment. [phone]     If you experience any of the following, please call the Wound Care Service at  [phone] or go to the nearest emergency room. *Increase in pain         *Temperature over 101           *Increase in drainage from your wound or a foul odor  *Uncontrolled swelling            *Need for compression bandage changes due to slippage, breakthrough drainage       PLEASE NOTE: IF YOU ARE UNABLE TO OBTAIN WOUND SUPPLIES, CONTINUE TO USE THE SUPPLIES YOU HAVE AVAILABLE UNTIL YOU ARE ABLE TO REACH US.  IT IS MOST IMPORTANT TO KEEP THE WOUND COVERED AT ALL TIMES

## 2022-10-04 ENCOUNTER — HOSPITAL ENCOUNTER (OUTPATIENT)
Dept: WOUND CARE | Age: 77
Discharge: HOME | End: 2022-10-04
Payer: MEDICARE

## 2022-10-04 VITALS
RESPIRATION RATE: 18 BRPM | HEART RATE: 65 BPM | TEMPERATURE: 97.7 F | SYSTOLIC BLOOD PRESSURE: 132 MMHG | DIASTOLIC BLOOD PRESSURE: 62 MMHG

## 2022-10-04 DIAGNOSIS — L97.322 NON-PRESSURE CHRONIC ULCER OF LEFT ANKLE WITH FAT LAYER EXPOSED (HCC): Primary | ICD-10-CM

## 2022-10-04 DIAGNOSIS — C34.92 NSCLC OF LEFT LUNG (HCC): ICD-10-CM

## 2022-10-04 PROCEDURE — 6370000000 HC RX 637 (ALT 250 FOR IP): Performed by: PODIATRIST

## 2022-10-04 PROCEDURE — 99213 OFFICE O/P EST LOW 20 MIN: CPT

## 2022-10-04 RX ORDER — LIDOCAINE HYDROCHLORIDE 40 MG/ML
SOLUTION TOPICAL ONCE
Status: CANCELLED | OUTPATIENT
Start: 2022-10-04 | End: 2022-10-04

## 2022-10-04 RX ORDER — LIDOCAINE 50 MG/G
OINTMENT TOPICAL ONCE
Status: CANCELLED | OUTPATIENT
Start: 2022-10-04 | End: 2022-10-04

## 2022-10-04 RX ORDER — LIDOCAINE HYDROCHLORIDE 20 MG/ML
JELLY TOPICAL ONCE
Status: CANCELLED | OUTPATIENT
Start: 2022-10-04 | End: 2022-10-04

## 2022-10-04 RX ORDER — LIDOCAINE 40 MG/G
CREAM TOPICAL ONCE
Status: CANCELLED | OUTPATIENT
Start: 2022-10-04 | End: 2022-10-04

## 2022-10-04 RX ORDER — GENTAMICIN SULFATE 1 MG/G
OINTMENT TOPICAL ONCE
Status: CANCELLED | OUTPATIENT
Start: 2022-10-04 | End: 2022-10-04

## 2022-10-04 RX ORDER — GINSENG 100 MG
CAPSULE ORAL ONCE
Status: CANCELLED | OUTPATIENT
Start: 2022-10-04 | End: 2022-10-04

## 2022-10-04 RX ORDER — BACITRACIN ZINC AND POLYMYXIN B SULFATE 500; 1000 [USP'U]/G; [USP'U]/G
OINTMENT TOPICAL ONCE
Status: CANCELLED | OUTPATIENT
Start: 2022-10-04 | End: 2022-10-04

## 2022-10-04 RX ORDER — BACITRACIN, NEOMYCIN, POLYMYXIN B 400; 3.5; 5 [USP'U]/G; MG/G; [USP'U]/G
OINTMENT TOPICAL ONCE
Status: CANCELLED | OUTPATIENT
Start: 2022-10-04 | End: 2022-10-04

## 2022-10-04 RX ORDER — LIDOCAINE HYDROCHLORIDE 20 MG/ML
JELLY TOPICAL ONCE
Status: COMPLETED | OUTPATIENT
Start: 2022-10-04 | End: 2022-10-04

## 2022-10-04 RX ORDER — BETAMETHASONE DIPROPIONATE 0.05 %
OINTMENT (GRAM) TOPICAL ONCE
Status: CANCELLED | OUTPATIENT
Start: 2022-10-04 | End: 2022-10-04

## 2022-10-04 RX ORDER — CLOBETASOL PROPIONATE 0.5 MG/G
OINTMENT TOPICAL ONCE
Status: CANCELLED | OUTPATIENT
Start: 2022-10-04 | End: 2022-10-04

## 2022-10-04 RX ADMIN — LIDOCAINE HYDROCHLORIDE: 20 JELLY TOPICAL at 16:25

## 2022-10-04 NOTE — PLAN OF CARE
Problem: Cognitive:  Goal: Knowledge of wound care  Description: Knowledge of wound care  Outcome: Progressing  Goal: Understands risk factors for wounds  Description: Understands risk factors for wounds  Outcome: Progressing     Problem: Wound:  Goal: Will show signs of wound healing; wound closure and no evidence of infection  Description: Will show signs of wound healing; wound closure and no evidence of infection  Outcome: Progressing     Problem: Falls - Risk of:  Goal: Will remain free from falls  Description: Will remain free from falls  Outcome: Progressing

## 2022-10-04 NOTE — DISCHARGE INSTRUCTIONS
[facility]   Physician Orders and Discharge Instructions  [address]  Telephone: [phone]        NAME:  Deondre Luong OF BIRTH:  1945  MEDICAL RECORD NUMBER:  [id number]     Your  is:  Nicholas Myers Care/Facility:  [facility] CARE     Wound Location:   LEFT ANTERIOR ANKLE AND LEFT MEDIAL ANKLE  Dressing orders: 1. Cleanse wound(s) with normal saline. 2. Apply a nickel thickness layer of SANTYL OINTMENT to the wound bed for enzymatic debridement purposes. 3. Apply HYDROFERA BLUE READY over the Santyl Ointment. 4. Cover with secure with a dry dressing. 5. Change Every Day. Compression:  NONE     Offloading Device:     Other Instructions:   have blood work done as soon as possible, make an appointment with Dr. Gary Nolasco all dressings clean, dry and intact. Keep pressure off the wound(s) at all times. Follow up visit  2 WEEKS    October 18, 2022 AT       Please give 24 hour notice if unable to keep appointment. [phone]     If you experience any of the following, please call the Wound Care Service at  [phone] or go to the nearest emergency room. *Increase in pain         *Temperature over 101           *Increase in drainage from your wound or a foul odor  *Uncontrolled swelling            *Need for compression bandage changes due to slippage, breakthrough drainage       PLEASE NOTE: IF YOU ARE UNABLE TO OBTAIN WOUND SUPPLIES, CONTINUE TO USE THE SUPPLIES YOU HAVE AVAILABLE UNTIL YOU ARE ABLE TO REACH US.  IT IS MOST IMPORTANT TO KEEP THE WOUND COVERED AT ALL TIMES  Electronically signed by Hussain Duvall DPM on 10/4/2022 at 4:45 PM

## 2022-10-04 NOTE — PROGRESS NOTES
[address] [address] [address]                                                   Progress Note and Procedure Note      Yohana Gamez RECORD NUMBER:  [id number]  AGE: 64 y.o. GENDER: male  : 1945  EPISODE DATE:  10/4/2022    Subjective:     Chief Complaint   Patient presents with    Wound Check     L leg         HISTORY of PRESENT ILLNESS HPI      Ana Shen is a 64 y.o. male who presents today for wound/ulcer evaluation. History of Wound Context: Patient presents for a reoccurrence of an non healing ulcer of the left ankle. Patient is s/p [place] #1  No new complaints noted. Wound/Ulcer Pain Timing/Severity: intermittent  Quality of pain: tender  Severity:   10   Modifying Factors: None  Associated Signs/Symptoms: drainage, odor and pain    Ulcer Identification:  Ulcer Type: venous and undetermined  Contributing Factors: edema and venous stasis    Wound: N/A        PAST MEDICAL HISTORY        Diagnosis Date    Alcohol abuse     quit drinking 21    CAD (coronary artery disease)     patient states no doctor has told him this / has 1 cardiac stent    Cancer ([address]     lung LLL    Chronic back pain     Chronic kidney disease     Chronic obstructive pulmonary disease, unspecified COPD type ([address] 3/24/2022    Chronic sinusitis     DJD (degenerative joint disease) of knee     left knee DJD    Elevated PSA     History of blood transfusion     History of inferior wall myocardial infarction 2016    1 cardiac stent    HTN (hypertension)     meds .  1 yr    Hyperlipidemia     meds > 12 yrs    NSTEMI (non-ST elevated myocardial infarction) ([address]     Smoker        PAST SURGICAL HISTORY    Past Surgical History:   Procedure Laterality Date    ABDOMEN SURGERY      spleenectomy due to [address]      lumbar disc OR    CARDIAC SURGERY      stents    CARPAL TUNNEL RELEASE Right 2019    RIGHT CARPAL TUNNEL RELEASE performed by Gary Browning MD at [place] [address] CORONARY ANGIOPLASTY WITH STENT PLACEMENT  2016    EYE SURGERY      to have Phaco with IOL OU 2018    HERNIA REPAIR      JOINT REPLACEMENT Left     LTHR    JOINT REPLACEMENT Right     RTKR    KNEE SURGERY Right     arthroscopy    AL MANIPULATN KNEE JT+ANESTHESIA Right 2017    RIGHT KNEE MANIPULATION UNDER ANESTHESIA performed by Marcus Sharma MD at [facility] OFFICE/OUTPT VISIT,PROCEDURE ONLY Bilateral 2017    RIGHT AND BILATERAL L 4-5 LYSIS OF SCAR DISKECTOMY INTERBODY CAGE FUSION POSTEROLATERAL FUSION PEDICLE SCREWS performed by Rafal Frey MD at [facility] [address]  2004    benign    [address] [place] [address] Right 3/24/2017    RIGHT  KNEE TOTAL ARTHROPLASTY, Paralee Julian CEMENTED PERSONA  performed by Marcus Sharma MD at [facility] HISTORY    Family History   Problem Relation Age of Onset    Osteoarthritis Mother     Emphysema Mother     Hypertension Father     Hearing Loss Father     Dementia Father     No Known Problems Sister     Prostate Cancer Brother     Colon Polyps Neg Hx        SOCIAL HISTORY    Social History     Tobacco Use    Smoking status: Former     Packs/day: 0.00     Years: 60.00     Pack years: 0.00     Types: Cigarettes     Quit date: 2021     Years since quittin.7    Smokeless tobacco: Never    Tobacco comments:      smokes 2 cigars a day   Vaping Use    Vaping Use: Never used   Substance Use Topics    Alcohol use: Not Currently     Alcohol/week: 12.0 standard drinks     Types: 12 Shots of liquor per week     Comment: socail drinking    Drug use: No       ALLERGIES    Allergies   Allergen Reactions    Morphine        MEDICATIONS    Current Outpatient Medications on File Prior to Encounter   Medication Sig Dispense Refill    cephALEXin (KEFLEX) 500 MG capsule Take 1 capsule by mouth 2 times daily 20 capsule 0    gentamicin (GARAMYCIN) 0.1 % ointment Apply topically  daily.  30 g 1    oxyCODONE-acetaminophen (PERCOCET) 7.5-325 MG per tablet Take 1 tablet by mouth five times a day as needed for pain      citalopram (CELEXA) 40 MG tablet TAKE ONE TABLET BY MOUTH nightly 30 tablet 5    atorvastatin (LIPITOR) 20 MG tablet TAKE ONE TABLET BY MOUTH DAILY 90 tablet 3    clopidogrel (PLAVIX) 75 MG tablet Take 1 tablet by mouth daily 90 tablet 3    diclofenac (VOLTAREN) 50 MG EC tablet TAKE ONE TABLET BY MOUTH TWO TIMES A DAY 60 tablet 5    isosorbide mononitrate (IMDUR) 30 MG extended release tablet TAKE ONE TABLET BY MOUTH EVERY DAY 90 tablet 3    bumetanide (BUMEX) 2 MG tablet Take 1 tablet by mouth daily 90 tablet 3    metoprolol tartrate (LOPRESSOR) 50 MG tablet Take 0.5 tablets by mouth 2 times daily TAKE ONE TABLET BY MOUTH TWO TIMES A  tablet 3    omeprazole (PRILOSEC) 10 MG delayed release capsule TAKE ONE CAPSULE BY MOUTH DAILY 90 capsule 2    finasteride (PROSCAR) 5 MG tablet Take 1 tablet by mouth daily 90 tablet 3    tiotropium (SPIRIVA RESPIMAT) 2.5 MCG/ACT AERS inhaler Inhale 2 puffs into the lungs daily 1 Inhaler 3    Fluticasone furoate-vilanterol (BREO ELLIPTA) 200-25 MCG/INH AEPB inhaler Inhale 1 puff into the lungs daily 1 each 3    albuterol sulfate  (90 Base) MCG/ACT inhaler Inhale 2 puffs into the lungs every 6 hours as needed for Wheezing 1 Inhaler 3    nitroGLYCERIN (NITROSTAT) 0.4 MG SL tablet Place 1 tablet under the tongue every 5 minutes as needed for Chest pain up to max of 3 total doses. If no relief after 1 dose, call 911. 25 tablet 3    DOCUSATE CALCIUM PO Take by mouth as needed       No current facility-administered medications on file prior to encounter. REVIEW OF SYSTEMS    Pertinent items are noted in HPI.     Objective:      /62   Pulse 65   Temp 97.7 °F (36.5 °C) (Temporal)   Resp 18     Wt Readings from Last 3 Encounters:   08/11/22 160 lb (72.6 kg)   07/12/22 168 lb (76.2 kg)   03/02/22 157 lb (71.2 kg)       PHYSICAL EXAM    Constitutional:   Well nourished and well developed. Appears neat and clean. Patient is alert, oriented x3, and in no apparent distress. Respiratory:  Respiratory effort is easy and symmetric bilaterally. Rate is normal at rest and on room air. Vascular:  Pedal Pulses is palpable and audible with doppler. Capillary refill is <3 sec to digits bilateral.  Extremities positive for 1+ pitting edema. Neurological:   Sensation is intact to lower extremities. Dermatological:  Wound description noted in wound assessment. The wound is deteriorating in size and depth. There is increased drainage and odor noted. The base of the wound has slough and necrotic tissue noted. Mild surrounding erythem  noted. Patient denies any N/V/F/C or signs of systemic infection. Psychiatric:  Judgement and insight intact. Short and long term memory intact. No evidence of depression, anxiety, or agitation. Patient is calm, cooperative, and communicative. Appropriate interactions and affect. Assessment:      Active Hospital Problems    Diagnosis Date Noted    Non-pressure chronic ulcer of left ankle with fat layer exposed ([address] [L97.322] 08/17/2021    Atherosclerosis of native arteries of extremities with rest pain, left leg ([address] [I70.222] 08/17/2021    Tobacco use [Z72.0] 04/24/2014        Procedure Note  Indications:  Based on my examination of this patient's wound(s)/ulcer(s) today, debridement is not  required to promote healing and evaluate the wound base. Wound/Ulcer #: 1    Post Debridement Measurements:  Wound 03/25/22 Ankle Left; Anterior #1 (Active)   Wound Image   10/04/22 1620   Wound Etiology Other 10/04/22 1620   Wound Cleansed Cleansed with saline 10/04/22 1620   Dressing/Treatment Dry dressing;Hydrofera blue; Pharmaceutical agent (see MAR) 10/04/22 1645   Wound Length (cm) 3.2 cm 10/04/22 1620   Wound Width (cm) 2.1 cm 10/04/22 1620   Wound Depth (cm) 0.3 cm 10/04/22 1620   Wound Surface Area (cm^2) 6.72 cm^2 10/04/22 1620 Change in Wound Size % (l*w) -110 10/04/22 1620   Wound Volume (cm^3) 2. 016 cm^3 10/04/22 1620   Wound Healing % -110 10/04/22 1620   Post-Procedure Length (cm) 2.7 cm 09/20/22 1613   Post-Procedure Width (cm) 1.8 cm 09/20/22 1613   Post-Procedure Depth (cm) 0.2 cm 09/20/22 1613   Post-Procedure Surface Area (cm^2) 4.86 cm^2 09/20/22 1613   Post-Procedure Volume (cm^3) 0.972 cm^3 09/20/22 1613   Wound Assessment Pink/red;Slough 10/04/22 1620   Drainage Amount Large 10/04/22 1620   Drainage Description Yellow 10/04/22 1620   Odor None 10/04/22 1620   Ela-wound Assessment Fragile; Hyperpigmented 10/04/22 1620   Margins Defined edges 10/04/22 1620   Wound Thickness Description not for Pressure Injury Full thickness 10/04/22 1620   Number of days: 193       Wound 08/30/22 Ankle Left;Medial #2 (Active)   Wound Image   10/04/22 1620   Wound Etiology Other 10/04/22 1620   Wound Cleansed Cleansed with saline 10/04/22 1620   Dressing/Treatment Other (comment) 09/20/22 1617   Wound Length (cm) 6.8 cm 10/04/22 1620   Wound Width (cm) 5 cm 10/04/22 1620   Wound Depth (cm) 0.2 cm 10/04/22 1620   Wound Surface Area (cm^2) 34 cm^2 10/04/22 1620   Change in Wound Size % (l*w) -1643.59 10/04/22 1620   Wound Volume (cm^3) 6.8 cm^3 10/04/22 1620   Wound Healing % -1644 10/04/22 1620   Post-Procedure Length (cm) 1.1 cm 09/20/22 1613   Post-Procedure Width (cm) 1.2 cm 09/20/22 1613   Post-Procedure Depth (cm) 0.2 cm 09/20/22 1613   Post-Procedure Surface Area (cm^2) 1.32 cm^2 09/20/22 1613   Post-Procedure Volume (cm^3) 0.264 cm^3 09/20/22 1613   Wound Assessment Pink/red;Slough 10/04/22 1620   Drainage Amount Large 10/04/22 1620   Drainage Description Yellow 10/04/22 1620   Odor None 10/04/22 1620   Ela-wound Assessment Fragile; Hyperpigmented 10/04/22 1620   Margins Undefined edges 10/04/22 1620   Wound Thickness Description not for Pressure Injury Full thickness 10/04/22 1620   Number of days: 35       Wound 10/04/22 Ankle Left;Lateral #3 (Active)   Wound Image   10/04/22 1622   Wound Etiology Other 10/04/22 1622   Wound Cleansed Cleansed with saline 10/04/22 1622   Dressing/Treatment Dry dressing;Hydrofera blue; Pharmaceutical agent (see MAR) 10/04/22 1645   Wound Length (cm) 3.6 cm 10/04/22 1622   Wound Width (cm) 4 cm 10/04/22 1622   Wound Depth (cm) 0.1 cm 10/04/22 1622   Wound Surface Area (cm^2) 14.4 cm^2 10/04/22 1622   Wound Volume (cm^3) 1.44 cm^3 10/04/22 1622   Number of days: 0         Plan:     Patient examined   Labs ordered to eval for OM/ DM  Santyl and Hydroferablue  Follow up in 2 weeks        Treatment Note please see attached Discharge Instructions    Written patient dismissal instructions given to patient and signed by patient or POA. Discharge [facility]   Physician Orders and Discharge Instructions  [address]  Telephone: [phone]        NAME:  Jesus Valera OF BIRTH:  1945  MEDICAL RECORD NUMBER:  [id number]     Your  is:  [address] [place] [address] Care/Facility:  [facility]     Wound Location:   LEFT ANTERIOR ANKLE AND LEFT MEDIAL ANKLE  Dressing orders: 1. Cleanse wound(s) with normal saline. 2. Apply a nickel thickness layer of SANTYL OINTMENT to the wound bed for enzymatic debridement purposes. 3. Apply HYDROFERA BLUE READY over the Santyl Ointment. 4. Cover with secure with a dry dressing. 5. Change Every Day. Compression:  NONE     Offloading Device:     Other Instructions:   have blood work done as soon as possible, make an appointment with Dr. Evonne Hernández all dressings clean, dry and intact. Keep pressure off the wound(s) at all times.       Follow up visit  2 WEEKS    October 18, 2022 AT       Please give 24 hour notice if unable to keep appointment. [phone]     If you experience any of the following, please call the Wound Care Service at  [phone] or go to the nearest emergency room. *Increase in pain         *Temperature over 101           *Increase in drainage from your wound or a foul odor  *Uncontrolled swelling            *Need for compression bandage changes due to slippage, breakthrough drainage       PLEASE NOTE: IF YOU ARE UNABLE TO OBTAIN WOUND SUPPLIES, CONTINUE TO USE THE SUPPLIES YOU HAVE AVAILABLE UNTIL YOU ARE ABLE TO REACH US.  IT IS MOST IMPORTANT TO KEEP THE WOUND COVERED AT ALL TIMES  Electronically signed by Jordana Roth DPM on 10/4/2022 at 4:45 PM        Electronically signed by Jordana Roth DPM on 10/4/2022 at 4:48 PM

## 2022-10-04 NOTE — CODE DOCUMENTATION
[id number] Physician Billing Sheet. Jose Tiwari  AGE: 64 y.o.    GENDER: male  : 1945  TODAY'S DATE:  10/4/2022    ICD-10 CODES  Active Hospital Problems    Diagnosis Date Noted    Non-pressure chronic ulcer of left ankle with fat layer exposed ([facility] [L97.322] 2021    Atherosclerosis of native arteries of extremities with rest pain, left leg ([facility] [I70.222] 2021    Tobacco use [Z72.0] 2014       PHYSICIAN PROCEDURES    CPT CODE  26051      Electronically signed by Vero Dominguez DPM on 10/4/2022 at 4:54 PM

## 2022-10-14 ENCOUNTER — OFFICE VISIT (OUTPATIENT)
Dept: CARDIOLOGY CLINIC | Age: 77
End: 2022-10-14
Payer: MEDICARE

## 2022-10-14 VITALS
OXYGEN SATURATION: 93 % | WEIGHT: 166.8 LBS | HEART RATE: 66 BPM | SYSTOLIC BLOOD PRESSURE: 120 MMHG | BODY MASS INDEX: 26.92 KG/M2 | DIASTOLIC BLOOD PRESSURE: 68 MMHG

## 2022-10-14 DIAGNOSIS — I70.222 ATHEROSCLEROSIS OF NATIVE ARTERIES OF EXTREMITIES WITH REST PAIN, LEFT LEG (HCC): ICD-10-CM

## 2022-10-14 DIAGNOSIS — I21.4 NSTEMI (NON-ST ELEVATED MYOCARDIAL INFARCTION) (HCC): ICD-10-CM

## 2022-10-14 DIAGNOSIS — I10 ESSENTIAL HYPERTENSION: ICD-10-CM

## 2022-10-14 DIAGNOSIS — E78.5 DYSLIPIDEMIA: ICD-10-CM

## 2022-10-14 DIAGNOSIS — R60.9 DEPENDENT EDEMA: ICD-10-CM

## 2022-10-14 DIAGNOSIS — R06.09 DOE (DYSPNEA ON EXERTION): ICD-10-CM

## 2022-10-14 DIAGNOSIS — I25.119 CORONARY ARTERY DISEASE INVOLVING NATIVE CORONARY ARTERY OF NATIVE HEART WITH ANGINA PECTORIS (HCC): Primary | ICD-10-CM

## 2022-10-14 DIAGNOSIS — E78.00 PURE HYPERCHOLESTEROLEMIA: ICD-10-CM

## 2022-10-14 LAB
ANION GAP SERPL CALCULATED.3IONS-SCNC: 11 MEQ/L (ref 9–15)
BUN BLDV-MCNC: 33 MG/DL (ref 8–23)
CALCIUM SERPL-MCNC: 9.7 MG/DL (ref 8.5–9.9)
CHLORIDE BLD-SCNC: 97 MEQ/L (ref 95–107)
CO2: 27 MEQ/L (ref 20–31)
CREAT SERPL-MCNC: 0.93 MG/DL (ref 0.7–1.2)
GFR AFRICAN AMERICAN: >60
GFR NON-AFRICAN AMERICAN: >60
GLUCOSE BLD-MCNC: 86 MG/DL (ref 70–99)
HBA1C MFR BLD: 5.4 % (ref 4.8–5.9)
POTASSIUM SERPL-SCNC: 5.4 MEQ/L (ref 3.4–4.9)
SODIUM BLD-SCNC: 135 MEQ/L (ref 135–144)

## 2022-10-14 PROCEDURE — 1123F ACP DISCUSS/DSCN MKR DOCD: CPT | Performed by: INTERNAL MEDICINE

## 2022-10-14 PROCEDURE — 99214 OFFICE O/P EST MOD 30 MIN: CPT | Performed by: INTERNAL MEDICINE

## 2022-10-14 PROCEDURE — G8484 FLU IMMUNIZE NO ADMIN: HCPCS | Performed by: INTERNAL MEDICINE

## 2022-10-14 PROCEDURE — 1036F TOBACCO NON-USER: CPT | Performed by: INTERNAL MEDICINE

## 2022-10-14 PROCEDURE — G8427 DOCREV CUR MEDS BY ELIG CLIN: HCPCS | Performed by: INTERNAL MEDICINE

## 2022-10-14 PROCEDURE — G8417 CALC BMI ABV UP PARAM F/U: HCPCS | Performed by: INTERNAL MEDICINE

## 2022-10-14 ASSESSMENT — ENCOUNTER SYMPTOMS
WHEEZING: 0
EYES NEGATIVE: 1
STRIDOR: 0
BLOOD IN STOOL: 0
SHORTNESS OF BREATH: 1
GASTROINTESTINAL NEGATIVE: 1
NAUSEA: 0
CHEST TIGHTNESS: 0
COUGH: 0

## 2022-10-14 NOTE — PROGRESS NOTES
Subsequent Progress Note  Patient: Gladys Garrido  YOB: 1945  MRN: [id number]    Chief Complaint: cad dizzy htn felder rash  Chief Complaint   Patient presents with    3 Month Follow-Up    Coronary Artery Disease       CV Data:  1/2016 NSTEMI RCA KRISTA  4/2018 echo  55 1TR  4/2018 CUS MIld palque  4/2018 Abd US neg AAA  4/2019 Spect negative   2/2020 CUS mild  9/21 LE Art Duplex - negative. 9/22 LE ART Duplex - mild     Subjective/HPI: occ dizzy + felder no cp no falls no bleed rash right temple for 6 weeks      1/10/2020 More FELDER and more frequent Chest tightness. No falls no bleed. Takes meds. Feels gaseous. 2/7/2020 chest tightness and felder much less since Imdur. Compliant with meds    5/14/2020 TELEHEALTH EVALUATION -- Audio/Visual (During PFWTE-15 public health emergency)    disocered isolated LLL nodule irregualr 1.2 cm suspicious for cancer. PET scan Negative of Mets. No CP no SOB No Bleed    7/14/2020 is declining to have localized LLL lung cancer to be removed. No cp breathing is ok. No falls. No bleeds    10/14/2020 no cp no sob no falls no bleed. Takes meds. Gait is wobbly but no falls. Taking Bumex prn.  lE edema worse. 1/13/21 no cp no sob no falls no bleed. Takes meds. Eats well.     4/22/21 no cp no sob occ dizzy no falls no bleed. Takes a lot of salt    9/9/21 tired all the time. Eats well no cp same FELDER. No worse. No palps no bleed no falls    1/21/22 TELEHEALTH EVALUATION -- Audio/Visual (During KGGRU-93 public health emergency)    Recent fall with shoulder injury and may need surgery. No cp no sob no bleed. Having little edema. 3/2/22  Ankle edema same no worse. Taking a lot of salt no cp no sob no falls no bleed. 7/12/22 doing weel still going to wound clinic for right ankle wound no cp still same felder. No falls no bleed. 10/14/22 still w nonhealing wound Left foot ankle. Going to wound clinic. LE Art Duplex no significant blockages.  No cp no sob no falls no bleed.     Smokes  cigars. No etoh  Retired- supervisor. [employer]  Lives alone    EKG: SR 61  QTc 433    Past Medical History:   Diagnosis Date    Alcohol abuse     quit drinking 8/18/21    CAD (coronary artery disease)     patient states no doctor has told him this / has 1 cardiac stent    Cancer ([facility]     lung LLL    Chronic back pain     Chronic kidney disease     Chronic obstructive pulmonary disease, unspecified COPD type ([facility] 3/24/2022    Chronic sinusitis     DJD (degenerative joint disease) of knee     left knee DJD    Elevated PSA     History of blood transfusion 1980's    History of inferior wall myocardial infarction 01/2016    1 cardiac stent    HTN (hypertension)     meds .  1 yr    Hyperlipidemia     meds > 12 yrs    NSTEMI (non-ST elevated myocardial infarction) ([facility]     Smoker        Past Surgical History:   Procedure Laterality Date    ABDOMEN SURGERY  1961    spleenectomy due to [address]  2009    lumbar disc OR    CARDIAC SURGERY      stents    CARPAL TUNNEL RELEASE Right 5/6/2019    RIGHT CARPAL TUNNEL RELEASE performed by Gilbert Vasquez MD at [address]  1/29/2016    EYE SURGERY      to have Phaco with IOL OU 2/2018    HERNIA REPAIR      JOINT REPLACEMENT Left 1994    LTHR    JOINT REPLACEMENT Right 2009    RTKR    KNEE SURGERY Right 2011    arthroscopy    IL MANIPULATN KNEE JT+ANESTHESIA Right 5/12/2017    RIGHT KNEE MANIPULATION UNDER ANESTHESIA performed by Kris Ramirez MD at [address] [facility] OFFICE/OUTPT VISIT,PROCEDURE ONLY Bilateral 12/29/2017    RIGHT AND BILATERAL L 4-5 LYSIS OF SCAR DISKECTOMY INTERBODY CAGE FUSION POSTEROLATERAL FUSION PEDICLE SCREWS performed by Gilbert Vasquez MD at [address]  2004    benign    [address] [facility] Right 3/24/2017    RIGHT  KNEE TOTAL ARTHROPLASTY, ELHAM CEMENTED PERSONA  performed by Kris Ramirez MD at [address] [facility] History Problem Relation Age of Onset    Osteoarthritis Mother     Emphysema Mother     Hypertension Father     Hearing Loss Father     Dementia Father     No Known Problems Sister     Prostate Cancer Brother     Colon Polyps Neg Hx        Social History     Socioeconomic History    Marital status:    Occupational History    Occupation: retired   Tobacco Use    Smoking status: Former     Packs/day: 0.00     Years: 60.00     Pack years: 0.00     Types: Cigarettes     Quit date: 2021     Years since quittin.7    Smokeless tobacco: Never    Tobacco comments:      smokes 2 cigars a day   Vaping Use    Vaping Use: Never used   Substance and Sexual Activity    Alcohol use: Not Currently     Alcohol/week: 12.0 standard drinks     Types: 12 Shots of liquor per week     Comment: socail drinking    Drug use: No    Sexual activity: Yes   Social History Narrative     He is  and lives alone    Type of Home: [address]: Able to Live on Main level with bedroom/bathroom, Two level, Performs ADL's on one level    Home Access: Stairs to enter with rails    Entrance Stairs - Number of Steps: 3- Rails: Both    Bathroom Shower/Tub: Tub/Shower unit--Shower chair, Grab bars in shower (does not use chair)    Home Equipment: Rolling walker, 4 wheeled walker, Oxygen    ADL Assistance: [address]: Independent    Homemaking Responsibilities: Yes    Ambulation Assistance: Independent    Transfer Assistance: Independent    Active : Yes    He is retired from [address] [place] [address] Strain: Medium Risk    Difficulty of Paying Living Expenses: Somewhat hard   Food Insecurity: No Food Insecurity    Worried About [address] [facility] in the Last Year: Never true    [address] [facility] in the Last Year: Never true   Transportation Needs: No Transportation Needs    Lack of Transportation (Medical): No    Lack of Transportation (Non-Medical): No   Physical Activity: Insufficiently Active    Days of Exercise per Week: 7 days    Minutes of Exercise per Session: 20 min   Stress: No Stress Concern Present    Feeling of Stress : Not at all   Social Connections: Socially Isolated    Frequency of Communication with Friends and Family: More than three times a week    Frequency of Social Gatherings with Friends and Family: Once a week    Attends [employer] Services: Never    Active Member of Clubs or Organizations: No    Attends Club or Organization Meetings: Never    Marital Status:    Housing Stability: Unknown    Unable to Pay for Housing in the Last Year: No    Unstable Housing in the Last Year: No       Allergies   Allergen Reactions    Morphine        Current Outpatient Medications   Medication Sig Dispense Refill    cephALEXin (KEFLEX) 500 MG capsule Take 1 capsule by mouth 2 times daily 20 capsule 0    gentamicin (GARAMYCIN) 0.1 % ointment Apply topically  daily.  30 g 1    oxyCODONE-acetaminophen (PERCOCET) 7.5-325 MG per tablet Take 1 tablet by mouth five times a day as needed for pain      citalopram (CELEXA) 40 MG tablet TAKE ONE TABLET BY MOUTH nightly 30 tablet 5    atorvastatin (LIPITOR) 20 MG tablet TAKE ONE TABLET BY MOUTH DAILY 90 tablet 3    clopidogrel (PLAVIX) 75 MG tablet Take 1 tablet by mouth daily 90 tablet 3    diclofenac (VOLTAREN) 50 MG EC tablet TAKE ONE TABLET BY MOUTH TWO TIMES A DAY 60 tablet 5    isosorbide mononitrate (IMDUR) 30 MG extended release tablet TAKE ONE TABLET BY MOUTH EVERY DAY 90 tablet 3    bumetanide (BUMEX) 2 MG tablet Take 1 tablet by mouth daily 90 tablet 3    metoprolol tartrate (LOPRESSOR) 50 MG tablet Take 0.5 tablets by mouth 2 times daily TAKE ONE TABLET BY MOUTH TWO TIMES A  tablet 3    omeprazole (PRILOSEC) 10 MG delayed release capsule TAKE ONE CAPSULE BY MOUTH DAILY 90 capsule 2    finasteride (PROSCAR) 5 MG tablet Take 1 tablet by mouth daily 90 tablet 3 tiotropium (SPIRIVA RESPIMAT) 2.5 MCG/ACT AERS inhaler Inhale 2 puffs into the lungs daily 1 Inhaler 3    Fluticasone furoate-vilanterol (BREO ELLIPTA) 200-25 MCG/INH AEPB inhaler Inhale 1 puff into the lungs daily 1 each 3    albuterol sulfate  (90 Base) MCG/ACT inhaler Inhale 2 puffs into the lungs every 6 hours as needed for Wheezing 1 Inhaler 3    nitroGLYCERIN (NITROSTAT) 0.4 MG SL tablet Place 1 tablet under the tongue every 5 minutes as needed for Chest pain up to max of 3 total doses. If no relief after 1 dose, call 911. 25 tablet 3    DOCUSATE CALCIUM PO Take by mouth as needed       No current facility-administered medications for this visit. Review of Systems:   Review of Systems   Constitutional: Negative. Negative for diaphoresis and fatigue. HENT: Negative. Eyes: Negative. Respiratory:  Positive for shortness of breath. Negative for cough, chest tightness, wheezing and stridor. Cardiovascular: Negative. Negative for chest pain, palpitations and leg swelling. Gastrointestinal: Negative. Negative for blood in stool and nausea. Genitourinary: Negative. Musculoskeletal: Negative. Skin: Negative. Neurological:  Positive for light-headedness. Negative for dizziness, syncope and weakness. Hematological: Negative. Psychiatric/Behavioral: Negative. Physical Examination:    /68 (Site: Right Upper Arm, Position: Sitting, Cuff Size: Medium Adult)   Pulse 66   Wt 166 lb 12.8 oz (75.7 kg)   SpO2 93%   BMI 26.92 kg/m²    Physical Exam   Constitutional: He appears healthy. No distress. HENT:   Normal cephalic and Atraumatic   Eyes: Pupils are equal, round, and reactive to light. Neck: Thyroid normal. No JVD present. No neck adenopathy. No thyromegaly present. Cardiovascular: Normal rate, regular rhythm, intact distal pulses and normal pulses. Murmur heard. Pulmonary/Chest: Effort normal and breath sounds normal. He has no wheezes.  He has no rales. He exhibits no tenderness. Abdominal: Soft. Bowel sounds are normal. There is no abdominal tenderness. Musculoskeletal:         General: No tenderness or edema. Normal range of motion. Cervical back: Normal range of motion and neck supple. Neurological: He is alert and oriented to person, place, and time. Skin: Skin is warm. No cyanosis. Nails show no clubbing.      LABS:  CBC:   Lab Results   Component Value Date/Time    WBC 11.6 01/14/2022 12:43 PM    RBC 3.33 01/14/2022 12:43 PM    HGB 10.4 01/14/2022 12:43 PM    HCT 32.0 01/14/2022 12:43 PM    MCV 96.2 01/14/2022 12:43 PM    MCH 31.2 01/14/2022 12:43 PM    MCHC 32.5 01/14/2022 12:43 PM    RDW 17.9 01/14/2022 12:43 PM     01/14/2022 12:43 PM     Lipids:  Lab Results   Component Value Date    CHOL 118 09/14/2021    CHOL 130 07/07/2020    CHOL 165 03/28/2016     Lab Results   Component Value Date    TRIG 84 09/14/2021    TRIG 63 07/07/2020    TRIG 64 03/28/2016     Lab Results   Component Value Date    HDL 54 09/14/2021    HDL 71 (H) 07/07/2020     (H) 03/28/2016     Lab Results   Component Value Date    LDLCALC 47 09/14/2021    LDLCALC 46 07/07/2020    LDLCALC 40 03/28/2016     No results found for: LABVLDL, VLDL  No results found for: CHOLHDLRATIO  CMP:    Lab Results   Component Value Date/Time     01/14/2022 12:43 PM    K 5.0 01/14/2022 12:43 PM    K 4.2 10/15/2021 10:22 AM    CL 98 01/14/2022 12:43 PM    CO2 22 01/14/2022 12:43 PM    BUN 19 01/14/2022 12:43 PM    CREATININE 0.77 01/14/2022 12:43 PM    GFRAA >60.0 01/14/2022 12:43 PM    LABGLOM >60.0 01/14/2022 12:43 PM    GLUCOSE 85 01/14/2022 12:43 PM    PROT 6.9 01/14/2022 12:43 PM    LABALBU 4.0 01/14/2022 12:43 PM    CALCIUM 9.3 01/14/2022 12:43 PM    BILITOT 0.4 01/14/2022 12:43 PM    ALKPHOS 137 01/14/2022 12:43 PM    AST 13 01/14/2022 12:43 PM    ALT 9 01/14/2022 12:43 PM     BMP:    Lab Results   Component Value Date/Time     01/14/2022 12:43 PM    K 5.0 01/14/2022 12:43 PM    K 4.2 10/15/2021 10:22 AM    CL 98 01/14/2022 12:43 PM    CO2 22 01/14/2022 12:43 PM    BUN 19 01/14/2022 12:43 PM    LABALBU 4.0 01/14/2022 12:43 PM    CREATININE 0.77 01/14/2022 12:43 PM    CALCIUM 9.3 01/14/2022 12:43 PM    GFRAA >60.0 01/14/2022 12:43 PM    LABGLOM >60.0 01/14/2022 12:43 PM    GLUCOSE 85 01/14/2022 12:43 PM     Magnesium:    Lab Results   Component Value Date/Time    MG 1.6 10/15/2021 10:22 AM     TSH:  Lab Results   Component Value Date    TSH 0.447 10/13/2021       Patient Active Problem List   Diagnosis    Tobacco use    Hip pain    Knee pain    Chronic back pain    Elevated PSA    Primary osteoarthritis of right knee    Spondylosis of lumbar region without myelopathy or radiculopathy    Sacroiliitis, not elsewhere classified ([facility]    Pure hypercholesterolemia    Charcot's joint of left ankle    Left ankle pain    Delirium due to general medical condition    DDD (degenerative disc disease), lumbar    Osteoarthritis of spine with myelopathy, lumbosacral region    Chronic bilateral low back pain with bilateral sciatica    Postlaminectomy syndrome, lumbar region    Acquired spondylolisthesis of lumbosacral region    Spinal stenosis of lumbar region with neurogenic claudication    HNP (herniated nucleus pulposus), lumbar    Spondylolisthesis at L4-L5 level    Anesthesia    Herniated nucleus pulposus, L4-5    NSTEMI (non-ST elevated myocardial infarction) ([facility])    S/P PTCA (percutaneous transluminal coronary angioplasty)    Right upper quadrant abdominal pain    Gallbladder polyp    Biliary dyskinesia    Carpal tunnel syndrome on right    Carpal tunnel syndrome on left    Angina effort    Dyslipidemia    FELDER (dyspnea on exertion)    CAD (coronary artery disease)    Chest pain    Lung nodule seen on imaging study    Bilateral carotid artery stenosis    Essential hypertension    NSCLC of left lung ([facility])    Ataxic gait    Dizziness    Fracture, Colles, right, closed    Heroin abuse ([facility]    Non-pressure chronic ulcer of left ankle with fat layer exposed ([facility]    Atherosclerosis of native arteries of extremities with rest pain, left leg (HCC)    Subacute osteomyelitis, left ankle and foot (HCC)    Osteomyelitis of ankle (HCC)    Hyponatremia    Frequent falls    Stage 3 chronic kidney disease, unspecified whether stage 3a or 3b CKD ([facility]    Fracture of right humerus    Hyperkalemia    Leukocytosis    Anemia    Acute hematogenous osteomyelitis, left ankle and foot ([facility]    Traumatic hematoma of right shoulder    Head injury    Humeral head fracture, right, closed, initial encounter    Pelvic hematoma, male, after a fall    Cause of injury, accidental fall, initial encounter    Tremor of unknown origin    Sundowning    Acute pain due to trauma    Disequilibrium    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 Modified Medications    No medications on file       No orders of the defined types were placed in this encounter. Assessment/Plan:    1. Essential hypertension  Stable- continue meds. Low salt diet    2. NSTEMI (non-ST elevated myocardial infarction) ([facility]   no angina continue CV meds. 3. Dyslipidemia  Statin - continue same low fat diet. Check labs. 4. FELDER (dyspnea on exertion)  Stop smoking   Possible angina - Imdur made a big difference- if worse let me know. 5. Rash- did not see Derm. Facial rash gone now. No further occurrence. 6. Lung Nodule - OK to hold Plavix 5 days for Biopsy. - revealed Malignancy. PET negative but pt refusing to have surgery. - f/u Dr. Imtiaz Benoit. - finished XRT f/u.     7. Wobbly gait- need to use rubin/walker. No recent falls. 8. Le EDEAM- TAKE bUMEX 1 MG qd. - decrease salt intake     9. Tires all the time- see Dr. Memo Espana and get screening labs. 10 LE edema - resume Bumex. Labs noted.  Reviewed with pt. Low salt. Try compression as well. Walk   Counseling:  Heart Healthy Lifestyle, Stop Smoking, Low Salt Diet, Take Precautions to Prevent Falls, Regular Exercise and Walk Daily    Return in about 4 months (around 2/14/2023).      Electronically signed by Ann Landrum MD on 10/14/2022 at 1:29 PM

## 2022-10-18 ENCOUNTER — HOSPITAL ENCOUNTER (OUTPATIENT)
Dept: WOUND CARE | Age: 77
Discharge: HOME | End: 2022-10-18
Payer: MEDICARE

## 2022-10-18 VITALS
RESPIRATION RATE: 18 BRPM | HEART RATE: 68 BPM | SYSTOLIC BLOOD PRESSURE: 111 MMHG | TEMPERATURE: 96.5 F | DIASTOLIC BLOOD PRESSURE: 51 MMHG

## 2022-10-18 DIAGNOSIS — L97.322 NON-PRESSURE CHRONIC ULCER OF LEFT ANKLE WITH FAT LAYER EXPOSED (HCC): Primary | ICD-10-CM

## 2022-10-18 PROCEDURE — 99213 OFFICE O/P EST LOW 20 MIN: CPT

## 2022-10-18 RX ORDER — GINSENG 100 MG
CAPSULE ORAL ONCE
Status: CANCELLED | OUTPATIENT
Start: 2022-10-18 | End: 2022-10-18

## 2022-10-18 RX ORDER — BETAMETHASONE DIPROPIONATE 0.05 %
OINTMENT (GRAM) TOPICAL ONCE
Status: CANCELLED | OUTPATIENT
Start: 2022-10-18 | End: 2022-10-18

## 2022-10-18 RX ORDER — BACITRACIN ZINC AND POLYMYXIN B SULFATE 500; 1000 [USP'U]/G; [USP'U]/G
OINTMENT TOPICAL ONCE
Status: CANCELLED | OUTPATIENT
Start: 2022-10-18 | End: 2022-10-18

## 2022-10-18 RX ORDER — LIDOCAINE 40 MG/G
CREAM TOPICAL ONCE
Status: CANCELLED | OUTPATIENT
Start: 2022-10-18 | End: 2022-10-18

## 2022-10-18 RX ORDER — LIDOCAINE HYDROCHLORIDE 40 MG/ML
SOLUTION TOPICAL ONCE
Status: CANCELLED | OUTPATIENT
Start: 2022-10-18 | End: 2022-10-18

## 2022-10-18 RX ORDER — BACITRACIN, NEOMYCIN, POLYMYXIN B 400; 3.5; 5 [USP'U]/G; MG/G; [USP'U]/G
OINTMENT TOPICAL ONCE
Status: CANCELLED | OUTPATIENT
Start: 2022-10-18 | End: 2022-10-18

## 2022-10-18 RX ORDER — LIDOCAINE 50 MG/G
OINTMENT TOPICAL ONCE
Status: CANCELLED | OUTPATIENT
Start: 2022-10-18 | End: 2022-10-18

## 2022-10-18 RX ORDER — LIDOCAINE HYDROCHLORIDE 20 MG/ML
JELLY TOPICAL ONCE
Status: CANCELLED | OUTPATIENT
Start: 2022-10-18 | End: 2022-10-18

## 2022-10-18 RX ORDER — CLOBETASOL PROPIONATE 0.5 MG/G
OINTMENT TOPICAL ONCE
Status: CANCELLED | OUTPATIENT
Start: 2022-10-18 | End: 2022-10-18

## 2022-10-18 RX ORDER — GENTAMICIN SULFATE 1 MG/G
OINTMENT TOPICAL ONCE
Status: CANCELLED | OUTPATIENT
Start: 2022-10-18 | End: 2022-10-18

## 2022-10-18 ASSESSMENT — PAIN DESCRIPTION - ORIENTATION: ORIENTATION: LEFT

## 2022-10-18 ASSESSMENT — PAIN DESCRIPTION - FREQUENCY: FREQUENCY: CONTINUOUS

## 2022-10-18 ASSESSMENT — PAIN DESCRIPTION - LOCATION: LOCATION: ANKLE

## 2022-10-18 ASSESSMENT — PAIN SCALES - GENERAL: PAINLEVEL_OUTOF10: 5

## 2022-10-18 NOTE — PROGRESS NOTES
[facility] [address]                                                   Progress Note and Procedure Note      Ria Camara RECORD NUMBER:  [id number]  AGE: 76 y.o. GENDER: male  : 1945  EPISODE DATE:  10/18/2022    Subjective:     Chief Complaint   Patient presents with    Wound Check     Left ankle         HISTORY of PRESENT ILLNESS HPI      Charlie Dowling is a 76 y.o. male who presents today for wound/ulcer evaluation. History of Wound Context: Patient presents for a reoccurrence of an non healing ulcer of the left ankle. Patient is s/p [place] #1  No new complaints noted. Wound/Ulcer Pain Timing/Severity: intermittent  Quality of pain: tender  Severity:  4 / 10   Modifying Factors: None  Associated Signs/Symptoms: drainage, odor and pain    Ulcer Identification:  Ulcer Type: venous and undetermined  Contributing Factors: edema and venous stasis    Wound: N/A        PAST MEDICAL HISTORY        Diagnosis Date    Alcohol abuse     quit drinking 21    CAD (coronary artery disease)     patient states no doctor has told him this / has 1 cardiac stent    Cancer ([address]     lung LLL    Chronic back pain     Chronic kidney disease     Chronic obstructive pulmonary disease, unspecified COPD type ([address] 3/24/2022    Chronic sinusitis     DJD (degenerative joint disease) of knee     left knee DJD    Elevated PSA     History of blood transfusion     History of inferior wall myocardial infarction 2016    1 cardiac stent    HTN (hypertension)     meds .  1 yr    Hyperlipidemia     meds > 12 yrs    NSTEMI (non-ST elevated myocardial infarction) ([address]     Smoker        PAST SURGICAL HISTORY    Past Surgical History:   Procedure Laterality Date    ABDOMEN SURGERY      spleenectomy due to [address]      lumbar disc OR    CARDIAC SURGERY      stents    CARPAL TUNNEL RELEASE Right 2019    RIGHT CARPAL TUNNEL RELEASE performed by Cande Summers MD at [address] [place] [address] citalopram (CELEXA) 40 MG tablet TAKE ONE TABLET BY MOUTH nightly 30 tablet 5    atorvastatin (LIPITOR) 20 MG tablet TAKE ONE TABLET BY MOUTH DAILY 90 tablet 3    clopidogrel (PLAVIX) 75 MG tablet Take 1 tablet by mouth daily 90 tablet 3    diclofenac (VOLTAREN) 50 MG EC tablet TAKE ONE TABLET BY MOUTH TWO TIMES A DAY 60 tablet 5    isosorbide mononitrate (IMDUR) 30 MG extended release tablet TAKE ONE TABLET BY MOUTH EVERY DAY 90 tablet 3    bumetanide (BUMEX) 2 MG tablet Take 1 tablet by mouth daily 90 tablet 3    metoprolol tartrate (LOPRESSOR) 50 MG tablet Take 0.5 tablets by mouth 2 times daily TAKE ONE TABLET BY MOUTH TWO TIMES A  tablet 3    omeprazole (PRILOSEC) 10 MG delayed release capsule TAKE ONE CAPSULE BY MOUTH DAILY 90 capsule 2    finasteride (PROSCAR) 5 MG tablet Take 1 tablet by mouth daily 90 tablet 3    tiotropium (SPIRIVA RESPIMAT) 2.5 MCG/ACT AERS inhaler Inhale 2 puffs into the lungs daily 1 Inhaler 3    Fluticasone furoate-vilanterol (BREO ELLIPTA) 200-25 MCG/INH AEPB inhaler Inhale 1 puff into the lungs daily 1 each 3    albuterol sulfate  (90 Base) MCG/ACT inhaler Inhale 2 puffs into the lungs every 6 hours as needed for Wheezing 1 Inhaler 3    nitroGLYCERIN (NITROSTAT) 0.4 MG SL tablet Place 1 tablet under the tongue every 5 minutes as needed for Chest pain up to max of 3 total doses. If no relief after 1 dose, call 911. 25 tablet 3    DOCUSATE CALCIUM PO Take by mouth as needed       No current facility-administered medications on file prior to encounter. REVIEW OF SYSTEMS    Pertinent items are noted in HPI. Objective:      BP (!) 111/51   Pulse 68   Temp (!) 96.5 °F (35.8 °C) (Tympanic)   Resp 18     Wt Readings from Last 3 Encounters:   10/14/22 166 lb 12.8 oz (75.7 kg)   08/11/22 160 lb (72.6 kg)   07/12/22 168 lb (76.2 kg)       PHYSICAL EXAM    Constitutional:   Well nourished and well developed. Appears neat and clean.   Patient is alert, oriented x3, and in no apparent distress. Respiratory:  Respiratory effort is easy and symmetric bilaterally. Rate is normal at rest and on room air. Vascular:  Pedal Pulses is palpable and audible with doppler. Capillary refill is <3 sec to digits bilateral.  Extremities positive for 1+ pitting edema. Neurological:   Sensation is intact to lower extremities. Dermatological:  Wound description noted in wound assessment. The wounds are  stable. The base of the wouns has slough and necrotic tissue noted. Mild surrounding erythema  noted. Patient denies any N/V/F/C or signs of systemic infection. Psychiatric:  Judgement and insight intact. Short and long term memory intact. No evidence of depression, anxiety, or agitation. Patient is calm, cooperative, and communicative. Appropriate interactions and affect. Assessment:      Active Hospital Problems    Diagnosis Date Noted    Non-pressure chronic ulcer of left ankle with fat layer exposed [facility]) [L97.322] 08/17/2021        Procedure Note  Indications:  Based on my examination of this patient's wound(s)/ulcer(s) today, debridement is not  required to promote healing and evaluate the wound base. Wound/Ulcer #: 1    Post Debridement Measurements:  Wound 03/25/22 Ankle Left; Anterior #1 (Active)   Wound Image   10/18/22 1631   Wound Etiology Other 10/04/22 1620   Wound Cleansed Cleansed with saline 10/18/22 1631   Dressing/Treatment Dry dressing;Hydrofera blue; Pharmaceutical agent (see MAR) 10/04/22 1645   Wound Length (cm) 3.1 cm 10/18/22 1631   Wound Width (cm) 2.1 cm 10/18/22 1631   Wound Depth (cm) 0.2 cm 10/18/22 1631   Wound Surface Area (cm^2) 6.51 cm^2 10/18/22 1631   Change in Wound Size % (l*w) -103.44 10/18/22 1631   Wound Volume (cm^3) 1.302 cm^3 10/18/22 1631   Wound Healing % -36 10/18/22 1631   Post-Procedure Length (cm) 2.7 cm 09/20/22 1613   Post-Procedure Width (cm) 1.8 cm 09/20/22 1613   Post-Procedure Depth (cm) 0.2 cm 09/20/22 1613 Post-Procedure Surface Area (cm^2) 4.86 cm^2 09/20/22 1613   Post-Procedure Volume (cm^3) 0.972 cm^3 09/20/22 1613   Wound Assessment Pink/red;Slough 10/18/22 1631   Drainage Amount Moderate 10/18/22 1631   Drainage Description Yellow 10/18/22 1631   Odor None 10/18/22 1631   Ela-wound Assessment Fragile; Hyperpigmented 10/18/22 1631   Margins Defined edges 10/18/22 1631   Wound Thickness Description not for Pressure Injury Full thickness 10/18/22 1631   Number of days: 207       Wound 08/30/22 Ankle Left;Medial #2 (Active)   Wound Image   10/18/22 1631   Wound Etiology Other 10/18/22 1631   Wound Cleansed Cleansed with saline 10/18/22 1631   Dressing/Treatment Other (comment) 09/20/22 1617   Wound Length (cm) 1.9 cm 10/18/22 1631   Wound Width (cm) 1.9 cm 10/18/22 1631   Wound Depth (cm) 0.1 cm 10/18/22 1631   Wound Surface Area (cm^2) 3.61 cm^2 10/18/22 1631   Change in Wound Size % (l*w) -85.13 10/18/22 1631   Wound Volume (cm^3) 0.361 cm^3 10/18/22 1631   Wound Healing % 7 10/18/22 1631   Post-Procedure Length (cm) 1.1 cm 09/20/22 1613   Post-Procedure Width (cm) 1.2 cm 09/20/22 1613   Post-Procedure Depth (cm) 0.2 cm 09/20/22 1613   Post-Procedure Surface Area (cm^2) 1.32 cm^2 09/20/22 1613   Post-Procedure Volume (cm^3) 0.264 cm^3 09/20/22 1613   Wound Assessment Pink/red;Slough 10/18/22 1631   Drainage Amount Moderate 10/18/22 1631   Drainage Description Serous; Yellow 10/18/22 1631   Odor None 10/18/22 1631   Ela-wound Assessment Fragile 10/18/22 1631   Margins Defined edges 10/18/22 1631   Wound Thickness Description not for Pressure Injury Full thickness 10/18/22 1631   Number of days: 49         Plan:     Patient examined   Only 2 of labs were done. A1c 5.4 and CMP  Santyl and Hydroferablue  Follow up in 2 weeks        Treatment Note please see attached Discharge Instructions    Written patient dismissal instructions given to patient and signed by patient or POA.          Discharge Instructions [facility]   Physician Orders and Discharge Instructions  [facility]  [address] [facility]  Telephone: [phone]        NAME:  Mohan Marie OF BIRTH:  1945  MEDICAL RECORD NUMBER:  [id number]     Your  is:  Nicholas Courtney Care/Facility:  [facility] CARE     Wound Location:   LEFT ANTERIOR ANKLE AND LEFT MEDIAL ANKLE  Dressing orders: 1. Cleanse wound(s) with normal saline. 2. Apply a nickel thickness layer of SANTYL OINTMENT to the wound bed for enzymatic debridement purposes. 3. Apply HYDROFERA BLUE READY over the Santyl Ointment. 4. Cover with secure with a dry dressing. 5. Change Every Day. Compression:  NONE     Offloading Device:     Other Instructions:   DR. Alberta Lucas TO REFER TO [facility],  make an appointment with Dr. Marcia Fuchs all dressings clean, dry and intact. Keep pressure off the wound(s) at all times. Follow up visit  2 WEEKS     November 1, 2022 AT       Please give 24 hour notice if unable to keep appointment. [phone]     If you experience any of the following, please call the Wound Care Service at  [phone] or go to the nearest emergency room. *Increase in pain         *Temperature over 101           *Increase in drainage from your wound or a foul odor  *Uncontrolled swelling            *Need for compression bandage changes due to slippage, breakthrough drainage       PLEASE NOTE: IF YOU ARE UNABLE TO OBTAIN WOUND SUPPLIES, CONTINUE TO USE THE SUPPLIES YOU HAVE AVAILABLE UNTIL YOU ARE ABLE TO REACH US.  IT IS MOST IMPORTANT TO KEEP THE WOUND COVERED AT ALL TIMES      Electronically signed by Rosalina Mayen DPM on 10/18/2022 at 6:05 PM

## 2022-10-18 NOTE — CODE DOCUMENTATION
[facility] [facility] Physician Billing Sheet. Tadeo Hands  AGE: 76 y.o.    GENDER: male  : 1945  TODAY'S DATE:  10/18/2022    ICD-10 CODES  Active Hospital Problems    Diagnosis Date Noted    Non-pressure chronic ulcer of left ankle with fat layer exposed [facility]) [L97.322] 2021       PHYSICIAN PROCEDURES    CPT CODE  79278      Electronically signed by Gita Augustin DPM on 10/18/2022 at 6:05 PM

## 2022-10-18 NOTE — DISCHARGE INSTRUCTIONS
[facility]   Physician Orders and Discharge Instructions  [facility]  [address] [facility]  Telephone: [phone]        NAME:  Nancy Buck OF BIRTH:  1945  MEDICAL RECORD NUMBER:  [id number]     Your  is:  Nicholas Oseguera [facility] Care/Facility:  [facility] CARE     Wound Location:   LEFT ANTERIOR ANKLE AND LEFT MEDIAL ANKLE  Dressing orders: 1. Cleanse wound(s) with normal saline. 2. Apply a nickel thickness layer of SANTYL OINTMENT to the wound bed for enzymatic debridement purposes. 3. Apply HYDROFERA BLUE READY over the Santyl Ointment. 4. Cover with secure with a dry dressing. 5. Change Every Day. Compression:  NONE     Offloading Device:     Other Instructions:   DR. Razia Horn TO REFER TO [facility],  make an appointment with Dr. Saravanan Mendoza all dressings clean, dry and intact. Keep pressure off the wound(s) at all times. Follow up visit  2 WEEKS     November 1, 2022 AT       Please give 24 hour notice if unable to keep appointment. [phone]     If you experience any of the following, please call the Wound Care Service at  [phone] or go to the nearest emergency room. *Increase in pain         *Temperature over 101           *Increase in drainage from your wound or a foul odor  *Uncontrolled swelling            *Need for compression bandage changes due to slippage, breakthrough drainage       PLEASE NOTE: IF YOU ARE UNABLE TO OBTAIN WOUND SUPPLIES, CONTINUE TO USE THE SUPPLIES YOU HAVE AVAILABLE UNTIL YOU ARE ABLE TO REACH US.  IT IS MOST IMPORTANT TO KEEP THE WOUND COVERED AT ALL TIMES

## 2022-10-25 ENCOUNTER — HOSPITAL ENCOUNTER (OUTPATIENT)
Dept: WOUND CARE | Age: 77
Discharge: HOME | End: 2022-10-25
Payer: MEDICARE

## 2022-10-25 ENCOUNTER — HOSPITAL ENCOUNTER (OUTPATIENT)
Dept: ULTRASOUND IMAGING | Age: 77
Discharge: HOME | End: 2022-10-27
Payer: MEDICARE

## 2022-10-25 ENCOUNTER — TELEPHONE (OUTPATIENT)
Dept: WOUND CARE | Age: 77
End: 2022-10-25

## 2022-10-25 VITALS
RESPIRATION RATE: 18 BRPM | DIASTOLIC BLOOD PRESSURE: 59 MMHG | TEMPERATURE: 97.8 F | HEART RATE: 65 BPM | SYSTOLIC BLOOD PRESSURE: 124 MMHG

## 2022-10-25 DIAGNOSIS — M79.662 PAIN OF LEFT CALF: ICD-10-CM

## 2022-10-25 DIAGNOSIS — L97.322 NON-PRESSURE CHRONIC ULCER OF LEFT ANKLE WITH FAT LAYER EXPOSED (HCC): Primary | ICD-10-CM

## 2022-10-25 DIAGNOSIS — R60.0 EDEMA, LEG: ICD-10-CM

## 2022-10-25 DIAGNOSIS — R60.0 EDEMA OF LEFT LOWER LEG: Chronic | ICD-10-CM

## 2022-10-25 PROCEDURE — 93971 EXTREMITY STUDY: CPT

## 2022-10-25 PROCEDURE — 99213 OFFICE O/P EST LOW 20 MIN: CPT

## 2022-10-25 RX ORDER — GENTAMICIN SULFATE 1 MG/G
OINTMENT TOPICAL ONCE
Status: CANCELLED | OUTPATIENT
Start: 2022-10-25 | End: 2022-10-25

## 2022-10-25 RX ORDER — LIDOCAINE 40 MG/G
CREAM TOPICAL ONCE
Status: CANCELLED | OUTPATIENT
Start: 2022-10-25 | End: 2022-10-25

## 2022-10-25 RX ORDER — LIDOCAINE HYDROCHLORIDE 20 MG/ML
JELLY TOPICAL ONCE
Status: CANCELLED | OUTPATIENT
Start: 2022-10-25 | End: 2022-10-25

## 2022-10-25 RX ORDER — GINSENG 100 MG
CAPSULE ORAL ONCE
Status: CANCELLED | OUTPATIENT
Start: 2022-10-25 | End: 2022-10-25

## 2022-10-25 RX ORDER — BACITRACIN, NEOMYCIN, POLYMYXIN B 400; 3.5; 5 [USP'U]/G; MG/G; [USP'U]/G
OINTMENT TOPICAL ONCE
Status: CANCELLED | OUTPATIENT
Start: 2022-10-25 | End: 2022-10-25

## 2022-10-25 RX ORDER — LIDOCAINE HYDROCHLORIDE 20 MG/ML
JELLY TOPICAL ONCE
Status: DISCONTINUED | OUTPATIENT
Start: 2022-10-25 | End: 2022-10-26 | Stop reason: HOSPADM

## 2022-10-25 RX ORDER — LIDOCAINE HYDROCHLORIDE 40 MG/ML
SOLUTION TOPICAL ONCE
Status: CANCELLED | OUTPATIENT
Start: 2022-10-25 | End: 2022-10-25

## 2022-10-25 RX ORDER — BACITRACIN ZINC AND POLYMYXIN B SULFATE 500; 1000 [USP'U]/G; [USP'U]/G
OINTMENT TOPICAL ONCE
Status: CANCELLED | OUTPATIENT
Start: 2022-10-25 | End: 2022-10-25

## 2022-10-25 RX ORDER — BETAMETHASONE DIPROPIONATE 0.05 %
OINTMENT (GRAM) TOPICAL ONCE
Status: CANCELLED | OUTPATIENT
Start: 2022-10-25 | End: 2022-10-25

## 2022-10-25 RX ORDER — CLOBETASOL PROPIONATE 0.5 MG/G
OINTMENT TOPICAL ONCE
Status: CANCELLED | OUTPATIENT
Start: 2022-10-25 | End: 2022-10-25

## 2022-10-25 RX ORDER — LIDOCAINE 50 MG/G
OINTMENT TOPICAL ONCE
Status: CANCELLED | OUTPATIENT
Start: 2022-10-25 | End: 2022-10-25

## 2022-10-25 NOTE — PROGRESS NOTES
E[facility] [address]                                                   Progress Note and Procedure Note      Yara Whelan RECORD NUMBER:  [id number]  AGE: 76 y.o. GENDER: male  : 1945  EPISODE DATE:  10/25/2022    Subjective:     Chief Complaint   Patient presents with    Wound Check     Left lower leg         HISTORY of PRESENT ILLNESS HPI      Gladys Garrido is a 76 y.o. male who presents today for wound/ulcer evaluation. History of Wound Context: Patient presents for a reoccurrence of an non healing ulcer of the left ankle. Patient seen emergentlly today and complains of left thigh and calf itching and painful left calf with new onset swelling. Wound/Ulcer Pain Timing/Severity: intermittent  Quality of pain: tender  Severity:   / 10   Modifying Factors: None  Associated Signs/Symptoms: drainage, odor and pain    Ulcer Identification:  Ulcer Type: venous and undetermined  Contributing Factors: edema and venous stasis    Wound: N/A        PAST MEDICAL HISTORY        Diagnosis Date    Alcohol abuse     quit drinking 21    CAD (coronary artery disease)     patient states no doctor has told him this / has 1 cardiac stent    Cancer ([address]     lung LLL    Chronic back pain     Chronic kidney disease     Chronic obstructive pulmonary disease, unspecified COPD type ([address] 3/24/2022    Chronic sinusitis     DJD (degenerative joint disease) of knee     left knee DJD    Elevated PSA     History of blood transfusion     History of inferior wall myocardial infarction 2016    1 cardiac stent    HTN (hypertension)     meds .  1 yr    Hyperlipidemia     meds > 12 yrs    NSTEMI (non-ST elevated myocardial infarction) ([address]     Smoker        PAST SURGICAL HISTORY    Past Surgical History:   Procedure Laterality Date    ABDOMEN SURGERY      spleenectomy due to [address]      lumbar disc OR    CARDIAC SURGERY      stents    CARPAL TUNNEL RELEASE Right 2019    RIGHT CARPAL TUNNEL RELEASE performed by Blaire Graf MD at [address] [facility] [facility] [address]  2016    EYE SURGERY      to have Phaco with IOL OU 2018    HERNIA REPAIR      JOINT REPLACEMENT Left     LTHR    JOINT REPLACEMENT Right     RTKR    KNEE SURGERY Right     arthroscopy    ND MANIPULATN KNEE JT+ANESTHESIA Right 2017    RIGHT KNEE MANIPULATION UNDER ANESTHESIA performed by Bernadette Fuller MD at [address] [facility] OFFICE/OUTPT VISIT,PROCEDURE ONLY Bilateral 2017    RIGHT AND BILATERAL L 4-5 LYSIS OF SCAR DISKECTOMY INTERBODY CAGE FUSION POSTEROLATERAL FUSION PEDICLE SCREWS performed by Blaire Graf MD at [address]  2004    benign    [address] Right 3/24/2017    RIGHT  KNEE TOTAL ARTHROPLASTY, Heavenly Stall CEMENTED PERSONA  performed by Bernadette Fuller MD at [address]  HISTORY    Family History   Problem Relation Age of Onset    Osteoarthritis Mother     Emphysema Mother     Hypertension Father     Hearing Loss Father     Dementia Father     No Known Problems Sister     Prostate Cancer Brother     Colon Polyps Neg Hx        SOCIAL HISTORY    Social History     Tobacco Use    Smoking status: Former     Packs/day: 0.00     Years: 60.00     Pack years: 0.00     Types: Cigarettes     Quit date: 2021     Years since quittin.8    Smokeless tobacco: Never    Tobacco comments:      smokes 2 cigars a day   Vaping Use    Vaping Use: Never used   Substance Use Topics    Alcohol use: Not Currently     Alcohol/week: 12.0 standard drinks     Types: 12 Shots of liquor per week     Comment: socail drinking    Drug use: No       ALLERGIES    Allergies   Allergen Reactions    Morphine        MEDICATIONS    Current Outpatient Medications on File Prior to Encounter   Medication Sig Dispense Refill    gentamicin (GARAMYCIN) 0.1 % ointment Apply topically  daily.  30 g 1    oxyCODONE-acetaminophen (PERCOCET) 7.5-325 MG per tablet Take 1 tablet by mouth five times a day as needed for pain      citalopram (CELEXA) 40 MG tablet TAKE ONE TABLET BY MOUTH nightly 30 tablet 5    atorvastatin (LIPITOR) 20 MG tablet TAKE ONE TABLET BY MOUTH DAILY 90 tablet 3    clopidogrel (PLAVIX) 75 MG tablet Take 1 tablet by mouth daily 90 tablet 3    diclofenac (VOLTAREN) 50 MG EC tablet TAKE ONE TABLET BY MOUTH TWO TIMES A DAY 60 tablet 5    isosorbide mononitrate (IMDUR) 30 MG extended release tablet TAKE ONE TABLET BY MOUTH EVERY DAY 90 tablet 3    bumetanide (BUMEX) 2 MG tablet Take 1 tablet by mouth daily 90 tablet 3    metoprolol tartrate (LOPRESSOR) 50 MG tablet Take 0.5 tablets by mouth 2 times daily TAKE ONE TABLET BY MOUTH TWO TIMES A  tablet 3    omeprazole (PRILOSEC) 10 MG delayed release capsule TAKE ONE CAPSULE BY MOUTH DAILY 90 capsule 2    finasteride (PROSCAR) 5 MG tablet Take 1 tablet by mouth daily 90 tablet 3    tiotropium (SPIRIVA RESPIMAT) 2.5 MCG/ACT AERS inhaler Inhale 2 puffs into the lungs daily 1 Inhaler 3    Fluticasone furoate-vilanterol (BREO ELLIPTA) 200-25 MCG/INH AEPB inhaler Inhale 1 puff into the lungs daily 1 each 3    albuterol sulfate  (90 Base) MCG/ACT inhaler Inhale 2 puffs into the lungs every 6 hours as needed for Wheezing 1 Inhaler 3    nitroGLYCERIN (NITROSTAT) 0.4 MG SL tablet Place 1 tablet under the tongue every 5 minutes as needed for Chest pain up to max of 3 total doses. If no relief after 1 dose, call 911. 25 tablet 3    DOCUSATE CALCIUM PO Take by mouth as needed       No current facility-administered medications on file prior to encounter. REVIEW OF SYSTEMS    Pertinent items are noted in HPI.     Objective:      BP (!) 124/59   Pulse 65   Temp 97.8 °F (36.6 °C) (Temporal)   Resp 18     Wt Readings from Last 3 Encounters:   10/14/22 166 lb 12.8 oz (75.7 kg)   08/11/22 160 lb (72.6 kg)   07/12/22 168 lb (76.2 kg)       PHYSICAL EXAM    Constitutional:   Well nourished and well developed. Appears neat and clean. Patient is alert, oriented x3, and in no apparent distress. Respiratory:  Respiratory effort is easy and symmetric bilaterally. Rate is normal at rest and on room air. Vascular:  Pedal Pulses is palpable and audible with doppler. Capillary refill is <3 sec to digits bilateral.  Extremities positive for 1+ pitting edema. Neurological:   Sensation is intact to lower extremities. Musculoskeletal:Patient has positive Kristopher's sign on left leg    Dermatological:  Wound description noted in wound assessment. The wounds are  stable. The base of the wouns has slough and granulation tissue noted. Mild surrounding erythema  noted. Patient denies any N/V/F/C or signs of systemic infection. Psychiatric:  Judgement and insight intact. Short and long term memory intact. No evidence of depression, anxiety, or agitation. Patient is calm, cooperative, and communicative. Appropriate interactions and affect. Assessment:      Active Hospital Problems    Diagnosis Date Noted    Pain of left calf [M79.662] 10/25/2022     Priority: Medium    Edema of left lower leg [R60.0] 10/25/2022     Priority: Medium        Procedure Note  Indications:  Based on my examination of this patient's wound(s)/ulcer(s) today, debridement is not  required to promote healing and evaluate the wound base. Wound/Ulcer #: 1    Post Debridement Measurements:  Wound 03/25/22 Ankle Left; Anterior #1 (Active)   Wound Image   10/25/22 1154   Wound Etiology Other 10/25/22 1154   Wound Cleansed Cleansed with saline 10/25/22 1154   Dressing/Treatment Dry dressing;Hydrofera blue; Pharmaceutical agent (see MAR) 10/04/22 1645   Wound Length (cm) 3 cm 10/25/22 1154   Wound Width (cm) 2 cm 10/25/22 1154   Wound Depth (cm) 0.3 cm 10/25/22 1154   Wound Surface Area (cm^2) 6 cm^2 10/25/22 1154   Change in Wound Size % (l*w) -87.5 10/25/22 1154   Wound Volume (cm^3) 1.8 cm^3 10/25/22 1154   Wound Healing % -88 10/25/22 1154   Post-Procedure Length (cm) 2.7 cm 09/20/22 1613   Post-Procedure Width (cm) 1.8 cm 09/20/22 1613   Post-Procedure Depth (cm) 0.2 cm 09/20/22 1613   Post-Procedure Surface Area (cm^2) 4.86 cm^2 09/20/22 1613   Post-Procedure Volume (cm^3) 0.972 cm^3 09/20/22 1613   Wound Assessment Pink/red;Slough 10/25/22 1154   Drainage Amount Moderate 10/25/22 1154   Drainage Description Serosanguinous; Yellow 10/25/22 1154   Odor None 10/25/22 1154   Ela-wound Assessment Fragile; Hyperpigmented 10/25/22 1154   Margins Defined edges 10/25/22 1154   Wound Thickness Description not for Pressure Injury Full thickness 10/25/22 1154   Number of days: 214       Wound 08/30/22 Ankle Left;Medial #2 (Active)   Wound Image   10/25/22 1154   Wound Etiology Other 10/25/22 1154   Wound Cleansed Cleansed with saline 10/25/22 1154   Dressing/Treatment Other (comment) 09/20/22 1617   Wound Length (cm) 1.9 cm 10/25/22 1154   Wound Width (cm) 1.6 cm 10/25/22 1154   Wound Depth (cm) 0.1 cm 10/25/22 1154   Wound Surface Area (cm^2) 3.04 cm^2 10/25/22 1154   Change in Wound Size % (l*w) -55.9 10/25/22 1154   Wound Volume (cm^3) 0.304 cm^3 10/25/22 1154   Wound Healing % 22 10/25/22 1154   Post-Procedure Length (cm) 1.1 cm 09/20/22 1613   Post-Procedure Width (cm) 1.2 cm 09/20/22 1613   Post-Procedure Depth (cm) 0.2 cm 09/20/22 1613   Post-Procedure Surface Area (cm^2) 1.32 cm^2 09/20/22 1613   Post-Procedure Volume (cm^3) 0.264 cm^3 09/20/22 1613   Wound Assessment Pink/red;Slough 10/25/22 1154   Drainage Amount Moderate 10/25/22 1154   Drainage Description Serous; Yellow 10/25/22 1154   Odor None 10/25/22 1154   Ela-wound Assessment Fragile 10/25/22 1154   Margins Defined edges 10/25/22 1154   Wound Thickness Description not for Pressure Injury Full thickness 10/25/22 1154   Number of days: 56       Plan:     Patient examined   Sent for a Stat venous duplex to eval for DVT  Santyl and Hydroferablue  Follow up in 1 week        Treatment Note please see attached Discharge Instructions    Written patient dismissal instructions given to patient and signed by patient or POA. Discharge [address]   Physician Orders and Discharge Instructions  [address]  Telephone: [phone]        NAME:  Edna Venegas OF BIRTH:  1945  MEDICAL RECORD NUMBER:  [id number]     Your  is:  Nicholas [facility] Care/Facility:  [facility] CARE     Wound Location:   LEFT ANTERIOR ANKLE AND LEFT MEDIAL ANKLE  Dressing orders: 1. Cleanse wound(s) with normal saline. 2. Apply a nickel thickness layer of SANTYL OINTMENT to the wound bed for enzymatic debridement purposes. 3. Apply HYDROFERA BLUE READY over the Santyl Ointment. 4. Cover with secure with a dry dressing. 5. Change Every Day. Compression:  NONE     Offloading Device:     Other Instructions:  GO TO THE XRAY DEPARTMENT FOR AN ULTRASOUND TODAY AT 1:00PM,  DR. Jimmie Head TO REFER TO [facility],  make an appointment with Dr. Arnel Harrington all dressings clean, dry and intact. Keep pressure off the wound(s) at all times. Follow up visit  2 WEEKS     November 1, 2022 AT  4:00pm     Please give 24 hour notice if unable to keep appointment. [phone]     If you experience any of the following, please call the Wound Care Service at  [phone] or go to the nearest emergency room.         *Increase in pain         *Temperature over 101           *Increase in drainage from your wound or a foul odor  *Uncontrolled swelling            *Need for compression bandage changes due to slippage, breakthrough drainage       PLEASE NOTE: IF YOU ARE UNABLE TO OBTAIN WOUND SUPPLIES, CONTINUE TO USE THE SUPPLIES YOU HAVE AVAILABLE UNTIL YOU ARE ABLE TO REACH US.  IT IS MOST IMPORTANT TO KEEP THE WOUND COVERED AT ALL TIMES      Electronically signed by Balaji Long DPM on 10/25/2022 at 12:29 PM

## 2022-10-25 NOTE — CODE DOCUMENTATION
[facility] Physician Billing Sheet. Chema Villegas  AGE: 76 y.o.    GENDER: male  : 1945  TODAY'S DATE:  10/25/2022    ICD-10  [facility] [address] Problems    Diagnosis Date Noted    Pain of left calf [M79.662] 10/25/2022     Priority: Medium    Edema of left lower leg [R60.0] 10/25/2022     Priority: Medium    Non-pressure chronic ulcer of left ankle with fat layer exposed [facility]) [L97.322] 2021       PHYSICIAN PROCEDURES    CPT CODE  66006      Electronically signed by Rosalina Mayen DPM on 10/25/2022 at 12:36 PM

## 2022-10-25 NOTE — TELEPHONE ENCOUNTER
Rose Wagner left a message regarding ankle wound 10-24-22, this RN spoke with him and he stated his leg is itching from his thigh down, stated his wound maybe is a little better, this RN offered him an appointment for 10-25-22 which he agreed to, later his home care nurse left a message stating the patient's calf is swollen and painful, this RN then called patient who denied any shortness of breath, stated he could wait till his appointment, this RN told Rose Wagner to go to the emergency room his he had any shortness of breath or increased pain, Bill verbalized understanding and agreement.

## 2022-10-25 NOTE — DISCHARGE INSTRUCTIONS
[facility]   Physician Orders and Discharge Instructions  [facility]  [address] [facility]  Telephone: [phone]        NAME:  Symone Vargas OF BIRTH:  1945  MEDICAL RECORD NUMBER:  [id number]     Your  is:  Nicholas Oseguera [facility] Care/Facility:  [facility] CARE     Wound Location:   LEFT ANTERIOR ANKLE AND LEFT MEDIAL ANKLE  Dressing orders: 1. Cleanse wound(s) with normal saline. 2. Apply a nickel thickness layer of SANTYL OINTMENT to the wound bed for enzymatic debridement purposes. 3. Apply HYDROFERA BLUE READY over the Santyl Ointment. 4. Cover with secure with a dry dressing. 5. Change Every Day. Compression:  NONE     Offloading Device:     Other Instructions:  GO TO THE XRAY DEPARTMENT FOR AN ULTRASOUND TODAY AT 1:00PM,  DR. Daniel Montejo TO REFER TO [facility],  make an appointment with Dr. Daniel William all dressings clean, dry and intact. Keep pressure off the wound(s) at all times. Follow up visit  2 WEEKS     November 1, 2022 AT  4:00pm     Please give 24 hour notice if unable to keep appointment. [phone]     If you experience any of the following, please call the Wound Care Service at  [phone] or go to the nearest emergency room. *Increase in pain         *Temperature over 101           *Increase in drainage from your wound or a foul odor  *Uncontrolled swelling            *Need for compression bandage changes due to slippage, breakthrough drainage       PLEASE NOTE: IF YOU ARE UNABLE TO OBTAIN WOUND SUPPLIES, CONTINUE TO USE THE SUPPLIES YOU HAVE AVAILABLE UNTIL YOU ARE ABLE TO REACH US.  IT IS MOST IMPORTANT TO KEEP THE WOUND COVERED AT ALL TIMES

## 2022-10-27 NOTE — PROGRESS NOTES
Arabella edwards [address]     Ph: [phone]  Fax: [phone]      [] Certification  [] Recertification []  Plan of Care  [] Progress Note [x] Discharge      Referring Provider: Neri Campbell MD     From:  Fadia Bah, PT  Patient: Jennifer Aviles (76 y.o. male) : 1945 Date: 10/27/2022  Medical Diagnosis: Sacroiliitis, not elsewhere classified [M46.1]       Treatment Diagnosis: Impaired balance and mobility    Plan of Care/Certification Expiration Date: : 22   Progress Report Period from:  2022  to 10/27/2022    Visits to Date: 1 No Show: 0 Cancelled Appts: 0    OBJECTIVE:   Short Term Goals - Time Frame for Short Term Goals: 3 weeks    Goals Current/Discharge status  Status   Short Term Goal 1: Independent with HEP  NT d/t unexpected D/C Unable to assess   Short Term Goal 2: Pt will be independent with transfers with decreased reliance on irwin UEs. NT d/t unexpected D/C Unable to assess     Long Term Goals - Time Frame for Long Term Goals : 6 weeks  Goals Current/ Discharge status Status   Long Term Goal 1: Improve irwin LE strength >/= 4+/5 to improve stability with standing and walking. NT d/t unexpected D/C Unable to assess   Long Term Goal 2: Pt will ambulate >/= 200' with improved irwin foot clearance and heel strike S/I. NT d/t unexpected D/C Unable to assess   Long Term Goal 3: Vill[place]or >/= 45/56 to reduce pt's risk for falls. NT d/t unexpected D/C Unable to assess   Long Term Goal 4: TUG with LRD </= 15sec to improve safety with ambulation. NT d/t unexpected D/C Unable to assess       Body Structures, Functions, Activity Limitations Requiring Skilled Therapeutic Intervention: Decreased functional mobility , Decreased ROM, Decreased strength, Decreased endurance, Decreased balance, Increased pain, Decreased posture  Assessment: Pt has not been seen since evaluation on 22.   Pt has not made any attempts to return to PT. Pt will be D/C'd at this time due to >30 day lapse in treatment. PLAN: [] Evaluate and Treat  Frequency/Duration:  D/C                  Patient Status:[] Continue/ Initiate plan of Care    [x] Discharge PT. Recommend pt continue with HEP. [] Additional visits requested, Please re-certify for additional visits:    [] Hold         Signature: Electronically signed by Long Mercedes PT on 10/27/22 at 2:23 PM EDT      If you have any questions or concerns, please don't hesitate to call.   Thank you for your referral.

## 2022-11-01 ENCOUNTER — HOSPITAL ENCOUNTER (OUTPATIENT)
Dept: WOUND CARE | Age: 77
Discharge: HOME | End: 2022-11-01
Payer: MEDICARE

## 2022-11-01 VITALS
RESPIRATION RATE: 18 BRPM | DIASTOLIC BLOOD PRESSURE: 58 MMHG | TEMPERATURE: 96.8 F | HEART RATE: 60 BPM | SYSTOLIC BLOOD PRESSURE: 116 MMHG

## 2022-11-01 DIAGNOSIS — L97.322 NON-PRESSURE CHRONIC ULCER OF LEFT ANKLE WITH FAT LAYER EXPOSED (HCC): Primary | ICD-10-CM

## 2022-11-01 PROCEDURE — 6370000000 HC RX 637 (ALT 250 FOR IP): Performed by: PODIATRIST

## 2022-11-01 PROCEDURE — 11042 DBRDMT SUBQ TIS 1ST 20SQCM/<: CPT

## 2022-11-01 PROCEDURE — 11045 DBRDMT SUBQ TISS EACH ADDL: CPT

## 2022-11-01 RX ORDER — LIDOCAINE HYDROCHLORIDE 20 MG/ML
JELLY TOPICAL ONCE
Status: COMPLETED | OUTPATIENT
Start: 2022-11-01 | End: 2022-11-01

## 2022-11-01 RX ORDER — GINSENG 100 MG
CAPSULE ORAL ONCE
Status: CANCELLED | OUTPATIENT
Start: 2022-11-01 | End: 2022-11-01

## 2022-11-01 RX ORDER — BETAMETHASONE DIPROPIONATE 0.05 %
OINTMENT (GRAM) TOPICAL ONCE
Status: CANCELLED | OUTPATIENT
Start: 2022-11-01 | End: 2022-11-01

## 2022-11-01 RX ORDER — LIDOCAINE HYDROCHLORIDE 40 MG/ML
SOLUTION TOPICAL ONCE
Status: CANCELLED | OUTPATIENT
Start: 2022-11-01 | End: 2022-11-01

## 2022-11-01 RX ORDER — LIDOCAINE 50 MG/G
OINTMENT TOPICAL ONCE
Status: CANCELLED | OUTPATIENT
Start: 2022-11-01 | End: 2022-11-01

## 2022-11-01 RX ORDER — CLOBETASOL PROPIONATE 0.5 MG/G
OINTMENT TOPICAL ONCE
Status: CANCELLED | OUTPATIENT
Start: 2022-11-01 | End: 2022-11-01

## 2022-11-01 RX ORDER — LIDOCAINE HYDROCHLORIDE 20 MG/ML
JELLY TOPICAL ONCE
Status: CANCELLED | OUTPATIENT
Start: 2022-11-01 | End: 2022-11-01

## 2022-11-01 RX ORDER — GENTAMICIN SULFATE 1 MG/G
OINTMENT TOPICAL ONCE
Status: CANCELLED | OUTPATIENT
Start: 2022-11-01 | End: 2022-11-01

## 2022-11-01 RX ORDER — LIDOCAINE 40 MG/G
CREAM TOPICAL ONCE
Status: CANCELLED | OUTPATIENT
Start: 2022-11-01 | End: 2022-11-01

## 2022-11-01 RX ORDER — BACITRACIN ZINC AND POLYMYXIN B SULFATE 500; 1000 [USP'U]/G; [USP'U]/G
OINTMENT TOPICAL ONCE
Status: CANCELLED | OUTPATIENT
Start: 2022-11-01 | End: 2022-11-01

## 2022-11-01 RX ORDER — BACITRACIN, NEOMYCIN, POLYMYXIN B 400; 3.5; 5 [USP'U]/G; MG/G; [USP'U]/G
OINTMENT TOPICAL ONCE
Status: CANCELLED | OUTPATIENT
Start: 2022-11-01 | End: 2022-11-01

## 2022-11-01 RX ADMIN — LIDOCAINE HYDROCHLORIDE: 20 JELLY TOPICAL at 16:55

## 2022-11-01 NOTE — CODE DOCUMENTATION
[address] [facility] Physician Billing Sheet. Beatrice Kraus  AGE: 76 y.o.    GENDER: male  : 1945  TODAY'S DATE:  2022    ICD-10  [facility] Street Problems    Diagnosis Date Noted    Edema of left lower leg [R60.0] 10/25/2022     Priority: Medium    Atherosclerosis of native arteries of extremities with rest pain, left leg ([address] [I70.222] 2021    Non-pressure chronic ulcer of left ankle with fat layer exposed ([address] [L97.322] 2021    Tobacco use [Z72.0] 2014       PHYSICIAN PROCEDURES    CPT CODE  92568 LT      Electronically signed by Rachelle Rose DPM on 2022 at 4:58 PM

## 2022-11-01 NOTE — PLAN OF CARE
Problem: Cognitive:  Goal: Knowledge of wound care  Outcome: Progressing     Problem: Cognitive:  Goal: Understands risk factors for wounds  Outcome: Progressing     Problem: Wound:  Goal: Will show signs of wound healing; wound closure and no evidence of infection  Outcome: Progressing     Problem: Falls - Risk of:  Goal: Will remain free from falls  Outcome: Progressing

## 2022-11-01 NOTE — PROGRESS NOTES
[address]                                                   Progress Note and Procedure Note      Edgar Izaguirre RECORD NUMBER:  [id number]  AGE: 76 y.o. GENDER: male  : 1945  EPISODE DATE:  2022    Subjective:     Chief Complaint   Patient presents with    Wound Check     Left ankle         HISTORY of PRESENT ILLNESS HPI      Mitul Maria is a 76 y.o. male who presents today for wound/ulcer evaluation. History of Wound Context: Patient presents for a reoccurrence of an non healing ulcer of the left ankle. The patient was last seen in 2021 with the wound doing very well and then he went to a AL and never followed up. He denies any NVFC. Wound/Ulcer Pain Timing/Severity: intermittent  Quality of pain: tender  Severity:  4 / 10   Modifying Factors: None  Associated Signs/Symptoms: drainage, odor and pain    Ulcer Identification:  Ulcer Type: venous and undetermined  Contributing Factors: edema and venous stasis    Wound: N/A        PAST MEDICAL HISTORY        Diagnosis Date    Alcohol abuse     quit drinking 21    CAD (coronary artery disease)     patient states no doctor has told him this / has 1 cardiac stent    Cancer ([address]     lung LLL    Chronic back pain     Chronic kidney disease     Chronic obstructive pulmonary disease, unspecified COPD type ([address] 3/24/2022    Chronic sinusitis     DJD (degenerative joint disease) of knee     left knee DJD    Elevated PSA     History of blood transfusion     History of inferior wall myocardial infarction 2016    1 cardiac stent    HTN (hypertension)     meds .  1 yr    Hyperlipidemia     meds > 12 yrs    NSTEMI (non-ST elevated myocardial infarction) ([address]     Smoker        PAST SURGICAL HISTORY    Past Surgical History:   Procedure Laterality Date    ABDOMEN SURGERY      spleenectomy due to [address]      lumbar disc OR    CARDIAC SURGERY      stents    CARPAL TUNNEL RELEASE Right 2019 RIGHT CARPAL TUNNEL RELEASE performed by Ashanti Guadalupe MD at [address] [facility] [address]  2016    EYE SURGERY      to have Phaco with IOL OU 2018    HERNIA REPAIR      JOINT REPLACEMENT Left     LTHR    JOINT REPLACEMENT Right     RTKR    KNEE SURGERY Right     arthroscopy    WA MANIPULATN KNEE JT+ANESTHESIA Right 2017    RIGHT KNEE MANIPULATION UNDER ANESTHESIA performed by Sundeep Mccormick MD at [address] [facility] OFFICE/OUTPT VISIT,PROCEDURE ONLY Bilateral 2017    RIGHT AND BILATERAL L 4-5 LYSIS OF SCAR DISKECTOMY INTERBODY CAGE FUSION POSTEROLATERAL FUSION PEDICLE SCREWS performed by Ashanti Guadalupe MD at [address]  2004    benign    [address]  Right 3/24/2017    RIGHT  KNEE TOTAL ARTHROPLASTY, [facility] [employer] CEMENTED PERSONA  performed by Sundeep Mccormick MD at [address] [facility] HISTORY    Family History   Problem Relation Age of Onset    Osteoarthritis Mother     Emphysema Mother     Hypertension Father     Hearing Loss Father     Dementia Father     No Known Problems Sister     Prostate Cancer Brother     Colon Polyps Neg Hx        SOCIAL HISTORY    Social History     Tobacco Use    Smoking status: Former     Packs/day: 0.00     Years: 60.00     Pack years: 0.00     Types: Cigarettes     Quit date: 2021     Years since quittin.8    Smokeless tobacco: Never    Tobacco comments:      smokes 2 cigars a day   Vaping Use    Vaping Use: Never used   Substance Use Topics    Alcohol use: Not Currently     Alcohol/week: 12.0 standard drinks     Types: 12 Shots of liquor per week     Comment: socail drinking    Drug use: No       ALLERGIES    Allergies   Allergen Reactions    Morphine        MEDICATIONS    Current Outpatient Medications on File Prior to Encounter   Medication Sig Dispense Refill    gentamicin (GARAMYCIN) 0.1 % ointment Apply topically  daily.  30 g 1    oxyCODONE-acetaminophen (PERCOCET) 7.5-325 MG per tablet Take 1 tablet by mouth five times a day as needed for pain      citalopram (CELEXA) 40 MG tablet TAKE ONE TABLET BY MOUTH nightly 30 tablet 5    atorvastatin (LIPITOR) 20 MG tablet TAKE ONE TABLET BY MOUTH DAILY 90 tablet 3    clopidogrel (PLAVIX) 75 MG tablet Take 1 tablet by mouth daily 90 tablet 3    diclofenac (VOLTAREN) 50 MG EC tablet TAKE ONE TABLET BY MOUTH TWO TIMES A DAY 60 tablet 5    isosorbide mononitrate (IMDUR) 30 MG extended release tablet TAKE ONE TABLET BY MOUTH EVERY DAY 90 tablet 3    bumetanide (BUMEX) 2 MG tablet Take 1 tablet by mouth daily 90 tablet 3    metoprolol tartrate (LOPRESSOR) 50 MG tablet Take 0.5 tablets by mouth 2 times daily TAKE ONE TABLET BY MOUTH TWO TIMES A  tablet 3    omeprazole (PRILOSEC) 10 MG delayed release capsule TAKE ONE CAPSULE BY MOUTH DAILY 90 capsule 2    finasteride (PROSCAR) 5 MG tablet Take 1 tablet by mouth daily 90 tablet 3    tiotropium (SPIRIVA RESPIMAT) 2.5 MCG/ACT AERS inhaler Inhale 2 puffs into the lungs daily 1 Inhaler 3    Fluticasone furoate-vilanterol (BREO ELLIPTA) 200-25 MCG/INH AEPB inhaler Inhale 1 puff into the lungs daily 1 each 3    albuterol sulfate  (90 Base) MCG/ACT inhaler Inhale 2 puffs into the lungs every 6 hours as needed for Wheezing 1 Inhaler 3    nitroGLYCERIN (NITROSTAT) 0.4 MG SL tablet Place 1 tablet under the tongue every 5 minutes as needed for Chest pain up to max of 3 total doses. If no relief after 1 dose, call 911. 25 tablet 3    DOCUSATE CALCIUM PO Take by mouth as needed       No current facility-administered medications on file prior to encounter. REVIEW OF SYSTEMS    Pertinent items are noted in HPI.     Objective:      BP (!) 116/58   Pulse 60   Temp 96.8 °F (36 °C) (Infrared)   Resp 18     Wt Readings from Last 3 Encounters:   10/14/22 166 lb 12.8 oz (75.7 kg)   08/11/22 160 lb (72.6 kg)   07/12/22 168 lb (76.2 kg)       PHYSICAL EXAM    Constitutional:   Well nourished and well developed. Appears neat and clean. Patient is alert, oriented x3, and in no apparent distress. Respiratory:  Respiratory effort is easy and symmetric bilaterally. Rate is normal at rest and on room air. Vascular:  Pedal Pulses is palpable and audible with doppler. Capillary refill is <3 sec to digits bilateral.  Extremities positive for 2+ pitting edema. Art duplex reviewed no occlusion noted. Neurological:   Sensation is intact to lower extremities. Dermatological:  Wound description noted in wound assessment. The wound appearnace is stable  with slough and decreased pain or erythema. Granulation buds are noted. Size is slightly larger secondary to edema today. Periwound area is denuded most likely due to drainage. Depth is much improved. Psychiatric:  Judgement and insight intact. Short and long term memory intact. No evidence of depression, anxiety, or agitation. Patient is calm, cooperative, and communicative. Appropriate interactions and affect. Assessment:      Active Hospital Problems    Diagnosis Date Noted    Edema of left lower leg [R60.0] 10/25/2022     Priority: Medium    Atherosclerosis of native arteries of extremities with rest pain, left leg ([facility] [I70.222] 08/17/2021    Non-pressure chronic ulcer of left ankle with fat layer exposed ([address] [L97.322] 08/17/2021    Tobacco use [Z72.0] 04/24/2014        Procedure Note  Indications:  Based on my examination of this patient's wound(s)/ulcer(s) today, debridement is required to promote healing and evaluate the wound base. Performed by: Ashleigh Bravo DPM    Consent obtained:  Yes    Time out taken:  Yes    Pain Control:2% lidocaine gel  thin layer applied topically to wound bed      Debridement:Excisional Debridement    Using curette the wound(s)/ulcer(s) was/were sharply debrided down through and including the removal of epidermis, dermis and subcutaneous tissue.         Devitalized Tissue Debrided: exudate    Pre Debridement Measurements:  Are located in the [place]  Documentation Flow Sheet    Wound/Ulcer #: 1 and 2    Post Debridement Measurements:  Wound/Ulcer Descriptions are Pre Debridement except measurements:  Wound 03/25/22 Ankle Left; Anterior #1 (Active)   Wound Image   11/01/22 1611   Wound Etiology Other 11/01/22 1611   Wound Cleansed Cleansed with saline 11/01/22 1611   Dressing/Treatment Dry dressing;Hydrofera blue; Pharmaceutical agent (see MAR) 10/25/22 1231   Wound Length (cm) 3.4 cm 11/01/22 1611   Wound Width (cm) 2.2 cm 11/01/22 1611   Wound Depth (cm) 0.15 cm 11/01/22 1611   Wound Surface Area (cm^2) 7.48 cm^2 11/01/22 1611   Change in Wound Size % (l*w) -133.75 11/01/22 1611   Wound Volume (cm^3) 1.122 cm^3 11/01/22 1611   Wound Healing % -17 11/01/22 1611   Post-Procedure Length (cm) 3.4 cm 11/01/22 1635   Post-Procedure Width (cm) 2.2 cm 11/01/22 1635   Post-Procedure Depth (cm) 0.2 cm 11/01/22 1635   Post-Procedure Surface Area (cm^2) 7.48 cm^2 11/01/22 1635   Post-Procedure Volume (cm^3) 1.496 cm^3 11/01/22 1635   Wound Assessment Pink/red;Slough 11/01/22 1611   Drainage Amount Large 11/01/22 1611   Drainage Description Serous; Yellow 11/01/22 1611   Odor None 11/01/22 1611   Ela-wound Assessment Fragile 11/01/22 1611   Margins Defined edges 11/01/22 1611   Wound Thickness Description not for Pressure Injury Full thickness 11/01/22 1611   Number of days: 221       Wound 08/30/22 Ankle Left;Medial #2 (Active)   Wound Image   11/01/22 1611   Wound Etiology Other 11/01/22 1611   Wound Cleansed Cleansed with saline 11/01/22 1611   Dressing/Treatment Dry dressing;Hydrofera blue; Pharmaceutical agent (see MAR) 10/25/22 1231   Wound Length (cm) 5 cm 11/01/22 1611   Wound Width (cm) 3.6 cm 11/01/22 1611   Wound Depth (cm) 0.1 cm 11/01/22 1611   Wound Surface Area (cm^2) 18 cm^2 11/01/22 1611   Change in Wound Size % (l*w) -823.08 11/01/22 1611   Wound Volume (cm^3) 1.8 cm^3 11/01/22 1611 Wound Healing % -362 11/01/22 1611   Post-Procedure Length (cm) 5 cm 11/01/22 1635   Post-Procedure Width (cm) 3.6 cm 11/01/22 1635   Post-Procedure Depth (cm) 0.15 cm 11/01/22 1635   Post-Procedure Surface Area (cm^2) 18 cm^2 11/01/22 1635   Post-Procedure Volume (cm^3) 2.7 cm^3 11/01/22 1635   Wound Assessment Pink/red;Slough 11/01/22 1611   Drainage Amount Large 11/01/22 1611   Drainage Description Yellow 11/01/22 1611   Odor None 11/01/22 1611   Ela-wound Assessment Fragile 11/01/22 1611   Margins Undefined edges 11/01/22 1611   Wound Thickness Description not for Pressure Injury Full thickness 11/01/22 1611   Number of days: 63         Percent of Wound/Ulcer Debrided: 100%    Total Surface Area Debrided: 27.48  sq cm     Diabetic/Pressure/Non Pressure Ulcers:  Ulcer: Non-Pressure ulcer, fat layer exposed      Bleeding:  Minimal    Hemostasis Achieved:  by pressure    Procedural Pain:  5  / 10     Post Procedural Pain:  1 / 10     Response to treatment:  Well tolerated by patient. Plan:     Patient examined and debrided  Santyl and drawtex daily  Cigar smoking cessation emphasized  Follow up in two weeks for possible hylomatrix after report from Dermatology  Discussed with sister who will be taking him. Treatment Note please see attached Discharge Instructions    Written patient dismissal instructions given to patient and signed by patient or POA.          Discharge [address]   Physician Orders and Discharge Instructions  [address]  Telephone: [phone]        NAME:  Jovany Lou OF BIRTH:  1945  MEDICAL RECORD NUMBER:  [id number]     Your  is:  Nicholas Oseguera [facility] Care/Facility:  [facility]     Wound Location:   LEFT ANTERIOR ANKLE AND LEFT MEDIAL ANKLE  Dressing orders: 1. Cleanse wound(s) with normal saline. 2. Apply a nickel thickness layer of SANTYL OINTMENT to the wound bed for enzymatic debridement purposes. 3. Apply DRAWTEX over the Santyl Ointment. 4. Cover with secure with a dry dressing. 5. Change Every Day. Compression:  NONE     Offloading Device:     Other Instructions:   KEEP APPOINTMENT WITH [facility],  Keep all dressings clean, dry and intact. Keep pressure off the wound(s) at all times. Follow up visit  2 WEEKS     November 15, 2022 AT       Please give 24 hour notice if unable to keep appointment. [phone]     If you experience any of the following, please call the Wound Care Service at  [phone] or go to the nearest emergency room. *Increase in pain         *Temperature over 101           *Increase in drainage from your wound or a foul odor  *Uncontrolled swelling            *Need for compression bandage changes due to slippage, breakthrough drainage       PLEASE NOTE: IF YOU ARE UNABLE TO OBTAIN WOUND SUPPLIES, CONTINUE TO USE THE SUPPLIES YOU HAVE AVAILABLE UNTIL YOU ARE ABLE TO REACH US.  IT IS MOST IMPORTANT TO KEEP THE WOUND COVERED AT ALL TIMES  Electronically signed by Vero Dominguez DPM on 11/1/2022 at 4:42 PM       Electronically signed by Vero Dominguez DPM on 11/1/2022 at 4:45 PM

## 2022-11-01 NOTE — DISCHARGE INSTRUCTIONS
[facility]   Physician Orders and Discharge Instructions  [address]  Telephone: [phone]        NAME:  Symone Vargas OF BIRTH:  1945  MEDICAL RECORD NUMBER:  [id number]     Your  is:  Nicholas Oseguera [facility] Care/Facility:  [facility] CARE     Wound Location:   LEFT ANTERIOR ANKLE AND LEFT MEDIAL ANKLE  Dressing orders: 1. Cleanse wound(s) with normal saline. 2. Apply a nickel thickness layer of SANTYL OINTMENT to the wound bed for enzymatic debridement purposes. 3. Apply DRAWTEX over the Santyl Ointment. 4. Cover with secure with a dry dressing. 5. Change Every Day. Compression:  NONE     Offloading Device:     Other Instructions:   KEEP APPOINTMENT WITH [facility],  Keep all dressings clean, dry and intact. Keep pressure off the wound(s) at all times. Follow up visit  2 WEEKS     November 15, 2022 AT       Please give 24 hour notice if unable to keep appointment. [phone]     If you experience any of the following, please call the Wound Care Service at  [phone] or go to the nearest emergency room. *Increase in pain         *Temperature over 101           *Increase in drainage from your wound or a foul odor  *Uncontrolled swelling            *Need for compression bandage changes due to slippage, breakthrough drainage       PLEASE NOTE: IF YOU ARE UNABLE TO OBTAIN WOUND SUPPLIES, CONTINUE TO USE THE SUPPLIES YOU HAVE AVAILABLE UNTIL YOU ARE ABLE TO REACH US.  IT IS MOST IMPORTANT TO KEEP THE WOUND COVERED AT ALL TIMES  Electronically signed by Kelly Hall DPM on 11/1/2022 at 4:42 PM

## 2022-11-15 ENCOUNTER — HOSPITAL ENCOUNTER (OUTPATIENT)
Dept: WOUND CARE | Age: 77
Discharge: HOME | End: 2022-11-15
Payer: MEDICARE

## 2022-11-15 VITALS
HEART RATE: 69 BPM | DIASTOLIC BLOOD PRESSURE: 55 MMHG | SYSTOLIC BLOOD PRESSURE: 107 MMHG | RESPIRATION RATE: 20 BRPM | TEMPERATURE: 96.9 F

## 2022-11-15 DIAGNOSIS — L97.322 NON-PRESSURE CHRONIC ULCER OF LEFT ANKLE WITH FAT LAYER EXPOSED (HCC): Primary | ICD-10-CM

## 2022-11-15 PROCEDURE — 99213 OFFICE O/P EST LOW 20 MIN: CPT

## 2022-11-15 PROCEDURE — 6370000000 HC RX 637 (ALT 250 FOR IP): Performed by: PODIATRIST

## 2022-11-15 RX ORDER — LIDOCAINE HYDROCHLORIDE 20 MG/ML
JELLY TOPICAL ONCE
Status: COMPLETED | OUTPATIENT
Start: 2022-11-15 | End: 2022-11-15

## 2022-11-15 RX ORDER — CLOBETASOL PROPIONATE 0.5 MG/G
OINTMENT TOPICAL ONCE
Status: CANCELLED | OUTPATIENT
Start: 2022-11-15 | End: 2022-11-15

## 2022-11-15 RX ORDER — LIDOCAINE 50 MG/G
OINTMENT TOPICAL ONCE
Status: CANCELLED | OUTPATIENT
Start: 2022-11-15 | End: 2022-11-15

## 2022-11-15 RX ORDER — BACITRACIN ZINC AND POLYMYXIN B SULFATE 500; 1000 [USP'U]/G; [USP'U]/G
OINTMENT TOPICAL ONCE
Status: CANCELLED | OUTPATIENT
Start: 2022-11-15 | End: 2022-11-15

## 2022-11-15 RX ORDER — LIDOCAINE 40 MG/G
CREAM TOPICAL ONCE
Status: CANCELLED | OUTPATIENT
Start: 2022-11-15 | End: 2022-11-15

## 2022-11-15 RX ORDER — BACITRACIN, NEOMYCIN, POLYMYXIN B 400; 3.5; 5 [USP'U]/G; MG/G; [USP'U]/G
OINTMENT TOPICAL ONCE
Status: CANCELLED | OUTPATIENT
Start: 2022-11-15 | End: 2022-11-15

## 2022-11-15 RX ORDER — GENTAMICIN SULFATE 1 MG/G
OINTMENT TOPICAL ONCE
Status: CANCELLED | OUTPATIENT
Start: 2022-11-15 | End: 2022-11-15

## 2022-11-15 RX ORDER — LIDOCAINE HYDROCHLORIDE 40 MG/ML
SOLUTION TOPICAL ONCE
Status: CANCELLED | OUTPATIENT
Start: 2022-11-15 | End: 2022-11-15

## 2022-11-15 RX ORDER — BETAMETHASONE DIPROPIONATE 0.05 %
OINTMENT (GRAM) TOPICAL ONCE
Status: CANCELLED | OUTPATIENT
Start: 2022-11-15 | End: 2022-11-15

## 2022-11-15 RX ORDER — LIDOCAINE HYDROCHLORIDE 20 MG/ML
JELLY TOPICAL ONCE
Status: CANCELLED | OUTPATIENT
Start: 2022-11-15 | End: 2022-11-15

## 2022-11-15 RX ORDER — GINSENG 100 MG
CAPSULE ORAL ONCE
Status: CANCELLED | OUTPATIENT
Start: 2022-11-15 | End: 2022-11-15

## 2022-11-15 RX ADMIN — LIDOCAINE HYDROCHLORIDE: 20 JELLY TOPICAL at 16:07

## 2022-11-15 ASSESSMENT — PAIN DESCRIPTION - LOCATION: LOCATION: ANKLE

## 2022-11-15 ASSESSMENT — PAIN SCALES - GENERAL: PAINLEVEL_OUTOF10: 7

## 2022-11-15 ASSESSMENT — PAIN DESCRIPTION - DESCRIPTORS: DESCRIPTORS: THROBBING

## 2022-11-15 ASSESSMENT — PAIN DESCRIPTION - PAIN TYPE: TYPE: CHRONIC PAIN

## 2022-11-15 ASSESSMENT — PAIN DESCRIPTION - ORIENTATION: ORIENTATION: LEFT

## 2022-11-15 NOTE — PROGRESS NOTES
Ewa Cervantes [address]                                                   Progress Note and Procedure Note      Tiffany Genao RECORD NUMBER:  [id number]  AGE: 76 y.o. GENDER: male  : 1945  EPISODE DATE:  11/15/2022    Subjective:     Chief Complaint   Patient presents with    Wound Check     L leg         HISTORY of PRESENT ILLNESS HPI      Jason Camacho is a 76 y.o. male who presents today for wound/ulcer evaluation. History of Wound Context: Patient presents for a reoccurrence of an non healing ulcer of the left ankle. The patient was last seen in 2021 with the wound doing very well and then he went to a AL and never followed up. He denies any NVFC. Wound/Ulcer Pain Timing/Severity: intermittent  Quality of pain: tender  Severity:  4 / 10   Modifying Factors: None  Associated Signs/Symptoms: drainage, odor and pain    Ulcer Identification:  Ulcer Type: venous and undetermined  Contributing Factors: edema and venous stasis    Wound: N/A        PAST MEDICAL HISTORY        Diagnosis Date    Alcohol abuse     quit drinking 21    CAD (coronary artery disease)     patient states no doctor has told him this / has 1 cardiac stent    Cancer ([address]     lung LLL    Chronic back pain     Chronic kidney disease     Chronic obstructive pulmonary disease, unspecified COPD type ([address] 3/24/2022    Chronic sinusitis     DJD (degenerative joint disease) of knee     left knee DJD    Elevated PSA     History of blood transfusion     History of inferior wall myocardial infarction 2016    1 cardiac stent    HTN (hypertension)     meds .  1 yr    Hyperlipidemia     meds > 12 yrs    NSTEMI (non-ST elevated myocardial infarction) ([address]     Smoker        PAST SURGICAL HISTORY    Past Surgical History:   Procedure Laterality Date    ABDOMEN SURGERY      spleenectomy due to [address]      lumbar disc OR    CARDIAC SURGERY      stents    CARPAL TUNNEL RELEASE Right 2019 RIGHT CARPAL TUNNEL RELEASE performed by Rafal Frey MD at [address] [facility] [address]  2016    EYE SURGERY      to have Phaco with IOL OU 2018    HERNIA REPAIR      JOINT REPLACEMENT Left     LTHR    JOINT REPLACEMENT Right     RTKR    KNEE SURGERY Right     arthroscopy    IL MANIPULATN KNEE JT+ANESTHESIA Right 2017    RIGHT KNEE MANIPULATION UNDER ANESTHESIA performed by Marcus Sharma MD at [address] [facility] OFFICE/OUTPT VISIT,PROCEDURE ONLY Bilateral 2017    RIGHT AND BILATERAL L 4-5 LYSIS OF SCAR DISKECTOMY INTERBODY CAGE FUSION POSTEROLATERAL FUSION PEDICLE SCREWS performed by Rafal Frey MD at [address]  2004    benign    [facility] Right 3/24/2017    RIGHT  KNEE TOTAL ARTHROPLASTY, Paralee Julian CEMENTED PERSONA  performed by Marcus Sharma MD at [address] [facility] HISTORY    Family History   Problem Relation Age of Onset    Osteoarthritis Mother     Emphysema Mother     Hypertension Father     Hearing Loss Father     Dementia Father     No Known Problems Sister     Prostate Cancer Brother     Colon Polyps Neg Hx        SOCIAL HISTORY    Social History     Tobacco Use    Smoking status: Former     Packs/day: 0.00     Years: 60.00     Pack years: 0.00     Types: Cigarettes     Quit date: 2021     Years since quittin.8    Smokeless tobacco: Never    Tobacco comments:      smokes 2 cigars a day   Vaping Use    Vaping Use: Never used   Substance Use Topics    Alcohol use: Not Currently     Alcohol/week: 12.0 standard drinks     Types: 12 Shots of liquor per week     Comment: socail drinking    Drug use: No       ALLERGIES    Allergies   Allergen Reactions    Morphine        MEDICATIONS    Current Outpatient Medications on File Prior to Encounter   Medication Sig Dispense Refill    gentamicin (GARAMYCIN) 0.1 % ointment Apply topically  daily.  30 g 1    oxyCODONE-acetaminophen (PERCOCET) 7.5-325 MG per tablet Take 1 tablet by mouth five times a day as needed for pain      citalopram (CELEXA) 40 MG tablet TAKE ONE TABLET BY MOUTH nightly 30 tablet 5    atorvastatin (LIPITOR) 20 MG tablet TAKE ONE TABLET BY MOUTH DAILY 90 tablet 3    clopidogrel (PLAVIX) 75 MG tablet Take 1 tablet by mouth daily 90 tablet 3    diclofenac (VOLTAREN) 50 MG EC tablet TAKE ONE TABLET BY MOUTH TWO TIMES A DAY 60 tablet 5    isosorbide mononitrate (IMDUR) 30 MG extended release tablet TAKE ONE TABLET BY MOUTH EVERY DAY 90 tablet 3    bumetanide (BUMEX) 2 MG tablet Take 1 tablet by mouth daily 90 tablet 3    metoprolol tartrate (LOPRESSOR) 50 MG tablet Take 0.5 tablets by mouth 2 times daily TAKE ONE TABLET BY MOUTH TWO TIMES A  tablet 3    omeprazole (PRILOSEC) 10 MG delayed release capsule TAKE ONE CAPSULE BY MOUTH DAILY 90 capsule 2    finasteride (PROSCAR) 5 MG tablet Take 1 tablet by mouth daily 90 tablet 3    tiotropium (SPIRIVA RESPIMAT) 2.5 MCG/ACT AERS inhaler Inhale 2 puffs into the lungs daily 1 Inhaler 3    Fluticasone furoate-vilanterol (BREO ELLIPTA) 200-25 MCG/INH AEPB inhaler Inhale 1 puff into the lungs daily 1 each 3    albuterol sulfate  (90 Base) MCG/ACT inhaler Inhale 2 puffs into the lungs every 6 hours as needed for Wheezing 1 Inhaler 3    nitroGLYCERIN (NITROSTAT) 0.4 MG SL tablet Place 1 tablet under the tongue every 5 minutes as needed for Chest pain up to max of 3 total doses. If no relief after 1 dose, call 911. 25 tablet 3    DOCUSATE CALCIUM PO Take by mouth as needed       No current facility-administered medications on file prior to encounter. REVIEW OF SYSTEMS    Pertinent items are noted in HPI.     Objective:      BP (!) 107/55   Pulse 69   Temp 96.9 °F (36.1 °C) (Temporal)   Resp 20     Wt Readings from Last 3 Encounters:   10/14/22 166 lb 12.8 oz (75.7 kg)   08/11/22 160 lb (72.6 kg)   07/12/22 168 lb (76.2 kg)       PHYSICAL EXAM    Constitutional:   Well nourished and well developed. Appears neat and clean. Patient is alert, oriented x3, and in no apparent distress. Respiratory:  Respiratory effort is easy and symmetric bilaterally. Rate is normal at rest and on room air. Vascular:  Pedal Pulses is palpable and audible with doppler. Capillary refill is <3 sec to digits bilateral.  Extremities positive for 2+ pitting edema. Art duplex reviewed no occlusion noted. Neurological:   Sensation is intact to lower extremities. Dermatological:  Wound description noted in wound assessment. The wound appearnace is stable  with slough and decreased pain or erythema. Granulation buds are noted. Size is slightly larger secondary to edema today. Periwound area is denuded most likely due to drainage. Depth is much improved again. New small satellite areas noted. Psychiatric:  Judgement and insight intact. Short and long term memory intact. No evidence of depression, anxiety, or agitation. Patient is calm, cooperative, and communicative. Appropriate interactions and affect. Assessment:      Active Hospital Problems    Diagnosis Date Noted    Edema of left lower leg [R60.0] 10/25/2022     Priority: Medium    Non-pressure chronic ulcer of left ankle with fat layer exposed [facility]) [L97.322] 08/17/2021        Procedure Note  Indications:  Based on my examination of this patient's wound(s)/ulcer(s) today, debridement is notrequired to promote healing and evaluate the wound base. Wound 03/25/22 Ankle Left; Anterior #1 (Active)   Wound Image   11/15/22 1609   Wound Etiology Other 11/15/22 1609   Wound Cleansed Cleansed with saline 11/15/22 1609   Dressing/Treatment Other (comment) 11/15/22 1630   Wound Length (cm) 3.2 cm 11/15/22 1609   Wound Width (cm) 2.3 cm 11/15/22 1609   Wound Depth (cm) 0.2 cm 11/15/22 1609   Wound Surface Area (cm^2) 7.36 cm^2 11/15/22 1609   Change in Wound Size % (l*w) -130 11/15/22 1609   Wound Volume (cm^3) 1.472 cm^3 11/15/22 1609 possible. Patient relates that he saw dermatology and did not take a biopsy. Felt that it was not cancerous. Told him to take zinc supplement  Follow up in one week    Treatment Note please see attached Discharge Instructions    Written patient dismissal instructions given to patient and signed by patient or POA. Discharge [facility]   Physician Orders and Discharge Instructions  [address]  Telephone: [phone]        NAME:  Selena Borges OF BIRTH:  1945  MEDICAL RECORD NUMBER:  [id number]     Your  is:  Nicholas Oseguera [facility] Care/Facility:  [facility] CARE     Wound Location:   LEFT ANTERIOR ANKLE AND LEFT MEDIAL ANKLE  Dressing orders: 1. Cleanse wound(s) with normal saline. 2. Apply IOPLEX  TO OPEN AREAS. 4. Cover and secure with a dry dressing. 5. Change Every Day. Compression:  NONE     Offloading Device:     Other Instructions:     Keep all dressings clean, dry and intact. Keep pressure off the wound(s) at all times. Follow up visit  1 week  November 22, 2022 at            Please give 24 hour notice if unable to keep appointment. [phone]     If you experience any of the following, please call the Wound Care Service at  [phone] or go to the nearest emergency room. *Increase in pain         *Temperature over 101           *Increase in drainage from your wound or a foul odor  *Uncontrolled swelling            *Need for compression bandage changes due to slippage, breakthrough drainage       PLEASE NOTE: IF YOU ARE UNABLE TO OBTAIN WOUND SUPPLIES, CONTINUE TO USE THE SUPPLIES YOU HAVE AVAILABLE UNTIL YOU ARE ABLE TO REACH US.  IT IS MOST IMPORTANT TO KEEP THE WOUND COVERED AT ALL TIMES  Electronically signed by Yanick Gatica DPM on 11/15/2022 at 4:27 PM       Electronically signed by Yanick Gatica DPM on 11/15/2022 at 4:28 PM

## 2022-11-15 NOTE — DISCHARGE INSTRUCTIONS
[facility]   Physician Orders and Discharge Instructions  [address]  Telephone: [phone]        NAME:  Paige Aly OF BIRTH:  1945  MEDICAL RECORD NUMBER:  [id number]     Your  is:  Nicholas Courtney Care/Facility:  [facility] CARE     Wound Location:   LEFT ANTERIOR ANKLE AND LEFT MEDIAL ANKLE  Dressing orders: 1. Cleanse wound(s) with normal saline. 2. Apply IOPLEX  TO OPEN AREAS. 4. Cover and secure with a dry dressing. 5. Change Every Day. Compression:  NONE     Offloading Device:     Other Instructions:     Keep all dressings clean, dry and intact. Keep pressure off the wound(s) at all times. Follow up visit  1 week  November 22, 2022 at            Please give 24 hour notice if unable to keep appointment. [phone]     If you experience any of the following, please call the Wound Care Service at  [phone] or go to the nearest emergency room. *Increase in pain         *Temperature over 101           *Increase in drainage from your wound or a foul odor  *Uncontrolled swelling            *Need for compression bandage changes due to slippage, breakthrough drainage       PLEASE NOTE: IF YOU ARE UNABLE TO OBTAIN WOUND SUPPLIES, CONTINUE TO USE THE SUPPLIES YOU HAVE AVAILABLE UNTIL YOU ARE ABLE TO REACH US.  IT IS MOST IMPORTANT TO KEEP THE WOUND COVERED AT ALL TIMES  Electronically signed by Jose Cervantes DPM on 11/15/2022 at 4:27 PM

## 2022-11-15 NOTE — PLAN OF CARE
Problem: Cognitive:  Goal: Knowledge of wound care  Description: Knowledge of wound care  Outcome: Progressing  Goal: Understands risk factors for wounds  Description: Understands risk factors for wounds  Outcome: Progressing     Problem: Wound:  Goal: Will show signs of wound healing; wound closure and no evidence of infection  Description: Will show signs of wound healing; wound closure and no evidence of infection  Outcome: Progressing     Problem: Venous:  Goal: Signs of wound healing will improve  Description: Signs of wound healing will improve  Outcome: Progressing     Problem: Smoking cessation:  Goal: Ability to formulate a plan to maintain a tobacco-free life will be supported  Description: Ability to formulate a plan to maintain a tobacco-free life will be supported  Outcome: Progressing     Problem: Compression therapy:  Goal: Will be free from complications associated with compression therapy  Description: Will be free from complications associated with compression therapy  Outcome: Progressing     Problem: Falls - Risk of:  Goal: Will remain free from falls  Description: Will remain free from falls  Outcome: Progressing     Problem: Blood Glucose:  Goal: Ability to maintain appropriate glucose levels will improve  Description: Ability to maintain appropriate glucose levels will improve  Outcome: Progressing

## 2022-11-15 NOTE — CODE DOCUMENTATION
[facility] Billing Sheet. Drew Persaud  AGE: 76 y.o.    GENDER: male  : 1945  TODAY'S DATE:  11/15/2022    ICD-10  [facility] Problems    Diagnosis Date Noted    Edema of left lower leg [R60.0] 10/25/2022     Priority: Medium    Non-pressure chronic ulcer of left ankle with fat layer exposed [facility]) [L97.322] 2021       PHYSICIAN PROCEDURES    CPT CODE  38637      Electronically signed by Balaji Long DPM on 11/15/2022 at 4:32 PM

## 2022-11-22 ENCOUNTER — HOSPITAL ENCOUNTER (OUTPATIENT)
Dept: WOUND CARE | Age: 77
Discharge: HOME | End: 2022-11-22
Payer: MEDICARE

## 2022-11-22 VITALS
HEART RATE: 60 BPM | TEMPERATURE: 96.8 F | DIASTOLIC BLOOD PRESSURE: 58 MMHG | SYSTOLIC BLOOD PRESSURE: 115 MMHG | RESPIRATION RATE: 20 BRPM

## 2022-11-22 DIAGNOSIS — L97.322 NON-PRESSURE CHRONIC ULCER OF LEFT ANKLE WITH FAT LAYER EXPOSED (HCC): Primary | ICD-10-CM

## 2022-11-22 DIAGNOSIS — M86.272 SUBACUTE OSTEOMYELITIS, LEFT ANKLE AND FOOT (HCC): ICD-10-CM

## 2022-11-22 DIAGNOSIS — R60.0 EDEMA OF LEFT LOWER LEG: Chronic | ICD-10-CM

## 2022-11-22 DIAGNOSIS — Z72.0 TOBACCO USE: ICD-10-CM

## 2022-11-22 PROCEDURE — 87186 SC STD MICRODIL/AGAR DIL: CPT

## 2022-11-22 PROCEDURE — 87075 CULTR BACTERIA EXCEPT BLOOD: CPT

## 2022-11-22 PROCEDURE — 6370000000 HC RX 637 (ALT 250 FOR IP): Performed by: PODIATRIST

## 2022-11-22 PROCEDURE — 87070 CULTURE OTHR SPECIMN AEROBIC: CPT

## 2022-11-22 PROCEDURE — 99213 OFFICE O/P EST LOW 20 MIN: CPT

## 2022-11-22 RX ORDER — LIDOCAINE HYDROCHLORIDE 20 MG/ML
JELLY TOPICAL ONCE
OUTPATIENT
Start: 2022-11-22 | End: 2022-11-22

## 2022-11-22 RX ORDER — GENTAMICIN SULFATE 1 MG/G
OINTMENT TOPICAL
Qty: 30 G | Refills: 1 | Status: SHIPPED | OUTPATIENT
Start: 2022-11-22

## 2022-11-22 RX ORDER — BACITRACIN ZINC AND POLYMYXIN B SULFATE 500; 1000 [USP'U]/G; [USP'U]/G
OINTMENT TOPICAL ONCE
OUTPATIENT
Start: 2022-11-22 | End: 2022-11-22

## 2022-11-22 RX ORDER — LIDOCAINE HYDROCHLORIDE 20 MG/ML
JELLY TOPICAL ONCE
Status: COMPLETED | OUTPATIENT
Start: 2022-11-22 | End: 2022-11-22

## 2022-11-22 RX ORDER — GENTAMICIN SULFATE 1 MG/G
OINTMENT TOPICAL ONCE
Status: COMPLETED | OUTPATIENT
Start: 2022-11-22 | End: 2022-11-22

## 2022-11-22 RX ORDER — LIDOCAINE HYDROCHLORIDE 40 MG/ML
SOLUTION TOPICAL ONCE
OUTPATIENT
Start: 2022-11-22 | End: 2022-11-22

## 2022-11-22 RX ORDER — GINSENG 100 MG
CAPSULE ORAL ONCE
OUTPATIENT
Start: 2022-11-22 | End: 2022-11-22

## 2022-11-22 RX ORDER — BACITRACIN, NEOMYCIN, POLYMYXIN B 400; 3.5; 5 [USP'U]/G; MG/G; [USP'U]/G
OINTMENT TOPICAL ONCE
OUTPATIENT
Start: 2022-11-22 | End: 2022-11-22

## 2022-11-22 RX ORDER — LIDOCAINE 40 MG/G
CREAM TOPICAL ONCE
OUTPATIENT
Start: 2022-11-22 | End: 2022-11-22

## 2022-11-22 RX ORDER — LIDOCAINE 50 MG/G
OINTMENT TOPICAL ONCE
OUTPATIENT
Start: 2022-11-22 | End: 2022-11-22

## 2022-11-22 RX ORDER — BETAMETHASONE DIPROPIONATE 0.05 %
OINTMENT (GRAM) TOPICAL ONCE
OUTPATIENT
Start: 2022-11-22 | End: 2022-11-22

## 2022-11-22 RX ORDER — GENTAMICIN SULFATE 1 MG/G
OINTMENT TOPICAL ONCE
Status: CANCELLED | OUTPATIENT
Start: 2022-11-22 | End: 2022-11-22

## 2022-11-22 RX ORDER — CLOBETASOL PROPIONATE 0.5 MG/G
OINTMENT TOPICAL ONCE
OUTPATIENT
Start: 2022-11-22 | End: 2022-11-22

## 2022-11-22 RX ADMIN — GENTAMICIN SULFATE: 1 OINTMENT TOPICAL at 14:06

## 2022-11-22 RX ADMIN — LIDOCAINE HYDROCHLORIDE: 20 JELLY TOPICAL at 13:37

## 2022-11-22 ASSESSMENT — PAIN - FUNCTIONAL ASSESSMENT: PAIN_FUNCTIONAL_ASSESSMENT: ACTIVITIES ARE NOT PREVENTED

## 2022-11-22 ASSESSMENT — PAIN DESCRIPTION - ORIENTATION: ORIENTATION: LEFT

## 2022-11-22 ASSESSMENT — PAIN DESCRIPTION - DESCRIPTORS: DESCRIPTORS: THROBBING

## 2022-11-22 ASSESSMENT — PAIN DESCRIPTION - LOCATION: LOCATION: ANKLE

## 2022-11-22 ASSESSMENT — PAIN SCALES - GENERAL: PAINLEVEL_OUTOF10: 6

## 2022-11-22 NOTE — PLAN OF CARE
Problem: Pain  Goal: Verbalizes/displays adequate comfort level or baseline comfort level  Outcome: Progressing     Problem: Cognitive:  Goal: Knowledge of wound care  Description: Knowledge of wound care  Outcome: Progressing  Goal: Understands risk factors for wounds  Description: Understands risk factors for wounds  Outcome: Progressing     Problem: Wound:  Goal: Will show signs of wound healing; wound closure and no evidence of infection  Description: Will show signs of wound healing; wound closure and no evidence of infection  Outcome: Progressing     Problem: Venous:  Goal: Signs of wound healing will improve  Description: Signs of wound healing will improve  Outcome: Progressing     Problem: Smoking cessation:  Goal: Ability to formulate a plan to maintain a tobacco-free life will be supported  Description: Ability to formulate a plan to maintain a tobacco-free life will be supported  Outcome: Progressing     Problem: Compression therapy:  Goal: Will be free from complications associated with compression therapy  Description: Will be free from complications associated with compression therapy  Outcome: Progressing     Problem: Falls - Risk of:  Goal: Will remain free from falls  Description: Will remain free from falls  Outcome: Progressing     Problem: Blood Glucose:  Goal: Ability to maintain appropriate glucose levels will improve  Description: Ability to maintain appropriate glucose levels will improve  Outcome: Progressing     Problem: Pain  Goal: Verbalizes/displays adequate comfort level or baseline comfort level  Outcome: Progressing

## 2022-11-22 NOTE — DISCHARGE INSTRUCTIONS
[facility]   Physician Orders and Discharge Instructions  [address]  Telephone: [phone]        NAME:  Edvin Díaz OF BIRTH:  1945  MEDICAL RECORD NUMBER:  [id number]     Your  is:  Nicholas Courtney Care/Facility:  [facility] CARE     Wound Location:   LEFT ANTERIOR ANKLE AND LEFT MEDIAL ANKLE  Dressing orders:  Cleanse wound(s) with normal saline. 2.   Apply a thin layer of GENTAMICIN OINTMENT. 3.   Cover with OPTILOCK and secure. 4. Change dressing EVERY OTHER DAY. Compression:  NONE     Offloading Device:     Other Instructions:    the Gentamicin Ointment at your pharmacy,  Dr. Hermelinda Swan to refer to Infectious Disease - please call for appointment. Keep all dressings clean, dry and intact. Keep pressure off the wound(s) at all times. Follow up visit         November 29, 2022 at        Please give 24 hour notice if unable to keep appointment. [phone]     If you experience any of the following, please call the Wound Care Service at  [phone] or go to the nearest emergency room. *Increase in pain         *Temperature over 101           *Increase in drainage from your wound or a foul odor  *Uncontrolled swelling            *Need for compression bandage changes due to slippage, breakthrough drainage       PLEASE NOTE: IF YOU ARE UNABLE TO OBTAIN WOUND SUPPLIES, CONTINUE TO USE THE SUPPLIES YOU HAVE AVAILABLE UNTIL YOU ARE ABLE TO REACH US.  IT IS MOST IMPORTANT TO KEEP THE WOUND COVERED AT ALL TIMES

## 2022-11-22 NOTE — CODE DOCUMENTATION
[facility] Billing Sheet. Rosaura Ladd  AGE: 76 y.o.    GENDER: male  : 1945  TODAY'S DATE:  2022    ICD-10  [facility] Problems    Diagnosis Date Noted    Edema of left lower leg [R60.0] 10/25/2022     Priority: Medium    Non-pressure chronic ulcer of left ankle with fat layer exposed ([facility] [L97.322] 2021    Subacute osteomyelitis, left ankle and foot [facility]) [M86.272] 2021    Tobacco use [Z72.0] 2014       PHYSICIAN PROCEDURES    CPT CODE  97183      Electronically signed by Louis Ramirez DPM on 2022 at 2:03 PM

## 2022-11-22 NOTE — PROGRESS NOTES
[facility] [address]                                                   Progress Note and Procedure Note      Maribell Frye RECORD NUMBER:  [id number]  AGE: 76 y.o. GENDER: male  : 1945  EPISODE DATE:  2022    Subjective:     Chief Complaint   Patient presents with    Wound Check     Left leg wound         HISTORY of PRESENT ILLNESS HPI      Kevin Vaz is a 76 y.o. male who presents today for wound/ulcer evaluation. History of Wound Context: Patient presents for a reoccurrence of an non healing ulcer of the left ankle. The patient was last seen in 2021 with the wound doing very well and then he went to a AL and never followed up. He denies any NVFC. Wound/Ulcer Pain Timing/Severity: intermittent  Quality of pain: tender  Severity:   10   Modifying Factors: None  Associated Signs/Symptoms: drainage, odor and pain    Ulcer Identification:  Ulcer Type: venous and undetermined  Contributing Factors: edema and venous stasis    Wound: N/A        PAST MEDICAL HISTORY        Diagnosis Date    Alcohol abuse     quit drinking 21    CAD (coronary artery disease)     patient states no doctor has told him this / has 1 cardiac stent    Cancer ([address]     lung LLL    Chronic back pain     Chronic kidney disease     Chronic obstructive pulmonary disease, unspecified COPD type ([address] 3/24/2022    Chronic sinusitis     DJD (degenerative joint disease) of knee     left knee DJD    Elevated PSA     History of blood transfusion     History of inferior wall myocardial infarction 2016    1 cardiac stent    HTN (hypertension)     meds .  1 yr    Hyperlipidemia     meds > 12 yrs    NSTEMI (non-ST elevated myocardial infarction) ([address]     Smoker        PAST SURGICAL HISTORY    Past Surgical History:   Procedure Laterality Date    ABDOMEN SURGERY      spleenectomy due to [address]      lumbar disc OR    CARDIAC SURGERY      stents    CARPAL TUNNEL RELEASE Right 2019    RIGHT CARPAL TUNNEL RELEASE performed by Rose Marie Duong MD at [address]  2016    EYE SURGERY      to have Phaco with IOL OU 2018    HERNIA REPAIR      JOINT REPLACEMENT Left     LTHR    JOINT REPLACEMENT Right     RTKR    KNEE SURGERY Right     arthroscopy    IN MANIPULATN KNEE JT+ANESTHESIA Right 2017    RIGHT KNEE MANIPULATION UNDER ANESTHESIA performed by Ailin Ritchie MD at [facility] OFFICE/OUTPT VISIT,PROCEDURE ONLY Bilateral 2017    RIGHT AND BILATERAL L 4-5 LYSIS OF SCAR DISKECTOMY INTERBODY CAGE FUSION POSTEROLATERAL FUSION PEDICLE SCREWS performed by Rose Marie Duong MD at [address]  2004    benign    [address] Right 3/24/2017    RIGHT  KNEE TOTAL ARTHROPLASTY, Mullan Dural CEMENTED PERSONA  performed by Ailin Ritchie MD at [facility] HISTORY    Family History   Problem Relation Age of Onset    Osteoarthritis Mother     Emphysema Mother     Hypertension Father     Hearing Loss Father     Dementia Father     No Known Problems Sister     Prostate Cancer Brother     Colon Polyps Neg Hx        SOCIAL HISTORY    Social History     Tobacco Use    Smoking status: Former     Packs/day: 0.00     Years: 60.00     Pack years: 0.00     Types: Cigarettes     Quit date: 2021     Years since quittin.8    Smokeless tobacco: Never    Tobacco comments:      smokes 2 cigars a day   Vaping Use    Vaping Use: Never used   Substance Use Topics    Alcohol use: Not Currently     Alcohol/week: 12.0 standard drinks     Types: 12 Shots of liquor per week     Comment: socail drinking    Drug use: No       ALLERGIES    Allergies   Allergen Reactions    Morphine        MEDICATIONS    Current Outpatient Medications on File Prior to Encounter   Medication Sig Dispense Refill    gentamicin (GARAMYCIN) 0.1 % ointment Apply topically  daily.  30 g 1 oxyCODONE-acetaminophen (PERCOCET) 7.5-325 MG per tablet Take 1 tablet by mouth five times a day as needed for pain      citalopram (CELEXA) 40 MG tablet TAKE ONE TABLET BY MOUTH nightly 30 tablet 5    atorvastatin (LIPITOR) 20 MG tablet TAKE ONE TABLET BY MOUTH DAILY 90 tablet 3    clopidogrel (PLAVIX) 75 MG tablet Take 1 tablet by mouth daily 90 tablet 3    diclofenac (VOLTAREN) 50 MG EC tablet TAKE ONE TABLET BY MOUTH TWO TIMES A DAY 60 tablet 5    isosorbide mononitrate (IMDUR) 30 MG extended release tablet TAKE ONE TABLET BY MOUTH EVERY DAY 90 tablet 3    bumetanide (BUMEX) 2 MG tablet Take 1 tablet by mouth daily 90 tablet 3    metoprolol tartrate (LOPRESSOR) 50 MG tablet Take 0.5 tablets by mouth 2 times daily TAKE ONE TABLET BY MOUTH TWO TIMES A  tablet 3    omeprazole (PRILOSEC) 10 MG delayed release capsule TAKE ONE CAPSULE BY MOUTH DAILY 90 capsule 2    finasteride (PROSCAR) 5 MG tablet Take 1 tablet by mouth daily 90 tablet 3    tiotropium (SPIRIVA RESPIMAT) 2.5 MCG/ACT AERS inhaler Inhale 2 puffs into the lungs daily 1 Inhaler 3    Fluticasone furoate-vilanterol (BREO ELLIPTA) 200-25 MCG/INH AEPB inhaler Inhale 1 puff into the lungs daily 1 each 3    albuterol sulfate  (90 Base) MCG/ACT inhaler Inhale 2 puffs into the lungs every 6 hours as needed for Wheezing 1 Inhaler 3    nitroGLYCERIN (NITROSTAT) 0.4 MG SL tablet Place 1 tablet under the tongue every 5 minutes as needed for Chest pain up to max of 3 total doses. If no relief after 1 dose, call 911. 25 tablet 3    DOCUSATE CALCIUM PO Take by mouth as needed       No current facility-administered medications on file prior to encounter. REVIEW OF SYSTEMS    Pertinent items are noted in HPI.     Objective:      BP (!) 115/58   Pulse 60   Temp 96.8 °F (36 °C) (Temporal)   Resp 20     Wt Readings from Last 3 Encounters:   10/14/22 166 lb 12.8 oz (75.7 kg)   08/11/22 160 lb (72.6 kg)   07/12/22 168 lb (76.2 kg)       PHYSICAL 11/22/22 1325   Wound Surface Area (cm^2) 6.4 cm^2 11/22/22 1325   Change in Wound Size % (l*w) -100 11/22/22 1325   Wound Volume (cm^3) 2.56 cm^3 11/22/22 1325   Wound Healing % -167 11/22/22 1325   Post-Procedure Length (cm) 3.4 cm 11/01/22 1635   Post-Procedure Width (cm) 2.2 cm 11/01/22 1635   Post-Procedure Depth (cm) 0.2 cm 11/01/22 1635   Post-Procedure Surface Area (cm^2) 7.48 cm^2 11/01/22 1635   Post-Procedure Volume (cm^3) 1.496 cm^3 11/01/22 1635   Wound Assessment Slough;[place]/red 11/22/22 1325   Drainage Amount Large 11/22/22 1325   Drainage Description Yellow 11/22/22 1325   Odor None 11/22/22 1325   Ela-wound Assessment Fragile; Maceration 11/22/22 1325   Margins Defined edges 11/22/22 1325   Wound Thickness Description not for Pressure Injury Full thickness 11/22/22 1325   Number of days: 242       Wound 08/30/22 Ankle Left;Medial #2 (Active)   Wound Image   11/22/22 1325   Wound Etiology Other 11/22/22 1325   Wound Cleansed Cleansed with saline 11/22/22 1325   Dressing/Treatment Other (comment) 11/15/22 1630   Wound Length (cm) 6.2 cm 11/22/22 1325   Wound Width (cm) 2.5 cm 11/22/22 1325   Wound Depth (cm) 0.3 cm 11/22/22 1325   Wound Surface Area (cm^2) 15.5 cm^2 11/22/22 1325   Change in Wound Size % (l*w) -694.87 11/22/22 1325   Wound Volume (cm^3) 4.65 cm^3 11/22/22 1325   Wound Healing % -1092 11/22/22 1325   Post-Procedure Length (cm) 5 cm 11/01/22 1635   Post-Procedure Width (cm) 3.6 cm 11/01/22 1635   Post-Procedure Depth (cm) 0.15 cm 11/01/22 1635   Post-Procedure Surface Area (cm^2) 18 cm^2 11/01/22 1635   Post-Procedure Volume (cm^3) 2.7 cm^3 11/01/22 1635   Wound Assessment Pink/red;Slough 11/22/22 1325   Drainage Amount Large 11/22/22 1325   Drainage Description Serous; Yellow 11/22/22 1325   Odor None 11/22/22 1325   Ela-wound Assessment Fragile; Maceration 11/22/22 1325   Margins Undefined edges 11/22/22 1325   Wound Thickness Description not for Pressure Injury Full thickness 11/22/22 1325   Number of days: 84       Wound 11/22/22 Ankle Left;Lateral #3 (Active)   Wound Image   11/22/22 1325   Wound Etiology Other 11/22/22 1325   Wound Cleansed Cleansed with saline 11/22/22 1325   Wound Length (cm) 7 cm 11/22/22 1325   Wound Width (cm) 2 cm 11/22/22 1325   Wound Depth (cm) 0.1 cm 11/22/22 1325   Wound Surface Area (cm^2) 14 cm^2 11/22/22 1325   Wound Volume (cm^3) 1.4 cm^3 11/22/22 1325   Wound Assessment Pink/red 11/22/22 1325   Drainage Amount Large 11/22/22 1325   Drainage Description Serous; Yellow 11/22/22 1325   Odor None 11/22/22 1325   Ela-wound Assessment Maceration 11/22/22 1325   Margins Undefined edges 11/22/22 1325   Wound Thickness Description not for Pressure Injury Partial thickness 11/22/22 1325   Number of days: 0         Plan:     Patient examined   Gentamicin and optilock  Cigar smoking cessation emphasized  Follow up in two weeks for possible. Patient relates that he saw dermatology and did not take a biopsy. Felt that it was not cancerous. Told him to take zinc supplement  Follow up in one week    Treatment Note please see attached Discharge Instructions    Written patient dismissal instructions given to patient and signed by patient or POA. Discharge [facility]   Physician Orders and Discharge Instructions  [address]  Telephone: [phone]        NAME:  Dmitriy Rios OF BIRTH:  1945  MEDICAL RECORD NUMBER:  [id number]     Your  is:  Nicholas Courtney Care/Facility:  [facility] CARE     Wound Location:   LEFT ANTERIOR ANKLE AND LEFT MEDIAL ANKLE  Dressing orders:  Cleanse wound(s) with normal saline. 2.   Apply a thin layer of GENTAMICIN OINTMENT. 3.   Cover with OPTILOCK and secure.   4. Change dressing EVERY OTHER DAY. Compression:  NONE     Offloading Device:     Other Instructions:    the Gentamicin Ointment at your pharmacy,  Dr. Cornel Bauman to refer to Infectious Disease - please call for appointment. Keep all dressings clean, dry and intact. Keep pressure off the wound(s) at all times. Follow up visit         November 29, 2022 at        Please give 24 hour notice if unable to keep appointment. [phone]     If you experience any of the following, please call the Wound Care Service at  [phone] or go to the nearest emergency room. *Increase in pain         *Temperature over 101           *Increase in drainage from your wound or a foul odor  *Uncontrolled swelling            *Need for compression bandage changes due to slippage, breakthrough drainage       PLEASE NOTE: IF YOU ARE UNABLE TO OBTAIN WOUND SUPPLIES, CONTINUE TO USE THE SUPPLIES YOU HAVE AVAILABLE UNTIL YOU ARE ABLE TO REACH US.  IT IS MOST IMPORTANT TO KEEP THE WOUND COVERED AT ALL TIMES      Electronically signed by Rachelle Rose DPM on 11/22/2022 at 2:05 PM

## 2022-11-29 ENCOUNTER — HOSPITAL ENCOUNTER (OUTPATIENT)
Dept: WOUND CARE | Age: 77
Discharge: HOME | End: 2022-11-29
Payer: MEDICARE

## 2022-11-29 VITALS
TEMPERATURE: 96.5 F | RESPIRATION RATE: 18 BRPM | SYSTOLIC BLOOD PRESSURE: 111 MMHG | HEART RATE: 64 BPM | DIASTOLIC BLOOD PRESSURE: 57 MMHG

## 2022-11-29 DIAGNOSIS — L97.322 NON-PRESSURE CHRONIC ULCER OF LEFT ANKLE WITH FAT LAYER EXPOSED (HCC): Primary | ICD-10-CM

## 2022-11-29 PROCEDURE — 99213 OFFICE O/P EST LOW 20 MIN: CPT

## 2022-11-29 PROCEDURE — 6370000000 HC RX 637 (ALT 250 FOR IP): Performed by: PODIATRIST

## 2022-11-29 RX ORDER — BACITRACIN ZINC AND POLYMYXIN B SULFATE 500; 1000 [USP'U]/G; [USP'U]/G
OINTMENT TOPICAL ONCE
OUTPATIENT
Start: 2022-11-29 | End: 2022-11-29

## 2022-11-29 RX ORDER — GENTAMICIN SULFATE 1 MG/G
OINTMENT TOPICAL ONCE
OUTPATIENT
Start: 2022-11-29 | End: 2022-11-29

## 2022-11-29 RX ORDER — CLOBETASOL PROPIONATE 0.5 MG/G
OINTMENT TOPICAL ONCE
OUTPATIENT
Start: 2022-11-29 | End: 2022-11-29

## 2022-11-29 RX ORDER — BETAMETHASONE DIPROPIONATE 0.05 %
OINTMENT (GRAM) TOPICAL ONCE
OUTPATIENT
Start: 2022-11-29 | End: 2022-11-29

## 2022-11-29 RX ORDER — GINSENG 100 MG
CAPSULE ORAL ONCE
OUTPATIENT
Start: 2022-11-29 | End: 2022-11-29

## 2022-11-29 RX ORDER — LIDOCAINE HYDROCHLORIDE 20 MG/ML
JELLY TOPICAL ONCE
OUTPATIENT
Start: 2022-11-29 | End: 2022-11-29

## 2022-11-29 RX ORDER — LIDOCAINE 50 MG/G
OINTMENT TOPICAL ONCE
OUTPATIENT
Start: 2022-11-29 | End: 2022-11-29

## 2022-11-29 RX ORDER — GENTAMICIN SULFATE 1 MG/G
OINTMENT TOPICAL ONCE
Status: COMPLETED | OUTPATIENT
Start: 2022-11-29 | End: 2022-11-29

## 2022-11-29 RX ORDER — LIDOCAINE HYDROCHLORIDE 40 MG/ML
SOLUTION TOPICAL ONCE
OUTPATIENT
Start: 2022-11-29 | End: 2022-11-29

## 2022-11-29 RX ORDER — LIDOCAINE 40 MG/G
CREAM TOPICAL ONCE
OUTPATIENT
Start: 2022-11-29 | End: 2022-11-29

## 2022-11-29 RX ORDER — BACITRACIN, NEOMYCIN, POLYMYXIN B 400; 3.5; 5 [USP'U]/G; MG/G; [USP'U]/G
OINTMENT TOPICAL ONCE
OUTPATIENT
Start: 2022-11-29 | End: 2022-11-29

## 2022-11-29 RX ADMIN — GENTAMICIN SULFATE: 1 OINTMENT TOPICAL at 16:55

## 2022-11-29 NOTE — DISCHARGE INSTRUCTIONS
[facility]   Physician Orders and Discharge Instructions  [address]  Telephone: [phone]        NAME:  Qi Miguel OF BIRTH:  1945  MEDICAL RECORD NUMBER:  [id number]     Your  is:  Nicholas Courtney Care/Facility:  [facility] CARE     Wound Location:   LEFT ANTERIOR ANKLE AND LEFT MEDIAL ANKLE  Dressing orders:  Cleanse wound(s) with normal saline. 2.   Apply a thin layer of GENTAMICIN OINTMENT. 3.   Cover with OPTILOCK and secure. 4. Change dressing EVERY OTHER DAY. Compression:  NONE     Offloading Device:     Other Instructions:   HOME CARE TO [address] LEFT FOOT WITH SOAP AND WATER THEN APPLY LOTION WITH EACH DRESSING CHANGE, Infectious Disease - please call for appointment. Keep all dressings clean, dry and intact. Keep pressure off the wound(s) at all times. Follow up visit       2 WEEKS  December 13, 2022 AT         Please give 24 hour notice if unable to keep appointment. [phone]     If you experience any of the following, please call the Wound Care Service at  [phone] or go to the nearest emergency room. *Increase in pain         *Temperature over 101           *Increase in drainage from your wound or a foul odor  *Uncontrolled swelling            *Need for compression bandage changes due to slippage, breakthrough drainage       PLEASE NOTE: IF YOU ARE UNABLE TO OBTAIN WOUND SUPPLIES, CONTINUE TO USE THE SUPPLIES YOU HAVE AVAILABLE UNTIL YOU ARE ABLE TO REACH US.  IT IS MOST IMPORTANT TO KEEP THE WOUND COVERED AT ALL TIMES

## 2022-11-29 NOTE — CODE DOCUMENTATION
[id number] Physician Billing Sheet. Aj Trivedi  AGE: 76 y.o.    GENDER: male  : 1945  TODAY'S DATE:  2022    ICD-10  [facility] Problems    Diagnosis Date Noted    Atherosclerosis of native arteries of extremities with rest pain, left leg ([facility] [I70.222] 2021    Non-pressure chronic ulcer of left ankle with fat layer exposed [facility]) [L97.322] 2021       PHYSICIAN PROCEDURES    CPT CODE  24911      Electronically signed by Antione Mohan DPM on 2022 at 4:59 PM

## 2022-11-29 NOTE — PLAN OF CARE
Problem: Wound:  Goal: Will show signs of wound healing; wound closure and no evidence of infection  11/29/2022 1605 by Renate Marmolejo RN  Outcome: Progressing     Problem: Venous:  Goal: Signs of wound healing will improve  Outcome: Progressing     Problem: Compression therapy:  Goal: Will be free from complications associated with compression therapy  Outcome: Progressing

## 2022-11-29 NOTE — PROGRESS NOTES
Ewa C[address]                                                   Progress Note and Procedure Note      Ron Lazo RECORD NUMBER:  [id number]  AGE: 76 y.o. GENDER: male  : 1945  EPISODE DATE:  2022    Subjective:     Chief Complaint   Patient presents with    Wound Check     Left ankle         HISTORY of PRESENT ILLNESS HPI      Beatrice Kraus is a 76 y.o. male who presents today for wound/ulcer evaluation. History of Wound Context: Patient presents for a reoccurrence of an non healing ulcer of the left ankle. The patient was last seen in 2021 with the wound doing very well and then he went to a AL and never followed up. He denies any NVFC. Wound/Ulcer Pain Timing/Severity: intermittent  Quality of pain: tender  Severity:  4 / 10   Modifying Factors: None  Associated Signs/Symptoms: drainage, odor and pain    Ulcer Identification:  Ulcer Type: venous and undetermined  Contributing Factors: edema and venous stasis    Wound: N/A        PAST MEDICAL HISTORY        Diagnosis Date    Alcohol abuse     quit drinking 21    CAD (coronary artery disease)     patient states no doctor has told him this / has 1 cardiac stent    Cancer ([address]     lung LLL    Chronic back pain     Chronic kidney disease     Chronic obstructive pulmonary disease, unspecified COPD type ([address] 3/24/2022    Chronic sinusitis     DJD (degenerative joint disease) of knee     left knee DJD    Elevated PSA     History of blood transfusion     History of inferior wall myocardial infarction 2016    1 cardiac stent    HTN (hypertension)     meds .  1 yr    Hyperlipidemia     meds > 12 yrs    NSTEMI (non-ST elevated myocardial infarction) ([address]     Smoker        PAST SURGICAL HISTORY    Past Surgical History:   Procedure Laterality Date    ABDOMEN SURGERY      spleenectomy due to [address]      lumbar disc OR    CARDIAC SURGERY      stents    CARPAL TUNNEL RELEASE Right 2019    RIGHT CARPAL TUNNEL RELEASE performed by Michael Drake MD at [address] [facility] [address]  2016    EYE SURGERY      to have Phaco with IOL OU 2018    HERNIA REPAIR      JOINT REPLACEMENT Left     LTHR    JOINT REPLACEMENT Right     RTKR    KNEE SURGERY Right     arthroscopy    ID MANIPULATN KNEE JT+ANESTHESIA Right 2017    RIGHT KNEE MANIPULATION UNDER ANESTHESIA performed by Hayley Mobley MD at [address] [facility] OFFICE/OUTPT VISIT,PROCEDURE ONLY Bilateral 2017    RIGHT AND BILATERAL L 4-5 LYSIS OF SCAR DISKECTOMY INTERBODY CAGE FUSION POSTEROLATERAL FUSION PEDICLE SCREWS performed by Michael Drake MD at [address]  2004    benign    [facility] Right 3/24/2017    RIGHT  KNEE TOTAL ARTHROPLASTY, [place] CEMENTED PERSONA  performed by Hayley Mobley MD at [address] [facility] HISTORY    Family History   Problem Relation Age of Onset    Osteoarthritis Mother     Emphysema Mother     Hypertension Father     Hearing Loss Father     Dementia Father     No Known Problems Sister     Prostate Cancer Brother     Colon Polyps Neg Hx        SOCIAL HISTORY    Social History     Tobacco Use    Smoking status: Former     Packs/day: 0.00     Years: 60.00     Pack years: 0.00     Types: Cigarettes     Quit date: 2021     Years since quittin.9    Smokeless tobacco: Never    Tobacco comments:      smokes 2 cigars a day   Vaping Use    Vaping Use: Never used   Substance Use Topics    Alcohol use: Not Currently     Alcohol/week: 12.0 standard drinks     Types: 12 Shots of liquor per week     Comment: socail drinking    Drug use: No       ALLERGIES    Allergies   Allergen Reactions    Morphine        MEDICATIONS    Current Outpatient Medications on File Prior to Encounter   Medication Sig Dispense Refill    gentamicin (GARAMYCIN) 0.1 % ointment Apply topically  daily.  30 g 1    gentamicin (GARAMYCIN) 0.1 % ointment Apply topically  daily. 30 g 1    oxyCODONE-acetaminophen (PERCOCET) 7.5-325 MG per tablet Take 1 tablet by mouth five times a day as needed for pain      citalopram (CELEXA) 40 MG tablet TAKE ONE TABLET BY MOUTH nightly 30 tablet 5    atorvastatin (LIPITOR) 20 MG tablet TAKE ONE TABLET BY MOUTH DAILY 90 tablet 3    clopidogrel (PLAVIX) 75 MG tablet Take 1 tablet by mouth daily 90 tablet 3    diclofenac (VOLTAREN) 50 MG EC tablet TAKE ONE TABLET BY MOUTH TWO TIMES A DAY 60 tablet 5    isosorbide mononitrate (IMDUR) 30 MG extended release tablet TAKE ONE TABLET BY MOUTH EVERY DAY 90 tablet 3    bumetanide (BUMEX) 2 MG tablet Take 1 tablet by mouth daily 90 tablet 3    metoprolol tartrate (LOPRESSOR) 50 MG tablet Take 0.5 tablets by mouth 2 times daily TAKE ONE TABLET BY MOUTH TWO TIMES A  tablet 3    omeprazole (PRILOSEC) 10 MG delayed release capsule TAKE ONE CAPSULE BY MOUTH DAILY 90 capsule 2    finasteride (PROSCAR) 5 MG tablet Take 1 tablet by mouth daily 90 tablet 3    tiotropium (SPIRIVA RESPIMAT) 2.5 MCG/ACT AERS inhaler Inhale 2 puffs into the lungs daily 1 Inhaler 3    Fluticasone furoate-vilanterol (BREO ELLIPTA) 200-25 MCG/INH AEPB inhaler Inhale 1 puff into the lungs daily 1 each 3    albuterol sulfate  (90 Base) MCG/ACT inhaler Inhale 2 puffs into the lungs every 6 hours as needed for Wheezing 1 Inhaler 3    nitroGLYCERIN (NITROSTAT) 0.4 MG SL tablet Place 1 tablet under the tongue every 5 minutes as needed for Chest pain up to max of 3 total doses. If no relief after 1 dose, call 911. 25 tablet 3    DOCUSATE CALCIUM PO Take by mouth as needed       No current facility-administered medications on file prior to encounter. REVIEW OF SYSTEMS    Pertinent items are noted in HPI.     Objective:      BP (!) 111/57   Pulse 64   Temp (!) 96.5 °F (35.8 °C) (Temporal)   Resp 18     Wt Readings from Last 3 Encounters:   10/14/22 166 lb 12.8 oz (75.7 kg)   08/11/22 160 lb (72.6 kg)   07/12/22 168 lb (76.2 kg)       PHYSICAL EXAM    Constitutional:   Well nourished and well developed. Appears neat and clean. Patient is alert, oriented x3, and in no apparent distress. Respiratory:  Respiratory effort is easy and symmetric bilaterally. Rate is normal at rest and on room air. Vascular:  Pedal Pulses is palpable and audible with doppler. Capillary refill is <3 sec to digits bilateral.  Extremities positive for 2+ pitting edema. Art duplex reviewed no occlusion noted. Neurological:   Sensation is intact to lower extremities. Dermatological:  Wound description noted in wound assessment. The wound appearnace is improved with slough and decreased pain and  erythema. Granulation buds are noted. Size is slightly larger secondary to edema today. Periwound area is denuded most likely due to drainage. Depth is much improved again. New small satellite areas noted. Psychiatric:  Judgement and insight intact. Short and long term memory intact. No evidence of depression, anxiety, or agitation. Patient is calm, cooperative, and communicative. Appropriate interactions and affect. Assessment:      Active Hospital Problems    Diagnosis Date Noted    Atherosclerosis of native arteries of extremities with rest pain, left leg ([facility] [I70.222] 08/17/2021    Non-pressure chronic ulcer of left ankle with fat layer exposed [facility]) [L97.322] 08/17/2021        Procedure Note  Indications:  Based on my examination of this patient's wound(s)/ulcer(s) today, debridement is notrequired to promote healing and evaluate the wound base. Wound 03/25/22 Ankle Left; Anterior #1 (Active)   Wound Image   11/29/22 1614   Wound Etiology Venous 11/29/22 1614   Wound Cleansed Cleansed with saline 11/29/22 1614   Dressing/Treatment Pharmaceutical agent (see MAR) 11/29/22 1646   Wound Length (cm) 7 cm 11/29/22 1614   Wound Width (cm) 5 cm 11/29/22 1614   Wound Depth (cm) 0.3 cm 11/29/22 1614   Wound Surface Area (cm^2) 35 cm^2 11/29/22 1614   Change in Wound Size % (l*w) -993.75 11/29/22 1614   Wound Volume (cm^3) 10.5 cm^3 11/29/22 1614   Wound Healing % -994 11/29/22 1614   Post-Procedure Length (cm) 3.4 cm 11/01/22 1635   Post-Procedure Width (cm) 2.2 cm 11/01/22 1635   Post-Procedure Depth (cm) 0.2 cm 11/01/22 1635   Post-Procedure Surface Area (cm^2) 7.48 cm^2 11/01/22 1635   Post-Procedure Volume (cm^3) 1.496 cm^3 11/01/22 1635   Wound Assessment Pink/red;Slough 11/29/22 1614   Drainage Amount Large 11/29/22 1614   Drainage Description Serous; Yellow 11/29/22 1614   Odor None 11/29/22 1614   Ela-wound Assessment Fragile 11/29/22 1614   Margins Defined edges 11/29/22 1614   Wound Thickness Description not for Pressure Injury Full thickness 11/29/22 1614   Number of days: 249       Wound 08/30/22 Ankle Left;Medial #2 (Active)   Wound Image   11/29/22 1614   Wound Etiology Venous 11/29/22 1614   Wound Cleansed Cleansed with saline 11/29/22 1614   Dressing/Treatment Pharmaceutical agent (see MAR) 11/29/22 1646   Wound Length (cm) 9.2 cm 11/29/22 1614   Wound Width (cm) 4.5 cm 11/29/22 1614   Wound Depth (cm) 0.1 cm 11/29/22 1614   Wound Surface Area (cm^2) 41.4 cm^2 11/29/22 1614   Change in Wound Size % (l*w) -2023.08 11/29/22 1614   Wound Volume (cm^3) 4.14 cm^3 11/29/22 1614   Wound Healing % -962 11/29/22 1614   Post-Procedure Length (cm) 5 cm 11/01/22 1635   Post-Procedure Width (cm) 3.6 cm 11/01/22 1635   Post-Procedure Depth (cm) 0.15 cm 11/01/22 1635   Post-Procedure Surface Area (cm^2) 18 cm^2 11/01/22 1635   Post-Procedure Volume (cm^3) 2.7 cm^3 11/01/22 1635   Wound Assessment Pink/red 11/29/22 1614   Drainage Amount Large 11/29/22 1614   Drainage Description Serous; Yellow 11/29/22 1614   Odor None 11/29/22 1614   Ela-wound Assessment Fragile 11/29/22 1614   Margins Undefined edges 11/29/22 1614   Wound Thickness Description not for Pressure Injury Full thickness 11/29/22 1614   Number of days: 91 Wound 11/22/22 Ankle Left;Lateral #3 (Active)   Wound Image   11/29/22 1614   Wound Etiology Venous 11/29/22 1614   Wound Cleansed Cleansed with saline 11/29/22 1614   Dressing/Treatment Pharmaceutical agent (see MAR) 11/29/22 1646   Wound Length (cm) 9.5 cm 11/29/22 1614   Wound Width (cm) 7 cm 11/29/22 1614   Wound Depth (cm) 0.1 cm 11/29/22 1614   Wound Surface Area (cm^2) 66.5 cm^2 11/29/22 1614   Change in Wound Size % (l*w) -375 11/29/22 1614   Wound Volume (cm^3) 6.65 cm^3 11/29/22 1614   Wound Healing % -375 11/29/22 1614   Wound Assessment Pink/red;Slough 11/29/22 1614   Drainage Amount Large 11/29/22 1614   Drainage Description Serous; Yellow 11/29/22 1614   Odor None 11/29/22 1614   Ela-wound Assessment Dry/flaky;Fragile 11/29/22 1614   Margins Undefined edges 11/29/22 1614   Wound Thickness Description not for Pressure Injury Full thickness 11/29/22 1614   Number of days: 7       Plan:     Patient examined   Culture reviewed and sensitive to gentamicin  Continue Gentamicin and optilock  Cigar smoking cessation emphasized  Follow up in two weeks  ID consult pending       Treatment Note please see attached Discharge Instructions    Written patient dismissal instructions given to patient and signed by patient or POA. Discharge [address] [facility]   Physician Orders and Discharge Instructions  [address]  Telephone: [phone]        NAME:  Deondre Luong OF BIRTH:  1945  MEDICAL RECORD NUMBER:  [id number]     Your  is:  Nicholas Courtney Care/Facility:  [facility] CARE     Wound Location:   LEFT ANTERIOR ANKLE AND LEFT MEDIAL ANKLE  Dressing orders:  Cleanse wound(s) with normal saline.   2.   Apply a thin layer of GENTAMICIN OINTMENT. 3.   Cover with OPTILOCK and secure. 4. Change dressing EVERY OTHER DAY. Compression:  NONE     Offloading Device:     Other Instructions:   HOME CARE TO [facility] LEFT FOOT WITH SOAP AND WATER THEN APPLY LOTION WITH EACH DRESSING CHANGE, Infectious Disease - please call for appointment. Keep all dressings clean, dry and intact. Keep pressure off the wound(s) at all times. Follow up visit       2 WEEKS  December 13, 2022 AT         Please give 24 hour notice if unable to keep appointment. [phone]     If you experience any of the following, please call the Wound Care Service at  [phone] or go to the nearest emergency room. *Increase in pain         *Temperature over 101           *Increase in drainage from your wound or a foul odor  *Uncontrolled swelling            *Need for compression bandage changes due to slippage, breakthrough drainage       PLEASE NOTE: IF YOU ARE UNABLE TO OBTAIN WOUND SUPPLIES, CONTINUE TO USE THE SUPPLIES YOU HAVE AVAILABLE UNTIL YOU ARE ABLE TO REACH US.  IT IS MOST IMPORTANT TO KEEP THE WOUND COVERED AT ALL TIMES      Electronically signed by Mariel Laurent DPM on 11/29/2022 at 4:56 PM

## 2022-12-06 ENCOUNTER — OFFICE VISIT (OUTPATIENT)
Dept: INFECTIOUS DISEASES | Age: 77
End: 2022-12-06
Payer: MEDICARE

## 2022-12-06 VITALS
SYSTOLIC BLOOD PRESSURE: 147 MMHG | DIASTOLIC BLOOD PRESSURE: 76 MMHG | WEIGHT: 165 LBS | TEMPERATURE: 97.3 F | HEIGHT: 66 IN | RESPIRATION RATE: 22 BRPM | HEART RATE: 79 BPM | BODY MASS INDEX: 26.52 KG/M2

## 2022-12-06 DIAGNOSIS — A49.01 MSSA (METHICILLIN SUSCEPTIBLE STAPHYLOCOCCUS AUREUS) INFECTION: Primary | ICD-10-CM

## 2022-12-06 PROCEDURE — 3078F DIAST BP <80 MM HG: CPT | Performed by: INTERNAL MEDICINE

## 2022-12-06 PROCEDURE — 1123F ACP DISCUSS/DSCN MKR DOCD: CPT | Performed by: INTERNAL MEDICINE

## 2022-12-06 PROCEDURE — 1036F TOBACCO NON-USER: CPT | Performed by: INTERNAL MEDICINE

## 2022-12-06 PROCEDURE — G8417 CALC BMI ABV UP PARAM F/U: HCPCS | Performed by: INTERNAL MEDICINE

## 2022-12-06 PROCEDURE — G8484 FLU IMMUNIZE NO ADMIN: HCPCS | Performed by: INTERNAL MEDICINE

## 2022-12-06 PROCEDURE — G8427 DOCREV CUR MEDS BY ELIG CLIN: HCPCS | Performed by: INTERNAL MEDICINE

## 2022-12-06 PROCEDURE — 3074F SYST BP LT 130 MM HG: CPT | Performed by: INTERNAL MEDICINE

## 2022-12-06 PROCEDURE — 99214 OFFICE O/P EST MOD 30 MIN: CPT | Performed by: INTERNAL MEDICINE

## 2022-12-06 RX ORDER — CEPHALEXIN 500 MG/1
500 CAPSULE ORAL 3 TIMES DAILY
Qty: 90 CAPSULE | Refills: 1 | Status: SHIPPED | OUTPATIENT
Start: 2022-12-06 | End: 2023-01-05

## 2022-12-06 ASSESSMENT — PATIENT HEALTH QUESTIONNAIRE - PHQ9
SUM OF ALL RESPONSES TO PHQ QUESTIONS 1-9: 0
1. LITTLE INTEREST OR PLEASURE IN DOING THINGS: 0
2. FEELING DOWN, DEPRESSED OR HOPELESS: 0
SUM OF ALL RESPONSES TO PHQ9 QUESTIONS 1 & 2: 0

## 2022-12-06 ASSESSMENT — ENCOUNTER SYMPTOMS
RESPIRATORY NEGATIVE: 1
GASTROINTESTINAL NEGATIVE: 1

## 2022-12-06 NOTE — PROGRESS NOTES
Narrative  Performed by: [facility]  ORDER#: [id number]                          ORDERED BY: Nithya Pittman   SOURCE: Leg                                COLLECTED:  22 14:33   ANTIBIOTICS AT JANIE.:                      RECEIVED :  22 14:33      Specimen Collected: 22 14:33 EST Last Resulted: 22 08:08 EST             22 0913     CULTURE WOUND  Abnormal   Direct Exam:  NO NEUTROPHILS SEEN   Direct Exam:  MANY GRAM POSITIVE COCCI IN CLUSTERS   Direct Exam:  MODERATE GRAM POSITIVE RODS   Direct Exam:  FEW GRAM NEGATIVE RODS   Cult,Aerobe/Anaerobe:  GRAM NEGATIVE RODS   Cult,Aerobe/Anaerobe:  HEAVY GROWTH   Cult,Aerobe/Anaerobe:  GRAM NEGATIVE RODS   Cult,Aerobe/Anaerobe:  2ND COLONY TYPE   Cult,Aerobe/Anaerobe:  HEAVY GROWTH   Cult,Aerobe/Anaerobe:  GRAM NEGATIVE RODS   Cult,Aerobe/Anaerobe:  THIRD COLONY TYPE   Cult,Aerobe/Anaerobe:  HEAVY GROWTH   Cult,Aerobe/Anaerobe: This is a mixed culture of organisms, which may represent   Cult,Aerobe/Anaerobe:  colonization or contamination. Notify the   laboratory   Cult,Aerobe/Anaerobe:   within 7 days for further workup. Susceptibilities have   Cult,Aerobe/Anaerobe:  not been performed. Cult,Aerobe/Anaerobe:  No anaerobic organisms isolated at 5 days. Performed at [address] [place] [address]   [phone]     Organism Staphylococcus aureus Abnormal     CULTURE WOUND HEAVY GROWTH    Resulting Agency [address] [facility]       Review of Systems   Constitutional: Negative. Respiratory: Negative. Cardiovascular: Negative. Gastrointestinal: Negative. Skin:  Positive for wound.      Review of Systems: All 14 review of systems negative other than as stated above    Social History     Tobacco Use    Smoking status: Former     Packs/day: 0.00     Years: 60.00     Pack years: 0.00     Types: Cigarettes     Quit date: 2021     Years since quittin.9    Smokeless tobacco: Never Tobacco comments:      smokes 2 cigars a day   Vaping Use    Vaping Use: Never used   Substance Use Topics    Alcohol use: Not Currently     Alcohol/week: 12.0 standard drinks     Types: 12 Shots of liquor per week     Comment: socail drinking    Drug use: No         Past Medical History:   Diagnosis Date    Alcohol abuse     quit drinking 8/18/21    CAD (coronary artery disease)     patient states no doctor has told him this / has 1 cardiac stent    Cancer ([facility]     lung LLL    Chronic back pain     Chronic kidney disease     Chronic obstructive pulmonary disease, unspecified COPD type ([facility] 3/24/2022    Chronic sinusitis     DJD (degenerative joint disease) of knee     left knee DJD    Elevated PSA     History of blood transfusion 1980's    History of inferior wall myocardial infarction 01/2016    1 cardiac stent    HTN (hypertension)     meds .  1 yr    Hyperlipidemia     meds > 12 yrs    NSTEMI (non-ST elevated myocardial infarction) ([facility]     Smoker            Past Surgical History:   Procedure Laterality Date    ABDOMEN SURGERY  1961    spleenectomy due to [address]  2009    lumbar disc OR    CARDIAC SURGERY      stents    CARPAL TUNNEL RELEASE Right 5/6/2019    RIGHT CARPAL TUNNEL RELEASE performed by Tete Worrell MD at [address]  1/29/2016    EYE SURGERY      to have Phaco with IOL OU 2/2018    HERNIA REPAIR      JOINT REPLACEMENT Left 1994    LTHR    JOINT REPLACEMENT Right 2009    RTKR    KNEE SURGERY Right 2011    arthroscopy    VT MANIPULATN KNEE JT+ANESTHESIA Right 5/12/2017    RIGHT KNEE MANIPULATION UNDER ANESTHESIA performed by Sunil Hernandez MD at [address] [facility] OFFICE/OUTPT VISIT,PROCEDURE ONLY Bilateral 12/29/2017    RIGHT AND BILATERAL L 4-5 LYSIS OF SCAR DISKECTOMY INTERBODY CAGE FUSION POSTEROLATERAL FUSION PEDICLE SCREWS performed by Tete Worrell MD at [address] [place] [address]    benign    [address] KNEE ARTHROPLASTY Right 3/24/2017    RIGHT  KNEE TOTAL ARTHROPLASTY, [place] CEMENTED PERSONA  performed by Sunil Hernandez MD at [facility]         Current Outpatient Medications on File Prior to Visit   Medication Sig Dispense Refill    gentamicin (GARAMYCIN) 0.1 % ointment Apply topically  daily. 30 g 1    gentamicin (GARAMYCIN) 0.1 % ointment Apply topically  daily. 30 g 1    oxyCODONE-acetaminophen (PERCOCET) 7.5-325 MG per tablet Take 1 tablet by mouth five times a day as needed for pain      citalopram (CELEXA) 40 MG tablet TAKE ONE TABLET BY MOUTH nightly 30 tablet 5    atorvastatin (LIPITOR) 20 MG tablet TAKE ONE TABLET BY MOUTH DAILY 90 tablet 3    clopidogrel (PLAVIX) 75 MG tablet Take 1 tablet by mouth daily 90 tablet 3    diclofenac (VOLTAREN) 50 MG EC tablet TAKE ONE TABLET BY MOUTH TWO TIMES A DAY 60 tablet 5    isosorbide mononitrate (IMDUR) 30 MG extended release tablet TAKE ONE TABLET BY MOUTH EVERY DAY 90 tablet 3    bumetanide (BUMEX) 2 MG tablet Take 1 tablet by mouth daily 90 tablet 3    metoprolol tartrate (LOPRESSOR) 50 MG tablet Take 0.5 tablets by mouth 2 times daily TAKE ONE TABLET BY MOUTH TWO TIMES A  tablet 3    omeprazole (PRILOSEC) 10 MG delayed release capsule TAKE ONE CAPSULE BY MOUTH DAILY 90 capsule 2    finasteride (PROSCAR) 5 MG tablet Take 1 tablet by mouth daily 90 tablet 3    tiotropium (SPIRIVA RESPIMAT) 2.5 MCG/ACT AERS inhaler Inhale 2 puffs into the lungs daily 1 Inhaler 3    Fluticasone furoate-vilanterol (BREO ELLIPTA) 200-25 MCG/INH AEPB inhaler Inhale 1 puff into the lungs daily 1 each 3    albuterol sulfate  (90 Base) MCG/ACT inhaler Inhale 2 puffs into the lungs every 6 hours as needed for Wheezing 1 Inhaler 3    nitroGLYCERIN (NITROSTAT) 0.4 MG SL tablet Place 1 tablet under the tongue every 5 minutes as needed for Chest pain up to max of 3 total doses.  If no relief after 1 dose, call 911. 25 tablet 3    DOCUSATE CALCIUM PO Take by mouth as needed No current facility-administered medications on file prior to visit. Allergies   Allergen Reactions    Morphine          Family History   Problem Relation Age of Onset    Osteoarthritis Mother     Emphysema Mother     Hypertension Father     Hearing Loss Father     Dementia Father     No Known Problems Sister     Prostate Cancer Brother     Colon Polyps Neg Hx          Physical Exam:      Physical Exam  HENT:      Head: Atraumatic. Cardiovascular:      Heart sounds: No murmur heard. Pulmonary:      Effort: Pulmonary effort is normal. No respiratory distress. Breath sounds: Normal breath sounds. No wheezing, rhonchi or rales. Abdominal:      General: Bowel sounds are normal. There is no distension. Palpations: There is no mass. Tenderness: There is no abdominal tenderness. There is no guarding or rebound. Hernia: No hernia is present. Skin:         Neurological:      Mental Status: He is alert. Blood pressure (!) 147/76, pulse 79, temperature 97.3 °F (36.3 °C), temperature source Temporal, resp. rate 22, height 5' 6\" (1.676 m), weight 165 lb (74.8 kg).       .   Lab Results   Component Value Date    WBC 11.6 (H) 01/14/2022    HGB 10.4 (L) 01/14/2022    HCT 32.0 (L) 01/14/2022    MCV 96.2 01/14/2022     01/14/2022     Lab Results   Component Value Date/Time     10/14/2022 11:00 AM    K 5.4 10/14/2022 11:00 AM    K 4.2 10/15/2021 10:22 AM    CL 97 10/14/2022 11:00 AM    CO2 27 10/14/2022 11:00 AM    BUN 33 10/14/2022 11:00 AM    CREATININE 0.93 10/14/2022 11:00 AM    GLUCOSE 86 10/14/2022 11:00 AM    CALCIUM 9.7 10/14/2022 11:00 AM                ASSESSMENT:  Patient Active Problem List   Diagnosis    Tobacco use    Hip pain    Knee pain    Chronic back pain    Elevated PSA    Primary osteoarthritis of right knee    Spondylosis of lumbar region without myelopathy or radiculopathy    Sacroiliitis, not elsewhere classified ([facility]    Pure hypercholesterolemia Charcot's joint of left ankle    Left ankle pain    Delirium due to general medical condition    DDD (degenerative disc disease), lumbar    Osteoarthritis of spine with myelopathy, lumbosacral region    Chronic bilateral low back pain with bilateral sciatica    Postlaminectomy syndrome, lumbar region    Acquired spondylolisthesis of lumbosacral region    Spinal stenosis of lumbar region with neurogenic claudication    HNP (herniated nucleus pulposus), lumbar    Spondylolisthesis at L4-L5 level    Anesthesia    Herniated nucleus pulposus, L4-5    NSTEMI (non-ST elevated myocardial infarction) ([facility])    S/P PTCA (percutaneous transluminal coronary angioplasty)    Right upper quadrant abdominal pain    Gallbladder polyp    Biliary dyskinesia    Carpal tunnel syndrome on right    Carpal tunnel syndrome on left    Angina effort    Dyslipidemia    FELDER (dyspnea on exertion)    CAD (coronary artery disease)    Chest pain    Lung nodule seen on imaging study    Bilateral carotid artery stenosis    Essential hypertension    NSCLC of left lung (HCC)    Ataxic gait    Dizziness    Fracture, [place], right, closed    Heroin abuse ([address]    Non-pressure chronic ulcer of left ankle with fat layer exposed ([address]    Atherosclerosis of native arteries of extremities with rest pain, left leg (HCC)    Subacute osteomyelitis, left ankle and foot (HCC)    Osteomyelitis of ankle (HCC)    Hyponatremia    Frequent falls    Stage 3 chronic kidney disease, unspecified whether stage 3a or 3b CKD (HCC)    Fracture of right humerus    Hyperkalemia    Leukocytosis    Anemia    Acute hematogenous osteomyelitis, left ankle and foot (HCC)    Traumatic hematoma of right shoulder    Head injury    Humeral head fracture, right, closed, initial encounter    Pelvic hematoma, male, after a fall    Cause of injury, accidental fall, initial encounter    Tremor of unknown origin    Sundowning    Acute pain due to trauma    Disequilibrium    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

## 2022-12-13 ENCOUNTER — HOSPITAL ENCOUNTER (OUTPATIENT)
Dept: NUCLEAR MEDICINE | Age: 77
Discharge: HOME | End: 2022-12-15
Payer: MEDICARE

## 2022-12-13 ENCOUNTER — HOSPITAL ENCOUNTER (OUTPATIENT)
Dept: WOUND CARE | Age: 77
Discharge: HOME | End: 2022-12-13
Payer: MEDICARE

## 2022-12-13 VITALS
HEART RATE: 62 BPM | TEMPERATURE: 96.6 F | SYSTOLIC BLOOD PRESSURE: 168 MMHG | DIASTOLIC BLOOD PRESSURE: 81 MMHG | RESPIRATION RATE: 20 BRPM

## 2022-12-13 DIAGNOSIS — L97.322 NON-PRESSURE CHRONIC ULCER OF LEFT ANKLE WITH FAT LAYER EXPOSED (HCC): Primary | ICD-10-CM

## 2022-12-13 DIAGNOSIS — A49.01 MSSA (METHICILLIN SUSCEPTIBLE STAPHYLOCOCCUS AUREUS) INFECTION: ICD-10-CM

## 2022-12-13 PROCEDURE — 3430000000 HC RX DIAGNOSTIC RADIOPHARMACEUTICAL: Performed by: INTERNAL MEDICINE

## 2022-12-13 PROCEDURE — A9503 TC99M MEDRONATE: HCPCS | Performed by: INTERNAL MEDICINE

## 2022-12-13 PROCEDURE — 6370000000 HC RX 637 (ALT 250 FOR IP): Performed by: PODIATRIST

## 2022-12-13 PROCEDURE — 99213 OFFICE O/P EST LOW 20 MIN: CPT

## 2022-12-13 PROCEDURE — 78315 BONE IMAGING 3 PHASE: CPT

## 2022-12-13 RX ORDER — LIDOCAINE 40 MG/G
CREAM TOPICAL ONCE
OUTPATIENT
Start: 2022-12-13 | End: 2022-12-13

## 2022-12-13 RX ORDER — LIDOCAINE HYDROCHLORIDE 20 MG/ML
JELLY TOPICAL ONCE
OUTPATIENT
Start: 2022-12-13 | End: 2022-12-13

## 2022-12-13 RX ORDER — LIDOCAINE HYDROCHLORIDE 20 MG/ML
JELLY TOPICAL ONCE
Status: COMPLETED | OUTPATIENT
Start: 2022-12-13 | End: 2022-12-13

## 2022-12-13 RX ORDER — TC 99M MEDRONATE 20 MG/10ML
25 INJECTION, POWDER, LYOPHILIZED, FOR SOLUTION INTRAVENOUS
Status: COMPLETED | OUTPATIENT
Start: 2022-12-13 | End: 2022-12-13

## 2022-12-13 RX ORDER — GENTAMICIN SULFATE 1 MG/G
OINTMENT TOPICAL ONCE
OUTPATIENT
Start: 2022-12-13 | End: 2022-12-13

## 2022-12-13 RX ORDER — BACITRACIN ZINC AND POLYMYXIN B SULFATE 500; 1000 [USP'U]/G; [USP'U]/G
OINTMENT TOPICAL ONCE
OUTPATIENT
Start: 2022-12-13 | End: 2022-12-13

## 2022-12-13 RX ORDER — CLOBETASOL PROPIONATE 0.5 MG/G
OINTMENT TOPICAL ONCE
OUTPATIENT
Start: 2022-12-13 | End: 2022-12-13

## 2022-12-13 RX ORDER — LIDOCAINE HYDROCHLORIDE 40 MG/ML
SOLUTION TOPICAL ONCE
OUTPATIENT
Start: 2022-12-13 | End: 2022-12-13

## 2022-12-13 RX ORDER — GINSENG 100 MG
CAPSULE ORAL ONCE
OUTPATIENT
Start: 2022-12-13 | End: 2022-12-13

## 2022-12-13 RX ORDER — BACITRACIN, NEOMYCIN, POLYMYXIN B 400; 3.5; 5 [USP'U]/G; MG/G; [USP'U]/G
OINTMENT TOPICAL ONCE
OUTPATIENT
Start: 2022-12-13 | End: 2022-12-13

## 2022-12-13 RX ORDER — LIDOCAINE 50 MG/G
OINTMENT TOPICAL ONCE
OUTPATIENT
Start: 2022-12-13 | End: 2022-12-13

## 2022-12-13 RX ORDER — BETAMETHASONE DIPROPIONATE 0.05 %
OINTMENT (GRAM) TOPICAL ONCE
OUTPATIENT
Start: 2022-12-13 | End: 2022-12-13

## 2022-12-13 RX ADMIN — LIDOCAINE HYDROCHLORIDE: 20 JELLY TOPICAL at 15:09

## 2022-12-13 RX ADMIN — TC 99M MEDRONATE 24.7 MILLICURIE: 20 INJECTION, POWDER, LYOPHILIZED, FOR SOLUTION INTRAVENOUS at 10:10

## 2022-12-13 ASSESSMENT — PAIN DESCRIPTION - ORIENTATION: ORIENTATION: LEFT

## 2022-12-13 ASSESSMENT — PAIN DESCRIPTION - LOCATION: LOCATION: LEG

## 2022-12-13 ASSESSMENT — PAIN SCALES - GENERAL: PAINLEVEL_OUTOF10: 5

## 2022-12-13 ASSESSMENT — PAIN DESCRIPTION - DESCRIPTORS: DESCRIPTORS: BURNING

## 2022-12-13 NOTE — DISCHARGE INSTRUCTIONS
[facility]   Physician Orders and Discharge Instructions  [address]  Telephone: [phone]        NAME:  Kalyani Delgado OF BIRTH:  1945  MEDICAL RECORD NUMBER:  [id number]     Your  is:  Nicholas Courtney Care/Facility:  [facility] CARE     Wound Location:   LEFT ANTERIOR ANKLE AND LEFT MEDIAL ANKLE  Dressing orders:  Cleanse wound(s) with normal saline. 2.   Apply a thin layer of VASELINE TO WILIAM WOUND EDGE. 3.   APPLY MESALT TO WOUND BED ONLY. 4. COVER WITH OPTILOCK AND SECURE. 4. Change dressing DAILY. Compression:  NONE     Offloading Device:     Other Instructions:   HOME CARE TO [facility] BILATERAL FEET WITH SOAP AND WATER THEN APPLY AQUAPHOR WITH EACH DRESSING CHANGE,   Keep all dressings clean, dry and intact. Keep pressure off the wound(s) at all times. Follow up visit       1 WEEKS  December 20, 2022 AT         Please give 24 hour notice if unable to keep appointment. [phone]     If you experience any of the following, please call the Wound Care Service at  [phone] or go to the nearest emergency room. *Increase in pain         *Temperature over 101           *Increase in drainage from your wound or a foul odor  *Uncontrolled swelling            *Need for compression bandage changes due to slippage, breakthrough drainage       PLEASE NOTE: IF YOU ARE UNABLE TO OBTAIN WOUND SUPPLIES, CONTINUE TO USE THE SUPPLIES YOU HAVE AVAILABLE UNTIL YOU ARE ABLE TO REACH US.  IT IS MOST IMPORTANT TO KEEP THE WOUND COVERED AT ALL TIMES

## 2022-12-13 NOTE — PLAN OF CARE
Problem: Pain  Goal: Verbalizes/displays adequate comfort level or baseline comfort level  Outcome: Progressing     Problem: Wound:  Goal: Will show signs of wound healing; wound closure and no evidence of infection  Description: Will show signs of wound healing; wound closure and no evidence of infection  Outcome: Progressing     Problem: Venous:  Goal: Signs of wound healing will improve  Description: Signs of wound healing will improve  Outcome: Progressing     Problem: Compression therapy:  Goal: Will be free from complications associated with compression therapy  Description: Will be free from complications associated with compression therapy  Outcome: Progressing     Problem: Falls - Risk of:  Goal: Will remain free from falls  Description: Will remain free from falls  Outcome: Progressing     Problem: Blood Glucose:  Goal: Ability to maintain appropriate glucose levels will improve  Description: Ability to maintain appropriate glucose levels will improve  Outcome: Progressing     Problem: Pain  Goal: Verbalizes/displays adequate comfort level or baseline comfort level  Outcome: Progressing

## 2022-12-13 NOTE — CODE DOCUMENTATION
[facility] Physician Billing Sheet. Kevin Vaz  AGE: 76 y.o.    GENDER: male  : 1945  TODAY'S DATE:  2022    ICD-10  [facility] Street Problems    Diagnosis Date Noted    Non-pressure chronic ulcer of left ankle with fat layer exposed ([address] [L97.322] 2021    Osteomyelitis of ankle ([address] [M86.9] 2021    Atherosclerosis of native arteries of extremities with rest pain, left leg ([address] [I70.222] 2021    Tobacco use [Z72.0] 2014       PHYSICIAN PROCEDURES    CPT CODE  04926      Electronically signed by Jose Cervantes DPM on 2022 at 3:36 PM

## 2022-12-13 NOTE — PROGRESS NOTES
[facility]                                                   Progress Note and Procedure Note      Maribeth Guard RECORD NUMBER:  [id number]  AGE: 76 y.o. GENDER: male  : 1945  EPISODE DATE:  2022    Subjective:     Chief Complaint   Patient presents with    Wound Check     Left lower ext         HISTORY of PRESENT ILLNESS HPI      Sanam Dove is a 76 y.o. male who presents today for wound/ulcer evaluation. History of Wound Context: Patient presents for a reoccurrence of an non healing ulcer of the left ankle. The patient was last seen in 2021 with the wound doing very well and then he went to a AL and never followed up. He denies any NVFC. He did see Dr. Cale Little who prescribed a NM bone scan and put him on keflex 500 mg tid. He sees him back on this Friday. Wound/Ulcer Pain Timing/Severity: intermittent  Quality of pain: tender  Severity:   / 10   Modifying Factors: None  Associated Signs/Symptoms: drainage, odor and pain    Ulcer Identification:  Ulcer Type: venous and undetermined  Contributing Factors: edema and venous stasis    Wound: N/A        PAST MEDICAL HISTORY        Diagnosis Date    Alcohol abuse     quit drinking 21    CAD (coronary artery disease)     patient states no doctor has told him this / has 1 cardiac stent    Cancer ([address]     lung LLL    Chronic back pain     Chronic kidney disease     Chronic obstructive pulmonary disease, unspecified COPD type ([address] 3/24/2022    Chronic sinusitis     DJD (degenerative joint disease) of knee     left knee DJD    Elevated PSA     History of blood transfusion     History of inferior wall myocardial infarction 2016    1 cardiac stent    HTN (hypertension)     meds .  1 yr    Hyperlipidemia     meds > 12 yrs    NSTEMI (non-ST elevated myocardial infarction) ([address]     Smoker        PAST SURGICAL HISTORY    Past Surgical History:   Procedure Laterality Date    [facility] spleenectomy due to [address]      lumbar disc OR    CARDIAC SURGERY      stents    CARPAL TUNNEL RELEASE Right 2019    RIGHT CARPAL TUNNEL RELEASE performed by Nancy Maya MD at [address] [facility] [facility] [address]  2016    EYE SURGERY      to have Phaco with IOL OU 2018    HERNIA REPAIR      JOINT REPLACEMENT Left     LTHR    JOINT REPLACEMENT Right     RTKR    KNEE SURGERY Right     arthroscopy    NV MANIPULATN KNEE JT+ANESTHESIA Right 2017    RIGHT KNEE MANIPULATION UNDER ANESTHESIA performed by Bhavya Westfall MD at [address] OFFICE/OUTPT VISIT,PROCEDURE ONLY Bilateral 2017    RIGHT AND BILATERAL L 4-5 LYSIS OF SCAR DISKECTOMY INTERBODY CAGE FUSION POSTEROLATERAL FUSION PEDICLE SCREWS performed by Nancy Maya MD at [address] [address]    benign    [address] [place] [address] Right 3/24/2017    RIGHT  KNEE TOTAL ARTHROPLASTY, Ethelyn Bad CEMENTED PERSONA  performed by Bhavya Westfall MD at [facility] HISTORY    Family History   Problem Relation Age of Onset    Osteoarthritis Mother     Emphysema Mother     Hypertension Father     Hearing Loss Father     Dementia Father     No Known Problems Sister     Prostate Cancer Brother     Colon Polyps Neg Hx        SOCIAL HISTORY    Social History     Tobacco Use    Smoking status: Former     Packs/day: 0.00     Years: 60.00     Pack years: 0.00     Types: Cigarettes     Quit date: 2021     Years since quittin.9    Smokeless tobacco: Never    Tobacco comments:      smokes 2 cigars a day   Vaping Use    Vaping Use: Never used   Substance Use Topics    Alcohol use: Not Currently     Alcohol/week: 12.0 standard drinks     Types: 12 Shots of liquor per week     Comment: socail drinking    Drug use: No       ALLERGIES    Allergies   Allergen Reactions    Morphine        MEDICATIONS    Current Outpatient Medications on File Prior to Encounter Medication Sig Dispense Refill    cephALEXin (KEFLEX) 500 MG capsule Take 1 capsule by mouth 3 times daily 90 capsule 1    gentamicin (GARAMYCIN) 0.1 % ointment Apply topically  daily. 30 g 1    gentamicin (GARAMYCIN) 0.1 % ointment Apply topically  daily. 30 g 1    oxyCODONE-acetaminophen (PERCOCET) 7.5-325 MG per tablet Take 1 tablet by mouth five times a day as needed for pain      citalopram (CELEXA) 40 MG tablet TAKE ONE TABLET BY MOUTH nightly 30 tablet 5    atorvastatin (LIPITOR) 20 MG tablet TAKE ONE TABLET BY MOUTH DAILY 90 tablet 3    clopidogrel (PLAVIX) 75 MG tablet Take 1 tablet by mouth daily 90 tablet 3    diclofenac (VOLTAREN) 50 MG EC tablet TAKE ONE TABLET BY MOUTH TWO TIMES A DAY 60 tablet 5    isosorbide mononitrate (IMDUR) 30 MG extended release tablet TAKE ONE TABLET BY MOUTH EVERY DAY 90 tablet 3    bumetanide (BUMEX) 2 MG tablet Take 1 tablet by mouth daily 90 tablet 3    metoprolol tartrate (LOPRESSOR) 50 MG tablet Take 0.5 tablets by mouth 2 times daily TAKE ONE TABLET BY MOUTH TWO TIMES A  tablet 3    omeprazole (PRILOSEC) 10 MG delayed release capsule TAKE ONE CAPSULE BY MOUTH DAILY 90 capsule 2    finasteride (PROSCAR) 5 MG tablet Take 1 tablet by mouth daily 90 tablet 3    tiotropium (SPIRIVA RESPIMAT) 2.5 MCG/ACT AERS inhaler Inhale 2 puffs into the lungs daily 1 Inhaler 3    Fluticasone furoate-vilanterol (BREO ELLIPTA) 200-25 MCG/INH AEPB inhaler Inhale 1 puff into the lungs daily 1 each 3    albuterol sulfate  (90 Base) MCG/ACT inhaler Inhale 2 puffs into the lungs every 6 hours as needed for Wheezing 1 Inhaler 3    nitroGLYCERIN (NITROSTAT) 0.4 MG SL tablet Place 1 tablet under the tongue every 5 minutes as needed for Chest pain up to max of 3 total doses. If no relief after 1 dose, call 911. 25 tablet 3    DOCUSATE CALCIUM PO Take by mouth as needed       No current facility-administered medications on file prior to encounter.        REVIEW OF SYSTEMS    Pertinent items are noted in HPI. Objective:      BP (!) 168/81   Pulse 62   Temp (!) 96.6 °F (35.9 °C) (Temporal)   Resp 20     Wt Readings from Last 3 Encounters:   12/06/22 165 lb (74.8 kg)   10/14/22 166 lb 12.8 oz (75.7 kg)   08/11/22 160 lb (72.6 kg)       PHYSICAL EXAM    Constitutional:   Well nourished and well developed. Appears neat and clean. Patient is alert, oriented x3, and in no apparent distress. Respiratory:  Respiratory effort is easy and symmetric bilaterally. Rate is normal at rest and on room air. Vascular:  Pedal Pulses is palpable and audible with doppler. Capillary refill is <3 sec to digits bilateral.  Extremities positive for 2+ pitting edema. Art duplex reviewed no occlusion noted. Neurological:   Sensation is intact to lower extremities. Dermatological:  Wound description noted in wound assessment. The wound is deteriorating in size and depth. There is increased drainage and odor noted. The base of the wound has slough and necrotic tissue noted. No purulence, but surrounding erythema, edema, and  warmth were noted. Patient denies any N/V/F/C or signs of systemic infection. . New small satellite areas noted. Psychiatric:  Judgement and insight intact. Short and long term memory intact. No evidence of depression, anxiety, or agitation. Patient is calm, cooperative, and communicative. Appropriate interactions and affect. 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   Wound 03/25/22 Ankle Left;Anterior #1 (Active)   Wound Image   12/13/22 1515   Wound Etiology Venous 12/13/22 1515   Wound Cleansed Cleansed with saline 12/13/22 1515   Dressing/Treatment Pharmaceutical agent (see MAR) 11/29/22 1646   Wound Length (cm) 4.8 cm 12/13/22 1515   Wound Width (cm) 2.8 cm 12/13/22 1515   Wound Depth (cm) 0.3 cm 12/13/22 1515   Wound Surface Area (cm^2) 13.44 cm^2 12/13/22 1515   Change in Wound Size % (l*w) -320 12/13/22 1515   Wound Volume (cm^3) 4.032 cm^3 12/13/22 1515   Wound Healing % -320 12/13/22 1515   Post-Procedure Length (cm) 3.4 cm 11/01/22 1635   Post-Procedure Width (cm) 2.2 cm 11/01/22 1635   Post-Procedure Depth (cm) 0.2 cm 11/01/22 1635   Post-Procedure Surface Area (cm^2) 7.48 cm^2 11/01/22 1635   Post-Procedure Volume (cm^3) 1.496 cm^3 11/01/22 1635   Wound Assessment Pink/red;Slough 12/13/22 1515   Drainage Amount Large 12/13/22 1515   Drainage Description Yellow;Serosanguinous 12/13/22 1515   Odor None 12/13/22 1515   Ela-wound Assessment Fragile 12/13/22 1515   Margins Defined edges 12/13/22 1515   Wound Thickness Description not for Pressure Injury Full thickness 12/13/22 1515   Number of days: 263       Wound 08/30/22 Ankle Left;Medial #2 (Active)   Wound Image   12/13/22 1515   Wound Etiology Venous 12/13/22 1515   Wound Cleansed Cleansed with saline 12/13/22 1515   Dressing/Treatment Pharmaceutical agent (see MAR) 11/29/22 1646   Wound Length (cm) 5.5 cm 12/13/22 1515   Wound Width (cm) 4.5 cm 12/13/22 1515   Wound Depth (cm) 0.1 cm 12/13/22 1515   Wound Surface Area (cm^2) 24.75 cm^2 12/13/22 1515   Change in Wound Size % (l*w) -1169.23 12/13/22 1515   Wound Volume (cm^3) 2.475 cm^3 12/13/22 1515   Wound Healing % -535 12/13/22 1515   Post-Procedure Length (cm) 5 cm 11/01/22 1635   Post-Procedure Width (cm) 3.6 cm 11/01/22 1635   Post-Procedure Depth (cm) 0.15 cm 11/01/22 1635   Post-Procedure Surface Area (cm^2) 18 cm^2 11/01/22 1635   Post-Procedure Volume (cm^3) 2.7 cm^3 11/01/22 YOB: 1945  MEDICAL RECORD NUMBER:  [id number]     Your  is:  Nicholas Courtney Care/Facility:  [facility] CARE     Wound Location:   LEFT ANTERIOR ANKLE AND LEFT MEDIAL ANKLE  Dressing orders:  Cleanse wound(s) with normal saline. 2.   Apply a thin layer of VASELINE TO WILIAM WOUND EDGE. 3.   APPLY MESALT TO WOUND BED ONLY. 4. COVER WITH OPTILOCK AND SECURE. 4. Change dressing DAILY. Compression:  NONE     Offloading Device:     Other Instructions:   HOME CARE TO [address] BILATERAL FEET WITH SOAP AND WATER THEN APPLY AQUAPHOR WITH EACH DRESSING CHANGE,   Keep all dressings clean, dry and intact. Keep pressure off the wound(s) at all times. Follow up visit       1 WEEKS  December 20, 2022 AT         Please give 24 hour notice if unable to keep appointment. [phone]     If you experience any of the following, please call the Wound Care Service at  [phone] or go to the nearest emergency room. *Increase in pain         *Temperature over 101           *Increase in drainage from your wound or a foul odor  *Uncontrolled swelling            *Need for compression bandage changes due to slippage, breakthrough drainage       PLEASE NOTE: IF YOU ARE UNABLE TO OBTAIN WOUND SUPPLIES, CONTINUE TO USE THE SUPPLIES YOU HAVE AVAILABLE UNTIL YOU ARE ABLE TO REACH US.  IT IS MOST IMPORTANT TO KEEP THE WOUND COVERED AT ALL TIMES      Electronically signed by Rosalina Mayen DPM on 12/13/2022 at 3:38 PM

## 2022-12-16 ENCOUNTER — OFFICE VISIT (OUTPATIENT)
Dept: INFECTIOUS DISEASES | Age: 77
End: 2022-12-16

## 2022-12-16 VITALS — TEMPERATURE: 97.7 F | DIASTOLIC BLOOD PRESSURE: 81 MMHG | SYSTOLIC BLOOD PRESSURE: 136 MMHG | HEART RATE: 71 BPM

## 2022-12-16 DIAGNOSIS — M86.172 OTHER ACUTE OSTEOMYELITIS OF LEFT ANKLE (HCC): Primary | ICD-10-CM

## 2022-12-16 DIAGNOSIS — M86.172 ACUTE OSTEOMYELITIS OF LEFT ANKLE OR FOOT (HCC): ICD-10-CM

## 2022-12-16 ASSESSMENT — ENCOUNTER SYMPTOMS
RESPIRATORY NEGATIVE: 1
GASTROINTESTINAL NEGATIVE: 1

## 2022-12-16 NOTE — PROGRESS NOTES
Infectious Disease     Patient Name: Allenrel Code  Date: 12/16/2022  YOB: 1945  Medical Record Number: [id number]      Osteomyelitis left ankle  MSSA infection wound left leg    In 2021  Left ankle osteomyelitis  Left ankle chronic nonhealing wound  Pasteurella and procidentia infection    Received a prolonged course of IV antibiotics finished therapy November 2021      Bone scan consistent with osteomyelitis left ankle           11/22/22 1433    CULTURE WOUND  Abnormal   Direct Exam:  MANY GRAM POSITIVE COCCI IN CLUSTERS   Direct Exam:  RARE GRAM POSITIVE RODS   Direct Exam:  FEW GRAM POSITIVE COCCI IN CHAINS   Direct Exam:  NO NEUTROPHILS SEEN   Cult,Aerobe/Anaerobe:  No anaerobic organisms isolated at 5 days. Performed at [address] [place] [address]   ([phone]     Organism BHS Group B (Strep agalacticae) Abnormal     CULTURE WOUND HEAVY GROWTH    Organism Staphylococcus aureus Abnormal     CULTURE WOUND HEAVY GROWTH   This isolate is methicillin susceptible. Resulting Agency [address] [facility]        Susceptibility    Staphylococcus aureus (2)    Antibiotic Interpretation Microscan  Method Status    benzylpenicillin Resistant >=0.5 mcg/mL BACTERIAL SUSCEPTIBILITY PANEL BY VALERY     clindamycin Resistant <=0.25 mcg/mL BACTERIAL SUSCEPTIBILITY PANEL BY VALERY     erythromycin Resistant   BACTERIAL SUSCEPTIBILITY PANEL BY VALERY     gentamicin Sensitive <=0.5 mcg/mL BACTERIAL SUSCEPTIBILITY PANEL BY VALERY      Gentamicin is used only in combination with other active agents that   test susceptible.         inducible clindamycin resistance Resistant POSITIVE mcg/mL BACTERIAL SUSCEPTIBILITY PANEL BY VALERY     oxacillin Sensitive 0.5 mcg/mL BACTERIAL SUSCEPTIBILITY PANEL BY VALERY     tetracycline Sensitive <=1 mcg/mL BACTERIAL SUSCEPTIBILITY PANEL BY VALERY     trimethoprim-sulfamethoxazole Sensitive <=10 mcg/mL BACTERIAL SUSCEPTIBILITY PANEL BY VALERY        Narrative  Performed by: [address] [facility]  ORDER#: [id number]                          ORDERED BY: Ashley Hess   SOURCE: Leg                                COLLECTED:  22 14:33   ANTIBIOTICS AT JANIE.:                      RECEIVED :  22 14:33      Specimen Collected: 22 14:33 EST Last Resulted: 22 08:08 EST             22 0913     CULTURE WOUND  Abnormal   Direct Exam:  NO NEUTROPHILS SEEN   Direct Exam:  MANY GRAM POSITIVE COCCI IN CLUSTERS   Direct Exam:  MODERATE GRAM POSITIVE RODS   Direct Exam:  FEW GRAM NEGATIVE RODS   Cult,Aerobe/Anaerobe:  GRAM NEGATIVE RODS   Cult,Aerobe/Anaerobe:  HEAVY GROWTH   Cult,Aerobe/Anaerobe:  GRAM NEGATIVE RODS   Cult,Aerobe/Anaerobe:  2ND COLONY TYPE   Cult,Aerobe/Anaerobe:  HEAVY GROWTH   Cult,Aerobe/Anaerobe:  GRAM NEGATIVE RODS   Cult,Aerobe/Anaerobe:  THIRD COLONY TYPE   Cult,Aerobe/Anaerobe:  HEAVY GROWTH   Cult,Aerobe/Anaerobe: This is a mixed culture of organisms, which may represent   Cult,Aerobe/Anaerobe:  colonization or contamination. Notify the   laboratory   Cult,Aerobe/Anaerobe:   within 7 days for further workup. Susceptibilities have   Cult,Aerobe/Anaerobe:  not been performed. Cult,Aerobe/Anaerobe:  No anaerobic organisms isolated at 5 days. Performed at [address] [place] [address]   [phone]     Organism Staphylococcus aureus Abnormal     CULTURE WOUND HEAVY GROWTH    Resulting Agency [address] [facility]       Review of Systems   Constitutional: Negative. Respiratory: Negative. Cardiovascular: Negative. Gastrointestinal: Negative. Skin:  Positive for wound.      Review of Systems: All 14 review of systems negative other than as stated above    Social History     Tobacco Use    Smoking status: Former     Packs/day: 0.00     Years: 60.00     Pack years: 0.00     Types: Cigarettes     Quit date: 2021     Years since quittin.9    Smokeless tobacco: Never    Tobacco comments: smokes 2 cigars a day   Vaping Use    Vaping Use: Never used   Substance Use Topics    Alcohol use: Not Currently     Alcohol/week: 12.0 standard drinks     Types: 12 Shots of liquor per week     Comment: socail drinking    Drug use: No         Past Medical History:   Diagnosis Date    Alcohol abuse     quit drinking 8/18/21    CAD (coronary artery disease)     patient states no doctor has told him this / has 1 cardiac stent    Cancer ([facility]     lung LLL    Chronic back pain     Chronic kidney disease     Chronic obstructive pulmonary disease, unspecified COPD type ([facility] 3/24/2022    Chronic sinusitis     DJD (degenerative joint disease) of knee     left knee DJD    Elevated PSA     History of blood transfusion 1980's    History of inferior wall myocardial infarction 01/2016    1 cardiac stent    HTN (hypertension)     meds .  1 yr    Hyperlipidemia     meds > 12 yrs    NSTEMI (non-ST elevated myocardial infarction) ([facility]     Smoker            Past Surgical History:   Procedure Laterality Date    ABDOMEN SURGERY  1961    spleenectomy due to [address]  2009    lumbar disc OR    CARDIAC SURGERY      stents    CARPAL TUNNEL RELEASE Right 5/6/2019    RIGHT CARPAL TUNNEL RELEASE performed by Osiel Infante MD at [address]  1/29/2016    EYE SURGERY      to have Phaco with IOL OU 2/2018    HERNIA REPAIR      JOINT REPLACEMENT Left 1994    LTHR    JOINT REPLACEMENT Right 2009    RTKR    KNEE SURGERY Right 2011    arthroscopy    MI MANIPULATN KNEE JT+ANESTHESIA Right 5/12/2017    RIGHT KNEE MANIPULATION UNDER ANESTHESIA performed by Loraine Gaucher, MD at [facility] OFFICE/OUTPT VISIT,PROCEDURE ONLY Bilateral 12/29/2017    RIGHT AND BILATERAL L 4-5 LYSIS OF SCAR DISKECTOMY INTERBODY CAGE FUSION POSTEROLATERAL FUSION PEDICLE SCREWS performed by Osiel Infante MD at [address] [place] [address] 3/24/2017    RIGHT  KNEE TOTAL ARTHROPLASTY, ELHAM CEMENTED PERSONA  performed by Ramiro Miller MD at [facility]         Current Outpatient Medications on File Prior to Visit   Medication Sig Dispense Refill    cephALEXin (KEFLEX) 500 MG capsule Take 1 capsule by mouth 3 times daily 90 capsule 1    gentamicin (GARAMYCIN) 0.1 % ointment Apply topically  daily. 30 g 1    gentamicin (GARAMYCIN) 0.1 % ointment Apply topically  daily. 30 g 1    oxyCODONE-acetaminophen (PERCOCET) 7.5-325 MG per tablet Take 1 tablet by mouth five times a day as needed for pain      citalopram (CELEXA) 40 MG tablet TAKE ONE TABLET BY MOUTH nightly 30 tablet 5    atorvastatin (LIPITOR) 20 MG tablet TAKE ONE TABLET BY MOUTH DAILY 90 tablet 3    clopidogrel (PLAVIX) 75 MG tablet Take 1 tablet by mouth daily 90 tablet 3    diclofenac (VOLTAREN) 50 MG EC tablet TAKE ONE TABLET BY MOUTH TWO TIMES A DAY 60 tablet 5    isosorbide mononitrate (IMDUR) 30 MG extended release tablet TAKE ONE TABLET BY MOUTH EVERY DAY 90 tablet 3    bumetanide (BUMEX) 2 MG tablet Take 1 tablet by mouth daily 90 tablet 3    metoprolol tartrate (LOPRESSOR) 50 MG tablet Take 0.5 tablets by mouth 2 times daily TAKE ONE TABLET BY MOUTH TWO TIMES A  tablet 3    omeprazole (PRILOSEC) 10 MG delayed release capsule TAKE ONE CAPSULE BY MOUTH DAILY 90 capsule 2    finasteride (PROSCAR) 5 MG tablet Take 1 tablet by mouth daily 90 tablet 3    tiotropium (SPIRIVA RESPIMAT) 2.5 MCG/ACT AERS inhaler Inhale 2 puffs into the lungs daily 1 Inhaler 3    Fluticasone furoate-vilanterol (BREO ELLIPTA) 200-25 MCG/INH AEPB inhaler Inhale 1 puff into the lungs daily 1 each 3    albuterol sulfate  (90 Base) MCG/ACT inhaler Inhale 2 puffs into the lungs every 6 hours as needed for Wheezing 1 Inhaler 3    nitroGLYCERIN (NITROSTAT) 0.4 MG SL tablet Place 1 tablet under the tongue every 5 minutes as needed for Chest pain up to max of 3 total doses. If no relief after 1 dose, call 911. 25 tablet 3    DOCUSATE CALCIUM PO Take by mouth as needed       No current facility-administered medications on file prior to visit. Allergies   Allergen Reactions    Morphine          Family History   Problem Relation Age of Onset    Osteoarthritis Mother     Emphysema Mother     Hypertension Father     Hearing Loss Father     Dementia Father     No Known Problems Sister     Prostate Cancer Brother     Colon Polyps Neg Hx          Physical Exam:      Physical Exam  HENT:      Head: Atraumatic. Cardiovascular:      Heart sounds: No murmur heard. Pulmonary:      Effort: Pulmonary effort is normal. No respiratory distress. Breath sounds: Normal breath sounds. No wheezing, rhonchi or rales. Abdominal:      General: Bowel sounds are normal. There is no distension. Palpations: There is no mass. Tenderness: There is no abdominal tenderness. There is no guarding or rebound. Hernia: No hernia is present. Skin:         Neurological:      Mental Status: He is alert. .   Lab Results   Component Value Date    WBC 11.6 (H) 01/14/2022    HGB 10.4 (L) 01/14/2022    HCT 32.0 (L) 01/14/2022    MCV 96.2 01/14/2022     01/14/2022     Lab Results   Component Value Date/Time     10/14/2022 11:00 AM    K 5.4 10/14/2022 11:00 AM    K 4.2 10/15/2021 10:22 AM    CL 97 10/14/2022 11:00 AM    CO2 27 10/14/2022 11:00 AM    BUN 33 10/14/2022 11:00 AM    CREATININE 0.93 10/14/2022 11:00 AM    GLUCOSE 86 10/14/2022 11:00 AM    CALCIUM 9.7 10/14/2022 11:00 AM          EXAMINATION:   THREE PHASE BONE SCAN       12/13/2022 9:42 am       TECHNIQUE:   The patient was injected intravenously with 24.7 mCi of 99 mTc MDP. Initial   blood flow and pool images of the ankles and feet were acquired. After 3   hours, delayed bone images were acquired. COMPARISON:   Patient did not have previous imaging studies for comparison.        HISTORY:   ORDERING SYSTEM PROVIDED HISTORY: [facility] (methicillin susceptible   Staphylococcus aureus) infection   TECHNOLOGIST PROVIDED HISTORY:   Reason for exam:->Osteomyelitis left ankle left tibia   What reading provider will be dictating this exam?->CRC       FINDINGS:   In the flow phase, intense asymmetric activity is demonstrated at the left   ankle and foot. In the blood pool phase, moderate asymmetric activity is seen at the left   lower leg and foot, greatest at the posterior foot. In the delayed phase, intense focal asymmetric activity is demonstrated at   the expected location of the left subtalar joint. A lesser degree of   activity is demonstrated at the mid feet bilaterally and right great toe. Mild lateral curvature of the thoracolumbar spine. Activity along the lumbar   spine is likely degenerative as a consequence. Moderate asymmetric activity is seen at the right shoulder. Activity at left shoulder, elbows, wrists, right hip, left knee, likely   degenerative. Photopenia is seen related to left hip and right knee arthroplasties, without   marked adjacent activity. Physiologic activity is seen within the kidneys and urinary bladder. Impression   Intense activity at the expected location of the left subtalar joint, with   active inflammation, which can reflect osteomyelitis given the clinical   suspicion. Additional entities which can have this appearance include   arthropathy with active inflammation or healing trauma. Moderate asymmetric activity at the right shoulder, which could be due to   asymmetric arthropathy, trauma, or metastatic disease. Radiograph could be   performed for further assessment as warranted. Unremarkable appearance of left hip and right knee arthroplasties.              ASSESSMENT:  Patient Active Problem List   Diagnosis    Tobacco use    Hip pain    Knee pain    Chronic back pain    Elevated PSA    Primary osteoarthritis of right knee    Spondylosis of lumbar region without myelopathy or radiculopathy    Sacroiliitis, not elsewhere classified ([address]    Pure hypercholesterolemia    Charcot's joint of left ankle    Left ankle pain    Delirium due to general medical condition    DDD (degenerative disc disease), lumbar    Osteoarthritis of spine with myelopathy, lumbosacral region    Chronic bilateral low back pain with bilateral sciatica    Postlaminectomy syndrome, lumbar region    Acquired spondylolisthesis of lumbosacral region    Spinal stenosis of lumbar region with neurogenic claudication    HNP (herniated nucleus pulposus), lumbar    Spondylolisthesis at L4-L5 level    Anesthesia    Herniated nucleus pulposus, L4-5    NSTEMI (non-ST elevated myocardial infarction) ([facility])    S/P PTCA (percutaneous transluminal coronary angioplasty)    Right upper quadrant abdominal pain    Gallbladder polyp    Biliary dyskinesia    Carpal tunnel syndrome on right    Carpal tunnel syndrome on left    Angina effort    Dyslipidemia    FELDER (dyspnea on exertion)    CAD (coronary artery disease)    Chest pain    Lung nodule seen on imaging study    Bilateral carotid artery stenosis    Essential hypertension    NSCLC of left lung (HCC)    Ataxic gait    Dizziness    Fracture, Colles, right, closed    Heroin abuse ([address]    Non-pressure chronic ulcer of left ankle with fat layer exposed ([address]    Atherosclerosis of native arteries of extremities with rest pain, left leg (HCC)    Subacute osteomyelitis, left ankle and foot (HCC)    Osteomyelitis of ankle (HCC)    Hyponatremia    Frequent falls    Stage 3 chronic kidney disease, unspecified whether stage 3a or 3b CKD ([address]    Fracture of right humerus    Hyperkalemia    Leukocytosis    Anemia    Acute hematogenous osteomyelitis, left ankle and foot (HCC)    Traumatic hematoma of right shoulder    Head injury    Humeral head fracture, right, closed, initial encounter    Pelvic hematoma, male, after a fall    Cause of injury, accidental fall, initial encounter    Tremor of unknown origin    Sundowning    Acute pain due to trauma    Disequilibrium    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

## 2022-12-20 ENCOUNTER — TELEPHONE (OUTPATIENT)
Dept: INFECTIOUS DISEASES | Age: 77
End: 2022-12-20

## 2022-12-20 ENCOUNTER — HOSPITAL ENCOUNTER (OUTPATIENT)
Dept: WOUND CARE | Age: 77
Discharge: HOME | End: 2022-12-20
Payer: MEDICARE

## 2022-12-20 VITALS
HEART RATE: 86 BPM | SYSTOLIC BLOOD PRESSURE: 125 MMHG | RESPIRATION RATE: 18 BRPM | DIASTOLIC BLOOD PRESSURE: 57 MMHG | TEMPERATURE: 97 F

## 2022-12-20 DIAGNOSIS — L97.322 NON-PRESSURE CHRONIC ULCER OF LEFT ANKLE WITH FAT LAYER EXPOSED (HCC): Primary | ICD-10-CM

## 2022-12-20 PROCEDURE — 99213 OFFICE O/P EST LOW 20 MIN: CPT

## 2022-12-20 PROCEDURE — 6370000000 HC RX 637 (ALT 250 FOR IP): Performed by: PODIATRIST

## 2022-12-20 RX ORDER — GENTAMICIN SULFATE 1 MG/G
OINTMENT TOPICAL ONCE
Status: COMPLETED | OUTPATIENT
Start: 2022-12-20 | End: 2022-12-20

## 2022-12-20 RX ORDER — BACITRACIN, NEOMYCIN, POLYMYXIN B 400; 3.5; 5 [USP'U]/G; MG/G; [USP'U]/G
OINTMENT TOPICAL ONCE
OUTPATIENT
Start: 2022-12-20 | End: 2022-12-20

## 2022-12-20 RX ORDER — LIDOCAINE 50 MG/G
OINTMENT TOPICAL ONCE
OUTPATIENT
Start: 2022-12-20 | End: 2022-12-20

## 2022-12-20 RX ORDER — CLOBETASOL PROPIONATE 0.5 MG/G
OINTMENT TOPICAL ONCE
OUTPATIENT
Start: 2022-12-20 | End: 2022-12-20

## 2022-12-20 RX ORDER — LIDOCAINE HYDROCHLORIDE 20 MG/ML
JELLY TOPICAL ONCE
Status: COMPLETED | OUTPATIENT
Start: 2022-12-20 | End: 2022-12-20

## 2022-12-20 RX ORDER — BACITRACIN ZINC AND POLYMYXIN B SULFATE 500; 1000 [USP'U]/G; [USP'U]/G
OINTMENT TOPICAL ONCE
OUTPATIENT
Start: 2022-12-20 | End: 2022-12-20

## 2022-12-20 RX ORDER — GINSENG 100 MG
CAPSULE ORAL ONCE
OUTPATIENT
Start: 2022-12-20 | End: 2022-12-20

## 2022-12-20 RX ORDER — LIDOCAINE HYDROCHLORIDE 20 MG/ML
JELLY TOPICAL ONCE
OUTPATIENT
Start: 2022-12-20 | End: 2022-12-20

## 2022-12-20 RX ORDER — LIDOCAINE 40 MG/G
CREAM TOPICAL ONCE
OUTPATIENT
Start: 2022-12-20 | End: 2022-12-20

## 2022-12-20 RX ORDER — BETAMETHASONE DIPROPIONATE 0.05 %
OINTMENT (GRAM) TOPICAL ONCE
OUTPATIENT
Start: 2022-12-20 | End: 2022-12-20

## 2022-12-20 RX ORDER — LIDOCAINE HYDROCHLORIDE 40 MG/ML
SOLUTION TOPICAL ONCE
OUTPATIENT
Start: 2022-12-20 | End: 2022-12-20

## 2022-12-20 RX ORDER — GENTAMICIN SULFATE 1 MG/G
OINTMENT TOPICAL ONCE
OUTPATIENT
Start: 2022-12-20 | End: 2022-12-20

## 2022-12-20 RX ADMIN — LIDOCAINE HYDROCHLORIDE: 20 JELLY TOPICAL at 16:19

## 2022-12-20 RX ADMIN — GENTAMICIN SULFATE: 1 OINTMENT TOPICAL at 16:37

## 2022-12-20 NOTE — PLAN OF CARE
Problem: Wound:  Goal: Will show signs of wound healing; wound closure and no evidence of infection  Outcome: Progressing     Problem: Venous:  Goal: Signs of wound healing will improve  Outcome: Progressing     Problem: Compression therapy:  Goal: Will be free from complications associated with compression therapy  Outcome: Progressing

## 2022-12-20 NOTE — CODE DOCUMENTATION
[facility] Physician Billing Sheet. Drew Persaud  AGE: 76 y.o.    GENDER: male  : 1945  TODAY'S DATE:  2022    ICD-10  [facility] Problems    Diagnosis Date Noted    Acute osteomyelitis of left ankle or foot [facility]) [M86.172] 2022     Priority: Medium    Edema of left lower leg [R60.0] 10/25/2022     Priority: Medium    Non-pressure chronic ulcer of left ankle with fat layer exposed [facility]) [L97.322] 2021       PHYSICIAN PROCEDURES    CPT CODE  37581      Electronically signed by Balaji Long DPM on 2022 at 4:35 PM

## 2022-12-20 NOTE — DISCHARGE INSTRUCTIONS
[facility]   Physician Orders and Discharge Instructions  [address]  Telephone: [phone]        NAME:  Anastasiia Francis OF BIRTH:  1945  MEDICAL RECORD NUMBER:  [id number]     Your  is:  Nicholas Courtney Care/Facility:  [facility] CARE     Wound Location:   LEFT ANTERIOR ANKLE AND LEFT MEDIAL ANKLE  Dressing orders:  Cleanse wound(s) with normal saline. 2.   Apply a thin layer of VASELINE TO WILIAM WOUND EDGE, apply a thin layer of GENTAMICIN to wound bed. 3.   APPLY MESALT TO WOUND BED ONLY. 4. COVER WITH OPTILOCK AND SECURE. 4. Change dressing DAILY. Compression:  NONE     Offloading Device:     Other Instructions:   HOME CARE TO [address] BILATERAL FEET WITH SOAP AND WATER THEN APPLY AQUAPHOR WITH EACH DRESSING CHANGE,   Keep all dressings clean, dry and intact. Keep pressure off the wound(s) at all times. Follow up visit       2 WEEKS  January 3, 2022 AT         Please give 24 hour notice if unable to keep appointment. [phone]     If you experience any of the following, please call the Wound Care Service at  [phone] or go to the nearest emergency room. *Increase in pain         *Temperature over 101           *Increase in drainage from your wound or a foul odor  *Uncontrolled swelling            *Need for compression bandage changes due to slippage, breakthrough drainage       PLEASE NOTE: IF YOU ARE UNABLE TO OBTAIN WOUND SUPPLIES, CONTINUE TO USE THE SUPPLIES YOU HAVE AVAILABLE UNTIL YOU ARE ABLE TO REACH US.  IT IS MOST IMPORTANT TO KEEP THE WOUND COVERED AT ALL TIMES  Electronically signed by Margarita Dumont DPM on 12/20/2022 at 4:29 PM

## 2022-12-20 NOTE — TELEPHONE ENCOUNTER
Nurse called with an Update. Patient states he cannot make the Wed. 12/21/22 appts as his sister is working and she is his main transportation. Called to patient's sister Guillermina Gray, coordinated a better time and date. Picc line and First Dose will be Tues. 12/27/22 starting at 11am.   Specilals Dept called and Tues morning offered. Patient/Patient sister have agreed. [facility] Infusion notified, spoke to Kevin Camejo.

## 2022-12-20 NOTE — PROGRESS NOTES
Ewa Cervantes 35                                                   Progress Note and Procedure Note      David Height RECORD NUMBER:  [id number]  AGE: 76 y.o. GENDER: male  : 1945  EPISODE DATE:  2022    Subjective:     Chief Complaint   Patient presents with    Wound Check     Left ankle         HISTORY of PRESENT ILLNESS HPI      Parris Flaherty is a 76 y.o. male who presents today for wound/ulcer evaluation. History of Wound Context: Patient presents for a reoccurrence of an non healing ulcer of the left ankle. The patient was last seen in 2021 with the wound doing very well and then he went to a AL and never followed up. He denies any NVFC. He did see Dr. Charissa Gambino who prescribed a  a picc line due to positive NM bone scan. Wound/Ulcer Pain Timing/Severity: intermittent  Quality of pain: tender  Severity:  4 / 10   Modifying Factors: None  Associated Signs/Symptoms: drainage, odor and pain    Ulcer Identification:  Ulcer Type: venous and undetermined  Contributing Factors: edema and venous stasis    Wound: N/A        PAST MEDICAL HISTORY        Diagnosis Date    Alcohol abuse     quit drinking 21    CAD (coronary artery disease)     patient states no doctor has told him this / has 1 cardiac stent    Cancer ([address]     lung LLL    Chronic back pain     Chronic kidney disease     Chronic obstructive pulmonary disease, unspecified COPD type ([address] 3/24/2022    Chronic sinusitis     DJD (degenerative joint disease) of knee     left knee DJD    Elevated PSA     History of blood transfusion     History of inferior wall myocardial infarction 2016    1 cardiac stent    HTN (hypertension)     meds .  1 yr    Hyperlipidemia     meds > 12 yrs    NSTEMI (non-ST elevated myocardial infarction) ([address]     Smoker        PAST SURGICAL HISTORY    Past Surgical History:   Procedure Laterality Date    ABDOMEN SURGERY      spleenectomy due to [address]   lumbar disc OR    CARDIAC SURGERY      stents    CARPAL TUNNEL RELEASE Right 2019    RIGHT CARPAL TUNNEL RELEASE performed by Zainab Salazar MD at [address] [facility] [address]  2016    EYE SURGERY      to have Phaco with IOL OU 2018    HERNIA REPAIR      JOINT REPLACEMENT Left     LTHR    JOINT REPLACEMENT Right     RTKR    KNEE SURGERY Right     arthroscopy    MS MANIPULATN KNEE JT+ANESTHESIA Right 2017    RIGHT KNEE MANIPULATION UNDER ANESTHESIA performed by Alicia Shay MD at [address] [facility] OFFICE/OUTPT VISIT,PROCEDURE ONLY Bilateral 2017    RIGHT AND BILATERAL L 4-5 LYSIS OF SCAR DISKECTOMY INTERBODY CAGE FUSION POSTEROLATERAL FUSION PEDICLE SCREWS performed by Zainab Salaazr MD at [address]  2004    benign    [facility] Right 3/24/2017    RIGHT  KNEE TOTAL ARTHROPLASTY, Clementine Morale CEMENTED PERSONA  performed by Alicia Shay MD at [address] [facility] HISTORY    Family History   Problem Relation Age of Onset    Osteoarthritis Mother     Emphysema Mother     Hypertension Father     Hearing Loss Father     Dementia Father     No Known Problems Sister     Prostate Cancer Brother     Colon Polyps Neg Hx        SOCIAL HISTORY    Social History     Tobacco Use    Smoking status: Former     Packs/day: 0.00     Years: 60.00     Pack years: 0.00     Types: Cigarettes     Quit date: 2021     Years since quittin.9    Smokeless tobacco: Never    Tobacco comments:      smokes 2 cigars a day   Vaping Use    Vaping Use: Never used   Substance Use Topics    Alcohol use: Not Currently     Alcohol/week: 12.0 standard drinks     Types: 12 Shots of liquor per week     Comment: socail drinking    Drug use: No       ALLERGIES    Allergies   Allergen Reactions    Morphine        MEDICATIONS    Current Outpatient Medications on File Prior to Encounter   Medication Sig Dispense Refill    cephALEXin (KEFLEX) 500 MG capsule Take 1 capsule by mouth 3 times daily 90 capsule 1    gentamicin (GARAMYCIN) 0.1 % ointment Apply topically  daily. 30 g 1    gentamicin (GARAMYCIN) 0.1 % ointment Apply topically  daily. 30 g 1    oxyCODONE-acetaminophen (PERCOCET) 7.5-325 MG per tablet Take 1 tablet by mouth five times a day as needed for pain      citalopram (CELEXA) 40 MG tablet TAKE ONE TABLET BY MOUTH nightly 30 tablet 5    atorvastatin (LIPITOR) 20 MG tablet TAKE ONE TABLET BY MOUTH DAILY 90 tablet 3    clopidogrel (PLAVIX) 75 MG tablet Take 1 tablet by mouth daily 90 tablet 3    diclofenac (VOLTAREN) 50 MG EC tablet TAKE ONE TABLET BY MOUTH TWO TIMES A DAY 60 tablet 5    isosorbide mononitrate (IMDUR) 30 MG extended release tablet TAKE ONE TABLET BY MOUTH EVERY DAY 90 tablet 3    bumetanide (BUMEX) 2 MG tablet Take 1 tablet by mouth daily 90 tablet 3    metoprolol tartrate (LOPRESSOR) 50 MG tablet Take 0.5 tablets by mouth 2 times daily TAKE ONE TABLET BY MOUTH TWO TIMES A  tablet 3    omeprazole (PRILOSEC) 10 MG delayed release capsule TAKE ONE CAPSULE BY MOUTH DAILY 90 capsule 2    finasteride (PROSCAR) 5 MG tablet Take 1 tablet by mouth daily 90 tablet 3    tiotropium (SPIRIVA RESPIMAT) 2.5 MCG/ACT AERS inhaler Inhale 2 puffs into the lungs daily 1 Inhaler 3    Fluticasone furoate-vilanterol (BREO ELLIPTA) 200-25 MCG/INH AEPB inhaler Inhale 1 puff into the lungs daily 1 each 3    albuterol sulfate  (90 Base) MCG/ACT inhaler Inhale 2 puffs into the lungs every 6 hours as needed for Wheezing 1 Inhaler 3    nitroGLYCERIN (NITROSTAT) 0.4 MG SL tablet Place 1 tablet under the tongue every 5 minutes as needed for Chest pain up to max of 3 total doses. If no relief after 1 dose, call 911. 25 tablet 3    DOCUSATE CALCIUM PO Take by mouth as needed       No current facility-administered medications on file prior to encounter. REVIEW OF SYSTEMS    Pertinent items are noted in HPI.     Objective:      BP (!) 125/57   Pulse 86   Temp 97 °F (36.1 °C) (Temporal)   Resp 18     Wt Readings from Last 3 Encounters:   12/06/22 165 lb (74.8 kg)   10/14/22 166 lb 12.8 oz (75.7 kg)   08/11/22 160 lb (72.6 kg)       PHYSICAL EXAM    Constitutional:   Well nourished and well developed. Appears neat and clean. Patient is alert, oriented x3, and in no apparent distress. Respiratory:  Respiratory effort is easy and symmetric bilaterally. Rate is normal at rest and on room air. Vascular:  Pedal Pulses is palpable and audible with doppler. Capillary refill is <3 sec to digits bilateral.  Extremities positive for 2+ pitting edema. Art duplex reviewed no occlusion noted. Neurological:   Sensation is intact to lower extremities. Dermatological:  Wound description noted in wound assessment. The wound is improved in appearance today. Less slough is noted. Still a lot of drainage. Psychiatric:  Judgement and insight intact. Short and long term memory intact. No evidence of depression, anxiety, or agitation. Patient is calm, cooperative, and communicative. Appropriate interactions and affect. Assessment:      Active Hospital Problems    Diagnosis Date Noted    Acute osteomyelitis of left ankle or foot [facility]) [M86.172] 12/16/2022     Priority: Medium    Edema of left lower leg [R60.0] 10/25/2022     Priority: Medium    Non-pressure chronic ulcer of left ankle with fat layer exposed [facility]) [L97.322] 08/17/2021        Procedure Note  Indications:  Based on my examination of this patient's wound(s)/ulcer(s) today, debridement is notrequired to promote healing and evaluate the wound base. Wound 03/25/22 Ankle Left; Anterior #1 (Active)   Wound Image   12/20/22 1605   Wound Etiology Venous 12/20/22 1605   Wound Cleansed Cleansed with saline 12/13/22 1515   Dressing/Treatment Pharmaceutical agent (see MAR) 11/29/22 1646   Wound Length (cm) 6.6 cm 12/20/22 1605   Wound Width (cm) 6 cm 12/20/22 1605   Wound Depth (cm) 0.3 cm 12/20/22 1605   Wound Surface Area (cm^2) 39.6 cm^2 12/20/22 1605   Change in Wound Size % (l*w) -1137.5 12/20/22 1605   Wound Volume (cm^3) 11.88 cm^3 12/20/22 1605   Wound Healing % -1138 12/20/22 1605   Post-Procedure Length (cm) 3.4 cm 11/01/22 1635   Post-Procedure Width (cm) 2.2 cm 11/01/22 1635   Post-Procedure Depth (cm) 0.2 cm 11/01/22 1635   Post-Procedure Surface Area (cm^2) 7.48 cm^2 11/01/22 1635   Post-Procedure Volume (cm^3) 1.496 cm^3 11/01/22 1635   Wound Assessment Pink/red;Slough 12/20/22 1605   Drainage Amount Large 12/20/22 1605   Drainage Description Yellow;Serous 12/20/22 1605   Odor None 12/20/22 1605   Ela-wound Assessment Fragile; Hyperpigmented 12/20/22 1605   Margins Undefined edges 12/20/22 1605   Wound Thickness Description not for Pressure Injury Full thickness 12/20/22 1605   Number of days: 270       Wound 08/30/22 Ankle Left;Medial #2 (Active)   Wound Image   12/20/22 1605   Wound Etiology Venous 12/20/22 1605   Wound Cleansed Cleansed with saline 12/20/22 1605   Dressing/Treatment Pharmaceutical agent (see MAR) 11/29/22 1646   Wound Length (cm) 1.5 cm 12/20/22 1605   Wound Width (cm) 1.8 cm 12/20/22 1605   Wound Depth (cm) 0.1 cm 12/20/22 1605   Wound Surface Area (cm^2) 2.7 cm^2 12/20/22 1605   Change in Wound Size % (l*w) -38.46 12/20/22 1605   Wound Volume (cm^3) 0.27 cm^3 12/20/22 1605   Wound Healing % 31 12/20/22 1605   Post-Procedure Length (cm) 5 cm 11/01/22 1635   Post-Procedure Width (cm) 3.6 cm 11/01/22 1635   Post-Procedure Depth (cm) 0.15 cm 11/01/22 1635   Post-Procedure Surface Area (cm^2) 18 cm^2 11/01/22 1635   Post-Procedure Volume (cm^3) 2.7 cm^3 11/01/22 1635   Wound Assessment Pink/red 12/20/22 1605   Drainage Amount Moderate 12/20/22 1605   Drainage Description Serous 12/20/22 1605   Odor None 12/20/22 1605   Ela-wound Assessment Fragile; Hyperpigmented 12/20/22 1605   Margins Undefined edges 12/20/22 1605   Wound Thickness Description not for Pressure Injury Full thickness 12/20/22 1605   Number of days: 112       Wound 11/22/22 Ankle Left;Lateral #3 (Active)   Wound Image   12/20/22 1605   Wound Etiology Venous 12/20/22 1605   Wound Cleansed Cleansed with saline 12/20/22 1605   Dressing/Treatment Pharmaceutical agent (see MAR) 11/29/22 1646   Wound Length (cm) 2.5 cm 12/20/22 1605   Wound Width (cm) 2 cm 12/20/22 1605   Wound Depth (cm) 0.15 cm 12/20/22 1605   Wound Surface Area (cm^2) 5 cm^2 12/20/22 1605   Change in Wound Size % (l*w) 64.29 12/20/22 1605   Wound Volume (cm^3) 0.75 cm^3 12/20/22 1605   Wound Healing % 46 12/20/22 1605   Wound Assessment Pink/red;Slough 12/20/22 1605   Drainage Amount Moderate 12/20/22 1605   Drainage Description Serous 12/20/22 1605   Odor None 12/20/22 1605   Ela-wound Assessment Fragile; Hyperpigmented 12/20/22 1605   Margins Undefined edges 12/20/22 1605   Wound Thickness Description not for Pressure Injury Full thickness 12/20/22 1605   Number of days: 28       Plan:     Patient examined   Follow up with ID for Picc line  Gentamicin Mesalt and optilock  Cigar smoking cessation emphasized  Follow up in  2 weeks  NM bone scan reviewed      Treatment Note please see attached Discharge Instructions    Written patient dismissal instructions given to patient and signed by patient or POA.          Discharge [facility]   Physician Orders and Discharge Instructions  [address]  Telephone: [phone]        NAME:  Pallavi Moore OF BIRTH:  1945  MEDICAL RECORD NUMBER:  [id number]     Your  is:  Nicholas Courtney Care/Facility:  [facility]     Wound Location:   LEFT ANTERIOR ANKLE AND LEFT MEDIAL ANKLE  Dressing orders:  Cleanse wound(s) with normal saline. 2.   Apply a thin layer of VASELINE TO WILIAM WOUND EDGE, apply a thin layer of GENTAMICIN to wound bed. 3.   APPLY MESALT TO WOUND BED ONLY. 4. COVER WITH OPTILOCK AND SECURE. 4. Change dressing DAILY. Compression:  NONE     Offloading Device:     Other Instructions:   HOME CARE TO [facility] BILATERAL FEET WITH SOAP AND WATER THEN APPLY AQUAPHOR WITH EACH DRESSING CHANGE,   Keep all dressings clean, dry and intact. Keep pressure off the wound(s) at all times. Follow up visit       2 WEEKS  January 3, 2022 AT         Please give 24 hour notice if unable to keep appointment. [phone]     If you experience any of the following, please call the Wound Care Service at  [phone] or go to the nearest emergency room. *Increase in pain         *Temperature over 101           *Increase in drainage from your wound or a foul odor  *Uncontrolled swelling            *Need for compression bandage changes due to slippage, breakthrough drainage       PLEASE NOTE: IF YOU ARE UNABLE TO OBTAIN WOUND SUPPLIES, CONTINUE TO USE THE SUPPLIES YOU HAVE AVAILABLE UNTIL YOU ARE ABLE TO REACH US.  IT IS MOST IMPORTANT TO KEEP THE WOUND COVERED AT ALL TIMES  Electronically signed by Mariel Laurent DPM on 12/20/2022 at 4:29 PM       Electronically signed by Mariel Laurent DPM on 12/20/2022 at 4:30 PM

## 2022-12-21 ENCOUNTER — HOSPITAL ENCOUNTER (OUTPATIENT)
Dept: INFUSION THERAPY | Age: 77
Setting detail: INFUSION SERIES
End: 2022-12-21

## 2022-12-22 ENCOUNTER — TELEPHONE (OUTPATIENT)
Dept: INFECTIOUS DISEASES | Age: 77
End: 2022-12-22

## 2022-12-22 NOTE — TELEPHONE ENCOUNTER
Patient called again to Request to Change appointment for Picc Line placement to Tues. 12/27/22, when his sister can bring him. He cannot come on Friday, 12/23/22 with granddaughter as he expected. Call to lacey Ayon.

## 2022-12-27 ENCOUNTER — HOSPITAL ENCOUNTER (OUTPATIENT)
Dept: INFUSION THERAPY | Age: 77
Setting detail: INFUSION SERIES
Discharge: HOME | End: 2022-12-27
Payer: MEDICARE

## 2022-12-27 ENCOUNTER — HOSPITAL ENCOUNTER (OUTPATIENT)
Dept: INTERVENTIONAL RADIOLOGY/VASCULAR | Age: 77
Discharge: HOME | End: 2022-12-29
Payer: MEDICARE

## 2022-12-27 ENCOUNTER — HOSPITAL ENCOUNTER (OUTPATIENT)
Dept: WOUND CARE | Age: 77
Discharge: HOME | End: 2022-12-27
Payer: MEDICARE

## 2022-12-27 VITALS
DIASTOLIC BLOOD PRESSURE: 76 MMHG | HEART RATE: 68 BPM | TEMPERATURE: 98.4 F | RESPIRATION RATE: 18 BRPM | SYSTOLIC BLOOD PRESSURE: 158 MMHG

## 2022-12-27 VITALS
TEMPERATURE: 97.3 F | HEART RATE: 74 BPM | DIASTOLIC BLOOD PRESSURE: 93 MMHG | SYSTOLIC BLOOD PRESSURE: 177 MMHG | RESPIRATION RATE: 18 BRPM

## 2022-12-27 DIAGNOSIS — M86.172 OTHER ACUTE OSTEOMYELITIS OF LEFT ANKLE (HCC): ICD-10-CM

## 2022-12-27 DIAGNOSIS — L97.322 NON-PRESSURE CHRONIC ULCER OF LEFT ANKLE WITH FAT LAYER EXPOSED (HCC): Primary | ICD-10-CM

## 2022-12-27 DIAGNOSIS — M86.172 ACUTE OSTEOMYELITIS OF LEFT ANKLE OR FOOT (HCC): Primary | ICD-10-CM

## 2022-12-27 PROCEDURE — 6360000002 HC RX W HCPCS: Performed by: INTERNAL MEDICINE

## 2022-12-27 PROCEDURE — 2580000003 HC RX 258: Performed by: INTERNAL MEDICINE

## 2022-12-27 PROCEDURE — 2709999900 IR PICC WO SQ PORT/PUMP > 5 YEARS

## 2022-12-27 PROCEDURE — 2500000003 HC RX 250 WO HCPCS: Performed by: INTERNAL MEDICINE

## 2022-12-27 PROCEDURE — 36573 INSJ PICC RS&I 5 YR+: CPT

## 2022-12-27 PROCEDURE — 6370000000 HC RX 637 (ALT 250 FOR IP): Performed by: PODIATRIST

## 2022-12-27 PROCEDURE — 99213 OFFICE O/P EST LOW 20 MIN: CPT

## 2022-12-27 PROCEDURE — 96365 THER/PROPH/DIAG IV INF INIT: CPT

## 2022-12-27 RX ORDER — BACITRACIN, NEOMYCIN, POLYMYXIN B 400; 3.5; 5 [USP'U]/G; MG/G; [USP'U]/G
OINTMENT TOPICAL ONCE
OUTPATIENT
Start: 2022-12-27 | End: 2022-12-27

## 2022-12-27 RX ORDER — SODIUM CHLORIDE 0.9 % (FLUSH) 0.9 %
5-40 SYRINGE (ML) INJECTION EVERY 12 HOURS SCHEDULED
Status: DISCONTINUED | OUTPATIENT
Start: 2022-12-27 | End: 2022-12-30 | Stop reason: HOSPADM

## 2022-12-27 RX ORDER — SODIUM CHLORIDE 9 MG/ML
INJECTION, SOLUTION INTRAVENOUS PRN
Status: DISCONTINUED | OUTPATIENT
Start: 2022-12-27 | End: 2022-12-30 | Stop reason: HOSPADM

## 2022-12-27 RX ORDER — LIDOCAINE 40 MG/G
CREAM TOPICAL ONCE
OUTPATIENT
Start: 2022-12-27 | End: 2022-12-27

## 2022-12-27 RX ORDER — LIDOCAINE HYDROCHLORIDE 40 MG/ML
SOLUTION TOPICAL ONCE
OUTPATIENT
Start: 2022-12-27 | End: 2022-12-27

## 2022-12-27 RX ORDER — LIDOCAINE HYDROCHLORIDE 20 MG/ML
5 INJECTION, SOLUTION INFILTRATION; PERINEURAL ONCE
Status: COMPLETED | OUTPATIENT
Start: 2022-12-27 | End: 2022-12-27

## 2022-12-27 RX ORDER — SODIUM CHLORIDE 9 MG/ML
250 INJECTION, SOLUTION INTRAVENOUS ONCE
Status: COMPLETED | OUTPATIENT
Start: 2022-12-27 | End: 2022-12-27

## 2022-12-27 RX ORDER — LIDOCAINE HYDROCHLORIDE 20 MG/ML
JELLY TOPICAL ONCE
OUTPATIENT
Start: 2022-12-27 | End: 2022-12-27

## 2022-12-27 RX ORDER — BACITRACIN ZINC AND POLYMYXIN B SULFATE 500; 1000 [USP'U]/G; [USP'U]/G
OINTMENT TOPICAL ONCE
OUTPATIENT
Start: 2022-12-27 | End: 2022-12-27

## 2022-12-27 RX ORDER — LIDOCAINE 50 MG/G
OINTMENT TOPICAL ONCE
OUTPATIENT
Start: 2022-12-27 | End: 2022-12-27

## 2022-12-27 RX ORDER — LIDOCAINE HYDROCHLORIDE 20 MG/ML
JELLY TOPICAL ONCE
Status: COMPLETED | OUTPATIENT
Start: 2022-12-27 | End: 2022-12-27

## 2022-12-27 RX ORDER — CLOBETASOL PROPIONATE 0.5 MG/G
OINTMENT TOPICAL ONCE
OUTPATIENT
Start: 2022-12-27 | End: 2022-12-27

## 2022-12-27 RX ORDER — BETAMETHASONE DIPROPIONATE 0.05 %
OINTMENT (GRAM) TOPICAL ONCE
OUTPATIENT
Start: 2022-12-27 | End: 2022-12-27

## 2022-12-27 RX ORDER — SODIUM CHLORIDE 0.9 % (FLUSH) 0.9 %
5-40 SYRINGE (ML) INJECTION PRN
Status: DISCONTINUED | OUTPATIENT
Start: 2022-12-27 | End: 2022-12-30 | Stop reason: HOSPADM

## 2022-12-27 RX ORDER — GENTAMICIN SULFATE 1 MG/G
OINTMENT TOPICAL ONCE
OUTPATIENT
Start: 2022-12-27 | End: 2022-12-27

## 2022-12-27 RX ORDER — GINSENG 100 MG
CAPSULE ORAL ONCE
OUTPATIENT
Start: 2022-12-27 | End: 2022-12-27

## 2022-12-27 RX ADMIN — LIDOCAINE HYDROCHLORIDE 5 ML: 20 INJECTION, SOLUTION INFILTRATION; PERINEURAL at 12:59

## 2022-12-27 RX ADMIN — ERTAPENEM 1000 MG: 1 INJECTION INTRAMUSCULAR; INTRAVENOUS at 14:19

## 2022-12-27 RX ADMIN — SODIUM CHLORIDE 250 ML: 9 INJECTION, SOLUTION INTRAVENOUS at 13:01

## 2022-12-27 RX ADMIN — LIDOCAINE HYDROCHLORIDE: 20 JELLY TOPICAL at 15:55

## 2022-12-27 NOTE — FLOWSHEET NOTE
Patient arrived to unit for Invanz infusion via walker and family member present. Blood pressure slightly elevated upon arrivalat 1345. Patient stated normal is 160's and he had just had PICC line placed and walked to our center. Written/verbal education given on medication and how to use PICC line to patient and family member. Patient and family member verbalized understanding at this time. Infusion started. Patient tolerating well. AVS printed. Repeat blood pressure check at 1430 shows lower blood pressure reading that is more normal per patient. Call light within reach.

## 2022-12-27 NOTE — DISCHARGE INSTRUCTIONS
Peripherally Inserted Central Catheter (PICC): Care Instructions  Your Care Instructions    Remember to carry your card for PICC. It is proof that the PICC line can be safely used for radiology procedures. A peripherally inserted central catheter (PICC) is a soft, flexible tube that runs under your skin from a vein in your arm to a large vein near your heart. One end of the catheter stays outside your body. It is a type of central venous catheter, or central venous line. You may have it for weeks or months. A PICC is used to give you medicine, blood products, nutrients, or fluids. A PICC makes doing these things more comfortable for you because they are put directly into the catheter. So you will not be stuck with a needle every time. A PICC also can be used to draw blood for tests. The end of the PICC sometimes has two or three openings so that you can get more than one type of fluid or medicine at a time. Your doctor may give you medicine to make you feel relaxed. You may feel a little pain when your doctor numbs your arm. Your doctor will then thread the catheter up a vein in your arm to a larger vein. You will not feel any pain. The doctor may use stitches or other devices to hold the catheter in place where it exits your arm. After the procedure, the site may be sore for a day or two. Follow-up care is a key part of your treatment and safety. Be sure to make and go to all appointments, and call your doctor if you are having problems. It's also a good idea to know your test results and keep a list of the medicines you take. How can you care for yourself at home? Do not lift anything heavier than 10-15lbs with affected arm. Do not perform any vigorous activity that involves affected arm; ie jumping jacks, push-ups, burpees etc.  Do not wear jewelry, such as necklaces, that can catch on the catheter. If the catheter breaks, follow the instructions your doctor gave you.  If you have no instructions, clamp or tie off the catheter. Then see a doctor as soon as possible. To help prevent infection, take a shower instead of a bath. Do not go swimming with the catheter. Try to keep the area dry. When you shower, cover the area with waterproof material, such as plastic wrap. Never touch the open end of the catheter if the cap is off. Never use scissors, knives, pins, or other sharp objects near the catheter or other tubing. If your catheter has a clamp, keep it clamped when you are not using it. Fasten or tape the catheter to your body to prevent pulling or dangling. Avoid clothing that rubs or pulls on your catheter. Avoid bending or crimping your catheter. Always wash your hands before you touch your catheter. Wear loose clothing over the catheter for the first 10 to 14 days. When getting dressed, be careful not to pull on the catheter. Dressing Changes  Your PICC dressing should be changed at least once a week. If the dressing becomes loose, wet, or dirty, it must be changed more often to prevent infection. Since the PICC is in one of your arms, you will not be able to change the dressing on your own. Your healthcare provider will have the required supplies needed to change your PICC dressing. These include medical tape, a surgical mask, sterile gloves, and your dressing kit. When should you call for help? Call 911 anytime you think you may need emergency care. For example, call if:    You passed out (lost consciousness). You have severe trouble breathing. You have sudden chest pain and shortness of breath, or you cough up blood. You have a fast or uneven pulse. Call your doctor now or seek immediate medical care if:    You have signs of infection, such as: Increased pain, swelling, warmth, or redness. Red streaks leading from the area. Pus or blood draining from the area. A fever. You have swelling in your face, chest, neck, or arm on the side where the catheter is. You have signs of a blood clot, such as bulging veins near the catheter. Your catheter is leaking, cracked, or clogged. You feel resistance when you inject medicine or fluids into your catheter. Your catheter is out of place. This may happen after severe coughing or vomiting, or if you pull on the catheter. You have chest pain or shortness of breath. Watch closely for changes in your health, and be sure to contact your doctor if:    You have any concerns about your catheter. Where can you learn more? Go to [email or web address]. org and sign in to your [employer] account. Enter C122 in the [employer] box to learn more about \"Peripherally Inserted Central Catheter (PICC): Care Instructions. \"     Current as of: September 23, 2018  Content Version: 12.0  © 8156-6888 [employer]. Care instructions adapted under license by [facility] ([facility]). If you have questions about a medical condition or this instruction, always ask your healthcare professional. [place] [address] any warranty or liability for your use of this information.

## 2022-12-27 NOTE — PROGRESS NOTES
[facility] [address]                                                   Progress Note and Procedure Note      Tiffany Genao RECORD NUMBER:  [id number]  AGE: 76 y.o. GENDER: male  : 1945  EPISODE DATE:  2022    Subjective:     Chief Complaint   Patient presents with    Wound Check     Left ankle         HISTORY of PRESENT ILLNESS HPI      Jason Camacho is a 76 y.o. male who presents today for wound/ulcer evaluation. History of Wound Context: Patient presents for a reoccurrence of an non healing ulcer of the left ankle. The patient received his picc line today and now had his first infusion today. He states the [place] is very painful and burning him. Wound/Ulcer Pain Timing/Severity: intermittent  Quality of pain: tender  Severity:   / 10   Modifying Factors: None  Associated Signs/Symptoms: drainage, odor and pain    Ulcer Identification:  Ulcer Type: venous and undetermined  Contributing Factors: edema and venous stasis    Wound: N/A        PAST MEDICAL HISTORY        Diagnosis Date    Alcohol abuse     quit drinking 21    CAD (coronary artery disease)     patient states no doctor has told him this / has 1 cardiac stent    Cancer ([address]     lung LLL    Chronic back pain     Chronic kidney disease     Chronic obstructive pulmonary disease, unspecified COPD type ([address] 3/24/2022    Chronic sinusitis     DJD (degenerative joint disease) of knee     left knee DJD    Elevated PSA     History of blood transfusion     History of inferior wall myocardial infarction 2016    1 cardiac stent    HTN (hypertension)     meds .  1 yr    Hyperlipidemia     meds > 12 yrs    NSTEMI (non-ST elevated myocardial infarction) ([address]     Smoker        PAST SURGICAL HISTORY    Past Surgical History:   Procedure Laterality Date    ABDOMEN SURGERY      spleenectomy due to [address]      lumbar disc OR    CARDIAC SURGERY      stents    CARPAL TUNNEL RELEASE Right 2019    RIGHT CARPAL TUNNEL RELEASE performed by Ladonna Grigsby MD at [address]  2016    EYE SURGERY      to have Phaco with IOL OU 2018    HERNIA REPAIR      JOINT REPLACEMENT Left     LTHR    JOINT REPLACEMENT Right     RTKR    KNEE SURGERY Right     arthroscopy    NC MANIPULATN KNEE JT+ANESTHESIA Right 2017    RIGHT KNEE MANIPULATION UNDER ANESTHESIA performed by Arash Chauhan MD at [address] OFFICE/OUTPT VISIT,PROCEDURE ONLY Bilateral 2017    RIGHT AND BILATERAL L 4-5 LYSIS OF SCAR DISKECTOMY INTERBODY CAGE FUSION POSTEROLATERAL FUSION PEDICLE SCREWS performed by Ladonna Grigsby MD at [facility]      benign    [address]  Right 3/24/2017    RIGHT  KNEE TOTAL ARTHROPLASTY, [facility] Gen[facility] CEMENTED PERSONA  performed by Arash Chauhan MD at [address] [facility] HISTORY    Family History   Problem Relation Age of Onset    Osteoarthritis Mother     Emphysema Mother     Hypertension Father     Hearing Loss Father     Dementia Father     No Known Problems Sister     Prostate Cancer Brother     Colon Polyps Neg Hx        SOCIAL HISTORY    Social History     Tobacco Use    Smoking status: Former     Packs/day: 0.00     Years: 60.00     Pack years: 0.00     Types: Cigarettes     Quit date: 2021     Years since quittin.9    Smokeless tobacco: Never    Tobacco comments:      smokes 2 cigars a day   Vaping Use    Vaping Use: Never used   Substance Use Topics    Alcohol use: Not Currently     Alcohol/week: 12.0 standard drinks     Types: 12 Shots of liquor per week     Comment: socail drinking    Drug use: No       ALLERGIES    Allergies   Allergen Reactions    Morphine        MEDICATIONS    Current Outpatient Medications on File Prior to Encounter   Medication Sig Dispense Refill    cephALEXin (KEFLEX) 500 MG capsule Take 1 capsule by mouth 3 times daily 90 capsule 1    gentamicin (GARAMYCIN) 0.1 % ointment Apply topically  daily. 30 g 1    gentamicin (GARAMYCIN) 0.1 % ointment Apply topically  daily. 30 g 1    oxyCODONE-acetaminophen (PERCOCET) 7.5-325 MG per tablet Take 1 tablet by mouth five times a day as needed for pain      citalopram (CELEXA) 40 MG tablet TAKE ONE TABLET BY MOUTH nightly 30 tablet 5    atorvastatin (LIPITOR) 20 MG tablet TAKE ONE TABLET BY MOUTH DAILY 90 tablet 3    clopidogrel (PLAVIX) 75 MG tablet Take 1 tablet by mouth daily 90 tablet 3    diclofenac (VOLTAREN) 50 MG EC tablet TAKE ONE TABLET BY MOUTH TWO TIMES A DAY 60 tablet 5    isosorbide mononitrate (IMDUR) 30 MG extended release tablet TAKE ONE TABLET BY MOUTH EVERY DAY 90 tablet 3    bumetanide (BUMEX) 2 MG tablet Take 1 tablet by mouth daily 90 tablet 3    metoprolol tartrate (LOPRESSOR) 50 MG tablet Take 0.5 tablets by mouth 2 times daily TAKE ONE TABLET BY MOUTH TWO TIMES A  tablet 3    omeprazole (PRILOSEC) 10 MG delayed release capsule TAKE ONE CAPSULE BY MOUTH DAILY 90 capsule 2    finasteride (PROSCAR) 5 MG tablet Take 1 tablet by mouth daily 90 tablet 3    tiotropium (SPIRIVA RESPIMAT) 2.5 MCG/ACT AERS inhaler Inhale 2 puffs into the lungs daily 1 Inhaler 3    Fluticasone furoate-vilanterol (BREO ELLIPTA) 200-25 MCG/INH AEPB inhaler Inhale 1 puff into the lungs daily 1 each 3    albuterol sulfate  (90 Base) MCG/ACT inhaler Inhale 2 puffs into the lungs every 6 hours as needed for Wheezing 1 Inhaler 3    nitroGLYCERIN (NITROSTAT) 0.4 MG SL tablet Place 1 tablet under the tongue every 5 minutes as needed for Chest pain up to max of 3 total doses.  If no relief after 1 dose, call 911. 25 tablet 3    DOCUSATE CALCIUM PO Take by mouth as needed       Current Facility-Administered Medications on File Prior to Encounter   Medication Dose Route Frequency Provider Last Rate Last Admin    sodium chloride flush 0.9 % injection 5-40 mL  5-40 mL IntraVENous 2 times per day Alicia Jackson MD        sodium chloride flush 0.9 % injection 5-40 mL  5-40 mL IntraVENous PRN Angela Oviedo MD        0.9 % sodium chloride infusion   IntraVENous PRN Angela Oviedo MD           REVIEW OF SYSTEMS    Pertinent items are noted in HPI. Objective:      BP (!) 177/93   Pulse 74   Temp 97.3 °F (36.3 °C) (Temporal)   Resp 18     Wt Readings from Last 3 Encounters:   12/06/22 165 lb (74.8 kg)   10/14/22 166 lb 12.8 oz (75.7 kg)   08/11/22 160 lb (72.6 kg)       PHYSICAL EXAM    Constitutional:   Well nourished and well developed. Appears neat and clean. Patient is alert, oriented x3, and in no apparent distress. Respiratory:  Respiratory effort is easy and symmetric bilaterally. Rate is normal at rest and on room air. Vascular:  Pedal Pulses is palpable and audible with doppler. Capillary refill is <3 sec to digits bilateral.  Extremities positive for 2+ pitting edema. Art duplex reviewed no occlusion noted. Neurological:   Sensation is intact to lower extremities. Dermatological:  Wound description noted in wound assessment. The wound is improved in appearance again today. Less slough is noted. Still a large amount of drainage. Psychiatric:  Judgement and insight intact. Short and long term memory intact. No evidence of depression, anxiety, or agitation. Patient is calm, cooperative, and communicative. Appropriate interactions and affect. Assessment:      Active Hospital Problems    Diagnosis Date Noted    Acute osteomyelitis of left ankle or foot [facility]) [M86.172] 12/16/2022     Priority: Medium    Edema of left lower leg [R60.0] 10/25/2022     Priority: Medium    Non-pressure chronic ulcer of left ankle with fat layer exposed [facility]) [L97.322] 08/17/2021        Procedure Note  Indications:  Based on my examination of this patient's wound(s)/ulcer(s) today, debridement is notrequired to promote healing and evaluate the wound base. Wound 03/25/22 Ankle Left; Anterior #1 (Active)   Wound Image   12/20/22 6694 Wound Etiology Venous 12/20/22 1605   Wound Cleansed Cleansed with saline 12/13/22 1515   Dressing/Treatment Pharmaceutical agent (see MAR) 11/29/22 1646   Wound Length (cm) 6.6 cm 12/20/22 1605   Wound Width (cm) 6 cm 12/20/22 1605   Wound Depth (cm) 0.3 cm 12/20/22 1605   Wound Surface Area (cm^2) 39.6 cm^2 12/20/22 1605   Change in Wound Size % (l*w) -1137.5 12/20/22 1605   Wound Volume (cm^3) 11.88 cm^3 12/20/22 1605   Wound Healing % -1138 12/20/22 1605   Post-Procedure Length (cm) 3.4 cm 11/01/22 1635   Post-Procedure Width (cm) 2.2 cm 11/01/22 1635   Post-Procedure Depth (cm) 0.2 cm 11/01/22 1635   Post-Procedure Surface Area (cm^2) 7.48 cm^2 11/01/22 1635   Post-Procedure Volume (cm^3) 1.496 cm^3 11/01/22 1635   Wound Assessment Pink/red;Slough 12/20/22 1605   Drainage Amount Large 12/20/22 1605   Drainage Description Yellow;Serous 12/20/22 1605   Odor None 12/20/22 1605   Ela-wound Assessment Fragile; Hyperpigmented 12/20/22 1605   Margins Undefined edges 12/20/22 1605   Wound Thickness Description not for Pressure Injury Full thickness 12/20/22 1605   Number of days: 270       Wound 08/30/22 Ankle Left;Medial #2 (Active)   Wound Image   12/20/22 1605   Wound Etiology Venous 12/20/22 1605   Wound Cleansed Cleansed with saline 12/20/22 1605   Dressing/Treatment Pharmaceutical agent (see MAR) 11/29/22 1646   Wound Length (cm) 1.5 cm 12/20/22 1605   Wound Width (cm) 1.8 cm 12/20/22 1605   Wound Depth (cm) 0.1 cm 12/20/22 1605   Wound Surface Area (cm^2) 2.7 cm^2 12/20/22 1605   Change in Wound Size % (l*w) -38.46 12/20/22 1605   Wound Volume (cm^3) 0.27 cm^3 12/20/22 1605   Wound Healing % 31 12/20/22 1605   Post-Procedure Length (cm) 5 cm 11/01/22 1635   Post-Procedure Width (cm) 3.6 cm 11/01/22 1635   Post-Procedure Depth (cm) 0.15 cm 11/01/22 1635   Post-Procedure Surface Area (cm^2) 18 cm^2 11/01/22 1635   Post-Procedure Volume (cm^3) 2.7 cm^3 11/01/22 1635   Wound Assessment Pink/red 12/20/22 1605 Drainage Amount Moderate 12/20/22 1605   Drainage Description Serous 12/20/22 1605   Odor None 12/20/22 1605   Ela-wound Assessment Fragile; Hyperpigmented 12/20/22 1605   Margins Undefined edges 12/20/22 1605   Wound Thickness Description not for Pressure Injury Full thickness 12/20/22 1605   Number of days: 112       Wound 11/22/22 Ankle Left;Lateral #3 (Active)   Wound Image   12/20/22 1605   Wound Etiology Venous 12/20/22 1605   Wound Cleansed Cleansed with saline 12/20/22 1605   Dressing/Treatment Pharmaceutical agent (see MAR) 11/29/22 1646   Wound Length (cm) 2.5 cm 12/20/22 1605   Wound Width (cm) 2 cm 12/20/22 1605   Wound Depth (cm) 0.15 cm 12/20/22 1605   Wound Surface Area (cm^2) 5 cm^2 12/20/22 1605   Change in Wound Size % (l*w) 64.29 12/20/22 1605   Wound Volume (cm^3) 0.75 cm^3 12/20/22 1605   Wound Healing % 46 12/20/22 1605   Wound Assessment Pink/red;Slough 12/20/22 1605   Drainage Amount Moderate 12/20/22 1605   Drainage Description Serous 12/20/22 1605   Odor None 12/20/22 1605   Ela-wound Assessment Fragile; Hyperpigmented 12/20/22 1605   Margins Undefined edges 12/20/22 1605   Wound Thickness Description not for Pressure Injury Full thickness 12/20/22 1605   Number of days: 28       Plan:     Patient examined   Follow up with JONAH Wilson smoking cessation emphasized  Follow up in  2 weeks        Treatment Note please see attached Discharge Instructions    Written patient dismissal instructions given to patient and signed by patient or POA.          Discharge [address] [facility]   Physician Orders and Discharge Instructions  [address]  Telephone: [phone]        NAME:  Moris Port OF BIRTH:  1945  MEDICAL RECORD NUMBER:  [id number] Your  is:  [address] Care/Facility:  [facility] CARE     Wound Location:   LEFT ANTERIOR ANKLE AND LEFT MEDIAL ANKLE  Dressing orders:  Cleanse wound(s) with normal saline. 2.  Apply THERAHONEY HD SHEET - EXTRA GIVEN TO PATIENT. 4. COVER WITH OPTILOCK AND SECURE. 4. Change dressing DAILY. Compression:  NONE     Offloading Device:     Other Instructions:   HOME CARE TO [address] BILATERAL FEET WITH SOAP AND WATER THEN APPLY AQUAPHOR WITH EACH DRESSING CHANGE,   Keep all dressings clean, dry and intact. Keep pressure off the wound(s) at all times. Follow up visit       2 WEEKS  January 16, 2022 AT         Please give 24 hour notice if unable to keep appointment. [phone]     If you experience any of the following, please call the Wound Care Service at  [phone] or go to the nearest emergency room. *Increase in pain         *Temperature over 101           *Increase in drainage from your wound or a foul odor  *Uncontrolled swelling            *Need for compression bandage changes due to slippage, breakthrough drainage       PLEASE NOTE: IF YOU ARE UNABLE TO OBTAIN WOUND SUPPLIES, CONTINUE TO USE THE SUPPLIES YOU HAVE AVAILABLE UNTIL YOU ARE ABLE TO REACH US.  IT IS MOST IMPORTANT TO KEEP THE WOUND COVERED AT ALL TIMES  Electronically signed by Mary Baeza DPM on 12/27/2022 at 4:31 PM       Electronically signed by Mary Baeza DPM on 12/27/2022 at 5:02 PM

## 2022-12-27 NOTE — FLOWSHEET NOTE
Observation complete. Patient tolerated well. Patient left ambulatory with walker and family member. All equipment used in the care for this patient has been cleaned.

## 2022-12-27 NOTE — CODE DOCUMENTATION
[id number] Physician Billing Sheet. Mariely Cruz  AGE: 76 y.o.    GENDER: male  : 1945  TODAY'S DATE:  2022    ICD-10  [facility] Street Problems    Diagnosis Date Noted    Acute osteomyelitis of left ankle or foot [facility]) [M86.172] 2022     Priority: Medium    Edema of left lower leg [R60.0] 10/25/2022     Priority: Medium    Non-pressure chronic ulcer of left ankle with fat layer exposed [facility]) [L97.322] 2021       PHYSICIAN PROCEDURES    CPT CODE  30888      Electronically signed by Hussain Duvall DPM on 2022 at 4:33 PM

## 2022-12-29 ENCOUNTER — TELEPHONE (OUTPATIENT)
Dept: INFECTIOUS DISEASES | Age: 77
End: 2022-12-29

## 2022-12-29 NOTE — TELEPHONE ENCOUNTER
[facility] AT [facility] Nurse advised the patient will Not have Delivery of IV Abx and supplies until 6pm today. (12/29/22) Request to start IV Abx teaching in the home for tomorrow. Patient had Picc placed on Tues. 12/27/22 and First Dose of IV Invanz. Patient was at his [address] [place] [address],[address] appt and could not receive meds until today. Will Update ID physician. Of Note, Nurse Jack Blanton states a visit is made Daily to assist patient with wound care and dressings.

## 2023-01-03 DIAGNOSIS — Z76.0 MEDICATION REFILL: ICD-10-CM

## 2023-01-03 NOTE — TELEPHONE ENCOUNTER
Comments: Pt states that he is out. Last Office Visit (last PCP visit):   3/24/2022    Next Visit Date:  Future Appointments   Date Time Provider Jeimy Jerry   1/6/2023 11:15 AM TAMIKA Kraus 47[address]   1/19/2023  1:30 PM Aram Lundy MD [address]   2/17/2023  2:15 PM Emanuel Austin MD [facility]   8/14/2023  1:30 PM Chai Forman DO [facility] [place]       **If hasn't been seen in over a year OR hasn't followed up according to last diabetes/ADHD visit, make appointment for patient before sending refill to provider.     Rx requested:  Requested Prescriptions     Pending Prescriptions Disp Refills    diclofenac (VOLTAREN) 50 MG EC tablet 60 tablet 5     Sig: TAKE ONE TABLET BY MOUTH TWO TIMES A DAY

## 2023-01-06 ENCOUNTER — HOSPITAL ENCOUNTER (OUTPATIENT)
Dept: WOUND CARE | Age: 78
Discharge: HOME | End: 2023-01-06
Payer: MEDICARE

## 2023-01-06 VITALS
DIASTOLIC BLOOD PRESSURE: 73 MMHG | TEMPERATURE: 97.3 F | RESPIRATION RATE: 18 BRPM | HEART RATE: 85 BPM | SYSTOLIC BLOOD PRESSURE: 133 MMHG

## 2023-01-06 DIAGNOSIS — L97.322 NON-PRESSURE CHRONIC ULCER OF LEFT ANKLE WITH FAT LAYER EXPOSED (HCC): Primary | ICD-10-CM

## 2023-01-06 PROCEDURE — 99213 OFFICE O/P EST LOW 20 MIN: CPT

## 2023-01-06 PROCEDURE — 6370000000 HC RX 637 (ALT 250 FOR IP): Performed by: PODIATRIST

## 2023-01-06 RX ORDER — LIDOCAINE HYDROCHLORIDE 40 MG/ML
SOLUTION TOPICAL ONCE
OUTPATIENT
Start: 2023-01-06 | End: 2023-01-06

## 2023-01-06 RX ORDER — LIDOCAINE HYDROCHLORIDE 20 MG/ML
JELLY TOPICAL ONCE
Status: COMPLETED | OUTPATIENT
Start: 2023-01-06 | End: 2023-01-06

## 2023-01-06 RX ORDER — LIDOCAINE HYDROCHLORIDE 20 MG/ML
JELLY TOPICAL ONCE
OUTPATIENT
Start: 2023-01-06 | End: 2023-01-06

## 2023-01-06 RX ORDER — LIDOCAINE 50 MG/G
OINTMENT TOPICAL ONCE
OUTPATIENT
Start: 2023-01-06 | End: 2023-01-06

## 2023-01-06 RX ORDER — CLOBETASOL PROPIONATE 0.5 MG/G
OINTMENT TOPICAL ONCE
OUTPATIENT
Start: 2023-01-06 | End: 2023-01-06

## 2023-01-06 RX ORDER — BACITRACIN, NEOMYCIN, POLYMYXIN B 400; 3.5; 5 [USP'U]/G; MG/G; [USP'U]/G
OINTMENT TOPICAL ONCE
OUTPATIENT
Start: 2023-01-06 | End: 2023-01-06

## 2023-01-06 RX ORDER — BACITRACIN ZINC AND POLYMYXIN B SULFATE 500; 1000 [USP'U]/G; [USP'U]/G
OINTMENT TOPICAL ONCE
OUTPATIENT
Start: 2023-01-06 | End: 2023-01-06

## 2023-01-06 RX ORDER — BETAMETHASONE DIPROPIONATE 0.05 %
OINTMENT (GRAM) TOPICAL ONCE
OUTPATIENT
Start: 2023-01-06 | End: 2023-01-06

## 2023-01-06 RX ORDER — GINSENG 100 MG
CAPSULE ORAL ONCE
OUTPATIENT
Start: 2023-01-06 | End: 2023-01-06

## 2023-01-06 RX ORDER — GENTAMICIN SULFATE 1 MG/G
OINTMENT TOPICAL ONCE
OUTPATIENT
Start: 2023-01-06 | End: 2023-01-06

## 2023-01-06 RX ORDER — LIDOCAINE 40 MG/G
CREAM TOPICAL ONCE
OUTPATIENT
Start: 2023-01-06 | End: 2023-01-06

## 2023-01-06 RX ADMIN — LIDOCAINE HYDROCHLORIDE: 20 JELLY TOPICAL at 11:44

## 2023-01-06 ASSESSMENT — PAIN SCALES - GENERAL: PAINLEVEL_OUTOF10: 5

## 2023-01-06 ASSESSMENT — PAIN DESCRIPTION - ORIENTATION: ORIENTATION: LEFT

## 2023-01-06 ASSESSMENT — PAIN DESCRIPTION - DESCRIPTORS: DESCRIPTORS: ACHING

## 2023-01-06 ASSESSMENT — PAIN DESCRIPTION - LOCATION: LOCATION: LEG

## 2023-01-06 ASSESSMENT — PAIN DESCRIPTION - PAIN TYPE: TYPE: CHRONIC PAIN

## 2023-01-06 NOTE — PROGRESS NOTES
Ewa Cervantes [address]                                                   Progress Note and Procedure Note      Tolu Chu RECORD NUMBER:  [id number]  AGE: 76 y.o. GENDER: male  : 1945  EPISODE DATE:  2023    Subjective:     Chief Complaint   Patient presents with    Wound Check     Left ankle x 2         HISTORY of PRESENT ILLNESS HPI      America Benedict is a 76 y.o. male who presents today for wound/ulcer evaluation. History of Wound Context: Patient presents for a reoccurrence of an non healing ulcer of the left ankle. The patient received his picc line today and now had his first infusion today. He states the [place] is very painful and burning him. Wound/Ulcer Pain Timing/Severity: intermittent  Quality of pain: tender  Severity:   10   Modifying Factors: None  Associated Signs/Symptoms: drainage, odor and pain    Ulcer Identification:  Ulcer Type: venous and undetermined  Contributing Factors: edema and venous stasis    Wound: N/A        PAST MEDICAL HISTORY        Diagnosis Date    Alcohol abuse     quit drinking 21    CAD (coronary artery disease)     patient states no doctor has told him this / has 1 cardiac stent    Cancer ([address]     lung LLL    Chronic back pain     Chronic kidney disease     Chronic obstructive pulmonary disease, unspecified COPD type ([address] 3/24/2022    Chronic sinusitis     DJD (degenerative joint disease) of knee     left knee DJD    Elevated PSA     History of blood transfusion     History of inferior wall myocardial infarction 2016    1 cardiac stent    HTN (hypertension)     meds .  1 yr    Hyperlipidemia     meds > 12 yrs    NSTEMI (non-ST elevated myocardial infarction) ([address]     Smoker        PAST SURGICAL HISTORY    Past Surgical History:   Procedure Laterality Date    ABDOMEN SURGERY      spleenectomy due to [address]      lumbar disc OR    CARDIAC SURGERY      stents    CARPAL TUNNEL RELEASE Right 2019 RIGHT CARPAL TUNNEL RELEASE performed by Juve Groves MD at [address] [facility] [address]  2016    EYE SURGERY      to have Phaco with IOL OU 2018    HERNIA REPAIR      JOINT REPLACEMENT Left     [facility]    JOINT REPLACEMENT Right     RTKR    KNEE SURGERY Right     arthroscopy    NM MANIPULATION KNEE JOINT UNDER GENERAL ANESTHESIA Right 2017    RIGHT KNEE MANIPULATION UNDER ANESTHESIA performed by Oriana Bailey MD at [address] [facility] OFFICE/OUTPT VISIT,PROCEDURE ONLY Bilateral 2017    RIGHT AND BILATERAL L 4-5 LYSIS OF SCAR DISKECTOMY INTERBODY CAGE FUSION POSTEROLATERAL FUSION PEDICLE SCREWS performed by Juve Groves MD at [address]  2004    benign    [facility] Right 3/24/2017    RIGHT  KNEE TOTAL ARTHROPLASTY, Zaynab Phlegm CEMENTED PERSONA  performed by Oriana Bailey MD at [facility] HISTORY    Family History   Problem Relation Age of Onset    Osteoarthritis Mother     Emphysema Mother     Hypertension Father     Hearing Loss Father     Dementia Father     No Known Problems Sister     Prostate Cancer Brother     Colon Polyps Neg Hx        SOCIAL HISTORY    Social History     Tobacco Use    Smoking status: Former     Packs/day: 0.00     Years: 60.00     Pack years: 0.00     Types: Cigarettes     Quit date: 2021     Years since quittin.0    Smokeless tobacco: Never    Tobacco comments:      smokes 2 cigars a day   Vaping Use    Vaping Use: Never used   Substance Use Topics    Alcohol use: Not Currently     Alcohol/week: 12.0 standard drinks     Types: 12 Shots of liquor per week     Comment: socail drinking    Drug use: No       ALLERGIES    Allergies   Allergen Reactions    Morphine        MEDICATIONS    Current Outpatient Medications on File Prior to Encounter   Medication Sig Dispense Refill    diclofenac (VOLTAREN) 50 MG EC tablet TAKE ONE TABLET BY MOUTH TWO TIMES A DAY 60 tablet 5    gentamicin (GARAMYCIN) 0.1 % ointment Apply topically  daily. 30 g 1    gentamicin (GARAMYCIN) 0.1 % ointment Apply topically  daily. 30 g 1    oxyCODONE-acetaminophen (PERCOCET) 7.5-325 MG per tablet Take 1 tablet by mouth five times a day as needed for pain      citalopram (CELEXA) 40 MG tablet TAKE ONE TABLET BY MOUTH nightly 30 tablet 5    atorvastatin (LIPITOR) 20 MG tablet TAKE ONE TABLET BY MOUTH DAILY 90 tablet 3    clopidogrel (PLAVIX) 75 MG tablet Take 1 tablet by mouth daily 90 tablet 3    isosorbide mononitrate (IMDUR) 30 MG extended release tablet TAKE ONE TABLET BY MOUTH EVERY DAY 90 tablet 3    bumetanide (BUMEX) 2 MG tablet Take 1 tablet by mouth daily 90 tablet 3    metoprolol tartrate (LOPRESSOR) 50 MG tablet Take 0.5 tablets by mouth 2 times daily TAKE ONE TABLET BY MOUTH TWO TIMES A  tablet 3    omeprazole (PRILOSEC) 10 MG delayed release capsule TAKE ONE CAPSULE BY MOUTH DAILY 90 capsule 2    finasteride (PROSCAR) 5 MG tablet Take 1 tablet by mouth daily 90 tablet 3    tiotropium (SPIRIVA RESPIMAT) 2.5 MCG/ACT AERS inhaler Inhale 2 puffs into the lungs daily 1 Inhaler 3    Fluticasone furoate-vilanterol (BREO ELLIPTA) 200-25 MCG/INH AEPB inhaler Inhale 1 puff into the lungs daily 1 each 3    albuterol sulfate  (90 Base) MCG/ACT inhaler Inhale 2 puffs into the lungs every 6 hours as needed for Wheezing 1 Inhaler 3    nitroGLYCERIN (NITROSTAT) 0.4 MG SL tablet Place 1 tablet under the tongue every 5 minutes as needed for Chest pain up to max of 3 total doses. If no relief after 1 dose, call 911. 25 tablet 3    DOCUSATE CALCIUM PO Take by mouth as needed       No current facility-administered medications on file prior to encounter. REVIEW OF SYSTEMS    Pertinent items are noted in HPI.     Objective:      /73   Pulse 85   Temp 97.3 °F (36.3 °C) (Temporal)   Resp 18     Wt Readings from Last 3 Encounters:   12/06/22 165 lb (74.8 kg)   10/14/22 166 lb 12.8 oz (75.7 kg)   08/11/22 160 lb (72.6 kg)       PHYSICAL EXAM    Constitutional:   Well nourished and well developed. Appears neat and clean. Patient is alert, oriented x3, and in no apparent distress. Respiratory:  Respiratory effort is easy and symmetric bilaterally. Rate is normal at rest and on room air. Vascular:  Pedal Pulses is palpable and audible with doppler. Capillary refill is <3 sec to digits bilateral.  Extremities positive for 2+ pitting edema. Art duplex reviewed no occlusion noted. Neurological:   Sensation is intact to lower extremities. Dermatological:  Wound description noted in wound assessment. The wound is improved in appearance again today. Minimal slough is noted. Still a large amount of drainage today with mild maceration. Erythema is much improved on periwound area. Psychiatric:  Judgement and insight intact. Short and long term memory intact. No evidence of depression, anxiety, or agitation. Patient is calm, cooperative, and communicative. Appropriate interactions and affect. Assessment:      Active Hospital Problems    Diagnosis Date Noted    Acute osteomyelitis of left ankle or foot [facility]) [M86.172] 12/16/2022     Priority: Medium    Edema of left lower leg [R60.0] 10/25/2022     Priority: Medium    Non-pressure chronic ulcer of left ankle with fat layer exposed [facility]) [L97.322] 08/17/2021        Procedure Note  Indications:  Based on my examination of this patient's wound(s)/ulcer(s) today, debridement is notrequired to promote healing and evaluate the wound base. Wound 03/25/22 Ankle Left; Anterior #1 (Active)   Wound Image   01/06/23 1136   Wound Etiology Venous 01/06/23 1136   Wound Cleansed Cleansed with saline 12/27/22 1544   Dressing/Treatment Other (comment) 12/20/22 1642   Wound Length (cm) 6.4 cm 01/06/23 1136   Wound Width (cm) 6.5 cm 01/06/23 1136   Wound Depth (cm) 0.3 cm 01/06/23 1136   Wound Surface Area (cm^2) 41.6 cm^2 01/06/23 1136   Change in Wound Size % (l*w) -1200 01/06/23 1136   Wound Volume (cm^3) 12.48 cm^3 01/06/23 1136   Wound Healing % -1200 01/06/23 1136   Post-Procedure Length (cm) 3.4 cm 11/01/22 1635   Post-Procedure Width (cm) 2.2 cm 11/01/22 1635   Post-Procedure Depth (cm) 0.2 cm 11/01/22 1635   Post-Procedure Surface Area (cm^2) 7.48 cm^2 11/01/22 1635   Post-Procedure Volume (cm^3) 1.496 cm^3 11/01/22 1635   Wound Assessment Pink/red;Slough 01/06/23 1136   Drainage Amount Large 01/06/23 1136   Drainage Description Serosanguinous 01/06/23 1136   Odor None 01/06/23 1136   Ela-wound Assessment Fragile; Maceration 01/06/23 1136   Margins Undefined edges 01/06/23 1136   Wound Thickness Description not for Pressure Injury Full thickness 01/06/23 1136   Number of days: 287       Wound 11/22/22 Ankle Left;Lateral #3 (Active)   Wound Image   01/06/23 1136   Wound Etiology Venous 01/06/23 1136   Wound Cleansed Cleansed with saline 12/27/22 1544   Dressing/Treatment Other (comment) 12/20/22 1642   Wound Length (cm) 0.7 cm 01/06/23 1136   Wound Width (cm) 0.5 cm 01/06/23 1136   Wound Depth (cm) 0.1 cm 01/06/23 1136   Wound Surface Area (cm^2) 0.35 cm^2 01/06/23 1136   Change in Wound Size % (l*w) 97.5 01/06/23 1136   Wound Volume (cm^3) 0.035 cm^3 01/06/23 1136   Wound Healing % 98 01/06/23 1136   Wound Assessment Pink/red;Slough 01/06/23 1136   Drainage Amount Moderate 01/06/23 1136   Drainage Description Serosanguinous 01/06/23 1136   Odor None 01/06/23 1136   Ela-wound Assessment Fragile; Maceration 01/06/23 1136   Margins Undefined edges 01/06/23 1136   Wound Thickness Description not for Pressure Injury Full thickness 01/06/23 1136   Number of days: 45         Plan:     Patient examined and showing some improvement  Follow up with ID   Cigar smoking cessation emphasized  Follow up in  2 weeks        Treatment Note please see attached Discharge Instructions    Written patient dismissal instructions given to patient and signed by patient or POA. Discharge [facility]   Physician Orders and Discharge Instructions  [facility]  [address] [facility]  Telephone: [phone]        NAME:  Olayinka Polanco OF BIRTH:  1945  MEDICAL RECORD NUMBER:  [id number]     Your  is:  Nicholas Courtney Care/Facility:  [facility] CARE     Wound Location:   LEFT ANTERIOR ANKLE AND LEFT MEDIAL ANKLE  Dressing orders:  Cleanse wound(s) with normal saline. 2.  Apply THERAHONEY HD SHEET - EXTRA GIVEN TO PATIENT - PLEASE CUT TO FIT ONLY THE OPEN WOUND. 4. COVER WITH OPTILOCK AND SECURE. 4. Change dressing DAILY. Compression:  NONE     Offloading Device:     Other Instructions:   HOME CARE TO [facility] BILATERAL FEET WITH SOAP AND WATER THEN APPLY AQUAPHOR WITH EACH DRESSING CHANGE,   Keep all dressings clean, dry and intact. Keep pressure off the wound(s) at all times. Follow up visit             January 17, 2023 AT  4:15PM     Please give 24 hour notice if unable to keep appointment. [phone]     If you experience any of the following, please call the Wound Care Service at  [phone] or go to the nearest emergency room. *Increase in pain         *Temperature over 101           *Increase in drainage from your wound or a foul odor  *Uncontrolled swelling            *Need for compression bandage changes due to slippage, breakthrough drainage       PLEASE NOTE: IF YOU ARE UNABLE TO OBTAIN WOUND SUPPLIES, CONTINUE TO USE THE SUPPLIES YOU HAVE AVAILABLE UNTIL YOU ARE ABLE TO REACH US.  IT IS MOST IMPORTANT TO KEEP THE WOUND COVERED AT ALL TIMES  Electronically signed by Ramon Brooke DPM on 1/6/2023 at 11:52 AM       Electronically signed by Ramon Brooke DPM on 1/6/2023 at 11:57 AM

## 2023-01-06 NOTE — CODE DOCUMENTATION
[facility] Physician Billing Sheet. Jacquelyn Lemuel  AGE: 76 y.o.    GENDER: male  : 1945  TODAY'S DATE:  2023    ICD-10 CODES  Active Hospital Problems    Diagnosis Date Noted    Acute osteomyelitis of left ankle or foot [facility]) [M86.172] 2022     Priority: Medium    Edema of left lower leg [R60.0] 10/25/2022     Priority: Medium    Non-pressure chronic ulcer of left ankle with fat layer exposed [facility]) [L97.322] 2021       PHYSICIAN PROCEDURES    CPT CODE  05040      Electronically signed by iTanna Hall DPM on 2023 at 11:53 AM

## 2023-01-06 NOTE — DISCHARGE INSTRUCTIONS
[address] [facility]   Physician Orders and Discharge Instructions  [address]  Telephone: [phone]        NAME:  Chelsey Mcclure OF BIRTH:  1945  MEDICAL RECORD NUMBER:  [id number]     Your  is:  Nicholas Courtney Care/Facility:  [facility] CARE     Wound Location:   LEFT ANTERIOR ANKLE AND LEFT MEDIAL ANKLE  Dressing orders:  Cleanse wound(s) with normal saline. 2.  Apply THERAHONEY HD SHEET - EXTRA GIVEN TO PATIENT - PLEASE CUT TO FIT ONLY THE OPEN WOUND. 4. COVER WITH OPTILOCK AND SECURE. 4. Change dressing DAILY. Compression:  NONE     Offloading Device:     Other Instructions:   HOME CARE TO [address] BILATERAL FEET WITH SOAP AND WATER THEN APPLY AQUAPHOR WITH EACH DRESSING CHANGE,   Keep all dressings clean, dry and intact. Keep pressure off the wound(s) at all times. Follow up visit             January 17, 2023 AT  4:15PM     Please give 24 hour notice if unable to keep appointment. [phone]     If you experience any of the following, please call the Wound Care Service at  [phone] or go to the nearest emergency room. *Increase in pain         *Temperature over 101           *Increase in drainage from your wound or a foul odor  *Uncontrolled swelling            *Need for compression bandage changes due to slippage, breakthrough drainage       PLEASE NOTE: IF YOU ARE UNABLE TO OBTAIN WOUND SUPPLIES, CONTINUE TO USE THE SUPPLIES YOU HAVE AVAILABLE UNTIL YOU ARE ABLE TO REACH US.  IT IS MOST IMPORTANT TO KEEP THE WOUND COVERED AT ALL TIMES  Electronically signed by Edmund Suarez DPM on 1/6/2023 at 11:52 AM

## 2023-01-11 ENCOUNTER — TELEPHONE (OUTPATIENT)
Dept: INFECTIOUS DISEASES | Age: 78
End: 2023-01-11

## 2023-01-11 NOTE — TELEPHONE ENCOUNTER
As of 1/10/23 Dina Linares Nurse reports an issue with No Blood Return from Picc Line. States patient is able to Flush. Labs were obtained peripherally. Request is for Cath Gilbert. Will update ID physician. 1/11/23 12:45pm. Per Dr Osmany Reynolds to provide Cath Gilbert. Call to Rosalba Harper at [facility], he states this will be costly for the patient, it is advised to do this on an O/P basis. Nurse Khanh Blair has been updated. Will call patient to assist with an appt at O/P Infusion.

## 2023-01-12 ENCOUNTER — TELEPHONE (OUTPATIENT)
Dept: INFECTIOUS DISEASES | Age: 78
End: 2023-01-12

## 2023-01-12 NOTE — TELEPHONE ENCOUNTER
Follow up call to patient. DARRELL. Request a return call. Per [facility] Keenan has No Blood return. Patient to be scheduled at [facility] Infusion for Cath Gilbert. Also call the sister darrell Jacinto.

## 2023-01-13 ENCOUNTER — HOSPITAL ENCOUNTER (OUTPATIENT)
Dept: INFUSION THERAPY | Age: 78
Setting detail: INFUSION SERIES
Discharge: HOME | End: 2023-01-13
Payer: MEDICARE

## 2023-01-13 VITALS
HEART RATE: 74 BPM | TEMPERATURE: 98.3 F | SYSTOLIC BLOOD PRESSURE: 107 MMHG | RESPIRATION RATE: 18 BRPM | DIASTOLIC BLOOD PRESSURE: 53 MMHG

## 2023-01-13 DIAGNOSIS — M86.072 ACUTE HEMATOGENOUS OSTEOMYELITIS, LEFT ANKLE AND FOOT (HCC): ICD-10-CM

## 2023-01-13 PROCEDURE — 99211 OFF/OP EST MAY X REQ PHY/QHP: CPT

## 2023-01-13 PROCEDURE — 6360000002 HC RX W HCPCS: Performed by: INTERNAL MEDICINE

## 2023-01-13 PROCEDURE — 2580000003 HC RX 258: Performed by: INTERNAL MEDICINE

## 2023-01-13 PROCEDURE — 96375 TX/PRO/DX INJ NEW DRUG ADDON: CPT

## 2023-01-13 RX ORDER — SODIUM CHLORIDE 0.9 % (FLUSH) 0.9 %
5-40 SYRINGE (ML) INJECTION PRN
Status: CANCELLED | OUTPATIENT
Start: 2023-01-13

## 2023-01-13 RX ORDER — SODIUM CHLORIDE 9 MG/ML
25 INJECTION, SOLUTION INTRAVENOUS PRN
Status: CANCELLED | OUTPATIENT
Start: 2023-01-13

## 2023-01-13 RX ORDER — SODIUM CHLORIDE 0.9 % (FLUSH) 0.9 %
5-40 SYRINGE (ML) INJECTION PRN
Status: DISCONTINUED | OUTPATIENT
Start: 2023-01-13 | End: 2023-01-14 | Stop reason: HOSPADM

## 2023-01-13 RX ADMIN — ALTEPLASE 2 MG: 2.2 INJECTION, POWDER, LYOPHILIZED, FOR SOLUTION INTRAVENOUS at 15:05

## 2023-01-13 ASSESSMENT — PAIN SCALES - GENERAL: PAINLEVEL_OUTOF10: 6

## 2023-01-13 ASSESSMENT — PAIN DESCRIPTION - PAIN TYPE: TYPE: CHRONIC PAIN

## 2023-01-13 ASSESSMENT — PAIN DESCRIPTION - DESCRIPTORS: DESCRIPTORS: ACHING

## 2023-01-13 ASSESSMENT — PAIN DESCRIPTION - LOCATION: LOCATION: LEG

## 2023-01-13 ASSESSMENT — PAIN DESCRIPTION - ORIENTATION: ORIENTATION: LEFT

## 2023-01-13 NOTE — FLOWSHEET NOTE
5 cc of blood removed from the purple port. Flushed with 20 cc of NS and cap changed. Alcohol cap applies. Patient left the unit ambulatory. All equipment used in the care for this patient has been cleaned.

## 2023-01-13 NOTE — FLOWSHEET NOTE
Patient to the floor ambulatory for cath trey to his Picc line. Vital signs taken. Complains of pain to his left leg. Call light within reach.

## 2023-01-16 ENCOUNTER — TELEPHONE (OUTPATIENT)
Dept: WOUND CARE | Age: 78
End: 2023-01-16

## 2023-01-16 NOTE — TELEPHONE ENCOUNTER
[facility] home care called in today and informed this nurse that the patient Mr. Hernan Jarquin fell on Friday night at his home and the squad was called to help \"pick him up\". Patient was not taken to a facility, but remained at home. Nurse from [facility] stated Jayne López only had a small new area noted on Left Hand. Will inform Dr. Trev Arguello.

## 2023-01-17 ENCOUNTER — HOSPITAL ENCOUNTER (OUTPATIENT)
Dept: WOUND CARE | Age: 78
Discharge: HOME | End: 2023-01-17
Payer: MEDICARE

## 2023-01-17 VITALS
DIASTOLIC BLOOD PRESSURE: 60 MMHG | RESPIRATION RATE: 18 BRPM | HEART RATE: 74 BPM | TEMPERATURE: 97.5 F | SYSTOLIC BLOOD PRESSURE: 125 MMHG

## 2023-01-17 DIAGNOSIS — L97.322 NON-PRESSURE CHRONIC ULCER OF LEFT ANKLE WITH FAT LAYER EXPOSED (HCC): Primary | ICD-10-CM

## 2023-01-17 PROCEDURE — 11042 DBRDMT SUBQ TIS 1ST 20SQCM/<: CPT

## 2023-01-17 PROCEDURE — 6370000000 HC RX 637 (ALT 250 FOR IP): Performed by: PODIATRIST

## 2023-01-17 RX ORDER — LIDOCAINE HYDROCHLORIDE 20 MG/ML
JELLY TOPICAL ONCE
OUTPATIENT
Start: 2023-01-17 | End: 2023-01-17

## 2023-01-17 RX ORDER — BACITRACIN ZINC AND POLYMYXIN B SULFATE 500; 1000 [USP'U]/G; [USP'U]/G
OINTMENT TOPICAL ONCE
OUTPATIENT
Start: 2023-01-17 | End: 2023-01-17

## 2023-01-17 RX ORDER — LIDOCAINE HYDROCHLORIDE 20 MG/ML
JELLY TOPICAL ONCE
Status: COMPLETED | OUTPATIENT
Start: 2023-01-17 | End: 2023-01-17

## 2023-01-17 RX ORDER — CLOBETASOL PROPIONATE 0.5 MG/G
OINTMENT TOPICAL ONCE
OUTPATIENT
Start: 2023-01-17 | End: 2023-01-17

## 2023-01-17 RX ORDER — GINSENG 100 MG
CAPSULE ORAL ONCE
OUTPATIENT
Start: 2023-01-17 | End: 2023-01-17

## 2023-01-17 RX ORDER — BACITRACIN, NEOMYCIN, POLYMYXIN B 400; 3.5; 5 [USP'U]/G; MG/G; [USP'U]/G
OINTMENT TOPICAL ONCE
OUTPATIENT
Start: 2023-01-17 | End: 2023-01-17

## 2023-01-17 RX ORDER — BETAMETHASONE DIPROPIONATE 0.05 %
OINTMENT (GRAM) TOPICAL ONCE
OUTPATIENT
Start: 2023-01-17 | End: 2023-01-17

## 2023-01-17 RX ORDER — LIDOCAINE 40 MG/G
CREAM TOPICAL ONCE
OUTPATIENT
Start: 2023-01-17 | End: 2023-01-17

## 2023-01-17 RX ORDER — LIDOCAINE 50 MG/G
OINTMENT TOPICAL ONCE
OUTPATIENT
Start: 2023-01-17 | End: 2023-01-17

## 2023-01-17 RX ORDER — LIDOCAINE HYDROCHLORIDE 40 MG/ML
SOLUTION TOPICAL ONCE
OUTPATIENT
Start: 2023-01-17 | End: 2023-01-17

## 2023-01-17 RX ORDER — GENTAMICIN SULFATE 1 MG/G
OINTMENT TOPICAL ONCE
OUTPATIENT
Start: 2023-01-17 | End: 2023-01-17

## 2023-01-17 RX ADMIN — LIDOCAINE HYDROCHLORIDE: 20 JELLY TOPICAL at 16:42

## 2023-01-17 ASSESSMENT — PAIN DESCRIPTION - ORIENTATION: ORIENTATION: LEFT

## 2023-01-17 ASSESSMENT — PAIN DESCRIPTION - LOCATION: LOCATION: ANKLE;FOOT

## 2023-01-17 ASSESSMENT — PAIN DESCRIPTION - DESCRIPTORS: DESCRIPTORS: BURNING

## 2023-01-17 ASSESSMENT — PAIN SCALES - GENERAL: PAINLEVEL_OUTOF10: 6

## 2023-01-17 NOTE — DISCHARGE INSTRUCTIONS
[facility]   Physician Orders and Discharge Instructions  [facility]  [facility] [address]  [facility]  Telephone: [phone]        NAME:  Leonides Gonzales OF BIRTH:  1945  MEDICAL RECORD NUMBER:  [id number]     Your  is:  Nicholas Courtney Care/Facility:  [facility] CARE     Wound Location:   LEFT ANTERIOR ANKLE AND LEFT MEDIAL ANKLE  Dressing orders:  Cleanse wound(s) with normal saline. 2.  Apply THERAHONEY HD SHEET - EXTRA GIVEN TO PATIENT - PLEASE CUT TO FIT ONLY THE OPEN WOUND. 4. COVER WITH OPTILOCK AND SECURE. 4. Change dressing DAILY. Compression:  NONE     Offloading Device:     Other Instructions:   HOME CARE TO [facility] BILATERAL FEET WITH SOAP AND WATER THEN APPLY AQUAPHOR WITH EACH DRESSING CHANGE,   Keep all dressings clean, dry and intact. Keep pressure off the wound(s) at all times. Follow up visit        2 weeks   January 24, 2023  at             Please give 24 hour notice if unable to keep appointment. [phone]     If you experience any of the following, please call the Wound Care Service at  [phone] or go to the nearest emergency room. *Increase in pain         *Temperature over 101           *Increase in drainage from your wound or a foul odor  *Uncontrolled swelling            *Need for compression bandage changes due to slippage, breakthrough drainage       PLEASE NOTE: IF YOU ARE UNABLE TO OBTAIN WOUND SUPPLIES, CONTINUE TO USE THE SUPPLIES YOU HAVE AVAILABLE UNTIL YOU ARE ABLE TO REACH US.  IT IS MOST IMPORTANT TO KEEP THE WOUND COVERED AT ALL TIMES

## 2023-01-17 NOTE — PLAN OF CARE
Problem: Pain  Goal: Verbalizes/displays adequate comfort level or baseline comfort level  Outcome: Progressing     Problem: Pain  Goal: Verbalizes/displays adequate comfort level or baseline comfort level  Outcome: Progressing     Problem: Safety - Adult  Goal: Free from fall injury  Outcome: Progressing     Problem: Wound:  Goal: Will show signs of wound healing; wound closure and no evidence of infection  Description: Will show signs of wound healing; wound closure and no evidence of infection  Outcome: Progressing     Problem: Venous:  Goal: Signs of wound healing will improve  Description: Signs of wound healing will improve  Outcome: Progressing

## 2023-01-18 NOTE — CODE DOCUMENTATION
[facility] Physician Billing Sheet. Reinaldo Ramirez  AGE: 76 y.o.    GENDER: male  : 1945  TODAY'S DATE:  2023    ICD-10 CODES  Active Hospital Problems    Diagnosis Date Noted    Non-pressure chronic ulcer of left ankle with [facility] layer [facility] [L97.322] 2021    Osteomyelitis of ankle [facility]) [M86.9] 2021       PHYSICIAN PROCEDURES    CPT CODE  35779 LT      Electronically signed by Yanique Mejias DPM on 2023 at 8:48 PM

## 2023-01-18 NOTE — PROGRESS NOTES
Ewa Cervantes [address]                                                   Progress Note and Procedure Note      Jhonny Momin RECORD NUMBER:  [id number]  AGE: 76 y.o. GENDER: male  : 1945  EPISODE DATE:  2023    Subjective:     Chief Complaint   Patient presents with    Wound Check     Left lower ext wounds         HISTORY of PRESENT ILLNESS HPI      Tree Aly is a 76 y.o. male who presents today for wound/ulcer evaluation. History of Wound Context: Patient presents for a reoccurrence of an non healing ulcer of the left ankle. HE states that he is tolerating the IV abx without any complication  Wound/Ulcer Pain Timing/Severity: intermittent  Quality of pain: tender  Severity:  4 / 10   Modifying Factors: None  Associated Signs/Symptoms: drainage, odor and pain    Ulcer Identification:  Ulcer Type: venous and undetermined  Contributing Factors: edema and venous stasis    Wound: N/A        PAST MEDICAL HISTORY        Diagnosis Date    Alcohol abuse     quit drinking 21    CAD (coronary artery disease)     patient states no doctor has told him this / has 1 cardiac stent    Cancer ([address]     lung LLL    Chronic back pain     Chronic kidney disease     Chronic obstructive pulmonary disease, unspecified COPD type ([address] 3/24/2022    Chronic sinusitis     DJD (degenerative joint disease) of knee     left knee DJD    Elevated PSA     History of blood transfusion     History of inferior wall myocardial infarction 2016    1 cardiac stent    HTN (hypertension)     meds .  1 yr    Hyperlipidemia     meds > 12 yrs    NSTEMI (non-ST elevated myocardial infarction) ([address]     Smoker        PAST SURGICAL HISTORY    Past Surgical History:   Procedure Laterality Date    ABDOMEN SURGERY      spleenectomy due to [address]      lumbar disc OR    CARDIAC SURGERY      stents    CARPAL TUNNEL RELEASE Right 2019    RIGHT CARPAL TUNNEL RELEASE performed by Tony Melchor MD at [facility] OR    COLONOSCOPY      CORONARY ANGIOPLASTY WITH STENT PLACEMENT  2016    EYE SURGERY      to have Phaco with IOL OU 2018    HERNIA REPAIR      JOINT REPLACEMENT Left     LTHR    JOINT REPLACEMENT Right     RTKR    KNEE SURGERY Right     arthroscopy    [place] MANIPULATION KNEE JOINT UNDER GENERAL ANESTHESIA Right 2017    RIGHT KNEE MANIPULATION UNDER ANESTHESIA performed by Royal Martinez MD at [facility] OFFICE/OUTPT VISIT,PROCEDURE ONLY Bilateral 2017    RIGHT AND BILATERAL L 4-5 LYSIS OF SCAR DISKECTOMY INTERBODY CAGE FUSION POSTEROLATERAL FUSION PEDICLE SCREWS performed by Betty Flaherty MD at [facility] [address]  2004    benign    [facility] Right 3/24/2017    RIGHT  KNEE TOTAL ARTHROPLASTY, Valery Severino CEMENTED PERSONA  performed by Royal Martinez MD at [facility] HISTORY    Family History   Problem Relation Age of Onset    Osteoarthritis Mother     Emphysema Mother     Hypertension Father     Hearing Loss Father     Dementia Father     No Known Problems Sister     Prostate Cancer Brother     Colon Polyps Neg Hx        SOCIAL HISTORY    Social History     Tobacco Use    Smoking status: Former     Packs/day: 0.00     Years: 60.00     Pack years: 0.00     Types: Cigarettes     Quit date: 2021     Years since quittin.0    Smokeless tobacco: Never    Tobacco comments:      smokes 2 cigars a day   Vaping Use    Vaping Use: Never used   Substance Use Topics    Alcohol use: Not Currently     Alcohol/week: 12.0 standard drinks     Types: 12 Shots of liquor per week     Comment: socail drinking    Drug use: No       ALLERGIES    Allergies   Allergen Reactions    Morphine        MEDICATIONS    Current Outpatient Medications on File Prior to Encounter   Medication Sig Dispense Refill    diclofenac (VOLTAREN) 50 MG EC tablet TAKE ONE TABLET BY MOUTH TWO TIMES A DAY 60 tablet 5    gentamicin (GARAMYCIN) 0.1 % ointment Apply topically  daily. 30 g 1    gentamicin (GARAMYCIN) 0.1 % ointment Apply topically  daily. 30 g 1    oxyCODONE-acetaminophen (PERCOCET) 7.5-325 MG per tablet Take 1 tablet by mouth five times a day as needed for pain      citalopram (CELEXA) 40 MG tablet TAKE ONE TABLET BY MOUTH nightly 30 tablet 5    atorvastatin (LIPITOR) 20 MG tablet TAKE ONE TABLET BY MOUTH DAILY 90 tablet 3    clopidogrel (PLAVIX) 75 MG tablet Take 1 tablet by mouth daily 90 tablet 3    isosorbide mononitrate (IMDUR) 30 MG extended release tablet TAKE ONE TABLET BY MOUTH EVERY DAY 90 tablet 3    bumetanide (BUMEX) 2 MG tablet Take 1 tablet by mouth daily 90 tablet 3    metoprolol tartrate (LOPRESSOR) 50 MG tablet Take 0.5 tablets by mouth 2 times daily TAKE ONE TABLET BY MOUTH TWO TIMES A  tablet 3    omeprazole (PRILOSEC) 10 MG delayed release capsule TAKE ONE CAPSULE BY MOUTH DAILY 90 capsule 2    finasteride (PROSCAR) 5 MG tablet Take 1 tablet by mouth daily 90 tablet 3    tiotropium (SPIRIVA RESPIMAT) 2.5 MCG/ACT AERS inhaler Inhale 2 puffs into the lungs daily 1 Inhaler 3    Fluticasone furoate-vilanterol (BREO ELLIPTA) 200-25 MCG/INH AEPB inhaler Inhale 1 puff into the lungs daily 1 each 3    albuterol sulfate  (90 Base) MCG/ACT inhaler Inhale 2 puffs into the lungs every 6 hours as needed for Wheezing 1 Inhaler 3    nitroGLYCERIN (NITROSTAT) 0.4 MG SL tablet Place 1 tablet under the tongue every 5 minutes as needed for Chest pain up to max of 3 total doses. If no relief after 1 dose, call 911. 25 tablet 3    DOCUSATE CALCIUM PO Take by mouth as needed       No current facility-administered medications on file prior to encounter. REVIEW OF SYSTEMS    Pertinent items are noted in HPI.     Objective:      /60   Pulse 74   Temp 97.5 °F (36.4 °C) (Temporal)   Resp 18     Wt Readings from Last 3 Encounters:   12/06/22 165 lb (74.8 kg)   10/14/22 166 lb 12.8 oz (75.7 kg)   08/11/22 160 lb (72.6 kg)       PHYSICAL EXAM    Constitutional:   Well nourished and well developed. Appears neat and clean. Patient is alert, oriented x3, and in no apparent distress. Respiratory:  Respiratory effort is easy and symmetric bilaterally. Rate is normal at rest and on room air. Vascular:  Pedal Pulses is palpable and audible with doppler. Capillary refill is <3 sec to digits bilateral.  Extremities positive for 1+ pitting edema. Art duplex reviewed no occlusion noted. Neurological:   Sensation is intact to lower extremities. Dermatological:  Wound description noted in wound assessment. The wound appearnace is stable  with decreased  slough, mild  pain and erythema. Granulation buds are noted. Size is slightly smaller   Depth is much improved. Psychiatric:  Judgement and insight intact. Short and long term memory intact. No evidence of depression, anxiety, or agitation. Patient is calm, cooperative, and communicative. Appropriate interactions and affect. 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 Performed by: Mary Ann Macias DPM    Consent obtained:  Yes    Time out taken:  Yes    Pain Control:2% lidocaine gel  thin layer applied topically to wound bed      Debridement:Excisional Debridement    Using curette the wound(s)/ulcer(s) was/were sharply debrided down through and including the removal of epidermis, dermis and subcutaneous tissue.         Devitalized Tissue Debrided:  exudate    Pre Debridement Measurements:  Are located in the Wound/Ulcer Documentation Flow Sheet    Wound/Ulcer #: 1 and 2    Post Debridement Measurements:  Wound/Ulcer Descriptions are Pre Debridement except measurements:    Wound 03/25/22 Ankle Left; Anterior #1 (Active)   Wound Image   01/17/23 1629   Wound Etiology Venous 01/17/23 1629   Wound Cleansed Cleansed with saline 01/17/23 1629   Dressing/Treatment Honey alginate;Dry dressing 01/17/23 1708   Wound Length (cm) 6 cm 01/17/23 1629   Wound Width (cm) 6.2 cm 01/17/23 1629   Wound Depth (cm) 0.1 cm 01/17/23 1629   Wound Surface Area (cm^2) 37.2 cm^2 01/17/23 1629   Change in Wound Size % (l*w) -1062.5 01/17/23 1629   Wound Volume (cm^3) 3.72 cm^3 01/17/23 1629   Wound Healing % -288 01/17/23 1629   Post-Procedure Length (cm) 6 cm 01/17/23 1703   Post-Procedure Width (cm) 6.2 cm 01/17/23 1703   Post-Procedure Depth (cm) 0.15 cm 01/17/23 1703   Post-Procedure Surface Area (cm^2) 37.2 cm^2 01/17/23 1703   Post-Procedure Volume (cm^3) 5.58 cm^3 01/17/23 1703   Wound Assessment Pink/red;Slough 01/17/23 1629   Drainage Amount Large 01/17/23 1629   Drainage Description Serosanguinous 01/17/23 1629   Odor None 01/17/23 1629   Ela-wound Assessment Fragile; Maceration 01/17/23 1629   Margins Undefined edges 01/17/23 1629   Wound Thickness Description not for Pressure Injury Full thickness 01/17/23 1629   Number of days: 298       Wound 11/22/22 Ankle Left;Lateral #3 (Active)   Wound Image   01/17/23 1629   Wound Etiology Venous 01/17/23 1629   Wound Cleansed Cleansed with saline 01/17/23 1629   Dressing/Treatment Honey alginate;Dry dressing 01/17/23 1708   Wound Length (cm) 0.5 cm 01/17/23 1629   Wound Width (cm) 0.3 cm 01/17/23 1629   Wound Depth (cm) 0.1 cm 01/17/23 1629   Wound Surface Area (cm^2) 0.15 cm^2 01/17/23 1629   Change in Wound Size % (l*w) 98.93 01/17/23 1629   Wound Volume (cm^3) 0.015 cm^3 01/17/23 1629   Wound Healing % 99 01/17/23 1629   Post-Procedure Length (cm) 0.5 cm 01/17/23 1703   Post-Procedure Width (cm) 0.3 cm 01/17/23 1703   Post-Procedure Depth (cm) 0.15 cm 01/17/23 1703   Post-Procedure Surface Area (cm^2) 0.15 cm^2 01/17/23 1703   Post-Procedure Volume (cm^3) 0.0225 cm^3 01/17/23 1703   Wound Assessment Pink/red;Slough 01/17/23 1629   Drainage Amount Small 01/17/23 1629   Drainage Description Serosanguinous 01/17/23 1629   Odor None 01/17/23 1629   Ela-wound Assessment Fragile; Maceration 01/17/23 1629   Margins Undefined edges 01/17/23 1629   Wound Thickness Description not for Pressure Injury Full thickness 01/17/23 1629   Number of days: 56         Percent of Wound/Ulcer Debrided: 50%    Total Surface Area Debrided:  18.67 sq cm     Diabetic/Pressure/Non Pressure Ulcers:  Ulcer: Non-Pressure ulcer, fat layer exposed      Bleeding:  Minimal    Hemostasis Achieved:  by pressure    Procedural Pain:  5 / 10     Post Procedural Pain:  1 / 10     Response to treatment:  Well tolerated by patient. Plan:     Patient examined and debrided  [place] HD  Continue IV abx per ID  Cigar smoking cessation emphasized  Follow up in two weeks       Treatment Note please see attached Discharge Instructions    Written patient dismissal instructions given to patient and signed by patient or POA. Discharge [address] and Hyperbaric Medicine   Physician Orders and Discharge Instructions  [address]  Telephone: [phone]        NAME:  Jessica  OF BIRTH:  1945  MEDICAL RECORD NUMBER:  [id number]     Your  is:  Nicholas Courtney Care/Facility:  [facility] CARE     Wound Location:   LEFT ANTERIOR ANKLE AND LEFT MEDIAL ANKLE  Dressing orders:  Cleanse wound(s) with normal saline. 2.  Apply THERAHONEY HD SHEET - EXTRA GIVEN TO PATIENT - PLEASE CUT TO FIT ONLY THE OPEN WOUND. 4. COVER WITH OPTILOCK AND SECURE. 4. Change dressing DAILY.      Compression:  NONE     Offloading Device:     Other Instructions:   HOME CARE TO [facility] BILATERAL FEET WITH SOAP AND WATER THEN APPLY AQUAPHOR WITH EACH DRESSING CHANGE,   Keep all dressings clean, dry and intact. Keep pressure off the wound(s) at all times. Follow up visit        2 weeks   January 24, 2023  at             Please give 24 hour notice if unable to keep appointment. [phone]     If you experience any of the following, please call the Wound Care Service at  [phone] or go to the nearest emergency room. *Increase in pain         *Temperature over 101           *Increase in drainage from your wound or a foul odor  *Uncontrolled swelling            *Need for compression bandage changes due to slippage, breakthrough drainage       PLEASE NOTE: IF YOU ARE UNABLE TO OBTAIN WOUND SUPPLIES, CONTINUE TO USE THE SUPPLIES YOU HAVE AVAILABLE UNTIL YOU ARE ABLE TO REACH US.  IT IS MOST IMPORTANT TO KEEP THE WOUND COVERED AT ALL TIMES      Electronically signed by Luis Franco DPM on 1/17/2023 at 8:32 PM

## 2023-01-19 ENCOUNTER — OFFICE VISIT (OUTPATIENT)
Dept: INFECTIOUS DISEASES | Age: 78
End: 2023-01-19

## 2023-01-19 VITALS — DIASTOLIC BLOOD PRESSURE: 62 MMHG | HEART RATE: 67 BPM | SYSTOLIC BLOOD PRESSURE: 118 MMHG | TEMPERATURE: 97.3 F

## 2023-01-19 DIAGNOSIS — M86.172 OTHER ACUTE OSTEOMYELITIS OF LEFT ANKLE (HCC): Primary | ICD-10-CM

## 2023-01-19 DIAGNOSIS — I83.023 VENOUS STASIS ULCER OF LEFT ANKLE LIMITED TO BREAKDOWN OF SKIN, UNSPECIFIED WHETHER VARICOSE VEINS PRESENT (HCC): ICD-10-CM

## 2023-01-19 DIAGNOSIS — A49.9 POLYMICROBIAL BACTERIAL INFECTION: ICD-10-CM

## 2023-01-19 DIAGNOSIS — L97.321 VENOUS STASIS ULCER OF LEFT ANKLE LIMITED TO BREAKDOWN OF SKIN, UNSPECIFIED WHETHER VARICOSE VEINS PRESENT (HCC): ICD-10-CM

## 2023-01-19 DIAGNOSIS — L03.114 CELLULITIS OF LEFT HAND: ICD-10-CM

## 2023-01-19 RX ORDER — ERTAPENEM 1 G/1
INJECTION, POWDER, LYOPHILIZED, FOR SOLUTION INTRAMUSCULAR; INTRAVENOUS
COMMUNITY
Start: 2023-01-16

## 2023-01-19 ASSESSMENT — PATIENT HEALTH QUESTIONNAIRE - PHQ9
SUM OF ALL RESPONSES TO PHQ QUESTIONS 1-9: 0
SUM OF ALL RESPONSES TO PHQ9 QUESTIONS 1 & 2: 0
2. FEELING DOWN, DEPRESSED OR HOPELESS: 0
SUM OF ALL RESPONSES TO PHQ QUESTIONS 1-9: 0
1. LITTLE INTEREST OR PLEASURE IN DOING THINGS: 0
SUM OF ALL RESPONSES TO PHQ QUESTIONS 1-9: 0
SUM OF ALL RESPONSES TO PHQ QUESTIONS 1-9: 0

## 2023-01-19 ASSESSMENT — ENCOUNTER SYMPTOMS: COLOR CHANGE: 1

## 2023-01-19 NOTE — PROGRESS NOTES
Marlin Cardona (:  1945) is a 76 y.o. male,Established patient, here for evaluation of the following chief complaint(s):  Osteomyelitis  (4 week f/u OM [facility] ankle)         ASSESSMENT/PLAN:  Acute osteomyelitis left ankle  Left leg chronic venous stasis ulcer with methicillin sensitive staph aureus and group B streptococcus infection  Left hand cellulitis and ulceration following fall    Invanz 1 g every 24 hours for 2 more weeks to complete 6 weeks course. DC PICC line in 2 weeks  1 month course of Keflex to be started at 500 mg 3 times daily following IV antibiotics discontinuation  Follow-up CBC BMP CRP  Follow-up in 3 to 4 weeks  Continue local wound care and follow-up at the wound care center    SUBJECTIVE/OBJECTIVE:  HPI  Follow-up left ankle acute osteomyelitis, infected venous stasis ulcers of left leg, methicillin sensitive staph for years and group B streptococcus infection. On IV Invanz that is well-tolerated frequent dreams. Idelle Sails 1 week ago and scraped his left hand against the bathroom door, currently with draining ulcer and redness. No fevers or chills. Review of Systems   Skin:  Positive for color change and wound. Neurological:  Positive for weakness. All other systems reviewed and are negative. Objective   Physical Exam  Vitals and nursing note reviewed. Constitutional:       General: He is not in acute distress. Appearance: Normal appearance. HENT:      Head: Normocephalic and atraumatic. Nose: No congestion. Eyes:      General: No scleral icterus. Pulmonary:      Effort: Pulmonary effort is normal. No respiratory distress. Abdominal:      General: There is no distension. Musculoskeletal:         General: Deformity (.  Left wrist and ankle deformities) present. Skin:     Findings: Erythema (.  Left leg and left hand) present. No rash. Neurological:      General: No focal deficit present.       Mental Status: He is alert and oriented to person, place, and time. Motor: Weakness present. Psychiatric:         Mood and Affect: Mood normal.         Behavior: Behavior normal.   Left hand dorsal aspect ulcer  Left ankle ulcer with positive granulation tissue  Positive surrounding erythema and excoriation                Contains abnormal data CBC  Order: [id number]   Ref Range & Units 1/16/23 1530   WBC 3.70 - 11.00 k/uL 8.45    RBC 4.20 - 6.00 m/uL 3.42 Low     Hemoglobin 13.0 - 17.0 g/dL 11.3 Low     Hematocrit 39.0 - 51.0 % 33.7 Low     MCV 80.0 - 100.0 fL 98.5    MCH 26.0 - 34.0 pg 33.0    MCHC 30.5 - 36.0 g/dL 33.5    RDW-CV 11.5 - 15.0 % 14.1    Platelet Count 391 - 400 k/uL 320      (Suppl 1). BUN 9 - 24 mg/dL 14    Creatinine 0.73 - 1.22 mg/dL 0.97    Sodium 136 - 144 mmol/L 138    Potassium 3.7 - 5.1 mmol/L 4.4    Chloride 97 - 105 mmol/L 102    CO2 22 - 30 mmol/L 23    Anion Gap 9 - 18 mmol/L 13    Calcium, Total 8.5 - 10.2 mg/dL 9.5    Estimated Glomerular Filtration Rate >=60 mL/min/1.73m² 79    Comment: Estimated Glomerular    Glucose of 126  [phone]      Organism BHS Group B (Strep agalacticae) Abnormal     CULTURE WOUND HEAVY GROWTH    Organism Staphylococcus aureus Abnormal     CULTURE WOUND HEAVY GROWTH   This isolate is methicillin susceptible. Resulting Agency [address] [facility]         Impression   Intense activity at the expected location of the left subtalar joint, with   active inflammation, which can reflect osteomyelitis given the clinical   suspicion. Additional entities which can have this appearance include   arthropathy with active inflammation or healing trauma. Moderate asymmetric activity at the right shoulder, which could be due to   asymmetric arthropathy, trauma, or metastatic disease. Radiograph could be   performed for further assessment as warranted. Unremarkable appearance of left hip and right knee arthroplasties.      On this date 1/19/2023 I have spent 30 minutes reviewing previous notes, test results and face to face with the patient discussing the diagnosis and importance of compliance with the treatment plan as well as documenting on the day of the visit.      An electronic signature was used to authenticate this note.    --Aide Palafox MD

## 2023-01-20 ENCOUNTER — TELEPHONE (OUTPATIENT)
Dept: INFECTIOUS DISEASES | Age: 78
End: 2023-01-20

## 2023-01-20 NOTE — TELEPHONE ENCOUNTER
Received call from Bailee states patient forgetful, States let Dr's to be aware.  We will Consult ID Doctor.

## 2023-01-31 ENCOUNTER — HOSPITAL ENCOUNTER (OUTPATIENT)
Dept: WOUND CARE | Age: 78
Discharge: HOME | End: 2023-01-31
Payer: MEDICARE

## 2023-01-31 VITALS
DIASTOLIC BLOOD PRESSURE: 71 MMHG | SYSTOLIC BLOOD PRESSURE: 148 MMHG | HEART RATE: 78 BPM | RESPIRATION RATE: 18 BRPM | TEMPERATURE: 97.4 F

## 2023-01-31 DIAGNOSIS — L97.322 NON-PRESSURE CHRONIC ULCER OF LEFT ANKLE WITH FAT LAYER EXPOSED (HCC): Primary | ICD-10-CM

## 2023-01-31 PROCEDURE — 87186 SC STD MICRODIL/AGAR DIL: CPT

## 2023-01-31 PROCEDURE — 6370000000 HC RX 637 (ALT 250 FOR IP): Performed by: PODIATRIST

## 2023-01-31 PROCEDURE — 86403 PARTICLE AGGLUT ANTBDY SCRN: CPT

## 2023-01-31 PROCEDURE — 11042 DBRDMT SUBQ TIS 1ST 20SQCM/<: CPT

## 2023-01-31 PROCEDURE — 87075 CULTR BACTERIA EXCEPT BLOOD: CPT

## 2023-01-31 PROCEDURE — 87070 CULTURE OTHR SPECIMN AEROBIC: CPT

## 2023-01-31 RX ORDER — LIDOCAINE 50 MG/G
OINTMENT TOPICAL ONCE
OUTPATIENT
Start: 2023-01-31 | End: 2023-01-31

## 2023-01-31 RX ORDER — LIDOCAINE HYDROCHLORIDE 40 MG/ML
SOLUTION TOPICAL ONCE
OUTPATIENT
Start: 2023-01-31 | End: 2023-01-31

## 2023-01-31 RX ORDER — BACITRACIN, NEOMYCIN, POLYMYXIN B 400; 3.5; 5 [USP'U]/G; MG/G; [USP'U]/G
OINTMENT TOPICAL ONCE
OUTPATIENT
Start: 2023-01-31 | End: 2023-01-31

## 2023-01-31 RX ORDER — CLOBETASOL PROPIONATE 0.5 MG/G
OINTMENT TOPICAL ONCE
OUTPATIENT
Start: 2023-01-31 | End: 2023-01-31

## 2023-01-31 RX ORDER — LIDOCAINE 40 MG/G
CREAM TOPICAL ONCE
OUTPATIENT
Start: 2023-01-31 | End: 2023-01-31

## 2023-01-31 RX ORDER — BETAMETHASONE DIPROPIONATE 0.05 %
OINTMENT (GRAM) TOPICAL ONCE
OUTPATIENT
Start: 2023-01-31 | End: 2023-01-31

## 2023-01-31 RX ORDER — LIDOCAINE HYDROCHLORIDE 20 MG/ML
JELLY TOPICAL ONCE
OUTPATIENT
Start: 2023-01-31 | End: 2023-01-31

## 2023-01-31 RX ORDER — GINSENG 100 MG
CAPSULE ORAL ONCE
OUTPATIENT
Start: 2023-01-31 | End: 2023-01-31

## 2023-01-31 RX ORDER — BACITRACIN ZINC AND POLYMYXIN B SULFATE 500; 1000 [USP'U]/G; [USP'U]/G
OINTMENT TOPICAL ONCE
OUTPATIENT
Start: 2023-01-31 | End: 2023-01-31

## 2023-01-31 RX ORDER — GENTAMICIN SULFATE 1 MG/G
OINTMENT TOPICAL ONCE
OUTPATIENT
Start: 2023-01-31 | End: 2023-01-31

## 2023-01-31 RX ORDER — LIDOCAINE HYDROCHLORIDE 20 MG/ML
JELLY TOPICAL ONCE
Status: COMPLETED | OUTPATIENT
Start: 2023-01-31 | End: 2023-01-31

## 2023-01-31 RX ADMIN — LIDOCAINE HYDROCHLORIDE: 20 JELLY TOPICAL at 16:15

## 2023-01-31 ASSESSMENT — PAIN DESCRIPTION - PAIN TYPE: TYPE: CHRONIC PAIN

## 2023-01-31 ASSESSMENT — PAIN DESCRIPTION - ORIENTATION: ORIENTATION: LEFT

## 2023-01-31 ASSESSMENT — PAIN DESCRIPTION - DESCRIPTORS: DESCRIPTORS: ACHING

## 2023-01-31 ASSESSMENT — PAIN DESCRIPTION - LOCATION: LOCATION: ANKLE

## 2023-01-31 ASSESSMENT — PAIN SCALES - GENERAL: PAINLEVEL_OUTOF10: 5

## 2023-01-31 NOTE — CODE DOCUMENTATION
[facility] Physician Billing Sheet. Ana Shen  AGE: 76 y.o.    GENDER: male  : 1945  TODAY'S DATE:  2023    ICD-10 CODES  Active Hospital Problems    Diagnosis Date Noted    Non-pressure chronic ulcer of left ankle with [facility] layer [facility] [L97.322] 2021    Osteomyelitis of ankle [facility]) [M86.9] 2021       PHYSICIAN PROCEDURES    CPT CODE  15577 LT      Electronically signed by Sara Crow DPM on 2023 at 4:45 PM

## 2023-01-31 NOTE — DISCHARGE INSTRUCTIONS
[facility] and Hyper[facility]ic Medicine   Physician Orders and Discharge Instructions  [facility]  [address] [facility]  [facility]  Telephone: [phone]        NAME:  Deondre Luong OF BIRTH:  1945  MEDICAL RECORD NUMBER:  [id number]     Your  is:  Nicholas Courtney Care/Facility:  [facility] CARE     Wound Location:   LEFT ANTERIOR ANKLE AND LEFT MEDIAL ANKLE  Dressing orders:  Cleanse wound(s) with normal saline. 2.  Apply THERAHONEY HD SHEET - EXTRA GIVEN TO PATIENT - PLEASE CUT TO FIT ONLY THE OPEN WOUND. 4. COVER WITH OPTILOCK AND SECURE. 4. Change dressing DAILY. Compression:  NONE     Offloading Device:     Other Instructions:   HOME CARE TO [address] BILATERAL FEET WITH SOAP AND WATER THEN APPLY AQUAPHOR WITH EACH DRESSING CHANGE,   Keep all dressings clean, dry and intact. Keep pressure off the wound(s) at all times. Follow up visit              1 WEEK   February 7, 2023  AT      Please give 24 hour notice if unable to keep appointment. [phone]     If you experience any of the following, please call the Wound Care Service at  [phone] or go to the nearest emergency room. *Increase in pain         *Temperature over 101           *Increase in drainage from your wound or a foul odor  *Uncontrolled swelling            *Need for compression bandage changes due to slippage, breakthrough drainage       PLEASE NOTE: IF YOU ARE UNABLE TO OBTAIN WOUND SUPPLIES, CONTINUE TO USE THE SUPPLIES YOU HAVE AVAILABLE UNTIL YOU ARE ABLE TO REACH US.  IT IS MOST IMPORTANT TO KEEP THE WOUND COVERED AT ALL TIMES

## 2023-01-31 NOTE — PROGRESS NOTES
[facility] [address]                                                   Progress Note and Procedure Note      Jodi Santos RECORD NUMBER:  [id number]  AGE: 76 y.o. GENDER: male  : 1945  EPISODE DATE:  2023    Subjective:     Chief Complaint   Patient presents with    Wound Check     Left ankle         HISTORY of PRESENT ILLNESS HPI      Aj Trivedi is a 76 y.o. male who presents today for wound/ulcer evaluation. History of Wound Context: Patient presents for a reoccurrence of an non healing ulcer of the left ankle. HE states that he is tolerating the IV abx without any complication  Wound/Ulcer Pain Timing/Severity: intermittent  Quality of pain: tender  Severity:   / 10   Modifying Factors: None  Associated Signs/Symptoms: drainage, odor and pain    Ulcer Identification:  Ulcer Type: venous and undetermined  Contributing Factors: edema and venous stasis    Wound: N/A        PAST MEDICAL HISTORY        Diagnosis Date    Alcohol abuse     quit drinking 21    CAD (coronary artery disease)     patient states no doctor has told him this / has 1 cardiac stent    Cancer ([address]     lung LLL    Chronic back pain     Chronic kidney disease     Chronic obstructive pulmonary disease, unspecified COPD type ([address] 3/24/2022    Chronic sinusitis     DJD (degenerative joint disease) of knee     left knee DJD    Elevated PSA     History of blood transfusion     History of inferior wall myocardial infarction 2016    1 cardiac stent    HTN (hypertension)     meds .  1 yr    Hyperlipidemia     meds > 12 yrs    NSTEMI (non-ST elevated myocardial infarction) ([address]     Smoker        PAST SURGICAL HISTORY    Past Surgical History:   Procedure Laterality Date    ABDOMEN SURGERY      spleenectomy due to [address]      lumbar disc OR    CARDIAC SURGERY      stents    CARPAL TUNNEL RELEASE Right 2019    RIGHT CARPAL TUNNEL RELEASE performed by Kady Torrez MD at [facility] COLONOSCOPY      CORONARY ANGIOPLASTY WITH STENT PLACEMENT  2016    EYE SURGERY      to have Phaco with IOL OU 2018    HERNIA REPAIR      JOINT REPLACEMENT Left     LTHR    JOINT REPLACEMENT Right     RTKR    KNEE SURGERY Right     arthroscopy    TX MANIPULATION KNEE JOINT UNDER GENERAL ANESTHESIA Right 2017    RIGHT KNEE MANIPULATION UNDER ANESTHESIA performed by Rashaun Delvalle MD at [facility] OFFICE/OUTPT VISIT,PROCEDURE ONLY Bilateral 2017    RIGHT AND BILATERAL L 4-5 LYSIS OF SCAR DISKECTOMY INTERBODY CAGE FUSION POSTEROLATERAL FUSION PEDICLE SCREWS performed by Bebeto Kelly MD at [facility] [address]  2004    benign    [address] [place] [address] Right 3/24/2017    RIGHT  KNEE TOTAL ARTHROPLASTY, ELHAM CEMENTED PERSONA  performed by Rashaun Delvalle MD at [facility] HISTORY    Family History   Problem Relation Age of Onset    Osteoarthritis Mother     Emphysema Mother     Hypertension Father     Hearing Loss Father     Dementia Father     No Known Problems Sister     Prostate Cancer Brother     Colon Polyps Neg Hx        SOCIAL HISTORY    Social History     Tobacco Use    Smoking status: Former     Packs/day: 0.00     Years: 60.00     Pack years: 0.00     Types: Cigarettes     Quit date: 2021     Years since quittin.0    Smokeless tobacco: Never    Tobacco comments:      smokes 2 cigars a day   Vaping Use    Vaping Use: Never used   Substance Use Topics    Alcohol use: Not Currently     Alcohol/week: 12.0 standard drinks     Types: 12 Shots of liquor per week     Comment: socail drinking    Drug use: No       ALLERGIES    Allergies   Allergen Reactions    Morphine        MEDICATIONS    Current Outpatient Medications on File Prior to Encounter   Medication Sig Dispense Refill    diclofenac (VOLTAREN) 50 MG EC tablet TAKE ONE TABLET BY MOUTH TWO TIMES A DAY 60 tablet 5    ertapenem (INVANZ) 1 GM injection       gentamicin (GARAMYCIN) 0.1 % ointment Apply topically  daily. 30 g 1    gentamicin (GARAMYCIN) 0.1 % ointment Apply topically  daily. 30 g 1    oxyCODONE-acetaminophen (PERCOCET) 7.5-325 MG per tablet Take 1 tablet by mouth five times a day as needed for pain      citalopram (CELEXA) 40 MG tablet TAKE ONE TABLET BY MOUTH nightly 30 tablet 5    atorvastatin (LIPITOR) 20 MG tablet TAKE ONE TABLET BY MOUTH DAILY 90 tablet 3    clopidogrel (PLAVIX) 75 MG tablet Take 1 tablet by mouth daily 90 tablet 3    isosorbide mononitrate (IMDUR) 30 MG extended release tablet TAKE ONE TABLET BY MOUTH EVERY DAY 90 tablet 3    bumetanide (BUMEX) 2 MG tablet Take 1 tablet by mouth daily 90 tablet 3    metoprolol tartrate (LOPRESSOR) 50 MG tablet Take 0.5 tablets by mouth 2 times daily TAKE ONE TABLET BY MOUTH TWO TIMES A  tablet 3    omeprazole (PRILOSEC) 10 MG delayed release capsule TAKE ONE CAPSULE BY MOUTH DAILY 90 capsule 2    finasteride (PROSCAR) 5 MG tablet Take 1 tablet by mouth daily 90 tablet 3    tiotropium (SPIRIVA RESPIMAT) 2.5 MCG/ACT AERS inhaler Inhale 2 puffs into the lungs daily 1 Inhaler 3    Fluticasone furoate-vilanterol (BREO ELLIPTA) 200-25 MCG/INH AEPB inhaler Inhale 1 puff into the lungs daily 1 each 3    albuterol sulfate  (90 Base) MCG/ACT inhaler Inhale 2 puffs into the lungs every 6 hours as needed for Wheezing 1 Inhaler 3    nitroGLYCERIN (NITROSTAT) 0.4 MG SL tablet Place 1 tablet under the tongue every 5 minutes as needed for Chest pain up to max of 3 total doses. If no relief after 1 dose, call 911. 25 tablet 3    DOCUSATE CALCIUM PO Take by mouth as needed       No current facility-administered medications on file prior to encounter. REVIEW OF SYSTEMS    Pertinent items are noted in HPI.     Objective:      BP (!) 148/71   Pulse 78   Temp 97.4 °F (36.3 °C) (Temporal)   Resp 18     Wt Readings from Last 3 Encounters:   12/06/22 165 lb (74.8 kg)   10/14/22 166 lb 12.8 oz (75.7 kg)   08/11/22 160 lb (72.6 kg)       PHYSICAL EXAM    Constitutional:   Well nourished and well developed. Appears neat and clean. Patient is alert, oriented x3, and in no apparent distress. Respiratory:  Respiratory effort is easy and symmetric bilaterally. Rate is normal at rest and on room air. Vascular:  Pedal Pulses is palpable and audible with doppler. Capillary refill is <3 sec to digits bilateral.  Extremities positive for 1+ pitting edema. Art duplex reviewed no occlusion noted. Neurological:   Sensation is intact to lower extremities. Dermatological:  Wound description noted in wound assessment. The wound appearnace is stable  with decreased  slough, mild  pain and erythema. Granulation buds are noted. Size is larger Depth is much improved. Psychiatric:  Judgement and insight intact. Short and long term memory intact. No evidence of depression, anxiety, or agitation. Patient is calm, cooperative, and communicative. Appropriate interactions and affect. 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) [M86.9] 08/17/2021        Procedure Note  Indications:  Based on my examination of this patient's wound(s)/ulcer(s) today, debridement is required to promote healing and evaluate the wound base. Performed by: Allison Boothe DPM    Consent obtained:  Yes    Time out taken:  Yes    Pain Control:2% lidocaine gel  thin layer applied topically to wound bed      Debridement:Excisional Debridement    Using curette the wound(s)/ulcer(s) was/were sharply debrided down through and including the removal of epidermis, dermis and subcutaneous tissue.         Devitalized Tissue Debrided:  exudate    Pre Debridement Measurements:  Are located in the Wound/Ulcer Documentation Flow Sheet    Wound/Ulcer #: 1 and 2    Post Debridement Measurements:  Wound/Ulcer Descriptions are Pre Debridement except measurements:    Wound 03/25/22 Ankle Left; Anterior #1 (Active)   Wound Image   01/31/23 1605   Wound Etiology Venous 01/31/23 1605   Wound Cleansed Cleansed with saline 01/31/23 1605   Dressing/Treatment Honey alginate;Dry dressing 01/17/23 1708   Wound Length (cm) 7 cm 01/31/23 1605   Wound Width (cm) 6.8 cm 01/31/23 1605   Wound Depth (cm) 0.2 cm 01/31/23 1605   Wound Surface Area (cm^2) 47.6 cm^2 01/31/23 1605   Change in Wound Size % (l*w) -1387.5 01/31/23 1605   Wound Volume (cm^3) 9.52 cm^3 01/31/23 1605   Wound Healing % -892 01/31/23 1605   Post-Procedure Length (cm) 7 cm 01/31/23 1637   Post-Procedure Width (cm) 6.8 cm 01/31/23 1637   Post-Procedure Depth (cm) 0.25 cm 01/31/23 1637   Post-Procedure Surface Area (cm^2) 47.6 cm^2 01/31/23 1637   Post-Procedure Volume (cm^3) 11.9 cm^3 01/31/23 1637   Wound Assessment Pink/red;Slough 01/31/23 1605   Drainage Amount Large 01/31/23 1605   Drainage Description Serous; Yellow;Serosanguinous 01/31/23 1605   Odor None 01/31/23 1605   Ela-wound Assessment Fragile; Intact; Maceration 01/31/23 1605   Margins Undefined edges 01/31/23 1605   Wound Thickness Description not for Pressure Injury Full thickness 01/31/23 1605   Number of days: 312       Wound 11/22/22 Ankle Left;Lateral #3 (Active)   Wound Image   01/31/23 1605   Wound Etiology Venous 01/31/23 1605   Wound Cleansed Cleansed with saline 01/31/23 1605   Dressing/Treatment Honey alginate;Dry dressing 01/17/23 1708   Wound Length (cm) 2.8 cm 01/31/23 1605   Wound Width (cm) 1 cm 01/31/23 1605   Wound Depth (cm) 0.1 cm 01/31/23 1605   Wound Surface Area (cm^2) 2.8 cm^2 01/31/23 1605   Change in Wound Size % (l*w) 80 01/31/23 1605   Wound Volume (cm^3) 0.28 cm^3 01/31/23 1605   Wound Healing % 80 01/31/23 1605   Post-Procedure Length (cm) 2.8 cm 01/31/23 1637   Post-Procedure Width (cm) 1 cm 01/31/23 1637   Post-Procedure Depth (cm) 0.15 cm 01/31/23 1637   Post-Procedure Surface Area (cm^2) 2.8 cm^2 01/31/23 1637   Post-Procedure Volume (cm^3) 0.42 cm^3 01/31/23 1637   Wound Assessment Pink/red;Slough 01/31/23 1605   Drainage Amount Moderate 01/31/23 1605   Drainage Description Serous; Yellow 01/31/23 1605   Odor None 01/31/23 1605   Ela-wound Assessment Fragile; Maceration 01/31/23 1605   Margins Undefined edges 01/31/23 1605   Wound Thickness Description not for Pressure Injury Full thickness 01/31/23 1605   Number of days: 70        Percent of Wound/Ulcer Debrided: 25%    Total Surface Area Debrided:  12.25 sq cm     Diabetic/Pressure/Non Pressure Ulcers:  Ulcer: Non-Pressure ulcer, fat layer exposed      Bleeding:  Minimal    Hemostasis Achieved:  by pressure    Procedural Pain:  5  / 10     Post Procedural Pain:  1 / 10     Response to treatment:  Well tolerated by patient. Plan:     Patient examined and debrided  [place] HD  Continue IV abx per ID  Cigar smoking cessation emphasized  Follow up in 1 weeks   Preauth Puraply      Treatment Note please see attached Discharge Instructions    Written patient dismissal instructions given to patient and signed by patient or POA. Discharge [address] and [facility]   Physician Orders and Discharge Instructions  [address]  Telephone: [phone]        NAME:  Antonio Galvin OF BIRTH:  1945  MEDICAL RECORD NUMBER:  [id number]     Your  is:  Nicholas Oseguera [facility] Care/Facility:  [facility] CARE     Wound Location:   LEFT ANTERIOR ANKLE AND LEFT MEDIAL ANKLE  Dressing orders:  Cleanse wound(s) with normal saline. 2.  Apply THERAHONEY HD SHEET - EXTRA GIVEN TO PATIENT - PLEASE CUT TO FIT ONLY THE OPEN WOUND. 4. COVER WITH OPTILOCK AND SECURE. 4. Change dressing DAILY. Compression:  NONE     Offloading Device:     Other Instructions:   HOME CARE TO [address] BILATERAL FEET WITH SOAP AND WATER THEN APPLY AQUAPHOR WITH EACH DRESSING CHANGE,   Keep all dressings clean, dry and intact. Keep pressure off the wound(s) at all times. Follow up visit              1 WEEK   February 7, 2023  AT      Please give 24 hour notice if unable to keep appointment. [phone]     If you experience any of the following, please call the Wound Care Service at  [phone] or go to the nearest emergency room. *Increase in pain         *Temperature over 101           *Increase in drainage from your wound or a foul odor  *Uncontrolled swelling            *Need for compression bandage changes due to slippage, breakthrough drainage       PLEASE NOTE: IF YOU ARE UNABLE TO OBTAIN WOUND SUPPLIES, CONTINUE TO USE THE SUPPLIES YOU HAVE AVAILABLE UNTIL YOU ARE ABLE TO REACH US.  IT IS MOST IMPORTANT TO KEEP THE WOUND COVERED AT ALL TIMES        Electronically signed by Kelly Alfred DPM on 1/31/2023 at 4:46 PM

## 2023-02-05 LAB
CULTURE: ABNORMAL
ORGANISM: ABNORMAL
ORGANISM: ABNORMAL

## 2023-02-07 ENCOUNTER — OFFICE VISIT (OUTPATIENT)
Dept: INFECTIOUS DISEASES | Age: 78
End: 2023-02-07
Payer: MEDICARE

## 2023-02-07 ENCOUNTER — HOSPITAL ENCOUNTER (OUTPATIENT)
Dept: WOUND CARE | Age: 78
Discharge: HOME | End: 2023-02-07
Payer: MEDICARE

## 2023-02-07 VITALS
RESPIRATION RATE: 18 BRPM | BODY MASS INDEX: 24.91 KG/M2 | WEIGHT: 155 LBS | TEMPERATURE: 97.3 F | HEART RATE: 72 BPM | DIASTOLIC BLOOD PRESSURE: 70 MMHG | OXYGEN SATURATION: 95 % | SYSTOLIC BLOOD PRESSURE: 120 MMHG | HEIGHT: 66 IN

## 2023-02-07 VITALS
SYSTOLIC BLOOD PRESSURE: 106 MMHG | HEART RATE: 81 BPM | RESPIRATION RATE: 18 BRPM | DIASTOLIC BLOOD PRESSURE: 53 MMHG | TEMPERATURE: 96.7 F

## 2023-02-07 DIAGNOSIS — A49.01 MSSA (METHICILLIN SUSCEPTIBLE STAPHYLOCOCCUS AUREUS) INFECTION: ICD-10-CM

## 2023-02-07 DIAGNOSIS — Z22.322 MRSA (METHICILLIN RESISTANT STAPH AUREUS) CULTURE POSITIVE: Chronic | ICD-10-CM

## 2023-02-07 DIAGNOSIS — L97.322 NON-PRESSURE CHRONIC ULCER OF LEFT ANKLE WITH FAT LAYER EXPOSED (HCC): Primary | ICD-10-CM

## 2023-02-07 DIAGNOSIS — M86.172 OTHER ACUTE OSTEOMYELITIS OF LEFT ANKLE (HCC): Primary | ICD-10-CM

## 2023-02-07 PROCEDURE — G8417 CALC BMI ABV UP PARAM F/U: HCPCS | Performed by: INTERNAL MEDICINE

## 2023-02-07 PROCEDURE — 99213 OFFICE O/P EST LOW 20 MIN: CPT

## 2023-02-07 PROCEDURE — G8484 FLU IMMUNIZE NO ADMIN: HCPCS | Performed by: INTERNAL MEDICINE

## 2023-02-07 PROCEDURE — 3078F DIAST BP <80 MM HG: CPT | Performed by: INTERNAL MEDICINE

## 2023-02-07 PROCEDURE — 3074F SYST BP LT 130 MM HG: CPT | Performed by: INTERNAL MEDICINE

## 2023-02-07 PROCEDURE — 6370000000 HC RX 637 (ALT 250 FOR IP): Performed by: PODIATRIST

## 2023-02-07 PROCEDURE — G8427 DOCREV CUR MEDS BY ELIG CLIN: HCPCS | Performed by: INTERNAL MEDICINE

## 2023-02-07 PROCEDURE — 1123F ACP DISCUSS/DSCN MKR DOCD: CPT | Performed by: INTERNAL MEDICINE

## 2023-02-07 PROCEDURE — 99213 OFFICE O/P EST LOW 20 MIN: CPT | Performed by: INTERNAL MEDICINE

## 2023-02-07 PROCEDURE — 1036F TOBACCO NON-USER: CPT | Performed by: INTERNAL MEDICINE

## 2023-02-07 RX ORDER — LIDOCAINE HYDROCHLORIDE 20 MG/ML
JELLY TOPICAL ONCE
OUTPATIENT
Start: 2023-02-07 | End: 2023-02-07

## 2023-02-07 RX ORDER — LIDOCAINE HYDROCHLORIDE 20 MG/ML
JELLY TOPICAL ONCE
Status: COMPLETED | OUTPATIENT
Start: 2023-02-07 | End: 2023-02-07

## 2023-02-07 RX ORDER — LIDOCAINE 40 MG/G
CREAM TOPICAL ONCE
OUTPATIENT
Start: 2023-02-07 | End: 2023-02-07

## 2023-02-07 RX ORDER — GINSENG 100 MG
CAPSULE ORAL ONCE
OUTPATIENT
Start: 2023-02-07 | End: 2023-02-07

## 2023-02-07 RX ORDER — LIDOCAINE 50 MG/G
OINTMENT TOPICAL ONCE
OUTPATIENT
Start: 2023-02-07 | End: 2023-02-07

## 2023-02-07 RX ORDER — BETAMETHASONE DIPROPIONATE 0.05 %
OINTMENT (GRAM) TOPICAL ONCE
OUTPATIENT
Start: 2023-02-07 | End: 2023-02-07

## 2023-02-07 RX ORDER — GENTAMICIN SULFATE 1 MG/G
OINTMENT TOPICAL
Qty: 30 G | Refills: 1 | Status: SHIPPED | OUTPATIENT
Start: 2023-02-07

## 2023-02-07 RX ORDER — LIDOCAINE HYDROCHLORIDE 40 MG/ML
SOLUTION TOPICAL ONCE
OUTPATIENT
Start: 2023-02-07 | End: 2023-02-07

## 2023-02-07 RX ORDER — GENTAMICIN SULFATE 1 MG/G
OINTMENT TOPICAL ONCE
OUTPATIENT
Start: 2023-02-07 | End: 2023-02-07

## 2023-02-07 RX ORDER — CLOBETASOL PROPIONATE 0.5 MG/G
OINTMENT TOPICAL ONCE
OUTPATIENT
Start: 2023-02-07 | End: 2023-02-07

## 2023-02-07 RX ORDER — BACITRACIN ZINC AND POLYMYXIN B SULFATE 500; 1000 [USP'U]/G; [USP'U]/G
OINTMENT TOPICAL ONCE
OUTPATIENT
Start: 2023-02-07 | End: 2023-02-07

## 2023-02-07 RX ORDER — CEPHALEXIN 500 MG/1
500 CAPSULE ORAL 3 TIMES DAILY
COMMUNITY

## 2023-02-07 RX ORDER — BACITRACIN, NEOMYCIN, POLYMYXIN B 400; 3.5; 5 [USP'U]/G; MG/G; [USP'U]/G
OINTMENT TOPICAL ONCE
OUTPATIENT
Start: 2023-02-07 | End: 2023-02-07

## 2023-02-07 RX ORDER — GENTAMICIN SULFATE 1 MG/G
OINTMENT TOPICAL ONCE
Status: COMPLETED | OUTPATIENT
Start: 2023-02-07 | End: 2023-02-07

## 2023-02-07 RX ORDER — CEPHALEXIN 500 MG/1
500 CAPSULE ORAL 3 TIMES DAILY
Qty: 90 CAPSULE | Refills: 2 | Status: SHIPPED | OUTPATIENT
Start: 2023-02-07 | End: 2023-03-09

## 2023-02-07 RX ADMIN — GENTAMICIN SULFATE: 1 OINTMENT TOPICAL at 16:53

## 2023-02-07 RX ADMIN — LIDOCAINE HYDROCHLORIDE: 20 JELLY TOPICAL at 16:16

## 2023-02-07 ASSESSMENT — PATIENT HEALTH QUESTIONNAIRE - PHQ9
SUM OF ALL RESPONSES TO PHQ QUESTIONS 1-9: 0
1. LITTLE INTEREST OR PLEASURE IN DOING THINGS: 0
SUM OF ALL RESPONSES TO PHQ QUESTIONS 1-9: 0
SUM OF ALL RESPONSES TO PHQ9 QUESTIONS 1 & 2: 0
2. FEELING DOWN, DEPRESSED OR HOPELESS: 0

## 2023-02-07 ASSESSMENT — ENCOUNTER SYMPTOMS
GASTROINTESTINAL NEGATIVE: 1
RESPIRATORY NEGATIVE: 1

## 2023-02-07 NOTE — DISCHARGE INSTRUCTIONS
[facility]   Physician Orders and Discharge Instructions  [address]  Telephone: [phone]        NAME:  Petty Chacon OF BIRTH:  1945  MEDICAL RECORD NUMBER:  [id number]     Your  is:  Nicholas Courtney Care/Facility:  [facility] CARE     Wound Location:   LEFT ANTERIOR ANKLE AND LEFT MEDIAL ANKLE  Dressing orders:  Cleanse wound(s) with normal saline. 2.  Apply GENTAMICIN OINTMENT. 3. APPLY HYDROFERA BLUE READY. 4. COVER WITH A DRY DRESSING. 5. CHANGE DRESSING DAILY. Compression:  NONE     Offloading Device:     Other Instructions:   HOME CARE TO [facility] BILATERAL FEET WITH SOAP AND WATER THEN APPLY AQUAPHOR WITH EACH DRESSING CHANGE,   Keep all dressings clean, dry and intact. Keep pressure off the wound(s) at all times. Follow up visit      1 WEEK  February 14, 2023 AT                Please give 24 hour notice if unable to keep appointment. [phone]     If you experience any of the following, please call the Wound Care Service at  [phone] or go to the nearest emergency room. *Increase in pain         *Temperature over 101           *Increase in drainage from your wound or a foul odor  *Uncontrolled swelling            *Need for compression bandage changes due to slippage, breakthrough drainage       PLEASE NOTE: IF YOU ARE UNABLE TO OBTAIN WOUND SUPPLIES, CONTINUE TO USE THE SUPPLIES YOU HAVE AVAILABLE UNTIL YOU ARE ABLE TO REACH US.  IT IS MOST IMPORTANT TO KEEP THE WOUND COVERED AT ALL TIMES

## 2023-02-07 NOTE — CODE DOCUMENTATION
[id number] Physician Billing Sheet. Laura Ricahrd  AGE: 76 y.o.    GENDER: male  : 1945  TODAY'S DATE:  2023    ICD-10 CODES  Active Hospital Problems    Diagnosis Date Noted    MRSA (methicillin resistant staph aureus) culture positive [Z22.322] 2023     Priority: Medium    Non-pressure chronic ulcer of left ankle with fat layer exposed [facility]) [L97.322] 2021       PHYSICIAN PROCEDURES    CPT CODE  37606      Electronically signed by Sierra Aparicio DPM on 2023 at 5:34 PM

## 2023-02-07 NOTE — PROGRESS NOTES
[address]                                                   Progress Note and Procedure Note      Jocelin Boswell RECORD NUMBER:  [id number]  AGE: 76 y.o. GENDER: male  : 1945  EPISODE DATE:  2023    Subjective:     Chief Complaint   Patient presents with    Wound Check     Left ankle x 2         HISTORY of PRESENT ILLNESS HPI      Evelyn Gilbert is a 76 y.o. male who presents today for wound/ulcer evaluation. History of Wound Context: Patient presents for for non healing ulceration of the left ankle. Picc line was pulled. Now on Keflex. Last Culture positive for Heavy growth MRSA. Culture faxed to ID  Wound/Ulcer Pain Timing/Severity: intermittent  Quality of pain: tender  Severity:   10   Modifying Factors: None  Associated Signs/Symptoms: drainage, odor and pain    Ulcer Identification:  Ulcer Type: venous and undetermined  Contributing Factors: edema and venous stasis    Wound: N/A        PAST MEDICAL HISTORY        Diagnosis Date    Alcohol abuse     quit drinking 21    CAD (coronary artery disease)     patient states no doctor has told him this / has 1 cardiac stent    Cancer ([address]     lung LLL    Chronic back pain     Chronic kidney disease     Chronic obstructive pulmonary disease, unspecified COPD type ([address] 3/24/2022    Chronic sinusitis     DJD (degenerative joint disease) of knee     left knee DJD    Elevated PSA     History of blood transfusion     History of inferior wall myocardial infarction 2016    1 cardiac stent    HTN (hypertension)     meds .  1 yr    Hyperlipidemia     meds > 12 yrs    NSTEMI (non-ST elevated myocardial infarction) ([address]     Smoker        PAST SURGICAL HISTORY    Past Surgical History:   Procedure Laterality Date    ABDOMEN SURGERY      spleenectomy due to [address]      lumbar disc OR    CARDIAC SURGERY      stents    CARPAL TUNNEL RELEASE Right 2019    RIGHT CARPAL TUNNEL RELEASE performed by Peter Sosa Divine Farris MD at [address] WITH STENT PLACEMENT  2016    EYE SURGERY      to have Phaco with IOL OU 2018    HERNIA REPAIR      JOINT REPLACEMENT Left     [address] [facility]    JOINT REPLACEMENT Right     RTKR    KNEE SURGERY Right     arthroscopy    NC MANIPULATION KNEE JOINT UNDER GENERAL ANESTHESIA Right 2017    RIGHT KNEE MANIPULATION UNDER ANESTHESIA performed by Jillyn Cheadle, MD at [address] [facility] OFFICE/OUTPT VISIT,PROCEDURE ONLY Bilateral 2017    RIGHT AND BILATERAL L 4-5 LYSIS OF SCAR DISKECTOMY INTERBODY CAGE FUSION POSTEROLATERAL FUSION PEDICLE SCREWS performed by Brian Lombardo MD at [address]      benign    [address] Right 3/24/2017    RIGHT  KNEE TOTAL ARTHROPLASTY, Kandi Bidding CEMENTED PERSONA  performed by Jillyn Cheadle, MD at [address] [facility] HISTORY    Family History   Problem Relation Age of Onset    Osteoarthritis Mother     Emphysema Mother     Hypertension Father     Hearing Loss Father     Dementia Father     No Known Problems Sister     Prostate Cancer Brother     Colon Polyps Neg Hx        SOCIAL HISTORY    Social History     Tobacco Use    Smoking status: Former     Packs/day: 0.00     Years: 60.00     Pack years: 0.00     Types: Cigarettes     Quit date: 2021     Years since quittin.1    Smokeless tobacco: Never    Tobacco comments:      smokes 2 cigars a day   Vaping Use    Vaping Use: Never used   Substance Use Topics    Alcohol use: Not Currently     Alcohol/week: 12.0 standard drinks     Types: 12 Shots of liquor per week     Comment: socail drinking    Drug use: No       ALLERGIES    Allergies   Allergen Reactions    Morphine        MEDICATIONS    Current Outpatient Medications on File Prior to Encounter   Medication Sig Dispense Refill    diclofenac (VOLTAREN) 50 MG EC tablet TAKE ONE TABLET BY MOUTH TWO TIMES A DAY 60 tablet 5    gentamicin (GARAMYCIN) 0.1 % ointment Apply topically  daily. 30 g 1    gentamicin (GARAMYCIN) 0.1 % ointment Apply topically  daily. 30 g 1    oxyCODONE-acetaminophen (PERCOCET) 7.5-325 MG per tablet Take 1 tablet by mouth five times a day as needed for pain      citalopram (CELEXA) 40 MG tablet TAKE ONE TABLET BY MOUTH nightly 30 tablet 5    atorvastatin (LIPITOR) 20 MG tablet TAKE ONE TABLET BY MOUTH DAILY 90 tablet 3    clopidogrel (PLAVIX) 75 MG tablet Take 1 tablet by mouth daily 90 tablet 3    isosorbide mononitrate (IMDUR) 30 MG extended release tablet TAKE ONE TABLET BY MOUTH EVERY DAY 90 tablet 3    bumetanide (BUMEX) 2 MG tablet Take 1 tablet by mouth daily 90 tablet 3    metoprolol tartrate (LOPRESSOR) 50 MG tablet Take 0.5 tablets by mouth 2 times daily TAKE ONE TABLET BY MOUTH TWO TIMES A  tablet 3    omeprazole (PRILOSEC) 10 MG delayed release capsule TAKE ONE CAPSULE BY MOUTH DAILY 90 capsule 2    finasteride (PROSCAR) 5 MG tablet Take 1 tablet by mouth daily 90 tablet 3    tiotropium (SPIRIVA RESPIMAT) 2.5 MCG/ACT AERS inhaler Inhale 2 puffs into the lungs daily 1 Inhaler 3    Fluticasone furoate-vilanterol (BREO ELLIPTA) 200-25 MCG/INH AEPB inhaler Inhale 1 puff into the lungs daily 1 each 3    albuterol sulfate  (90 Base) MCG/ACT inhaler Inhale 2 puffs into the lungs every 6 hours as needed for Wheezing 1 Inhaler 3    nitroGLYCERIN (NITROSTAT) 0.4 MG SL tablet Place 1 tablet under the tongue every 5 minutes as needed for Chest pain up to max of 3 total doses. If no relief after 1 dose, call 911. 25 tablet 3    DOCUSATE CALCIUM PO Take by mouth as needed       No current facility-administered medications on file prior to encounter. REVIEW OF SYSTEMS    Pertinent items are noted in HPI.     Objective:      BP (!) 106/53   Pulse 81   Temp (!) 96.7 °F (35.9 °C) (Temporal)   Resp 18     Wt Readings from Last 3 Encounters:   02/07/23 155 lb (70.3 kg)   12/06/22 165 lb (74.8 kg)   10/14/22 166 lb 12.8 oz (75.7 kg) PHYSICAL EXAM    Constitutional:   Well nourished and well developed. Appears neat and clean. Patient is alert, oriented x3, and in no apparent distress. Respiratory:  Respiratory effort is easy and symmetric bilaterally. Rate is normal at rest and on room air. Vascular:  Pedal Pulses is palpable and audible with doppler. Capillary refill is <3 sec to digits bilateral.  Extremities positive for 1+ pitting edema. Art duplex reviewed no occlusion noted. Neurological:   Sensation is intact to lower extremities. Dermatological:  Wound description noted in wound assessment. The wound appearnace is stable  with decreased  slough, mild  pain and erythema. Granulation buds are noted. Size is larger Depth is much improved. Still very painful  Psychiatric:  Judgement and insight intact. Short and long term memory intact. No evidence of depression, anxiety, or agitation. Patient is calm, cooperative, and communicative. Appropriate interactions and affect. Assessment:      Active Hospital Problems    Diagnosis Date Noted    MRSA (methicillin resistant staph aureus) culture positive [Z22.322] 02/07/2023     Priority: Medium    Non-pressure chronic ulcer of left ankle with fat layer exposed [facility]) [L97.322] 08/17/2021        Procedure Note  Indications:  Based on my examination of this patient's wound(s)/ulcer(s) today, debridement is not required to promote healing and evaluate the wound base. Post Debridement Measurements:  Wound/Ulcer Descriptions are Pre Debridement except measurements:    Wound 03/25/22 Ankle Left; Anterior #1 (Active)   Wound Image   02/07/23 1611   Wound Etiology Venous 02/07/23 1611   Wound Cleansed Cleansed with saline 02/07/23 1611   Dressing/Treatment Honey alginate;Dry dressing 01/17/23 1708   Wound Length (cm) 6.6 cm 02/07/23 1611   Wound Width (cm) 6.2 cm 02/07/23 1611   Wound Depth (cm) 0.2 cm 02/07/23 1611   Wound Surface Area (cm^2) 40.92 cm^2 02/07/23 1611 Change in Wound Size % (l*w) -1178.75 02/07/23 1611   Wound Volume (cm^3) 8.184 cm^3 02/07/23 1611   Wound Healing % -753 02/07/23 1611   Post-Procedure Length (cm) 7 cm 01/31/23 1637   Post-Procedure Width (cm) 6.8 cm 01/31/23 1637   Post-Procedure Depth (cm) 0.25 cm 01/31/23 1637   Post-Procedure Surface Area (cm^2) 47.6 cm^2 01/31/23 1637   Post-Procedure Volume (cm^3) 11.9 cm^3 01/31/23 1637   Wound Assessment Pink/red;Slough 02/07/23 1611   Drainage Amount Large 02/07/23 1611   Drainage Description Yellow 02/07/23 1611   Odor None 02/07/23 1611   Ela-wound Assessment Intact;Fragile 02/07/23 1611   Margins Undefined edges 02/07/23 1611   Wound Thickness Description not for Pressure Injury Full thickness 02/07/23 1611   Number of days: 319       Wound 11/22/22 Ankle Left;Lateral #3 (Active)   Wound Image   02/07/23 1611   Wound Etiology Venous 02/07/23 1611   Wound Cleansed Cleansed with saline 02/07/23 1611   Dressing/Treatment Honey alginate;Dry dressing 01/17/23 1708   Wound Length (cm) 1.1 cm 02/07/23 1611   Wound Width (cm) 1 cm 02/07/23 1611   Wound Depth (cm) 0.2 cm 02/07/23 1611   Wound Surface Area (cm^2) 1.1 cm^2 02/07/23 1611   Change in Wound Size % (l*w) 92.14 02/07/23 1611   Wound Volume (cm^3) 0.22 cm^3 02/07/23 1611   Wound Healing % 84 02/07/23 1611   Post-Procedure Length (cm) 2.8 cm 01/31/23 1637   Post-Procedure Width (cm) 1 cm 01/31/23 1637   Post-Procedure Depth (cm) 0.15 cm 01/31/23 1637   Post-Procedure Surface Area (cm^2) 2.8 cm^2 01/31/23 1637   Post-Procedure Volume (cm^3) 0.42 cm^3 01/31/23 1637   Wound Assessment Pink/red;Slough 02/07/23 1611   Drainage Amount Moderate 02/07/23 1611   Drainage Description Yellow 02/07/23 1611   Odor None 02/07/23 1611   Ela-wound Assessment Intact;Fragile 02/07/23 1611   Margins Defined edges 02/07/23 1611   Wound Thickness Description not for Pressure Injury Full thickness 02/07/23 1611   Number of days: 77       Plan:     Patient examined and debrided  Gentamicin and Hydrofera blue  Abx per ID  MRSA  Cigar smoking cessation emphasized  Follow up in 1 week  Preauth Puraply      Treatment Note please see attached Discharge Instructions    Written patient dismissal instructions given to patient and signed by patient or POA. Discharge [facility]   Physician Orders and Discharge Instructions  [place] [address]  [facility]  Telephone: [phone]        NAME:  Selma Casas OF BIRTH:  1945  MEDICAL RECORD NUMBER:  [id number]     Your  is:  Nicholas Oumar Courtney Care/Facility:  [facility] CARE     Wound Location:   LEFT ANTERIOR ANKLE AND LEFT MEDIAL ANKLE  Dressing orders:  Cleanse wound(s) with normal saline. 2.  Apply THERAHONEY HD SHEET - EXTRA GIVEN TO PATIENT - PLEASE CUT TO FIT ONLY THE OPEN WOUND. 4. COVER WITH OPTILOCK AND SECURE. 4. Change dressing DAILY. Compression:  NONE     Offloading Device:     Other Instructions:   HOME CARE TO [facility] BILATERAL FEET WITH SOAP AND WATER THEN APPLY AQUAPHOR WITH EACH DRESSING CHANGE,   Keep all dressings clean, dry and intact. Keep pressure off the wound(s) at all times. Follow up visit                    Please give 24 hour notice if unable to keep appointment. [phone]     If you experience any of the following, please call the Wound Care Service at  [phone] or go to the nearest emergency room.         *Increase in pain         *Temperature over 101           *Increase in drainage from your wound or a foul odor  *Uncontrolled swelling            *Need for compression bandage changes due to slippage, breakthrough drainage       PLEASE NOTE: IF YOU ARE UNABLE TO OBTAIN WOUND SUPPLIES, CONTINUE TO USE THE SUPPLIES YOU HAVE AVAILABLE UNTIL YOU ARE ABLE TO REACH US.  IT IS MOST IMPORTANT TO KEEP THE WOUND COVERED AT ALL TIMES      Electronically signed by Yumiko Patel DPM on 2/7/2023 at 4:36 PM

## 2023-02-07 NOTE — PROGRESS NOTES
Infectious Disease     Patient Name: Evangelina Montero  Date: 2/7/2023  YOB: 1945  Medical Record Number: [id number]      Osteomyelitis left ankle  MSSA infection wound left leg    In 2021  Left ankle osteomyelitis  Left ankle chronic nonhealing wound  Pasteurella and procidentia infection    Received a prolonged course of IV antibiotics finished therapy November 2021  Bone scan consistent with osteomyelitis left ankle        Needed 6 weeks IV Invanz therapy then switched back to oral Keflex               11/22/22 1433    CULTURE WOUND  Abnormal   Direct Exam:  MANY GRAM POSITIVE COCCI IN CLUSTERS   Direct Exam:  RARE GRAM POSITIVE RODS   Direct Exam:  FEW GRAM POSITIVE COCCI IN CHAINS   Direct Exam:  NO NEUTROPHILS SEEN   Cult,Aerobe/Anaerobe:  No anaerobic organisms isolated at 5 days. Performed at [address] [place] [address]   [phone]     Organism BHS Group B (Strep agalacticae) Abnormal     CULTURE WOUND HEAVY GROWTH    Organism Staphylococcus aureus Abnormal     CULTURE WOUND HEAVY GROWTH   This isolate is methicillin susceptible. Resulting Agency [address] [facility]        Susceptibility    Staphylococcus aureus (2)    Antibiotic Interpretation Microscan  Method Status    benzylpenicillin Resistant >=0.5 mcg/mL BACTERIAL SUSCEPTIBILITY PANEL BY VALERY     clindamycin Resistant <=0.25 mcg/mL BACTERIAL SUSCEPTIBILITY PANEL BY VALERY     erythromycin Resistant   BACTERIAL SUSCEPTIBILITY PANEL BY VALERY     gentamicin Sensitive <=0.5 mcg/mL BACTERIAL SUSCEPTIBILITY PANEL BY VALERY      Gentamicin is used only in combination with other active agents that   test susceptible.         inducible clindamycin resistance Resistant POSITIVE mcg/mL BACTERIAL SUSCEPTIBILITY PANEL BY VALERY     oxacillin Sensitive 0.5 mcg/mL BACTERIAL SUSCEPTIBILITY PANEL BY VALERY     tetracycline Sensitive <=1 mcg/mL BACTERIAL SUSCEPTIBILITY PANEL BY VALERY     trimethoprim-sulfamethoxazole Sensitive <=10 mcg/mL BACTERIAL SUSCEPTIBILITY PANEL BY VALERY        Narrative  Performed by: [facility]  ORDER#: [id number]                          ORDERED BY: Pietro Rubio   SOURCE: Leg                                COLLECTED:  11/22/22 14:33   ANTIBIOTICS AT JANIE.:                      RECEIVED :  11/22/22 14:33      Specimen Collected: 11/22/22 14:33 EST Last Resulted: 11/28/22 08:08 EST             8/31/22 0913     CULTURE WOUND  Abnormal   Direct Exam:  NO NEUTROPHILS SEEN   Direct Exam:  MANY GRAM POSITIVE COCCI IN CLUSTERS   Direct Exam:  MODERATE GRAM POSITIVE RODS   Direct Exam:  FEW GRAM NEGATIVE RODS   Cult,Aerobe/Anaerobe:  GRAM NEGATIVE RODS   Cult,Aerobe/Anaerobe:  HEAVY GROWTH   Cult,Aerobe/Anaerobe:  GRAM NEGATIVE RODS   Cult,Aerobe/Anaerobe:  2ND COLONY TYPE   Cult,Aerobe/Anaerobe:  HEAVY GROWTH   Cult,Aerobe/Anaerobe:  GRAM NEGATIVE RODS   Cult,Aerobe/Anaerobe:  THIRD COLONY TYPE   Cult,Aerobe/Anaerobe:  HEAVY GROWTH   Cult,Aerobe/Anaerobe: This is a mixed culture of organisms, which may represent   Cult,Aerobe/Anaerobe:  colonization or contamination. Notify the   laboratory   Cult,Aerobe/Anaerobe:   within 7 days for further workup. Susceptibilities have   Cult,Aerobe/Anaerobe:  not been performed. Cult,Aerobe/Anaerobe:  No anaerobic organisms isolated at 5 days. Performed at [address] [place] [address]   [phone]     Organism Staphylococcus aureus Abnormal     CULTURE WOUND HEAVY GROWTH    Resulting Agency [address] [facility]       Review of Systems   Constitutional: Negative. Respiratory: Negative. Cardiovascular: Negative. Gastrointestinal: Negative. Skin:  Positive for wound.      Review of Systems: All 14 review of systems negative other than as stated above    Social History     Tobacco Use    Smoking status: Former     Packs/day: 0.00     Years: 60.00     Pack years: 0.00     Types: Cigarettes     Quit date: 12/30/2021     Years since quittin.1    Smokeless tobacco: Never    Tobacco comments:      smokes 2 cigars a day   Vaping Use    Vaping Use: Never used   Substance Use Topics    Alcohol use: Not Currently     Alcohol/week: 12.0 standard drinks     Types: 12 Shots of liquor per week     Comment: socail drinking    Drug use: No         Past Medical History:   Diagnosis Date    Alcohol abuse     quit drinking 19    CAD (coronary artery disease)     patient states no doctor has told him this / has 1 cardiac stent    Cancer ([facility]     lung LLL    Chronic back pain     Chronic kidney disease     Chronic obstructive pulmonary disease, unspecified COPD type ([facility] [address] 3/24/2022    Chronic sinusitis     DJD (degenerative joint disease) of knee     left knee DJD    Elevated PSA     History of blood transfusion     History of inferior wall myocardial infarction 2016    1 cardiac stent    HTN (hypertension)     meds .  1 yr    Hyperlipidemia     meds > 12 yrs    NSTEMI (non-ST elevated myocardial infarction) ([facility] [address]     Smoker            Past Surgical History:   Procedure Laterality Date    ABDOMEN SURGERY      spleenectomy due to [address]      lumbar disc OR    CARDIAC SURGERY      stents    CARPAL TUNNEL RELEASE Right 2019    RIGHT CARPAL TUNNEL RELEASE performed by Thiago Diaz MD at [address]  2016    EYE SURGERY      to have Phaco with IOL OU 2018    HERNIA REPAIR      JOINT REPLACEMENT Left     LTHR    JOINT REPLACEMENT Right     RTKR    KNEE SURGERY Right     arthroscopy    ME MANIPULATION KNEE JOINT UNDER GENERAL ANESTHESIA Right 2017    RIGHT KNEE MANIPULATION UNDER ANESTHESIA performed by Rae Ba MD at [address] OFFICE/OUTPT VISIT,PROCEDURE ONLY Bilateral 2017    RIGHT AND BILATERAL L 4-5 LYSIS OF SCAR DISKECTOMY INTERBODY CAGE FUSION POSTEROLATERAL FUSION PEDICLE SCREWS [place] [facility] [place]    PROSTATE BIOPSY  2004    benign    SPLENECTOMY  1961    TOTAL KNEE ARTHROPLASTY Right 3/24/2017    RIGHT  KNEE TOTAL ARTHROPLASTY, ELHAM CEMENTED PERSONA  performed by Stef Anna MD at [facility]         Current Outpatient Medications on File Prior to Visit   Medication Sig Dispense Refill    cephALEXin (KEFLEX) 500 MG capsule Take 500 mg by mouth 3 times daily      diclofenac (VOLTAREN) 50 MG EC tablet TAKE ONE TABLET BY MOUTH TWO TIMES A DAY 60 tablet 5    gentamicin (GARAMYCIN) 0.1 % ointment Apply topically  daily. 30 g 1    gentamicin (GARAMYCIN) 0.1 % ointment Apply topically  daily.  30 g 1    oxyCODONE-acetaminophen (PERCOCET) 7.5-325 MG per tablet Take 1 tablet by mouth five times a day as needed for pain      citalopram (CELEXA) 40 MG tablet TAKE ONE TABLET BY MOUTH nightly 30 tablet 5    atorvastatin (LIPITOR) 20 MG tablet TAKE ONE TABLET BY MOUTH DAILY 90 tablet 3    clopidogrel (PLAVIX) 75 MG tablet Take 1 tablet by mouth daily 90 tablet 3    isosorbide mononitrate (IMDUR) 30 MG extended release tablet TAKE ONE TABLET BY MOUTH EVERY DAY 90 tablet 3    bumetanide (BUMEX) 2 MG tablet Take 1 tablet by mouth daily 90 tablet 3    metoprolol tartrate (LOPRESSOR) 50 MG tablet Take 0.5 tablets by mouth 2 times daily TAKE ONE TABLET BY MOUTH TWO TIMES A  tablet 3    omeprazole (PRILOSEC) 10 MG delayed release capsule TAKE ONE CAPSULE BY MOUTH DAILY 90 capsule 2    finasteride (PROSCAR) 5 MG tablet Take 1 tablet by mouth daily 90 tablet 3    tiotropium (SPIRIVA RESPIMAT) 2.5 MCG/ACT AERS inhaler Inhale 2 puffs into the lungs daily 1 Inhaler 3    Fluticasone furoate-vilanterol (BREO ELLIPTA) 200-25 MCG/INH AEPB inhaler Inhale 1 puff into the lungs daily 1 each 3    albuterol sulfate  (90 Base) MCG/ACT inhaler Inhale 2 puffs into the lungs every 6 hours as needed for Wheezing 1 Inhaler 3    nitroGLYCERIN (NITROSTAT) 0.4 MG SL tablet Place 1 tablet under the tongue every 5 minutes as needed for Chest pain up to max of 3 total doses. If no relief after 1 dose, call 911. 25 tablet 3    DOCUSATE CALCIUM PO Take by mouth as needed       No current facility-administered medications on file prior to visit. Allergies   Allergen Reactions    Morphine          Family History   Problem Relation Age of Onset    Osteoarthritis Mother     Emphysema Mother     Hypertension Father     Hearing Loss Father     Dementia Father     No Known Problems Sister     Prostate Cancer Brother     Colon Polyps Neg Hx          Physical Exam:      Physical Exam  HENT:      Head: Atraumatic. Cardiovascular:      Heart sounds: No murmur heard. Pulmonary:      Effort: Pulmonary effort is normal. No respiratory distress. Breath sounds: Normal breath sounds. No wheezing, rhonchi or rales. Abdominal:      General: Bowel sounds are normal. There is no distension. Palpations: There is no mass. Tenderness: There is no abdominal tenderness. There is no guarding or rebound. Hernia: No hernia is present. Skin:         Neurological:      Mental Status: He is alert. .   Lab Results   Component Value Date    WBC 11.6 (H) 01/14/2022    HGB 10.4 (L) 01/14/2022    HCT 32.0 (L) 01/14/2022    MCV 96.2 01/14/2022     01/14/2022     Lab Results   Component Value Date/Time     10/14/2022 11:00 AM    K 5.4 10/14/2022 11:00 AM    K 4.2 10/15/2021 10:22 AM    CL 97 10/14/2022 11:00 AM    CO2 27 10/14/2022 11:00 AM    BUN 33 10/14/2022 11:00 AM    CREATININE 0.93 10/14/2022 11:00 AM    GLUCOSE 86 10/14/2022 11:00 AM    CALCIUM 9.7 10/14/2022 11:00 AM          EXAMINATION:   THREE PHASE BONE SCAN       12/13/2022 9:42 am       TECHNIQUE:   The patient was injected intravenously with 24.7 mCi of 99 mTc MDP. Initial   blood flow and pool images of the ankles and feet were acquired. After 3   hours, delayed bone images were acquired.        COMPARISON:   Patient did not have previous imaging studies for comparison. HISTORY:   ORDERING SYSTEM PROVIDED HISTORY: MSSA (methicillin susceptible   Staphylococcus aureus) infection   TECHNOLOGIST PROVIDED HISTORY:   Reason for exam:->Osteomyelitis left ankle left tibia   What reading provider will be dictating this exam?->CRC       FINDINGS:   In the flow phase, intense asymmetric activity is demonstrated at the left   ankle and foot. In the blood pool phase, moderate asymmetric activity is seen at the left   lower leg and foot, greatest at the posterior foot. In the delayed phase, intense focal asymmetric activity is demonstrated at   the expected location of the left subtalar joint. A lesser degree of   activity is demonstrated at the mid feet bilaterally and right great toe. Mild lateral curvature of the thoracolumbar spine. Activity along the lumbar   spine is likely degenerative as a consequence. Moderate asymmetric activity is seen at the right shoulder. Activity at left shoulder, elbows, wrists, right hip, left knee, likely   degenerative. Photopenia is seen related to left hip and right knee arthroplasties, without   marked adjacent activity. Physiologic activity is seen within the kidneys and urinary bladder. Impression   Intense activity at the expected location of the left subtalar joint, with   active inflammation, which can reflect osteomyelitis given the clinical   suspicion. Additional entities which can have this appearance include   arthropathy with active inflammation or healing trauma. Moderate asymmetric activity at the right shoulder, which could be due to   asymmetric arthropathy, trauma, or metastatic disease. Radiograph could be   performed for further assessment as warranted. Unremarkable appearance of left hip and right knee arthroplasties.              ASSESSMENT:  Patient Active Problem List   Diagnosis    Tobacco use    Hip pain    Knee pain    Chronic back pain    Elevated PSA    Primary osteoarthritis of right knee    Spondylosis of lumbar region without myelopathy or radiculopathy    Sacroiliitis, not elsewhere classified ([facility] [address]    Pure hypercholesterolemia    Charcot's joint of left ankle    Left ankle pain    Delirium due to general medical condition    DDD (degenerative disc disease), lumbar    Osteoarthritis of spine with myelopathy, lumbosacral region    Chronic bilateral low back pain with bilateral sciatica    Postlaminectomy syndrome, lumbar region    Acquired spondylolisthesis of lumbosacral region    Spinal stenosis of lumbar region with neurogenic claudication    HNP (herniated nucleus pulposus), lumbar    Spondylolisthesis at L4-L5 level    Anesthesia    Herniated nucleus pulposus, L4-5    NSTEMI (non-ST elevated myocardial infarction) ([facility])    S/P PTCA (percutaneous transluminal coronary angioplasty)    Right upper quadrant abdominal pain    Gallbladder polyp    Biliary dyskinesia    Carpal tunnel syndrome on right    Carpal tunnel syndrome on left    Angina effort    Dyslipidemia    FELDER (dyspnea on exertion)    CAD (coronary artery disease)    Chest pain    Lung nodule seen on imaging study    Bilateral carotid artery stenosis    Essential hypertension    NSCLC of left lung ([facility])    Ataxic gait    Dizziness    Fracture, Colles, right, closed    Heroin abuse ([address]    Non-pressure chronic ulcer of left ankle with fat layer exposed ([address]    Atherosclerosis of native arteries of extremities with rest pain, left leg (HCC)    Subacute osteomyelitis, left ankle and foot ([facility])    Osteomyelitis of ankle ([facility])    Hyponatremia    Frequent falls    Stage 3 chronic kidney disease, unspecified whether stage 3a or 3b CKD (HCC)    Fracture of right humerus    Hyperkalemia    Leukocytosis    Anemia    Acute hematogenous osteomyelitis, left ankle and foot ([facility])    Traumatic hematoma of right shoulder    Head injury    Humeral head fracture, right, closed, initial encounter    Pelvic hematoma, male, after a fall    Cause of injury, accidental fall, initial encounter    Tremor of unknown origin    Sundowning    Acute pain due to trauma    Disequilibrium    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

## 2023-02-07 NOTE — CODE DOCUMENTATION
[id number] [facility] Billing Sheet. Pola Martinezyaz  AGE: 76 y.o.    GENDER: male  : 1945  TODAY'S DATE:  2023    ICD-10 CODES  Active Hospital Problems    Diagnosis Date Noted    MRSA (methicillin resistant staph aureus) culture positive [Z22.322] 2023     Priority: Medium    Non-pressure chronic ulcer of left ankle with fat layer exposed [facility]) [L97.322] 2021       PHYSICIAN PROCEDURES    CPT CODE  21604      Electronically signed by Juanito Herrera DPM on 2023 at 4:42 PM

## 2023-02-14 ENCOUNTER — HOSPITAL ENCOUNTER (OUTPATIENT)
Dept: WOUND CARE | Age: 78
Discharge: HOME | End: 2023-02-14
Payer: MEDICARE

## 2023-02-14 ENCOUNTER — OFFICE VISIT (OUTPATIENT)
Dept: CARDIOLOGY CLINIC | Age: 78
End: 2023-02-14
Payer: MEDICARE

## 2023-02-14 VITALS
SYSTOLIC BLOOD PRESSURE: 124 MMHG | OXYGEN SATURATION: 97 % | DIASTOLIC BLOOD PRESSURE: 56 MMHG | WEIGHT: 158 LBS | HEART RATE: 65 BPM | BODY MASS INDEX: 25.5 KG/M2

## 2023-02-14 VITALS
DIASTOLIC BLOOD PRESSURE: 55 MMHG | RESPIRATION RATE: 16 BRPM | SYSTOLIC BLOOD PRESSURE: 123 MMHG | TEMPERATURE: 97.5 F | HEART RATE: 71 BPM

## 2023-02-14 DIAGNOSIS — E78.5 DYSLIPIDEMIA: ICD-10-CM

## 2023-02-14 DIAGNOSIS — R60.9 DEPENDENT EDEMA: ICD-10-CM

## 2023-02-14 DIAGNOSIS — I21.4 NSTEMI (NON-ST ELEVATED MYOCARDIAL INFARCTION) (HCC): ICD-10-CM

## 2023-02-14 DIAGNOSIS — I25.119 CORONARY ARTERY DISEASE INVOLVING NATIVE CORONARY ARTERY OF NATIVE HEART WITH ANGINA PECTORIS (HCC): ICD-10-CM

## 2023-02-14 DIAGNOSIS — Z98.61 S/P PTCA (PERCUTANEOUS TRANSLUMINAL CORONARY ANGIOPLASTY): Primary | ICD-10-CM

## 2023-02-14 DIAGNOSIS — I10 ESSENTIAL HYPERTENSION: ICD-10-CM

## 2023-02-14 DIAGNOSIS — I70.222 ATHEROSCLEROSIS OF NATIVE ARTERIES OF EXTREMITIES WITH REST PAIN, LEFT LEG (HCC): ICD-10-CM

## 2023-02-14 DIAGNOSIS — E78.00 PURE HYPERCHOLESTEROLEMIA: ICD-10-CM

## 2023-02-14 DIAGNOSIS — L97.322 NON-PRESSURE CHRONIC ULCER OF LEFT ANKLE WITH FAT LAYER EXPOSED (HCC): Primary | ICD-10-CM

## 2023-02-14 DIAGNOSIS — R06.09 DOE (DYSPNEA ON EXERTION): ICD-10-CM

## 2023-02-14 PROCEDURE — 3074F SYST BP LT 130 MM HG: CPT | Performed by: INTERNAL MEDICINE

## 2023-02-14 PROCEDURE — 99213 OFFICE O/P EST LOW 20 MIN: CPT

## 2023-02-14 PROCEDURE — 1036F TOBACCO NON-USER: CPT | Performed by: INTERNAL MEDICINE

## 2023-02-14 PROCEDURE — 1123F ACP DISCUSS/DSCN MKR DOCD: CPT | Performed by: INTERNAL MEDICINE

## 2023-02-14 PROCEDURE — 3078F DIAST BP <80 MM HG: CPT | Performed by: INTERNAL MEDICINE

## 2023-02-14 PROCEDURE — G8417 CALC BMI ABV UP PARAM F/U: HCPCS | Performed by: INTERNAL MEDICINE

## 2023-02-14 PROCEDURE — G8484 FLU IMMUNIZE NO ADMIN: HCPCS | Performed by: INTERNAL MEDICINE

## 2023-02-14 PROCEDURE — 99214 OFFICE O/P EST MOD 30 MIN: CPT | Performed by: INTERNAL MEDICINE

## 2023-02-14 PROCEDURE — G8427 DOCREV CUR MEDS BY ELIG CLIN: HCPCS | Performed by: INTERNAL MEDICINE

## 2023-02-14 PROCEDURE — 6370000000 HC RX 637 (ALT 250 FOR IP): Performed by: PODIATRIST

## 2023-02-14 RX ORDER — BACITRACIN, NEOMYCIN, POLYMYXIN B 400; 3.5; 5 [USP'U]/G; MG/G; [USP'U]/G
OINTMENT TOPICAL ONCE
OUTPATIENT
Start: 2023-02-14 | End: 2023-02-14

## 2023-02-14 RX ORDER — BACITRACIN ZINC AND POLYMYXIN B SULFATE 500; 1000 [USP'U]/G; [USP'U]/G
OINTMENT TOPICAL ONCE
OUTPATIENT
Start: 2023-02-14 | End: 2023-02-14

## 2023-02-14 RX ORDER — GENTAMICIN SULFATE 1 MG/G
OINTMENT TOPICAL ONCE
Status: COMPLETED | OUTPATIENT
Start: 2023-02-14 | End: 2023-02-14

## 2023-02-14 RX ORDER — LIDOCAINE HYDROCHLORIDE 40 MG/ML
SOLUTION TOPICAL ONCE
OUTPATIENT
Start: 2023-02-14 | End: 2023-02-14

## 2023-02-14 RX ORDER — LIDOCAINE 40 MG/G
CREAM TOPICAL ONCE
OUTPATIENT
Start: 2023-02-14 | End: 2023-02-14

## 2023-02-14 RX ORDER — CLOBETASOL PROPIONATE 0.5 MG/G
OINTMENT TOPICAL ONCE
OUTPATIENT
Start: 2023-02-14 | End: 2023-02-14

## 2023-02-14 RX ORDER — LIDOCAINE HYDROCHLORIDE 20 MG/ML
JELLY TOPICAL ONCE
OUTPATIENT
Start: 2023-02-14 | End: 2023-02-14

## 2023-02-14 RX ORDER — BETAMETHASONE DIPROPIONATE 0.05 %
OINTMENT (GRAM) TOPICAL ONCE
OUTPATIENT
Start: 2023-02-14 | End: 2023-02-14

## 2023-02-14 RX ORDER — GENTAMICIN SULFATE 1 MG/G
OINTMENT TOPICAL ONCE
OUTPATIENT
Start: 2023-02-14 | End: 2023-02-14

## 2023-02-14 RX ORDER — GINSENG 100 MG
CAPSULE ORAL ONCE
OUTPATIENT
Start: 2023-02-14 | End: 2023-02-14

## 2023-02-14 RX ORDER — LIDOCAINE 50 MG/G
OINTMENT TOPICAL ONCE
OUTPATIENT
Start: 2023-02-14 | End: 2023-02-14

## 2023-02-14 RX ORDER — LIDOCAINE 40 MG/G
CREAM TOPICAL ONCE
Status: COMPLETED | OUTPATIENT
Start: 2023-02-14 | End: 2023-02-14

## 2023-02-14 RX ADMIN — LIDOCAINE 4%: 4 CREAM TOPICAL at 16:01

## 2023-02-14 RX ADMIN — GENTAMICIN SULFATE: 1 OINTMENT TOPICAL at 16:46

## 2023-02-14 ASSESSMENT — ENCOUNTER SYMPTOMS
COUGH: 0
EYES NEGATIVE: 1
CHEST TIGHTNESS: 0
NAUSEA: 0
GASTROINTESTINAL NEGATIVE: 1
WHEEZING: 0
STRIDOR: 0
SHORTNESS OF BREATH: 1
BLOOD IN STOOL: 0

## 2023-02-14 NOTE — DISCHARGE INSTR - COC
Continuity of Care Form    Patient Name: Etta Ac   :  1945  MRN:  [id number]    Admit date:  2023  Discharge date:  ***    Code Status Order: Prior   Advance Directives:     Admitting Physician:  No admitting provider for patient encounter. PCP: Montana Frank MD    Discharging Nurse: [facility] Unit/Room#: No information available for this encounter. Discharging Unit Phone Number: ***    Emergency Contact:   Extended Emergency Contact Information  Primary Emergency Contact: Denise Wilkins [address] Phone: [phone]  Work Phone: [phone]  Mobile Phone: [phone]  Relation: Brother/Sister   needed? No  Secondary Emergency Contact: Carly Cade [address] Phone: [phone]  Work Phone: [phone]  Mobile Phone: [phone]  Relation: Child   needed?  No    Past Surgical History:  Past Surgical History:   Procedure Laterality Date    ABDOMEN SURGERY      spleenectomy due to [address]      lumbar disc OR    CARDIAC SURGERY      stents    CARPAL TUNNEL RELEASE Right 2019    RIGHT CARPAL TUNNEL RELEASE performed by Elizabet Duenas MD at [address] [facility] [address]  2016    EYE SURGERY      to have Phaco with IOL OU 2018    HERNIA REPAIR      JOINT REPLACEMENT Left     LTHR    JOINT REPLACEMENT Right     RTKR    KNEE SURGERY Right     arthroscopy    VT MANIPULATION KNEE JOINT UNDER GENERAL ANESTHESIA Right 2017    RIGHT KNEE MANIPULATION UNDER ANESTHESIA performed by Betsy Mahoney MD at [address] [facility] OFFICE/OUTPT [facility] Bilateral 2017    RIGHT AND BILATERAL L 4-5 LYSIS OF SCAR DISKECTOMY INTERBODY CAGE FUSION POSTEROLATERAL FUSION PEDICLE SCREWS performed by Elizabet Duenas MD at [address]  2004    benign    [address] [place] [address] Right 3/24/2017    RIGHT  KNEE TOTAL Bere Stoner CEMENTED PERSONA  performed by Bahman Garay MD at [facility]       Immunization History:   Immunization History   Administered Date(s) Administered    Pneumococcal Conjugate 13-valent (Marlon Heriberto) 04/20/2017    Pneumococcal Polysaccharide ([id number]) 05/08/2014       Active Problems:  Patient Active Problem List   Diagnosis Code    Tobacco use Z72.0    Hip pain M25.559    Knee pain M25.569    Chronic back pain M54.9, G89.29    Elevated PSA R97.20    Primary osteoarthritis of right knee M17.11    Spondylosis of lumbar region without myelopathy or radiculopathy M47.816    Sacroiliitis, not elsewhere classified ([facility] M46.1    Pure hypercholesterolemia E78.00    Charcot's joint of left ankle M14.672    Left ankle pain M25.572    Delirium due to general medical condition F05    DDD (degenerative disc disease), lumbar M51.36    Osteoarthritis of spine with myelopathy, lumbosacral region M47.16    Chronic bilateral low back pain with bilateral sciatica M54.42, M54.41, G89.29    Postlaminectomy syndrome, lumbar region M96.1    Acquired spondylolisthesis of lumbosacral region M43.17    Spinal stenosis of lumbar region with neurogenic claudication M48.062    HNP (herniated nucleus pulposus), lumbar M51.26    Spondylolisthesis at L4-L5 level M43.16    Anesthesia R20.0    Herniated nucleus pulposus, L4-5 M51.26    NSTEMI (non-ST elevated myocardial infarction) (HCC) I21.4    S/P PTCA (percutaneous transluminal coronary angioplasty) Z98.61    Right upper quadrant abdominal pain R10.11    Gallbladder polyp K82.4    Biliary dyskinesia K82.8    Carpal tunnel syndrome on right G56.01    Carpal tunnel syndrome on left G56.02    Angina effort I20.8    Dyslipidemia E78.5    FELDER (dyspnea on exertion) R06.09    CAD (coronary artery disease) I25.10    Chest pain R07.9    Lung nodule seen on imaging study R91.1    Bilateral carotid artery stenosis I65.23    Essential hypertension I10    NSCLC of left lung ([facility] C34.92    Ataxic gait R26.0    Dizziness R42    Fracture, Col[place], right, closed S52.531A    Heroin abuse ([facility]) F11.10    Non-pressure chronic ulcer of left ankle with fat layer exposed ([facility] L97.322    Atherosclerosis of native arteries of extremities with rest pain, left leg ([facility]) I70.222    Subacute osteomyelitis, left ankle and foot ([facility]) M86.272    Osteomyelitis of ankle ([facility]) M86.9    Hyponatremia E87.1    Frequent falls R29.6    Stage 3 chronic kidney disease, unspecified whether stage 3a or 3b CKD ([facility]) N18.30    Fracture of right humerus S42.301A    Hyperkalemia E87.5    Leukocytosis D72.829    Anemia D64.9    Acute hematogenous osteomyelitis, left ankle and foot ([facility]) M86.072    Traumatic hematoma of right shoulder S40.011A    Head injury S09.90XA    Humeral head fracture, right, closed, initial encounter S42.291A    Pelvic hematoma, male, after a fall N50.1    Cause of injury, accidental fall, initial encounter W19. 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) M86.172    'light-for-dates' infant with signs of fetal malnutrition P05.00    MRSA (methicillin resistant staph aureus) culture positive Z22.322       Isolation/Infection:   Isolation            No Isolation          Patient Infection Status       Infection Onset Added Last Indicated Last Indicated By Review Planned Expiration Resolved Resolved By    MRSA 07/19/22 07/23/22 01/31/23 Culture, Tissue        Resolved    COVID-19 (Rule Out) 10/15/21 10/15/21 10/15/21 COVID-19, Rapid (Ordered)   10/15/21 Rule-Out Test Resulted    COVID-19 (Rule Out) 11/18/20 11/18/20 11/18/20 COVID-19 Ambulatory (Ordered)   11/21/20 Rule-Out Test Resulted            Nurse Assessment:  Last Vital Signs: BP (!) 123/55   Pulse 71   Temp 97.5 °F (36.4 °C) (Temporal)   Resp 16     Last documented pain score (0-10 scale):    Last Weight:   Wt Readings from Last 1 Encounters:   23 158 lb (71.7 kg)     Mental Status:  {IP PT MENTAL STATUS:}    IV Access:  { RAJINDER IV ACCESS:627184808}    Nursing Mobility/ADLs:  Walking   {CHP DME UJGX:024958826}  Transfer  {CHP DME IKML:289634636}  Bathing  {CHP DME JSN}  Dressing  {CHP DME KRQV:834124384}  Toileting  {CHP DME IKRP:244934193}  Feeding  {CHP DME VGDB:922169027}  Med Admin  {CHP DME UXHO:347581133}  Med Delivery   { RAJINDER MED Delivery:435858691}    Wound Care Documentation and Therapy:  Wound 22 Ankle Left; Anterior #1 (Active)   Wound Image   23 1559   Wound Etiology Venous 23 1559   Wound Cleansed Cleansed with saline 23 1559   Dressing/Treatment Pharmaceutical agent (see MAR); Hydrofera blue;Dry dressing 23 1634   Wound Length (cm) 6.4 cm 23 1559   Wound Width (cm) 6.1 cm 23 1559   Wound Depth (cm) 0.2 cm 23 1559   Wound Surface Area (cm^2) 39.04 cm^2 23 1559   Change in Wound Size % (l*w) -1120 23 1559   Wound Volume (cm^3) 7.808 cm^3 23 1559   Wound Healing % -713 23 1559   Post-Procedure Length (cm) 7 cm 23 1637   Post-Procedure Width (cm) 6.8 cm 23 1637   Post-Procedure Depth (cm) 0.25 cm 23 1637   Post-Procedure Surface Area (cm^2) 47.6 cm^2 23 1637   Post-Procedure Volume (cm^3) 11.9 cm^3 23 1637   Wound Assessment Pink/red;Slough 23 1559   Drainage Amount Large 23 1559   Drainage Description Yellow 23 1559   Odor None 23 1559   Ela-wound Assessment Maceration 23 1559   Margins Defined edges 23 1559   Wound Thickness Description not for Pressure Injury Full thickness 23 1559   Number of days: 326       Wound 22 Ankle Left;Lateral #3 (Active)   Wound Image   02/14/23 1559   Wound Etiology Venous 02/14/23 1559   Wound Cleansed Cleansed with saline 02/14/23 1559   Dressing/Treatment Pharmaceutical agent (see MAR); Hydrofera blue;Dry dressing 02/14/23 1634   Wound Length (cm) 0.9 cm 02/14/23 1559   Wound Width (cm) 0.7 cm 02/14/23 1559   Wound Depth (cm) 0.1 cm 02/14/23 1559   Wound Surface Area (cm^2) 0.63 cm^2 02/14/23 1559   Change in Wound Size % (l*w) 95.5 02/14/23 1559   Wound Volume (cm^3) 0.063 cm^3 02/14/23 1559   Wound Healing % 96 02/14/23 1559   Post-Procedure Length (cm) 2.8 cm 01/31/23 1637   Post-Procedure Width (cm) 1 cm 01/31/23 1637   Post-Procedure Depth (cm) 0.15 cm 01/31/23 1637   Post-Procedure Surface Area (cm^2) 2.8 cm^2 01/31/23 1637   Post-Procedure Volume (cm^3) 0.42 cm^3 01/31/23 1637   Wound Assessment Pink/red;Slough 02/14/23 1559   Drainage Amount Moderate 02/14/23 1559   Drainage Description Yellow 02/14/23 1559   Odor None 02/14/23 1559   Ela-wound Assessment Intact;Fragile 02/14/23 1559   Margins Defined edges 02/14/23 1559   Wound Thickness Description not for Pressure Injury Full thickness 02/14/23 1559   Number of days: 84       Incision 05/06/19 Wrist Right (Active)   Number of days: 1380        Elimination:  Continence: Bowel: {YES / IF:10038}  Bladder: {YES / FV:27528}  Urinary Catheter: {Urinary Catheter:180974587}   Colostomy/Ileostomy/Ileal Conduit: {YES / UU:71981}       Date of Last BM: ***  No intake or output data in the 24 hours ending 02/14/23 1728  No intake/output data recorded.     Safety Concerns:     [address] Safety Concerns:180035205}    Impairments/Disabilities:      [address] Impairments/Disabilities:541773609}    Nutrition Therapy:  Current Nutrition Therapy:   [address] Osborn RAJINDER Diet List:308870218}    Routes of Feeding: {CHP DME Other Feedings:235617145}  Liquids: {Slp liquid thickness:92514}  Daily Fluid Restriction: {CHP DME Yes amt example:945196944}  Last Modified Barium Swallow with Video (Video Swallowing Test): {Done Not Done OEWV:746122094}    Treatments at the Time of Hospital Discharge:   Respiratory Treatments: ***  Oxygen Therapy:  {Therapy; copd oxygen:61827}  Ventilator:    {MH CC Vent LNBZ:835973550}    Rehab Therapies: {THERAPEUTIC INTERVENTION:5948848735}  Weight Bearing Status/Restrictions: 50Verena GRISSOM Weight Bearin}  Other Medical Equipment (for information only, NOT a DME order):  {EQUIPMENT:639918723}  Other Treatments: ***    Patient's personal belongings (please select all that are sent with patient):  {[facility] DME Belongings:369156389}    RN SIGNATURE:  {Esignature:801515916}    CASE MANAGEMENT/SOCIAL WORK SECTION    Inpatient Status Date: ***    Readmission Risk Assessment Score:  Readmission Risk              Risk of Unplanned Readmission:  0           Discharging to Facility/ Agency   Name:   Address:  Phone:  Fax:    Dialysis Facility (if applicable)   Name:  Address:  Dialysis Schedule:  Phone:  Fax:    / signature: {Esignature:440328534}    PHYSICIAN SECTION    Prognosis: {Prognosis:3730413552}    Condition at Discharge: [address]in Osborn Patient Condition:710326356}    Rehab Potential (if transferring to Rehab): {Prognosis:0334749121}    Recommended Labs or Other Treatments After Discharge: ***    Physician Certification: I certify the above information and transfer of Concepcion Kearns  is necessary for the continuing treatment of the diagnosis listed and that he requires {Admit to Appropriate Level of Care:81244} for {GREATER/LESS:899167212} 30 days.      Update Admission H&P: {P DME Changes in [facility]:433046977}    PHYSICIAN SIGNATURE:  {Esignature:343129149}

## 2023-02-14 NOTE — CODE DOCUMENTATION
[facility] Physician Billing Sheet. Wen Gleason  AGE: 76 y.o.    GENDER: male  : 1945  TODAY'S DATE:  2023    ICD-10 CODES  Active Hospital Problems    Diagnosis Date Noted    Non-pressure chronic ulcer of left ankle with fat layer exposed [facility]) [L97.322] 2021       PHYSICIAN PROCEDURES    CPT CODE  80287      Electronically signed by Miguel Adam DPM on 2023 at 4:35 PM

## 2023-02-14 NOTE — PROGRESS NOTES
[facility] [address]                                                   Progress Note and Procedure Note      Laura Smith RECORD NUMBER:  [id number]  AGE: 76 y.o. GENDER: male  : 1945  EPISODE DATE:  2023    Subjective:     Chief Complaint   Patient presents with    Wound Check     Left ankle x2          HISTORY of PRESENT ILLNESS HPI      Diandra Zaragoza is a 76 y.o. male who presents today for wound/ulcer evaluation. History of Wound Context: Patient presents for for non healing ulceration of the left ankle. Picc line was pulled. Now on Keflex. Last Culture positive for Heavy growth MRSA. Culture faxed to ID  Wound/Ulcer Pain Timing/Severity: intermittent  Quality of pain: tender  Severity:  4 / 10   Modifying Factors: None  Associated Signs/Symptoms: drainage, odor and pain    Ulcer Identification:  Ulcer Type: venous and undetermined  Contributing Factors: edema and venous stasis    Wound: N/A        PAST MEDICAL HISTORY        Diagnosis Date    Alcohol abuse     quit drinking 21    CAD (coronary artery disease)     patient states no doctor has told him this / has 1 cardiac stent    Cancer ([address]     lung LLL    Chronic back pain     Chronic kidney disease     Chronic obstructive pulmonary disease, unspecified COPD type ([address] 3/24/2022    Chronic sinusitis     DJD (degenerative joint disease) of knee     left knee DJD    Elevated PSA     History of blood transfusion     History of inferior wall myocardial infarction 2016    1 cardiac stent    HTN (hypertension)     meds .  1 yr    Hyperlipidemia     meds > 12 yrs    NSTEMI (non-ST elevated myocardial infarction) ([address]     Smoker        PAST SURGICAL HISTORY    Past Surgical History:   Procedure Laterality Date    ABDOMEN SURGERY      spleenectomy due to [address]      lumbar disc OR    CARDIAC SURGERY      stents    CARPAL TUNNEL RELEASE Right 2019    RIGHT CARPAL TUNNEL RELEASE performed by Rosalva Shankar Jackelyn Massey MD at [address] [facility] [address]  2016    EYE SURGERY      to have Phaco with IOL OU 2018    HERNIA REPAIR      JOINT REPLACEMENT Left     [facility]    JOINT REPLACEMENT Right     RTKR    KNEE SURGERY Right     arthroscopy    [facility] MANIPULATION KNEE JOINT UNDER GENERAL ANESTHESIA Right 2017    RIGHT KNEE MANIPULATION UNDER ANESTHESIA performed by Kamron Wayne MD at [address] [facility] OFFICE/OUTPT VISIT,PROCEDURE ONLY Bilateral 2017    RIGHT AND BILATERAL L 4-5 LYSIS OF SCAR DISKECTOMY INTERBODY CAGE FUSION POSTEROLATERAL FUSION PEDICLE SCREWS performed by Omi Arambula MD at [address]      benign    [facility] Right 3/24/2017    RIGHT  KNEE TOTAL ARTHROPLASTY, ELHAM CEMENTED PERSONA  performed by Kamron Wayne MD at [facility] HISTORY    Family History   Problem Relation Age of Onset    Osteoarthritis Mother     Emphysema Mother     Hypertension Father     Hearing Loss Father     Dementia Father     No Known Problems Sister     Prostate Cancer Brother     Colon Polyps Neg Hx        SOCIAL HISTORY    Social History     Tobacco Use    Smoking status: Former     Packs/day: 0.00     Years: 60.00     Pack years: 0.00     Types: Cigarettes     Quit date: 2021     Years since quittin.1    Smokeless tobacco: Never    Tobacco comments:      smokes 2 cigars a day   Vaping Use    Vaping Use: Never used   Substance Use Topics    Alcohol use: Not Currently     Alcohol/week: 12.0 standard drinks     Types: 12 Shots of liquor per week     Comment: socail drinking    Drug use: No       ALLERGIES    Allergies   Allergen Reactions    Morphine        MEDICATIONS    Current Outpatient Medications on File Prior to Encounter   Medication Sig Dispense Refill    cephALEXin (KEFLEX) 500 MG capsule Take 500 mg by mouth 3 times daily      cephALEXin (KEFLEX) 500 MG capsule Take 1 capsule by mouth 3 times daily 90 capsule 2    gentamicin (GARAMYCIN) 0.1 % ointment Apply topically  daily. 30 g 1    diclofenac (VOLTAREN) 50 MG EC tablet TAKE ONE TABLET BY MOUTH TWO TIMES A DAY 60 tablet 5    oxyCODONE-acetaminophen (PERCOCET) 7.5-325 MG per tablet Take 1 tablet by mouth five times a day as needed for pain      citalopram (CELEXA) 40 MG tablet TAKE ONE TABLET BY MOUTH nightly 30 tablet 5    atorvastatin (LIPITOR) 20 MG tablet TAKE ONE TABLET BY MOUTH DAILY 90 tablet 3    clopidogrel (PLAVIX) 75 MG tablet Take 1 tablet by mouth daily 90 tablet 3    isosorbide mononitrate (IMDUR) 30 MG extended release tablet TAKE ONE TABLET BY MOUTH EVERY DAY 90 tablet 3    bumetanide (BUMEX) 2 MG tablet Take 1 tablet by mouth daily 90 tablet 3    metoprolol tartrate (LOPRESSOR) 50 MG tablet Take 0.5 tablets by mouth 2 times daily TAKE ONE TABLET BY MOUTH TWO TIMES A  tablet 3    omeprazole (PRILOSEC) 10 MG delayed release capsule TAKE ONE CAPSULE BY MOUTH DAILY 90 capsule 2    finasteride (PROSCAR) 5 MG tablet Take 1 tablet by mouth daily 90 tablet 3    tiotropium (SPIRIVA RESPIMAT) 2.5 MCG/ACT AERS inhaler Inhale 2 puffs into the lungs daily 1 Inhaler 3    Fluticasone furoate-vilanterol (BREO ELLIPTA) 200-25 MCG/INH AEPB inhaler Inhale 1 puff into the lungs daily 1 each 3    albuterol sulfate  (90 Base) MCG/ACT inhaler Inhale 2 puffs into the lungs every 6 hours as needed for Wheezing 1 Inhaler 3    nitroGLYCERIN (NITROSTAT) 0.4 MG SL tablet Place 1 tablet under the tongue every 5 minutes as needed for Chest pain up to max of 3 total doses. If no relief after 1 dose, call 911. 25 tablet 3    DOCUSATE CALCIUM PO Take by mouth as needed       No current facility-administered medications on file prior to encounter. REVIEW OF SYSTEMS    Pertinent items are noted in HPI.     Objective:      BP (!) 123/55   Pulse 71   Temp 97.5 °F (36.4 °C) (Temporal)   Resp 16     Wt Readings from Last 3 Encounters: 02/14/23 158 lb (71.7 kg)   02/07/23 155 lb (70.3 kg)   12/06/22 165 lb (74.8 kg)       PHYSICAL EXAM    Constitutional:   Well nourished and well developed. Appears neat and clean. Patient is alert, oriented x3, and in no apparent distress. Respiratory:  Respiratory effort is easy and symmetric bilaterally. Rate is normal at rest and on room air. Vascular:  Pedal Pulses is palpable and audible with doppler. Capillary refill is <3 sec to digits bilateral.  Extremities positive for 1+ pitting edema. Art duplex reviewed no occlusion noted. Neurological:   Sensation is intact to lower extremities. Dermatological:  Wound description noted in wound assessment. The wound is improving in size and depth. A healthy granular base is noted. The chinyere wound area is intact without any erythema or warmth noted. No signs or symptoms of acute infection noted. New skin islands noted. Psychiatric:  Judgement and insight intact. Short and long term memory intact. No evidence of depression, anxiety, or agitation. Patient is calm, cooperative, and communicative. Appropriate interactions and affect. Assessment:      Active Hospital Problems    Diagnosis Date Noted    Non-pressure chronic ulcer of left ankle with fat layer exposed [facility]) [L97.322] 08/17/2021        Procedure Note  Indications:  Based on my examination of this patient's wound(s)/ulcer(s) today, debridement is not required to promote healing and evaluate the wound base. Post Debridement Measurements:  Wound/Ulcer Descriptions are Pre Debridement except measurements:    Wound 03/25/22 Ankle Left; Anterior #1 (Active)   Wound Image   02/14/23 1559   Wound Etiology Venous 02/14/23 1559   Wound Cleansed Cleansed with saline 02/14/23 1559   Dressing/Treatment Other (comment) 02/14/23 1634   Wound Length (cm) 6.4 cm 02/14/23 1559   Wound Width (cm) 6.1 cm 02/14/23 1559   Wound Depth (cm) 0.2 cm 02/14/23 1559   Wound Surface Area (cm^2) 39.04 cm^2 02/14/23 1559   Change in Wound Size % (l*w) -1120 02/14/23 1559   Wound Volume (cm^3) 7.808 cm^3 02/14/23 1559   Wound Healing % -713 02/14/23 1559   Post-Procedure Length (cm) 7 cm 01/31/23 1637   Post-Procedure Width (cm) 6.8 cm 01/31/23 1637   Post-Procedure Depth (cm) 0.25 cm 01/31/23 1637   Post-Procedure Surface Area (cm^2) 47.6 cm^2 01/31/23 1637   Post-Procedure Volume (cm^3) 11.9 cm^3 01/31/23 1637   Wound Assessment Pink/red;Slough 02/14/23 1559   Drainage Amount Large 02/14/23 1559   Drainage Description Yellow 02/14/23 1559   Odor None 02/14/23 1559   Ela-wound Assessment Maceration 02/14/23 1559   Margins Defined edges 02/14/23 1559   Wound Thickness Description not for Pressure Injury Full thickness 02/14/23 1559   Number of days: 326       Wound 11/22/22 Ankle Left;Lateral #3 (Active)   Wound Image   02/14/23 1559   Wound Etiology Venous 02/14/23 1559   Wound Cleansed Cleansed with saline 02/14/23 1559   Dressing/Treatment Other (comment) 02/14/23 1634   Wound Length (cm) 0.9 cm 02/14/23 1559   Wound Width (cm) 0.7 cm 02/14/23 1559   Wound Depth (cm) 0.1 cm 02/14/23 1559   Wound Surface Area (cm^2) 0.63 cm^2 02/14/23 1559   Change in Wound Size % (l*w) 95.5 02/14/23 1559   Wound Volume (cm^3) 0.063 cm^3 02/14/23 1559   Wound Healing % 96 02/14/23 1559   Post-Procedure Length (cm) 2.8 cm 01/31/23 1637   Post-Procedure Width (cm) 1 cm 01/31/23 1637   Post-Procedure Depth (cm) 0.15 cm 01/31/23 1637   Post-Procedure Surface Area (cm^2) 2.8 cm^2 01/31/23 1637   Post-Procedure Volume (cm^3) 0.42 cm^3 01/31/23 1637   Wound Assessment Pink/red;Slough 02/14/23 1559   Drainage Amount Moderate 02/14/23 1559   Drainage Description Yellow 02/14/23 1559   Odor None 02/14/23 1559   Ela-wound Assessment Intact;Fragile 02/14/23 1559   Margins Defined edges 02/14/23 1559   Wound Thickness Description not for Pressure Injury Full thickness 02/14/23 1559   Number of days: 84       Plan:     Patient examined and debrided  Gentamicin and Hydrofera blue  Cigar smoking cessation emphasized  Follow up in  weeks        Treatment Note please see attached Discharge Instructions    Written patient dismissal instructions given to patient and signed by patient or POA. Discharge [address] [facility]   Physician Orders and Discharge Instructions  [address]  Telephone: [phone]        NAME:  Gonzalo Toro OF BIRTH:  1945  MEDICAL RECORD NUMBER:  [id number]     Your  is:  [facility] Care/Facility:  [facility] CARE     Wound Location:   LEFT ANTERIOR ANKLE AND LEFT MEDIAL ANKLE  Dressing orders:  Cleanse wound(s) with normal saline. 2. APPLY GENTAMICIN OINTMENT. 3. APPLY HYDROFERA BLUE READY. 4. COVER WITH A DRY DRESSING. 5. CHANGE DRESSING DAILY. Compression:  NONE     Offloading Device:     Other Instructions:   HOME CARE TO [facility] BILATERAL FEET WITH SOAP AND WATER THEN APPLY AQUAPHOR WITH EACH DRESSING CHANGE,   Keep all dressings clean, dry and intact. Keep pressure off the wound(s) at all times. Follow up visit         2 WEEKS   February 28, 2023 AT       3:15PM       Please give 24 hour notice if unable to keep appointment. [phone]     If you experience any of the following, please call the Wound Care Service at  [phone] or go to the nearest emergency room. *Increase in pain         *Temperature over 101           *Increase in drainage from your wound or a foul odor  *Uncontrolled swelling            *Need for compression bandage changes due to slippage, breakthrough drainage       PLEASE NOTE: IF YOU ARE UNABLE TO OBTAIN WOUND SUPPLIES, CONTINUE TO USE THE SUPPLIES YOU HAVE AVAILABLE UNTIL YOU ARE ABLE TO REACH US. IT IS MOST IMPORTANT TO KEEP THE WOUND COVERED AT ALL TIMES  Electronically signed by Mariama Sinha DPM on 2/14/2023 at 4:34 PM       Electronically signed by Mariama Sinha DPM on 2/14/2023 at 4:39 PM

## 2023-02-14 NOTE — DISCHARGE INSTRUCTIONS
[facility]   Physician Orders and Discharge Instructions  [address]  Telephone: [phone]        NAME:  Marah Wright OF BIRTH:  1945  MEDICAL RECORD NUMBER:  [id number]     Your  is:  Nicholas Courtney Care/Facility:  [facility] CARE     Wound Location:   LEFT ANTERIOR ANKLE AND LEFT MEDIAL ANKLE  Dressing orders:  Cleanse wound(s) with normal saline. 2. APPLY GENTAMICIN OINTMENT. 3. APPLY HYDROFERA BLUE READY. 4. COVER WITH A DRY DRESSING. 5. CHANGE DRESSING DAILY. Compression:  NONE     Offloading Device:     Other Instructions:   HOME CARE TO [facility] BILATERAL FEET WITH SOAP AND WATER THEN APPLY AQUAPHOR WITH EACH DRESSING CHANGE,   Keep all dressings clean, dry and intact. Keep pressure off the wound(s) at all times. Follow up visit         2 WEEKS   February 28, 2023 AT       3:15PM       Please give 24 hour notice if unable to keep appointment. [phone]     If you experience any of the following, please call the Wound Care Service at  [phone] or go to the nearest emergency room. *Increase in pain         *Temperature over 101           *Increase in drainage from your wound or a foul odor  *Uncontrolled swelling            *Need for compression bandage changes due to slippage, breakthrough drainage       PLEASE NOTE: IF YOU ARE UNABLE TO OBTAIN WOUND SUPPLIES, CONTINUE TO USE THE SUPPLIES YOU HAVE AVAILABLE UNTIL YOU ARE ABLE TO REACH US.  IT IS MOST IMPORTANT TO KEEP THE WOUND COVERED AT ALL TIMES  Electronically signed by Ericka Garcia DPM on 2/14/2023 at 4:34 PM

## 2023-02-14 NOTE — PROGRESS NOTES
Subsequent Progress Note  Patient: Rudy You  YOB: 1945  MRN: [id number]    Chief Complaint: cad dizzy htn felder rash  Chief Complaint   Patient presents with    Follow-up     4 month    Coronary Artery Disease       CV Data:  1/2016 NSTEMI RCA KRISTA  4/2018 echo  55 1TR  4/2018 CUS MIld palque  4/2018 Abd US neg AAA  4/2019 Spect negative   2/2020 CUS mild  9/21 LE Art Duplex - negative. 9/22 LE ART Duplex - mild     Subjective/HPI: occ dizzy + felder no cp no falls no bleed rash right temple for 6 weeks      1/10/2020 More FELDER and more frequent Chest tightness. No falls no bleed. Takes meds. Feels gaseous. 2/7/2020 chest tightness and felder much less since Imdur. Compliant with meds    5/14/2020 TELEHEALTH EVALUATION -- Audio/Visual (During WTZ-99 public health emergency)    disocered isolated LLL nodule irregualr 1.2 cm suspicious for cancer. PET scan Negative of Mets. No CP no SOB No Bleed    7/14/2020 is declining to have localized LLL lung cancer to be removed. No cp breathing is ok. No falls. No bleeds    10/14/2020 no cp no sob no falls no bleed. Takes meds. Gait is wobbly but no falls. Taking Bumex prn.  lE edema worse. 1/13/21 no cp no sob no falls no bleed. Takes meds. Eats well.     4/22/21 no cp no sob occ dizzy no falls no bleed. Takes a lot of salt    9/9/21 tired all the time. Eats well no cp same FELDER. No worse. No palps no bleed no falls    1/21/22 TELEHEALTH EVALUATION -- Audio/Visual (During [id number]-[id number] public health emergency)    Recent fall with shoulder injury and may need surgery. No cp no sob no bleed. Having little edema. 3/2/22  Ankle edema same no worse. Taking a lot of salt no cp no sob no falls no bleed. 7/12/22 doing weel still going to wound clinic for right ankle wound no cp still same felder. No falls no bleed. 10/14/22 still w nonhealing wound Left foot ankle. Going to wound clinic. LE Art Duplex no significant blockages.  No cp no sob no falls no bleed.     2/14/23 doing well apart form arthritis and back pain. No falls no bleed takes meds eats well. Smokes  cigars. No etoh  Retired- supervisor. [employer]  Lives alone    EKG: SR 61  QTc 433    Past Medical History:   Diagnosis Date    Alcohol abuse     quit drinking 8/18/21    CAD (coronary artery disease)     patient states no doctor has told him this / has 1 cardiac stent    Cancer ([facility] [address]     lung LLL    Chronic back pain     Chronic kidney disease     Chronic obstructive pulmonary disease, unspecified COPD type ([facility] [address] 3/24/2022    Chronic sinusitis     DJD (degenerative joint disease) of knee     left knee DJD    Elevated PSA     History of blood transfusion 1980's    History of inferior wall myocardial infarction 01/2016    1 cardiac stent    HTN (hypertension)     meds .  1 yr    Hyperlipidemia     meds > 12 yrs    NSTEMI (non-ST elevated myocardial infarction) ([facility] [address]     Smoker        Past Surgical History:   Procedure Laterality Date    ABDOMEN SURGERY  1961    spleenectomy due to [address]  2009    lumbar disc OR    CARDIAC SURGERY      stents    CARPAL TUNNEL RELEASE Right 5/6/2019    RIGHT CARPAL TUNNEL RELEASE performed by Cindy Avila MD at [address]  1/29/2016    EYE SURGERY      to have Phaco with IOL OU 2/2018    HERNIA REPAIR      JOINT REPLACEMENT Left 1994    LTHR    JOINT REPLACEMENT Right 2009    RTKR    KNEE SURGERY Right 2011    arthroscopy    RI MANIPULATION KNEE JOINT UNDER GENERAL ANESTHESIA Right 5/12/2017    RIGHT KNEE MANIPULATION UNDER ANESTHESIA performed by Jameel Porras MD at [address] [facility] OFFICE/OUTPT [address] Bilateral 12/29/2017    RIGHT AND BILATERAL L 4-5 LYSIS OF SCAR DISKECTOMY INTERBODY CAGE FUSION POSTEROLATERAL FUSION PEDICLE SCREWS performed by Cindy Avila MD at [address] Right 3/24/2017    RIGHT KNEE TOTAL ARTHROPLASTY, ELHAM CEMENTED PERSONA  performed by Pavel Chaidez MD at [address] [place] [address] History   Problem Relation Age of Onset    Osteoarthritis Mother     Emphysema Mother     Hypertension Father     Hearing Loss Father     Dementia Father     No Known Problems Sister     Prostate Cancer Brother     Colon Polyps Neg Hx        Social History     Socioeconomic History    Marital status:      Spouse name: None    Number of children: None    Years of education: None    Highest education level: None   Occupational History    Occupation: retired   Tobacco Use    Smoking status: Former     Packs/day: 0.00     Years: 60.00     Pack years: 0.00     Types: Cigarettes     Quit date: 2021     Years since quittin.1    Smokeless tobacco: Never    Tobacco comments:      smokes 2 cigars a day   Vaping Use    Vaping Use: Never used   Substance and Sexual Activity    Alcohol use: Not Currently     Alcohol/week: 12.0 standard drinks     Types: 12 Shots of liquor per week     Comment: socail drinking    Drug use: No    Sexual activity: Yes   Social History Narrative     He is  and lives alone    Type of Home: [address] [facility] [address]: Able to Live on Main level with bedroom/bathroom, Two level, Performs ADL's on one level    Home Access: Stairs to enter with rails    Entrance Stairs - Number of Steps: 3- Rails: Both    Bathroom Shower/Tub: Tub/Shower unit--Shower chair, Grab bars in shower (does not use chair)    Home Equipment: Rolling walker, 4 wheeled walker, Oxygen    ADL Assistance: Independent    Homemaking Assistance: Independent    Homemaking Responsibilities: Yes    Ambulation Assistance: Independent    Transfer Assistance: Independent    Active : Yes    He is retired from [address] [place] [address] Strain: Medium Risk    Difficulty of Paying Living Expenses: Somewhat hard   Food Insecurity: No Food Insecurity    Worried About Running Out of Food in the Last Year: Never true    [address] in the Last Year: Never true   Transportation Needs: No Transportation Needs    Lack of Transportation (Medical): No    Lack of Transportation (Non-Medical): No   Physical Activity: Insufficiently Active    Days of Exercise per Week: 7 days    Minutes of Exercise per Session: 20 min   Stress: No Stress Concern Present    Feeling of Stress : Not at all   Social Connections: Socially Isolated    Frequency of Communication with Friends and Family: More than three times a week    Frequency of Social Gatherings with Friends and Family: Once a week    Attends Sikhism Services: Never    Active Member of Clubs or Organizations: No    Attends Club or Organization Meetings: Never    Marital Status:    Housing Stability: Unknown    Unable to Pay for Housing in the Last Year: No    Unstable Housing in the Last Year: No       Allergies   Allergen Reactions    Morphine        Current Outpatient Medications   Medication Sig Dispense Refill    cephALEXin (KEFLEX) 500 MG capsule Take 500 mg by mouth 3 times daily      cephALEXin (KEFLEX) 500 MG capsule Take 1 capsule by mouth 3 times daily 90 capsule 2    gentamicin (GARAMYCIN) 0.1 % ointment Apply topically  daily.  30 g 1    diclofenac (VOLTAREN) 50 MG EC tablet TAKE ONE TABLET BY MOUTH TWO TIMES A DAY 60 tablet 5    oxyCODONE-acetaminophen (PERCOCET) 7.5-325 MG per tablet Take 1 tablet by mouth five times a day as needed for pain      citalopram (CELEXA) 40 MG tablet TAKE ONE TABLET BY MOUTH nightly 30 tablet 5    atorvastatin (LIPITOR) 20 MG tablet TAKE ONE TABLET BY MOUTH DAILY 90 tablet 3    clopidogrel (PLAVIX) 75 MG tablet Take 1 tablet by mouth daily 90 tablet 3    isosorbide mononitrate (IMDUR) 30 MG extended release tablet TAKE ONE TABLET BY MOUTH EVERY DAY 90 tablet 3    bumetanide (BUMEX) 2 MG tablet Take 1 tablet by mouth daily 90 tablet 3    metoprolol tartrate (LOPRESSOR) 50 MG tablet Take 0.5 tablets by mouth 2 times daily TAKE ONE TABLET BY MOUTH TWO TIMES A  tablet 3    omeprazole (PRILOSEC) 10 MG delayed release capsule TAKE ONE CAPSULE BY MOUTH DAILY 90 capsule 2    finasteride (PROSCAR) 5 MG tablet Take 1 tablet by mouth daily 90 tablet 3    tiotropium (SPIRIVA RESPIMAT) 2.5 MCG/ACT AERS inhaler Inhale 2 puffs into the lungs daily 1 Inhaler 3    Fluticasone furoate-vilanterol (BREO ELLIPTA) 200-25 MCG/INH AEPB inhaler Inhale 1 puff into the lungs daily 1 each 3    albuterol sulfate  (90 Base) MCG/ACT inhaler Inhale 2 puffs into the lungs every 6 hours as needed for Wheezing 1 Inhaler 3    nitroGLYCERIN (NITROSTAT) 0.4 MG SL tablet Place 1 tablet under the tongue every 5 minutes as needed for Chest pain up to max of 3 total doses. If no relief after 1 dose, call 911. 25 tablet 3    DOCUSATE CALCIUM PO Take by mouth as needed       No current facility-administered medications for this visit. Review of Systems:   Review of Systems   Constitutional: Negative. Negative for diaphoresis and fatigue. HENT: Negative. Eyes: Negative. Respiratory:  Positive for shortness of breath. Negative for cough, chest tightness, wheezing and stridor. Cardiovascular: Negative. Negative for chest pain, palpitations and leg swelling. Gastrointestinal: Negative. Negative for blood in stool and nausea. Genitourinary: Negative. Musculoskeletal: Negative. Skin: Negative. Neurological:  Positive for light-headedness. Negative for dizziness, syncope and weakness. Hematological: Negative. Psychiatric/Behavioral: Negative. Physical Examination:    BP (!) 124/56 (Site: Right Upper Arm, Position: Sitting, Cuff Size: Medium Adult)   Pulse 65   Wt 158 lb (71.7 kg)   SpO2 97%   BMI 25.50 kg/m²    Physical Exam   Constitutional: He appears healthy. No distress.    HENT:   Normal cephalic and Atraumatic   Eyes: Pupils are equal, round, and reactive to light. Neck: Thyroid normal. No JVD present. No neck adenopathy. No thyromegaly present. Cardiovascular: Normal rate, regular rhythm, intact distal pulses and normal pulses. Murmur heard. Pulmonary/Chest: Effort normal and breath sounds normal. He has no wheezes. He has no rales. He exhibits no tenderness. Abdominal: Soft. Bowel sounds are normal. There is no abdominal tenderness. Musculoskeletal:         General: No tenderness or edema. Normal range of motion. Cervical back: Normal range of motion and neck supple. Neurological: He is alert and oriented to person, place, and time. Skin: Skin is warm. No cyanosis. Nails show no clubbing.      LABS:  CBC:   Lab Results   Component Value Date/Time    WBC 11.6 01/14/2022 12:43 PM    RBC 3.33 01/14/2022 12:43 PM    HGB 10.4 01/14/2022 12:43 PM    HCT 32.0 01/14/2022 12:43 PM    MCV 96.2 01/14/2022 12:43 PM    MCH 31.2 01/14/2022 12:43 PM    MCHC 32.5 01/14/2022 12:43 PM    RDW 17.9 01/14/2022 12:43 PM     01/14/2022 12:43 PM     Lipids:  Lab Results   Component Value Date    CHOL 118 09/14/2021    CHOL 130 07/07/2020    CHOL 165 03/28/2016     Lab Results   Component Value Date    TRIG 84 09/14/2021    TRIG 63 07/07/2020    TRIG 64 03/28/2016     Lab Results   Component Value Date    HDL 54 09/14/2021    HDL 71 (H) 07/07/2020     (H) 03/28/2016     Lab Results   Component Value Date    LDLCALC 47 09/14/2021    LDLCALC 46 07/07/2020    LDLCALC 40 03/28/2016     No results found for: LABVLDL, VLDL  No results found for: CHOLHDLRATIO  CMP:    Lab Results   Component Value Date/Time     10/14/2022 11:00 AM    K 5.4 10/14/2022 11:00 AM    K 4.2 10/15/2021 10:22 AM    CL 97 10/14/2022 11:00 AM    CO2 27 10/14/2022 11:00 AM    BUN 33 10/14/2022 11:00 AM    CREATININE 0.93 10/14/2022 11:00 AM    GFRAA >60.0 10/14/2022 11:00 AM    LABGLOM >60.0 10/14/2022 11:00 AM    GLUCOSE 86 10/14/2022 11:00 AM    PROT 6.9 01/14/2022 12:43 PM LABALBU 4.0 01/14/2022 12:43 PM    CALCIUM 9.7 10/14/2022 11:00 AM    BILITOT 0.4 01/14/2022 12:43 PM    ALKPHOS 137 01/14/2022 12:43 PM    AST 13 01/14/2022 12:43 PM    ALT 9 01/14/2022 12:43 PM     BMP:    Lab Results   Component Value Date/Time     10/14/2022 11:00 AM    K 5.4 10/14/2022 11:00 AM    K 4.2 10/15/2021 10:22 AM    CL 97 10/14/2022 11:00 AM    CO2 27 10/14/2022 11:00 AM    BUN 33 10/14/2022 11:00 AM    LABALBU 4.0 01/14/2022 12:43 PM    CREATININE 0.93 10/14/2022 11:00 AM    CALCIUM 9.7 10/14/2022 11:00 AM    GFRAA >60.0 10/14/2022 11:00 AM    LABGLOM >60.0 10/14/2022 11:00 AM    GLUCOSE 86 10/14/2022 11:00 AM     Magnesium:    Lab Results   Component Value Date/Time    MG 1.6 10/15/2021 10:22 AM     TSH:  Lab Results   Component Value Date    TSH 0.447 10/13/2021       Patient Active Problem List   Diagnosis    Tobacco use    Hip pain    Knee pain    Chronic back pain    Elevated PSA    Primary osteoarthritis of right knee    Spondylosis of lumbar region without myelopathy or radiculopathy    Sacroiliitis, not elsewhere classified ([facility] [address]    Pure hypercholesterolemia    Charcot's joint of left ankle    Left ankle pain    Delirium due to general medical condition    DDD (degenerative disc disease), lumbar    Osteoarthritis of spine with myelopathy, lumbosacral region    Chronic bilateral low back pain with bilateral sciatica    Postlaminectomy syndrome, lumbar region    Acquired spondylolisthesis of lumbosacral region    Spinal stenosis of lumbar region with neurogenic claudication    HNP (herniated nucleus pulposus), lumbar    Spondylolisthesis at L4-L5 level    Anesthesia    Herniated nucleus pulposus, L4-5    NSTEMI (non-ST elevated myocardial infarction) ([facility]    S/P PTCA (percutaneous transluminal coronary angioplasty)    Right upper quadrant abdominal pain    Gallbladder polyp    Biliary dyskinesia    Carpal tunnel syndrome on right    Carpal tunnel syndrome on left    Angina effort Dyslipidemia    FELDER (dyspnea on exertion)    CAD (coronary artery disease)    Chest pain    Lung nodule seen on imaging study    Bilateral carotid artery stenosis    Essential hypertension    NSCLC of left lung (HCC)    Ataxic gait    Dizziness    Fracture, Colles, right, closed    Heroin abuse (HCC)    Non-pressure chronic ulcer of left ankle with fat layer exposed ([facility] [address]    Atherosclerosis of native arteries of extremities with rest pain, left leg (HCC)    Subacute osteomyelitis, left ankle and foot (HCC)    Osteomyelitis of ankle (HCC)    Hyponatremia    Frequent falls    Stage 3 chronic kidney disease, unspecified whether stage 3a or 3b CKD ([facility] [address]    Fracture of right humerus    Hyperkalemia    Leukocytosis    Anemia    Acute hematogenous osteomyelitis, left ankle and foot (HCC)    Traumatic hematoma of right shoulder    Head injury    Humeral head fracture, right, closed, initial encounter    Pelvic hematoma, male, after a fall    Cause of injury, accidental fall, initial encounter    Tremor of unknown origin    Sundowning    Acute pain due to trauma    Disequilibrium    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 Modified Medications    No medications on file       No orders of the defined types were placed in this encounter. Assessment/Plan:    1. Essential hypertension  Stable- continue meds. Low salt diet    2. NSTEMI (non-ST elevated myocardial infarction) ([facility] [address]   no angina continue CV meds. 3. Dyslipidemia  Statin - continue same low fat diet. Check labs.      4. FELDER (dyspnea on exertion)  Stop smoking   Possible angina - Imdur made a big difference- if worse let me know. 5. Rash- did not see Derm. Facial rash gone now. No further occurrence. 6. Lung Nodule - OK to hold Plavix 5 days for Biopsy. - revealed Malignancy. PET negative but pt refusing to have surgery. - f/u Dr. Grace Enriquez. - finished XRT f/u.     7. Wobbly gait- need to use rubin/walker. No recent falls. 8. [place]- TAKE bUMEX 1 MG qd. - decrease salt intake     9. Tires all the time- see Dr. Luz Marina Meek and get screening labs. 10 LE edema - resume Bumex. Labs noted. Reviewed with pt. Low salt. Try compression as well. Walk   Counseling:  Heart Healthy Lifestyle, Stop Smoking, Low Salt Diet, Take Precautions to Prevent Falls, Regular Exercise and Walk Daily    Return in about 4 months (around 6/14/2023).      Electronically signed by Ramses Parker MD on 2/14/2023 at 2:41 PM

## 2023-02-28 ENCOUNTER — HOSPITAL ENCOUNTER (OUTPATIENT)
Dept: WOUND CARE | Age: 78
Discharge: HOME | End: 2023-02-28
Payer: MEDICARE

## 2023-02-28 VITALS
RESPIRATION RATE: 18 BRPM | DIASTOLIC BLOOD PRESSURE: 65 MMHG | HEART RATE: 74 BPM | TEMPERATURE: 96.2 F | SYSTOLIC BLOOD PRESSURE: 156 MMHG

## 2023-02-28 DIAGNOSIS — L97.322 NON-PRESSURE CHRONIC ULCER OF LEFT ANKLE WITH FAT LAYER EXPOSED (HCC): Primary | ICD-10-CM

## 2023-02-28 PROCEDURE — 99213 OFFICE O/P EST LOW 20 MIN: CPT

## 2023-02-28 PROCEDURE — 6370000000 HC RX 637 (ALT 250 FOR IP): Performed by: PODIATRIST

## 2023-02-28 RX ORDER — LIDOCAINE 50 MG/G
OINTMENT TOPICAL ONCE
OUTPATIENT
Start: 2023-02-28 | End: 2023-02-28

## 2023-02-28 RX ORDER — BACITRACIN ZINC AND POLYMYXIN B SULFATE 500; 1000 [USP'U]/G; [USP'U]/G
OINTMENT TOPICAL ONCE
OUTPATIENT
Start: 2023-02-28 | End: 2023-02-28

## 2023-02-28 RX ORDER — LIDOCAINE HYDROCHLORIDE 40 MG/ML
SOLUTION TOPICAL ONCE
Status: COMPLETED | OUTPATIENT
Start: 2023-02-28 | End: 2023-02-28

## 2023-02-28 RX ORDER — BETAMETHASONE DIPROPIONATE 0.05 %
OINTMENT (GRAM) TOPICAL ONCE
OUTPATIENT
Start: 2023-02-28 | End: 2023-02-28

## 2023-02-28 RX ORDER — LIDOCAINE HYDROCHLORIDE 40 MG/ML
SOLUTION TOPICAL ONCE
OUTPATIENT
Start: 2023-02-28 | End: 2023-02-28

## 2023-02-28 RX ORDER — LIDOCAINE HYDROCHLORIDE 20 MG/ML
JELLY TOPICAL ONCE
OUTPATIENT
Start: 2023-02-28 | End: 2023-02-28

## 2023-02-28 RX ORDER — CLOBETASOL PROPIONATE 0.5 MG/G
OINTMENT TOPICAL ONCE
OUTPATIENT
Start: 2023-02-28 | End: 2023-02-28

## 2023-02-28 RX ORDER — BACITRACIN, NEOMYCIN, POLYMYXIN B 400; 3.5; 5 [USP'U]/G; MG/G; [USP'U]/G
OINTMENT TOPICAL ONCE
OUTPATIENT
Start: 2023-02-28 | End: 2023-02-28

## 2023-02-28 RX ORDER — TRIAMCINOLONE ACETONIDE 0.25 MG/G
OINTMENT TOPICAL
Qty: 15 G | Refills: 2 | Status: SHIPPED | OUTPATIENT
Start: 2023-02-28 | End: 2023-03-07

## 2023-02-28 RX ORDER — GENTAMICIN SULFATE 1 MG/G
OINTMENT TOPICAL ONCE
Status: COMPLETED | OUTPATIENT
Start: 2023-02-28 | End: 2023-02-28

## 2023-02-28 RX ORDER — GINSENG 100 MG
CAPSULE ORAL ONCE
OUTPATIENT
Start: 2023-02-28 | End: 2023-02-28

## 2023-02-28 RX ORDER — GENTAMICIN SULFATE 1 MG/G
OINTMENT TOPICAL ONCE
OUTPATIENT
Start: 2023-02-28 | End: 2023-02-28

## 2023-02-28 RX ORDER — LIDOCAINE 40 MG/G
CREAM TOPICAL ONCE
OUTPATIENT
Start: 2023-02-28 | End: 2023-02-28

## 2023-02-28 RX ADMIN — MUPIROCIN: 20 OINTMENT TOPICAL at 16:40

## 2023-02-28 RX ADMIN — LIDOCAINE HYDROCHLORIDE: 40 SOLUTION ORAL at 16:07

## 2023-02-28 RX ADMIN — GENTAMICIN SULFATE: 1 OINTMENT TOPICAL at 16:40

## 2023-02-28 NOTE — PROGRESS NOTES
[facility] [address]                                                   Progress Note and Procedure Note      Issac Clarke RECORD NUMBER:  [id number]  AGE: 76 y.o. GENDER: male  : 1945  EPISODE DATE:  2023    Subjective:     Chief Complaint   Patient presents with    Wound Check     L leg         HISTORY of PRESENT ILLNESS LULY Nuñez is a 76 y.o. male who presents today for wound/ulcer evaluation. History of Wound Context: Patient presents for for non healing ulceration of the left ankle. Picc line was pulled. Now on Keflex. Last Culture positive for Heavy growth MRSA. Culture faxed to ID  Wound/Ulcer Pain Timing/Severity: intermittent  Quality of pain: tender  Severity:  4 / 10   Modifying Factors: None  Associated Signs/Symptoms: drainage, odor and pain    Ulcer Identification:  Ulcer Type: venous and undetermined  Contributing Factors: edema and venous stasis    Wound: N/A        PAST MEDICAL HISTORY        Diagnosis Date    Alcohol abuse     quit drinking 21    CAD (coronary artery disease)     patient states no doctor has told him this / has 1 cardiac stent    Cancer ([address]     lung LLL    Chronic back pain     Chronic kidney disease     Chronic obstructive pulmonary disease, unspecified COPD type ([address] 3/24/2022    Chronic sinusitis     DJD (degenerative joint disease) of knee     left knee DJD    Elevated PSA     History of blood transfusion     History of inferior wall myocardial infarction 2016    1 cardiac stent    HTN (hypertension)     meds .  1 yr    Hyperlipidemia     meds > 12 yrs    NSTEMI (non-ST elevated myocardial infarction) ([address]     Smoker        PAST SURGICAL HISTORY    Past Surgical History:   Procedure Laterality Date    ABDOMEN SURGERY      spleenectomy due to [address]      lumbar disc OR    CARDIAC SURGERY      stents    CARPAL TUNNEL RELEASE Right 2019    RIGHT CARPAL TUNNEL RELEASE performed by Christine Wyman MD at [address] [facility]  2016    EYE SURGERY      to have Phaco with IOL OU 2018    HERNIA REPAIR      JOINT REPLACEMENT Left     LTHR    JOINT REPLACEMENT Right     RTKR    KNEE SURGERY Right     arthroscopy    NC MANIPULATION KNEE JOINT UNDER GENERAL ANESTHESIA Right 2017    RIGHT KNEE MANIPULATION UNDER ANESTHESIA performed by Mulugeta Almanza MD at [address] [facility] OFFICE/OUTPT VISIT,PROCEDURE ONLY Bilateral 2017    RIGHT AND BILATERAL L 4-5 LYSIS OF SCAR DISKECTOMY INTERBODY CAGE FUSION POSTEROLATERAL FUSION PEDICLE SCREWS performed by Sierra Prado MD at [address]  2004    benign    [address] [facility] Right 3/24/2017    RIGHT  KNEE TOTAL ARTHROPLASTY, ELHAM CEMENTED PERSONA  performed by Mulugeta Almanza MD at [address] [facility] HISTORY    Family History   Problem Relation Age of Onset    Osteoarthritis Mother     Emphysema Mother     Hypertension Father     Hearing Loss Father     Dementia Father     No Known Problems Sister     Prostate Cancer Brother     Colon Polyps Neg Hx        SOCIAL HISTORY    Social History     Tobacco Use    Smoking status: Former     Packs/day: 0.00     Years: 60.00     Pack years: 0.00     Types: Cigarettes     Quit date: 2021     Years since quittin.1    Smokeless tobacco: Never    Tobacco comments:      smokes 2 cigars a day   Vaping Use    Vaping Use: Never used   Substance Use Topics    Alcohol use: Not Currently     Alcohol/week: 12.0 standard drinks     Types: 12 Shots of liquor per week     Comment: socail drinking    Drug use: No       ALLERGIES    Allergies   Allergen Reactions    Morphine        MEDICATIONS    Current Outpatient Medications on File Prior to Encounter   Medication Sig Dispense Refill    cephALEXin (KEFLEX) 500 MG capsule Take 500 mg by mouth 3 times daily      cephALEXin (KEFLEX) 500 MG capsule Take 1 capsule by mouth 3 times daily 90 capsule 2    gentamicin (GARAMYCIN) 0.1 % ointment Apply topically  daily. 30 g 1    diclofenac (VOLTAREN) 50 MG EC tablet TAKE ONE TABLET BY MOUTH TWO TIMES A DAY 60 tablet 5    oxyCODONE-acetaminophen (PERCOCET) 7.5-325 MG per tablet Take 1 tablet by mouth five times a day as needed for pain      citalopram (CELEXA) 40 MG tablet TAKE ONE TABLET BY MOUTH nightly 30 tablet 5    atorvastatin (LIPITOR) 20 MG tablet TAKE ONE TABLET BY MOUTH DAILY 90 tablet 3    clopidogrel (PLAVIX) 75 MG tablet Take 1 tablet by mouth daily 90 tablet 3    isosorbide mononitrate (IMDUR) 30 MG extended release tablet TAKE ONE TABLET BY MOUTH EVERY DAY 90 tablet 3    bumetanide (BUMEX) 2 MG tablet Take 1 tablet by mouth daily 90 tablet 3    metoprolol tartrate (LOPRESSOR) 50 MG tablet Take 0.5 tablets by mouth 2 times daily TAKE ONE TABLET BY MOUTH TWO TIMES A  tablet 3    omeprazole (PRILOSEC) 10 MG delayed release capsule TAKE ONE CAPSULE BY MOUTH DAILY 90 capsule 2    finasteride (PROSCAR) 5 MG tablet Take 1 tablet by mouth daily 90 tablet 3    tiotropium (SPIRIVA RESPIMAT) 2.5 MCG/ACT AERS inhaler Inhale 2 puffs into the lungs daily 1 Inhaler 3    Fluticasone furoate-vilanterol (BREO ELLIPTA) 200-25 MCG/INH AEPB inhaler Inhale 1 puff into the lungs daily 1 each 3    albuterol sulfate  (90 Base) MCG/ACT inhaler Inhale 2 puffs into the lungs every 6 hours as needed for Wheezing 1 Inhaler 3    nitroGLYCERIN (NITROSTAT) 0.4 MG SL tablet Place 1 tablet under the tongue every 5 minutes as needed for Chest pain up to max of 3 total doses. If no relief after 1 dose, call 911. 25 tablet 3    DOCUSATE CALCIUM PO Take by mouth as needed       No current facility-administered medications on file prior to encounter. REVIEW OF SYSTEMS    Pertinent items are noted in HPI.     Objective:      BP (!) 156/65   Pulse 74   Temp (!) 96.2 °F (35.7 °C) (Temporal)   Resp 18     Wt Readings from Last 3 Encounters: 02/14/23 158 lb (71.7 kg)   02/07/23 155 lb (70.3 kg)   12/06/22 165 lb (74.8 kg)       PHYSICAL EXAM    Constitutional:   Well nourished and well developed. Appears neat and clean. Patient is alert, oriented x3, and in no apparent distress. Respiratory:  Respiratory effort is easy and symmetric bilaterally. Rate is normal at rest and on room air. Vascular:  Pedal Pulses is palpable and audible with doppler. Capillary refill is <3 sec to digits bilateral.  Extremities positive for 1+ pitting edema. Art duplex reviewed no occlusion noted. Neurological:   Sensation is intact to lower extremities. Dermatological:  Wound description noted in wound assessment. The wound is improving in size and depth. A healthy granular base is noted. The chinyere wound area is intact without any erythema or warmth noted. No signs or symptoms of acute infection noted. New skin islands noted. Mild maceration noted. Psychiatric:  Judgement and insight intact. Short and long term memory intact. No evidence of depression, anxiety, or agitation. Patient is calm, cooperative, and communicative. Appropriate interactions and affect. Assessment:      Active Hospital Problems    Diagnosis Date Noted    Osteomyelitis of ankle ([facility] [M86.9] 08/17/2021    Non-pressure chronic ulcer of left ankle with fat layer exposed [facility]) [L97.322] 08/17/2021        Procedure Note  Indications:  Based on my examination of this patient's wound(s)/ulcer(s) today, debridement is not required to promote healing and evaluate the wound base. Post Debridement Measurements:  Wound/Ulcer Descriptions are Pre Debridement except measurements:  Wound 03/25/22 Ankle Left; Anterior #1 (Active)   Wound Image   02/28/23 1605   Wound Etiology Venous 02/28/23 1605   Wound Cleansed Cleansed with saline 02/28/23 1605   Dressing/Treatment Pharmaceutical agent (see MAR) 02/28/23 1630   Wound Length (cm) 6.6 cm 02/28/23 1605   Wound Width (cm) 5.5 cm 02/28/23 1605   Wound Depth (cm) 0.2 cm 02/28/23 1605   Wound Surface Area (cm^2) 36.3 cm^2 02/28/23 1605   Change in Wound Size % (l*w) -1034.38 02/28/23 1605   Wound Volume (cm^3) 7.26 cm^3 02/28/23 1605   Wound Healing % -656 02/28/23 1605   Post-Procedure Length (cm) 7 cm 01/31/23 1637   Post-Procedure Width (cm) 6.8 cm 01/31/23 1637   Post-Procedure Depth (cm) 0.25 cm 01/31/23 1637   Post-Procedure Surface Area (cm^2) 47.6 cm^2 01/31/23 1637   Post-Procedure Volume (cm^3) 11.9 cm^3 01/31/23 1637   Wound Assessment Pink/red;Slough 02/28/23 1605   Drainage Amount Large 02/28/23 1605   Drainage Description Yellow 02/28/23 1605   Odor None 02/28/23 1605   Ela-wound Assessment Maceration 02/28/23 1605   Margins Undefined edges 02/28/23 1605   Wound Thickness Description not for Pressure Injury Full thickness 02/28/23 1605   Number of days: 340       Wound 11/22/22 Ankle Left;Lateral #3 (Active)   Wound Image   02/28/23 1605   Wound Etiology Venous 02/28/23 1605   Wound Cleansed Cleansed with saline 02/28/23 1605   Dressing/Treatment Other (comment) 02/28/23 1630   Wound Length (cm) 0.8 cm 02/28/23 1605   Wound Width (cm) 0.6 cm 02/28/23 1605   Wound Depth (cm) 0.1 cm 02/28/23 1605   Wound Surface Area (cm^2) 0.48 cm^2 02/28/23 1605   Change in Wound Size % (l*w) 96.57 02/28/23 1605   Wound Volume (cm^3) 0.048 cm^3 02/28/23 1605   Wound Healing % 97 02/28/23 1605   Post-Procedure Length (cm) 2.8 cm 01/31/23 1637   Post-Procedure Width (cm) 1 cm 01/31/23 1637   Post-Procedure Depth (cm) 0.15 cm 01/31/23 1637   Post-Procedure Surface Area (cm^2) 2.8 cm^2 01/31/23 1637   Post-Procedure Volume (cm^3) 0.42 cm^3 01/31/23 1637   Wound Assessment Pink/red;Slough 02/28/23 1605   Drainage Amount Moderate 02/28/23 1605   Drainage Description Yellow 02/28/23 1605   Odor None 02/28/23 1605   Ela-wound Assessment Intact; Maceration 02/28/23 1605   Margins Defined edges 02/28/23 1605   Wound Thickness Description not for Pressure Injury Full thickness 02/28/23 1605   Number of days: 98       Plan:     Patient examined  Gentamicin and Hydrofera blue  Cigar smoking cessation emphasized  Follow up in  1 weeks  Rx triamcinolone for itching        Treatment Note please see attached Discharge Instructions    Written patient dismissal instructions given to patient and signed by patient or POA. Discharge [address] and Hyper[facility]ic Medicine   Physician Orders and Discharge Instructions  [address]  Telephone: [phone]        NAME:  Elie Awad OF BIRTH:  1945  MEDICAL RECORD NUMBER:  [id number]     Your  is:  [facility] Care/Facility:  [facility] CARE     Wound Location:   LEFT ANTERIOR ANKLE AND LEFT MEDIAL ANKLE  Dressing orders:  Cleanse wound(s) with normal saline. 2. APPLY GENTAMICIN OINTMENT. 3. APPLY OPTILOCK. 4. SECURE WITH GAUZE WRAP. 5. CHANGE DRESSING DAILY. HOME CARE TO [address] BILATERAL FEET WITH SOAP AND WATER THEN APPLY AQUAPHOR WITH EACH DRESSING CHANGE,    TODAY IN WOUND CENTER APPLY MUPIROCIN TO SCRATCHES ON LEFT LATERAL LEG. Compression:  NONE     Offloading Device:     Other Instructions:     TRIAMCINOLONE CREAM AND APPLY TO ITCHING AREAS DAILY, CONTINUE YOUR ANTIBIOTIC,  Keep all dressings clean, dry and intact. Keep pressure off the wound(s) at all times. Follow up visit         1 WEEK   MARCH 7, 2023  AT      Please give 24 hour notice if unable to keep appointment. [phone]     If you experience any of the following, please call the Wound Care Service at  [phone] or go to the nearest emergency room.         *Increase in pain         *Temperature over 101           *Increase in drainage from your wound or a foul odor  *Uncontrolled swelling            *Need for compression bandage changes due to slippage, breakthrough drainage       PLEASE NOTE: IF YOU ARE UNABLE TO OBTAIN WOUND SUPPLIES, CONTINUE TO USE THE SUPPLIES YOU HAVE AVAILABLE UNTIL YOU ARE ABLE TO REACH US.  IT IS MOST IMPORTANT TO KEEP THE WOUND COVERED AT ALL TIMES  Electronically signed by Ana Manriquez DPM on 2/28/2023 at 6:27 PM         Electronically signed by Ana Manriquez DPM on 2/28/2023 at 6:27 PM

## 2023-02-28 NOTE — PLAN OF CARE
Problem: Cognitive:  Goal: Knowledge of wound care  Description: Knowledge of wound care  Outcome: Progressing  Goal: Understands risk factors for wounds  Description: Understands risk factors for wounds  Outcome: Progressing     Problem: Wound:  Goal: Will show signs of wound healing; wound closure and no evidence of infection  Description: Will show signs of wound healing; wound closure and no evidence of infection  Outcome: Progressing     Problem: Venous:  Goal: Signs of wound healing will improve  Description: Signs of wound healing will improve  Outcome: Progressing     Problem: Smoking cessation:  Goal: Ability to formulate a plan to maintain a tobacco-free life will be supported  Description: Ability to formulate a plan to maintain a tobacco-free life will be supported  Outcome: Progressing     Problem: Falls - Risk of:  Goal: Will remain free from falls  Description: Will remain free from falls  Outcome: Progressing

## 2023-02-28 NOTE — DISCHARGE INSTRUCTIONS
[facility] and [facility] Medicine   Physician Orders and Discharge Instructions  [address]  Telephone: [phone]        NAME:  Alisa Castaneda OF BIRTH:  1945  MEDICAL RECORD NUMBER:  [id number]     Your  is:  Nicholas [facility]ney Care/Facility:  [facility] CARE     Wound Location:   LEFT ANTERIOR ANKLE AND LEFT MEDIAL ANKLE  Dressing orders:  Cleanse wound(s) with normal saline. 2. APPLY GENTAMICIN OINTMENT. 3. APPLY OPTILOCK. 4. SECURE WITH GAUZE WRAP. 5. CHANGE DRESSING DAILY. HOME CARE TO [address] BILATERAL FEET WITH SOAP AND WATER THEN APPLY AQUAPHOR WITH EACH DRESSING CHANGE,    TODAY IN WOUND CENTER APPLY MUPIROCIN TO SCRATCHES ON LEFT LATERAL LEG. Compression:  NONE     Offloading Device:     Other Instructions:     TRIAMCINOLONE CREAM AND APPLY TO ITCHING AREAS DAILY, CONTINUE YOUR ANTIBIOTIC,  Keep all dressings clean, dry and intact. Keep pressure off the wound(s) at all times. Follow up visit         1 WEEK   MARCH 7, 2023  AT      Please give 24 hour notice if unable to keep appointment. [phone]     If you experience any of the following, please call the Wound Care Service at  [phone] or go to the nearest emergency room. *Increase in pain         *Temperature over 101           *Increase in drainage from your wound or a foul odor  *Uncontrolled swelling            *Need for compression bandage changes due to slippage, breakthrough drainage       PLEASE NOTE: IF YOU ARE UNABLE TO OBTAIN WOUND SUPPLIES, CONTINUE TO USE THE SUPPLIES YOU HAVE AVAILABLE UNTIL YOU ARE ABLE TO REACH US.  IT IS MOST IMPORTANT TO KEEP THE WOUND COVERED AT ALL TIMES  Electronically signed by Virgilio Tinoco DPM on 2/28/2023 at 6:27 PM

## 2023-02-28 NOTE — CODE DOCUMENTATION
[facility] Phys[facility] Billing Sheet. Sudha Hendricks  AGE: 76 y.o.    GENDER: male  : 1945  TODAY'S DATE:  2023    ICD-10 CODES  Active Hospital Problems    Diagnosis Date Noted    Osteomyelitis of ankle ([facility] [M86.9] 2021    Non-pressure chronic ulcer of left ankle with fat layer exposed [facility]) [L97.322] 2021       PHYSICIAN PROCEDURES    CPT CODE  32495      Electronically signed by Yrn Rodríguez DPM on 2023 at 6:26 PM

## 2023-03-07 ENCOUNTER — HOSPITAL ENCOUNTER (OUTPATIENT)
Dept: WOUND CARE | Age: 78
Discharge: HOME | End: 2023-03-07
Payer: MEDICARE

## 2023-03-07 VITALS
HEART RATE: 74 BPM | TEMPERATURE: 98 F | RESPIRATION RATE: 18 BRPM | DIASTOLIC BLOOD PRESSURE: 70 MMHG | SYSTOLIC BLOOD PRESSURE: 149 MMHG

## 2023-03-07 DIAGNOSIS — L97.322 NON-PRESSURE CHRONIC ULCER OF LEFT ANKLE WITH FAT LAYER EXPOSED (HCC): Primary | ICD-10-CM

## 2023-03-07 PROCEDURE — 11042 DBRDMT SUBQ TIS 1ST 20SQCM/<: CPT

## 2023-03-07 PROCEDURE — 6370000000 HC RX 637 (ALT 250 FOR IP): Performed by: PODIATRIST

## 2023-03-07 RX ORDER — GENTAMICIN SULFATE 1 MG/G
OINTMENT TOPICAL ONCE
OUTPATIENT
Start: 2023-03-07 | End: 2023-03-07

## 2023-03-07 RX ORDER — LIDOCAINE 40 MG/G
CREAM TOPICAL ONCE
OUTPATIENT
Start: 2023-03-07 | End: 2023-03-07

## 2023-03-07 RX ORDER — LIDOCAINE HYDROCHLORIDE 40 MG/ML
SOLUTION TOPICAL ONCE
OUTPATIENT
Start: 2023-03-07 | End: 2023-03-07

## 2023-03-07 RX ORDER — BACITRACIN, NEOMYCIN, POLYMYXIN B 400; 3.5; 5 [USP'U]/G; MG/G; [USP'U]/G
OINTMENT TOPICAL ONCE
OUTPATIENT
Start: 2023-03-07 | End: 2023-03-07

## 2023-03-07 RX ORDER — LIDOCAINE HYDROCHLORIDE 20 MG/ML
JELLY TOPICAL ONCE
OUTPATIENT
Start: 2023-03-07 | End: 2023-03-07

## 2023-03-07 RX ORDER — LIDOCAINE HYDROCHLORIDE 20 MG/ML
JELLY TOPICAL ONCE
Status: COMPLETED | OUTPATIENT
Start: 2023-03-07 | End: 2023-03-07

## 2023-03-07 RX ORDER — GENTAMICIN SULFATE 1 MG/G
OINTMENT TOPICAL ONCE
Status: COMPLETED | OUTPATIENT
Start: 2023-03-07 | End: 2023-03-07

## 2023-03-07 RX ORDER — BACITRACIN ZINC AND POLYMYXIN B SULFATE 500; 1000 [USP'U]/G; [USP'U]/G
OINTMENT TOPICAL ONCE
OUTPATIENT
Start: 2023-03-07 | End: 2023-03-07

## 2023-03-07 RX ORDER — BETAMETHASONE DIPROPIONATE 0.05 %
OINTMENT (GRAM) TOPICAL ONCE
OUTPATIENT
Start: 2023-03-07 | End: 2023-03-07

## 2023-03-07 RX ORDER — LIDOCAINE 50 MG/G
OINTMENT TOPICAL ONCE
OUTPATIENT
Start: 2023-03-07 | End: 2023-03-07

## 2023-03-07 RX ORDER — GINSENG 100 MG
CAPSULE ORAL ONCE
OUTPATIENT
Start: 2023-03-07 | End: 2023-03-07

## 2023-03-07 RX ORDER — CLOBETASOL PROPIONATE 0.5 MG/G
OINTMENT TOPICAL ONCE
OUTPATIENT
Start: 2023-03-07 | End: 2023-03-07

## 2023-03-07 RX ADMIN — LIDOCAINE HYDROCHLORIDE: 20 JELLY TOPICAL at 16:02

## 2023-03-07 RX ADMIN — GENTAMICIN SULFATE: 1 OINTMENT TOPICAL at 16:30

## 2023-03-07 NOTE — PLAN OF CARE
Problem: Cognitive:  Goal: Knowledge of wound care  Outcome: Progressing  Goal: Understands risk factors for wounds  Outcome: Progressing     Problem: Cognitive:  Goal: Knowledge of wound care  Outcome: Progressing  Goal: Understands risk factors for wounds  Outcome: Progressing     Problem: Wound:  Goal: Will show signs of wound healing; wound closure and no evidence of infection  Description: Will show signs of wound healing; wound closure and no evidence of infection  Outcome: Progressing     Problem: Venous:  Goal: Signs of wound healing will improve  Description: Signs of wound healing will improve  Outcome: Progressing     Problem: Smoking cessation:  Goal: Ability to formulate a plan to maintain a tobacco-free life will be supported  Description: Ability to formulate a plan to maintain a tobacco-free life will be supported  Outcome: Progressing     Problem: Falls - Risk of:  Goal: Will remain free from falls  Description: Will remain free from falls  Outcome: Progressing

## 2023-03-07 NOTE — CODE DOCUMENTATION
[facility] Physician Billing Sheet. Alfredo Geiger  AGE: 76 y.o.    GENDER: male  : 1945  TODAY'S DATE:  3/7/2023    ICD-10 CODES  Active Hospital Problems    Diagnosis Date Noted    MRSA (methicillin resistant staph aureus) culture positive [Z22.322] 2023     Priority: Medium    Non-pressure chronic ulcer of left ankle with fat layer exposed ([address] [L97.322] 2021    Osteomyelitis of ankle ([facility] [M86.9] 2021       PHYSICIAN PROCEDURES    CPT CODE  39504 LT      Electronically signed by Johanna Burleson DPM on 3/7/2023 at 5:15 PM

## 2023-03-07 NOTE — DISCHARGE INSTRUCTIONS
[facility] and Hyper[facility] Medicine   Physician Orders and Discharge Instructions  [address]  Telephone: [phone]        NAME:  Radha Araya OF BIRTH:  1945  MEDICAL RECORD NUMBER:  [id number]     Your  is:  Nicholas Courtney Care/Facility:  [facility] CARE     Wound Location:   LEFT ANTERIOR ANKLE AND LEFT MEDIAL ANKLE  Dressing orders:  Cleanse wound(s) with normal saline. 2. APPLY GENTAMICIN OINTMENT. 3. APPLY OPTILOCK. 4. SECURE WITH GAUZE WRAP. 5. CHANGE DRESSING DAILY. HOME CARE TO [address] BILATERAL FEET WITH SOAP AND WATER THEN APPLY AQUAPHOR WITH EACH DRESSING CHANGE,     Compression:  NONE     Offloading Device:     Other Instructions:    APPLY TRIAMCINOLONE CREAM  TO ITCHING AREAS DAILY, CONTINUE YOUR ANTIBIOTIC,  Keep all dressings clean, dry and intact. Keep pressure off the wound(s) at all times. Follow up visit        2 WEEKS   MARCH 21, 2023 AT     Please give 24 hour notice if unable to keep appointment. [phone]     If you experience any of the following, please call the Wound Care Service at  [phone] or go to the nearest emergency room. *Increase in pain         *Temperature over 101           *Increase in drainage from your wound or a foul odor  *Uncontrolled swelling            *Need for compression bandage changes due to slippage, breakthrough drainage       PLEASE NOTE: IF YOU ARE UNABLE TO OBTAIN WOUND SUPPLIES, CONTINUE TO USE THE SUPPLIES YOU HAVE AVAILABLE UNTIL YOU ARE ABLE TO REACH US.  IT IS MOST IMPORTANT TO KEEP THE WOUND COVERED AT ALL TIMES
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
## 2023-03-09 DIAGNOSIS — I21.4 NSTEMI (NON-ST ELEVATED MYOCARDIAL INFARCTION) (HCC): ICD-10-CM

## 2023-03-09 DIAGNOSIS — E78.5 DYSLIPIDEMIA: ICD-10-CM

## 2023-03-09 DIAGNOSIS — I25.119 CORONARY ARTERY DISEASE INVOLVING NATIVE CORONARY ARTERY OF NATIVE HEART WITH ANGINA PECTORIS (HCC): ICD-10-CM

## 2023-03-09 RX ORDER — METOPROLOL TARTRATE 50 MG/1
25 TABLET, FILM COATED ORAL 2 TIMES DAILY
Qty: 180 TABLET | Refills: 3 | Status: SHIPPED | OUTPATIENT
Start: 2023-03-09

## 2023-03-09 RX ORDER — CLOPIDOGREL BISULFATE 75 MG/1
75 TABLET ORAL DAILY
Qty: 90 TABLET | Refills: 3 | Status: SHIPPED | OUTPATIENT
Start: 2023-03-09

## 2023-03-09 RX ORDER — OMEPRAZOLE 10 MG/1
CAPSULE, DELAYED RELEASE ORAL
Qty: 90 CAPSULE | Refills: 2 | Status: SHIPPED | OUTPATIENT
Start: 2023-03-09

## 2023-03-09 RX ORDER — ISOSORBIDE MONONITRATE 30 MG/1
TABLET, EXTENDED RELEASE ORAL
Qty: 90 TABLET | Refills: 3 | Status: SHIPPED | OUTPATIENT
Start: 2023-03-09

## 2023-03-09 RX ORDER — BUMETANIDE 2 MG/1
2 TABLET ORAL DAILY
Qty: 90 TABLET | Refills: 3 | Status: SHIPPED | OUTPATIENT
Start: 2023-03-09

## 2023-03-09 RX ORDER — NITROGLYCERIN 0.4 MG/1
0.4 TABLET SUBLINGUAL EVERY 5 MIN PRN
Qty: 25 TABLET | Refills: 3 | Status: SHIPPED | OUTPATIENT
Start: 2023-03-09

## 2023-03-09 RX ORDER — ATORVASTATIN CALCIUM 20 MG/1
TABLET, FILM COATED ORAL
Qty: 90 TABLET | Refills: 3 | Status: SHIPPED | OUTPATIENT
Start: 2023-03-09

## 2023-03-09 NOTE — TELEPHONE ENCOUNTER
Requesting medication refill. Please approve or deny this request.    Rx requested:  Requested Prescriptions     Pending Prescriptions Disp Refills    omeprazole (PRILOSEC) 10 MG delayed release capsule 90 capsule 2     Sig: TAKE ONE CAPSULE BY MOUTH DAILY    nitroGLYCERIN (NITROSTAT) 0.4 MG SL tablet 25 tablet 3     Sig: Place 1 tablet under the tongue every 5 minutes as needed for Chest pain up to max of 3 total doses. If no relief after 1 dose, call 911.     metoprolol tartrate (LOPRESSOR) 50 MG tablet 180 tablet 3     Sig: Take 0.5 tablets by mouth 2 times daily TAKE ONE TABLET BY MOUTH TWO TIMES A DAY    isosorbide mononitrate (IMDUR) 30 MG extended release tablet 90 tablet 3     Sig: TAKE ONE TABLET BY MOUTH EVERY DAY    clopidogrel (PLAVIX) 75 MG tablet 90 tablet 3     Sig: Take 1 tablet by mouth daily    atorvastatin (LIPITOR) 20 MG tablet 90 tablet 3     Sig: TAKE ONE TABLET BY MOUTH DAILY    bumetanide (BUMEX) 2 MG tablet 90 tablet 3     Sig: Take 1 tablet by mouth daily         Last Office Visit:   2/14/2023      Next Visit Date:  Future Appointments   Date Time Provider Jeimy Jerry   3/21/2023  4:15 PM TAMIKA Diaz [id number]   4/3/2023  2:15 PM Wyatt Higgins MD [address]   6/15/2023  2:00 PM Theresa Richey MD [facility]   8/14/2023  1:30 PM Peggy Huddleston DO [facility] EMERGENCY MEDICAL [place] AT [place]

## 2023-03-28 ENCOUNTER — HOSPITAL ENCOUNTER (OUTPATIENT)
Dept: WOUND CARE | Age: 78
Discharge: HOME | End: 2023-03-28
Payer: MEDICARE

## 2023-03-28 VITALS
HEART RATE: 75 BPM | DIASTOLIC BLOOD PRESSURE: 77 MMHG | RESPIRATION RATE: 20 BRPM | SYSTOLIC BLOOD PRESSURE: 155 MMHG | TEMPERATURE: 96.9 F

## 2023-03-28 DIAGNOSIS — L97.322 NON-PRESSURE CHRONIC ULCER OF LEFT ANKLE WITH FAT LAYER EXPOSED (HCC): Primary | ICD-10-CM

## 2023-03-28 PROCEDURE — 6370000000 HC RX 637 (ALT 250 FOR IP): Performed by: PODIATRIST

## 2023-03-28 PROCEDURE — 11042 DBRDMT SUBQ TIS 1ST 20SQCM/<: CPT

## 2023-03-28 RX ORDER — CLOBETASOL PROPIONATE 0.5 MG/G
OINTMENT TOPICAL ONCE
OUTPATIENT
Start: 2023-03-28 | End: 2023-03-28

## 2023-03-28 RX ORDER — BETAMETHASONE DIPROPIONATE 0.05 %
OINTMENT (GRAM) TOPICAL ONCE
OUTPATIENT
Start: 2023-03-28 | End: 2023-03-28

## 2023-03-28 RX ORDER — LIDOCAINE 40 MG/G
CREAM TOPICAL ONCE
Status: COMPLETED | OUTPATIENT
Start: 2023-03-28 | End: 2023-03-28

## 2023-03-28 RX ORDER — LIDOCAINE HYDROCHLORIDE 20 MG/ML
JELLY TOPICAL ONCE
OUTPATIENT
Start: 2023-03-28 | End: 2023-03-28

## 2023-03-28 RX ORDER — BACITRACIN, NEOMYCIN, POLYMYXIN B 400; 3.5; 5 [USP'U]/G; MG/G; [USP'U]/G
OINTMENT TOPICAL ONCE
OUTPATIENT
Start: 2023-03-28 | End: 2023-03-28

## 2023-03-28 RX ORDER — GINSENG 100 MG
CAPSULE ORAL ONCE
OUTPATIENT
Start: 2023-03-28 | End: 2023-03-28

## 2023-03-28 RX ORDER — LIDOCAINE HYDROCHLORIDE 40 MG/ML
SOLUTION TOPICAL ONCE
OUTPATIENT
Start: 2023-03-28 | End: 2023-03-28

## 2023-03-28 RX ORDER — BACITRACIN ZINC AND POLYMYXIN B SULFATE 500; 1000 [USP'U]/G; [USP'U]/G
OINTMENT TOPICAL ONCE
OUTPATIENT
Start: 2023-03-28 | End: 2023-03-28

## 2023-03-28 RX ORDER — GENTAMICIN SULFATE 1 MG/G
OINTMENT TOPICAL ONCE
Status: COMPLETED | OUTPATIENT
Start: 2023-03-28 | End: 2023-03-28

## 2023-03-28 RX ORDER — GENTAMICIN SULFATE 1 MG/G
OINTMENT TOPICAL ONCE
OUTPATIENT
Start: 2023-03-28 | End: 2023-03-28

## 2023-03-28 RX ORDER — LIDOCAINE 40 MG/G
CREAM TOPICAL ONCE
OUTPATIENT
Start: 2023-03-28 | End: 2023-03-28

## 2023-03-28 RX ORDER — LIDOCAINE 50 MG/G
OINTMENT TOPICAL ONCE
OUTPATIENT
Start: 2023-03-28 | End: 2023-03-28

## 2023-03-28 RX ADMIN — LIDOCAINE 4%: 4 CREAM TOPICAL at 14:25

## 2023-03-28 RX ADMIN — GENTAMICIN SULFATE: 1 OINTMENT TOPICAL at 15:19

## 2023-03-28 NOTE — DISCHARGE INSTRUCTIONS
[facility]   Physician Orders and Discharge Instructions  [address]  Telephone: [phone]        NAME:  Caroline Nicole OF BIRTH:  1945  MEDICAL RECORD NUMBER:  [id number]     Your  is:  Nicholas Courtney Care/Facility:  [facility] CARE     Wound Location:   LEFT ANTERIOR ANKLE AND LEFT MEDIAL ANKLE  Dressing orders:  Cleanse wound(s) with normal saline. 2. APPLY GENTAMICIN OINTMENT. 3. APPLY OPTILOCK. 4. SECURE WITH GAUZE WRAP. 5. CHANGE DRESSING DAILY. HOME CARE TO [facility] BILATERAL FEET WITH SOAP AND WATER THEN APPLY AQUAPHOR WITH EACH DRESSING CHANGE,     Compression:  NONE     Offloading Device:     Other Instructions:    APPLY TRIAMCINOLONE CREAM  TO ITCHING AREAS DAILY, CONTINUE YOUR ANTIBIOTIC,  Keep all dressings clean, dry and intact. Keep pressure off the wound(s) at all times. Follow up visit        2 WEEKS   April 11, 2023  AT      Please give 24 hour notice if unable to keep appointment. [phone]     If you experience any of the following, please call the Wound Care Service at  [phone] or go to the nearest emergency room. *Increase in pain         *Temperature over 101           *Increase in drainage from your wound or a foul odor  *Uncontrolled swelling            *Need for compression bandage changes due to slippage, breakthrough drainage       PLEASE NOTE: IF YOU ARE UNABLE TO OBTAIN WOUND SUPPLIES, CONTINUE TO USE THE SUPPLIES YOU HAVE AVAILABLE UNTIL YOU ARE ABLE TO REACH US.  IT IS MOST IMPORTANT TO KEEP THE WOUND COVERED AT ALL TIMES  Electronically signed by Gina Wilkins DPM on 3/28/2023 at 2:58 PM

## 2023-03-28 NOTE — CODE DOCUMENTATION
[facility] Physician Billing Sheet. Bacilio Silva  AGE: 76 y.o.    GENDER: male  : 1945  TODAY'S DATE:  3/28/2023    ICD-10 CODES  Active Hospital Problems    Diagnosis Date Noted    Non-pressure chronic ulcer [facility] left ankle with [facility] layer [facility] [L97.322] 2021    Osteomyelitis of ankle [facility]) [M86.9] 2021       PHYSICIAN PROCEDURES    CPT CODE  88973 LT      Electronically signed by Sara Huynh DPM on 3/28/2023 at 3:04 PM

## 2023-03-28 NOTE — PLAN OF CARE
Problem: Pain  Goal: Verbalizes/displays adequate comfort level or baseline comfort level  Outcome: Progressing     Problem: Pain  Goal: Verbalizes/displays adequate comfort level or baseline comfort level  3/28/2023 1424 by Selma Mayfield RN  Outcome: Progressing  3/28/2023 1424 by Selma Mayfield RN  Outcome: Progressing     Problem: Wound:  Goal: Will show signs of wound healing; wound closure and no evidence of infection  Description: Will show signs of wound healing; wound closure and no evidence of infection  Outcome: Progressing

## 2023-03-28 NOTE — PROGRESS NOTES
DRESSING CHANGE,     Compression:  NONE     Offloading Device:     Other Instructions:    APPLY TRIAMCINOLONE CREAM  TO ITCHING AREAS DAILY, CONTINUE YOUR ANTIBIOTIC,  Keep all dressings clean, dry and intact. Keep pressure off the wound(s) at all times. Follow up visit        2 WEEKS   April 11, 2023  AT      Please give 24 hour notice if unable to keep appointment. [phone]     If you experience any of the following, please call the Wound Care Service at  [phone] or go to the nearest emergency room. *Increase in pain         *Temperature over 101           *Increase in drainage from your wound or a foul odor  *Uncontrolled swelling            *Need for compression bandage changes due to slippage, breakthrough drainage       PLEASE NOTE: IF YOU ARE UNABLE TO OBTAIN WOUND SUPPLIES, CONTINUE TO USE THE SUPPLIES YOU HAVE AVAILABLE UNTIL YOU ARE ABLE TO REACH US.  IT IS MOST IMPORTANT TO KEEP THE WOUND COVERED AT ALL TIMES  Electronically signed by Gina Wilkins DPM on 3/28/2023 at 2:58 PM       Electronically signed by Gina Wilkins DPM on 3/28/2023 at 2:59 PM

## 2023-04-25 ENCOUNTER — HOSPITAL ENCOUNTER (OUTPATIENT)
Dept: WOUND CARE | Age: 78
Discharge: HOME | End: 2023-04-25
Payer: MEDICARE

## 2023-04-25 VITALS
HEART RATE: 64 BPM | SYSTOLIC BLOOD PRESSURE: 137 MMHG | TEMPERATURE: 97.6 F | DIASTOLIC BLOOD PRESSURE: 67 MMHG | RESPIRATION RATE: 18 BRPM

## 2023-04-25 DIAGNOSIS — L97.322 NON-PRESSURE CHRONIC ULCER OF LEFT ANKLE WITH FAT LAYER EXPOSED (HCC): Primary | Chronic | ICD-10-CM

## 2023-04-25 PROCEDURE — 6370000000 HC RX 637 (ALT 250 FOR IP): Performed by: PODIATRIST

## 2023-04-25 PROCEDURE — 11042 DBRDMT SUBQ TIS 1ST 20SQCM/<: CPT

## 2023-04-25 RX ORDER — LIDOCAINE HYDROCHLORIDE 40 MG/ML
SOLUTION TOPICAL ONCE
OUTPATIENT
Start: 2023-04-25 | End: 2023-04-25

## 2023-04-25 RX ORDER — LIDOCAINE HYDROCHLORIDE 20 MG/ML
JELLY TOPICAL ONCE
OUTPATIENT
Start: 2023-04-25 | End: 2023-04-25

## 2023-04-25 RX ORDER — BACITRACIN ZINC AND POLYMYXIN B SULFATE 500; 1000 [USP'U]/G; [USP'U]/G
OINTMENT TOPICAL ONCE
OUTPATIENT
Start: 2023-04-25 | End: 2023-04-25

## 2023-04-25 RX ORDER — CLOBETASOL PROPIONATE 0.5 MG/G
OINTMENT TOPICAL ONCE
OUTPATIENT
Start: 2023-04-25 | End: 2023-04-25

## 2023-04-25 RX ORDER — BACITRACIN, NEOMYCIN, POLYMYXIN B 400; 3.5; 5 [USP'U]/G; MG/G; [USP'U]/G
OINTMENT TOPICAL ONCE
OUTPATIENT
Start: 2023-04-25 | End: 2023-04-25

## 2023-04-25 RX ORDER — GENTAMICIN SULFATE 1 MG/G
OINTMENT TOPICAL ONCE
OUTPATIENT
Start: 2023-04-25 | End: 2023-04-25

## 2023-04-25 RX ORDER — GENTAMICIN SULFATE 1 MG/G
OINTMENT TOPICAL ONCE
Status: DISCONTINUED | OUTPATIENT
Start: 2023-04-25 | End: 2023-04-26 | Stop reason: HOSPADM

## 2023-04-25 RX ORDER — BETAMETHASONE DIPROPIONATE 0.05 %
OINTMENT (GRAM) TOPICAL ONCE
OUTPATIENT
Start: 2023-04-25 | End: 2023-04-25

## 2023-04-25 RX ORDER — GINSENG 100 MG
CAPSULE ORAL ONCE
OUTPATIENT
Start: 2023-04-25 | End: 2023-04-25

## 2023-04-25 RX ORDER — LIDOCAINE 50 MG/G
OINTMENT TOPICAL ONCE
OUTPATIENT
Start: 2023-04-25 | End: 2023-04-25

## 2023-04-25 RX ORDER — LIDOCAINE 40 MG/G
CREAM TOPICAL ONCE
OUTPATIENT
Start: 2023-04-25 | End: 2023-04-25

## 2023-04-25 RX ORDER — LIDOCAINE 40 MG/G
CREAM TOPICAL ONCE
Status: COMPLETED | OUTPATIENT
Start: 2023-04-25 | End: 2023-04-25

## 2023-04-25 RX ADMIN — LIDOCAINE 4%: 4 CREAM TOPICAL at 16:20

## 2023-04-25 NOTE — PROGRESS NOTES
EXAM    Constitutional:   Well nourished and well developed. Appears neat and clean. Patient is alert, oriented x3, and in no apparent distress. Respiratory:  Respiratory effort is easy and symmetric bilaterally. Rate is normal at rest and on room air. Vascular:  Pedal Pulses is palpable and audible with doppler. Capillary refill is <3 sec to digits bilateral.  Extremities positive for 1+ pitting edema. Art duplex reviewed no occlusion noted. Neurological:   Sensation is intact to lower extremities. Dermatological:  Wound description noted in wound assessment. The wound appearnace is  much improved again this week with decreased  slough, mild  pain and erythema. Granulation buds are noted. Depth is much improved as well as drainage. New epithelialization noted around wound edges. Major improvement noted with full epithelialization of the lateral aspect of the wound. Psychiatric:  Judgement and insight intact. Short and long term memory intact. No evidence of depression, anxiety, or agitation. Patient is calm, cooperative, and communicative. Appropriate interactions and affect. Assessment:      Active Hospital Problems    Diagnosis Date Noted    Non-pressure chronic ulcer of left ankle with fat layer exposed ([facility] [L97.322] 08/17/2021    Osteomyelitis of ankle [facility]) [M86.9] 08/17/2021        Procedure Note  Indications:  Based on my examination of this patient's wound(s)/ulcer(s) today, debridement is required to promote healing and evaluate the wound base. Performed by: Bria Almeida DPM    Consent obtained:  Yes    Time out taken:  Yes    Pain Control:2% lidocaine gel  thin layer applied topically to wound bed      Debridement:Excisional Debridement    Using curette the wound(s)/ulcer(s) was/were sharply debrided down through and including the removal of epidermis, dermis and subcutaneous tissue.         Devitalized Tissue Debrided:  exudate    Pre Debridement Measurements:  Are

## 2023-04-25 NOTE — DISCHARGE INSTRUCTIONS
[facility]   Physician Orders and Discharge Instructions  [address]  Telephone: [phone]        NAME:  Mohan Marie OF BIRTH:  1945  MEDICAL RECORD NUMBER:  [id number]     Your  is:  Nicholas Courtney Care/Facility:  [facility] CARE     Wound Location:   LEFT ANTERIOR ANKLE AND LEFT MEDIAL ANKLE  Dressing orders:  Cleanse wound(s) with normal saline. 2. APPLY GENTAMICIN OINTMENT. 3. APPLY OPTILOCK. 4. SECURE WITH GAUZE WRAP. 5. CHANGE DRESSING DAILY. HOME CARE TO [address] BILATERAL FEET WITH SOAP AND WATER THEN APPLY AQUAPHOR WITH EACH DRESSING CHANGE,     Compression:  NONE     Offloading Device:     Other Instructions:    APPLY TRIAMCINOLONE CREAM  TO ITCHING AREAS DAILY, CONTINUE YOUR ANTIBIOTIC,  Keep all dressings clean, dry and intact. Keep pressure off the wound(s) at all times. Follow up visit       2 WEEKS    MAY   11  , 2023  AT   2:30PM     Please give 24 hour notice if unable to keep appointment. [phone]     If you experience any of the following, please call the Wound Care Service at  [phone] or go to the nearest emergency room. *Increase in pain         *Temperature over 101           *Increase in drainage from your wound or a foul odor  *Uncontrolled swelling            *Need for compression bandage changes due to slippage, breakthrough drainage       PLEASE NOTE: IF YOU ARE UNABLE TO OBTAIN WOUND SUPPLIES, CONTINUE TO USE THE SUPPLIES YOU HAVE AVAILABLE UNTIL YOU ARE ABLE TO REACH US.  IT IS MOST IMPORTANT TO KEEP THE WOUND COVERED AT ALL TIMES

## 2023-04-25 NOTE — CODE DOCUMENTATION
[facility] Physician Billing Sheet. Tino Yuan  AGE: 76 y.o.    GENDER: male  : 1945  TODAY'S DATE:  2023    ICD-10 CODES  Active Hospital Problems    Diagnosis Date Noted    Non-pressure chronic ulcer of left ankle with [facility] layer [facility] [L97.322] 2021    Osteomyelitis of ankle [facility]) [M86.9] 2021       PHYSICIAN PROCEDURES    CPT CODE  20754 LT      Electronically signed by Ashleigh Bravo DPM on 2023 at 4:58 PM

## 2023-04-27 ASSESSMENT — ENCOUNTER SYMPTOMS
ABDOMINAL PAIN: 0
VOMITING: 0
SHORTNESS OF BREATH: 0
APNEA: 0
NAUSEA: 0
VOICE CHANGE: 0
COLOR CHANGE: 1
ANAL BLEEDING: 0
ABDOMINAL DISTENTION: 0
PHOTOPHOBIA: 0
BACK PAIN: 0
EYE DISCHARGE: 0
COUGH: 0

## 2023-05-11 ENCOUNTER — HOSPITAL ENCOUNTER (OUTPATIENT)
Dept: WOUND CARE | Age: 78
Discharge: HOME | End: 2023-05-11
Payer: MEDICARE

## 2023-05-11 ENCOUNTER — OFFICE VISIT (OUTPATIENT)
Dept: INFECTIOUS DISEASES | Age: 78
End: 2023-05-11

## 2023-05-11 VITALS
HEART RATE: 77 BPM | SYSTOLIC BLOOD PRESSURE: 136 MMHG | TEMPERATURE: 98.7 F | RESPIRATION RATE: 18 BRPM | DIASTOLIC BLOOD PRESSURE: 79 MMHG

## 2023-05-11 VITALS — HEART RATE: 83 BPM | TEMPERATURE: 98 F | SYSTOLIC BLOOD PRESSURE: 138 MMHG | DIASTOLIC BLOOD PRESSURE: 80 MMHG

## 2023-05-11 DIAGNOSIS — L97.321 VENOUS STASIS ULCER OF LEFT ANKLE LIMITED TO BREAKDOWN OF SKIN, UNSPECIFIED WHETHER VARICOSE VEINS PRESENT (HCC): Primary | ICD-10-CM

## 2023-05-11 DIAGNOSIS — L97.322 NON-PRESSURE CHRONIC ULCER OF LEFT ANKLE WITH FAT LAYER EXPOSED (HCC): Primary | ICD-10-CM

## 2023-05-11 DIAGNOSIS — I83.023 VENOUS STASIS ULCER OF LEFT ANKLE LIMITED TO BREAKDOWN OF SKIN, UNSPECIFIED WHETHER VARICOSE VEINS PRESENT (HCC): Primary | ICD-10-CM

## 2023-05-11 DIAGNOSIS — A49.02 MRSA (METHICILLIN RESISTANT STAPHYLOCOCCUS AUREUS) INFECTION: ICD-10-CM

## 2023-05-11 PROCEDURE — 6370000000 HC RX 637 (ALT 250 FOR IP): Performed by: PODIATRIST

## 2023-05-11 PROCEDURE — 97597 DBRDMT OPN WND 1ST 20 CM/<: CPT

## 2023-05-11 RX ORDER — BETAMETHASONE DIPROPIONATE 0.05 %
OINTMENT (GRAM) TOPICAL ONCE
OUTPATIENT
Start: 2023-05-11 | End: 2023-05-11

## 2023-05-11 RX ORDER — LIDOCAINE HYDROCHLORIDE 20 MG/ML
JELLY TOPICAL ONCE
OUTPATIENT
Start: 2023-05-11 | End: 2023-05-11

## 2023-05-11 RX ORDER — LIDOCAINE 40 MG/G
CREAM TOPICAL ONCE
OUTPATIENT
Start: 2023-05-11 | End: 2023-05-11

## 2023-05-11 RX ORDER — LIDOCAINE 50 MG/G
OINTMENT TOPICAL ONCE
OUTPATIENT
Start: 2023-05-11 | End: 2023-05-11

## 2023-05-11 RX ORDER — BACITRACIN, NEOMYCIN, POLYMYXIN B 400; 3.5; 5 [USP'U]/G; MG/G; [USP'U]/G
OINTMENT TOPICAL ONCE
OUTPATIENT
Start: 2023-05-11 | End: 2023-05-11

## 2023-05-11 RX ORDER — BACITRACIN ZINC AND POLYMYXIN B SULFATE 500; 1000 [USP'U]/G; [USP'U]/G
OINTMENT TOPICAL ONCE
OUTPATIENT
Start: 2023-05-11 | End: 2023-05-11

## 2023-05-11 RX ORDER — GENTAMICIN SULFATE 1 MG/G
OINTMENT TOPICAL ONCE
OUTPATIENT
Start: 2023-05-11 | End: 2023-05-11

## 2023-05-11 RX ORDER — GINSENG 100 MG
CAPSULE ORAL ONCE
OUTPATIENT
Start: 2023-05-11 | End: 2023-05-11

## 2023-05-11 RX ORDER — LIDOCAINE HYDROCHLORIDE 40 MG/ML
SOLUTION TOPICAL ONCE
OUTPATIENT
Start: 2023-05-11 | End: 2023-05-11

## 2023-05-11 RX ORDER — LIDOCAINE 40 MG/G
CREAM TOPICAL ONCE
Status: COMPLETED | OUTPATIENT
Start: 2023-05-11 | End: 2023-05-11

## 2023-05-11 RX ORDER — CLOBETASOL PROPIONATE 0.5 MG/G
OINTMENT TOPICAL ONCE
OUTPATIENT
Start: 2023-05-11 | End: 2023-05-11

## 2023-05-11 RX ORDER — GENTAMICIN SULFATE 1 MG/G
OINTMENT TOPICAL ONCE
Status: COMPLETED | OUTPATIENT
Start: 2023-05-11 | End: 2023-05-11

## 2023-05-11 RX ORDER — DOXYCYCLINE HYCLATE 100 MG/1
100 CAPSULE ORAL 2 TIMES DAILY
Qty: 60 CAPSULE | Refills: 1 | Status: SHIPPED | OUTPATIENT
Start: 2023-05-11 | End: 2023-06-10

## 2023-05-11 RX ADMIN — GENTAMICIN SULFATE: 1 OINTMENT TOPICAL at 15:51

## 2023-05-11 RX ADMIN — LIDOCAINE 4%: 4 CREAM TOPICAL at 14:34

## 2023-05-11 ASSESSMENT — PAIN SCALES - GENERAL: PAINLEVEL_OUTOF10: 4

## 2023-05-11 ASSESSMENT — PAIN DESCRIPTION - LOCATION: LOCATION: LEG

## 2023-05-11 ASSESSMENT — PAIN DESCRIPTION - ORIENTATION: ORIENTATION: LEFT

## 2023-05-11 NOTE — DISCHARGE INSTRUCTIONS
[facility]   Physician Orders and Discharge Instructions  [address]  Telephone: [phone]        NAME:  America Julio OF BIRTH:  1945  MEDICAL RECORD NUMBER:  [id number]     Your  is:       Home Care/Facility:  [facility] [address] CARE     Wound Location:   LEFT ANTERIOR ANKLE AND LEFT MEDIAL ANKLE  Dressing orders:  Cleanse wound(s) with normal saline. 2. APPLY GENTAMICIN OINTMENT. 3. APPLY OPTILOCK. 4. SECURE WITH GAUZE WRAP. 5. CHANGE every other day or Monday, Wednesday and Friday. HOME CARE TO [facility] BILATERAL FEET WITH SOAP AND WATER THEN APPLY AQUAPHOR WITH EACH DRESSING CHANGE,     Compression:  NONE     Offloading Device:     Other Instructions:    APPLY TRIAMCINOLONE CREAM  TO ITCHING AREAS DAILY, CONTINUE YOUR ANTIBIOTIC per Infectious Disease. Keep all dressings clean, dry and intact. Keep pressure off the wound(s) at all times. Follow up visit       2 WEEKS    MAY  25 , 2023   @  3:30 pm     Please give 24 hour notice if unable to keep appointment. [phone]     If you experience any of the following, please call the Wound Care Service at  [phone] or go to the nearest emergency room. *Increase in pain         *Temperature over 101           *Increase in drainage from your wound or a foul odor  *Uncontrolled swelling            *Need for compression bandage changes due to slippage, breakthrough drainage       PLEASE NOTE: IF YOU ARE UNABLE TO OBTAIN WOUND SUPPLIES, CONTINUE TO USE THE SUPPLIES YOU HAVE AVAILABLE UNTIL YOU ARE ABLE TO REACH US.  IT IS MOST IMPORTANT TO KEEP THE WOUND COVERED AT ALL TIMES

## 2023-05-11 NOTE — PROGRESS NOTES
Ivy Fernandez (:  1945) is a 76 y.o. male,Established patient, here for evaluation of the following chief complaint(s):  Osteomyelitis  (2 month f/u OM L ankle )         ASSESSMENT/PLAN:  Acute osteomyelitis left ankle  Left leg chronic venous stasis ulcers with lack of healing   +ve MRSA 2023    DC Keflex   Doxycycline 100 mg p.o. twice daily x 8 weeks  Follow-up in 6 to 8 weeks  Continue local wound care    SUBJECTIVE/OBJECTIVE:  HPI  Follow-up left ankle osteomyelitis in a patient with left leg chronic venous stasis ulcer. History of MRSA colonization. Remains to be on p.o. Keflex 500 mg 3 times daily, well-tolerated. Past Medical History:   Diagnosis Date    Alcohol abuse     quit drinking 21    CAD (coronary artery disease)     patient states no doctor has told him this / has 1 cardiac stent    Cancer ([address]     lung LLL    Chronic back pain     Chronic kidney disease     Chronic obstructive pulmonary disease, unspecified COPD type ([address] 3/24/2022    Chronic sinusitis     DJD (degenerative joint disease) of knee     left knee DJD    Elevated PSA     History of blood transfusion     History of inferior wall myocardial infarction 2016    1 cardiac stent    HTN (hypertension)     meds .  1 yr    Hyperlipidemia     meds > 12 yrs    NSTEMI (non-ST elevated myocardial infarction) ([address]     Smoker      Past Surgical History:   Procedure Laterality Date    ABDOMEN SURGERY      spleenectomy due to [address]      lumbar disc OR    CARDIAC SURGERY      stents    CARPAL TUNNEL RELEASE Right 2019    RIGHT CARPAL TUNNEL RELEASE performed by Edilberto Baca MD at [address]  2016    EYE SURGERY      to have Phaco with IOL OU 2018    HERNIA REPAIR      JOINT REPLACEMENT Left     LTHR    JOINT REPLACEMENT Right     RTKR    KNEE SURGERY Right     arthroscopy    AL MANIPULATION KNEE JOINT UNDER GENERAL

## 2023-05-16 ENCOUNTER — TELEPHONE (OUTPATIENT)
Dept: INFECTIOUS DISEASES | Age: 78
End: 2023-05-16

## 2023-05-16 DIAGNOSIS — I83.023 VENOUS STASIS ULCER OF LEFT ANKLE LIMITED TO BREAKDOWN OF SKIN, UNSPECIFIED WHETHER VARICOSE VEINS PRESENT (HCC): Primary | ICD-10-CM

## 2023-05-16 DIAGNOSIS — L97.321 VENOUS STASIS ULCER OF LEFT ANKLE LIMITED TO BREAKDOWN OF SKIN, UNSPECIFIED WHETHER VARICOSE VEINS PRESENT (HCC): Primary | ICD-10-CM

## 2023-05-16 RX ORDER — CEPHALEXIN 500 MG/1
500 CAPSULE ORAL 3 TIMES DAILY
Qty: 90 CAPSULE | Refills: 0 | Status: SHIPPED | OUTPATIENT
Start: 2023-05-16

## 2023-05-16 NOTE — TELEPHONE ENCOUNTER
Per  Stop Doxy. Take Keflex 500 mg 3 x a day. And Check blood work. Nurse Randall Yang sent medication to the pharmacy  Called patient LM to return my call in the office.

## 2023-05-16 NOTE — TELEPHONE ENCOUNTER
Received call from patient,He think the New Medication  ( Doxy. - 100 mg  1 tab.twice) Patient think Doxy make him sick( feeling tired and Nauseous)  We will Consult ID Doctor.

## 2023-05-24 ENCOUNTER — HOSPITAL ENCOUNTER (OUTPATIENT)
Dept: WOUND CARE | Age: 78
Discharge: HOME | End: 2023-05-24
Payer: MEDICARE

## 2023-05-24 VITALS
HEART RATE: 63 BPM | TEMPERATURE: 97.3 F | DIASTOLIC BLOOD PRESSURE: 79 MMHG | RESPIRATION RATE: 20 BRPM | SYSTOLIC BLOOD PRESSURE: 169 MMHG

## 2023-05-24 DIAGNOSIS — L97.322 NON-PRESSURE CHRONIC ULCER OF LEFT ANKLE WITH FAT LAYER EXPOSED (HCC): Primary | ICD-10-CM

## 2023-05-24 PROCEDURE — 97597 DBRDMT OPN WND 1ST 20 CM/<: CPT

## 2023-05-24 PROCEDURE — 6370000000 HC RX 637 (ALT 250 FOR IP): Performed by: PODIATRIST

## 2023-05-24 RX ORDER — LIDOCAINE 40 MG/G
CREAM TOPICAL ONCE
OUTPATIENT
Start: 2023-05-24 | End: 2023-05-24

## 2023-05-24 RX ORDER — BACITRACIN, NEOMYCIN, POLYMYXIN B 400; 3.5; 5 [USP'U]/G; MG/G; [USP'U]/G
OINTMENT TOPICAL ONCE
OUTPATIENT
Start: 2023-05-24 | End: 2023-05-24

## 2023-05-24 RX ORDER — CLOBETASOL PROPIONATE 0.5 MG/G
OINTMENT TOPICAL ONCE
OUTPATIENT
Start: 2023-05-24 | End: 2023-05-24

## 2023-05-24 RX ORDER — GENTAMICIN SULFATE 1 MG/G
OINTMENT TOPICAL ONCE
OUTPATIENT
Start: 2023-05-24 | End: 2023-05-24

## 2023-05-24 RX ORDER — LIDOCAINE 40 MG/G
CREAM TOPICAL ONCE
Status: COMPLETED | OUTPATIENT
Start: 2023-05-24 | End: 2023-05-24

## 2023-05-24 RX ORDER — GENTAMICIN SULFATE 1 MG/G
OINTMENT TOPICAL ONCE
Status: COMPLETED | OUTPATIENT
Start: 2023-05-24 | End: 2023-05-24

## 2023-05-24 RX ORDER — LIDOCAINE HYDROCHLORIDE 40 MG/ML
SOLUTION TOPICAL ONCE
OUTPATIENT
Start: 2023-05-24 | End: 2023-05-24

## 2023-05-24 RX ORDER — BACITRACIN ZINC AND POLYMYXIN B SULFATE 500; 1000 [USP'U]/G; [USP'U]/G
OINTMENT TOPICAL ONCE
OUTPATIENT
Start: 2023-05-24 | End: 2023-05-24

## 2023-05-24 RX ORDER — BETAMETHASONE DIPROPIONATE 0.05 %
OINTMENT (GRAM) TOPICAL ONCE
OUTPATIENT
Start: 2023-05-24 | End: 2023-05-24

## 2023-05-24 RX ORDER — LIDOCAINE HYDROCHLORIDE 20 MG/ML
JELLY TOPICAL ONCE
OUTPATIENT
Start: 2023-05-24 | End: 2023-05-24

## 2023-05-24 RX ORDER — GINSENG 100 MG
CAPSULE ORAL ONCE
OUTPATIENT
Start: 2023-05-24 | End: 2023-05-24

## 2023-05-24 RX ORDER — LIDOCAINE 50 MG/G
OINTMENT TOPICAL ONCE
OUTPATIENT
Start: 2023-05-24 | End: 2023-05-24

## 2023-05-24 RX ADMIN — LIDOCAINE 4%: 4 CREAM TOPICAL at 14:48

## 2023-05-24 RX ADMIN — GENTAMICIN SULFATE: 1 OINTMENT TOPICAL at 15:39

## 2023-05-24 NOTE — PLAN OF CARE
Problem: Cognitive:  Goal: Knowledge of wound care  Description: Knowledge of wound care  Outcome: Progressing  Goal: Understands risk factors for wounds  Description: Understands risk factors for wounds  Outcome: Progressing     Problem: Wound:  Goal: Will show signs of wound healing; wound closure and no evidence of infection  Description: Will show signs of wound healing; wound closure and no evidence of infection  Outcome: Progressing     Problem: Venous:  Goal: Signs of wound healing will improve  Description: Signs of wound healing will improve  Outcome: Progressing     Problem: Compression therapy:  Goal: Will be free from complications associated with compression therapy  Description: Will be free from complications associated with compression therapy  Outcome: Progressing     Problem: Falls - Risk of:  Goal: Will remain free from falls  Description: Will remain free from falls  Outcome: Progressing

## 2023-05-24 NOTE — PROGRESS NOTES
Change in Wound Size % (l*w) 99.14 05/24/23 1443   Wound Volume (cm^3) 0.024 cm^3 05/24/23 1443   Wound Healing % 98 05/24/23 1443   Post-Procedure Length (cm) 1 cm 05/11/23 1533   Post-Procedure Width (cm) 0.3 cm 05/11/23 1533   Post-Procedure Depth (cm) 0.2 cm 05/11/23 1533   Post-Procedure Surface Area (cm^2) 0.3 cm^2 05/11/23 1533   Post-Procedure Volume (cm^3) 0.06 cm^3 05/11/23 1533   Wound Assessment Pink/red 05/24/23 1443   Drainage Amount Small 05/24/23 1443   Drainage Description Serosanguinous 05/24/23 1443   Odor None 05/24/23 1443   Ela-wound Assessment Intact;Fragile 05/24/23 1443   Margins Undefined edges 05/24/23 1443   Wound Thickness Description not for Pressure Injury Full thickness 05/24/23 1443   Number of days: 183     Incision 05/06/19 Wrist Right (Active)   Number of days: 5755         Percent of Wound/Ulcer Debrided: 100%    Total Surface Area Debrided:  4.8 sq cm     Diabetic/Pressure/Non Pressure Ulcers:  Ulcer: N/A      Bleeding:  Minimal    Hemostasis Achieved:  not needed    Procedural Pain:  0  / 10     Post Procedural Pain:  0 / 10     Response to treatment:  Well tolerated by patient. Plan:     Encouraged patient to keep feet elevated when sitting. Keep wound clean and covered. Patient is followed by home health. Encourage patient to follow-up with infectious disease and take antibiotics as directed. Encouraged patient to have tetanus vaccination and quit smoking. Advised to go to the ER with signs symptoms of infection. Treatment Note please see attached Discharge Instructions    Written patient dismissal instructions given to patient and signed by patient or POA.          Discharge [facility]   Physician Orders and Discharge Instructions  [address]  Telephone: [phone]        NAME:  Valerie Nam

## 2023-05-24 NOTE — DISCHARGE INSTRUCTIONS
[facility]   Physician Orders and Discharge Instructions  [address]  Telephone: [phone]        NAME:  Buck Steward OF BIRTH:  1945  MEDICAL RECORD NUMBER:  [id number]     Your  is: [address] Care/Facility:  [address] [place] [address] Rd., [address] CARE     Wound Location:   LEFT ANTERIOR ANKLE AND LEFT MEDIAL ANKLE  Dressing orders:  Cleanse wound(s) with normal saline. 2.  APPLY GENTAMICIN OINTMENT. 3. Apply saline moistened HYDROFERA BLUE  FOAM to wound bed  NOTE: If your Alexandru Prows is not soft and pliable, moisten with saline until it is sponge like and then ring out excess saline and apply to wound bed. 4.. Cover with 4x4's and wrap with gauze (graciela or kerlix)  4. Change  Every other day or Monday, Wednesday, and Friday        HOME CARE TO [facility] BILATERAL FEET WITH SOAP AND WATER THEN APPLY AQUAPHOR WITH EACH DRESSING CHANGE,     Compression:  NONE     Offloading Device:     Other Instructions:    APPLY TRIAMCINOLONE CREAM  TO ITCHING AREAS DAILY, CONTINUE YOUR ANTIBIOTIC per Infectious Disease. Keep all dressings clean, dry and intact. Keep pressure off the wound(s) at all times. Follow up visit       2 WEEKS    June 7 , 2023   @  2:00 pm      Please give 24 hour notice if unable to keep appointment. [phone]     If you experience any of the following, please call the Wound Care Service at  [phone] or go to the nearest emergency room.         *Increase in pain         *Temperature over 101           *Increase in drainage from your wound or a foul odor  *Uncontrolled swelling            *Need for compression bandage changes due to slippage, breakthrough drainage       PLEASE NOTE: IF YOU ARE UNABLE TO OBTAIN WOUND SUPPLIES, CONTINUE TO USE THE SUPPLIES

## 2023-05-25 DIAGNOSIS — Z76.0 MEDICATION REFILL: ICD-10-CM

## 2023-05-25 RX ORDER — CITALOPRAM 40 MG/1
TABLET ORAL
Qty: 30 TABLET | Refills: 5 | Status: SHIPPED | OUTPATIENT
Start: 2023-05-25

## 2023-06-07 ENCOUNTER — HOSPITAL ENCOUNTER (OUTPATIENT)
Dept: WOUND CARE | Age: 78
Discharge: HOME | End: 2023-06-07
Payer: COMMERCIAL

## 2023-06-07 VITALS
SYSTOLIC BLOOD PRESSURE: 150 MMHG | RESPIRATION RATE: 20 BRPM | HEART RATE: 59 BPM | DIASTOLIC BLOOD PRESSURE: 79 MMHG | TEMPERATURE: 97.8 F

## 2023-06-07 DIAGNOSIS — L97.322 NON-PRESSURE CHRONIC ULCER OF LEFT ANKLE WITH FAT LAYER EXPOSED (HCC): Primary | ICD-10-CM

## 2023-06-07 PROCEDURE — 97597 DBRDMT OPN WND 1ST 20 CM/<: CPT

## 2023-06-07 PROCEDURE — 6370000000 HC RX 637 (ALT 250 FOR IP): Performed by: PODIATRIST

## 2023-06-07 PROCEDURE — 11042 DBRDMT SUBQ TIS 1ST 20SQCM/<: CPT

## 2023-06-07 RX ORDER — BACITRACIN ZINC AND POLYMYXIN B SULFATE 500; 1000 [USP'U]/G; [USP'U]/G
OINTMENT TOPICAL ONCE
OUTPATIENT
Start: 2023-06-07 | End: 2023-06-07

## 2023-06-07 RX ORDER — LIDOCAINE 50 MG/G
OINTMENT TOPICAL ONCE
OUTPATIENT
Start: 2023-06-07 | End: 2023-06-07

## 2023-06-07 RX ORDER — BETAMETHASONE DIPROPIONATE 0.05 %
OINTMENT (GRAM) TOPICAL ONCE
OUTPATIENT
Start: 2023-06-07 | End: 2023-06-07

## 2023-06-07 RX ORDER — CLOBETASOL PROPIONATE 0.5 MG/G
OINTMENT TOPICAL ONCE
OUTPATIENT
Start: 2023-06-07 | End: 2023-06-07

## 2023-06-07 RX ORDER — LIDOCAINE HYDROCHLORIDE 20 MG/ML
JELLY TOPICAL ONCE
OUTPATIENT
Start: 2023-06-07 | End: 2023-06-07

## 2023-06-07 RX ORDER — LIDOCAINE HYDROCHLORIDE 40 MG/ML
SOLUTION TOPICAL ONCE
OUTPATIENT
Start: 2023-06-07 | End: 2023-06-07

## 2023-06-07 RX ORDER — LIDOCAINE 40 MG/G
CREAM TOPICAL ONCE
OUTPATIENT
Start: 2023-06-07 | End: 2023-06-07

## 2023-06-07 RX ORDER — LIDOCAINE 40 MG/G
CREAM TOPICAL ONCE
Status: COMPLETED | OUTPATIENT
Start: 2023-06-07 | End: 2023-06-07

## 2023-06-07 RX ORDER — GINSENG 100 MG
CAPSULE ORAL ONCE
OUTPATIENT
Start: 2023-06-07 | End: 2023-06-07

## 2023-06-07 RX ORDER — IBUPROFEN 200 MG
TABLET ORAL ONCE
OUTPATIENT
Start: 2023-06-07 | End: 2023-06-07

## 2023-06-07 RX ORDER — GENTAMICIN SULFATE 1 MG/G
OINTMENT TOPICAL ONCE
Status: COMPLETED | OUTPATIENT
Start: 2023-06-07 | End: 2023-06-07

## 2023-06-07 RX ORDER — GENTAMICIN SULFATE 1 MG/G
OINTMENT TOPICAL ONCE
OUTPATIENT
Start: 2023-06-07 | End: 2023-06-07

## 2023-06-07 RX ADMIN — GENTAMICIN SULFATE: 1 OINTMENT TOPICAL at 16:01

## 2023-06-07 RX ADMIN — LIDOCAINE 4%: 4 CREAM TOPICAL at 15:01

## 2023-06-07 NOTE — DISCHARGE INSTRUCTIONS
101 [facility]   Physician Orders and Discharge Instructions  [address]  Telephone: [phone]        NAME:  Argelia Espino OF BIRTH:  1945  MEDICAL RECORD NUMBER:  [id number]     Your  is: [address] Care/Facility:  [facility] [address] CARE     Wound Location:   LEFT ANTERIOR ANKLE AND LEFT MEDIAL ANKLE  Dressing orders:  1Cleanse wound(s) with normal saline. 2 APPLY Gentamincin  OINTMENT. 3. Apply Plain Calcium Alginate to wound beds  4. Cover with dry dressing  5. Change every other day or daily if needed. HOME CARE TO [facility] BILATERAL FEET WITH SOAP AND WATER THEN APPLY AQUAPHOR WITH EACH DRESSING CHANGE,     Compression:  NONE     Offloading Device:     Other Instructions:    APPLY TRIAMCINOLONE CREAM  TO ITCHING AREAS DAILY, CONTINUE YOUR ANTIBIOTIC per Infectious Disease. Keep all dressings clean, dry and intact. Keep pressure off the wound(s) at all times. Follow up visit       1 WEEK    June 14, 2023   @  3:00 pm      Please give 24 hour notice if unable to keep appointment. [phone]     If you experience any of the following, please call the Wound Care Service at  [phone] or go to the nearest emergency room. *Increase in pain         *Temperature over 101           *Increase in drainage from your wound or a foul odor  *Uncontrolled swelling            *Need for compression bandage changes due to slippage, breakthrough drainage       PLEASE NOTE: IF YOU ARE UNABLE TO OBTAIN WOUND SUPPLIES, CONTINUE TO USE THE SUPPLIES YOU HAVE AVAILABLE UNTIL YOU ARE ABLE TO REACH US.  IT IS MOST IMPORTANT TO KEEP THE WOUND COVERED AT ALL TIMES

## 2023-06-07 NOTE — PROGRESS NOTES
[address]                                                   Progress Note and Procedure Note      Will Garcia RECORD NUMBER:  [id number]  AGE: 76 y.o. GENDER: male  : 1945  EPISODE DATE:  2023    Subjective:     Chief Complaint   Patient presents with    Wound Check     Left ankle wounds         HISTORY of PRESENT ILLNESS HPI     Tadeo Ochoa is a 76 y.o. male who presents today for wound/ulcer evaluation. History of Wound Context: Patient states wound was looking good until last dressing change when redness appeared around wound and shape and distribution of Hydrofera Blue pad. No fever or chills. No increased drainage or purulence noted by patient. No streaking of redness up the leg. Patient is followed by infectious disease due to history of osteomyelitis of left ankle. He is currently taking doxycycline daily. He plans to follow-up with infectious disease in a couple weeks. Patient is unable to tolerate compression. Wound/Ulcer Pain Timing/Severity: intermittent  Quality of pain: tender  Severity:  3 / 10   Modifying Factors: Pain worsens with pressure  Associated Signs/Symptoms: drainage and pain    Ulcer Identification:  Ulcer Type: venous  Contributing Factors: edema, venous stasis, decreased mobility, smoking, and history of osteomyelitis and MRSA. Wound: N/A        PAST MEDICAL HISTORY        Diagnosis Date    Alcohol abuse     quit drinking 21    CAD (coronary artery disease)     patient states no doctor has told him this / has 1 cardiac stent    Cancer ([address]     lung LLL    Chronic back pain     Chronic kidney disease     Chronic obstructive pulmonary disease, unspecified COPD type ([facility] 3/24/2022    Chronic sinusitis     DJD (degenerative joint disease) of knee     left knee DJD    Elevated PSA     History of blood transfusion     History of inferior wall myocardial infarction 2016    1 cardiac stent    HTN (hypertension)     meds .

## 2023-06-19 ENCOUNTER — TELEPHONE (OUTPATIENT)
Dept: CARDIOLOGY CLINIC | Age: 78
End: 2023-06-19

## 2023-06-19 NOTE — TELEPHONE ENCOUNTER
Rosa Maria [facility] nurse calling for a verbal for her to do pt's labs at home.      Labs are; Magnesium, TSH, Lipid, Comprehensive Metabolic Panel, CBC      Bailee's # [phone] [id number]

## 2023-06-21 ENCOUNTER — HOSPITAL ENCOUNTER (OUTPATIENT)
Dept: WOUND CARE | Age: 78
Discharge: HOME | End: 2023-06-21
Payer: MEDICARE

## 2023-06-21 VITALS
DIASTOLIC BLOOD PRESSURE: 60 MMHG | HEART RATE: 65 BPM | TEMPERATURE: 96.9 F | RESPIRATION RATE: 18 BRPM | SYSTOLIC BLOOD PRESSURE: 119 MMHG

## 2023-06-21 DIAGNOSIS — L97.322 NON-PRESSURE CHRONIC ULCER OF LEFT ANKLE WITH FAT LAYER EXPOSED (HCC): Primary | Chronic | ICD-10-CM

## 2023-06-21 PROCEDURE — 6370000000 HC RX 637 (ALT 250 FOR IP): Performed by: PODIATRIST

## 2023-06-21 PROCEDURE — 97597 DBRDMT OPN WND 1ST 20 CM/<: CPT

## 2023-06-21 RX ORDER — GINSENG 100 MG
CAPSULE ORAL ONCE
OUTPATIENT
Start: 2023-06-21 | End: 2023-06-21

## 2023-06-21 RX ORDER — BETAMETHASONE DIPROPIONATE 0.05 %
OINTMENT (GRAM) TOPICAL ONCE
OUTPATIENT
Start: 2023-06-21 | End: 2023-06-21

## 2023-06-21 RX ORDER — LIDOCAINE HYDROCHLORIDE 20 MG/ML
JELLY TOPICAL ONCE
OUTPATIENT
Start: 2023-06-21 | End: 2023-06-21

## 2023-06-21 RX ORDER — LIDOCAINE HYDROCHLORIDE 40 MG/ML
SOLUTION TOPICAL ONCE
OUTPATIENT
Start: 2023-06-21 | End: 2023-06-21

## 2023-06-21 RX ORDER — LIDOCAINE 50 MG/G
OINTMENT TOPICAL ONCE
OUTPATIENT
Start: 2023-06-21 | End: 2023-06-21

## 2023-06-21 RX ORDER — BACITRACIN ZINC AND POLYMYXIN B SULFATE 500; 1000 [USP'U]/G; [USP'U]/G
OINTMENT TOPICAL ONCE
OUTPATIENT
Start: 2023-06-21 | End: 2023-06-21

## 2023-06-21 RX ORDER — LIDOCAINE 40 MG/G
CREAM TOPICAL ONCE
OUTPATIENT
Start: 2023-06-21 | End: 2023-06-21

## 2023-06-21 RX ORDER — IBUPROFEN 200 MG
TABLET ORAL ONCE
OUTPATIENT
Start: 2023-06-21 | End: 2023-06-21

## 2023-06-21 RX ORDER — GENTAMICIN SULFATE 1 MG/G
OINTMENT TOPICAL ONCE
OUTPATIENT
Start: 2023-06-21 | End: 2023-06-21

## 2023-06-21 RX ORDER — LIDOCAINE HYDROCHLORIDE 20 MG/ML
JELLY TOPICAL ONCE
Status: COMPLETED | OUTPATIENT
Start: 2023-06-21 | End: 2023-06-21

## 2023-06-21 RX ORDER — CLOBETASOL PROPIONATE 0.5 MG/G
OINTMENT TOPICAL ONCE
OUTPATIENT
Start: 2023-06-21 | End: 2023-06-21

## 2023-06-21 RX ORDER — GENTAMICIN SULFATE 1 MG/G
OINTMENT TOPICAL ONCE
Status: COMPLETED | OUTPATIENT
Start: 2023-06-21 | End: 2023-06-21

## 2023-06-21 RX ADMIN — LIDOCAINE HYDROCHLORIDE: 20 JELLY TOPICAL at 15:51

## 2023-06-21 RX ADMIN — GENTAMICIN SULFATE: 1 OINTMENT TOPICAL at 16:42

## 2023-06-21 ASSESSMENT — PAIN SCALES - GENERAL: PAINLEVEL_OUTOF10: 3

## 2023-06-21 ASSESSMENT — PAIN DESCRIPTION - ORIENTATION: ORIENTATION: LEFT

## 2023-06-21 ASSESSMENT — PAIN DESCRIPTION - DESCRIPTORS: DESCRIPTORS: ACHING

## 2023-06-21 ASSESSMENT — PAIN DESCRIPTION - LOCATION: LOCATION: ANKLE

## 2023-06-21 ASSESSMENT — PAIN DESCRIPTION - FREQUENCY: FREQUENCY: CONTINUOUS

## 2023-06-21 NOTE — DISCHARGE INSTRUCTIONS
101 [facility]   Physician Orders and Discharge Instructions  [address]  Telephone: [phone]        NAME:  Violetta Serrano OF BIRTH:  1945  MEDICAL RECORD NUMBER:  [id number]     Your  is: [address] Care/Facility:  [facility] [address] CARE     Wound Location:   LEFT ANTERIOR ANKLE   Dressing orders:  1Cleanse wound(s) with normal saline. 2 APPLY Gentamincin  OINTMENT. 3. Apply Plain Calcium Alginate to wound beds  4. Cover with dry dressing  5. Change every other day or M-W-F, or daily if needed. Wound Location: Left Lateral Ankle  Cleanse wound with normal saline  Apply gentamicin ointment  Apply xeroform gauze  Wrap with dry dressing, change every other day or M-W-F        HOME CARE TO [facility] BILATERAL FEET WITH SOAP AND WATER THEN APPLY AQUAPHOR WITH EACH DRESSING CHANGE,     Compression:  NONE     Offloading Device:     Other Instructions:    APPLY TRIAMCINOLONE CREAM  TO ITCHING AREAS DAILY, CONTINUE YOUR ANTIBIOTIC per Infectious Disease. Keep all dressings clean, dry and intact. Keep pressure off the wound(s) at all times. Follow up visit       2 WEEK    July 5, 2023   @  1:45 pm      Please give 24 hour notice if unable to keep appointment. [phone]     If you experience any of the following, please call the Wound Care Service at  [phone] or go to the nearest emergency room.         *Increase in pain         *Temperature over 101           *Increase in drainage from your wound or a foul odor  *Uncontrolled swelling            *Need for compression bandage changes due to slippage, breakthrough drainage       PLEASE NOTE: IF YOU ARE UNABLE TO OBTAIN WOUND SUPPLIES, CONTINUE TO USE THE SUPPLIES YOU HAVE AVAILABLE UNTIL YOU ARE ABLE TO REACH

## 2023-06-21 NOTE — PROGRESS NOTES
[address]                                                   Progress Note and Procedure Note      Duane [employer] RECORD NUMBER:  [id number]  AGE: 76 y.o. GENDER: male  : 1945  EPISODE DATE:  2023    Subjective:     Chief Complaint   Patient presents with    Wound Check     Left ankle         HISTORY of PRESENT ILLNESS HPI     Lindsay Isaacs is a 76 y.o. male who presents today for wound/ulcer evaluation. History of Wound Context: Patient is without complaint today. No increased pain, redness around wound, purulence, odor or warmth noted by patient. Wound #1 is draining. He is also followed by infectious disease and continues his antibiotic, doxycycline, he has follow-up in the next few weeks with ID. No fever or chills. Nl WBC 23. Wound/Ulcer Pain Timing/Severity: intermittent  Quality of pain: tender  Severity:  2 / 10   Modifying Factors: Pain worsens with pressure  Associated Signs/Symptoms: drainage and pain    Ulcer Identification:  Ulcer Type: venous  Contributing Factors: edema, venous stasis, decreased mobility, smoking, and history of osteomyelitis and MRSA    Wound: N/A        PAST MEDICAL HISTORY        Diagnosis Date    Alcohol abuse     quit drinking 21    CAD (coronary artery disease)     patient states no doctor has told him this / has 1 cardiac stent    Cancer ([address]     lung LLL    Chronic back pain     Chronic kidney disease     Chronic obstructive pulmonary disease, unspecified COPD type ([address] 3/24/2022    Chronic sinusitis     DJD (degenerative joint disease) of knee     left knee DJD    Elevated PSA     History of blood transfusion     History of inferior wall myocardial infarction 2016    1 cardiac stent    HTN (hypertension)     meds .  1 yr    Hyperlipidemia     meds > 12 yrs    NSTEMI (non-ST elevated myocardial infarction) ([address]     Smoker        PAST SURGICAL HISTORY    Past Surgical History:   Procedure Laterality Date

## 2023-06-26 DIAGNOSIS — I25.119 CORONARY ARTERY DISEASE INVOLVING NATIVE CORONARY ARTERY OF NATIVE HEART WITH ANGINA PECTORIS (HCC): Primary | ICD-10-CM

## 2023-06-26 RX ORDER — ISOSORBIDE MONONITRATE 30 MG/1
TABLET, EXTENDED RELEASE ORAL
Qty: 90 TABLET | Refills: 3 | Status: SHIPPED | OUTPATIENT
Start: 2023-06-26

## 2023-06-26 RX ORDER — CLOPIDOGREL BISULFATE 75 MG/1
75 TABLET ORAL DAILY
Qty: 90 TABLET | Refills: 3 | Status: SHIPPED | OUTPATIENT
Start: 2023-06-26

## 2023-07-11 ENCOUNTER — OFFICE VISIT (OUTPATIENT)
Dept: INFECTIOUS DISEASES | Age: 78
End: 2023-07-11

## 2023-07-11 VITALS — TEMPERATURE: 97.1 F | HEART RATE: 67 BPM | SYSTOLIC BLOOD PRESSURE: 104 MMHG | DIASTOLIC BLOOD PRESSURE: 60 MMHG

## 2023-07-11 DIAGNOSIS — I83.023 VENOUS STASIS ULCER OF LEFT ANKLE LIMITED TO BREAKDOWN OF SKIN, UNSPECIFIED WHETHER VARICOSE VEINS PRESENT (HCC): ICD-10-CM

## 2023-07-11 DIAGNOSIS — A49.02 MRSA (METHICILLIN RESISTANT STAPHYLOCOCCUS AUREUS) INFECTION: ICD-10-CM

## 2023-07-11 DIAGNOSIS — L97.321 VENOUS STASIS ULCER OF LEFT ANKLE LIMITED TO BREAKDOWN OF SKIN, UNSPECIFIED WHETHER VARICOSE VEINS PRESENT (HCC): ICD-10-CM

## 2023-07-11 DIAGNOSIS — M86.272 SUBACUTE OSTEOMYELITIS, LEFT ANKLE AND FOOT (HCC): Primary | ICD-10-CM

## 2023-07-11 RX ORDER — DOXYCYCLINE 100 MG/1
100 CAPSULE ORAL 2 TIMES DAILY
Qty: 60 CAPSULE | Refills: 2 | Status: SHIPPED | OUTPATIENT
Start: 2023-07-11 | End: 2023-07-11

## 2023-07-11 RX ORDER — DOXYCYCLINE 100 MG/1
100 CAPSULE ORAL 2 TIMES DAILY
Qty: 60 CAPSULE | Refills: 2 | Status: SHIPPED | OUTPATIENT
Start: 2023-07-11 | End: 2023-08-10

## 2023-07-11 RX ORDER — DOXYCYCLINE HYCLATE 100 MG/1
100 CAPSULE ORAL 2 TIMES DAILY
COMMUNITY
Start: 2023-06-27

## 2023-07-11 ASSESSMENT — PATIENT HEALTH QUESTIONNAIRE - PHQ9
SUM OF ALL RESPONSES TO PHQ QUESTIONS 1-9: 0
1. LITTLE INTEREST OR PLEASURE IN DOING THINGS: 0
SUM OF ALL RESPONSES TO PHQ QUESTIONS 1-9: 0
SUM OF ALL RESPONSES TO PHQ QUESTIONS 1-9: 0
SUM OF ALL RESPONSES TO PHQ9 QUESTIONS 1 & 2: 0
2. FEELING DOWN, DEPRESSED OR HOPELESS: 0
SUM OF ALL RESPONSES TO PHQ QUESTIONS 1-9: 0

## 2023-07-12 ENCOUNTER — HOSPITAL ENCOUNTER (OUTPATIENT)
Dept: WOUND CARE | Age: 78
Discharge: HOME | End: 2023-07-12
Payer: MEDICARE

## 2023-07-12 VITALS
SYSTOLIC BLOOD PRESSURE: 116 MMHG | RESPIRATION RATE: 18 BRPM | HEART RATE: 65 BPM | DIASTOLIC BLOOD PRESSURE: 70 MMHG | TEMPERATURE: 96.7 F

## 2023-07-12 DIAGNOSIS — L97.322 NON-PRESSURE CHRONIC ULCER OF LEFT ANKLE WITH FAT LAYER EXPOSED (HCC): Primary | Chronic | ICD-10-CM

## 2023-07-12 PROCEDURE — 6370000000 HC RX 637 (ALT 250 FOR IP): Performed by: PODIATRIST

## 2023-07-12 PROCEDURE — 97597 DBRDMT OPN WND 1ST 20 CM/<: CPT

## 2023-07-12 PROCEDURE — 97597 DBRDMT OPN WND 1ST 20 CM/<: CPT | Performed by: FAMILY MEDICINE

## 2023-07-12 RX ORDER — GENTAMICIN SULFATE 1 MG/G
OINTMENT TOPICAL ONCE
Status: COMPLETED | OUTPATIENT
Start: 2023-07-12 | End: 2023-07-12

## 2023-07-12 RX ORDER — GENTAMICIN SULFATE 1 MG/G
OINTMENT TOPICAL ONCE
OUTPATIENT
Start: 2023-07-12 | End: 2023-07-12

## 2023-07-12 RX ORDER — LIDOCAINE HYDROCHLORIDE 40 MG/ML
SOLUTION TOPICAL ONCE
OUTPATIENT
Start: 2023-07-12 | End: 2023-07-12

## 2023-07-12 RX ORDER — BACITRACIN ZINC AND POLYMYXIN B SULFATE 500; 1000 [USP'U]/G; [USP'U]/G
OINTMENT TOPICAL ONCE
OUTPATIENT
Start: 2023-07-12 | End: 2023-07-12

## 2023-07-12 RX ORDER — BETAMETHASONE DIPROPIONATE 0.05 %
OINTMENT (GRAM) TOPICAL ONCE
OUTPATIENT
Start: 2023-07-12 | End: 2023-07-12

## 2023-07-12 RX ORDER — LIDOCAINE 50 MG/G
OINTMENT TOPICAL ONCE
OUTPATIENT
Start: 2023-07-12 | End: 2023-07-12

## 2023-07-12 RX ORDER — IBUPROFEN 200 MG
TABLET ORAL ONCE
OUTPATIENT
Start: 2023-07-12 | End: 2023-07-12

## 2023-07-12 RX ORDER — CLOBETASOL PROPIONATE 0.5 MG/G
OINTMENT TOPICAL ONCE
OUTPATIENT
Start: 2023-07-12 | End: 2023-07-12

## 2023-07-12 RX ORDER — LIDOCAINE HYDROCHLORIDE 20 MG/ML
JELLY TOPICAL ONCE
OUTPATIENT
Start: 2023-07-12 | End: 2023-07-12

## 2023-07-12 RX ORDER — LIDOCAINE 40 MG/G
CREAM TOPICAL ONCE
OUTPATIENT
Start: 2023-07-12 | End: 2023-07-12

## 2023-07-12 RX ORDER — LIDOCAINE HYDROCHLORIDE 20 MG/ML
JELLY TOPICAL ONCE
Status: COMPLETED | OUTPATIENT
Start: 2023-07-12 | End: 2023-07-12

## 2023-07-12 RX ORDER — GINSENG 100 MG
CAPSULE ORAL ONCE
OUTPATIENT
Start: 2023-07-12 | End: 2023-07-12

## 2023-07-12 RX ADMIN — LIDOCAINE HYDROCHLORIDE: 20 JELLY TOPICAL at 16:39

## 2023-07-12 RX ADMIN — GENTAMICIN SULFATE: 1 OINTMENT TOPICAL at 17:16

## 2023-07-12 NOTE — PROGRESS NOTES
101           *Increase in drainage from your wound or a foul odor  *Uncontrolled swelling            *Need for compression bandage changes due to slippage, breakthrough drainage       PLEASE NOTE: IF YOU ARE UNABLE TO OBTAIN WOUND SUPPLIES, CONTINUE TO USE THE SUPPLIES YOU HAVE AVAILABLE UNTIL YOU ARE ABLE TO REACH US.  IT IS MOST IMPORTANT TO KEEP THE WOUND COVERED AT ALL TIMES        Electronically signed by Orlin Breaux MD on 7/12/2023 at 5:02 PM

## 2023-07-12 NOTE — DISCHARGE INSTRUCTIONS
[facility]   Physician Orders and Discharge Instructions  [facility], [id number] [place] [address] Avenue  Telephone: [phone]        NAME:  Villa Cabot OF BIRTH:  1945  MEDICAL RECORD NUMBER:  [id number]     Your  is: Lc Lee Care/Facility:  [address] [place] [address]     Wound Location:   LEFT ANTERIOR ANKLE     Dressing orders: 1. Cleanse wound(s) with normal saline. 2. Apply Gentamicin to wound bed, then   3. Apply es to the wound bed  4. Then apply dry dressing, wrap with graciela         HOME CARE TO [address] [place] [address] BILATERAL FEET WITH SOAP AND WATER THEN APPLY AQUAPHOR WITH EACH DRESSING CHANGE,     Compression:  NONE     Offloading Device:     Other Instructions: Keep appointments with infectious disease. Keep all dressings clean, dry and intact. Keep pressure off the wound(s) at all times. Follow up visit       3 WEEK    Aug 2, 2023   @  3:45 pm      Please give 24 hour notice if unable to keep appointment. [phone]     If you experience any of the following, please call the Wound Care Service at  [phone] or go to the nearest emergency room. *Increase in pain         *Temperature over 101           *Increase in drainage from your wound or a foul odor  *Uncontrolled swelling            *Need for compression bandage changes due to slippage, breakthrough drainage       PLEASE NOTE: IF YOU ARE UNABLE TO OBTAIN WOUND SUPPLIES, CONTINUE TO USE THE SUPPLIES YOU HAVE AVAILABLE UNTIL YOU ARE ABLE TO REACH US.  IT IS MOST IMPORTANT TO KEEP THE WOUND COVERED AT ALL TIMES

## 2023-08-02 ENCOUNTER — HOSPITAL ENCOUNTER (OUTPATIENT)
Dept: WOUND CARE | Age: 78
Discharge: HOME | End: 2023-08-02
Payer: MEDICARE

## 2023-08-02 VITALS
TEMPERATURE: 97.4 F | RESPIRATION RATE: 16 BRPM | HEART RATE: 67 BPM | DIASTOLIC BLOOD PRESSURE: 54 MMHG | SYSTOLIC BLOOD PRESSURE: 100 MMHG

## 2023-08-02 DIAGNOSIS — L97.322 NON-PRESSURE CHRONIC ULCER OF LEFT ANKLE WITH FAT LAYER EXPOSED (HCC): Primary | Chronic | ICD-10-CM

## 2023-08-02 PROCEDURE — 97597 DBRDMT OPN WND 1ST 20 CM/<: CPT

## 2023-08-02 PROCEDURE — 97597 DBRDMT OPN WND 1ST 20 CM/<: CPT | Performed by: FAMILY MEDICINE

## 2023-08-02 RX ORDER — BETAMETHASONE DIPROPIONATE 0.05 %
OINTMENT (GRAM) TOPICAL ONCE
OUTPATIENT
Start: 2023-08-02 | End: 2023-08-02

## 2023-08-02 RX ORDER — LIDOCAINE HYDROCHLORIDE 20 MG/ML
JELLY TOPICAL ONCE
OUTPATIENT
Start: 2023-08-02 | End: 2023-08-02

## 2023-08-02 RX ORDER — LIDOCAINE 40 MG/G
CREAM TOPICAL ONCE
OUTPATIENT
Start: 2023-08-02 | End: 2023-08-02

## 2023-08-02 RX ORDER — GINSENG 100 MG
CAPSULE ORAL ONCE
OUTPATIENT
Start: 2023-08-02 | End: 2023-08-02

## 2023-08-02 RX ORDER — IBUPROFEN 200 MG
TABLET ORAL ONCE
OUTPATIENT
Start: 2023-08-02 | End: 2023-08-02

## 2023-08-02 RX ORDER — LIDOCAINE HYDROCHLORIDE 40 MG/ML
SOLUTION TOPICAL ONCE
OUTPATIENT
Start: 2023-08-02 | End: 2023-08-02

## 2023-08-02 RX ORDER — BACITRACIN ZINC AND POLYMYXIN B SULFATE 500; 1000 [USP'U]/G; [USP'U]/G
OINTMENT TOPICAL ONCE
OUTPATIENT
Start: 2023-08-02 | End: 2023-08-02

## 2023-08-02 RX ORDER — GENTAMICIN SULFATE 1 MG/G
OINTMENT TOPICAL ONCE
OUTPATIENT
Start: 2023-08-02 | End: 2023-08-02

## 2023-08-02 RX ORDER — CLOBETASOL PROPIONATE 0.5 MG/G
OINTMENT TOPICAL ONCE
OUTPATIENT
Start: 2023-08-02 | End: 2023-08-02

## 2023-08-02 RX ORDER — LIDOCAINE 50 MG/G
OINTMENT TOPICAL ONCE
OUTPATIENT
Start: 2023-08-02 | End: 2023-08-02

## 2023-08-02 ASSESSMENT — PAIN DESCRIPTION - LOCATION: LOCATION: LEG

## 2023-08-02 ASSESSMENT — PAIN SCALES - GENERAL: PAINLEVEL_OUTOF10: 3

## 2023-08-02 ASSESSMENT — PAIN - FUNCTIONAL ASSESSMENT: PAIN_FUNCTIONAL_ASSESSMENT: ACTIVITIES ARE NOT PREVENTED

## 2023-08-02 ASSESSMENT — PAIN DESCRIPTION - DESCRIPTORS: DESCRIPTORS: BURNING;ACHING

## 2023-08-02 ASSESSMENT — PAIN DESCRIPTION - ORIENTATION: ORIENTATION: LEFT

## 2023-08-02 ASSESSMENT — PAIN DESCRIPTION - PAIN TYPE: TYPE: ACUTE PAIN

## 2023-08-02 NOTE — PROGRESS NOTES
100 [facility]                                                   Progress Note and Procedure Note      Johana Velasquez RECORD NUMBER:  [id number]  AGE: 76 y.o. GENDER: male  : 1945  EPISODE DATE:  2023    Subjective:     Chief Complaint   Patient presents with    Wound Check         HISTORY of PRESENT ILLNESS HPI     Gonzalez Shepherd is a 76 y.o. male who presents today for wound/ulcer evaluation. History of Wound Context: Patient presents for follow-up. He complains of no fever or chills. Wound is stable to improved. No increased drainage, warmth, purulence, redness or swelling noted by patient. Patient also followed by Dr. Kris Alejo. Positive MRSA culture previously. Patient has history of subacute osteomyelitis left ankle. Wound/Ulcer Pain Timing/Severity: intermittent  Quality of pain: tender  Severity:  2 / 10   Modifying Factors: Pain worsens with pressure  Associated Signs/Symptoms: pain    Ulcer Identification:  Ulcer Type: venous  Contributing Factors: edema, venous stasis, smoking, and history of subacute osteomyelitis and MRSA. Wound: N/A        PAST MEDICAL HISTORY        Diagnosis Date    Alcohol abuse     quit drinking 21    CAD (coronary artery disease)     patient states no doctor has told him this / has 1 cardiac stent    Cancer ([address])     lung LLL    Chronic back pain     Chronic kidney disease     Chronic obstructive pulmonary disease, unspecified COPD type ([address]) 3/24/2022    Chronic sinusitis     DJD (degenerative joint disease) of knee     left knee DJD    Elevated PSA     History of blood transfusion     History of inferior wall myocardial infarction 2016    1 cardiac stent    HTN (hypertension)     meds .  1 yr    Hyperlipidemia     meds > 12 yrs    NSTEMI (non-ST elevated myocardial infarction) ([address])     Smoker        PAST SURGICAL HISTORY    Past Surgical History:   Procedure Laterality Date    ABDOMEN SURGERY      spleenectomy due to

## 2023-08-02 NOTE — DISCHARGE INSTRUCTIONS
[facility]   Physician Orders and Discharge Instructions  [facility], [address] [place] [address] Avenue  Telephone: [phone]        NAME:  Desiree Black OF BIRTH:  1945  MEDICAL RECORD NUMBER:  [id number]     Your  is:  Socorro Huang Care/Facility:  [facility] CARE     Wound Location:   LEFT ANTERIOR ANKLE      Dressing orders: 1. Cleanse wound(s) with normal saline. 2. Apply Gentamicin ointment to wound bed, then   3. Apply Cyn to the wound bed  4. Then apply dry dressing, wrap with graciela         HOME CARE TO [facility] BILATERAL FEET WITH SOAP AND WATER THEN APPLY AQUAPHOR WITH EACH DRESSING CHANGE,     Compression:  NONE     Offloading Device:     Other Instructions:    APPLY TRIAMCINOLONE CREAM  TO ITCHING AREAS DAILY, CONTINUE YOUR ANTIBIOTIC per Infectious Disease. Keep all dressings clean, dry and intact. Keep pressure off the wound(s) at all times. Follow up visit       2 WEEKs    Aug 16, 2023   @  4:00     Please give 24 hour notice if unable to keep appointment. [phone]     If you experience any of the following, please call the Wound Care Service at  [phone] or go to the nearest emergency room. *Increase in pain         *Temperature over 101           *Increase in drainage from your wound or a foul odor  *Uncontrolled swelling            *Need for compression bandage changes due to slippage, breakthrough drainage       PLEASE NOTE: IF YOU ARE UNABLE TO OBTAIN WOUND SUPPLIES, CONTINUE TO USE THE SUPPLIES YOU HAVE AVAILABLE UNTIL YOU ARE ABLE TO REACH US.  IT IS MOST IMPORTANT TO KEEP THE WOUND COVERED AT ALL TIMES

## 2023-08-23 ENCOUNTER — HOSPITAL ENCOUNTER (OUTPATIENT)
Dept: WOUND CARE | Age: 78
Discharge: HOME | End: 2023-08-23
Payer: MEDICARE

## 2023-08-23 VITALS
DIASTOLIC BLOOD PRESSURE: 61 MMHG | TEMPERATURE: 97.1 F | HEART RATE: 67 BPM | SYSTOLIC BLOOD PRESSURE: 138 MMHG | RESPIRATION RATE: 16 BRPM

## 2023-08-23 DIAGNOSIS — L97.322 NON-PRESSURE CHRONIC ULCER OF LEFT ANKLE WITH FAT LAYER EXPOSED (HCC): Primary | Chronic | ICD-10-CM

## 2023-08-23 PROCEDURE — 15275 SKIN SUB GRAFT FACE/NK/HF/G: CPT

## 2023-08-23 PROCEDURE — 15275 SKIN SUB GRAFT FACE/NK/HF/G: CPT | Performed by: FAMILY MEDICINE

## 2023-08-23 NOTE — PROGRESS NOTES
MEDICATIONS    Current Outpatient Medications on File Prior to Encounter   Medication Sig Dispense Refill    doxycycline hyclate (VIBRAMYCIN) 100 MG capsule Take 1 capsule by mouth 2 times daily      clopidogrel (PLAVIX) 75 MG tablet Take 1 tablet by mouth daily 90 tablet 3    isosorbide mononitrate (IMDUR) 30 MG extended release tablet TAKE ONE TABLET BY MOUTH EVERY DAY 90 tablet 3    citalopram (CELEXA) 40 MG tablet TAKE 1 TABLET BY MOUTH NIGHTLY 30 tablet 5    cephALEXin (KEFLEX) 500 MG capsule Take 1 capsule by mouth 3 times daily 90 capsule 0    omeprazole (PRILOSEC) 10 MG delayed release capsule TAKE ONE CAPSULE BY MOUTH DAILY 90 capsule 2    nitroGLYCERIN (NITROSTAT) 0.4 MG SL tablet Place 1 tablet under the tongue every 5 minutes as needed for Chest pain up to max of 3 total doses. If no relief after 1 dose, call 911. 25 tablet 3    metoprolol tartrate (LOPRESSOR) 50 MG tablet Take 0.5 tablets by mouth 2 times daily TAKE ONE TABLET BY MOUTH TWO TIMES A  tablet 3    atorvastatin (LIPITOR) 20 MG tablet TAKE ONE TABLET BY MOUTH DAILY 90 tablet 3    bumetanide (BUMEX) 2 MG tablet Take 1 tablet by mouth daily 90 tablet 3    gentamicin (GARAMYCIN) 0.1 % ointment Apply topically  daily.  30 g 1    diclofenac (VOLTAREN) 50 MG EC tablet TAKE ONE TABLET BY MOUTH TWO TIMES A DAY 60 tablet 5    oxyCODONE-acetaminophen (PERCOCET) 7.5-325 MG per tablet Take 1 tablet by mouth five times a day as needed for pain      finasteride (PROSCAR) 5 MG tablet Take 1 tablet by mouth daily 90 tablet 3    tiotropium (SPIRIVA RESPIMAT) 2.5 MCG/ACT AERS inhaler Inhale 2 puffs into the lungs daily 1 Inhaler 3    Fluticasone furoate-vilanterol (BREO ELLIPTA) 200-25 MCG/INH AEPB inhaler Inhale 1 puff into the lungs daily 1 each 3    albuterol sulfate  (90 Base) MCG/ACT inhaler Inhale 2 puffs into the lungs every 6 hours as needed for Wheezing 1 Inhaler 3    DOCUSATE CALCIUM PO Take by mouth as needed       No current

## 2023-08-23 NOTE — FLOWSHEET NOTE
PuraPly AMTreatment Note    NAME:  Catrachita Doss  YOB: 1945  MEDICAL RECORD NUMBER:  [id number]  DATE:  8/23/2023    Goal:  Patient will receive safe and proper application of skin substitute. Patient will comply with caring for dressing, and reporting complications. Expiration date checked immediately prior to use. Package intact prior to use and no damage noted. Transport temperature controlled and acceptable. PuraPly AM was removed from protective sterile packaging by provider and applied to prepared ulcer bed. PuraPly AM was hydrated with sterile normal saline per provider. PuraPly AM was applied to left ankle and affixed with steri-strips by the provider. PuraPly AM was covered with non-adherent ulcer dressing. Applied calcium alginate over non-adherent. Applied dry gauze and/or roll gauze. Patient/caregiver was instructed not to remove dressing and to keep it clean and dry. Pt/family/caregiver was instructed on signs and symptoms of complications to report such as draining through dressing, dressing falling down/slipping, getting wet, or severe pain or tingling. PuraPly AM may be applied a total of 10 times per wound over a 12 week period. Date of first application of PuraPly for this current wound is August 23, 2023.    Guidelines followed    Electronically signed by Marco Antonio Musa RN on 8/23/2023 at 5:42 PM

## 2023-08-23 NOTE — DISCHARGE INSTRUCTIONS
[address] [facility]   Physician Orders and Discharge Instructions  [facility], [address] [place] [address] Avenue  Telephone: [phone]        NAME:  Wilton Durham OF BIRTH:  1945  MEDICAL RECORD NUMBER:  [id number]     Your  is:  Socorro Huang Care/Facility:  [facility] CARE     Wound Location:   LEFT ANTERIOR ANKLE    DRessing orders: A skin substitute was applied today (Puraply). .Leave dressing in place until next scheduled appointment at wound clinic on 8/30/23. HOME CARE TO [facility] BILATERAL FEET WITH SOAP AND WATER THEN APPLY AQUAPHOR WITH EACH DRESSING CHANGE,     Compression:  NONE     Offloading Device:     Other Instructions:    APPLY TRIAMCINOLONE CREAM  TO ITCHING AREAS DAILY, CONTINUE YOUR ANTIBIOTIC per Infectious Disease. Keep all dressings clean, dry and intact. Keep pressure off the wound(s) at all times. Follow up visit       1 WEEK    August 30, 2023   @ 4:00     Please give 24 hour notice if unable to keep appointment. [phone]     If you experience any of the following, please call the Wound Care Service at  [phone] or go to the nearest emergency room. *Increase in pain         *Temperature over 101           *Increase in drainage from your wound or a foul odor  *Uncontrolled swelling            *Need for compression bandage changes due to slippage, breakthrough drainage       PLEASE NOTE: IF YOU ARE UNABLE TO OBTAIN WOUND SUPPLIES, CONTINUE TO USE THE SUPPLIES YOU HAVE AVAILABLE UNTIL YOU ARE ABLE TO REACH US.  IT IS MOST IMPORTANT TO KEEP THE WOUND COVERED AT ALL TIMES

## 2023-08-30 ENCOUNTER — HOSPITAL ENCOUNTER (OUTPATIENT)
Dept: WOUND CARE | Age: 78
Discharge: HOME | End: 2023-08-30
Payer: MEDICARE

## 2023-08-30 VITALS
DIASTOLIC BLOOD PRESSURE: 79 MMHG | HEART RATE: 69 BPM | TEMPERATURE: 97.3 F | SYSTOLIC BLOOD PRESSURE: 163 MMHG | RESPIRATION RATE: 16 BRPM

## 2023-08-30 DIAGNOSIS — L97.322 NON-PRESSURE CHRONIC ULCER OF LEFT ANKLE WITH FAT LAYER EXPOSED (HCC): Primary | ICD-10-CM

## 2023-08-30 DIAGNOSIS — I25.119 CORONARY ARTERY DISEASE INVOLVING NATIVE CORONARY ARTERY OF NATIVE HEART WITH ANGINA PECTORIS (HCC): ICD-10-CM

## 2023-08-30 PROCEDURE — 6370000000 HC RX 637 (ALT 250 FOR IP): Performed by: PODIATRIST

## 2023-08-30 PROCEDURE — 15275 SKIN SUB GRAFT FACE/NK/HF/G: CPT | Performed by: FAMILY MEDICINE

## 2023-08-30 PROCEDURE — 15275 SKIN SUB GRAFT FACE/NK/HF/G: CPT

## 2023-08-30 RX ORDER — METOPROLOL TARTRATE 50 MG/1
25 TABLET, FILM COATED ORAL 2 TIMES DAILY
Qty: 180 TABLET | Refills: 3 | Status: SHIPPED | OUTPATIENT
Start: 2023-08-30

## 2023-08-30 RX ORDER — BACITRACIN ZINC AND POLYMYXIN B SULFATE 500; 1000 [USP'U]/G; [USP'U]/G
OINTMENT TOPICAL ONCE
OUTPATIENT
Start: 2023-08-30 | End: 2023-08-30

## 2023-08-30 RX ORDER — LIDOCAINE HYDROCHLORIDE 20 MG/ML
JELLY TOPICAL ONCE
OUTPATIENT
Start: 2023-08-30 | End: 2023-08-30

## 2023-08-30 RX ORDER — LIDOCAINE 50 MG/G
OINTMENT TOPICAL ONCE
OUTPATIENT
Start: 2023-08-30 | End: 2023-08-30

## 2023-08-30 RX ORDER — CLOBETASOL PROPIONATE 0.5 MG/G
OINTMENT TOPICAL ONCE
OUTPATIENT
Start: 2023-08-30 | End: 2023-08-30

## 2023-08-30 RX ORDER — GINSENG 100 MG
CAPSULE ORAL ONCE
OUTPATIENT
Start: 2023-08-30 | End: 2023-08-30

## 2023-08-30 RX ORDER — LIDOCAINE HYDROCHLORIDE 40 MG/ML
SOLUTION TOPICAL ONCE
OUTPATIENT
Start: 2023-08-30 | End: 2023-08-30

## 2023-08-30 RX ORDER — LIDOCAINE HYDROCHLORIDE 20 MG/ML
JELLY TOPICAL ONCE
Status: COMPLETED | OUTPATIENT
Start: 2023-08-30 | End: 2023-08-30

## 2023-08-30 RX ORDER — GENTAMICIN SULFATE 1 MG/G
OINTMENT TOPICAL ONCE
OUTPATIENT
Start: 2023-08-30 | End: 2023-08-30

## 2023-08-30 RX ORDER — IBUPROFEN 200 MG
TABLET ORAL ONCE
OUTPATIENT
Start: 2023-08-30 | End: 2023-08-30

## 2023-08-30 RX ORDER — BETAMETHASONE DIPROPIONATE 0.05 %
OINTMENT (GRAM) TOPICAL ONCE
OUTPATIENT
Start: 2023-08-30 | End: 2023-08-30

## 2023-08-30 RX ORDER — LIDOCAINE 40 MG/G
CREAM TOPICAL ONCE
OUTPATIENT
Start: 2023-08-30 | End: 2023-08-30

## 2023-08-30 RX ADMIN — LIDOCAINE HYDROCHLORIDE: 20 JELLY TOPICAL at 16:45

## 2023-08-30 NOTE — DISCHARGE INSTRUCTIONS
[address] [facility]   Physician Orders and Discharge Instructions  [facility], [address] [place] [address] Avenue  Telephone: [phone]        NAME:  Rosaura Castañeda OF BIRTH:  1945  MEDICAL RECORD NUMBER:  [id number]     Your  is:  Socorro Huang Care/Facility:  [facility] CARE     Wound Location:   LEFT ANTERIOR ANKLE    DRessing orders: A skin substitute was applied today (Puraply). .Leave dressing in place until next scheduled appointment at wound clinic on 9/6/23. HOME CARE TO [facility] BILATERAL FEET WITH SOAP AND WATER THEN APPLY AQUAPHOR WITH EACH DRESSING CHANGE,     Compression:  NONE     Offloading Device:     Other Instructions:  Keep your Infectious Disease appointment on 9/6/23 @ 1:30. APPLY TRIAMCINOLONE CREAM  TO ITCHING AREAS DAILY, CONTINUE YOUR ANTIBIOTIC per Infectious Disease. Keep all dressings clean, dry and intact. Keep pressure off the wound(s) at all times. Follow up visit       1 WEEK   September 6, 2023   @ 2:30     Please give 24 hour notice if unable to keep appointment. [phone]     If you experience any of the following, please call the Wound Care Service at  [phone] or go to the nearest emergency room. *Increase in pain         *Temperature over 101           *Increase in drainage from your wound or a foul odor  *Uncontrolled swelling            *Need for compression bandage changes due to slippage, breakthrough drainage       PLEASE NOTE: IF YOU ARE UNABLE TO OBTAIN WOUND SUPPLIES, CONTINUE TO USE THE SUPPLIES YOU HAVE AVAILABLE UNTIL YOU ARE ABLE TO REACH US.  IT IS MOST IMPORTANT TO KEEP THE WOUND COVERED AT ALL TIMES

## 2023-08-30 NOTE — PROGRESS NOTES
[address]                                                   Progress Note and Procedure Note      Eliecer Painting RECORD NUMBER:  [id number]  AGE: 76 y.o. GENDER: male  : 1945  EPISODE DATE:  2023    Subjective:     Chief Complaint   Patient presents with    Wound Check         HISTORY of PRESENT ILLNESS LULY Marcelo is a 76 y.o. male who presents today for wound/ulcer evaluation. History of Wound Context: Patient presents for follow-up of chronic left ankle wound, history of subacute osteomyelitis left ankle, positive MRSA culture previously. He is followed by Dr. Chico Mendoza patient has noted no increased drainage, warmth, purulence, redness or swelling. Wound is improved from previous. Patient states it is not painful. PuraPly placed last week and removed today- well-tolerated by patient. Wound/Ulcer Pain Timing/Severity: none  Quality of pain: N/A  Severity:  0 / 10   Modifying Factors: None  Associated Signs/Symptoms: none    Ulcer Identification:  Ulcer Type: venous  Contributing Factors: edema, venous stasis, smoking, and history of MRSA infection subacute osteomyelitis    Wound: N/A        PAST MEDICAL HISTORY        Diagnosis Date    Alcohol abuse     quit drinking 21    CAD (coronary artery disease)     patient states no doctor has told him this / has 1 cardiac stent    Cancer ([address])     lung LLL    Chronic back pain     Chronic kidney disease     Chronic obstructive pulmonary disease, unspecified COPD type ([address]) 3/24/2022    Chronic sinusitis     DJD (degenerative joint disease) of knee     left knee DJD    Elevated PSA     History of blood transfusion     History of inferior wall myocardial infarction 2016    1 cardiac stent    HTN (hypertension)     meds .  1 yr    Hyperlipidemia     meds > 12 yrs    NSTEMI (non-ST elevated myocardial infarction) ([address])     Smoker        PAST SURGICAL HISTORY    Past Surgical History:   Procedure

## 2023-08-30 NOTE — FLOWSHEET NOTE
PuraPly AMTreatment Note    NAME:  Shahram Orellana  YOB: 1945  MEDICAL RECORD NUMBER:  [id number]  DATE:  8/30/2023    Goal:  Patient will receive safe and proper application of skin substitute. Patient will comply with caring for dressing, and reporting complications. Expiration date checked immediately prior to use. Package intact prior to use and no damage noted. Transport temperature controlled and acceptable. PuraPly AM was removed from protective sterile packaging by provider and applied to prepared ulcer bed. PuraPly AM was hydrated with sterile normal saline per provider. PuraPly AM was applied to left ankle and affixed with steri-strips by the provider. PuraPly AM was covered with non-adherent ulcer dressing. Applied calcium alginate over non-adherent. Applied dry gauze and/or roll gauze. Patient/caregiver was instructed not to remove dressing and to keep it clean and dry. Pt/family/caregiver was instructed on signs and symptoms of complications to report such as draining through dressing, dressing falling down/slipping, getting wet, or severe pain or tingling. PuraPly AM may be applied a total of 10 times per wound over a 12 week period. Date of first application of PuraPly for this current wound is August 23, 2023.    Guidelines followed    Electronically signed by Nell Hope RN on 8/30/2023 at 4:49 PM

## 2023-09-06 ENCOUNTER — OFFICE VISIT (OUTPATIENT)
Dept: INFECTIOUS DISEASES | Age: 78
End: 2023-09-06

## 2023-09-06 ENCOUNTER — HOSPITAL ENCOUNTER (OUTPATIENT)
Dept: WOUND CARE | Age: 78
Discharge: HOME | End: 2023-09-06
Payer: MEDICARE

## 2023-09-06 VITALS
RESPIRATION RATE: 18 BRPM | SYSTOLIC BLOOD PRESSURE: 114 MMHG | TEMPERATURE: 97 F | HEART RATE: 74 BPM | DIASTOLIC BLOOD PRESSURE: 66 MMHG

## 2023-09-06 VITALS
DIASTOLIC BLOOD PRESSURE: 50 MMHG | TEMPERATURE: 96.9 F | RESPIRATION RATE: 16 BRPM | HEART RATE: 69 BPM | SYSTOLIC BLOOD PRESSURE: 117 MMHG

## 2023-09-06 DIAGNOSIS — L97.321 VENOUS STASIS ULCER OF LEFT ANKLE LIMITED TO BREAKDOWN OF SKIN, UNSPECIFIED WHETHER VARICOSE VEINS PRESENT (HCC): ICD-10-CM

## 2023-09-06 DIAGNOSIS — I83.023 VENOUS STASIS ULCER OF LEFT ANKLE LIMITED TO BREAKDOWN OF SKIN, UNSPECIFIED WHETHER VARICOSE VEINS PRESENT (HCC): ICD-10-CM

## 2023-09-06 DIAGNOSIS — L97.322 NON-PRESSURE CHRONIC ULCER OF LEFT ANKLE WITH FAT LAYER EXPOSED (HCC): Primary | Chronic | ICD-10-CM

## 2023-09-06 DIAGNOSIS — M86.272 SUBACUTE OSTEOMYELITIS, LEFT ANKLE AND FOOT (HCC): Primary | ICD-10-CM

## 2023-09-06 DIAGNOSIS — A49.02 MRSA (METHICILLIN RESISTANT STAPHYLOCOCCUS AUREUS) INFECTION: ICD-10-CM

## 2023-09-06 PROCEDURE — 99213 OFFICE O/P EST LOW 20 MIN: CPT

## 2023-09-06 PROCEDURE — 6370000000 HC RX 637 (ALT 250 FOR IP): Performed by: PODIATRIST

## 2023-09-06 PROCEDURE — 99213 OFFICE O/P EST LOW 20 MIN: CPT | Performed by: FAMILY MEDICINE

## 2023-09-06 RX ORDER — GINSENG 100 MG
CAPSULE ORAL ONCE
OUTPATIENT
Start: 2023-09-06 | End: 2023-09-06

## 2023-09-06 RX ORDER — LIDOCAINE 50 MG/G
OINTMENT TOPICAL ONCE
OUTPATIENT
Start: 2023-09-06 | End: 2023-09-06

## 2023-09-06 RX ORDER — LIDOCAINE 40 MG/G
CREAM TOPICAL ONCE
Status: COMPLETED | OUTPATIENT
Start: 2023-09-06 | End: 2023-09-06

## 2023-09-06 RX ORDER — IBUPROFEN 200 MG
TABLET ORAL ONCE
OUTPATIENT
Start: 2023-09-06 | End: 2023-09-06

## 2023-09-06 RX ORDER — BACITRACIN ZINC AND POLYMYXIN B SULFATE 500; 1000 [USP'U]/G; [USP'U]/G
OINTMENT TOPICAL ONCE
OUTPATIENT
Start: 2023-09-06 | End: 2023-09-06

## 2023-09-06 RX ORDER — LIDOCAINE HYDROCHLORIDE 40 MG/ML
SOLUTION TOPICAL ONCE
OUTPATIENT
Start: 2023-09-06 | End: 2023-09-06

## 2023-09-06 RX ORDER — LIDOCAINE HYDROCHLORIDE 20 MG/ML
JELLY TOPICAL ONCE
OUTPATIENT
Start: 2023-09-06 | End: 2023-09-06

## 2023-09-06 RX ORDER — CLOBETASOL PROPIONATE 0.5 MG/G
OINTMENT TOPICAL ONCE
OUTPATIENT
Start: 2023-09-06 | End: 2023-09-06

## 2023-09-06 RX ORDER — BETAMETHASONE DIPROPIONATE 0.05 %
OINTMENT (GRAM) TOPICAL ONCE
OUTPATIENT
Start: 2023-09-06 | End: 2023-09-06

## 2023-09-06 RX ORDER — LIDOCAINE 40 MG/G
CREAM TOPICAL ONCE
OUTPATIENT
Start: 2023-09-06 | End: 2023-09-06

## 2023-09-06 RX ORDER — GENTAMICIN SULFATE 1 MG/G
OINTMENT TOPICAL ONCE
OUTPATIENT
Start: 2023-09-06 | End: 2023-09-06

## 2023-09-06 RX ADMIN — LIDOCAINE 4%: 4 CREAM TOPICAL at 14:52

## 2023-09-06 ASSESSMENT — PATIENT HEALTH QUESTIONNAIRE - PHQ9
1. LITTLE INTEREST OR PLEASURE IN DOING THINGS: 0
SUM OF ALL RESPONSES TO PHQ QUESTIONS 1-9: 0
SUM OF ALL RESPONSES TO PHQ QUESTIONS 1-9: 0
SUM OF ALL RESPONSES TO PHQ9 QUESTIONS 1 & 2: 0
SUM OF ALL RESPONSES TO PHQ QUESTIONS 1-9: 0
2. FEELING DOWN, DEPRESSED OR HOPELESS: 0
SUM OF ALL RESPONSES TO PHQ QUESTIONS 1-9: 0

## 2023-09-06 NOTE — PROGRESS NOTES
[facility] [employer]   Progress Note and Procedure Note      Jos Powell RECORD NUMBER:  [id number]  AGE: 76 y.o. GENDER: male  : 1945  EPISODE DATE:  2023    Subjective:     Chief Complaint   Patient presents with    Wound Check         HISTORY of PRESENT ILLNESS HPI     Javier Nye is a 76 y.o. male who presents today for wound/ulcer evaluation. History of Wound Context: Patient presents for follow-up of chronic left ankle wound, history of subacute osteomyelitis, positive MRSA culture previously. He is followed by Dr. Deandre Maier. He continues to take doxycycline. Patient has not noticed any increased drainage, warmth, purulence or swelling. There is some redness and itching that was not there previous however. Patient complains of no pain. Wound/Ulcer Pain Timing/Severity: none  Quality of pain: N/A  Severity:  0 / 10   Modifying Factors: None  Associated Signs/Symptoms: erythema and itching    Ulcer Identification:  Ulcer Type: venous  Contributing Factors: edema, venous stasis, smoking, and history of MRSA infection, subacute osteomyelitis. Wound: N/A        PAST MEDICAL HISTORY        Diagnosis Date    Alcohol abuse     quit drinking 21    CAD (coronary artery disease)     patient states no doctor has told him this / has 1 cardiac stent    Cancer ([address])     lung LLL    Chronic back pain     Chronic kidney disease     Chronic obstructive pulmonary disease, unspecified COPD type ([address]) 3/24/2022    Chronic sinusitis     DJD (degenerative joint disease) of knee     left knee DJD    Elevated PSA     History of blood transfusion     History of inferior wall myocardial infarction 2016    1 cardiac stent    HTN (hypertension)     meds .  1 yr    Hyperlipidemia     meds > 12 yrs    NSTEMI (non-ST elevated myocardial infarction) ([address])     Smoker        PAST SURGICAL HISTORY    Past Surgical History:   Procedure Laterality Date    ABDOMEN SURGERY      spleenectomy

## 2023-09-06 NOTE — DISCHARGE INSTRUCTIONS
[address] [facility]   Physician Orders and Discharge Instructions  [facility], [address] [place] [address] Avenue  Telephone: [phone]        NAME:  Isael Martin OF BIRTH:  1945  MEDICAL RECORD NUMBER:  [id number]     Your  is:  Socorro Huang Care/Facility:  [facility] CARE     Wound Location:   LEFT ANTERIOR ANKLE      Dressing orders: 1. Cleanse wound(s) with normal saline. Please use skin prep on the surrounding area  2. Apply Gentamicin ointment to wound bed, then   3. Apply Cyn to the wound bed  4. Then apply dry dressing, wrap with graciela   5. Change every other day or Monday, Wednesday and Friday      HOME CARE TO [facility] BILATERAL FEET WITH SOAP AND WATER THEN APPLY AQUAPHOR WITH EACH DRESSING CHANGE,     Compression:  NONE     Offloading Device:     Other Instructions:    APPLY TRIAMCINOLONE CREAM  TO ITCHING AREAS DAILY, CONTINUE YOUR ANTIBIOTIC per Infectious Disease. Keep all dressings clean, dry and intact. Keep pressure off the wound(s) at all times. Follow up visit       1 WEEK   September 13, 2023   @  4:00     Please give 24 hour notice if unable to keep appointment. [phone]     If you experience any of the following, please call the Wound Care Service at  [phone] or go to the nearest emergency room. *Increase in pain         *Temperature over 101           *Increase in drainage from your wound or a foul odor  *Uncontrolled swelling            *Need for compression bandage changes due to slippage, breakthrough drainage       PLEASE NOTE: IF YOU ARE UNABLE TO OBTAIN WOUND SUPPLIES, CONTINUE TO USE THE SUPPLIES YOU HAVE AVAILABLE UNTIL YOU ARE ABLE TO REACH US.  IT IS MOST IMPORTANT TO KEEP THE WOUND COVERED AT ALL TIMES

## 2023-09-14 ENCOUNTER — TELEPHONE (OUTPATIENT)
Dept: CARDIOLOGY CLINIC | Age: 78
End: 2023-09-14

## 2023-09-14 ENCOUNTER — HOSPITAL ENCOUNTER (OUTPATIENT)
Dept: WOUND CARE | Age: 78
Discharge: HOME | End: 2023-09-14
Payer: MEDICARE

## 2023-09-14 VITALS
TEMPERATURE: 96.9 F | SYSTOLIC BLOOD PRESSURE: 115 MMHG | DIASTOLIC BLOOD PRESSURE: 39 MMHG | RESPIRATION RATE: 16 BRPM | HEART RATE: 76 BPM

## 2023-09-14 DIAGNOSIS — Z76.0 MEDICATION REFILL: ICD-10-CM

## 2023-09-14 DIAGNOSIS — M86.272 SUBACUTE OSTEOMYELITIS, LEFT ANKLE AND FOOT (HCC): Primary | ICD-10-CM

## 2023-09-14 DIAGNOSIS — L97.322 NON-PRESSURE CHRONIC ULCER OF LEFT ANKLE WITH FAT LAYER EXPOSED (HCC): Chronic | ICD-10-CM

## 2023-09-14 PROCEDURE — 99203 OFFICE O/P NEW LOW 30 MIN: CPT | Performed by: PODIATRIST

## 2023-09-14 PROCEDURE — 11042 DBRDMT SUBQ TIS 1ST 20SQCM/<: CPT | Performed by: PODIATRIST

## 2023-09-14 PROCEDURE — 11042 DBRDMT SUBQ TIS 1ST 20SQCM/<: CPT

## 2023-09-14 RX ORDER — CITALOPRAM 40 MG/1
TABLET ORAL
Qty: 30 TABLET | Refills: 5 | Status: CANCELLED | OUTPATIENT
Start: 2023-09-14

## 2023-09-14 NOTE — TELEPHONE ENCOUNTER
Comments: pt called for refill    Last Office Visit (last PCP visit):   6/15/2023    Next Visit Date:  Future Appointments   Date Time Provider [address]  [facility]   9/14/2023  3:30 PM Lula Bermudez, [address] [place] [address]   10/18/2023  2:15 PM Melinda Allen MD [address] [facility]   11/1/2023  1:30 PM Lola Skiff, MD [address] [place] [address]   12/11/2023  3:15 PM Anshu Moralez MD [address] [facility] ([place], [place]) Neurology -       **If hasn't been seen in over a year OR hasn't followed up according to last diabetes/ADHD visit, make appointment for patient before sending refill to provider.     Rx requested:  Requested Prescriptions     Pending Prescriptions Disp Refills    bumetanide (BUMEX) 2 MG tablet 90 tablet 3     Sig: Take 1 tablet by mouth daily

## 2023-09-14 NOTE — PROGRESS NOTES
substitute in the future. I have also recommended and ordered a repeat radiographs of the left ankle, these will be reviewed at follow-up. Patient to closely monitor for signs of recurrent infection and call immediately if these are observed. Treatment Note please see attached Discharge Instructions    Written patient dismissal instructions given to patient and signed by patient or POA. Discharge [facility],[address] and Hyperbaric Medicine   Physician Orders and Discharge Instructions  [facility], [address] [place] [address] Avenue  Telephone: [phone]        NAME:  Jigar Elam OF BIRTH:  1945  MEDICAL RECORD NUMBER:  [id number]     Your  is:  Socorro Gonzalezola Care/Facility:  [facility] CARE     Wound Location:   LEFT ANTERIOR ANKLE      Dressing orders: 1. Cleanse wound(s) with normal saline. Please use skin prep on the surrounding area  2. Apply Gentamicin ointment to wound bed, then   3. Apply Cyn to the wound bed  4. Then apply dry dressing, wrap with graciela   5. Change every other day or Monday, Wednesday and Friday      HOME CARE TO [facility] BILATERAL FEET WITH SOAP AND WATER THEN APPLY AQUAPHOR WITH EACH DRESSING CHANGE,     Compression:  NONE     Offloading Device:     Other Instructions:   X ray ordered today. Please complete as soon as possible. Try to increase the protein in your diet. APPLY TRIAMCINOLONE CREAM  TO ITCHING AREAS DAILY, CONTINUE YOUR ANTIBIOTIC per Infectious Disease. Keep all dressings clean, dry and intact. Keep pressure off the wound(s) at all times. Follow up visit       1 WEEK   September 21, 2023   @ 3:45am     Please give 24 hour notice if unable to keep appointment.  [phone]     If you experience any of the following, please call the Wound Care

## 2023-09-14 NOTE — DISCHARGE INSTRUCTIONS
[address] [facility]   Physician Orders and Discharge Instructions  [facility], [address]  Telephone: [phone]        NAME:  Jory Wallace OF BIRTH:  1945  MEDICAL RECORD NUMBER:  [id number]     Your  is:  Socorro Huang Care/Facility:  [facility] CARE     Wound Location:   LEFT ANTERIOR ANKLE      Dressing orders: 1. Cleanse wound(s) with normal saline. Please use skin prep on the surrounding area  2. Apply Gentamicin ointment to wound bed, then   3. Apply Cyn to the wound bed  4. Then apply dry dressing, wrap with graciela   5. Change every other day or Monday, Wednesday and Friday      HOME CARE TO [facility] BILATERAL FEET WITH SOAP AND WATER THEN APPLY AQUAPHOR WITH EACH DRESSING CHANGE,     Compression:  NONE     Offloading Device:     Other Instructions:   X ray ordered today. Please complete as soon as possible. Try to increase the protein in your diet. APPLY TRIAMCINOLONE CREAM  TO ITCHING AREAS DAILY, CONTINUE YOUR ANTIBIOTIC per Infectious Disease. Keep all dressings clean, dry and intact. Keep pressure off the wound(s) at all times. Follow up visit       1 WEEK   September 21, 2023   @ 3:45am     Please give 24 hour notice if unable to keep appointment. [phone]     If you experience any of the following, please call the Wound Care Service at  [phone] or go to the nearest emergency room. *Increase in pain         *Temperature over 101           *Increase in drainage from your wound or a foul odor  *Uncontrolled swelling            *Need for compression bandage changes due to slippage, breakthrough drainage       PLEASE NOTE: IF YOU ARE UNABLE TO OBTAIN WOUND SUPPLIES, CONTINUE TO USE THE SUPPLIES YOU HAVE AVAILABLE UNTIL YOU ARE ABLE TO REACH US.  IT IS

## 2023-09-15 RX ORDER — BUMETANIDE 2 MG/1
2 TABLET ORAL DAILY
Qty: 90 TABLET | Refills: 3 | Status: SHIPPED | OUTPATIENT
Start: 2023-09-15

## 2023-09-19 ENCOUNTER — HOSPITAL ENCOUNTER (OUTPATIENT)
Dept: GENERAL RADIOLOGY | Age: 78
Discharge: HOME | End: 2023-09-21
Attending: PODIATRIST
Payer: MEDICARE

## 2023-09-19 DIAGNOSIS — L97.322 NON-PRESSURE CHRONIC ULCER OF LEFT ANKLE WITH FAT LAYER EXPOSED (HCC): Chronic | ICD-10-CM

## 2023-09-19 DIAGNOSIS — M86.272 SUBACUTE OSTEOMYELITIS, LEFT ANKLE AND FOOT (HCC): ICD-10-CM

## 2023-09-19 PROCEDURE — 73610 X-RAY EXAM OF ANKLE: CPT

## 2023-09-20 ENCOUNTER — TELEPHONE (OUTPATIENT)
Dept: INFECTIOUS DISEASES | Age: 78
End: 2023-09-20

## 2023-09-20 DIAGNOSIS — A49.02 MRSA (METHICILLIN RESISTANT STAPHYLOCOCCUS AUREUS) INFECTION: ICD-10-CM

## 2023-09-20 DIAGNOSIS — M86.272 SUBACUTE OSTEOMYELITIS, LEFT ANKLE AND FOOT (HCC): Primary | ICD-10-CM

## 2023-09-20 NOTE — TELEPHONE ENCOUNTER
Call to patient lmovm. Request a return call. Lab results from 9/11/23 reviewed and a repeat CMP is to be done. Also must inquire if patient see's a nephrologist-? Concern is for an elevated creatinine From 1.00 as of July-2023 to 1.73 as of 9/11/23. Obtain a repeat lab, update PCP. Call to [phone] [facility] as patient is seen at least once per week. Will request a lab draw from them. If not, perhaps tomorrow as patient has a [facility] appt.

## 2023-09-21 ENCOUNTER — HOSPITAL ENCOUNTER (OUTPATIENT)
Dept: WOUND CARE | Age: 78
Discharge: HOME | End: 2023-09-21
Payer: MEDICARE

## 2023-09-21 VITALS
SYSTOLIC BLOOD PRESSURE: 106 MMHG | RESPIRATION RATE: 18 BRPM | DIASTOLIC BLOOD PRESSURE: 46 MMHG | HEART RATE: 69 BPM | TEMPERATURE: 96.8 F

## 2023-09-21 DIAGNOSIS — L97.322 NON-PRESSURE CHRONIC ULCER OF LEFT ANKLE WITH FAT LAYER EXPOSED (HCC): Primary | ICD-10-CM

## 2023-09-21 PROCEDURE — 11042 DBRDMT SUBQ TIS 1ST 20SQCM/<: CPT | Performed by: PODIATRIST

## 2023-09-21 PROCEDURE — 11042 DBRDMT SUBQ TIS 1ST 20SQCM/<: CPT

## 2023-09-21 PROCEDURE — 6370000000 HC RX 637 (ALT 250 FOR IP): Performed by: PODIATRIST

## 2023-09-21 RX ORDER — LIDOCAINE 40 MG/G
CREAM TOPICAL ONCE
Status: COMPLETED | OUTPATIENT
Start: 2023-09-21 | End: 2023-09-21

## 2023-09-21 RX ADMIN — LIDOCAINE 4%: 4 CREAM TOPICAL at 16:12

## 2023-09-21 NOTE — PROGRESS NOTES
[facility]                                                   Progress Note and Procedure Note      Raven Juares RECORD NUMBER:  [id number]  AGE: 76 y.o. GENDER: male  : 1945  EPISODE DATE:  2023    Subjective:     Chief Complaint   Patient presents with    Wound Check         HISTORY of PRESENT ILLNESS HPI     Rolo Fitzgerald is a 76 y.o. male who presents today for wound/ulcer evaluation. History of Wound Context: Patient presents for continued wound care for a chronic ulceration of the left anterior ankle. Patient reports compliance with dressing changes, he has not observed signs of infection. Patient reports that he had the previously ordered radiographs performed. Patient denies nausea, vomiting, fever, chills, chest pain, or shortness of breath. Wound/Ulcer Pain Timing/Severity: none  Quality of pain: N/A  Severity:  0 / 10   Modifying Factors: None  Associated Signs/Symptoms: drainage    Ulcer Identification:  Ulcer Type: venous  Contributing Factors: edema and venous stasis    Wound:  Venous ulcer left anterior ankle        PAST MEDICAL HISTORY        Diagnosis Date    Alcohol abuse     quit drinking 21    CAD (coronary artery disease)     patient states no doctor has told him this / has 1 cardiac stent    Cancer ([address])     lung LLL    Chronic back pain     Chronic kidney disease     Chronic obstructive pulmonary disease, unspecified COPD type ([address]) 3/24/2022    Chronic sinusitis     DJD (degenerative joint disease) of knee     left knee DJD    Elevated PSA     History of blood transfusion     History of inferior wall myocardial infarction 2016    1 cardiac stent    HTN (hypertension)     meds .  1 yr    Hyperlipidemia     meds > 12 yrs    NSTEMI (non-ST elevated myocardial infarction) ([address])     Smoker        PAST SURGICAL HISTORY    Past Surgical History:   Procedure Laterality Date    ABDOMEN SURGERY      [address] [address]

## 2023-10-05 ENCOUNTER — HOSPITAL ENCOUNTER (OUTPATIENT)
Dept: WOUND CARE | Age: 78
Discharge: HOME | End: 2023-10-05
Attending: PODIATRIST
Payer: MEDICARE

## 2023-10-05 VITALS
TEMPERATURE: 97.2 F | HEART RATE: 79 BPM | DIASTOLIC BLOOD PRESSURE: 60 MMHG | RESPIRATION RATE: 18 BRPM | SYSTOLIC BLOOD PRESSURE: 143 MMHG

## 2023-10-05 DIAGNOSIS — L97.322 NON-PRESSURE CHRONIC ULCER OF LEFT ANKLE WITH FAT LAYER EXPOSED (HCC): Primary | Chronic | ICD-10-CM

## 2023-10-05 PROCEDURE — 11042 DBRDMT SUBQ TIS 1ST 20SQCM/<: CPT | Performed by: PODIATRIST

## 2023-10-05 PROCEDURE — 11042 DBRDMT SUBQ TIS 1ST 20SQCM/<: CPT

## 2023-10-05 NOTE — PROGRESS NOTES
Encounter   Medication Sig Dispense Refill    bumetanide (BUMEX) 2 MG tablet Take 1 tablet by mouth daily 90 tablet 3    metoprolol tartrate (LOPRESSOR) 50 MG tablet Take 0.5 tablets by mouth 2 times daily TAKE ONE TABLET BY MOUTH TWO TIMES A  tablet 3    doxycycline hyclate (VIBRAMYCIN) 100 MG capsule Take 1 capsule by mouth 2 times daily      clopidogrel (PLAVIX) 75 MG tablet Take 1 tablet by mouth daily 90 tablet 3    isosorbide mononitrate (IMDUR) 30 MG extended release tablet TAKE ONE TABLET BY MOUTH EVERY DAY 90 tablet 3    citalopram (CELEXA) 40 MG tablet TAKE 1 TABLET BY MOUTH NIGHTLY 30 tablet 5    cephALEXin (KEFLEX) 500 MG capsule Take 1 capsule by mouth 3 times daily 90 capsule 0    omeprazole (PRILOSEC) 10 MG delayed release capsule TAKE ONE CAPSULE BY MOUTH DAILY 90 capsule 2    nitroGLYCERIN (NITROSTAT) 0.4 MG SL tablet Place 1 tablet under the tongue every 5 minutes as needed for Chest pain up to max of 3 total doses. If no relief after 1 dose, call 911. 25 tablet 3    atorvastatin (LIPITOR) 20 MG tablet TAKE ONE TABLET BY MOUTH DAILY 90 tablet 3    gentamicin (GARAMYCIN) 0.1 % ointment Apply topically  daily. 30 g 1    diclofenac (VOLTAREN) 50 MG EC tablet TAKE ONE TABLET BY MOUTH TWO TIMES A DAY 60 tablet 5    oxyCODONE-acetaminophen (PERCOCET) 7.5-325 MG per tablet Take 1 tablet by mouth five times a day as needed for pain      finasteride (PROSCAR) 5 MG tablet Take 1 tablet by mouth daily 90 tablet 3    tiotropium (SPIRIVA RESPIMAT) 2.5 MCG/ACT AERS inhaler Inhale 2 puffs into the lungs daily 1 Inhaler 3    Fluticasone furoate-vilanterol (BREO ELLIPTA) 200-25 MCG/INH AEPB inhaler Inhale 1 puff into the lungs daily 1 each 3    albuterol sulfate  (90 Base) MCG/ACT inhaler Inhale 2 puffs into the lungs every 6 hours as needed for Wheezing 1 Inhaler 3    DOCUSATE CALCIUM PO Take by mouth as needed       No current facility-administered medications on file prior to encounter.

## 2023-10-11 ENCOUNTER — TRANSCRIBE ORDERS (OUTPATIENT)
Dept: ADMINISTRATIVE | Age: 78
End: 2023-10-11

## 2023-10-11 DIAGNOSIS — C34.32 PRIMARY MALIGNANT NEOPLASM OF BRONCHUS OF LEFT LOWER LOBE (HCC): Primary | ICD-10-CM

## 2023-10-19 ENCOUNTER — HOSPITAL ENCOUNTER (OUTPATIENT)
Dept: WOUND CARE | Age: 78
Discharge: HOME | End: 2023-10-19
Attending: PODIATRIST
Payer: MEDICARE

## 2023-10-19 VITALS
TEMPERATURE: 97.8 F | HEART RATE: 72 BPM | SYSTOLIC BLOOD PRESSURE: 100 MMHG | DIASTOLIC BLOOD PRESSURE: 50 MMHG | RESPIRATION RATE: 18 BRPM

## 2023-10-19 DIAGNOSIS — L97.322 NON-PRESSURE CHRONIC ULCER OF LEFT ANKLE WITH FAT LAYER EXPOSED (HCC): Primary | Chronic | ICD-10-CM

## 2023-10-19 PROCEDURE — 11042 DBRDMT SUBQ TIS 1ST 20SQCM/<: CPT

## 2023-10-19 PROCEDURE — 11042 DBRDMT SUBQ TIS 1ST 20SQCM/<: CPT | Performed by: PODIATRIST

## 2023-10-19 NOTE — PROGRESS NOTES
[address] [place]                                                   Progress Note and Procedure Note      [facility] RECORD NUMBER:  [id number]  AGE: 78 y.o. GENDER: male  : 1945  EPISODE DATE:  10/19/2023    Subjective:     Chief Complaint   Patient presents with    Wound Check         HISTORY of PRESENT ILLNESS HPI     Saleem Martino is a 78 y.o. male who presents today for wound/ulcer evaluation. History of Wound Context: Patient presents for continued treatment of a chronic ulceration of the left anterior ankle. Patient reports compliance with dressing changes, he has not observed signs of infection. Patient denies nausea, vomiting, fever, chills, chest pain, or shortness of breath. Wound/Ulcer Pain Timing/Severity: none  Quality of pain: N/A  Severity:  0 / 10   Modifying Factors: None  Associated Signs/Symptoms: drainage    Ulcer Identification:  Ulcer Type: venous  Contributing Factors: edema and venous stasis    Wound:  Full-thickness ulcer left ankle        PAST MEDICAL HISTORY        Diagnosis Date    Alcohol abuse     quit drinking 21    CAD (coronary artery disease)     patient states no doctor has told him this / has 1 cardiac stent    Cancer ([address])     lung LLL    Chronic back pain     Chronic kidney disease     Chronic obstructive pulmonary disease, unspecified COPD type ([address]) 3/24/2022    Chronic sinusitis     DJD (degenerative joint disease) of knee     left knee DJD    Elevated PSA     History of blood transfusion     History of inferior wall myocardial infarction 2016    1 cardiac stent    HTN (hypertension)     meds .  1 yr    Hyperlipidemia     meds > 12 yrs    NSTEMI (non-ST elevated myocardial infarction) ([address])     Smoker        PAST SURGICAL HISTORY    Past Surgical History:   Procedure Laterality Date    ABDOMEN SURGERY      spleenectomy due to  E 2009    lumbar disc OR    CARDIAC SURGERY      stents    CARPAL TUNNEL

## 2023-10-19 NOTE — DISCHARGE INSTRUCTIONS
[address]   Physician Orders and Discharge Instructions  [facility], [address] [place] [address] Avenue  Telephone: [phone]        NAME:  Antoine Diaz OF BIRTH:  1945  MEDICAL RECORD NUMBER:  [id number]     Your  is:  Socorro Huang Care/Facility:  [facility] CARE     Wound Location:   LEFT ANTERIOR ANKLE      Dressing orders: 1. Cleanse wound(s) with normal saline. 2.. Apply Cyn to the wound bed  3. Then apply dry dressing, wrap with graciela   4. Change every other day or Tuesday, Thursday and Saturday      HOME CARE TO [facility] BILATERAL FEET WITH SOAP AND WATER THEN APPLY AQUAPHOR WITH EACH DRESSING CHANGE,     Compression:  NONE     Offloading Device: None     Other Instructions:  Keep all dressings clean, dry and intact. Keep pressure off the wound(s) at all times. Follow up visit       3WEEKs   November 9, 2023  @  4:00     Please give 24 hour notice if unable to keep appointment. [phone]     If you experience any of the following, please call the Wound Care Service at  [phone] or go to the nearest emergency room. *Increase in pain         *Temperature over 101           *Increase in drainage from your wound or a foul odor  *Uncontrolled swelling            *Need for compression bandage changes due to slippage, breakthrough drainage       PLEASE NOTE: IF YOU ARE UNABLE TO OBTAIN WOUND SUPPLIES, CONTINUE TO USE THE SUPPLIES YOU HAVE AVAILABLE UNTIL YOU ARE ABLE TO REACH US.  IT IS MOST IMPORTANT TO KEEP THE WOUND COVERED AT ALL TIMES

## 2023-10-24 ENCOUNTER — HOSPITAL ENCOUNTER (OUTPATIENT)
Dept: CT IMAGING | Age: 78
Discharge: HOME | End: 2023-10-26
Attending: INTERNAL MEDICINE
Payer: MEDICARE

## 2023-10-24 DIAGNOSIS — C34.32 PRIMARY MALIGNANT NEOPLASM OF BRONCHUS OF LEFT LOWER LOBE (HCC): ICD-10-CM

## 2023-10-24 PROCEDURE — 6360000004 HC RX CONTRAST MEDICATION: Performed by: INTERNAL MEDICINE

## 2023-10-24 PROCEDURE — 2580000003 HC RX 258: Performed by: INTERNAL MEDICINE

## 2023-10-24 PROCEDURE — 71260 CT THORAX DX C+: CPT

## 2023-10-24 RX ORDER — SODIUM CHLORIDE 9 MG/ML
INJECTION, SOLUTION INTRAVENOUS ONCE
Status: COMPLETED | OUTPATIENT
Start: 2023-10-24 | End: 2023-10-24

## 2023-10-24 RX ADMIN — IOPAMIDOL 50 ML: 612 INJECTION, SOLUTION INTRAVENOUS at 14:03

## 2023-10-24 RX ADMIN — SODIUM CHLORIDE 50 ML: 9 INJECTION, SOLUTION INTRAVENOUS at 14:08

## 2023-10-31 ENCOUNTER — OFFICE VISIT (OUTPATIENT)
Dept: CARDIOLOGY CLINIC | Age: 78
End: 2023-10-31
Payer: MEDICARE

## 2023-10-31 VITALS
WEIGHT: 159.8 LBS | BODY MASS INDEX: 25.68 KG/M2 | SYSTOLIC BLOOD PRESSURE: 126 MMHG | DIASTOLIC BLOOD PRESSURE: 76 MMHG | HEART RATE: 77 BPM | HEIGHT: 66 IN | OXYGEN SATURATION: 94 %

## 2023-10-31 DIAGNOSIS — I25.119 CORONARY ARTERY DISEASE INVOLVING NATIVE CORONARY ARTERY OF NATIVE HEART WITH ANGINA PECTORIS (HCC): ICD-10-CM

## 2023-10-31 DIAGNOSIS — R60.9 DEPENDENT EDEMA: ICD-10-CM

## 2023-10-31 DIAGNOSIS — I65.23 BILATERAL CAROTID ARTERY STENOSIS: ICD-10-CM

## 2023-10-31 DIAGNOSIS — Z98.61 S/P PTCA (PERCUTANEOUS TRANSLUMINAL CORONARY ANGIOPLASTY): ICD-10-CM

## 2023-10-31 DIAGNOSIS — I70.222 ATHEROSCLEROSIS OF NATIVE ARTERIES OF EXTREMITIES WITH REST PAIN, LEFT LEG (HCC): ICD-10-CM

## 2023-10-31 DIAGNOSIS — E78.5 DYSLIPIDEMIA: ICD-10-CM

## 2023-10-31 DIAGNOSIS — Z00.00 PE (PHYSICAL EXAM), ANNUAL: Primary | ICD-10-CM

## 2023-10-31 DIAGNOSIS — I21.4 NSTEMI (NON-ST ELEVATED MYOCARDIAL INFARCTION) (HCC): ICD-10-CM

## 2023-10-31 DIAGNOSIS — I50.33 ACUTE ON CHRONIC DIASTOLIC CONGESTIVE HEART FAILURE (HCC): ICD-10-CM

## 2023-10-31 DIAGNOSIS — E78.00 PURE HYPERCHOLESTEROLEMIA: ICD-10-CM

## 2023-10-31 DIAGNOSIS — R06.09 DOE (DYSPNEA ON EXERTION): ICD-10-CM

## 2023-10-31 DIAGNOSIS — I10 ESSENTIAL HYPERTENSION: ICD-10-CM

## 2023-10-31 PROCEDURE — 3078F DIAST BP <80 MM HG: CPT | Performed by: INTERNAL MEDICINE

## 2023-10-31 PROCEDURE — 3074F SYST BP LT 130 MM HG: CPT | Performed by: INTERNAL MEDICINE

## 2023-10-31 PROCEDURE — 4004F PT TOBACCO SCREEN RCVD TLK: CPT | Performed by: INTERNAL MEDICINE

## 2023-10-31 PROCEDURE — 93000 ELECTROCARDIOGRAM COMPLETE: CPT | Performed by: INTERNAL MEDICINE

## 2023-10-31 PROCEDURE — 99215 OFFICE O/P EST HI 40 MIN: CPT | Performed by: INTERNAL MEDICINE

## 2023-10-31 PROCEDURE — G8484 FLU IMMUNIZE NO ADMIN: HCPCS | Performed by: INTERNAL MEDICINE

## 2023-10-31 PROCEDURE — G8417 CALC BMI ABV UP PARAM F/U: HCPCS | Performed by: INTERNAL MEDICINE

## 2023-10-31 PROCEDURE — G8427 DOCREV CUR MEDS BY ELIG CLIN: HCPCS | Performed by: INTERNAL MEDICINE

## 2023-10-31 PROCEDURE — 1123F ACP DISCUSS/DSCN MKR DOCD: CPT | Performed by: INTERNAL MEDICINE

## 2023-10-31 RX ORDER — SPIRONOLACTONE 25 MG/1
50 TABLET ORAL DAILY
Qty: 60 TABLET | Refills: 3 | Status: SHIPPED | OUTPATIENT
Start: 2023-10-31

## 2023-10-31 RX ORDER — NALOXEGOL OXALATE 25 MG/1
TABLET, FILM COATED ORAL
COMMUNITY
Start: 2023-08-17

## 2023-10-31 RX ORDER — PREGABALIN 25 MG/1
25 CAPSULE ORAL 2 TIMES DAILY
COMMUNITY
Start: 2023-10-11

## 2023-10-31 RX ORDER — DOXYCYCLINE 100 MG/1
100 CAPSULE ORAL 2 TIMES DAILY
COMMUNITY
Start: 2023-10-19

## 2023-10-31 ASSESSMENT — ENCOUNTER SYMPTOMS
GASTROINTESTINAL NEGATIVE: 1
COUGH: 0
STRIDOR: 0
SHORTNESS OF BREATH: 1
BLOOD IN STOOL: 0
EYES NEGATIVE: 1
NAUSEA: 0
CHEST TIGHTNESS: 0
WHEEZING: 0

## 2023-10-31 NOTE — PROGRESS NOTES
Subsequent Progress Note  Patient: Kim Perry  YOB: 1945  MRN: [id number]    Chief Complaint: cad dizzy htn felder rash  Chief Complaint   Patient presents with    Follow-up     4 month    Hypertension       CV Data:  1/2016 NSTEMI RCA KRISTA  4/2018 echo  55 1TR  4/2018 CUS MIld palque  4/2018 Abd US neg AAA  4/2019 Spect negative   2/2020 CUS mild  9/21 LE Art Duplex - negative. 9/22 LE ART Duplex - mild     Subjective/HPI: occ dizzy + felder no cp no falls no bleed rash right temple for 6 weeks      1/10/2020 More FELDER and more frequent Chest tightness. No falls no bleed. Takes meds. Feels gaseous. 2/7/2020 chest tightness and felder much less since Imdur. Compliant with meds    5/14/2020 TELEHEALTH EVALUATION -- Audio/Visual (During EIYTA-03 public health emergency)    disocered isolated LLL nodule irregualr 1.2 cm suspicious for cancer. PET scan Negative of Mets. No CP no SOB No Bleed    7/14/2020 is declining to have localized LLL lung cancer to be removed. No cp breathing is ok. No falls. No bleeds    10/14/2020 no cp no sob no falls no bleed. Takes meds. Gait is wobbly but no falls. Taking Bumex prn.  lE edema worse. 1/13/21 no cp no sob no falls no bleed. Takes meds. Eats well.     4/22/21 no cp no sob occ dizzy no falls no bleed. Takes a lot of salt    9/9/21 tired all the time. Eats well no cp same FELDER. No worse. No palps no bleed no falls    1/21/22 TELEHEALTH EVALUATION -- Audio/Visual (During TSTOV-03 public health emergency)    Recent fall with shoulder injury and may need surgery. No cp no sob no bleed. Having little edema. 3/2/22  Ankle edema same no worse. Taking a lot of salt no cp no sob no falls no bleed. 7/12/22 doing weel still going to wound clinic for right ankle wound no cp still same felder. No falls no bleed. 10/14/22 still w nonhealing wound Left foot ankle. Going to wound clinic. LE Art Duplex no significant blockages. No cp no sob no falls no bleed.

## 2023-11-07 ENCOUNTER — OFFICE VISIT (OUTPATIENT)
Dept: CARDIOLOGY CLINIC | Age: 78
End: 2023-11-07
Payer: MEDICARE

## 2023-11-07 VITALS
SYSTOLIC BLOOD PRESSURE: 110 MMHG | WEIGHT: 145.4 LBS | DIASTOLIC BLOOD PRESSURE: 60 MMHG | HEIGHT: 66 IN | HEART RATE: 88 BPM | BODY MASS INDEX: 23.37 KG/M2 | OXYGEN SATURATION: 93 %

## 2023-11-07 DIAGNOSIS — I70.222 ATHEROSCLEROSIS OF NATIVE ARTERIES OF EXTREMITIES WITH REST PAIN, LEFT LEG (HCC): ICD-10-CM

## 2023-11-07 DIAGNOSIS — I25.119 CORONARY ARTERY DISEASE INVOLVING NATIVE CORONARY ARTERY OF NATIVE HEART WITH ANGINA PECTORIS (HCC): Primary | ICD-10-CM

## 2023-11-07 DIAGNOSIS — R60.9 DEPENDENT EDEMA: ICD-10-CM

## 2023-11-07 DIAGNOSIS — E78.5 DYSLIPIDEMIA: ICD-10-CM

## 2023-11-07 DIAGNOSIS — I65.23 BILATERAL CAROTID ARTERY STENOSIS: ICD-10-CM

## 2023-11-07 DIAGNOSIS — I21.4 NSTEMI (NON-ST ELEVATED MYOCARDIAL INFARCTION) (HCC): ICD-10-CM

## 2023-11-07 DIAGNOSIS — Z98.61 S/P PTCA (PERCUTANEOUS TRANSLUMINAL CORONARY ANGIOPLASTY): ICD-10-CM

## 2023-11-07 DIAGNOSIS — R06.09 DOE (DYSPNEA ON EXERTION): ICD-10-CM

## 2023-11-07 DIAGNOSIS — E78.00 PURE HYPERCHOLESTEROLEMIA: ICD-10-CM

## 2023-11-07 DIAGNOSIS — I10 ESSENTIAL HYPERTENSION: ICD-10-CM

## 2023-11-07 PROCEDURE — G8420 CALC BMI NORM PARAMETERS: HCPCS | Performed by: INTERNAL MEDICINE

## 2023-11-07 PROCEDURE — 3078F DIAST BP <80 MM HG: CPT | Performed by: INTERNAL MEDICINE

## 2023-11-07 PROCEDURE — 3074F SYST BP LT 130 MM HG: CPT | Performed by: INTERNAL MEDICINE

## 2023-11-07 PROCEDURE — G8427 DOCREV CUR MEDS BY ELIG CLIN: HCPCS | Performed by: INTERNAL MEDICINE

## 2023-11-07 PROCEDURE — 99215 OFFICE O/P EST HI 40 MIN: CPT | Performed by: INTERNAL MEDICINE

## 2023-11-07 PROCEDURE — 1123F ACP DISCUSS/DSCN MKR DOCD: CPT | Performed by: INTERNAL MEDICINE

## 2023-11-07 PROCEDURE — 4004F PT TOBACCO SCREEN RCVD TLK: CPT | Performed by: INTERNAL MEDICINE

## 2023-11-07 PROCEDURE — G8484 FLU IMMUNIZE NO ADMIN: HCPCS | Performed by: INTERNAL MEDICINE

## 2023-11-07 ASSESSMENT — ENCOUNTER SYMPTOMS
CHEST TIGHTNESS: 0
STRIDOR: 0
NAUSEA: 0
GASTROINTESTINAL NEGATIVE: 1
COUGH: 0
SHORTNESS OF BREATH: 1
WHEEZING: 0
BLOOD IN STOOL: 0
EYES NEGATIVE: 1

## 2023-11-07 NOTE — PROGRESS NOTES
Subsequent Progress Note  Patient: Elaine Samuels  YOB: 1945  MRN: [id number]    Chief Complaint: cad dizzy htn samuel rash  Chief Complaint   Patient presents with    Follow-up     1 week    Coronary Artery Disease       CV Data:  1/2016 NSTEMI RCA KRISTA  4/2018 echo  55 1TR  4/2018 CUS MIld palque  4/2018 Abd US neg AAA  4/2019 Spect negative   2/2020 CUS mild  9/21 LE Art Duplex - negative. 9/22 LE ART Duplex - mild     Subjective/HPI: occ dizzy + samuel no cp no falls no bleed rash right temple for 6 weeks      1/10/2020 More SAMUEL and more frequent Chest tightness. No falls no bleed. Takes meds. Feels gaseous. 2/7/2020 chest tightness and samuel much less since Imdur. Compliant with meds    5/14/2020 TELEHEALTH EVALUATION -- Audio/Visual (During BDSOX-97 public health emergency)    disocered isolated LLL nodule irregualr 1.2 cm suspicious for cancer. PET scan Negative of Mets. No CP no SOB No Bleed    7/14/2020 is declining to have localized LLL lung cancer to be removed. No cp breathing is ok. No falls. No bleeds    10/14/2020 no cp no sob no falls no bleed. Takes meds. Gait is wobbly but no falls. Taking Bumex prn.  lE edema worse. 1/13/21 no cp no sob no falls no bleed. Takes meds. Eats well.     4/22/21 no cp no sob occ dizzy no falls no bleed. Takes a lot of salt    9/9/21 tired all the time. Eats well no cp same SAMUEL. No worse. No palps no bleed no falls    1/21/22 TELEHEALTH EVALUATION -- Audio/Visual (During GPCOX-05 public health emergency)    Recent fall with shoulder injury and may need surgery. No cp no sob no bleed. Having little edema. 3/2/22  Ankle edema same no worse. Taking a lot of salt no cp no sob no falls no bleed. 7/12/22 doing weel still going to wound clinic for right ankle wound no cp still same samuel. No falls no bleed. 10/14/22 still w nonhealing wound Left foot ankle. Going to wound clinic. LE Art Duplex no significant blockages.  No cp no sob no falls no

## 2023-11-08 ENCOUNTER — TELEPHONE (OUTPATIENT)
Dept: CARDIOLOGY CLINIC | Age: 78
End: 2023-11-08

## 2023-11-09 ENCOUNTER — HOSPITAL ENCOUNTER (OUTPATIENT)
Dept: WOUND CARE | Age: 78
Discharge: HOME | End: 2023-11-09
Attending: PODIATRIST
Payer: MEDICARE

## 2023-11-09 VITALS
SYSTOLIC BLOOD PRESSURE: 111 MMHG | DIASTOLIC BLOOD PRESSURE: 57 MMHG | RESPIRATION RATE: 16 BRPM | TEMPERATURE: 97.8 F | HEART RATE: 75 BPM

## 2023-11-09 DIAGNOSIS — L97.322 NON-PRESSURE CHRONIC ULCER OF LEFT ANKLE WITH FAT LAYER EXPOSED (HCC): Primary | ICD-10-CM

## 2023-11-09 PROCEDURE — 11042 DBRDMT SUBQ TIS 1ST 20SQCM/<: CPT

## 2023-11-09 PROCEDURE — 11042 DBRDMT SUBQ TIS 1ST 20SQCM/<: CPT | Performed by: PODIATRIST

## 2023-11-09 NOTE — PROGRESS NOTES
[facility]                                                   Progress Note and Procedure Note      [facility] RECORD NUMBER:  [id number]  AGE: 78 y.o. GENDER: male  : 1945  EPISODE DATE:  2023    Subjective:     Chief Complaint   Patient presents with    Wound Check         HISTORY of PRESENT ILLNESS HPI     Latanya Middleton is a 78 y.o. male who presents today for wound/ulcer evaluation. History of Wound Context: Patient presents for continued local wound care for a chronic ulceration of the left anterior ankle. Patient reports compliance with dressing changes. Patient has not observed signs of infection, he reports minimal drainage to the pad when changing the dressing. Patient denies nausea, vomiting, fever, chills, chest pain, or shortness of breath. Wound/Ulcer Pain Timing/Severity: none  Quality of pain: N/A  Severity:  0 / 10   Modifying Factors: None  Associated Signs/Symptoms: none    Ulcer Identification:  Ulcer Type: venous  Contributing Factors: edema and venous stasis    Wound:  Full-thickness ulceration left anterior ankle        PAST MEDICAL HISTORY        Diagnosis Date    Alcohol abuse     quit drinking 19    CAD (coronary artery disease)     patient states no doctor has told him this / has 1 cardiac stent    Cancer ([address])     lung LLL    Chronic back pain     Chronic kidney disease     Chronic obstructive pulmonary disease, unspecified COPD type ([address]) 3/24/2022    Chronic sinusitis     DJD (degenerative joint disease) of knee     left knee DJD    Elevated PSA     History of blood transfusion     History of inferior wall myocardial infarction 2016    1 cardiac stent    HTN (hypertension)     meds .  1 yr    Hyperlipidemia     meds > 12 yrs    NSTEMI (non-ST elevated myocardial infarction) ([address])     Smoker        PAST SURGICAL HISTORY    Past Surgical History:   Procedure Laterality Date    ABDOMEN SURGERY      spleenectomy due to

## 2023-11-09 NOTE — DISCHARGE INSTRUCTIONS
[facility]   Physician Orders and Discharge Instructions  [facility]  Chema Justin, [address]  Telephone: [phone]        NAME:  Remi Maldonado OF BIRTH:  1945  MEDICAL RECORD NUMBER:  [id number]     Your  is:  Socorro Huang Care/Facility:  [facility]     Wound Location:   LEFT ANTERIOR ANKLE      Dressing orders: 1. Cleanse wound(s) with normal saline. 2.. Apply Cyn to the wound bed  3. Then apply dry dressing, wrap with graciela   4. Change every other day or Tuesday, Thursday and Saturday      HOME CARE TO [address] [place] [address] BILATERAL FEET WITH SOAP AND WATER THEN APPLY AQUAPHOR WITH EACH DRESSING CHANGE,     Compression:  NONE     Offloading Device: None     Other Instructions:  Keep all dressings clean, dry and intact. Keep pressure off the wound(s) at all times. Follow up visit       3 WEEKs   November 30, 2023  @  4:00     Please give 24 hour notice if unable to keep appointment. [phone]     If you experience any of the following, please call the Wound Care Service at  [phone] or go to the nearest emergency room. *Increase in pain         *Temperature over 101           *Increase in drainage from your wound or a foul odor  *Uncontrolled swelling            *Need for compression bandage changes due to slippage, breakthrough drainage       PLEASE NOTE: IF YOU ARE UNABLE TO OBTAIN WOUND SUPPLIES, CONTINUE TO USE THE SUPPLIES YOU HAVE AVAILABLE UNTIL YOU ARE ABLE TO REACH US.  IT IS MOST IMPORTANT TO KEEP THE WOUND COVERED AT ALL TIMES

## 2023-11-13 ENCOUNTER — OFFICE VISIT (OUTPATIENT)
Dept: INFECTIOUS DISEASES | Age: 78
End: 2023-11-13
Payer: MEDICARE

## 2023-11-13 ENCOUNTER — OFFICE VISIT (OUTPATIENT)
Dept: FAMILY MEDICINE CLINIC | Age: 78
End: 2023-11-13
Payer: MEDICARE

## 2023-11-13 VITALS
TEMPERATURE: 97.2 F | HEIGHT: 66 IN | WEIGHT: 145 LBS | DIASTOLIC BLOOD PRESSURE: 67 MMHG | BODY MASS INDEX: 23.3 KG/M2 | SYSTOLIC BLOOD PRESSURE: 110 MMHG | HEART RATE: 75 BPM | RESPIRATION RATE: 18 BRPM

## 2023-11-13 VITALS
HEIGHT: 66 IN | HEART RATE: 68 BPM | BODY MASS INDEX: 23.63 KG/M2 | SYSTOLIC BLOOD PRESSURE: 102 MMHG | OXYGEN SATURATION: 95 % | WEIGHT: 147 LBS | DIASTOLIC BLOOD PRESSURE: 64 MMHG

## 2023-11-13 DIAGNOSIS — F11.10 HEROIN ABUSE (HCC): ICD-10-CM

## 2023-11-13 DIAGNOSIS — Z00.00 MEDICARE ANNUAL WELLNESS VISIT, SUBSEQUENT: Primary | ICD-10-CM

## 2023-11-13 DIAGNOSIS — N18.30 STAGE 3 CHRONIC KIDNEY DISEASE, UNSPECIFIED WHETHER STAGE 3A OR 3B CKD (HCC): ICD-10-CM

## 2023-11-13 DIAGNOSIS — M86.662 CHRONIC OSTEOMYELITIS OF LEFT LOWER LEG (HCC): Primary | ICD-10-CM

## 2023-11-13 DIAGNOSIS — M46.1 SACROILIITIS, NOT ELSEWHERE CLASSIFIED (HCC): ICD-10-CM

## 2023-11-13 DIAGNOSIS — I25.119 CORONARY ARTERY DISEASE INVOLVING NATIVE CORONARY ARTERY OF NATIVE HEART WITH ANGINA PECTORIS (HCC): ICD-10-CM

## 2023-11-13 DIAGNOSIS — J44.9 CHRONIC OBSTRUCTIVE PULMONARY DISEASE, UNSPECIFIED COPD TYPE (HCC): ICD-10-CM

## 2023-11-13 DIAGNOSIS — H91.93 BILATERAL HEARING LOSS, UNSPECIFIED HEARING LOSS TYPE: ICD-10-CM

## 2023-11-13 DIAGNOSIS — E78.00 PURE HYPERCHOLESTEROLEMIA: ICD-10-CM

## 2023-11-13 PROCEDURE — 4004F PT TOBACCO SCREEN RCVD TLK: CPT | Performed by: INTERNAL MEDICINE

## 2023-11-13 PROCEDURE — 3074F SYST BP LT 130 MM HG: CPT | Performed by: INTERNAL MEDICINE

## 2023-11-13 PROCEDURE — 3078F DIAST BP <80 MM HG: CPT | Performed by: INTERNAL MEDICINE

## 2023-11-13 PROCEDURE — G0439 PPPS, SUBSEQ VISIT: HCPCS | Performed by: STUDENT IN AN ORGANIZED HEALTH CARE EDUCATION/TRAINING PROGRAM

## 2023-11-13 PROCEDURE — G8427 DOCREV CUR MEDS BY ELIG CLIN: HCPCS | Performed by: INTERNAL MEDICINE

## 2023-11-13 PROCEDURE — 3074F SYST BP LT 130 MM HG: CPT | Performed by: STUDENT IN AN ORGANIZED HEALTH CARE EDUCATION/TRAINING PROGRAM

## 2023-11-13 PROCEDURE — 3078F DIAST BP <80 MM HG: CPT | Performed by: STUDENT IN AN ORGANIZED HEALTH CARE EDUCATION/TRAINING PROGRAM

## 2023-11-13 PROCEDURE — 1123F ACP DISCUSS/DSCN MKR DOCD: CPT | Performed by: STUDENT IN AN ORGANIZED HEALTH CARE EDUCATION/TRAINING PROGRAM

## 2023-11-13 PROCEDURE — 1123F ACP DISCUSS/DSCN MKR DOCD: CPT | Performed by: INTERNAL MEDICINE

## 2023-11-13 PROCEDURE — G8484 FLU IMMUNIZE NO ADMIN: HCPCS | Performed by: INTERNAL MEDICINE

## 2023-11-13 PROCEDURE — 99213 OFFICE O/P EST LOW 20 MIN: CPT | Performed by: INTERNAL MEDICINE

## 2023-11-13 PROCEDURE — G8420 CALC BMI NORM PARAMETERS: HCPCS | Performed by: INTERNAL MEDICINE

## 2023-11-13 PROCEDURE — G8484 FLU IMMUNIZE NO ADMIN: HCPCS | Performed by: STUDENT IN AN ORGANIZED HEALTH CARE EDUCATION/TRAINING PROGRAM

## 2023-11-13 RX ORDER — DOXYCYCLINE HYCLATE 100 MG
100 TABLET ORAL 2 TIMES DAILY
Qty: 60 TABLET | Refills: 4 | Status: SHIPPED | OUTPATIENT
Start: 2023-11-13 | End: 2023-12-13

## 2023-11-13 SDOH — ECONOMIC STABILITY: INCOME INSECURITY: HOW HARD IS IT FOR YOU TO PAY FOR THE VERY BASICS LIKE FOOD, HOUSING, MEDICAL CARE, AND HEATING?: NOT HARD AT ALL

## 2023-11-13 SDOH — ECONOMIC STABILITY: FOOD INSECURITY: WITHIN THE PAST 12 MONTHS, YOU WORRIED THAT YOUR FOOD WOULD RUN OUT BEFORE YOU GOT MONEY TO BUY MORE.: NEVER TRUE

## 2023-11-13 SDOH — ECONOMIC STABILITY: FOOD INSECURITY: WITHIN THE PAST 12 MONTHS, THE FOOD YOU BOUGHT JUST DIDN'T LAST AND YOU DIDN'T HAVE MONEY TO GET MORE.: NEVER TRUE

## 2023-11-13 ASSESSMENT — ENCOUNTER SYMPTOMS
GASTROINTESTINAL NEGATIVE: 1
RESPIRATORY NEGATIVE: 1

## 2023-11-13 ASSESSMENT — PATIENT HEALTH QUESTIONNAIRE - PHQ9
SUM OF ALL RESPONSES TO PHQ QUESTIONS 1-9: 0
SUM OF ALL RESPONSES TO PHQ QUESTIONS 1-9: 0
2. FEELING DOWN, DEPRESSED OR HOPELESS: 0
SUM OF ALL RESPONSES TO PHQ9 QUESTIONS 1 & 2: 0
1. LITTLE INTEREST OR PLEASURE IN DOING THINGS: 0
SUM OF ALL RESPONSES TO PHQ QUESTIONS 1-9: 0
SUM OF ALL RESPONSES TO PHQ QUESTIONS 1-9: 0

## 2023-11-13 NOTE — PROGRESS NOTES
ointment Apply topically  daily.   Patient not taking: Reported on 11/13/2023  Sara Moran, DPM       CareTeam (Including outside providers/suppliers regularly involved in providing care):   Patient Care Team:  Félix Mccarthy MD as PCP - General (Family Medicine)  Félix Mccarthy MD as PCP - Empaneled Provider  Candi Mauricio MD (Orthopedic Surgery)  Amol Devlin MD as Cardiologist (Cardiology)  Tamera Vyas MD as Consulting Physician (Neurology)     Reviewed and updated this visit:  Tobacco  Allergies  Meds  Med Hx  Surg Hx  Soc Hx  Fam Hx

## 2023-11-29 ENCOUNTER — APPOINTMENT (OUTPATIENT)
Dept: GENERAL RADIOLOGY | Age: 78
DRG: 872 | End: 2023-11-29
Payer: MEDICARE

## 2023-11-29 ENCOUNTER — HOSPITAL ENCOUNTER (INPATIENT)
Age: 78
LOS: 5 days | Discharge: HOME HEALTH | DRG: 872 | End: 2023-12-04
Attending: INTERNAL MEDICINE | Admitting: INTERNAL MEDICINE
Payer: MEDICARE

## 2023-11-29 DIAGNOSIS — N18.9 ACUTE KIDNEY INJURY SUPERIMPOSED ON CKD (HCC): Primary | ICD-10-CM

## 2023-11-29 DIAGNOSIS — N17.9 ACUTE KIDNEY INJURY SUPERIMPOSED ON CKD (HCC): Primary | ICD-10-CM

## 2023-11-29 DIAGNOSIS — M86.662 CHRONIC OSTEOMYELITIS INVOLVING LOWER LEG, LEFT (HCC): ICD-10-CM

## 2023-11-29 PROBLEM — I50.9 HEART FAILURE, UNSPECIFIED (HCC): Status: ACTIVE | Noted: 2017-03-27

## 2023-11-29 PROBLEM — K59.00 CONSTIPATION, UNSPECIFIED: Status: ACTIVE | Noted: 2017-03-27

## 2023-11-29 PROBLEM — R26.2 DIFFICULTY IN WALKING, NOT ELSEWHERE CLASSIFIED: Status: ACTIVE | Noted: 2017-03-27

## 2023-11-29 PROBLEM — R29.898 OTHER SYMPTOMS AND SIGNS INVOLVING THE MUSCULOSKELETAL SYSTEM: Status: ACTIVE | Noted: 2017-03-27

## 2023-11-29 PROBLEM — M62.81 MUSCLE WEAKNESS (GENERALIZED): Status: ACTIVE | Noted: 2017-03-27

## 2023-11-29 PROBLEM — I13.0 HYPERTENSIVE HEART AND CHRONIC KIDNEY DISEASE WITH HEART FAILURE AND STAGE 1 THROUGH STAGE 4 CHRONIC KIDNEY DISEASE, OR UNSPECIFIED CHRONIC KIDNEY DISEASE (HCC): Status: ACTIVE | Noted: 2017-03-27

## 2023-11-29 PROBLEM — E16.2 HYPOGLYCEMIA, UNSPECIFIED: Status: ACTIVE | Noted: 2017-03-27

## 2023-11-29 PROBLEM — N40.0 BENIGN PROSTATIC HYPERPLASIA WITHOUT LOWER URINARY TRACT SYMPTOMS: Status: ACTIVE | Noted: 2017-03-27

## 2023-11-29 PROBLEM — E78.5 HYPERLIPIDEMIA, UNSPECIFIED: Status: ACTIVE | Noted: 2017-03-27

## 2023-11-29 PROBLEM — Z96.651 PRESENCE OF RIGHT ARTIFICIAL KNEE JOINT: Status: ACTIVE | Noted: 2017-03-27

## 2023-11-29 PROBLEM — R33.9 RETENTION OF URINE, UNSPECIFIED: Status: ACTIVE | Noted: 2017-03-27

## 2023-11-29 PROBLEM — J32.9 CHRONIC SINUSITIS, UNSPECIFIED: Status: ACTIVE | Noted: 2017-03-27

## 2023-11-29 PROBLEM — K30 FUNCTIONAL DYSPEPSIA: Status: ACTIVE | Noted: 2017-03-27

## 2023-11-29 LAB
ALBUMIN SERPL-MCNC: 4.5 G/DL (ref 3.5–4.6)
ALP SERPL-CCNC: 113 U/L (ref 35–104)
ALT SERPL-CCNC: 12 U/L (ref 0–41)
ANION GAP SERPL CALCULATED.3IONS-SCNC: 17 MEQ/L (ref 9–15)
AST SERPL-CCNC: 15 U/L (ref 0–40)
B PARAP IS1001 DNA NPH QL NAA+NON-PROBE: NOT DETECTED
B PERT.PT PRMT NPH QL NAA+NON-PROBE: NOT DETECTED
BASOPHILS # BLD: 0.1 K/UL (ref 0–0.2)
BASOPHILS NFR BLD: 0.5 %
BILIRUB SERPL-MCNC: 0.6 MG/DL (ref 0.2–0.7)
BILIRUB UR QL STRIP: NEGATIVE
BNP BLD-MCNC: 1550 PG/ML
BUN SERPL-MCNC: 67 MG/DL (ref 8–23)
C PNEUM DNA NPH QL NAA+NON-PROBE: NOT DETECTED
CALCIUM SERPL-MCNC: 10.6 MG/DL (ref 8.5–9.9)
CHLORIDE SERPL-SCNC: 92 MEQ/L (ref 95–107)
CLARITY UR: CLEAR
CO2 SERPL-SCNC: 25 MEQ/L (ref 20–31)
COLOR UR: YELLOW
CREAT SERPL-MCNC: 1.99 MG/DL (ref 0.7–1.2)
EOSINOPHIL # BLD: 0 K/UL (ref 0–0.7)
EOSINOPHIL NFR BLD: 0.1 %
ERYTHROCYTE [DISTWIDTH] IN BLOOD BY AUTOMATED COUNT: 12.8 % (ref 11.5–14.5)
FLUAV RNA NPH QL NAA+NON-PROBE: NOT DETECTED
FLUBV RNA NPH QL NAA+NON-PROBE: NOT DETECTED
GLOBULIN SER CALC-MCNC: 3.6 G/DL (ref 2.3–3.5)
GLUCOSE SERPL-MCNC: 140 MG/DL (ref 70–99)
GLUCOSE UR STRIP-MCNC: NEGATIVE MG/DL
HADV DNA NPH QL NAA+NON-PROBE: NOT DETECTED
HCOV 229E RNA NPH QL NAA+NON-PROBE: NOT DETECTED
HCOV HKU1 RNA NPH QL NAA+NON-PROBE: NOT DETECTED
HCOV NL63 RNA NPH QL NAA+NON-PROBE: NOT DETECTED
HCOV OC43 RNA NPH QL NAA+NON-PROBE: NOT DETECTED
HCT VFR BLD AUTO: 42.2 % (ref 42–52)
HGB BLD-MCNC: 14.3 G/DL (ref 14–18)
HGB UR QL STRIP: NEGATIVE
HMPV RNA NPH QL NAA+NON-PROBE: NOT DETECTED
HPIV1 RNA NPH QL NAA+NON-PROBE: NOT DETECTED
HPIV2 RNA NPH QL NAA+NON-PROBE: NOT DETECTED
HPIV3 RNA NPH QL NAA+NON-PROBE: NOT DETECTED
HPIV4 RNA NPH QL NAA+NON-PROBE: NOT DETECTED
KETONES UR STRIP-MCNC: NEGATIVE MG/DL
LACTATE BLDV-SCNC: 2.6 MMOL/L (ref 0.5–2.2)
LEUKOCYTE ESTERASE UR QL STRIP: NEGATIVE
LYMPHOCYTES # BLD: 0.7 K/UL (ref 1–4.8)
LYMPHOCYTES NFR BLD: 4 %
M PNEUMO DNA NPH QL NAA+NON-PROBE: NOT DETECTED
MAGNESIUM SERPL-MCNC: 2 MG/DL (ref 1.7–2.4)
MCH RBC QN AUTO: 33.2 PG (ref 27–31.3)
MCHC RBC AUTO-ENTMCNC: 33.9 % (ref 33–37)
MCV RBC AUTO: 97.9 FL (ref 79–92.2)
MONOCYTES # BLD: 0.9 K/UL (ref 0.2–0.8)
MONOCYTES NFR BLD: 5 %
NEUTROPHILS # BLD: 16.2 K/UL (ref 1.4–6.5)
NEUTS SEG NFR BLD: 89.9 %
NITRITE UR QL STRIP: NEGATIVE
PH UR STRIP: 5 [PH] (ref 5–9)
PLATELET # BLD AUTO: 348 K/UL (ref 130–400)
POTASSIUM SERPL-SCNC: 5.4 MEQ/L (ref 3.4–4.9)
PROCALCITONIN SERPL IA-MCNC: 0.16 NG/ML (ref 0–0.15)
PROT SERPL-MCNC: 8.1 G/DL (ref 6.3–8)
PROT UR STRIP-MCNC: NEGATIVE MG/DL
RBC # BLD AUTO: 4.31 M/UL (ref 4.7–6.1)
RSV RNA NPH QL NAA+NON-PROBE: NOT DETECTED
RV+EV RNA NPH QL NAA+NON-PROBE: NOT DETECTED
SARS-COV-2 RNA NPH QL NAA+NON-PROBE: NOT DETECTED
SODIUM SERPL-SCNC: 134 MEQ/L (ref 135–144)
SP GR UR STRIP: 1.01 (ref 1–1.03)
STREP GRP A PCR: NEGATIVE
TROPONIN, HIGH SENSITIVITY: 45 NG/L (ref 0–19)
TROPONIN, HIGH SENSITIVITY: 46 NG/L (ref 0–19)
URINE REFLEX TO CULTURE: NORMAL
UROBILINOGEN UR STRIP-ACNC: 0.2 E.U./DL
WBC # BLD AUTO: 18.1 K/UL (ref 4.8–10.8)

## 2023-11-29 PROCEDURE — 93005 ELECTROCARDIOGRAM TRACING: CPT

## 2023-11-29 PROCEDURE — 71045 X-RAY EXAM CHEST 1 VIEW: CPT

## 2023-11-29 PROCEDURE — 36415 COLL VENOUS BLD VENIPUNCTURE: CPT

## 2023-11-29 PROCEDURE — 2580000003 HC RX 258

## 2023-11-29 PROCEDURE — 83880 ASSAY OF NATRIURETIC PEPTIDE: CPT

## 2023-11-29 PROCEDURE — 80053 COMPREHEN METABOLIC PANEL: CPT

## 2023-11-29 PROCEDURE — 2580000003 HC RX 258: Performed by: NURSE PRACTITIONER

## 2023-11-29 PROCEDURE — 83605 ASSAY OF LACTIC ACID: CPT

## 2023-11-29 PROCEDURE — 84145 PROCALCITONIN (PCT): CPT

## 2023-11-29 PROCEDURE — 85027 COMPLETE CBC AUTOMATED: CPT

## 2023-11-29 PROCEDURE — 87651 STREP A DNA AMP PROBE: CPT

## 2023-11-29 PROCEDURE — 6370000000 HC RX 637 (ALT 250 FOR IP)

## 2023-11-29 PROCEDURE — 83735 ASSAY OF MAGNESIUM: CPT

## 2023-11-29 PROCEDURE — 99285 EMERGENCY DEPT VISIT HI MDM: CPT

## 2023-11-29 PROCEDURE — 85025 COMPLETE CBC W/AUTO DIFF WBC: CPT

## 2023-11-29 PROCEDURE — 84484 ASSAY OF TROPONIN QUANT: CPT

## 2023-11-29 PROCEDURE — 0202U NFCT DS 22 TRGT SARS-COV-2: CPT

## 2023-11-29 PROCEDURE — 81003 URINALYSIS AUTO W/O SCOPE: CPT

## 2023-11-29 PROCEDURE — 1210000000 HC MED SURG R&B

## 2023-11-29 RX ORDER — ENOXAPARIN SODIUM 100 MG/ML
30 INJECTION SUBCUTANEOUS DAILY
Status: DISCONTINUED | OUTPATIENT
Start: 2023-11-30 | End: 2023-11-30

## 2023-11-29 RX ORDER — SODIUM CHLORIDE 0.9 % (FLUSH) 0.9 %
5-40 SYRINGE (ML) INJECTION EVERY 12 HOURS SCHEDULED
Status: DISCONTINUED | OUTPATIENT
Start: 2023-11-29 | End: 2023-12-04 | Stop reason: HOSPADM

## 2023-11-29 RX ORDER — SODIUM CHLORIDE 0.9 % (FLUSH) 0.9 %
5-40 SYRINGE (ML) INJECTION PRN
Status: DISCONTINUED | OUTPATIENT
Start: 2023-11-29 | End: 2023-12-04 | Stop reason: HOSPADM

## 2023-11-29 RX ORDER — 0.9 % SODIUM CHLORIDE 0.9 %
1000 INTRAVENOUS SOLUTION INTRAVENOUS ONCE
Status: COMPLETED | OUTPATIENT
Start: 2023-11-29 | End: 2023-11-29

## 2023-11-29 RX ORDER — POLYETHYLENE GLYCOL 3350 17 G/17G
17 POWDER, FOR SOLUTION ORAL DAILY PRN
Status: DISCONTINUED | OUTPATIENT
Start: 2023-11-29 | End: 2023-12-04 | Stop reason: HOSPADM

## 2023-11-29 RX ORDER — ACETAMINOPHEN 325 MG/1
650 TABLET ORAL EVERY 6 HOURS PRN
Status: DISCONTINUED | OUTPATIENT
Start: 2023-11-29 | End: 2023-12-04 | Stop reason: HOSPADM

## 2023-11-29 RX ORDER — ACETAMINOPHEN 650 MG/1
650 SUPPOSITORY RECTAL EVERY 6 HOURS PRN
Status: DISCONTINUED | OUTPATIENT
Start: 2023-11-29 | End: 2023-12-04 | Stop reason: HOSPADM

## 2023-11-29 RX ORDER — ONDANSETRON 4 MG/1
4 TABLET, ORALLY DISINTEGRATING ORAL EVERY 8 HOURS PRN
Status: DISCONTINUED | OUTPATIENT
Start: 2023-11-29 | End: 2023-12-04 | Stop reason: HOSPADM

## 2023-11-29 RX ORDER — ONDANSETRON 2 MG/ML
4 INJECTION INTRAMUSCULAR; INTRAVENOUS EVERY 6 HOURS PRN
Status: DISCONTINUED | OUTPATIENT
Start: 2023-11-29 | End: 2023-12-04 | Stop reason: HOSPADM

## 2023-11-29 RX ORDER — SODIUM CHLORIDE 9 MG/ML
INJECTION, SOLUTION INTRAVENOUS PRN
Status: DISCONTINUED | OUTPATIENT
Start: 2023-11-29 | End: 2023-12-04 | Stop reason: HOSPADM

## 2023-11-29 RX ORDER — ACETAMINOPHEN 500 MG
1000 TABLET ORAL ONCE
Status: COMPLETED | OUTPATIENT
Start: 2023-11-29 | End: 2023-11-29

## 2023-11-29 RX ORDER — SODIUM CHLORIDE 9 MG/ML
INJECTION, SOLUTION INTRAVENOUS CONTINUOUS
Status: DISPENSED | OUTPATIENT
Start: 2023-11-29 | End: 2023-11-30

## 2023-11-29 RX ADMIN — SODIUM CHLORIDE 1000 ML: 9 INJECTION, SOLUTION INTRAVENOUS at 21:59

## 2023-11-29 RX ADMIN — Medication 10 ML: at 23:49

## 2023-11-29 RX ADMIN — SODIUM CHLORIDE: 9 INJECTION, SOLUTION INTRAVENOUS at 23:49

## 2023-11-29 RX ADMIN — ACETAMINOPHEN 1000 MG: 500 TABLET ORAL at 19:11

## 2023-11-29 ASSESSMENT — LIFESTYLE VARIABLES
HOW MANY STANDARD DRINKS CONTAINING ALCOHOL DO YOU HAVE ON A TYPICAL DAY: 1 OR 2
HOW MANY STANDARD DRINKS CONTAINING ALCOHOL DO YOU HAVE ON A TYPICAL DAY: 1 OR 2
HOW OFTEN DO YOU HAVE A DRINK CONTAINING ALCOHOL: 4 OR MORE TIMES A WEEK
HOW OFTEN DO YOU HAVE A DRINK CONTAINING ALCOHOL: 4 OR MORE TIMES A WEEK

## 2023-11-29 ASSESSMENT — PAIN SCALES - GENERAL
PAINLEVEL_OUTOF10: 5
PAINLEVEL_OUTOF10: 5

## 2023-11-29 ASSESSMENT — ENCOUNTER SYMPTOMS
RHINORRHEA: 1
NAUSEA: 1

## 2023-11-29 ASSESSMENT — PAIN - FUNCTIONAL ASSESSMENT: PAIN_FUNCTIONAL_ASSESSMENT: 0-10

## 2023-11-29 NOTE — ED TRIAGE NOTES
Patient arrived via EMS, he has been weak for the last 2 weeks, he drinks 1-2 ounces daily but not for 2 weeks due to not feeling well. Today he started having increased congestion ear pain and vomiting.  He has had limited oral or liquid intake

## 2023-11-30 LAB
ALBUMIN SERPL-MCNC: 4 G/DL (ref 3.5–4.6)
ALP SERPL-CCNC: 95 U/L (ref 35–104)
ALT SERPL-CCNC: 9 U/L (ref 0–41)
ANION GAP SERPL CALCULATED.3IONS-SCNC: 15 MEQ/L (ref 9–15)
ANION GAP SERPL CALCULATED.3IONS-SCNC: 15 MEQ/L (ref 9–15)
ANISOCYTOSIS BLD QL SMEAR: ABNORMAL
AST SERPL-CCNC: 14 U/L (ref 0–40)
BASOPHILS # BLD: 0.1 K/UL (ref 0–0.2)
BASOPHILS NFR BLD: 0.5 %
BILIRUB SERPL-MCNC: 0.7 MG/DL (ref 0.2–0.7)
BUN SERPL-MCNC: 59 MG/DL (ref 8–23)
BUN SERPL-MCNC: 64 MG/DL (ref 8–23)
CALCIUM SERPL-MCNC: 9.4 MG/DL (ref 8.5–9.9)
CALCIUM SERPL-MCNC: 9.5 MG/DL (ref 8.5–9.9)
CHLORIDE SERPL-SCNC: 92 MEQ/L (ref 95–107)
CHLORIDE SERPL-SCNC: 96 MEQ/L (ref 95–107)
CO2 SERPL-SCNC: 24 MEQ/L (ref 20–31)
CO2 SERPL-SCNC: 25 MEQ/L (ref 20–31)
CREAT SERPL-MCNC: 1.53 MG/DL (ref 0.7–1.2)
CREAT SERPL-MCNC: 1.75 MG/DL (ref 0.7–1.2)
EOSINOPHIL # BLD: 0 K/UL (ref 0–0.7)
EOSINOPHIL NFR BLD: 0.1 %
ERYTHROCYTE [DISTWIDTH] IN BLOOD BY AUTOMATED COUNT: 12.9 % (ref 11.5–14.5)
ERYTHROCYTE [DISTWIDTH] IN BLOOD BY AUTOMATED COUNT: 13 % (ref 11.5–14.5)
GLOBULIN SER CALC-MCNC: 2.7 G/DL (ref 2.3–3.5)
GLUCOSE SERPL-MCNC: 103 MG/DL (ref 70–99)
GLUCOSE SERPL-MCNC: 96 MG/DL (ref 70–99)
HCT VFR BLD AUTO: 36.5 % (ref 42–52)
HCT VFR BLD AUTO: 37 % (ref 42–52)
HGB BLD-MCNC: 12.5 G/DL (ref 14–18)
HGB BLD-MCNC: 12.6 G/DL (ref 14–18)
LACTATE BLDV-SCNC: 1.9 MMOL/L (ref 0.5–2.2)
LYMPHOCYTES # BLD: 1.8 K/UL (ref 1–4.8)
LYMPHOCYTES NFR BLD: 8.5 %
MAGNESIUM SERPL-MCNC: 1.9 MG/DL (ref 1.7–2.4)
MCH RBC QN AUTO: 33.1 PG (ref 27–31.3)
MCH RBC QN AUTO: 33.7 PG (ref 27–31.3)
MCHC RBC AUTO-ENTMCNC: 33.8 % (ref 33–37)
MCHC RBC AUTO-ENTMCNC: 34.5 % (ref 33–37)
MCV RBC AUTO: 97.6 FL (ref 79–92.2)
MCV RBC AUTO: 97.9 FL (ref 79–92.2)
MONOCYTES # BLD: 1.6 K/UL (ref 0.2–0.8)
MONOCYTES NFR BLD: 7.5 %
NEUTROPHILS # BLD: 17.1 K/UL (ref 1.4–6.5)
NEUTS SEG NFR BLD: 82.7 %
PLATELET # BLD AUTO: 304 K/UL (ref 130–400)
PLATELET # BLD AUTO: 306 K/UL (ref 130–400)
PLATELET BLD QL SMEAR: ABNORMAL
POTASSIUM SERPL-SCNC: 4.5 MEQ/L (ref 3.4–4.9)
POTASSIUM SERPL-SCNC: 4.6 MEQ/L (ref 3.4–4.9)
PROT SERPL-MCNC: 6.7 G/DL (ref 6.3–8)
RBC # BLD AUTO: 3.74 M/UL (ref 4.7–6.1)
RBC # BLD AUTO: 3.78 M/UL (ref 4.7–6.1)
SODIUM SERPL-SCNC: 131 MEQ/L (ref 135–144)
SODIUM SERPL-SCNC: 136 MEQ/L (ref 135–144)
WBC # BLD AUTO: 20.7 K/UL (ref 4.8–10.8)
WBC # BLD AUTO: 21.5 K/UL (ref 4.8–10.8)

## 2023-11-30 PROCEDURE — 6370000000 HC RX 637 (ALT 250 FOR IP): Performed by: NURSE PRACTITIONER

## 2023-11-30 PROCEDURE — 83735 ASSAY OF MAGNESIUM: CPT

## 2023-11-30 PROCEDURE — 3E1B78Z IRRIGATION OF EAR USING IRRIGATING SUBSTANCE, VIA NATURAL OR ARTIFICIAL OPENING: ICD-10-PCS | Performed by: INTERNAL MEDICINE

## 2023-11-30 PROCEDURE — 2580000003 HC RX 258: Performed by: NURSE PRACTITIONER

## 2023-11-30 PROCEDURE — 36415 COLL VENOUS BLD VENIPUNCTURE: CPT

## 2023-11-30 PROCEDURE — 2500000003 HC RX 250 WO HCPCS: Performed by: NURSE PRACTITIONER

## 2023-11-30 PROCEDURE — 80053 COMPREHEN METABOLIC PANEL: CPT

## 2023-11-30 PROCEDURE — 94761 N-INVAS EAR/PLS OXIMETRY MLT: CPT

## 2023-11-30 PROCEDURE — 85025 COMPLETE CBC W/AUTO DIFF WBC: CPT

## 2023-11-30 PROCEDURE — 94640 AIRWAY INHALATION TREATMENT: CPT

## 2023-11-30 PROCEDURE — 1210000000 HC MED SURG R&B

## 2023-11-30 RX ORDER — PANTOPRAZOLE SODIUM 40 MG/1
40 TABLET, DELAYED RELEASE ORAL
Status: DISCONTINUED | OUTPATIENT
Start: 2023-11-30 | End: 2023-12-04 | Stop reason: HOSPADM

## 2023-11-30 RX ORDER — FINASTERIDE 5 MG/1
5 TABLET, FILM COATED ORAL DAILY
Status: DISCONTINUED | OUTPATIENT
Start: 2023-11-30 | End: 2023-11-30

## 2023-11-30 RX ORDER — ISOSORBIDE MONONITRATE 30 MG/1
30 TABLET, EXTENDED RELEASE ORAL DAILY
Status: DISCONTINUED | OUTPATIENT
Start: 2023-11-30 | End: 2023-12-04 | Stop reason: HOSPADM

## 2023-11-30 RX ORDER — ATORVASTATIN CALCIUM 20 MG/1
20 TABLET, FILM COATED ORAL NIGHTLY
Status: DISCONTINUED | OUTPATIENT
Start: 2023-11-30 | End: 2023-12-04 | Stop reason: HOSPADM

## 2023-11-30 RX ORDER — ENOXAPARIN SODIUM 100 MG/ML
40 INJECTION SUBCUTANEOUS DAILY
Status: DISCONTINUED | OUTPATIENT
Start: 2023-12-01 | End: 2023-12-04 | Stop reason: HOSPADM

## 2023-11-30 RX ORDER — CLOPIDOGREL BISULFATE 75 MG/1
75 TABLET ORAL DAILY
Status: DISCONTINUED | OUTPATIENT
Start: 2023-11-30 | End: 2023-12-04 | Stop reason: HOSPADM

## 2023-11-30 RX ORDER — OXYCODONE AND ACETAMINOPHEN 7.5; 325 MG/1; MG/1
1 TABLET ORAL EVERY 6 HOURS PRN
Status: DISCONTINUED | OUTPATIENT
Start: 2023-11-30 | End: 2023-12-04 | Stop reason: HOSPADM

## 2023-11-30 RX ORDER — CITALOPRAM 20 MG/1
40 TABLET ORAL DAILY
Status: DISCONTINUED | OUTPATIENT
Start: 2023-11-30 | End: 2023-12-04 | Stop reason: HOSPADM

## 2023-11-30 RX ORDER — BUDESONIDE AND FORMOTEROL FUMARATE DIHYDRATE 160; 4.5 UG/1; UG/1
2 AEROSOL RESPIRATORY (INHALATION)
Status: DISCONTINUED | OUTPATIENT
Start: 2023-11-30 | End: 2023-12-04 | Stop reason: HOSPADM

## 2023-11-30 RX ADMIN — BUDESONIDE AND FORMOTEROL FUMARATE DIHYDRATE 2 PUFF: 160; 4.5 AEROSOL RESPIRATORY (INHALATION) at 08:15

## 2023-11-30 RX ADMIN — ONDANSETRON 4 MG: 4 TABLET, ORALLY DISINTEGRATING ORAL at 11:42

## 2023-11-30 RX ADMIN — OXYCODONE HYDROCHLORIDE AND ACETAMINOPHEN 1 TABLET: 7.5; 325 TABLET ORAL at 23:36

## 2023-11-30 RX ADMIN — OXYCODONE HYDROCHLORIDE AND ACETAMINOPHEN 1 TABLET: 7.5; 325 TABLET ORAL at 15:58

## 2023-11-30 RX ADMIN — DOXYCYCLINE 100 MG: 100 INJECTION, POWDER, LYOPHILIZED, FOR SOLUTION INTRAVENOUS at 15:55

## 2023-11-30 RX ADMIN — BUDESONIDE AND FORMOTEROL FUMARATE DIHYDRATE 2 PUFF: 160; 4.5 AEROSOL RESPIRATORY (INHALATION) at 19:28

## 2023-11-30 RX ADMIN — ATORVASTATIN CALCIUM 20 MG: 20 TABLET, FILM COATED ORAL at 21:12

## 2023-11-30 RX ADMIN — Medication 10 ML: at 09:25

## 2023-11-30 RX ADMIN — PANTOPRAZOLE SODIUM 40 MG: 40 TABLET, DELAYED RELEASE ORAL at 06:28

## 2023-11-30 RX ADMIN — CITALOPRAM HYDROBROMIDE 40 MG: 20 TABLET ORAL at 09:28

## 2023-11-30 RX ADMIN — CLOPIDOGREL BISULFATE 75 MG: 75 TABLET, FILM COATED ORAL at 09:24

## 2023-11-30 RX ADMIN — TIOTROPIUM BROMIDE INHALATION SPRAY 2 PUFF: 3.12 SPRAY, METERED RESPIRATORY (INHALATION) at 08:15

## 2023-11-30 RX ADMIN — ISOSORBIDE MONONITRATE 30 MG: 30 TABLET, EXTENDED RELEASE ORAL at 09:21

## 2023-11-30 RX ADMIN — METOPROLOL TARTRATE 25 MG: 25 TABLET, FILM COATED ORAL at 21:07

## 2023-11-30 RX ADMIN — Medication 10 ML: at 21:13

## 2023-11-30 RX ADMIN — METOPROLOL TARTRATE 25 MG: 25 TABLET, FILM COATED ORAL at 09:23

## 2023-11-30 RX ADMIN — DOXYCYCLINE 100 MG: 100 INJECTION, POWDER, LYOPHILIZED, FOR SOLUTION INTRAVENOUS at 03:52

## 2023-11-30 ASSESSMENT — PAIN SCALES - GENERAL
PAINLEVEL_OUTOF10: 5
PAINLEVEL_OUTOF10: 7
PAINLEVEL_OUTOF10: 0
PAINLEVEL_OUTOF10: 0

## 2023-11-30 ASSESSMENT — ENCOUNTER SYMPTOMS
SHORTNESS OF BREATH: 1
NAUSEA: 0
COUGH: 1
ABDOMINAL PAIN: 0
RHINORRHEA: 1

## 2023-11-30 ASSESSMENT — PAIN DESCRIPTION - ORIENTATION
ORIENTATION: RIGHT
ORIENTATION: RIGHT

## 2023-11-30 ASSESSMENT — PAIN DESCRIPTION - LOCATION
LOCATION: SHOULDER
LOCATION: SHOULDER

## 2023-11-30 NOTE — PLAN OF CARE
Nutrition Problem #1: Moderate malnutrition, In context of chronic illness  Intervention: Food and/or Nutrient Delivery: Modify Current Diet, Start Oral Nutrition Supplement  Nutritional

## 2023-11-30 NOTE — H&P
Not on file   Occupational History    Occupation: retired   Tobacco Use    Smoking status: Every Day     Packs/day: 0.00     Years: 60.00     Additional pack years: 0.00     Total pack years: 0.00     Types: Cigarettes     Last attempt to quit: 2021     Years since quittin.9     Passive exposure: Current    Smokeless tobacco: Never    Tobacco comments:      smokes 2 cigars a day   Vaping Use    Vaping Use: Never used   Substance and Sexual Activity    Alcohol use: Not Currently     Alcohol/week: 12.0 standard drinks of alcohol     Types: 12 Shots of liquor per week     Comment: socail drinking    Drug use: No    Sexual activity: Yes   Other Topics Concern    Not on file   Social History Narrative     He is  and lives alone    Type of Home: [address]: Able to Live on Main level with bedroom/bathroom, Two level, Performs ADL's on one level    Home Access: Stairs to enter with rails    Entrance Stairs - Number of Steps: 3- Rails: Both    Bathroom Shower/Tub: Tub/Shower unit--Shower chair, Grab bars in shower (does not use chair)    Home Equipment: Rolling walker, 4 wheeled walker, Oxygen    ADL Assistance: [address]: Independent    Homemaking Responsibilities: Yes    Ambulation Assistance: Independent    Transfer Assistance: Independent    Active : Yes    He is retired from [address] [place] [address] [email or web address] Strain: [address]  (2023)    Overall Financial Resource Strain (CARDIA)     Difficulty of Paying Living Expenses: Not hard at all   Food Insecurity: Not on file (2023)   Transportation Needs: Unknown (2023)    PRAPARE - Transportation     Lack of Transportation (Medical): Not on file     Lack of Transportation (Non-Medical):  No   Physical Activity: Insufficiently Active (2023)    Exercise Vital Sign     Days of Exercise per Week: 3 days     Minutes of Exercise per

## 2023-11-30 NOTE — ACP (ADVANCE CARE PLANNING)
Advance Care Planning     Advance Care Planning Activator (Inpatient)  Conversation Note      Date of ACP Conversation: 11/30/2023     Conversation Conducted with: Patient with Decision Making Capacity    ACP Activator: Alana Falcon RN        Health Care Decision Maker:     Current Designated Health Care Decision Maker:     Primary Decision Maker: Sylvester Coto - Brother/Sister - [phone]    Primary Decision Maker: Coreen Carey - Child - [phone]    Secondary Decision Maker: Robert Reynolds - Child - [phone]

## 2023-11-30 NOTE — PLAN OF CARE
Problem: Discharge Planning  Goal: Discharge to home or other facility with appropriate resources  Outcome: Progressing  Flowsheets  Taken 11/29/2023 2334  Discharge to home or other facility with appropriate resources:   Identify barriers to discharge with patient and caregiver   Identify discharge learning needs (meds, wound care, etc)   Refer to discharge planning if patient needs post-hospital services based on physician order or complex needs related to functional status, cognitive ability or social support system   Arrange for needed discharge resources and transportation as appropriate   Arrange for interpreters to assist at discharge as needed  Taken 11/29/2023 2319  Discharge to home or other facility with appropriate resources:   Identify barriers to discharge with patient and caregiver   Arrange for needed discharge resources and transportation as appropriate   Identify discharge learning needs (meds, wound care, etc)   Arrange for interpreters to assist at discharge as needed   Refer to discharge planning if patient needs post-hospital services based on physician order or complex needs related to functional status, cognitive ability or social support system     Problem: Pain  Goal: Verbalizes/displays adequate comfort level or baseline comfort level  Outcome: Progressing  Flowsheets (Taken 11/29/2023 2319)  Verbalizes/displays adequate comfort level or baseline comfort level:   Encourage patient to monitor pain and request assistance   Administer analgesics based on type and severity of pain and evaluate response   Consider cultural and social influences on pain and pain management   Assess pain using appropriate pain scale   Implement non-pharmacological measures as appropriate and evaluate response   Notify Licensed Independent Practitioner if interventions unsuccessful or patient reports new pain     Problem: Safety - Adult  Goal: Free from fall injury  Outcome: Progressing

## 2023-11-30 NOTE — ED PROVIDER NOTES
MCV 97.9 (*)     MCH 33.2 (*)     Neutrophils Absolute 16.2 (*)     Lymphocytes Absolute 0.7 (*)     Monocytes Absolute 0.9 (*)     All other components within normal limits   COMPREHENSIVE METABOLIC PANEL - Abnormal; Notable for the following components:    Sodium 134 (*)     Potassium 5.4 (*)     Chloride 92 (*)     Anion Gap 17 (*)     Glucose 140 (*)     BUN 67 (*)     Creatinine 1.99 (*)     Est, Glom Filt Rate 33.3 (*)     Calcium 10.6 (*)     Total Protein 8.1 (*)     Alkaline Phosphatase 113 (*)     Globulin 3.6 (*)     All other components within normal limits   TROPONIN - Abnormal; Notable for the following components:    Troponin, High Sensitivity 45 (*)     All other components within normal limits   TROPONIN - Abnormal; Notable for the following components:    Troponin, High Sensitivity 46 (*)     All other components within normal limits   LACTIC ACID - Abnormal; Notable for the following components:    Lactic Acid 2.6 (*)     All other components within normal limits   PROCALCITONIN - Abnormal; Notable for the following components:    Procalcitonin 0.16 (*)     All other components within normal limits   RESPIRATORY PANEL, MOLECULAR, WITH COVID-19   RAPID STREP SCREEN   MAGNESIUM   BRAIN NATRIURETIC PEPTIDE   URINALYSIS WITH REFLEX TO CULTURE       All other labs were within normal range or not returned as of this dictation. EMERGENCY DEPARTMENT COURSE and DIFFERENTIAL DIAGNOSIS/MDM:   Vitals:    Vitals:    11/29/23 2102 11/29/23 2117 11/29/23 2132 11/29/23 2147   BP:  (!) 140/73 127/74 114/70   Pulse: 81 78 79 94   Resp: 15 17 17 20   Temp:       TempSrc:       SpO2: 97% 98% 99% 96%   Weight:       Height:         Medical Decision Making  78-year-old male presents to ED via EMS for evaluation of generalized illness. Patient is afebrile, hemodynamically stable, nontoxic. Patient given p.o. Tylenol while in ED. EKG shows NSR with HR 92, normal axis, normal intervals, no ST changes.   In comparison

## 2023-11-30 NOTE — PROCEDURES
Bilateral cerumen impaction right worse than left. Irrigated ear canals using hydrogen peroxide and warm water with lavage irrigation syringe. Large amount of hardened cerumen removed from right ear. Moderate amount of cerumen removed from left ear canal.  Post irrigation ear canals clear, tympanic membranes visible, intact without signs of infection or perforation.   Patient tolerated procedure well

## 2023-11-30 NOTE — CARE COORDINATION
Case Management Assessment  Initial Evaluation    Date/Time of Evaluation: 11/30/2023 3:00 PM  Assessment Completed by: Callie Bella RN    If patient is discharged prior to next notation, then this note serves as note for discharge by case management. Patient Name: Jeanette Dove                   YOB: 1945  Diagnosis: Chronic osteomyelitis involving lower leg, left ([address]) [D39.463]  Acute kidney injury superimposed on CKD ([address]) [N17.9, N18.9]                   Date / Time: 11/29/2023  6:24 PM    Patient Admission Status: Inpatient   Readmission Risk (Low < 19, Mod (19-27), High > 27): Readmission Risk Score: 17    Current PCP: Julio Panda MD  PCP verified by CM? (P) Yes    Chart Reviewed: Yes      History Provided by: Patient  Patient Orientation: Alert and Oriented, Person, Place, Situation, Self    Patient Cognition: Alert    Hospitalization in the last 30 days (Readmission):  No    If yes, Readmission Assessment in CM Navigator will be completed.     Advance Directives:      Code Status: Full Code   Patient's Primary Decision Maker is: (P) Named in Scanned ACP Document    Primary Decision Maker: Chelsy Art - Brother/Sister - [phone]    Primary Decision Maker: Jorge Claude  Child - [phone]    Secondary Decision Maker: Marlene Bassem Shannon - [phone]    Discharge Planning:    Patient lives with: (P) Alone Type of Home: (P) House  Primary Care Giver: Self  Patient Support Systems include: Children, Family Members   Current Financial resources: (P) Other (Comment) (private insurance mmo)  Current community resources: (P) ECF/Home Care  Current services prior to admission: (P) [address] [place] [address] ([facility] through [facility])            Current DME:              Type of Home Care services:  (P) Nursing Services    ADLS  Prior functional level: (P) Independent in ADLs/IADLs  Current functional level: (P) Independent in ADLs/IADLs    PT AM-PAC:   /24  OT AM-PAC:   /24    Family can provide

## 2023-12-01 LAB
ALBUMIN SERPL-MCNC: 3.5 G/DL (ref 3.5–4.6)
ALP SERPL-CCNC: 93 U/L (ref 35–104)
ALT SERPL-CCNC: 9 U/L (ref 0–41)
ANION GAP SERPL CALCULATED.3IONS-SCNC: 12 MEQ/L (ref 9–15)
AST SERPL-CCNC: 13 U/L (ref 0–40)
BASOPHILS # BLD: 0.1 K/UL (ref 0–0.2)
BASOPHILS NFR BLD: 0.4 %
BILIRUB SERPL-MCNC: 0.7 MG/DL (ref 0.2–0.7)
BUN SERPL-MCNC: 39 MG/DL (ref 8–23)
CALCIUM SERPL-MCNC: 9.5 MG/DL (ref 8.5–9.9)
CHLORIDE SERPL-SCNC: 95 MEQ/L (ref 95–107)
CO2 SERPL-SCNC: 23 MEQ/L (ref 20–31)
CREAT SERPL-MCNC: 1.2 MG/DL (ref 0.7–1.2)
EKG ATRIAL RATE: 92 BPM
EKG P AXIS: 23 DEGREES
EKG P-R INTERVAL: 176 MS
EKG Q-T INTERVAL: 364 MS
EKG QRS DURATION: 88 MS
EKG QTC CALCULATION (BAZETT): 450 MS
EKG R AXIS: 32 DEGREES
EKG T AXIS: 35 DEGREES
EKG VENTRICULAR RATE: 92 BPM
EOSINOPHIL # BLD: 0 K/UL (ref 0–0.7)
EOSINOPHIL NFR BLD: 0.1 %
ERYTHROCYTE [DISTWIDTH] IN BLOOD BY AUTOMATED COUNT: 12.8 % (ref 11.5–14.5)
GLOBULIN SER CALC-MCNC: 2.9 G/DL (ref 2.3–3.5)
GLUCOSE SERPL-MCNC: 106 MG/DL (ref 70–99)
HCT VFR BLD AUTO: 34.1 % (ref 42–52)
HGB BLD-MCNC: 11.5 G/DL (ref 14–18)
LYMPHOCYTES # BLD: 1.4 K/UL (ref 1–4.8)
LYMPHOCYTES NFR BLD: 6.7 %
MAGNESIUM SERPL-MCNC: 1.8 MG/DL (ref 1.7–2.4)
MCH RBC QN AUTO: 32.9 PG (ref 27–31.3)
MCHC RBC AUTO-ENTMCNC: 33.7 % (ref 33–37)
MCV RBC AUTO: 97.4 FL (ref 79–92.2)
MONOCYTES # BLD: 1.2 K/UL (ref 0.2–0.8)
MONOCYTES NFR BLD: 6.1 %
NEUTROPHILS # BLD: 17.5 K/UL (ref 1.4–6.5)
NEUTS SEG NFR BLD: 86 %
PLATELET # BLD AUTO: 269 K/UL (ref 130–400)
POTASSIUM SERPL-SCNC: 4.9 MEQ/L (ref 3.4–4.9)
PROT SERPL-MCNC: 6.4 G/DL (ref 6.3–8)
RBC # BLD AUTO: 3.5 M/UL (ref 4.7–6.1)
SODIUM SERPL-SCNC: 130 MEQ/L (ref 135–144)
WBC # BLD AUTO: 20.3 K/UL (ref 4.8–10.8)

## 2023-12-01 PROCEDURE — 1210000000 HC MED SURG R&B

## 2023-12-01 PROCEDURE — 36415 COLL VENOUS BLD VENIPUNCTURE: CPT

## 2023-12-01 PROCEDURE — 94640 AIRWAY INHALATION TREATMENT: CPT

## 2023-12-01 PROCEDURE — 83735 ASSAY OF MAGNESIUM: CPT

## 2023-12-01 PROCEDURE — 93010 ELECTROCARDIOGRAM REPORT: CPT | Performed by: INTERNAL MEDICINE

## 2023-12-01 PROCEDURE — 2500000003 HC RX 250 WO HCPCS: Performed by: NURSE PRACTITIONER

## 2023-12-01 PROCEDURE — 2580000003 HC RX 258: Performed by: INTERNAL MEDICINE

## 2023-12-01 PROCEDURE — 6360000002 HC RX W HCPCS: Performed by: INTERNAL MEDICINE

## 2023-12-01 PROCEDURE — 94760 N-INVAS EAR/PLS OXIMETRY 1: CPT

## 2023-12-01 PROCEDURE — 80053 COMPREHEN METABOLIC PANEL: CPT

## 2023-12-01 PROCEDURE — 85025 COMPLETE CBC W/AUTO DIFF WBC: CPT

## 2023-12-01 PROCEDURE — 6370000000 HC RX 637 (ALT 250 FOR IP): Performed by: NURSE PRACTITIONER

## 2023-12-01 PROCEDURE — 2580000003 HC RX 258: Performed by: NURSE PRACTITIONER

## 2023-12-01 PROCEDURE — 6360000002 HC RX W HCPCS: Performed by: STUDENT IN AN ORGANIZED HEALTH CARE EDUCATION/TRAINING PROGRAM

## 2023-12-01 RX ADMIN — CEFAZOLIN 2000 MG: 2 INJECTION, POWDER, FOR SOLUTION INTRAMUSCULAR; INTRAVENOUS at 17:21

## 2023-12-01 RX ADMIN — CITALOPRAM HYDROBROMIDE 40 MG: 20 TABLET ORAL at 08:51

## 2023-12-01 RX ADMIN — Medication 5 ML: at 08:51

## 2023-12-01 RX ADMIN — CLOPIDOGREL BISULFATE 75 MG: 75 TABLET, FILM COATED ORAL at 08:51

## 2023-12-01 RX ADMIN — METOPROLOL TARTRATE 25 MG: 25 TABLET, FILM COATED ORAL at 08:51

## 2023-12-01 RX ADMIN — BUDESONIDE AND FORMOTEROL FUMARATE DIHYDRATE 2 PUFF: 160; 4.5 AEROSOL RESPIRATORY (INHALATION) at 07:18

## 2023-12-01 RX ADMIN — OXYCODONE HYDROCHLORIDE AND ACETAMINOPHEN 1 TABLET: 7.5; 325 TABLET ORAL at 08:57

## 2023-12-01 RX ADMIN — METOPROLOL TARTRATE 25 MG: 25 TABLET, FILM COATED ORAL at 20:43

## 2023-12-01 RX ADMIN — Medication 10 ML: at 20:46

## 2023-12-01 RX ADMIN — ISOSORBIDE MONONITRATE 30 MG: 30 TABLET, EXTENDED RELEASE ORAL at 08:51

## 2023-12-01 RX ADMIN — BUDESONIDE AND FORMOTEROL FUMARATE DIHYDRATE 2 PUFF: 160; 4.5 AEROSOL RESPIRATORY (INHALATION) at 19:26

## 2023-12-01 RX ADMIN — TIOTROPIUM BROMIDE INHALATION SPRAY 2 PUFF: 3.12 SPRAY, METERED RESPIRATORY (INHALATION) at 07:18

## 2023-12-01 RX ADMIN — DOXYCYCLINE 100 MG: 100 INJECTION, POWDER, LYOPHILIZED, FOR SOLUTION INTRAVENOUS at 03:31

## 2023-12-01 RX ADMIN — PANTOPRAZOLE SODIUM 40 MG: 40 TABLET, DELAYED RELEASE ORAL at 05:28

## 2023-12-01 RX ADMIN — VANCOMYCIN HYDROCHLORIDE 1000 MG: 1 INJECTION, POWDER, LYOPHILIZED, FOR SOLUTION INTRAVENOUS at 18:17

## 2023-12-01 RX ADMIN — ATORVASTATIN CALCIUM 20 MG: 20 TABLET, FILM COATED ORAL at 20:45

## 2023-12-01 ASSESSMENT — PAIN SCALES - GENERAL
PAINLEVEL_OUTOF10: 7
PAINLEVEL_OUTOF10: 4

## 2023-12-01 ASSESSMENT — PAIN DESCRIPTION - ORIENTATION: ORIENTATION: RIGHT

## 2023-12-01 ASSESSMENT — PAIN DESCRIPTION - LOCATION: LOCATION: SHOULDER

## 2023-12-01 NOTE — CARE COORDINATION
Met with patient to confirm info from ER [facility]. Pt lives home alone and prefers home on d/c with his [facility]. I called and spoke to Zachary from [facility] and  she states he has  SN and they would need  JENNIFER order. I asked pt if he needed IV Antibiotics what his plan would be and he states friend Belinda River would be able to administer and has in the past. We will send IV check .

## 2023-12-01 NOTE — CARE COORDINATION
IV BENEFIT REQUEST FORM    FAX FROM: [facility]                        Miles Bhat, [facility]    REQUESTED BY: Electronically signed by Eve Taveras on 2023 at 8:19 AM                                               RN/C3: PHONE: [phone])     DATE:/TIME OF REQUEST: 23  TIME: 8:19 AM      TO: [address] [place] [address] TO: [phone]    PHONE: [phone]     THIS PATIENT HAS BEEN IDENTIFIED TO POSSIBLY NEED LONG TERM IV'S. PLEASE CHECK INSURANCE COVERAGE FOR THE FOLLOWING PT/DRUGS. PATIENT'S NAME: Adin Hayden                              ROOM: [facility]/Y100-58   PATIENT'S : 1945  PATIENT ADDRESS: [address] [place] [id number]  SSN:    ([id number])     PAYOR NAME:  Payor: [employer] / Plan: [address] [place] [address] 440 / Product Type: *No Product type* /      VIBRAMYCIN 100MG IV Q12     __________ CHECK HERE IF PT HAS NO INSURANCE AND REQUESTING SELF PAY COST. *IF [facility] IS NOT A PROVIDER FOR THIS PATIENT, PLEASE FORWARD INFO VIA FAX TO [facility] @ [phone],(PHONE NUMBER: [phone]) TO RUN BENEFIT VERIFICATION AND NOTIFY THE ABOVE C3 OF THIS PLAN. (FAX FACE SHEET WITH DEMOGRAPHICS AND INSURANCE INFO WITH THIS FORM.)  PLEASE FAX BENEFIT INFO TO: THE [address] -[phone]    This message is intended only for the use of the individual or entity to which it is addressed and may contain information that is privileged, confidential, and exempt from disclosure under applicable law. If the reader of the notice is not intended recipient of the employee/agent responsible for delivering the message to the intended recipient, you are hereby notified than any dissemination, distribution or copying of this communication is strictly prohibited. Please contact the sender for further instructions on handling the information.

## 2023-12-02 LAB
ALBUMIN SERPL-MCNC: 3.3 G/DL (ref 3.5–4.6)
ALP SERPL-CCNC: 90 U/L (ref 35–104)
ALT SERPL-CCNC: 9 U/L (ref 0–41)
ANION GAP SERPL CALCULATED.3IONS-SCNC: 13 MEQ/L (ref 9–15)
AST SERPL-CCNC: 14 U/L (ref 0–40)
BASOPHILS # BLD: 0.1 K/UL (ref 0–0.2)
BASOPHILS NFR BLD: 0.6 %
BILIRUB SERPL-MCNC: 0.4 MG/DL (ref 0.2–0.7)
BUN SERPL-MCNC: 28 MG/DL (ref 8–23)
CALCIUM SERPL-MCNC: 9.2 MG/DL (ref 8.5–9.9)
CHLORIDE SERPL-SCNC: 104 MEQ/L (ref 95–107)
CO2 SERPL-SCNC: 22 MEQ/L (ref 20–31)
CREAT SERPL-MCNC: 1.14 MG/DL (ref 0.7–1.2)
EOSINOPHIL # BLD: 0 K/UL (ref 0–0.7)
EOSINOPHIL NFR BLD: 0.3 %
ERYTHROCYTE [DISTWIDTH] IN BLOOD BY AUTOMATED COUNT: 13 % (ref 11.5–14.5)
GLOBULIN SER CALC-MCNC: 2.8 G/DL (ref 2.3–3.5)
GLUCOSE SERPL-MCNC: 100 MG/DL (ref 70–99)
HCT VFR BLD AUTO: 33.8 % (ref 42–52)
HGB BLD-MCNC: 11.2 G/DL (ref 14–18)
LYMPHOCYTES # BLD: 2 K/UL (ref 1–4.8)
LYMPHOCYTES NFR BLD: 13.5 %
MAGNESIUM SERPL-MCNC: 1.9 MG/DL (ref 1.7–2.4)
MCH RBC QN AUTO: 32.9 PG (ref 27–31.3)
MCHC RBC AUTO-ENTMCNC: 33.1 % (ref 33–37)
MCV RBC AUTO: 99.4 FL (ref 79–92.2)
MONOCYTES # BLD: 1.3 K/UL (ref 0.2–0.8)
MONOCYTES NFR BLD: 8.8 %
NEUTROPHILS # BLD: 11.3 K/UL (ref 1.4–6.5)
NEUTS SEG NFR BLD: 76.2 %
PLATELET # BLD AUTO: 271 K/UL (ref 130–400)
POTASSIUM SERPL-SCNC: 4.3 MEQ/L (ref 3.4–4.9)
PROT SERPL-MCNC: 6.1 G/DL (ref 6.3–8)
RBC # BLD AUTO: 3.4 M/UL (ref 4.7–6.1)
SODIUM SERPL-SCNC: 139 MEQ/L (ref 135–144)
WBC # BLD AUTO: 14.8 K/UL (ref 4.8–10.8)

## 2023-12-02 PROCEDURE — 36415 COLL VENOUS BLD VENIPUNCTURE: CPT

## 2023-12-02 PROCEDURE — 94640 AIRWAY INHALATION TREATMENT: CPT

## 2023-12-02 PROCEDURE — 83735 ASSAY OF MAGNESIUM: CPT

## 2023-12-02 PROCEDURE — 2580000003 HC RX 258: Performed by: NURSE PRACTITIONER

## 2023-12-02 PROCEDURE — 85025 COMPLETE CBC W/AUTO DIFF WBC: CPT

## 2023-12-02 PROCEDURE — 80053 COMPREHEN METABOLIC PANEL: CPT

## 2023-12-02 PROCEDURE — 1210000000 HC MED SURG R&B

## 2023-12-02 PROCEDURE — 6370000000 HC RX 637 (ALT 250 FOR IP): Performed by: NURSE PRACTITIONER

## 2023-12-02 PROCEDURE — 6360000002 HC RX W HCPCS: Performed by: INTERNAL MEDICINE

## 2023-12-02 PROCEDURE — 94761 N-INVAS EAR/PLS OXIMETRY MLT: CPT

## 2023-12-02 PROCEDURE — 6360000002 HC RX W HCPCS: Performed by: STUDENT IN AN ORGANIZED HEALTH CARE EDUCATION/TRAINING PROGRAM

## 2023-12-02 PROCEDURE — 2580000003 HC RX 258: Performed by: INTERNAL MEDICINE

## 2023-12-02 RX ADMIN — ENOXAPARIN SODIUM 40 MG: 100 INJECTION SUBCUTANEOUS at 08:21

## 2023-12-02 RX ADMIN — Medication 10 ML: at 21:43

## 2023-12-02 RX ADMIN — ISOSORBIDE MONONITRATE 30 MG: 30 TABLET, EXTENDED RELEASE ORAL at 08:18

## 2023-12-02 RX ADMIN — PANTOPRAZOLE SODIUM 40 MG: 40 TABLET, DELAYED RELEASE ORAL at 07:08

## 2023-12-02 RX ADMIN — CEFAZOLIN 2000 MG: 2 INJECTION, POWDER, FOR SOLUTION INTRAMUSCULAR; INTRAVENOUS at 08:27

## 2023-12-02 RX ADMIN — Medication 10 ML: at 08:37

## 2023-12-02 RX ADMIN — BUDESONIDE AND FORMOTEROL FUMARATE DIHYDRATE 2 PUFF: 160; 4.5 AEROSOL RESPIRATORY (INHALATION) at 07:15

## 2023-12-02 RX ADMIN — METOPROLOL TARTRATE 25 MG: 25 TABLET, FILM COATED ORAL at 21:35

## 2023-12-02 RX ADMIN — CEFAZOLIN 2000 MG: 2 INJECTION, POWDER, FOR SOLUTION INTRAMUSCULAR; INTRAVENOUS at 15:45

## 2023-12-02 RX ADMIN — CLOPIDOGREL BISULFATE 75 MG: 75 TABLET, FILM COATED ORAL at 08:19

## 2023-12-02 RX ADMIN — OXYCODONE HYDROCHLORIDE AND ACETAMINOPHEN 1 TABLET: 7.5; 325 TABLET ORAL at 21:34

## 2023-12-02 RX ADMIN — CEFAZOLIN 2000 MG: 2 INJECTION, POWDER, FOR SOLUTION INTRAMUSCULAR; INTRAVENOUS at 01:12

## 2023-12-02 RX ADMIN — Medication 20 ML: at 17:56

## 2023-12-02 RX ADMIN — TIOTROPIUM BROMIDE INHALATION SPRAY 2 PUFF: 3.12 SPRAY, METERED RESPIRATORY (INHALATION) at 07:15

## 2023-12-02 RX ADMIN — ATORVASTATIN CALCIUM 20 MG: 20 TABLET, FILM COATED ORAL at 21:33

## 2023-12-02 RX ADMIN — BUDESONIDE AND FORMOTEROL FUMARATE DIHYDRATE 2 PUFF: 160; 4.5 AEROSOL RESPIRATORY (INHALATION) at 19:38

## 2023-12-02 RX ADMIN — CITALOPRAM HYDROBROMIDE 40 MG: 20 TABLET ORAL at 08:17

## 2023-12-02 RX ADMIN — METOPROLOL TARTRATE 25 MG: 25 TABLET, FILM COATED ORAL at 08:18

## 2023-12-02 RX ADMIN — VANCOMYCIN HYDROCHLORIDE 1000 MG: 1 INJECTION, POWDER, LYOPHILIZED, FOR SOLUTION INTRAVENOUS at 17:45

## 2023-12-02 ASSESSMENT — PAIN DESCRIPTION - LOCATION: LOCATION: SHOULDER

## 2023-12-02 ASSESSMENT — PAIN SCALES - WONG BAKER
WONGBAKER_NUMERICALRESPONSE: 0

## 2023-12-02 ASSESSMENT — PAIN DESCRIPTION - ORIENTATION: ORIENTATION: RIGHT

## 2023-12-02 ASSESSMENT — PAIN SCALES - GENERAL: PAINLEVEL_OUTOF10: 4

## 2023-12-02 NOTE — CONSULTS
PODIATRIC MEDICINE AND SURGERY  CONSULT HISTORY AND PHYSICAL    Consulting Service:  Medicine  Requesting Provider: Sidra Fowler  Opinion/advice regarding: LLE OM  Staff Doctor:  Shaw Herring    ASSESSMENT:  78 y.o. male with PMH significant for chronic OM of LLE on doxycycline suppression per ID admitted for KEILY on CKD with WBC of 14.8 with no open wound to LLE without acute sign of infection. PLAN AND RECOMMENDATIONS[de-identified]  Patient's case to be discussed with staff, Dr. Shaw Herring, who will provide final recommendations going forward  Wound care: Betadine paint and bandage to LLE. Change daily. Nursing orders placed. WBAT to LLE  Reviewed prior XR, NM BONE SCAN, ART DUPLEX  Antibiotic coverage per primary/ID   Pain management per primary team   Patient will need follow up with Dr. Shaw Herring within 7-10 days of discharge      HPI: This 78y.o. year old male was seen today for concern of LLE wound. Patient states that he is not sure why he is in the hospital. States he has known OM to LLE, is being treated by Dr. Shaw Herring for wound care and sees ID with current Doxycycline suppression for + MRSA cultures. Patient relates no current pain to LLE. Has been compliant with wound care and relates no acute changes. Patient admits to nausea at home prior to admission but denies vomiting, diarrhea, fevers, chills, chest pain, shortness of breath, head ache, or calf pain. No other pedal complaints.      Past Medical History:   Diagnosis Date    Alcohol abuse     quit drinking 8/18/21    CAD (coronary artery disease)     patient states no doctor has told him this / has 1 cardiac stent    Cancer ([address])     lung LLL    Chronic back pain     Chronic kidney disease     Chronic obstructive pulmonary disease, unspecified COPD type ([address]) 3/24/2022    Chronic sinusitis     DJD (degenerative joint disease) of knee     left knee DJD    Elevated PSA     History of blood transfusion 1980's    History of inferior wall myocardial infarction 01/2016    1

## 2023-12-03 LAB
ALBUMIN SERPL-MCNC: 3.3 G/DL (ref 3.5–4.6)
ALP SERPL-CCNC: 88 U/L (ref 35–104)
ALT SERPL-CCNC: <5 U/L (ref 0–41)
ANION GAP SERPL CALCULATED.3IONS-SCNC: 10 MEQ/L (ref 9–15)
AST SERPL-CCNC: 13 U/L (ref 0–40)
BASOPHILS # BLD: 0.1 K/UL (ref 0–0.2)
BASOPHILS NFR BLD: 0.6 %
BILIRUB SERPL-MCNC: 0.3 MG/DL (ref 0.2–0.7)
BUN SERPL-MCNC: 24 MG/DL (ref 8–23)
CALCIUM SERPL-MCNC: 9.1 MG/DL (ref 8.5–9.9)
CHLORIDE SERPL-SCNC: 106 MEQ/L (ref 95–107)
CO2 SERPL-SCNC: 24 MEQ/L (ref 20–31)
CREAT SERPL-MCNC: 0.99 MG/DL (ref 0.7–1.2)
EOSINOPHIL # BLD: 0.1 K/UL (ref 0–0.7)
EOSINOPHIL NFR BLD: 0.8 %
ERYTHROCYTE [DISTWIDTH] IN BLOOD BY AUTOMATED COUNT: 13.1 % (ref 11.5–14.5)
GLOBULIN SER CALC-MCNC: 2.8 G/DL (ref 2.3–3.5)
GLUCOSE SERPL-MCNC: 99 MG/DL (ref 70–99)
HCT VFR BLD AUTO: 32.1 % (ref 42–52)
HGB BLD-MCNC: 10.7 G/DL (ref 14–18)
LYMPHOCYTES # BLD: 1.8 K/UL (ref 1–4.8)
LYMPHOCYTES NFR BLD: 12.8 %
MAGNESIUM SERPL-MCNC: 1.9 MG/DL (ref 1.7–2.4)
MCH RBC QN AUTO: 33.2 PG (ref 27–31.3)
MCHC RBC AUTO-ENTMCNC: 33.3 % (ref 33–37)
MCV RBC AUTO: 99.7 FL (ref 79–92.2)
MONOCYTES # BLD: 1.2 K/UL (ref 0.2–0.8)
MONOCYTES NFR BLD: 8.1 %
NEUTROPHILS # BLD: 11.1 K/UL (ref 1.4–6.5)
NEUTS SEG NFR BLD: 77.1 %
PLATELET # BLD AUTO: 281 K/UL (ref 130–400)
POTASSIUM SERPL-SCNC: 3.8 MEQ/L (ref 3.4–4.9)
PROT SERPL-MCNC: 6.1 G/DL (ref 6.3–8)
RBC # BLD AUTO: 3.22 M/UL (ref 4.7–6.1)
SODIUM SERPL-SCNC: 140 MEQ/L (ref 135–144)
VANCOMYCIN SERPL-MCNC: 13.6 UG/ML (ref 10–40)
WBC # BLD AUTO: 14.4 K/UL (ref 4.8–10.8)

## 2023-12-03 PROCEDURE — 80053 COMPREHEN METABOLIC PANEL: CPT

## 2023-12-03 PROCEDURE — 2580000003 HC RX 258: Performed by: NURSE PRACTITIONER

## 2023-12-03 PROCEDURE — 36415 COLL VENOUS BLD VENIPUNCTURE: CPT

## 2023-12-03 PROCEDURE — 6370000000 HC RX 637 (ALT 250 FOR IP): Performed by: NURSE PRACTITIONER

## 2023-12-03 PROCEDURE — 83735 ASSAY OF MAGNESIUM: CPT

## 2023-12-03 PROCEDURE — 2580000003 HC RX 258: Performed by: INTERNAL MEDICINE

## 2023-12-03 PROCEDURE — 1210000000 HC MED SURG R&B

## 2023-12-03 PROCEDURE — 94640 AIRWAY INHALATION TREATMENT: CPT

## 2023-12-03 PROCEDURE — 6360000002 HC RX W HCPCS: Performed by: INTERNAL MEDICINE

## 2023-12-03 PROCEDURE — 85025 COMPLETE CBC W/AUTO DIFF WBC: CPT

## 2023-12-03 PROCEDURE — 94761 N-INVAS EAR/PLS OXIMETRY MLT: CPT

## 2023-12-03 PROCEDURE — 80202 ASSAY OF VANCOMYCIN: CPT

## 2023-12-03 RX ADMIN — CEFAZOLIN 2000 MG: 2 INJECTION, POWDER, FOR SOLUTION INTRAMUSCULAR; INTRAVENOUS at 02:27

## 2023-12-03 RX ADMIN — OXYCODONE HYDROCHLORIDE AND ACETAMINOPHEN 1 TABLET: 7.5; 325 TABLET ORAL at 13:53

## 2023-12-03 RX ADMIN — CITALOPRAM HYDROBROMIDE 40 MG: 20 TABLET ORAL at 08:43

## 2023-12-03 RX ADMIN — PANTOPRAZOLE SODIUM 40 MG: 40 TABLET, DELAYED RELEASE ORAL at 06:41

## 2023-12-03 RX ADMIN — Medication 10 ML: at 08:49

## 2023-12-03 RX ADMIN — BUDESONIDE AND FORMOTEROL FUMARATE DIHYDRATE 2 PUFF: 160; 4.5 AEROSOL RESPIRATORY (INHALATION) at 19:58

## 2023-12-03 RX ADMIN — METOPROLOL TARTRATE 25 MG: 25 TABLET, FILM COATED ORAL at 08:43

## 2023-12-03 RX ADMIN — METOPROLOL TARTRATE 25 MG: 25 TABLET, FILM COATED ORAL at 20:58

## 2023-12-03 RX ADMIN — ATORVASTATIN CALCIUM 20 MG: 20 TABLET, FILM COATED ORAL at 20:58

## 2023-12-03 RX ADMIN — BUDESONIDE AND FORMOTEROL FUMARATE DIHYDRATE 2 PUFF: 160; 4.5 AEROSOL RESPIRATORY (INHALATION) at 06:59

## 2023-12-03 RX ADMIN — OXYCODONE HYDROCHLORIDE AND ACETAMINOPHEN 1 TABLET: 7.5; 325 TABLET ORAL at 21:03

## 2023-12-03 RX ADMIN — CEFAZOLIN 2000 MG: 2 INJECTION, POWDER, FOR SOLUTION INTRAMUSCULAR; INTRAVENOUS at 15:55

## 2023-12-03 RX ADMIN — ISOSORBIDE MONONITRATE 30 MG: 30 TABLET, EXTENDED RELEASE ORAL at 08:42

## 2023-12-03 RX ADMIN — Medication 10 ML: at 20:58

## 2023-12-03 RX ADMIN — TIOTROPIUM BROMIDE INHALATION SPRAY 2 PUFF: 3.12 SPRAY, METERED RESPIRATORY (INHALATION) at 06:59

## 2023-12-03 RX ADMIN — CLOPIDOGREL BISULFATE 75 MG: 75 TABLET, FILM COATED ORAL at 08:43

## 2023-12-03 RX ADMIN — CEFAZOLIN 2000 MG: 2 INJECTION, POWDER, FOR SOLUTION INTRAMUSCULAR; INTRAVENOUS at 08:49

## 2023-12-03 RX ADMIN — VANCOMYCIN HYDROCHLORIDE 1000 MG: 1 INJECTION, POWDER, LYOPHILIZED, FOR SOLUTION INTRAVENOUS at 17:55

## 2023-12-03 ASSESSMENT — PAIN DESCRIPTION - LOCATION: LOCATION: SHOULDER

## 2023-12-03 ASSESSMENT — PAIN SCALES - GENERAL
PAINLEVEL_OUTOF10: 4

## 2023-12-03 ASSESSMENT — PAIN DESCRIPTION - ORIENTATION: ORIENTATION: RIGHT

## 2023-12-03 ASSESSMENT — PAIN - FUNCTIONAL ASSESSMENT: PAIN_FUNCTIONAL_ASSESSMENT: ACTIVITIES ARE NOT PREVENTED

## 2023-12-03 ASSESSMENT — PAIN DESCRIPTION - DESCRIPTORS: DESCRIPTORS: ACHING;SORE

## 2023-12-03 NOTE — CONSULTS
Infectious Disease     Patient Name: Javier Nye  Date: 12/3/2023  YOB: 1945  Medical Record Number: [id number]        Chief Complaint   Patient presents with    Generalized Body Aches     Weakness times 2 weeks           History of Present Illness: We are asked to evaluate for  hx of osteomyelitis   Patient presented various complaints. Weakness, fatigue, some abd cramping with eating, ear pain. Chronic wound lle seen by our service as outpatient . On doxycycline suppression MRSA lLE ankle osteomyelitis. Various infections prior . Wound closing according to patient . He had cerumen impaction and removal. Says ear pain is better now. No fevers here or at home. WBCS up . Found to have KEILY. Feels better now. There was some concern of soft tissue infection LLE, but patient unclear if things had been worse.        Review of Systems: All other ROS reviewed and are negative other than as stated in HPI            Social History     Tobacco Use    Smoking status: Every Day     Packs/day: 0.00     Years: 60.00     Additional pack years: 0.00     Total pack years: 0.00     Types: Cigarettes     Last attempt to quit: 2021     Years since quittin.9     Passive exposure: Current    Smokeless tobacco: Never    Tobacco comments:      smokes 2 cigars a day   Vaping Use    Vaping Use: Never used   Substance Use Topics    Alcohol use: Not Currently     Alcohol/week: 12.0 standard drinks of alcohol     Types: 12 Shots of liquor per week     Comment: socail drinking    Drug use: No         Past Medical History:   Diagnosis Date    Alcohol abuse     quit drinking 21    CAD (coronary artery disease)     patient states no doctor has told him this / has 1 cardiac stent    Cancer ([address])     lung LLL    Chronic back pain     Chronic kidney disease     Chronic obstructive pulmonary disease, unspecified COPD type ([address]) 3/24/2022    Chronic sinusitis     DJD (degenerative joint disease) of knee     left knee DJD

## 2023-12-04 VITALS
BODY MASS INDEX: 20.88 KG/M2 | TEMPERATURE: 97.9 F | HEIGHT: 66 IN | RESPIRATION RATE: 16 BRPM | HEART RATE: 91 BPM | DIASTOLIC BLOOD PRESSURE: 114 MMHG | OXYGEN SATURATION: 97 % | SYSTOLIC BLOOD PRESSURE: 135 MMHG | WEIGHT: 129.9 LBS

## 2023-12-04 LAB
ALBUMIN SERPL-MCNC: 3.1 G/DL (ref 3.5–4.6)
ALP SERPL-CCNC: 91 U/L (ref 35–104)
ALT SERPL-CCNC: <5 U/L (ref 0–41)
ANION GAP SERPL CALCULATED.3IONS-SCNC: 6 MEQ/L (ref 9–15)
AST SERPL-CCNC: 11 U/L (ref 0–40)
BASOPHILS # BLD: 0.1 K/UL (ref 0–0.2)
BASOPHILS NFR BLD: 0.5 %
BILIRUB SERPL-MCNC: 0.3 MG/DL (ref 0.2–0.7)
BUN SERPL-MCNC: 23 MG/DL (ref 8–23)
CALCIUM SERPL-MCNC: 9 MG/DL (ref 8.5–9.9)
CHLORIDE SERPL-SCNC: 105 MEQ/L (ref 95–107)
CO2 SERPL-SCNC: 26 MEQ/L (ref 20–31)
CREAT SERPL-MCNC: 0.93 MG/DL (ref 0.7–1.2)
EOSINOPHIL # BLD: 0.2 K/UL (ref 0–0.7)
EOSINOPHIL NFR BLD: 0.9 %
ERYTHROCYTE [DISTWIDTH] IN BLOOD BY AUTOMATED COUNT: 13.1 % (ref 11.5–14.5)
GLOBULIN SER CALC-MCNC: 2.7 G/DL (ref 2.3–3.5)
GLUCOSE BLD-MCNC: 107 MG/DL (ref 70–99)
GLUCOSE BLD-MCNC: 99 MG/DL (ref 70–99)
GLUCOSE SERPL-MCNC: 102 MG/DL (ref 70–99)
HCT VFR BLD AUTO: 31.7 % (ref 42–52)
HGB BLD-MCNC: 10.4 G/DL (ref 14–18)
LYMPHOCYTES # BLD: 1.7 K/UL (ref 1–4.8)
LYMPHOCYTES NFR BLD: 8.6 %
MAGNESIUM SERPL-MCNC: 1.7 MG/DL (ref 1.7–2.4)
MCH RBC QN AUTO: 32.9 PG (ref 27–31.3)
MCHC RBC AUTO-ENTMCNC: 32.8 % (ref 33–37)
MCV RBC AUTO: 100.3 FL (ref 79–92.2)
MONOCYTES # BLD: 1.5 K/UL (ref 0.2–0.8)
MONOCYTES NFR BLD: 7.8 %
NEUTROPHILS # BLD: 15.5 K/UL (ref 1.4–6.5)
NEUTS SEG NFR BLD: 81.6 %
PERFORMED ON: ABNORMAL
PERFORMED ON: NORMAL
PLATELET # BLD AUTO: 274 K/UL (ref 130–400)
POTASSIUM SERPL-SCNC: 4 MEQ/L (ref 3.4–4.9)
PROT SERPL-MCNC: 5.8 G/DL (ref 6.3–8)
RBC # BLD AUTO: 3.16 M/UL (ref 4.7–6.1)
SODIUM SERPL-SCNC: 137 MEQ/L (ref 135–144)
WBC # BLD AUTO: 19.1 K/UL (ref 4.8–10.8)

## 2023-12-04 PROCEDURE — 6360000002 HC RX W HCPCS: Performed by: STUDENT IN AN ORGANIZED HEALTH CARE EDUCATION/TRAINING PROGRAM

## 2023-12-04 PROCEDURE — 6360000002 HC RX W HCPCS: Performed by: INTERNAL MEDICINE

## 2023-12-04 PROCEDURE — 80053 COMPREHEN METABOLIC PANEL: CPT

## 2023-12-04 PROCEDURE — 36415 COLL VENOUS BLD VENIPUNCTURE: CPT

## 2023-12-04 PROCEDURE — 94640 AIRWAY INHALATION TREATMENT: CPT

## 2023-12-04 PROCEDURE — 85025 COMPLETE CBC W/AUTO DIFF WBC: CPT

## 2023-12-04 PROCEDURE — 2580000003 HC RX 258: Performed by: INTERNAL MEDICINE

## 2023-12-04 PROCEDURE — 83735 ASSAY OF MAGNESIUM: CPT

## 2023-12-04 PROCEDURE — 6370000000 HC RX 637 (ALT 250 FOR IP): Performed by: NURSE PRACTITIONER

## 2023-12-04 PROCEDURE — 2580000003 HC RX 258: Performed by: NURSE PRACTITIONER

## 2023-12-04 PROCEDURE — 94762 N-INVAS EAR/PLS OXIMTRY CONT: CPT

## 2023-12-04 RX ORDER — CEPHALEXIN 250 MG/1
250 CAPSULE ORAL 4 TIMES DAILY
Qty: 20 CAPSULE | Refills: 0 | Status: SHIPPED | OUTPATIENT
Start: 2023-12-04 | End: 2023-12-09

## 2023-12-04 RX ADMIN — ISOSORBIDE MONONITRATE 30 MG: 30 TABLET, EXTENDED RELEASE ORAL at 08:37

## 2023-12-04 RX ADMIN — TIOTROPIUM BROMIDE INHALATION SPRAY 2 PUFF: 3.12 SPRAY, METERED RESPIRATORY (INHALATION) at 07:18

## 2023-12-04 RX ADMIN — METOPROLOL TARTRATE 25 MG: 25 TABLET, FILM COATED ORAL at 08:38

## 2023-12-04 RX ADMIN — CEFAZOLIN 2000 MG: 2 INJECTION, POWDER, FOR SOLUTION INTRAMUSCULAR; INTRAVENOUS at 08:32

## 2023-12-04 RX ADMIN — BUDESONIDE AND FORMOTEROL FUMARATE DIHYDRATE 2 PUFF: 160; 4.5 AEROSOL RESPIRATORY (INHALATION) at 07:18

## 2023-12-04 RX ADMIN — CEFAZOLIN 2000 MG: 2 INJECTION, POWDER, FOR SOLUTION INTRAMUSCULAR; INTRAVENOUS at 01:03

## 2023-12-04 RX ADMIN — CITALOPRAM HYDROBROMIDE 40 MG: 20 TABLET ORAL at 08:36

## 2023-12-04 RX ADMIN — CLOPIDOGREL BISULFATE 75 MG: 75 TABLET, FILM COATED ORAL at 08:35

## 2023-12-04 RX ADMIN — PANTOPRAZOLE SODIUM 40 MG: 40 TABLET, DELAYED RELEASE ORAL at 06:05

## 2023-12-04 RX ADMIN — Medication 5 ML: at 08:37

## 2023-12-04 ASSESSMENT — PAIN SCALES - GENERAL
PAINLEVEL_OUTOF10: 2
PAINLEVEL_OUTOF10: 3

## 2023-12-04 NOTE — CARE COORDINATION
MET W/PT TO ASSESS NEEDS AND DISCUSS DISCHARGE PLAN WHICH IS HOME ALONE AND WOULD LIKE TO [facility]. JENNIFER ORDER NEEDED. IV CHECK SENT AND ABX IS COVERED % IF IV ABX NEEDED. WILL FOLLOW FOR NEEDS. PT STATES HIS NEIGHBOR IS SUPPORTIVE AND ABLE TO ASSIST IF NEEDED. SPOKE W/DELORES FROM [facility] WHO INFORMED PT IS CURRENT WITH SKILLED NURSING C. WILL NEED JENNIFER ORDER FAXED [phone]. WILL FOLLOW. 1220- FAXED JENNIFER [address]. [address] FROM [facility] THAT THEY RECEIVED JENNIFER HHC ORDERS AND THEY WILL REACH OUT TO PT TO SET UP SN VISIT.

## 2023-12-04 NOTE — PLAN OF CARE
Problem: Discharge Planning  Goal: Discharge to home or other facility with appropriate resources  Outcome: Completed  Flowsheets (Taken 12/4/2023 1223)  Discharge to home or other facility with appropriate resources:   Identify barriers to discharge with patient and caregiver   Identify discharge learning needs (meds, wound care, etc)   Refer to discharge planning if patient needs post-hospital services based on physician order or complex needs related to functional status, cognitive ability or social support system   Arrange for needed discharge resources and transportation as appropriate     Problem: Pain  Goal: Verbalizes/displays adequate comfort level or baseline comfort level  Outcome: Completed  Flowsheets (Taken 12/4/2023 1223)  Verbalizes/displays adequate comfort level or baseline comfort level:   Encourage patient to monitor pain and request assistance   Administer analgesics based on type and severity of pain and evaluate response   Consider cultural and social influences on pain and pain management   Assess pain using appropriate pain scale   Implement non-pharmacological measures as appropriate and evaluate response     Problem: Safety - Adult  Goal: Free from fall injury  Outcome: Completed  Flowsheets (Taken 12/4/2023 1223)  Free From Fall Injury: Instruct family/caregiver on patient safety     Problem: Nutrition Deficit:  Goal: Optimize nutritional status  Outcome: Completed  Flowsheets (Taken 11/30/2023 1229 by Tanmay Anderson RD, LD)  Nutrient intake appropriate for improving, restoring, or maintaining nutritional needs:   Assess nutritional status and recommend course of action   Monitor oral intake, labs, and treatment plans   Recommend appropriate diets, oral nutritional supplements, and vitamin/mineral supplements     Problem: Skin/Tissue Integrity  Goal: Absence of new skin breakdown  Description: 1. Monitor for areas of redness and/or skin breakdown  2.   Assess vascular access sites

## 2023-12-04 NOTE — DISCHARGE INSTR - COC
Continuity of Care Form    Patient Name: Juan Booker   :  1945  MRN:  [id number]    Admit date:  2023  Discharge date:  23    Code Status Order: Full Code   Advance Directives:     Admitting Physician:  Elizabeth Cadet DO  PCP: Ania Ash MD    Discharging Nurse: [facility] Unit/Room#: A282/E342-42  Discharging Unit Phone Number: [phone]    Emergency Contact:   Extended Emergency Contact Information  Primary Emergency Contact: Reshma Crowder [facility] of [address] Phone: [phone]  Work Phone: [phone]  Mobile Phone: [phone]  Relation: Brother/Sister   needed? No  Secondary Emergency Contact: Laurence Artd  Home Phone: [phone]  Mobile Phone: [phone]  Relation: Child  Preferred language: English   needed?  No    Past Surgical History:  Past Surgical History:   Procedure Laterality Date    ABDOMEN SURGERY      spleenectomy due to [address] 2009    lumbar disc OR    CARDIAC SURGERY      stents    CARPAL TUNNEL RELEASE Right 2019    RIGHT CARPAL TUNNEL RELEASE performed by Bonny Becker MD at [facility]  2016    EYE SURGERY      to have Phaco with IOL OU 2018    HERNIA REPAIR      JOINT REPLACEMENT Left     LTHR    JOINT REPLACEMENT Right     RTKR    KNEE SURGERY Right     arthroscopy    HI MANIPULATION KNEE JOINT UNDER GENERAL ANESTHESIA Right 2017    RIGHT KNEE MANIPULATION UNDER ANESTHESIA performed by Serg Henriquez MD at [facility] OFFICE/OUTPT [address] Bilateral 2017    RIGHT AND BILATERAL L 4-5 LYSIS OF SCAR DISKECTOMY INTERBODY CAGE FUSION POSTEROLATERAL FUSION PEDICLE SCREWS performed by Bonny Becker MD at [address] [address]    benign    [address] Right 3/24/2017    RIGHT  KNEE TOTAL ARTHROPLASTY, ELHAM CEMENTED PERSONA  performed by Serg Henriquez MD

## 2023-12-05 ENCOUNTER — TELEPHONE (OUTPATIENT)
Dept: FAMILY MEDICINE CLINIC | Age: 78
End: 2023-12-05

## 2023-12-05 NOTE — TELEPHONE ENCOUNTER
Care Transitions Initial Follow Up Call    Outreach made within 2 business days of discharge: Yes    Patient: Bryan Tyler Patient : 1945   MRN: [id number]  Reason for Admission: There are no discharge diagnoses documented for the most recent discharge.   Discharge Date: 23      Spoke with: [id number]    Discharge department/facility: Elie Rodriguez      Follow Up  Future Appointments   Date Time Provider [facility] [address]   2023  2:30 PM Yesy Salvador MD [address] [facility] [place]   2023  3:15 PM [facility]   2024  3:15 PM Melinda Allen MD [facility]   3/12/2024  1:45 PM Alexis Corbin DO [address] [place] [address]   2024  1:30 PM Lou Finch DO [facility] AT [facility], [address] [place] [address]

## 2023-12-11 PROBLEM — G63 POLYNEUROPATHY ASSOCIATED WITH UNDERLYING DISEASE (HCC): Status: ACTIVE | Noted: 2023-12-11

## 2023-12-29 ENCOUNTER — TELEPHONE (OUTPATIENT)
Dept: FAMILY MEDICINE CLINIC | Age: 78
End: 2023-12-29

## 2023-12-29 NOTE — TELEPHONE ENCOUNTER
Pt has fallen 3 times in last week lost footing and fell  Couple abrasions on elbow and hand   Pt feels weaker and more tired he is generally more tired nauseous and no appetite       Fax any orders to [address]

## 2024-01-01 ENCOUNTER — PREP FOR PROCEDURE (OUTPATIENT)
Dept: PULMONOLOGY | Age: 79
End: 2024-01-01

## 2024-01-03 ENCOUNTER — OFFICE VISIT (OUTPATIENT)
Dept: FAMILY MEDICINE CLINIC | Age: 79
End: 2024-01-03
Payer: MEDICARE

## 2024-01-03 VITALS
BODY MASS INDEX: 20.93 KG/M2 | HEART RATE: 86 BPM | WEIGHT: 130.2 LBS | TEMPERATURE: 97.6 F | DIASTOLIC BLOOD PRESSURE: 78 MMHG | HEIGHT: 66 IN | SYSTOLIC BLOOD PRESSURE: 110 MMHG | OXYGEN SATURATION: 100 %

## 2024-01-03 DIAGNOSIS — N17.9 ACUTE RENAL FAILURE SUPERIMPOSED ON STAGE 3A CHRONIC KIDNEY DISEASE, UNSPECIFIED ACUTE RENAL FAILURE TYPE (HCC): ICD-10-CM

## 2024-01-03 DIAGNOSIS — E87.1 HYPONATREMIA: ICD-10-CM

## 2024-01-03 DIAGNOSIS — N18.31 ACUTE RENAL FAILURE SUPERIMPOSED ON STAGE 3A CHRONIC KIDNEY DISEASE, UNSPECIFIED ACUTE RENAL FAILURE TYPE (HCC): ICD-10-CM

## 2024-01-03 DIAGNOSIS — Z09 HOSPITAL DISCHARGE FOLLOW-UP: Primary | ICD-10-CM

## 2024-01-03 DIAGNOSIS — R26.0 ATAXIC GAIT: ICD-10-CM

## 2024-01-03 DIAGNOSIS — G63 POLYNEUROPATHY ASSOCIATED WITH UNDERLYING DISEASE (HCC): ICD-10-CM

## 2024-01-03 PROCEDURE — 1123F ACP DISCUSS/DSCN MKR DOCD: CPT | Performed by: FAMILY MEDICINE

## 2024-01-03 PROCEDURE — 3074F SYST BP LT 130 MM HG: CPT | Performed by: FAMILY MEDICINE

## 2024-01-03 PROCEDURE — 1111F DSCHRG MED/CURRENT MED MERGE: CPT | Performed by: FAMILY MEDICINE

## 2024-01-03 PROCEDURE — G8420 CALC BMI NORM PARAMETERS: HCPCS | Performed by: FAMILY MEDICINE

## 2024-01-03 PROCEDURE — G8427 DOCREV CUR MEDS BY ELIG CLIN: HCPCS | Performed by: FAMILY MEDICINE

## 2024-01-03 PROCEDURE — 4004F PT TOBACCO SCREEN RCVD TLK: CPT | Performed by: FAMILY MEDICINE

## 2024-01-03 PROCEDURE — 99214 OFFICE O/P EST MOD 30 MIN: CPT | Performed by: FAMILY MEDICINE

## 2024-01-03 PROCEDURE — G8484 FLU IMMUNIZE NO ADMIN: HCPCS | Performed by: FAMILY MEDICINE

## 2024-01-03 PROCEDURE — 3078F DIAST BP <80 MM HG: CPT | Performed by: FAMILY MEDICINE

## 2024-01-03 ASSESSMENT — ENCOUNTER SYMPTOMS
SORE THROAT: 0
DIARRHEA: 0
SHORTNESS OF BREATH: 0
RHINORRHEA: 0
COUGH: 0
ABDOMINAL PAIN: 0
EYE PAIN: 0
VOMITING: 0

## 2024-01-03 ASSESSMENT — PATIENT HEALTH QUESTIONNAIRE - PHQ9
SUM OF ALL RESPONSES TO PHQ QUESTIONS 1-9: 2
SUM OF ALL RESPONSES TO PHQ QUESTIONS 1-9: 2
1. LITTLE INTEREST OR PLEASURE IN DOING THINGS: 1
SUM OF ALL RESPONSES TO PHQ QUESTIONS 1-9: 2
2. FEELING DOWN, DEPRESSED OR HOPELESS: 1
SUM OF ALL RESPONSES TO PHQ QUESTIONS 1-9: 2
SUM OF ALL RESPONSES TO PHQ9 QUESTIONS 1 & 2: 2

## 2024-01-03 NOTE — PROGRESS NOTES
quadrant abdominal pain    Gallbladder polyp    Biliary dyskinesia    Carpal tunnel syndrome on right    Carpal tunnel syndrome on left    Angina effort    Dyslipidemia    FELDER (dyspnea on exertion)    CAD (coronary artery disease)    Chest pain    Lung nodule seen on imaging study    Bilateral carotid artery stenosis    Essential hypertension    NSCLC of left lung ([facility])    Ataxic gait    Dizziness    Fracture, Colles, right, closed    Heroin abuse (HCC)    Non-pressure chronic ulcer of left ankle with fat layer exposed ([facility])    Atherosclerosis of native arteries of extremities with rest pain, left leg ([facility])    Subacute osteomyelitis, left ankle and foot ([facility])    Osteomyelitis of ankle ([facility])    Hyponatremia    Frequent falls    Stage 3 chronic kidney disease, unspecified whether stage 3a or 3b CKD ([facility])    Fracture of right humerus    Hyperkalemia    Leukocytosis    Anemia    Acute hematogenous osteomyelitis, left ankle and foot ([facility])    Traumatic hematoma of right shoulder    Head injury    Humeral head fracture, right, closed, initial encounter    Pelvic hematoma, male, after a fall    Fall    Tremor of unknown origin    Sundowning    Acute pain due to trauma    Disequilibrium    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, right    Chronic obstructive pulmonary disease, unspecified COPD type ([facility])    Pain of left calf    Edema of left lower leg    Acute osteomyelitis of left ankle or foot ([facility])    'light-for-dates' infant with signs of fetal malnutrition    MRSA (methicillin resistant staph aureus) culture positive    Benign prostatic hyperplasia without lower urinary tract symptoms    Chronic sinusitis, unspecified    Constipation, unspecified    Difficulty in walking, not elsewhere classified    Functional dyspepsia    Heart failure, unspecified ([facility])    Hyperlipidemia, unspecified    Hypertensive heart and chronic kidney disease with heart failure and stage 1 through stage 4 chronic kidney disease,

## 2024-01-04 ENCOUNTER — TELEPHONE (OUTPATIENT)
Dept: CARDIOLOGY CLINIC | Age: 79
End: 2024-01-04

## 2024-01-09 ENCOUNTER — OFFICE VISIT (OUTPATIENT)
Dept: CARDIOLOGY CLINIC | Age: 79
End: 2024-01-09
Payer: COMMERCIAL

## 2024-01-09 VITALS
WEIGHT: 129 LBS | HEART RATE: 80 BPM | HEIGHT: 66 IN | BODY MASS INDEX: 20.73 KG/M2 | OXYGEN SATURATION: 95 % | DIASTOLIC BLOOD PRESSURE: 78 MMHG | SYSTOLIC BLOOD PRESSURE: 116 MMHG

## 2024-01-09 DIAGNOSIS — E43 SEVERE PROTEIN-CALORIE MALNUTRITION (HCC): ICD-10-CM

## 2024-01-09 DIAGNOSIS — I50.33 ACUTE ON CHRONIC DIASTOLIC CONGESTIVE HEART FAILURE (HCC): ICD-10-CM

## 2024-01-09 DIAGNOSIS — I70.222 ATHEROSCLEROSIS OF NATIVE ARTERIES OF EXTREMITIES WITH REST PAIN, LEFT LEG (HCC): ICD-10-CM

## 2024-01-09 DIAGNOSIS — E78.5 DYSLIPIDEMIA: ICD-10-CM

## 2024-01-09 DIAGNOSIS — L97.322 NON-PRESSURE CHRONIC ULCER OF LEFT ANKLE WITH FAT LAYER EXPOSED (HCC): ICD-10-CM

## 2024-01-09 DIAGNOSIS — I25.119 CORONARY ARTERY DISEASE INVOLVING NATIVE CORONARY ARTERY OF NATIVE HEART WITH ANGINA PECTORIS (HCC): Primary | ICD-10-CM

## 2024-01-09 DIAGNOSIS — I83.023 VENOUS STASIS ULCER OF LEFT ANKLE LIMITED TO BREAKDOWN OF SKIN, UNSPECIFIED WHETHER VARICOSE VEINS PRESENT (HCC): ICD-10-CM

## 2024-01-09 DIAGNOSIS — E78.00 PURE HYPERCHOLESTEROLEMIA: ICD-10-CM

## 2024-01-09 DIAGNOSIS — I21.4 NSTEMI (NON-ST ELEVATED MYOCARDIAL INFARCTION) (HCC): ICD-10-CM

## 2024-01-09 DIAGNOSIS — I65.23 BILATERAL CAROTID ARTERY STENOSIS: ICD-10-CM

## 2024-01-09 DIAGNOSIS — Z98.61 S/P PTCA (PERCUTANEOUS TRANSLUMINAL CORONARY ANGIOPLASTY): ICD-10-CM

## 2024-01-09 DIAGNOSIS — L97.321 VENOUS STASIS ULCER OF LEFT ANKLE LIMITED TO BREAKDOWN OF SKIN, UNSPECIFIED WHETHER VARICOSE VEINS PRESENT (HCC): ICD-10-CM

## 2024-01-09 PROBLEM — E46 PROTEIN CALORIE MALNUTRITION (HCC): Status: ACTIVE | Noted: 2024-01-09

## 2024-01-09 PROCEDURE — 3078F DIAST BP <80 MM HG: CPT | Performed by: INTERNAL MEDICINE

## 2024-01-09 PROCEDURE — 3074F SYST BP LT 130 MM HG: CPT | Performed by: INTERNAL MEDICINE

## 2024-01-09 PROCEDURE — 99214 OFFICE O/P EST MOD 30 MIN: CPT | Performed by: INTERNAL MEDICINE

## 2024-01-09 PROCEDURE — 1123F ACP DISCUSS/DSCN MKR DOCD: CPT | Performed by: INTERNAL MEDICINE

## 2024-01-09 ASSESSMENT — ENCOUNTER SYMPTOMS
BLOOD IN STOOL: 0
GASTROINTESTINAL NEGATIVE: 1
WHEEZING: 0
NAUSEA: 0
SHORTNESS OF BREATH: 1
COUGH: 0
CHEST TIGHTNESS: 0
STRIDOR: 0
EYES NEGATIVE: 1

## 2024-01-09 NOTE — PROGRESS NOTES
osteomyelitis of left ankle or foot (HCC)    'light-for-dates' infant with signs of fetal malnutrition    MRSA (methicillin resistant staph aureus) culture positive    Benign prostatic hyperplasia without lower urinary tract symptoms    Chronic sinusitis, unspecified    Constipation, unspecified    Difficulty in walking, not elsewhere classified    Functional dyspepsia    Heart failure, unspecified (HCC)    Hyperlipidemia, unspecified    Hypertensive heart and chronic kidney disease with heart failure and stage 1 through stage 4 chronic kidney disease, or unspecified chronic kidney disease (HCC)    Hypoglycemia, unspecified    Muscle weakness (generalized)    Other symptoms and signs involving the musculoskeletal system    Presence of right artificial knee joint    Retention of urine, unspecified    Acute kidney injury superimposed on CKD (HCC)    Polyneuropathy associated with underlying disease (HCC)    Venous stasis ulcer of left ankle limited to breakdown of skin, unspecified whether varicose veins present ([facility])    Protein calorie malnutrition       There are no discontinued medications.      Modified Medications    No medications on file       No orders of the defined types were placed in this encounter.      Assessment/Plan:    1. Essential hypertension  Stable- continue meds.   Low salt diet    2. NSTEMI (non-ST elevated myocardial infarction) ([facility])   no angina continue CV meds.     3. Dyslipidemia  Statin - continue same low fat diet. Check labs.     4. FELDER (dyspnea on exertion)  Stop smoking   Possible angina - Imdur made a big difference- if worse let me know. - mhas been stable      5. Rash- did not see Derm. Facial rash gone now. No further occurrence.     6. Lung Nodule - OK to hold Plavix 5 days for Biopsy. - revealed Malignancy. PET negative but pt refusing to have surgery. - f/u Dr. REYNA - finished XRT f/u.   Pt has lost 40LBS.  I am concerned.d  will see Dr. Emilie REZA     7. Wobbly gait- need to use
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
## 2024-01-26 ENCOUNTER — HOSPITAL ENCOUNTER (OUTPATIENT)
Dept: CT IMAGING | Age: 79
End: 2024-01-26
Attending: INTERNAL MEDICINE
Payer: MEDICARE

## 2024-01-26 DIAGNOSIS — C34.32 PRIMARY MALIGNANT NEOPLASM OF BRONCHUS OF LEFT LOWER LOBE (HCC): ICD-10-CM

## 2024-01-26 PROCEDURE — 6360000004 HC RX CONTRAST MEDICATION: Performed by: INTERNAL MEDICINE

## 2024-01-26 PROCEDURE — 74177 CT ABD & PELVIS W/CONTRAST: CPT

## 2024-01-26 PROCEDURE — 71260 CT THORAX DX C+: CPT

## 2024-01-26 RX ADMIN — IOPAMIDOL 20 ML: 612 INJECTION, SOLUTION INTRAVENOUS at 14:40

## 2024-01-26 RX ADMIN — IOPAMIDOL 75 ML: 612 INJECTION, SOLUTION INTRAVENOUS at 14:37

## 2024-02-02 ENCOUNTER — TELEPHONE (OUTPATIENT)
Dept: FAMILY MEDICINE CLINIC | Age: 79
End: 2024-02-02

## 2024-02-02 NOTE — TELEPHONE ENCOUNTER
RED word: low O2 stats - per sister appointment was already scheduled, just wanted to know if it cold be moved to another date - appointment changed. - pt sister aware to go to    Ok per DR Valencia

## 2024-02-05 ENCOUNTER — OFFICE VISIT (OUTPATIENT)
Dept: FAMILY MEDICINE CLINIC | Age: 79
End: 2024-02-05
Payer: MEDICARE

## 2024-02-05 VITALS
BODY MASS INDEX: 21.38 KG/M2 | HEART RATE: 89 BPM | SYSTOLIC BLOOD PRESSURE: 114 MMHG | HEIGHT: 66 IN | WEIGHT: 133 LBS | DIASTOLIC BLOOD PRESSURE: 62 MMHG | OXYGEN SATURATION: 93 %

## 2024-02-05 DIAGNOSIS — J44.1 COPD EXACERBATION (HCC): Primary | ICD-10-CM

## 2024-02-05 DIAGNOSIS — J20.9 ACUTE BRONCHITIS, UNSPECIFIED ORGANISM: ICD-10-CM

## 2024-02-05 DIAGNOSIS — J44.9 CHRONIC OBSTRUCTIVE PULMONARY DISEASE, UNSPECIFIED COPD TYPE (HCC): ICD-10-CM

## 2024-02-05 PROCEDURE — G8427 DOCREV CUR MEDS BY ELIG CLIN: HCPCS | Performed by: FAMILY MEDICINE

## 2024-02-05 PROCEDURE — 3023F SPIROM DOC REV: CPT | Performed by: FAMILY MEDICINE

## 2024-02-05 PROCEDURE — 1123F ACP DISCUSS/DSCN MKR DOCD: CPT | Performed by: FAMILY MEDICINE

## 2024-02-05 PROCEDURE — 3078F DIAST BP <80 MM HG: CPT | Performed by: FAMILY MEDICINE

## 2024-02-05 PROCEDURE — 3074F SYST BP LT 130 MM HG: CPT | Performed by: FAMILY MEDICINE

## 2024-02-05 PROCEDURE — G8420 CALC BMI NORM PARAMETERS: HCPCS | Performed by: FAMILY MEDICINE

## 2024-02-05 PROCEDURE — 99214 OFFICE O/P EST MOD 30 MIN: CPT | Performed by: FAMILY MEDICINE

## 2024-02-05 PROCEDURE — 4004F PT TOBACCO SCREEN RCVD TLK: CPT | Performed by: FAMILY MEDICINE

## 2024-02-05 PROCEDURE — G8484 FLU IMMUNIZE NO ADMIN: HCPCS | Performed by: FAMILY MEDICINE

## 2024-02-05 RX ORDER — PREGABALIN 75 MG/1
75 CAPSULE ORAL 2 TIMES DAILY
COMMUNITY
Start: 2024-01-11

## 2024-02-05 RX ORDER — METHADONE HYDROCHLORIDE 10 MG/1
10 TABLET ORAL EVERY 12 HOURS PRN
COMMUNITY
Start: 2024-01-09

## 2024-02-05 RX ORDER — TRIAMCINOLONE ACETONIDE 0.25 MG/G
OINTMENT TOPICAL 2 TIMES DAILY
COMMUNITY
Start: 2024-01-17

## 2024-02-05 RX ORDER — METHYLPREDNISOLONE 4 MG/1
TABLET ORAL
Qty: 1 KIT | Refills: 0 | Status: SHIPPED | OUTPATIENT
Start: 2024-02-05 | End: 2024-02-11

## 2024-02-05 RX ORDER — AZITHROMYCIN 250 MG/1
250 TABLET, FILM COATED ORAL SEE ADMIN INSTRUCTIONS
Qty: 6 TABLET | Refills: 0 | Status: SHIPPED | OUTPATIENT
Start: 2024-02-05 | End: 2024-02-10

## 2024-02-05 RX ORDER — FLUTICASONE PROPIONATE AND SALMETEROL 232; 14 UG/1; UG/1
1 POWDER, METERED RESPIRATORY (INHALATION) DAILY
Qty: 1 EACH | Refills: 3 | Status: SHIPPED | OUTPATIENT
Start: 2024-02-05

## 2024-02-05 RX ORDER — ALBUTEROL SULFATE 90 UG/1
2 AEROSOL, METERED RESPIRATORY (INHALATION) 2 TIMES DAILY
Qty: 36 G | Refills: 3 | Status: SHIPPED | OUTPATIENT
Start: 2024-02-05

## 2024-02-05 RX ORDER — LUBIPROSTONE 24 UG/1
24 CAPSULE ORAL DAILY
COMMUNITY
Start: 2024-01-25

## 2024-02-05 ASSESSMENT — ENCOUNTER SYMPTOMS
WHEEZING: 1
VOMITING: 0
DIARRHEA: 0
COUGH: 1
EYE REDNESS: 0
NAUSEA: 0
ABDOMINAL PAIN: 0
RHINORRHEA: 1
EYE DISCHARGE: 0
SORE THROAT: 0
SHORTNESS OF BREATH: 1

## 2024-02-05 NOTE — PROGRESS NOTES
[facility] SPECIALTY/PRIMARY CARE  [address], [address]  [facility] [address]  Dept: [phone]  Dept Fax: [phone]  Loc: [phone]     2/5/2024    Visit type: Follow up    Reason for Visit: Shortness of Breath (States that he has been having some SOB x 5 weeks. Has not seen pulm is seeing oncology, Dr. Phan on the 20th.)         Patient: Ayan Art is a 78 y.o. male   HPI: 78-year-old male presents with shortness of breath that has been ongoing for about 5 weeks.  Patient states he does have lung cancer and has a pulmonologist though has not seen him in years.  Patient would like referral for a new pulmonologist.  Patient also reports being ill recently, has had a productive cough and rhinorrhea.        ASSESSMENT/PLAN   1. COPD exacerbation (HCC)  -     Ambulatory referral to Pulmonology  -     Fluticasone-Salmeterol 232-14 MCG/ACT AEPB; Inhale 1 puff into the lungs daily, Disp-1 each, R-3Normal  -     albuterol sulfate HFA (PROVENTIL;VENTOLIN;PROAIR) 108 (90 Base) MCG/ACT inhaler; Inhale 2 puffs into the lungs 2 times daily, Disp-36 g, R-3Normal  -     methylPREDNISolone (MEDROL DOSEPACK) 4 MG tablet; Take by mouth., Disp-1 kit, R-0Normal  -     DME Order for Home Oxygen as OP  2. Chronic obstructive pulmonary disease, unspecified COPD type (HCC)  -     Ambulatory referral to Pulmonology  -     Fluticasone-Salmeterol 232-14 MCG/ACT AEPB; Inhale 1 puff into the lungs daily, Disp-1 each, R-3Normal  -     albuterol sulfate HFA (PROVENTIL;VENTOLIN;PROAIR) 108 (90 Base) MCG/ACT inhaler; Inhale 2 puffs into the lungs 2 times daily, Disp-36 g, R-3Normal  -     methylPREDNISolone (MEDROL DOSEPACK) 4 MG tablet; Take by mouth., Disp-1 kit, R-0Normal  -     DME Order for Home Oxygen as OP  3. Acute bronchitis, unspecified organism  -     azithromycin (ZITHROMAX) 250 MG tablet; Take 1 tablet by mouth See Admin Instructions for 5 days 500mg on day 1 followed by

## 2024-03-04 ENCOUNTER — OFFICE VISIT (OUTPATIENT)
Dept: PULMONOLOGY | Age: 79
End: 2024-03-04
Payer: MEDICARE

## 2024-03-04 VITALS
TEMPERATURE: 97.6 F | BODY MASS INDEX: 20.57 KG/M2 | DIASTOLIC BLOOD PRESSURE: 66 MMHG | OXYGEN SATURATION: 95 % | HEIGHT: 66 IN | SYSTOLIC BLOOD PRESSURE: 104 MMHG | HEART RATE: 76 BPM | WEIGHT: 128 LBS

## 2024-03-04 DIAGNOSIS — R91.1 LUNG NODULE: ICD-10-CM

## 2024-03-04 DIAGNOSIS — R05.9 COUGH, UNSPECIFIED TYPE: ICD-10-CM

## 2024-03-04 DIAGNOSIS — C34.92 NON-SMALL CELL CANCER OF LEFT LUNG (HCC): ICD-10-CM

## 2024-03-04 DIAGNOSIS — J44.9 CHRONIC OBSTRUCTIVE PULMONARY DISEASE, UNSPECIFIED COPD TYPE (HCC): Primary | ICD-10-CM

## 2024-03-04 DIAGNOSIS — R06.02 SHORTNESS OF BREATH: ICD-10-CM

## 2024-03-04 DIAGNOSIS — Z72.0 TOBACCO ABUSE: ICD-10-CM

## 2024-03-04 PROCEDURE — 3023F SPIROM DOC REV: CPT | Performed by: INTERNAL MEDICINE

## 2024-03-04 PROCEDURE — 99214 OFFICE O/P EST MOD 30 MIN: CPT | Performed by: INTERNAL MEDICINE

## 2024-03-04 PROCEDURE — 1123F ACP DISCUSS/DSCN MKR DOCD: CPT | Performed by: INTERNAL MEDICINE

## 2024-03-04 PROCEDURE — G8420 CALC BMI NORM PARAMETERS: HCPCS | Performed by: INTERNAL MEDICINE

## 2024-03-04 PROCEDURE — 4004F PT TOBACCO SCREEN RCVD TLK: CPT | Performed by: INTERNAL MEDICINE

## 2024-03-04 PROCEDURE — 3078F DIAST BP <80 MM HG: CPT | Performed by: INTERNAL MEDICINE

## 2024-03-04 PROCEDURE — G8427 DOCREV CUR MEDS BY ELIG CLIN: HCPCS | Performed by: INTERNAL MEDICINE

## 2024-03-04 PROCEDURE — 3074F SYST BP LT 130 MM HG: CPT | Performed by: INTERNAL MEDICINE

## 2024-03-04 PROCEDURE — G8484 FLU IMMUNIZE NO ADMIN: HCPCS | Performed by: INTERNAL MEDICINE

## 2024-03-04 RX ORDER — FLUTICASONE FUROATE, UMECLIDINIUM BROMIDE AND VILANTEROL TRIFENATATE 100; 62.5; 25 UG/1; UG/1; UG/1
1 POWDER RESPIRATORY (INHALATION) DAILY
Qty: 3 EACH | Refills: 0 | Status: SHIPPED | OUTPATIENT
Start: 2024-03-04 | End: 2024-06-02

## 2024-03-04 NOTE — PROGRESS NOTES
performed by Leona Garcia MD at [facility]    COLONOSCOPY      CORONARY ANGIOPLASTY WITH STENT PLACEMENT  1/29/2016    EYE SURGERY      to have Phaco with IOL OU 2/2018    HERNIA REPAIR      JOINT REPLACEMENT Left 1994    LTHR    JOINT REPLACEMENT Right 2009    RTKR    KNEE SURGERY Right 2011    arthroscopy    TN MANIPULATION KNEE JOINT UNDER GENERAL ANESTHESIA Right 5/12/2017    RIGHT KNEE MANIPULATION UNDER ANESTHESIA performed by Norman Camargo MD at [facility] OR    TN OFFICE/OUTPT VISIT,PROCEDURE ONLY Bilateral 12/29/2017    RIGHT AND BILATERAL L 4-5 LYSIS OF SCAR DISKECTOMY INTERBODY CAGE FUSION POSTEROLATERAL FUSION PEDICLE SCREWS performed by Leona Garcia MD at [facility]    PROSTATE BIOPSY  2004    benign    SPLENECTOMY  1961    TOTAL KNEE ARTHROPLASTY Right 3/24/2017    RIGHT  KNEE TOTAL ARTHROPLASTY, ELHAM CEMENTED PERSONA  performed by Norman Camargo MD at [facility]       Allergies  Allergies   Allergen Reactions    Morphine        Medications  Current Outpatient Medications   Medication Sig Dispense Refill    fluticasone-umeclidin-vilant (TRELEGY ELLIPTA) 100-62.5-25 MCG/ACT AEPB inhaler Inhale 1 puff into the lungs daily 3 each 0    lubiprostone (AMITIZA) 24 MCG capsule Take 1 capsule by mouth daily      methadone (DOLOPHINE) 10 MG tablet Take 1 tablet by mouth every 12 hours as needed for Pain.      pregabalin (LYRICA) 75 MG capsule Take 1 capsule by mouth 2 times daily.      triamcinolone (KENALOG) 0.025 % ointment Apply topically 2 times daily      Fluticasone-Salmeterol 232-14 MCG/ACT AEPB Inhale 1 puff into the lungs daily 1 each 3    albuterol sulfate HFA (PROVENTIL;VENTOLIN;PROAIR) 108 (90 Base) MCG/ACT inhaler Inhale 2 puffs into the lungs 2 times daily 36 g 3    spironolactone (ALDACTONE) 25 MG tablet Take 2 tablets by mouth daily 60 tablet 3    bumetanide (BUMEX) 2 MG tablet Take 1 tablet by mouth daily 90 tablet 3    metoprolol tartrate (LOPRESSOR) 50 MG tablet Take 0.5 tablets by mouth 2 times daily

## 2024-03-06 ENCOUNTER — HOSPITAL ENCOUNTER (OUTPATIENT)
Dept: CT IMAGING | Age: 79
Discharge: HOME | End: 2024-03-08
Payer: MEDICARE

## 2024-03-06 DIAGNOSIS — C34.32 PRIMARY MALIGNANT NEOPLASM OF BRONCHUS OF LEFT LOWER LOBE (HCC): ICD-10-CM

## 2024-03-06 PROCEDURE — A9609 HC RX DIAGNOSTIC RADIOPHARMACEUTICAL: HCPCS | Performed by: INTERNAL MEDICINE

## 2024-03-06 PROCEDURE — 3430000000 HC RX DIAGNOSTIC RADIOPHARMACEUTICAL: Performed by: INTERNAL MEDICINE

## 2024-03-06 PROCEDURE — 78815 PET IMAGE W/CT SKULL-THIGH: CPT

## 2024-03-06 RX ORDER — FLUDEOXYGLUCOSE F 18 200 MCI/ML
16.7 INJECTION, SOLUTION INTRAVENOUS
Status: COMPLETED | OUTPATIENT
Start: 2024-03-06 | End: 2024-03-06

## 2024-03-06 RX ADMIN — FLUDEOXYGLUCOSE F 18 16.7 MILLICURIE: 200 INJECTION, SOLUTION INTRAVENOUS at 11:20

## 2024-03-25 ENCOUNTER — OFFICE VISIT (OUTPATIENT)
Dept: FAMILY MEDICINE CLINIC | Age: 79
End: 2024-03-25

## 2024-03-25 VITALS
SYSTOLIC BLOOD PRESSURE: 100 MMHG | OXYGEN SATURATION: 94 % | HEIGHT: 66 IN | DIASTOLIC BLOOD PRESSURE: 60 MMHG | WEIGHT: 120 LBS | HEART RATE: 71 BPM | BODY MASS INDEX: 19.29 KG/M2

## 2024-03-25 DIAGNOSIS — J44.9 CHRONIC OBSTRUCTIVE PULMONARY DISEASE, UNSPECIFIED COPD TYPE (HCC): Primary | ICD-10-CM

## 2024-03-25 DIAGNOSIS — C34.92 NSCLC OF LEFT LUNG (HCC): ICD-10-CM

## 2024-03-25 DIAGNOSIS — R64 CACHEXIA (HCC): ICD-10-CM

## 2024-03-25 PROBLEM — N18.2 STAGE 2 CHRONIC KIDNEY DISEASE: Status: ACTIVE | Noted: 2024-03-25

## 2024-03-25 PROBLEM — I12.9 CHRONIC KIDNEY DISEASE DUE TO HYPERTENSION: Status: ACTIVE | Noted: 2024-03-25

## 2024-03-25 ASSESSMENT — ENCOUNTER SYMPTOMS
WHEEZING: 1
RHINORRHEA: 0
COUGH: 1
SHORTNESS OF BREATH: 1
ABDOMINAL PAIN: 0
DIARRHEA: 0
EYE PAIN: 0
VOMITING: 0
SORE THROAT: 0

## 2024-03-25 NOTE — PROGRESS NOTES
[facility] SPECIALTY/PRIMARY CARE  [address], [address]  [facility] [address]  Dept: [phone]  Dept Fax: [phone]  Loc: [phone]     3/25/2024    Visit type: Follow up    Reason for Visit: home health care (Pt's sister would like to request Home Health Care) and Follow-up (Pt states he's still having intermittent SOB and wheezing,  scripts does works for him. )         Patient: Ayan Art is a 78 y.o. male     HPI: 78-year-old male presents for follow-up visit pertaining to COPD exacerbation.  Patient continues to have some shortness of breath and wheezing though it has improved with new prescription.  Patient was seen by pulmonologist recently who altered medication options and started on Trelegy Ellipta.    ASSESSMENT/PLAN   Chronic obstructive pulmonary disease, unspecified COPD type (HCC)  -     Ambulatory referral to Home Health  Body mass index (BMI) of 19.0 to 19.9 in adult  -     Ambulatory referral to Home Health  Cachexia (HCC)  -     Ambulatory referral to Home Health  NSCLC of left lung (HCC)  -     Ambulatory referral to Home Health    -COPD, lung cancer is being managed by pulmonology  -Home health was ordered today to help with daily activities  -Follow-up in 6 months      No orders of the defined types were placed in this encounter.       Subjective      Review of Systems   Constitutional:  Negative for fatigue and fever.   HENT:  Negative for congestion, rhinorrhea and sore throat.    Eyes:  Negative for pain.   Respiratory:  Positive for cough, shortness of breath and wheezing.    Cardiovascular:  Negative for chest pain.   Gastrointestinal:  Negative for abdominal pain, diarrhea and vomiting.   Neurological:  Negative for dizziness and headaches.      Allergies   Allergen Reactions    Morphine        Current Outpatient Medications:     fluticasone-umeclidin-vilant (TRELEGY ELLIPTA) 100-62.5-25 MCG/ACT AEPB inhaler, Inhale 1 puff

## 2024-03-26 ENCOUNTER — TELEPHONE (OUTPATIENT)
Dept: FAMILY MEDICINE CLINIC | Age: 79
End: 2024-03-26

## 2024-03-26 DIAGNOSIS — R64 CACHEXIA (HCC): ICD-10-CM

## 2024-03-26 DIAGNOSIS — C34.92 NSCLC OF LEFT LUNG (HCC): ICD-10-CM

## 2024-03-26 DIAGNOSIS — J44.9 CHRONIC OBSTRUCTIVE PULMONARY DISEASE, UNSPECIFIED COPD TYPE (HCC): Primary | ICD-10-CM

## 2024-03-26 NOTE — TELEPHONE ENCOUNTER
[facility] home health calling stating that they are not able to assist the pt with Home health aid, they are suggesting that we do a referral to [facility] their address is [address] [place] [address]. Please advise.

## 2024-03-27 ENCOUNTER — OFFICE VISIT (OUTPATIENT)
Dept: PULMONOLOGY | Age: 79
End: 2024-03-27
Payer: MEDICARE

## 2024-03-27 VITALS
SYSTOLIC BLOOD PRESSURE: 104 MMHG | HEART RATE: 78 BPM | DIASTOLIC BLOOD PRESSURE: 66 MMHG | WEIGHT: 118 LBS | BODY MASS INDEX: 18.96 KG/M2 | HEIGHT: 66 IN | TEMPERATURE: 97.6 F | OXYGEN SATURATION: 92 %

## 2024-03-27 DIAGNOSIS — R64 PULMONARY CACHEXIA DUE TO COPD (HCC): ICD-10-CM

## 2024-03-27 DIAGNOSIS — R91.1 LUNG NODULE: Primary | ICD-10-CM

## 2024-03-27 DIAGNOSIS — J44.9 PULMONARY CACHEXIA DUE TO COPD (HCC): ICD-10-CM

## 2024-03-27 DIAGNOSIS — J44.9 CHRONIC OBSTRUCTIVE PULMONARY DISEASE, UNSPECIFIED COPD TYPE (HCC): ICD-10-CM

## 2024-03-27 DIAGNOSIS — J43.9 PULMONARY EMPHYSEMA, UNSPECIFIED EMPHYSEMA TYPE (HCC): ICD-10-CM

## 2024-03-27 DIAGNOSIS — C34.92 NON-SMALL CELL CANCER OF LEFT LUNG (HCC): ICD-10-CM

## 2024-03-27 PROBLEM — C34.90 LUNG CANCER (HCC): Status: ACTIVE | Noted: 2024-03-27

## 2024-03-27 PROCEDURE — G8420 CALC BMI NORM PARAMETERS: HCPCS | Performed by: INTERNAL MEDICINE

## 2024-03-27 PROCEDURE — 1123F ACP DISCUSS/DSCN MKR DOCD: CPT | Performed by: INTERNAL MEDICINE

## 2024-03-27 PROCEDURE — G8484 FLU IMMUNIZE NO ADMIN: HCPCS | Performed by: INTERNAL MEDICINE

## 2024-03-27 PROCEDURE — 3078F DIAST BP <80 MM HG: CPT | Performed by: INTERNAL MEDICINE

## 2024-03-27 PROCEDURE — 3023F SPIROM DOC REV: CPT | Performed by: INTERNAL MEDICINE

## 2024-03-27 PROCEDURE — 3074F SYST BP LT 130 MM HG: CPT | Performed by: INTERNAL MEDICINE

## 2024-03-27 PROCEDURE — 99214 OFFICE O/P EST MOD 30 MIN: CPT | Performed by: INTERNAL MEDICINE

## 2024-03-27 PROCEDURE — G8427 DOCREV CUR MEDS BY ELIG CLIN: HCPCS | Performed by: INTERNAL MEDICINE

## 2024-03-27 PROCEDURE — 4004F PT TOBACCO SCREEN RCVD TLK: CPT | Performed by: INTERNAL MEDICINE

## 2024-03-27 RX ORDER — FLUTICASONE FUROATE, UMECLIDINIUM BROMIDE AND VILANTEROL TRIFENATATE 100; 62.5; 25 UG/1; UG/1; UG/1
1 POWDER RESPIRATORY (INHALATION) DAILY
Qty: 2 EACH | Refills: 0 | Status: ON HOLD | COMMUNITY
Start: 2024-03-27 | End: 2024-06-25

## 2024-03-27 NOTE — PROGRESS NOTES
PATIENT VISIT-PULMONARY/SLEEP    3/27/2024     REFERRING PHYSICIAN:  Jesse Salvador MD     REASON FOR REFERRAL:  Shortness of breath    HPI:     Ayan Art is a 78 y.o. male who was referred to pulmonary clinic for evaluation.   He used to see Dr. Sage in 2021.  Was diagnosed with non-small cell lung cancer of left lower lobe and underwent radiation.  Has not been followed by pulmonary since then.  He wants a second opinion by pulmonologist.    Had recent worsening of respiratory status, cough and shortness of breath and weight loss.  Had a CT chest that showed left upper lobe nodule which has been increasing in size.  Was evaluated by his oncologist and is in the process of getting a PET scan.  Still have shortness of breath with activities.  He takes Breo daily in addition to rescue inhaler.  Denies any hemoptysis.  Has not had pulmonary rehab in the past.       3/27/24:      Underwent a PET scan which I reviewed personally.  Left upper lobe perihilar opacity and left hilar lymph nodes are active on PET scan.  SUV max is 5.9.    Had not had PFT yet.  Respiratory status is about the same.  Continues to be using his inhalers.  He lost about 5 pounds since last visit.      Past Medical History   Past Medical History:   Diagnosis Date    Alcohol abuse     quit drinking 8/18/21    CAD (coronary artery disease)     patient states no doctor has told him this / has 1 cardiac stent    Cancer (HCC)     lung LLL    Chronic back pain     Chronic kidney disease     Chronic obstructive pulmonary disease, unspecified COPD type (HCC) 3/24/2022    Chronic sinusitis     DJD (degenerative joint disease) of knee     left knee DJD    Elevated PSA     History of blood transfusion 1980's    History of inferior wall myocardial infarction 01/2016    1 cardiac stent    HTN (hypertension)     meds . 1 yr    Hyperlipidemia     meds > 12 yrs    NSTEMI (non-ST elevated myocardial infarction) (HCC)     Retention of

## 2024-03-31 ENCOUNTER — HOSPITAL ENCOUNTER (INPATIENT)
Age: 79
LOS: 3 days | Discharge: NURSING, REHAB OR LONG-TERM CARE | DRG: 551 | End: 2024-04-03
Attending: EMERGENCY MEDICINE | Admitting: INTERNAL MEDICINE
Payer: MEDICARE

## 2024-03-31 ENCOUNTER — APPOINTMENT (OUTPATIENT)
Dept: GENERAL RADIOLOGY | Age: 79
DRG: 551 | End: 2024-03-31
Payer: MEDICARE
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
## 2024-03-31 DIAGNOSIS — R26.9 GAIT DISTURBANCE: ICD-10-CM

## 2024-03-31 DIAGNOSIS — I25.119 CORONARY ARTERY DISEASE INVOLVING NATIVE CORONARY ARTERY OF NATIVE HEART WITH ANGINA PECTORIS (HCC): ICD-10-CM

## 2024-03-31 DIAGNOSIS — R53.1 GENERAL WEAKNESS: ICD-10-CM

## 2024-03-31 LAB
ALBUMIN SERPL-MCNC: 3.6 G/DL (ref 3.5–4.6)
ALP SERPL-CCNC: 92 U/L (ref 35–104)
ALT SERPL-CCNC: 10 U/L (ref 0–41)
ANION GAP SERPL CALCULATED.3IONS-SCNC: 11 MEQ/L (ref 9–15)
AST SERPL-CCNC: 13 U/L (ref 0–40)
B PARAP IS1001 DNA NPH QL NAA+NON-PROBE: NOT DETECTED
B PERT.PT PRMT NPH QL NAA+NON-PROBE: NOT DETECTED
BASOPHILS # BLD: 0.1 K/UL (ref 0–0.2)
BASOPHILS NFR BLD: 0.5 %
BILIRUB SERPL-MCNC: 0.3 MG/DL (ref 0.2–0.7)
BILIRUB UR QL STRIP: NEGATIVE
BUN SERPL-MCNC: 26 MG/DL (ref 8–23)
C PNEUM DNA NPH QL NAA+NON-PROBE: NOT DETECTED
CALCIUM SERPL-MCNC: 9.4 MG/DL (ref 8.5–9.9)
CHLORIDE SERPL-SCNC: 99 MEQ/L (ref 95–107)
CLARITY UR: CLEAR
CO2 SERPL-SCNC: 24 MEQ/L (ref 20–31)
COLOR UR: YELLOW
CREAT SERPL-MCNC: 0.97 MG/DL (ref 0.7–1.2)
EOSINOPHIL # BLD: 0.2 K/UL (ref 0–0.7)
EOSINOPHIL NFR BLD: 1 %
ERYTHROCYTE [DISTWIDTH] IN BLOOD BY AUTOMATED COUNT: 14.1 % (ref 11.5–14.5)
FLUAV RNA NPH QL NAA+NON-PROBE: NOT DETECTED
FLUBV RNA NPH QL NAA+NON-PROBE: NOT DETECTED
GLOBULIN SER CALC-MCNC: 3.5 G/DL (ref 2.3–3.5)
GLUCOSE SERPL-MCNC: 91 MG/DL (ref 70–99)
GLUCOSE UR STRIP-MCNC: NEGATIVE MG/DL
HADV DNA NPH QL NAA+NON-PROBE: NOT DETECTED
HCOV 229E RNA NPH QL NAA+NON-PROBE: NOT DETECTED
HCOV HKU1 RNA NPH QL NAA+NON-PROBE: NOT DETECTED
HCOV NL63 RNA NPH QL NAA+NON-PROBE: NOT DETECTED
HCOV OC43 RNA NPH QL NAA+NON-PROBE: NOT DETECTED
HCT VFR BLD AUTO: 33.9 % (ref 42–52)
HGB BLD-MCNC: 10.7 G/DL (ref 14–18)
HGB UR QL STRIP: NEGATIVE
HMPV RNA NPH QL NAA+NON-PROBE: NOT DETECTED
HPIV1 RNA NPH QL NAA+NON-PROBE: NOT DETECTED
HPIV2 RNA NPH QL NAA+NON-PROBE: NOT DETECTED
HPIV3 RNA NPH QL NAA+NON-PROBE: NOT DETECTED
HPIV4 RNA NPH QL NAA+NON-PROBE: NOT DETECTED
INR PPP: 1.1
KETONES UR STRIP-MCNC: 15 MG/DL
LEUKOCYTE ESTERASE UR QL STRIP: NEGATIVE
LYMPHOCYTES # BLD: 0.9 K/UL (ref 1–4.8)
LYMPHOCYTES NFR BLD: 5.3 %
M PNEUMO DNA NPH QL NAA+NON-PROBE: NOT DETECTED
MAGNESIUM SERPL-MCNC: 2 MG/DL (ref 1.7–2.4)
MCH RBC QN AUTO: 30.7 PG (ref 27–31.3)
MCHC RBC AUTO-ENTMCNC: 31.6 % (ref 33–37)
MCV RBC AUTO: 97.1 FL (ref 79–92.2)
MONOCYTES # BLD: 1.1 K/UL (ref 0.2–0.8)
MONOCYTES NFR BLD: 6.8 %
NEUTROPHILS # BLD: 14.4 K/UL (ref 1.4–6.5)
NEUTS SEG NFR BLD: 86.1 %
NITRITE UR QL STRIP: NEGATIVE
PH UR STRIP: 5.5 [PH] (ref 5–9)
PLATELET # BLD AUTO: 445 K/UL (ref 130–400)
POTASSIUM SERPL-SCNC: 5.5 MEQ/L (ref 3.4–4.9)
PROT SERPL-MCNC: 7.1 G/DL (ref 6.3–8)
PROT UR STRIP-MCNC: NEGATIVE MG/DL
PROTHROMBIN TIME: 14.3 SEC (ref 12.3–14.9)
RBC # BLD AUTO: 3.49 M/UL (ref 4.7–6.1)
RSV RNA NPH QL NAA+NON-PROBE: NOT DETECTED
RV+EV RNA NPH QL NAA+NON-PROBE: NOT DETECTED
SARS-COV-2 RNA NPH QL NAA+NON-PROBE: NOT DETECTED
SODIUM SERPL-SCNC: 134 MEQ/L (ref 135–144)
SP GR UR STRIP: 1.02 (ref 1–1.03)
URINE REFLEX TO CULTURE: ABNORMAL
UROBILINOGEN UR STRIP-ACNC: 0.2 E.U./DL
WBC # BLD AUTO: 16.7 K/UL (ref 4.8–10.8)

## 2024-03-31 PROCEDURE — 73610 X-RAY EXAM OF ANKLE: CPT

## 2024-03-31 PROCEDURE — 36415 COLL VENOUS BLD VENIPUNCTURE: CPT

## 2024-03-31 PROCEDURE — 97162 PT EVAL MOD COMPLEX 30 MIN: CPT

## 2024-03-31 PROCEDURE — 6830039000 HC L3 TRAUMA ALERT

## 2024-03-31 PROCEDURE — 71045 X-RAY EXAM CHEST 1 VIEW: CPT

## 2024-03-31 PROCEDURE — 99285 EMERGENCY DEPT VISIT HI MDM: CPT

## 2024-03-31 PROCEDURE — 85025 COMPLETE CBC W/AUTO DIFF WBC: CPT

## 2024-03-31 PROCEDURE — 80053 COMPREHEN METABOLIC PANEL: CPT

## 2024-03-31 PROCEDURE — 6370000000 HC RX 637 (ALT 250 FOR IP): Performed by: INTERNAL MEDICINE

## 2024-03-31 PROCEDURE — 85610 PROTHROMBIN TIME: CPT

## 2024-03-31 PROCEDURE — 73560 X-RAY EXAM OF KNEE 1 OR 2: CPT

## 2024-03-31 PROCEDURE — 0202U NFCT DS 22 TRGT SARS-COV-2: CPT

## 2024-03-31 PROCEDURE — 97167 OT EVAL HIGH COMPLEX 60 MIN: CPT

## 2024-03-31 PROCEDURE — 83735 ASSAY OF MAGNESIUM: CPT

## 2024-03-31 PROCEDURE — 2580000003 HC RX 258: Performed by: INTERNAL MEDICINE

## 2024-03-31 PROCEDURE — 1210000000 HC MED SURG R&B

## 2024-03-31 PROCEDURE — 81003 URINALYSIS AUTO W/O SCOPE: CPT

## 2024-03-31 PROCEDURE — 2580000003 HC RX 258: Performed by: EMERGENCY MEDICINE

## 2024-03-31 RX ORDER — SODIUM CHLORIDE 9 MG/ML
INJECTION, SOLUTION INTRAVENOUS PRN
Status: DISCONTINUED | OUTPATIENT
Start: 2024-03-31 | End: 2024-04-03 | Stop reason: HOSPADM

## 2024-03-31 RX ORDER — ALBUTEROL SULFATE 90 UG/1
2 AEROSOL, METERED RESPIRATORY (INHALATION) 2 TIMES DAILY
Status: DISCONTINUED | OUTPATIENT
Start: 2024-03-31 | End: 2024-04-03 | Stop reason: HOSPADM

## 2024-03-31 RX ORDER — 0.9 % SODIUM CHLORIDE 0.9 %
1000 INTRAVENOUS SOLUTION INTRAVENOUS ONCE
Status: COMPLETED | OUTPATIENT
Start: 2024-03-31 | End: 2024-03-31

## 2024-03-31 RX ORDER — PREGABALIN 75 MG/1
75 CAPSULE ORAL 2 TIMES DAILY
Status: DISCONTINUED | OUTPATIENT
Start: 2024-03-31 | End: 2024-04-03 | Stop reason: HOSPADM

## 2024-03-31 RX ORDER — BUDESONIDE AND FORMOTEROL FUMARATE DIHYDRATE 160; 4.5 UG/1; UG/1
2 AEROSOL RESPIRATORY (INHALATION)
Status: DISCONTINUED | OUTPATIENT
Start: 2024-03-31 | End: 2024-04-03 | Stop reason: HOSPADM

## 2024-03-31 RX ORDER — ENOXAPARIN SODIUM 100 MG/ML
40 INJECTION SUBCUTANEOUS DAILY
Status: DISCONTINUED | OUTPATIENT
Start: 2024-04-01 | End: 2024-04-03 | Stop reason: HOSPADM

## 2024-03-31 RX ORDER — SODIUM CHLORIDE 0.9 % (FLUSH) 0.9 %
5-40 SYRINGE (ML) INJECTION EVERY 12 HOURS SCHEDULED
Status: DISCONTINUED | OUTPATIENT
Start: 2024-03-31 | End: 2024-04-03 | Stop reason: HOSPADM

## 2024-03-31 RX ORDER — ISOSORBIDE MONONITRATE 60 MG/1
30 TABLET, EXTENDED RELEASE ORAL DAILY
Status: DISCONTINUED | OUTPATIENT
Start: 2024-03-31 | End: 2024-04-03

## 2024-03-31 RX ORDER — LUBIPROSTONE 24 UG/1
24 CAPSULE ORAL DAILY
Status: DISCONTINUED | OUTPATIENT
Start: 2024-03-31 | End: 2024-03-31 | Stop reason: ALTCHOICE

## 2024-03-31 RX ORDER — METHADONE HYDROCHLORIDE 5 MG/1
10 TABLET ORAL 2 TIMES DAILY
Status: DISCONTINUED | OUTPATIENT
Start: 2024-03-31 | End: 2024-04-03 | Stop reason: HOSPADM

## 2024-03-31 RX ORDER — ONDANSETRON 2 MG/ML
4 INJECTION INTRAMUSCULAR; INTRAVENOUS EVERY 6 HOURS PRN
Status: DISCONTINUED | OUTPATIENT
Start: 2024-03-31 | End: 2024-04-03 | Stop reason: HOSPADM

## 2024-03-31 RX ORDER — FINASTERIDE 5 MG/1
5 TABLET, FILM COATED ORAL DAILY
Status: DISCONTINUED | OUTPATIENT
Start: 2024-03-31 | End: 2024-04-03 | Stop reason: HOSPADM

## 2024-03-31 RX ORDER — ACETAMINOPHEN 325 MG/1
650 TABLET ORAL EVERY 6 HOURS PRN
Status: DISCONTINUED | OUTPATIENT
Start: 2024-03-31 | End: 2024-04-03 | Stop reason: HOSPADM

## 2024-03-31 RX ORDER — POLYETHYLENE GLYCOL 3350 17 G/17G
17 POWDER, FOR SOLUTION ORAL DAILY PRN
Status: DISCONTINUED | OUTPATIENT
Start: 2024-03-31 | End: 2024-04-03 | Stop reason: HOSPADM

## 2024-03-31 RX ORDER — ATORVASTATIN CALCIUM 20 MG/1
20 TABLET, FILM COATED ORAL NIGHTLY
Status: DISCONTINUED | OUTPATIENT
Start: 2024-03-31 | End: 2024-04-03

## 2024-03-31 RX ORDER — ACETAMINOPHEN 650 MG/1
650 SUPPOSITORY RECTAL EVERY 6 HOURS PRN
Status: DISCONTINUED | OUTPATIENT
Start: 2024-03-31 | End: 2024-04-03 | Stop reason: HOSPADM

## 2024-03-31 RX ORDER — ONDANSETRON 4 MG/1
4 TABLET, ORALLY DISINTEGRATING ORAL EVERY 8 HOURS PRN
Status: DISCONTINUED | OUTPATIENT
Start: 2024-03-31 | End: 2024-04-03 | Stop reason: HOSPADM

## 2024-03-31 RX ORDER — PANTOPRAZOLE SODIUM 40 MG/1
40 TABLET, DELAYED RELEASE ORAL
Status: DISCONTINUED | OUTPATIENT
Start: 2024-04-01 | End: 2024-04-03 | Stop reason: HOSPADM

## 2024-03-31 RX ORDER — SODIUM CHLORIDE 0.9 % (FLUSH) 0.9 %
5-40 SYRINGE (ML) INJECTION PRN
Status: DISCONTINUED | OUTPATIENT
Start: 2024-03-31 | End: 2024-04-03 | Stop reason: HOSPADM

## 2024-03-31 RX ORDER — CITALOPRAM 20 MG/1
20 TABLET ORAL NIGHTLY
Status: DISCONTINUED | OUTPATIENT
Start: 2024-03-31 | End: 2024-04-03 | Stop reason: HOSPADM

## 2024-03-31 RX ADMIN — CITALOPRAM HYDROBROMIDE 20 MG: 20 TABLET ORAL at 21:45

## 2024-03-31 RX ADMIN — SODIUM CHLORIDE, PRESERVATIVE FREE 10 ML: 5 INJECTION INTRAVENOUS at 21:45

## 2024-03-31 RX ADMIN — ATORVASTATIN CALCIUM 20 MG: 20 TABLET, FILM COATED ORAL at 21:45

## 2024-03-31 RX ADMIN — PREGABALIN 75 MG: 75 CAPSULE ORAL at 21:45

## 2024-03-31 RX ADMIN — ISOSORBIDE MONONITRATE 30 MG: 60 TABLET, EXTENDED RELEASE ORAL at 16:28

## 2024-03-31 RX ADMIN — SODIUM ZIRCONIUM CYCLOSILICATE 10 G: 10 POWDER, FOR SUSPENSION ORAL at 13:52

## 2024-03-31 RX ADMIN — SODIUM CHLORIDE 1000 ML: 9 INJECTION, SOLUTION INTRAVENOUS at 12:26

## 2024-03-31 RX ADMIN — METHADONE HYDROCHLORIDE 10 MG: 5 TABLET ORAL at 21:45

## 2024-03-31 RX ADMIN — FINASTERIDE 5 MG: 5 TABLET, FILM COATED ORAL at 16:28

## 2024-03-31 ASSESSMENT — LIFESTYLE VARIABLES
HOW OFTEN DO YOU HAVE A DRINK CONTAINING ALCOHOL: 4 OR MORE TIMES A WEEK
HOW MANY STANDARD DRINKS CONTAINING ALCOHOL DO YOU HAVE ON A TYPICAL DAY: 3 OR 4

## 2024-03-31 ASSESSMENT — PAIN SCALES - GENERAL
PAINLEVEL_OUTOF10: 5
PAINLEVEL_OUTOF10: 3
PAINLEVEL_OUTOF10: 4
PAINLEVEL_OUTOF10: 3

## 2024-03-31 ASSESSMENT — PAIN DESCRIPTION - DESCRIPTORS
DESCRIPTORS: ACHING

## 2024-03-31 ASSESSMENT — PAIN - FUNCTIONAL ASSESSMENT
PAIN_FUNCTIONAL_ASSESSMENT: 0-10
PAIN_FUNCTIONAL_ASSESSMENT: ACTIVITIES ARE NOT PREVENTED

## 2024-03-31 ASSESSMENT — PAIN DESCRIPTION - ORIENTATION
ORIENTATION: LEFT

## 2024-03-31 ASSESSMENT — PAIN DESCRIPTION - LOCATION
LOCATION: KNEE;ANKLE
LOCATION: KNEE;ANKLE
LOCATION: BACK
LOCATION: KNEE;ANKLE

## 2024-03-31 NOTE — ED PROVIDER NOTES
[facility] 2W ORTHO TELE  EMERGENCY DEPARTMENT ENCOUNTER      Pt Name: Ayan Art  MRN: [id number]  Birthdate 1945  Date of evaluation: 3/31/2024  Provider: Cindi Mei DO    CHIEF COMPLAINT       Chief Complaint   Patient presents with    Fall         HISTORY OF PRESENT ILLNESS   (Location/Symptom, Timing/Onset, Context/Setting, Quality, Duration, Modifying Factors, Severity)  Note limiting factors.   Ayan Art is a 78 y.o. male who presents to the emergency department .  Patient presents 3 days after a fall.  Patient states that he was using his walker and he just fell down injuring his left knee and ankle.  Patient has fallen before.  He is not very steady generally and now is having trouble walking altogether because of the pain in his knee and ankle.  Patient lives alone and his sister is here stating that she does not feel that he is safe to be living alone even before this particular fall.  She has been contemplating trying to figure out somewhere that would be safer for him to live but she did not know how to start the process.  Patient does have lung cancer.     HPI    Nursing Notes were reviewed.    REVIEW OF SYSTEMS    (2-9 systems for level 4, 10 or more for level 5)     Review of Systems   Constitutional:  Negative for activity change, appetite change and fatigue.   HENT:  Negative for congestion and sore throat.    Eyes:  Negative for pain and visual disturbance.   Respiratory:  Negative for chest tightness and shortness of breath.    Cardiovascular:  Negative for chest pain.   Gastrointestinal:  Negative for abdominal pain, nausea and vomiting.   Endocrine: Negative for polydipsia.   Genitourinary:  Negative for flank pain and urgency.   Musculoskeletal:  Positive for arthralgias and gait problem. Negative for neck stiffness.   Skin:  Negative for rash.   Neurological:  Positive for weakness. Negative for light-headedness and headaches.   Psychiatric/Behavioral:  Negative for

## 2024-03-31 NOTE — ED NOTES
Patient requesting water, Dr. Hamida horvath, patient given water. Sister at bedside requesting to speak with provider. Dr. Mei aware
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
## 2024-03-31 NOTE — RT PROTOCOL NOTE
RT Inhaler-Nebulizer Bronchodilator Protocol Note    There is a bronchodilator order in the chart from a provider indicating to follow the RT Bronchodilator Protocol and there is an “Initiate RT Inhaler-Nebulizer Bronchodilator Protocol” order as well (see protocol at bottom of note).    CXR Findings:  XR CHEST PORTABLE    Result Date: 3/31/2024  Prominent left lung interstitial markings.       The findings from the last RT Protocol Assessment were as follows:   History Pulmonary Disease: Chronic pulmonary disease  Respiratory Pattern: Regular pattern and RR 12-20 bpm  Breath Sounds: Slightly diminished and/or crackles  Cough: Strong, spontaneous, non-productive  Indication for Bronchodilator Therapy: Decreased or absent breath sounds  Bronchodilator Assessment Score: 4    Aerosolized bronchodilator medication orders have been revised according to the RT Inhaler-Nebulizer Bronchodilator Protocol below.    Respiratory Therapist to perform RT Therapy Protocol Assessment initially then follow the protocol.  Repeat RT Therapy Protocol Assessment PRN for score 0-3 or on second treatment, BID, and PRN for scores above 3.    No Indications - adjust the frequency to every 6 hours PRN wheezing or bronchospasm, if no treatments needed after 48 hours then discontinue using Per Protocol order mode.     If indication present, adjust the RT bronchodilator orders based on the Bronchodilator Assessment Score as indicated below.  Use Inhaler orders unless patient has one or more of the following: on home nebulizer, not able to hold breath for 10 seconds, is not alert and oriented, cannot activate and use MDI correctly, or respiratory rate 25 breaths per minute or more, then use the equivalent nebulizer order(s) with same Frequency and PRN reasons based on the score.  If a patient is on this medication at home then do not decrease Frequency below that used at home.    0-3 - enter or revise RT bronchodilator order(s) to equivalent RT

## 2024-03-31 NOTE — ED TRIAGE NOTES
Patient states fall Friday after leg \"gave out\". Patient states left ankle and knee pain. States more difficulty ambulating. Patient denies hitting head denies loc, states takes Plavix. A&Ox4

## 2024-03-31 NOTE — ED NOTES
Patient and family notified of admission to inpatient rather than rehab at this time. In agreement. Denies questions/needs

## 2024-04-01 LAB
ALBUMIN SERPL-MCNC: 3.2 G/DL (ref 3.5–4.6)
ANION GAP SERPL CALCULATED.3IONS-SCNC: 12 MEQ/L (ref 9–15)
BASOPHILS # BLD: 0.1 K/UL (ref 0–0.2)
BASOPHILS NFR BLD: 0.4 %
BUN SERPL-MCNC: 19 MG/DL (ref 8–23)
CALCIUM SERPL-MCNC: 8.9 MG/DL (ref 8.5–9.9)
CHLORIDE SERPL-SCNC: 103 MEQ/L (ref 95–107)
CO2 SERPL-SCNC: 23 MEQ/L (ref 20–31)
CREAT SERPL-MCNC: 0.76 MG/DL (ref 0.7–1.2)
EOSINOPHIL # BLD: 0.2 K/UL (ref 0–0.7)
EOSINOPHIL NFR BLD: 1 %
ERYTHROCYTE [DISTWIDTH] IN BLOOD BY AUTOMATED COUNT: 13.9 % (ref 11.5–14.5)
GLUCOSE SERPL-MCNC: 97 MG/DL (ref 70–99)
HCT VFR BLD AUTO: 29.1 % (ref 42–52)
HGB BLD-MCNC: 9.2 G/DL (ref 14–18)
LYMPHOCYTES # BLD: 1.3 K/UL (ref 1–4.8)
LYMPHOCYTES NFR BLD: 7.9 %
MAGNESIUM SERPL-MCNC: 1.9 MG/DL (ref 1.7–2.4)
MCH RBC QN AUTO: 30.5 PG (ref 27–31.3)
MCHC RBC AUTO-ENTMCNC: 31.6 % (ref 33–37)
MCV RBC AUTO: 96.4 FL (ref 79–92.2)
MONOCYTES # BLD: 1.3 K/UL (ref 0.2–0.8)
MONOCYTES NFR BLD: 7.6 %
NEUTROPHILS # BLD: 13.8 K/UL (ref 1.4–6.5)
NEUTS SEG NFR BLD: 82.6 %
PHOSPHATE SERPL-MCNC: 2.3 MG/DL (ref 2.3–4.8)
PLATELET # BLD AUTO: 381 K/UL (ref 130–400)
POTASSIUM SERPL-SCNC: 4.5 MEQ/L (ref 3.4–4.9)
RBC # BLD AUTO: 3.02 M/UL (ref 4.7–6.1)
SODIUM SERPL-SCNC: 138 MEQ/L (ref 135–144)
WBC # BLD AUTO: 16.8 K/UL (ref 4.8–10.8)

## 2024-04-01 PROCEDURE — 80069 RENAL FUNCTION PANEL: CPT

## 2024-04-01 PROCEDURE — APPSS45 APP SPLIT SHARED TIME 31-45 MINUTES: Performed by: NURSE PRACTITIONER

## 2024-04-01 PROCEDURE — 85025 COMPLETE CBC W/AUTO DIFF WBC: CPT

## 2024-04-01 PROCEDURE — 99223 1ST HOSP IP/OBS HIGH 75: CPT | Performed by: PSYCHIATRY & NEUROLOGY

## 2024-04-01 PROCEDURE — 6370000000 HC RX 637 (ALT 250 FOR IP): Performed by: INTERNAL MEDICINE

## 2024-04-01 PROCEDURE — 2700000000 HC OXYGEN THERAPY PER DAY

## 2024-04-01 PROCEDURE — 2580000003 HC RX 258: Performed by: INTERNAL MEDICINE

## 2024-04-01 PROCEDURE — 1210000000 HC MED SURG R&B

## 2024-04-01 PROCEDURE — 36415 COLL VENOUS BLD VENIPUNCTURE: CPT

## 2024-04-01 PROCEDURE — 83735 ASSAY OF MAGNESIUM: CPT

## 2024-04-01 PROCEDURE — 97116 GAIT TRAINING THERAPY: CPT

## 2024-04-01 RX ORDER — LUBIPROSTONE 24 UG/1
24 CAPSULE ORAL DAILY
Status: DISCONTINUED | OUTPATIENT
Start: 2024-04-01 | End: 2024-04-03 | Stop reason: HOSPADM

## 2024-04-01 RX ADMIN — ISOSORBIDE MONONITRATE 30 MG: 60 TABLET, EXTENDED RELEASE ORAL at 08:14

## 2024-04-01 RX ADMIN — METHADONE HYDROCHLORIDE 10 MG: 5 TABLET ORAL at 20:11

## 2024-04-01 RX ADMIN — FINASTERIDE 5 MG: 5 TABLET, FILM COATED ORAL at 08:15

## 2024-04-01 RX ADMIN — LUBIPROSTONE 24 MCG: 24 CAPSULE ORAL at 13:48

## 2024-04-01 RX ADMIN — METOPROLOL TARTRATE 25 MG: 25 TABLET, FILM COATED ORAL at 08:15

## 2024-04-01 RX ADMIN — SODIUM CHLORIDE, PRESERVATIVE FREE 10 ML: 5 INJECTION INTRAVENOUS at 08:17

## 2024-04-01 RX ADMIN — METHADONE HYDROCHLORIDE 10 MG: 5 TABLET ORAL at 08:15

## 2024-04-01 RX ADMIN — PREGABALIN 75 MG: 75 CAPSULE ORAL at 08:15

## 2024-04-01 RX ADMIN — SODIUM CHLORIDE, PRESERVATIVE FREE 10 ML: 5 INJECTION INTRAVENOUS at 20:11

## 2024-04-01 RX ADMIN — PANTOPRAZOLE SODIUM 40 MG: 40 TABLET, DELAYED RELEASE ORAL at 06:58

## 2024-04-01 RX ADMIN — ATORVASTATIN CALCIUM 20 MG: 20 TABLET, FILM COATED ORAL at 20:11

## 2024-04-01 RX ADMIN — CITALOPRAM HYDROBROMIDE 20 MG: 20 TABLET ORAL at 20:11

## 2024-04-01 RX ADMIN — PREGABALIN 75 MG: 75 CAPSULE ORAL at 20:11

## 2024-04-01 RX ADMIN — METOPROLOL TARTRATE 25 MG: 25 TABLET, FILM COATED ORAL at 20:11

## 2024-04-01 ASSESSMENT — ENCOUNTER SYMPTOMS
TROUBLE SWALLOWING: 0
COUGH: 0
NAUSEA: 0
DIARRHEA: 0
BACK PAIN: 0
SHORTNESS OF BREATH: 0
COLOR CHANGE: 0
VOMITING: 0
CONSTIPATION: 1
CHEST TIGHTNESS: 0
WHEEZING: 0

## 2024-04-01 ASSESSMENT — PAIN SCALES - GENERAL
PAINLEVEL_OUTOF10: 0
PAINLEVEL_OUTOF10: 0

## 2024-04-01 NOTE — PLAN OF CARE
Nutrition Problem #1: Severe malnutrition, In context of chronic illness  Intervention: Food and/or Nutrient Delivery: Continue Current Diet, Modify Oral Nutrition Supplement (high calorie supplement tid and frozen supplement bid)     Pfizer dose 1 and 2

## 2024-04-01 NOTE — ACP (ADVANCE CARE PLANNING)
Advance Care Planning   Healthcare Decision Maker:    Primary Decision Maker: Chelsy Art - Brother/Sister - [phone]    Primary Decision Maker: Josemanuel Art - Child - [phone]    Secondary Decision Maker: Reggie Art - Child - [phone]    Click here to complete Healthcare Decision Makers including selection of the Healthcare Decision Maker Relationship (ie \"Primary\").  Today we documented Decision Maker(s) consistent with Legal Next of Kin hierarchy.       If the relationship to the patient does NOT follow our state's Next of Kin hierarchy, the patient MUST complete an ACP Document to allow him/her to act on the patient's behalf. :      Electronically signed by CHET Lara on 4/1/2024 at 1:03 PM

## 2024-04-01 NOTE — DISCHARGE INSTR - COC
4/1/2024 1348  Last data filed at 4/1/2024 0645  Gross per 24 hour   Intake 200 ml   Output 850 ml   Net -650 ml     I/O last 3 completed shifts:  In: 200 [P.O.:200]  Out: 850 [Urine:850]    Safety Concerns:     At Risk for Falls    Impairments/Disabilities:      None    Nutrition Therapy:  Current Nutrition Therapy:   - Oral Diet:  General    Routes of Feeding: Oral  Liquids: Thin Liquids  Daily Fluid Restriction: no  Last Modified Barium Swallow with Video (Video Swallowing Test): not done    Treatments at the Time of Hospital Discharge:   Respiratory Treatments: as ordered  Oxygen Therapy:  is on oxygen at 2 L/min per nasal cannula.  Ventilator:    - No ventilator support    Rehab Therapies: Physical Therapy and Occupational Therapy  Weight Bearing Status/Restrictions: No weight bearing restrictions  Other Medical Equipment (for information only, NOT a DME order):  walker  Other Treatments: na    Patient's personal belongings (please select all that are sent with patient):  Aimee    RN SIGNATURE:  Electronically signed by Jennifer Anderson RN on 4/3/24 at 2:45 PM EDT    CASE MANAGEMENT/SOCIAL WORK SECTION    Inpatient Status Date: 3/31/24    Readmission Risk Assessment Score:  Readmission Risk              Risk of Unplanned Readmission:  31           Discharging to Facility/ Agency   Name: [facility]  Address:  Phone:[phone]  Fax:    Dialysis Facility (if applicable)   Name:  Address:  Dialysis Schedule:  Phone:  Fax:    / signature: Electronically signed by CHET Wiseman on 4/1/24 at 1:48 PM EDT    PHYSICIAN SECTION    Prognosis: {Prognosis:6861454288}    Condition at Discharge: { Patient Condition:489751284}    Rehab Potential (if transferring to Rehab): {Prognosis:3149450833}    Recommended Labs or Other Treatments After Discharge: ***    Physician Certification: I certify the above information and transfer of Ayan Art  is necessary for the continuing

## 2024-04-02 ENCOUNTER — APPOINTMENT (OUTPATIENT)
Dept: CT IMAGING | Age: 79
DRG: 551 | End: 2024-04-02
Payer: MEDICARE

## 2024-04-02 PROBLEM — Z51.5 PALLIATIVE CARE ENCOUNTER: Status: ACTIVE | Noted: 2024-04-02

## 2024-04-02 PROBLEM — Z71.89 ENCOUNTER FOR HOSPICE CARE DISCUSSION: Status: ACTIVE | Noted: 2024-04-02

## 2024-04-02 PROBLEM — Z71.89 ADVANCED CARE PLANNING/COUNSELING DISCUSSION: Status: ACTIVE | Noted: 2024-04-02

## 2024-04-02 PROBLEM — Z71.89 GOALS OF CARE, COUNSELING/DISCUSSION: Status: ACTIVE | Noted: 2024-04-02

## 2024-04-02 LAB
ALBUMIN SERPL-MCNC: 3.2 G/DL (ref 3.5–4.6)
ANION GAP SERPL CALCULATED.3IONS-SCNC: 9 MEQ/L (ref 9–15)
BASOPHILS # BLD: 0.1 K/UL (ref 0–0.2)
BASOPHILS NFR BLD: 0.4 %
BUN SERPL-MCNC: 26 MG/DL (ref 8–23)
CALCIUM SERPL-MCNC: 9 MG/DL (ref 8.5–9.9)
CHLORIDE SERPL-SCNC: 102 MEQ/L (ref 95–107)
CO2 SERPL-SCNC: 26 MEQ/L (ref 20–31)
CREAT SERPL-MCNC: 0.85 MG/DL (ref 0.7–1.2)
EOSINOPHIL # BLD: 0.2 K/UL (ref 0–0.7)
EOSINOPHIL NFR BLD: 1.5 %
ERYTHROCYTE [DISTWIDTH] IN BLOOD BY AUTOMATED COUNT: 13.9 % (ref 11.5–14.5)
FOLATE: 12.5 NG/ML (ref 4.8–24.2)
GLUCOSE SERPL-MCNC: 95 MG/DL (ref 70–99)
HCT VFR BLD AUTO: 30.5 % (ref 42–52)
HGB BLD-MCNC: 9.9 G/DL (ref 14–18)
LYMPHOCYTES # BLD: 1.8 K/UL (ref 1–4.8)
LYMPHOCYTES NFR BLD: 12.8 %
MAGNESIUM SERPL-MCNC: 1.9 MG/DL (ref 1.7–2.4)
MCH RBC QN AUTO: 31 PG (ref 27–31.3)
MCHC RBC AUTO-ENTMCNC: 32.5 % (ref 33–37)
MCV RBC AUTO: 95.6 FL (ref 79–92.2)
MONOCYTES # BLD: 1.3 K/UL (ref 0.2–0.8)
MONOCYTES NFR BLD: 9.1 %
NEUTROPHILS # BLD: 10.8 K/UL (ref 1.4–6.5)
NEUTS SEG NFR BLD: 75.7 %
PHOSPHATE SERPL-MCNC: 2.7 MG/DL (ref 2.3–4.8)
PLATELET # BLD AUTO: 399 K/UL (ref 130–400)
POTASSIUM SERPL-SCNC: 5.2 MEQ/L (ref 3.4–4.9)
RBC # BLD AUTO: 3.19 M/UL (ref 4.7–6.1)
SODIUM SERPL-SCNC: 137 MEQ/L (ref 135–144)
TSH REFLEX: 0.82 UIU/ML (ref 0.44–3.86)
VITAMIN B-12: 444 PG/ML (ref 232–1245)
WBC # BLD AUTO: 14.3 K/UL (ref 4.8–10.8)

## 2024-04-02 PROCEDURE — 36415 COLL VENOUS BLD VENIPUNCTURE: CPT

## 2024-04-02 PROCEDURE — 82607 VITAMIN B-12: CPT

## 2024-04-02 PROCEDURE — 1210000000 HC MED SURG R&B

## 2024-04-02 PROCEDURE — 82746 ASSAY OF FOLIC ACID SERUM: CPT

## 2024-04-02 PROCEDURE — 99232 SBSQ HOSP IP/OBS MODERATE 35: CPT | Performed by: PSYCHIATRY & NEUROLOGY

## 2024-04-02 PROCEDURE — 83735 ASSAY OF MAGNESIUM: CPT

## 2024-04-02 PROCEDURE — 6370000000 HC RX 637 (ALT 250 FOR IP): Performed by: INTERNAL MEDICINE

## 2024-04-02 PROCEDURE — 70450 CT HEAD/BRAIN W/O DYE: CPT

## 2024-04-02 PROCEDURE — 80069 RENAL FUNCTION PANEL: CPT

## 2024-04-02 PROCEDURE — 99222 1ST HOSP IP/OBS MODERATE 55: CPT

## 2024-04-02 PROCEDURE — APPSS15 APP SPLIT SHARED TIME 0-15 MINUTES: Performed by: NURSE PRACTITIONER

## 2024-04-02 PROCEDURE — 99222 1ST HOSP IP/OBS MODERATE 55: CPT | Performed by: INTERNAL MEDICINE

## 2024-04-02 PROCEDURE — 84443 ASSAY THYROID STIM HORMONE: CPT

## 2024-04-02 PROCEDURE — 97116 GAIT TRAINING THERAPY: CPT

## 2024-04-02 PROCEDURE — 85025 COMPLETE CBC W/AUTO DIFF WBC: CPT

## 2024-04-02 PROCEDURE — 2580000003 HC RX 258: Performed by: INTERNAL MEDICINE

## 2024-04-02 RX ORDER — OXYCODONE HYDROCHLORIDE 5 MG/1
5 TABLET ORAL EVERY 4 HOURS PRN
Status: DISCONTINUED | OUTPATIENT
Start: 2024-04-02 | End: 2024-04-03 | Stop reason: HOSPADM

## 2024-04-02 RX ADMIN — FINASTERIDE 5 MG: 5 TABLET, FILM COATED ORAL at 07:53

## 2024-04-02 RX ADMIN — ISOSORBIDE MONONITRATE 30 MG: 60 TABLET, EXTENDED RELEASE ORAL at 07:53

## 2024-04-02 RX ADMIN — METHADONE HYDROCHLORIDE 10 MG: 5 TABLET ORAL at 20:27

## 2024-04-02 RX ADMIN — SODIUM CHLORIDE, PRESERVATIVE FREE 10 ML: 5 INJECTION INTRAVENOUS at 07:56

## 2024-04-02 RX ADMIN — PREGABALIN 75 MG: 75 CAPSULE ORAL at 20:27

## 2024-04-02 RX ADMIN — PREGABALIN 75 MG: 75 CAPSULE ORAL at 07:53

## 2024-04-02 RX ADMIN — METOPROLOL TARTRATE 25 MG: 25 TABLET, FILM COATED ORAL at 20:27

## 2024-04-02 RX ADMIN — METOPROLOL TARTRATE 25 MG: 25 TABLET, FILM COATED ORAL at 07:53

## 2024-04-02 RX ADMIN — SODIUM CHLORIDE, PRESERVATIVE FREE 10 ML: 5 INJECTION INTRAVENOUS at 20:26

## 2024-04-02 RX ADMIN — METHADONE HYDROCHLORIDE 10 MG: 5 TABLET ORAL at 07:53

## 2024-04-02 RX ADMIN — CITALOPRAM HYDROBROMIDE 20 MG: 20 TABLET ORAL at 20:27

## 2024-04-02 RX ADMIN — PANTOPRAZOLE SODIUM 40 MG: 40 TABLET, DELAYED RELEASE ORAL at 06:04

## 2024-04-02 RX ADMIN — ATORVASTATIN CALCIUM 20 MG: 20 TABLET, FILM COATED ORAL at 20:27

## 2024-04-02 RX ADMIN — LUBIPROSTONE 24 MCG: 24 CAPSULE ORAL at 07:53

## 2024-04-02 ASSESSMENT — ENCOUNTER SYMPTOMS
COUGH: 0
COLOR CHANGE: 0
WHEEZING: 0
SHORTNESS OF BREATH: 0
BACK PAIN: 0
NAUSEA: 0
VOMITING: 0
CHEST TIGHTNESS: 0

## 2024-04-02 ASSESSMENT — PAIN SCALES - GENERAL: PAINLEVEL_OUTOF10: 0
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
## 2024-04-02 NOTE — ACP (ADVANCE CARE PLANNING)
Advance Care Planning      Palliative Medicine Provider (MD/NP)  Advance Care Planning (ACP) Conversation      Date of Conversation: 04/02/24    The patient and/or authorized decision maker consented to a voluntary Advance Care Planning conversation.   Individuals present for the conversation:  Documented healthcare agent   Other persons present:  Legal healthcare decision maker as named below and Patient    Legal Healthcare Decision Maker(s):    Primary Decision Maker: Chelsy Art - Brother/Sister - [phone]    Primary Decision Maker: Josemanuel Art - Child - [phone]    Secondary Decision Maker: Reggie Art - Child - [phone]    ACP documents available in EMR prior to discussion:  [x] Advance Medical Directive  [x] Portable DNR  [] POLST  [] [facility]   [] None  [] ACP documents have been previously completed by patient or surrogate, but are not available today for review.        Primary Palliative Diagnosis:       Conversation Summary:   Discussed overall goals of care and wishes for patient. Patient defers decision making to HPOA listed above. Reviewed HPOA documents. Patient's son Josemanuel, Primary Decision Maker.  Josemanuel wishes to stop aggressive treatment, pursue comfort measures only. Josemanuel wishes to change code status to DNRCC.   Form filled out and placed in chart for SNF.     Resuscitation Status:   Code Status: DNR-CC     Outcomes / Completed Documentation:  An explanation of advance directives and their importance was provided and the following forms completed:    [x] Advance Medical Directive  [x] Portable DNR   [] POLST  [] No new documents completed  [] Patient or surrogate was asked to provide previously completed documents to the palliative team    If new document completed, original was provided to patient and/or family member.    Copy was placed for scanning into the [facility] EMR.      I spent 17 minutes providing separately identifiable ACP services with the patient and/or surrogate decision

## 2024-04-02 NOTE — PLAN OF CARE
Problem: Pain  Goal: Verbalizes/displays adequate comfort level or baseline comfort level  4/2/2024 0922 by Carlin Solis, RN  Outcome: Progressing  4/1/2024 2014 by Anh Walker, RN  Outcome: Progressing     Problem: Safety - Adult  Goal: Free from fall injury  Outcome: Progressing     Problem: Nutrition Deficit:  Goal: Optimize nutritional status  Outcome: Progressing

## 2024-04-03 VITALS
BODY MASS INDEX: 20.09 KG/M2 | DIASTOLIC BLOOD PRESSURE: 71 MMHG | RESPIRATION RATE: 18 BRPM | TEMPERATURE: 98.1 F | SYSTOLIC BLOOD PRESSURE: 88 MMHG | HEIGHT: 66 IN | HEART RATE: 61 BPM | OXYGEN SATURATION: 100 % | WEIGHT: 125 LBS

## 2024-04-03 PROCEDURE — 6360000002 HC RX W HCPCS: Performed by: INTERNAL MEDICINE

## 2024-04-03 PROCEDURE — APPSS15 APP SPLIT SHARED TIME 0-15 MINUTES: Performed by: NURSE PRACTITIONER

## 2024-04-03 PROCEDURE — 97535 SELF CARE MNGMENT TRAINING: CPT

## 2024-04-03 PROCEDURE — 2580000003 HC RX 258: Performed by: INTERNAL MEDICINE

## 2024-04-03 PROCEDURE — 99232 SBSQ HOSP IP/OBS MODERATE 35: CPT | Performed by: PSYCHIATRY & NEUROLOGY

## 2024-04-03 PROCEDURE — 99232 SBSQ HOSP IP/OBS MODERATE 35: CPT | Performed by: INTERNAL MEDICINE

## 2024-04-03 PROCEDURE — 97116 GAIT TRAINING THERAPY: CPT

## 2024-04-03 PROCEDURE — 6370000000 HC RX 637 (ALT 250 FOR IP): Performed by: INTERNAL MEDICINE

## 2024-04-03 RX ORDER — METOPROLOL TARTRATE 50 MG/1
25 TABLET, FILM COATED ORAL 2 TIMES DAILY
Qty: 180 TABLET | Refills: 3 | COMMUNITY
Start: 2024-04-03

## 2024-04-03 RX ADMIN — ENOXAPARIN SODIUM 40 MG: 100 INJECTION SUBCUTANEOUS at 09:10

## 2024-04-03 RX ADMIN — METOPROLOL TARTRATE 25 MG: 25 TABLET, FILM COATED ORAL at 09:17

## 2024-04-03 RX ADMIN — FINASTERIDE 5 MG: 5 TABLET, FILM COATED ORAL at 09:10

## 2024-04-03 RX ADMIN — PANTOPRAZOLE SODIUM 40 MG: 40 TABLET, DELAYED RELEASE ORAL at 06:00

## 2024-04-03 RX ADMIN — LUBIPROSTONE 24 MCG: 24 CAPSULE ORAL at 09:16

## 2024-04-03 RX ADMIN — PREGABALIN 75 MG: 75 CAPSULE ORAL at 09:10

## 2024-04-03 RX ADMIN — METHADONE HYDROCHLORIDE 10 MG: 5 TABLET ORAL at 09:05

## 2024-04-03 RX ADMIN — SODIUM CHLORIDE, PRESERVATIVE FREE 10 ML: 5 INJECTION INTRAVENOUS at 09:11

## 2024-04-03 ASSESSMENT — ENCOUNTER SYMPTOMS
VOMITING: 0
CHEST TIGHTNESS: 0
BACK PAIN: 0
TROUBLE SWALLOWING: 0
COLOR CHANGE: 0
WHEEZING: 0
COUGH: 0
NAUSEA: 0

## 2024-04-03 ASSESSMENT — PAIN SCALES - GENERAL: PAINLEVEL_OUTOF10: 2

## 2024-04-03 ASSESSMENT — PAIN DESCRIPTION - LOCATION: LOCATION: KNEE;LEG

## 2024-04-03 ASSESSMENT — PAIN DESCRIPTION - DESCRIPTORS: DESCRIPTORS: STABBING

## 2024-04-03 ASSESSMENT — PAIN DESCRIPTION - ORIENTATION: ORIENTATION: LEFT

## 2024-04-03 NOTE — PLAN OF CARE
Problem: Pain  Goal: Verbalizes/displays adequate comfort level or baseline comfort level  4/3/2024 1021 by Jennifer Anderson, RN  Outcome: Progressing  4/2/2024 2029 by Anh Walker, RN  Outcome: Progressing     Problem: Safety - Adult  Goal: Free from fall injury  Outcome: Progressing     Problem: Nutrition Deficit:  Goal: Optimize nutritional status  Outcome: Progressing     Problem: Skin/Tissue Integrity  Goal: Absence of new skin breakdown  Description: 1.  Monitor for areas of redness and/or skin breakdown  2.  Assess vascular access sites hourly  3.  Every 4-6 hours minimum:  Change oxygen saturation probe site  4.  Every 4-6 hours:  If on nasal continuous positive airway pressure, respiratory therapy assess nares and determine need for appliance change or resting period.  Outcome: Progressing

## 2024-04-03 NOTE — DISCHARGE INSTR - DIET

## 2024-04-03 NOTE — CARE COORDINATION
LAURIW SPOKE WITH RYAN AT [facility] AND THEY WILL ACCEPT THE PT TOMORROW AS LONG AS HE IS MEDICALLY CLEARED FOR DC.    
PT WILL TRANSPORT TO [facility] TODAY AT 3:30PM [facility].  [id number] completed.  
if so, who? Yes (son)  Plans to Return to Present Housing: No  Other Identified Issues/Barriers to RETURNING to current housing: MEDICAL CLEARANCE  Potential Assistance needed at discharge: Skilled Nursing Facility            Potential DME:    Patient expects to discharge to: Acute rehab  Plan for transportation at discharge:      Financial    Payor: MEDICARE / Plan: MEDICARE PART A AND B / Product Type: *No Product type* /     Does insurance require precert for SNF: No    Potential assistance Purchasing Medications:    Meds-to-Beds request: Yes      [id number] - [place], [place] - [address] -  [phone] - F [phone]  [address]  [facility] [address]  Phone: [phone] Fax: [phone]      Notes:    Factors facilitating achievement of predicted outcomes: Motivated, Cooperative, Pleasant, Sense of humor, and Good insight into deficits    Barriers to discharge: Medical complications    Additional Case Management Notes: LSW met with pt and son at bedside. Pt lives at home alone. Independent at baseline. Use walker. No Home O2. No dialysis. Not . Discussed DC plan with pt and son. Both agree with DC plan to SNF. FOC offered. [facility] first choice, [facility] 2nd choice.   Referral sent to [facility]. Pending acceptance.     The Plan for Transition of Care is related to the following treatment goals of Gait disturbance [R26.9]  Weakness [R53.1]  General weakness [R53.1]  Fall, initial encounter [W19.XXXA]    IF APPLICABLE: The Patient and/or patient representative Ayan and his family were provided with a choice of provider and agrees with the discharge plan. Freedom of choice list with basic dialogue that supports the patient's individualized plan of care/goals and shares the quality data associated with the providers was provided to:     Patient Representative Name:       The Patient and/or Patient Representative Agree with the Discharge Plan?      CHET Lara  Case

## 2024-04-03 NOTE — DISCHARGE SUMMARY
[facility] Medicine Discharge Summary    Ayan Art  :  1945  MRN:  [id number]    Admit date:  3/31/2024  Discharge date:  4/3/2024    Admitting Physician:  Roddy Diaz MD  Primary Care Physician:  Jesse Salvador MD    Discharge Diagnoses:    Principal Problem:    Weakness  Active Problems:    Fall    Palliative care encounter    Encounter for hospice care discussion    Advanced care planning/counseling discussion    Goals of care, counseling/discussion  Resolved Problems:    * No resolved hospital problems. *    Chief Complaint   Patient presents with    Fall       Condition: unchanged   Activity: no strenuous activity   Diet: regular  Disposition: SNF - KO  Functional Status: ambulatory with assistance    Significant Findings:         Latest Ref Rng & Units 2024     5:17 AM 2024     5:02 AM 3/31/2024    11:10 AM 2023     4:22 AM 12/3/2023     5:22 AM   Labs Renal   BUN 8 - 23 mg/dL 26  19  26  23  24    Cr 0.70 - 1.20 mg/dL 0.85  0.76  0.97  0.93  0.99    K 3.4 - 4.9 mEq/L 5.2  4.5  5.5  4.0  3.8    Na 135 - 144 mEq/L 137  138  134  137  140          Hospital Course:   78-year-old male with history of non-small cell lung cancer, prior splenectomy, left ankle osteomyelitis, CAD, chronic pain on methadone, Lyrica, and lung nodule for which he is undergoing malignancy workup was brought to the hospital for recurrent falls.  He otherwise remained stable during the hospitalization.  After consultation with palliative care, family elected to transition to comfort care strategy.  There was initial plan for EBUS as outpatient which they no longer plan on doing.  He will continue to follow with palliative care after discharge who will involve hospice if/when appropriate.      Exam on Discharge:   BP (!) 88/71   Pulse 61   Temp 98.1 °F (36.7 °C) (Oral)   Resp 18   Ht 1.676 m (5' 6\")   Wt 56.7 kg (125 lb)   SpO2 100%   BMI 20.18 kg/m²   General appearance:

## 2024-04-04 ENCOUNTER — OFFICE VISIT (OUTPATIENT)
Dept: GERIATRIC MEDICINE | Age: 79
End: 2024-04-04

## 2024-04-04 DIAGNOSIS — N18.9 CHRONIC KIDNEY DISEASE, UNSPECIFIED CKD STAGE: ICD-10-CM

## 2024-04-04 DIAGNOSIS — F32.A DEPRESSION, UNSPECIFIED DEPRESSION TYPE: ICD-10-CM

## 2024-04-04 DIAGNOSIS — E78.5 HYPERLIPIDEMIA, UNSPECIFIED HYPERLIPIDEMIA TYPE: ICD-10-CM

## 2024-04-04 DIAGNOSIS — I10 HYPERTENSION, UNSPECIFIED TYPE: ICD-10-CM

## 2024-04-04 DIAGNOSIS — R53.1 WEAKNESS: Primary | ICD-10-CM

## 2024-04-04 DIAGNOSIS — C34.90 MALIGNANT NEOPLASM OF LUNG, UNSPECIFIED LATERALITY, UNSPECIFIED PART OF LUNG (HCC): ICD-10-CM

## 2024-04-04 NOTE — PROGRESS NOTES
03/31/24 1700   RT Protocol   History Pulmonary Disease 2   Respiratory pattern 0   Breath sounds 2   Cough 0   Indications for Bronchodilator Therapy Decreased or absent breath sounds   Bronchodilator Assessment Score 4       
   04/01/24 0858   RT Protocol   History Pulmonary Disease 2   Respiratory pattern 0   Breath sounds 2   Cough 0   Indications for Bronchodilator Therapy Decreased or absent breath sounds   Bronchodilator Assessment Score 4       
  Physician Progress Note      PATIENT:               JEROME DACOSTA  CSN #:                  [id number]  :                       1945  ADMIT DATE:       3/31/2024 9:41 AM  DISCH DATE:  RESPONDING  PROVIDER #:        Louis Jarrett DO          QUERY TEXT:    Patient admitted with \"Generalized weakness, left knee/ankle pain s/p   fall....NEETA nodule\" per the H&P.  Noted 24 clinical dietician PN:   \"presents with severe malnutrition related to inadequate oral intake evidenced   by reported poor intake with significant weight loss and as per NFPA.\" If   possible, please document in progress notes and discharge summary if you are   evaluating and /or treating any of the following:    The medical record reflects the following:  Risk Factors: male age 78  chronic diastolic HF  COPD  HTN  HLD  Clinical Indicators: \"Severe malnutrition (24 1600)  Context:  Chronic   Illness  Findings of the 6 clinical characteristics of malnutrition:  Energy Intake:  75% or less estimated energy requirements for 1 month or   longer  Weight Loss:  10% over 6 months  Body Fat Loss:  Severe body fat loss Orbital, Buccal region  Muscle Mass Loss:  Severe muscle mass loss Temples (temporalis), Clavicles   (pectoralis & deltoids)  Treatment: malnutrition assessment  Continue Current Diet, Modify Oral   Nutrition Supplement (high calorie supplement tid and frozen supplement bid)    ASPEN Criteria:    https://aspenjournals.onlinelibrary.delacruz.com/doi/full/10.1177/843622336924147  5    Twin Mishra RN [facility]  [phone]  Options provided:  -- Protein calorie malnutrition severe  -- Other - I will add my own diagnosis  -- Disagree - Not applicable / Not valid  -- Disagree - Clinically unable to determine / Unknown  -- Refer to Clinical Documentation Reviewer    PROVIDER RESPONSE TEXT:    This patient has severe protein calorie malnutrition.    Query created by: Twin Mishra on 2024 12:57 PM      Electronically signed by:  Louis 
Comprehensive Nutrition Assessment    Type and Reason for Visit:  Initial, Positive Nutrition Screen    Nutrition Recommendations/Plan:   Continue Current Diet  Modify Oral Nutrition Supplement (high calorie supplement tid and frozen supplement bid)     Malnutrition Assessment:  Malnutrition Status:  Severe malnutrition (04/01/24 1600)    Context:  Chronic Illness     Findings of the 6 clinical characteristics of malnutrition:  Energy Intake:  75% or less estimated energy requirements for 1 month or longer  Weight Loss:  10% over 6 months     Body Fat Loss:  Severe body fat loss Orbital, Buccal region   Muscle Mass Loss:  Severe muscle mass loss Temples (temporalis), Clavicles (pectoralis & deltoids)  Fluid Accumulation:  No significant fluid accumulation     Strength:  Not Performed    Nutrition Assessment:    Pt presents with severe malnutrition related to inadequate oral intake evidenced by reported poor intake with significant weight loss and as per NFPA. To provide oral nutritional supplements with meals for additional calories and protein. To continue to follow.    Nutrition Related Findings:    PMH-CAD, HTN, LLL Ca (radiation), COPD, tobacco use, GERD, chronic left ankle OM, ETOH use, s/p splenectomy, lumbar stenosis, frequent falls, chronic pain with opiate dependence; admitted s/p fall. Labs/meds reviewed. Pert meds-ppi, (lokemia 3/31). No edema noted. Confused at times per nsg. Wound Type: None       Current Nutrition Intake & Therapies:    Average Meal Intake: 1-25%  Average Supplements Intake: % (per pt)  ADULT DIET; Regular  ADULT ORAL NUTRITION SUPPLEMENT; Breakfast, Lunch, Dinner; Standard High Calorie/High Protein Oral Supplement    Anthropometric Measures:  Height: 167.6 cm (5' 6\")  Ideal Body Weight (IBW): 142 lbs (65 kg)    Admission Body Weight: 56.7 kg (125 lb) (stated)  Current BMI (kg/m2):  20.2  Usual Body Weight: 72.5 kg (159 lb 12 oz) ((10/31/23); 158lb (2/14/23))                  
INPATIENT PROGRESS NOTES    PATIENT NAME: Ayan Art  MRN: [id number]  SERVICE DATE:  April 3, 2024   SERVICE TIME:  5:16 PM      PRIMARY SERVICE: Pulmonary Disease    CHIEF COMPLAIN: Lung nodule      INTERVAL HPI: Patient seen and examined at bedside, Interval Notes, orders reviewed. Nursing notes noted  he is going to skilled nursing [facility].  Sister is not sure she wants to do any aggressive workup until he is more stronger.  She said if he does not get stronger She does not want him to go through procedure.  No new problem overnight.       OBJECTIVE   I/O:24HR INTAKE/OUTPUT:    Intake/Output Summary (Last 24 hours) at 4/3/2024 1716  Last data filed at 4/3/2024 0922  Gross per 24 hour   Intake 360 ml   Output 550 ml   Net -190 ml     04/02 0701 - 04/03 0700  In: -   Out: 200 [Urine:200]  Body mass index is 20.18 kg/m².    PHYSICAL EXAM:  Vitals:  BP (!) 88/71   Pulse 61   Temp 98.1 °F (36.7 °C) (Oral)   Resp 18   Ht 1.676 m (5' 6\")   Wt 56.7 kg (125 lb)   SpO2 100%   BMI 20.18 kg/m²     General: Alert, awake .comfortable in bed, No distress.  Head: Atraumatic , Normocephalic   Eyes: PERRL. No sclera icterus. No conjunctival injection. No discharge   ENT: No nasal  discharge. Pharynx clear.  Neck:  Trachea midline. No thyromegaly, no JVD, No cervical adenopathy.  Chest : Bilaterally symmetrical ,Normal effort,  No accessory muscle use  Lung : Diminished breath sound bilaterally No Rales. No wheezing. No rhonchi.   Heart:: Normal rate. Regular rhythm. No mumur ,  Rub or gallop  ABD: Non-tender. Non-distended. No masses. No organmegaly. Normal bowel sounds. No hernia.  Ext : No Pitting both leg , No Cyanosis No clubbing  Neuro: no focal weakness    Labs:  Recent Labs     04/01/24  0502 04/02/24  0517   WBC 16.8* 14.3*   HGB 9.2* 9.9*    399     Recent Labs     04/01/24  0502 04/02/24  0517    137   K 4.5 5.2*    102   CO2 23 26   BUN 19 26*   CREATININE 0.76 0.85 
INPATIENT PROGRESS NOTES    PATIENT NAME: Ayan Art  MRN: [id number]  SERVICE DATE:  April 2, 2024   SERVICE TIME:  11:09 PM      PRIMARY SERVICE: Pulmonary Disease    CHIEF COMPLAIN: Lung nodule      INTERVAL HPI: Patient seen and examined at bedside, Interval Notes, orders reviewed. Nursing notes noted  Discussed with patient's sister.  She said he is going to skilled nursing facility [facility].  She said she is not sure she wants to do any aggressive workup until he is more stronger.  She said if he does not get stronger She does not want to stop.  He went through procedure.  She said she will call office to hold off on procedure on 4/13/2024         OBJECTIVE   I/O:24HR INTAKE/OUTPUT:    Intake/Output Summary (Last 24 hours) at 4/2/2024 2309  Last data filed at 4/2/2024 2145  Gross per 24 hour   Intake --   Output 325 ml   Net -325 ml     04/01 0701 - 04/02 0700  In: -   Out: 275 [Urine:275]  Body mass index is 20.18 kg/m².    PHYSICAL EXAM:  Vitals:  BP 94/62   Pulse 64   Temp 97.9 °F (36.6 °C) (Oral)   Resp 18   Ht 1.676 m (5' 6\")   Wt 56.7 kg (125 lb)   SpO2 94%   BMI 20.18 kg/m²     General: Alert, awake .comfortable in bed, No distress.  Head: Atraumatic , Normocephalic   Eyes: PERRL. No sclera icterus. No conjunctival injection. No discharge   ENT: No nasal  discharge. Pharynx clear.  Neck:  Trachea midline. No thyromegaly, no JVD, No cervical adenopathy.  Chest : Bilaterally symmetrical ,Normal effort,  No accessory muscle use  Lung : Diminished breath sound bilaterally No Rales. No wheezing. No rhonchi.   Heart:: Normal rate. Regular rhythm. No mumur ,  Rub or gallop  ABD: Non-tender. Non-distended. No masses. No organmegaly. Normal bowel sounds. No hernia.  Ext : No Pitting both leg , No Cyanosis No clubbing  Neuro: no focal weakness    Labs:  Recent Labs     03/31/24  1110 04/01/24  0502 04/02/24  0517   WBC 16.7* 16.8* 14.3*   HGB 10.7* 9.2* 9.9*   * 381 399     Recent 
[facility] OCCUPATIONAL THERAPY EVALUATION - ACUTE     NAME: Ayan Art  : 1945 (78 y.o.)  MRN: [id number]  CODE STATUS: Prior  Room:     Date of Service: 3/31/2024    Patient Diagnosis(es): No admission diagnoses are documented for this encounter.   Chief Complaint   Patient presents with    Fall     Patient Active Problem List    Diagnosis Date Noted    Bilateral carotid artery stenosis 2020    Essential hypertension 2020    MRSA (methicillin resistant staph aureus) culture positive 2023    'light-for-dates' infant with signs of fetal malnutrition 2023    Acute osteomyelitis of left ankle or foot (HCC) 2022    Pain of left calf 10/25/2022    Edema of left lower leg 10/25/2022    Lung cancer (HCC) 2024    Chronic kidney disease due to hypertension 2024    Stage 2 chronic kidney disease 2024    Cachexia (HCC) 2024    Body mass index (BMI) of 19.0 to 19.9 in adult 2024    Venous stasis ulcer of left ankle limited to breakdown of skin, unspecified whether varicose veins present (HCC) 2024    Protein calorie malnutrition 2024    Polyneuropathy associated with underlying disease ([facility]) 2023    Acute kidney injury superimposed on CKD ([facility]) 2023    Chronic obstructive pulmonary disease, unspecified COPD type (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 
[facility] OCCUPATIONAL THERAPY MED SURG TREATMENT NOTE     Date: 4/3/2024  Patient Name: Ayan Art        MRN: [id number]  Account: [id number]   : 1945  (78 y.o.)  Room: [place] [id number]    Chart Review:    Restrictions  Restrictions/Precautions  Restrictions/Precautions: Fall Risk     Safety:  Safety Devices  Type of Devices: All fall risk precautions in place;Call light within reach;Left in bed    Patient's birthday verified: Yes    Subjective:Pt seen with daughter in law present. \"Ah F%$k\"(Frustration with tying pants)            Pain at start of treatment: No    Pain at end of treatment: No    Location: N/A  Description N/A  Nursing notified: No  RN: N/A  Intervention: Repositioned    Objective:    ADL Status:  ADL  Grooming: Stand by assistance (Pt completed hand washing standing at sink.  Pt seated edge of bed without support completed oral care with SBA.)  Toileting: Contact guard assistance (Pt urinated while standing at toilet.  CGA for balance.)  Functional Mobility: Stand by assistance (Pt ambulated from bed<->bathroom with wwalker.)    WHITNEY Hose donned: No  If no - why  N/A    Therapy key for assistance levels -   Independent/Mod I = Pt. is able to perform task with no assistance but may require a device   Stand by assistance = Pt. does not perform task at an independent level but does not need physical assistance, requires verbal cues  Minimal, Moderate, Maximal Assistance = Pt. requires physical assistance (25%, 50%, 75% assist from helper) for task but is able to actively participate in task   Dependent = Pt. requires total assistance with task and is not able to actively participate with task completion    Orientation Status:       Observation:       Cognition Status:       Perception Status:       Vision and Hearing Status:           GROSS ASSESSMENT AROM/PROM:          ROM:        UE STRENGTH:       UE COORDINATION:       UE TONE:       UE SENSATION:       Functional 
Physical Therapy  Facility/Department: [facility] MED SURG W276/W276-01  Physical Therapy Discharge      NAME: Ayan Art    : 1945 (78 y.o.)  MRN: [id number]    Account: [id number]  Gender: male      Patient has been discharged from acute care hospital. DC patient from current PT program.      Electronically signed by Sarah Chavez PT on 24 at 5:03 PM EDT  
Physical Therapy Med Surg Daily Treatment Note  Facility/Department: [facility] 2 ORTHO TELE  Room: [facility]/76-       NAME: Ayan Art  : 1945 (78 y.o.)  MRN: [id number]  CODE STATUS: DNR-CC    Date of Service: 4/3/2024    Patient Diagnosis(es): Gait disturbance [R26.9]  Weakness [R53.1]  General weakness [R53.1]  Fall, initial encounter [W19.XXXA]   Chief Complaint   Patient presents with    Fall     Patient Active Problem List    Diagnosis Date Noted    Bilateral carotid artery stenosis 2020    Essential hypertension 2020    MRSA (methicillin resistant staph aureus) culture positive 2023    'Light-for-dates' infant with signs of fetal malnutrition 2023    Acute osteomyelitis of left ankle or foot (HCC) 2022    Pain of left calf 10/25/2022    Edema of left lower leg 10/25/2022    Palliative care encounter 2024    Encounter for hospice care discussion 2024    Advanced care planning/counseling discussion 2024    Goals of care, counseling/discussion 2024    Weakness 2024    Lung cancer (HCC) 2024    Chronic kidney disease due to hypertension 2024    Stage 2 chronic kidney disease 2024    Cachexia (HCC) 2024    Body mass index (BMI) of 19.0 to 19.9 in adult 2024    Venous stasis ulcer of left ankle limited to breakdown of skin, unspecified whether varicose veins present (HCC) 2024    Protein calorie malnutrition 2024    Polyneuropathy associated with underlying disease (HCC) 2023    Acute kidney injury superimposed on CKD (HCC) 2023    Chronic obstructive pulmonary disease, unspecified COPD type (HCC) 2022    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 
Physical Therapy Med Surg Daily Treatment Note  Facility/Department: [facility] 2 ORTHO TELE  Room: [place] [id number]       NAME: Ayan Art  : 1945 (78 y.o.)  MRN: [id number]  CODE STATUS: DNR-CCA    Date of Service: 2024    Patient Diagnosis(es): Gait disturbance [R26.9]  Weakness [R53.1]  General weakness [R53.1]  Fall, initial encounter [W19.XXXA]   Chief Complaint   Patient presents with    Fall     Patient Active Problem List    Diagnosis Date Noted    Bilateral carotid artery stenosis 2020    Essential hypertension 2020    MRSA (methicillin resistant staph aureus) culture positive 2023    'Light-for-dates' infant with signs of fetal malnutrition 2023    Acute osteomyelitis of left ankle or foot (HCC) 2022    Pain of left calf 10/25/2022    Edema of left lower leg 10/25/2022    Weakness 2024    Lung cancer (HCC) 2024    Chronic kidney disease due to hypertension 2024    Stage 2 chronic kidney disease 2024    Cachexia (HCC) 2024    Body mass index (BMI) of 19.0 to 19.9 in adult 2024    Venous stasis ulcer of left ankle limited to breakdown of skin, unspecified whether varicose veins present (HCC) 2024    Protein calorie malnutrition 2024    Polyneuropathy associated with underlying disease ([facility]) 2023    Acute kidney injury superimposed on CKD ([facility]) 2023    Chronic obstructive pulmonary disease, unspecified COPD type ([facility]) 2022    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 
Physical Therapy Med Surg Daily Treatment Note  Facility/Department: [facility] 2 ORTHO TELE  Room: [place] [id number]       NAME: Ayan Art  : 1945 (78 y.o.)  MRN: [id number]  CODE STATUS: DNR-CC    Date of Service: 2024    Patient Diagnosis(es): Gait disturbance [R26.9]  Weakness [R53.1]  General weakness [R53.1]  Fall, initial encounter [W19.XXXA]   Chief Complaint   Patient presents with    Fall     Patient Active Problem List    Diagnosis Date Noted    Bilateral carotid artery stenosis 2020    Essential hypertension 2020    MRSA (methicillin resistant staph aureus) culture positive 2023    'Light-for-dates' infant with signs of fetal malnutrition 2023    Acute osteomyelitis of left ankle or foot (HCC) 2022    Pain of left calf 10/25/2022    Edema of left lower leg 10/25/2022    Weakness 2024    Lung cancer (HCC) 2024    Chronic kidney disease due to hypertension 2024    Stage 2 chronic kidney disease 2024    Cachexia (HCC) 2024    Body mass index (BMI) of 19.0 to 19.9 in adult 2024    Venous stasis ulcer of left ankle limited to breakdown of skin, unspecified whether varicose veins present (HCC) 2024    Protein calorie malnutrition 2024    Polyneuropathy associated with underlying disease ([facility]) 2023    Acute kidney injury superimposed on CKD ([facility]) 2023    Chronic obstructive pulmonary disease, unspecified COPD type ([facility]) 2022    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 
Physical Therapy Med Surg Initial Assessment  Facility/Department: [facility] ED  Room:        NAME: Ayan Art  : 1945 (78 y.o.)  MRN: [id number]  CODE STATUS: Prior    Date of Service: 3/31/2024    Patient Diagnosis(es): No admission diagnoses are documented for this encounter.   Chief Complaint   Patient presents with    Fall     Patient Active Problem List    Diagnosis Date Noted    Bilateral carotid artery stenosis 2020    Essential hypertension 2020    MRSA (methicillin resistant staph aureus) culture positive 2023    'light-for-dates' infant with signs of fetal malnutrition 2023    Acute osteomyelitis of left ankle or foot (HCC) 2022    Pain of left calf 10/25/2022    Edema of left lower leg 10/25/2022    Lung cancer ([facility]) 2024    Chronic kidney disease due to hypertension 2024    Stage 2 chronic kidney disease 2024    Cachexia (HCC) 2024    Body mass index (BMI) of 19.0 to 19.9 in adult 2024    Venous stasis ulcer of left ankle limited to breakdown of skin, unspecified whether varicose veins present (HCC) 2024    Protein calorie malnutrition 2024    Polyneuropathy associated with underlying disease ([facility]) 2023    Acute kidney injury superimposed on CKD ([facility]) 2023    Chronic obstructive pulmonary disease, unspecified COPD type ([facility]) 2022    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     
Physician Progress Note    4/1/2024   1:11 PM    Name:  Ayan Art  MRN:    [id number]      Day: 1     Admit Date: 3/31/2024  9:41 AM  PCP: Jesse Salvador MD    Code Status:  DNR-CCA    Subjective:     Baseline patient is ambulatory with walker.  He has had recurrent falls for over a year that worsened recently.  No recent illness, fever, chills.  No change in medications.  He did have concerning PET scan recently for which outpatient EBUS is planned for 4/12.    Current Facility-Administered Medications   Medication Dose Route Frequency Provider Last Rate Last Admin    lubiprostone (AMITIZA) capsule 24 mcg (Patient Supplied)  24 mcg Oral Daily Louis Jarrett R, DO        sodium chloride flush 0.9 % injection 5-40 mL  5-40 mL IntraVENous 2 times per day Roddy Diaz MD   10 mL at 04/01/24 0817    sodium chloride flush 0.9 % injection 5-40 mL  5-40 mL IntraVENous PRN Roddy Diaz MD        0.9 % sodium chloride infusion   IntraVENous PRN Roddy Diaz MD        enoxaparin (LOVENOX) injection 40 mg  40 mg SubCUTAneous Daily Roddy Diaz MD        ondansetron (ZOFRAN-ODT) disintegrating tablet 4 mg  4 mg Oral Q8H PRN Roddy Diaz MD        Or    ondansetron (ZOFRAN) injection 4 mg  4 mg IntraVENous Q6H PRN Roddy Diaz MD        polyethylene glycol (GLYCOLAX) packet 17 g  17 g Oral Daily PRN Roddy Diaz MD        acetaminophen (TYLENOL) tablet 650 mg  650 mg Oral Q6H PRN Roddy Diaz MD        Or    acetaminophen (TYLENOL) suppository 650 mg  650 mg Rectal Q6H PRN Roddy Diaz MD        albuterol sulfate HFA (PROVENTIL;VENTOLIN;PROAIR) 108 (90 Base) MCG/ACT inhaler 2 puff  2 puff Inhalation BID Roddy Diaz MD        atorvastatin (LIPITOR) tablet 20 mg  20 mg Oral Nightly Roddy Diaz MD   20 mg at 03/31/24 2145    citalopram (CELEXA) tablet 20 mg  20 mg Oral Nightly Roddy Diaz MD   20 mg at 03/31/24 2140    finasteride (PROSCAR) tablet 5 mg  5 mg Oral Daily Joe 
Physician Progress Note    4/2/2024   3:40 PM    Name:  Ayan Art  MRN:    [id number]     IP Day: 2     Admit Date: 3/31/2024  9:41 AM  PCP: Jesse Salvador MD    Code Status:  DNR-CC    Subjective:     Denies complaints today    Current Facility-Administered Medications   Medication Dose Route Frequency Provider Last Rate Last Admin    oxyCODONE (ROXICODONE) immediate release tablet 5 mg  5 mg Oral Q4H PRN Cindi Cerna, APRN - CNP        lubiprostone (AMITIZA) capsule 24 mcg (Patient Supplied)  24 mcg Oral Daily Louis Jarrett DO   24 mcg at 04/02/24 0753    sodium chloride flush 0.9 % injection 5-40 mL  5-40 mL IntraVENous 2 times per day Roddy Diaz MD   10 mL at 04/02/24 0756    sodium chloride flush 0.9 % injection 5-40 mL  5-40 mL IntraVENous PRN Roddy Diaz MD        0.9 % sodium chloride infusion   IntraVENous PRN Roddy Diaz MD        enoxaparin (LOVENOX) injection 40 mg  40 mg SubCUTAneous Daily Roddy Diaz MD        ondansetron (ZOFRAN-ODT) disintegrating tablet 4 mg  4 mg Oral Q8H PRN Roddy Diaz MD        Or    ondansetron (ZOFRAN) injection 4 mg  4 mg IntraVENous Q6H PRN Roddy Diaz MD        polyethylene glycol (GLYCOLAX) packet 17 g  17 g Oral Daily PRN Roddy Diaz MD        acetaminophen (TYLENOL) tablet 650 mg  650 mg Oral Q6H PRN Roddy Diaz MD        Or    acetaminophen (TYLENOL) suppository 650 mg  650 mg Rectal Q6H PRN Roddy Diaz MD        albuterol sulfate HFA (PROVENTIL;VENTOLIN;PROAIR) 108 (90 Base) MCG/ACT inhaler 2 puff  2 puff Inhalation BID Roddy Diaz MD        atorvastatin (LIPITOR) tablet 20 mg  20 mg Oral Nightly Roddy Diaz MD   20 mg at 04/01/24 2011    citalopram (CELEXA) tablet 20 mg  20 mg Oral Nightly Roddy Diaz MD   20 mg at 04/01/24 2011    finasteride (PROSCAR) tablet 5 mg  5 mg Oral Daily Roddy Diaz MD   5 mg at 04/02/24 0753    isosorbide mononitrate (IMDUR) extended release tablet 30 mg  30 mg Oral Daily 
Report called to Marissa parr [facility]  
The patient home medication Amitiza given to Mariluy, his family member  
                       CT of the Head: Results for orders placed during the hospital encounter of 10/11/21    CT Head WO Contrast    Narrative  CT Brain.    Contrast medium:  without contrast..    History:  Fall. Loss of consciousness. Invasive squamous cell carcinoma, left lower lobe.    Technical factors: CT imaging of the brain was obtained and formatted as 5 mm contiguous axial images. 2.5 mm contiguous axial images were obtained through the osseous structures. Sagittal and coronal reconstruction obtained during postprocessing.    Comparison:  CT brain, August 18, 2021..    Findings:    Extra-axial spaces:  Normal.    Intracranial hemorrhage:  None.    Ventricular system: Ventricles mildly enlarged. Sulci mildly prominent.    Basal Cisterns:  Normal.    Cerebral Parenchyma: Bilateral symmetric periventricular areas decreased attenuation.    Midline Shift:  None.    Cerebellum:  Normal.    Paranasal sinuses and mastoid air cells:  Normal.    Visualized Orbits:  Normal.    Impression  Impression:    Mild cerebral atrophy.    Chronic ischemic white matter disease.      All CT scans at this facility use dose modulation, iterative reconstruction, and/or weight based dosing when appropriate to reduce radiation dose to as low as reasonably achievable.  No results found for this or any previous visit.  No results found for this or any previous visit.      Carotid duplex: No results found for this or any previous visit.  No results found for this or any previous visit.  Results for orders placed during the hospital encounter of 02/29/20    US CAROTID ARTERY BILATERAL    Narrative  EXAMINATION:  CAROTID DUPLEX ULTRASONOGRAPHY    CLINICAL HISTORY:  CAROTID STENOSIS    COMPARISONS:  4/18/2018    TECHNIQUE:  B-mode, color flow and spectral Doppler    FINDINGS:    ARTERIAL BLOOD FLOW VELOCITY    RIGHT PS    Prox CCA    99.4 cm/s  Mid CCA     94.4 cm/s  Dist CCA    76.8 cm/s  Prox ICA    57.5 cm/s  Mid ICA     71.3 cm/s  Dist 
decline  Will obtain CT of the head to rule out NPH  Current workup pending for lung cancer  Palliative care has been consulted    I have personally performed a face to face diagnostic evaluation on this patient, reviewed all data and investigations, and am the sole provider of all clinical decisions on the neurological status of this patient.  Patient seen and examined sequent lower extremity weakness which is multifactorial.  Will arrange for CT scan of the head to make sure is not normal pressure hydrocephalus keeping in mind that the management is unlikely to change given that he already  has lumbar spinal stenosis.  60% time spent on evaluating pain      Anshu Calzada MD, BENITA  Diplomate, American Board of Psychiatry & Neurology  Board Certified in Vascular Neurology  Board Certified in Neuromuscular Medicine  Certified in Neurorehabilitation           Collaborating physicians: Dr Calzada    Electronically signed by ELOY Pruitt CNP on 4/2/2024 at 12:46 PM

## 2024-04-05 ENCOUNTER — OFFICE VISIT (OUTPATIENT)
Dept: GERIATRIC MEDICINE | Age: 79
End: 2024-04-05

## 2024-04-05 DIAGNOSIS — C34.90 MALIGNANT NEOPLASM OF LUNG, UNSPECIFIED LATERALITY, UNSPECIFIED PART OF LUNG (HCC): ICD-10-CM

## 2024-04-05 DIAGNOSIS — R53.1 WEAKNESS: Primary | ICD-10-CM

## 2024-04-05 DIAGNOSIS — R26.9 ABNORMAL GAIT: ICD-10-CM

## 2024-04-05 DIAGNOSIS — J44.9 CHRONIC OBSTRUCTIVE PULMONARY DISEASE, UNSPECIFIED COPD TYPE (HCC): ICD-10-CM

## 2024-04-05 LAB
ANION GAP SERPL CALCULATED.3IONS-SCNC: 9 MEQ/L (ref 9–15)
BUN SERPL-MCNC: 15 MG/DL (ref 8–23)
CALCIUM SERPL-MCNC: 9.4 MG/DL (ref 8.5–9.9)
CHLORIDE SERPL-SCNC: 102 MEQ/L (ref 95–107)
CO2 SERPL-SCNC: 27 MEQ/L (ref 20–31)
CREAT SERPL-MCNC: 0.8 MG/DL (ref 0.7–1.2)
ERYTHROCYTE [DISTWIDTH] IN BLOOD BY AUTOMATED COUNT: 14 % (ref 11.5–14.5)
GLUCOSE SERPL-MCNC: 82 MG/DL (ref 70–99)
HCT VFR BLD AUTO: 33.2 % (ref 42–52)
HGB BLD-MCNC: 10.9 G/DL (ref 14–18)
MCH RBC QN AUTO: 31.4 PG (ref 27–31.3)
MCHC RBC AUTO-ENTMCNC: 32.8 % (ref 33–37)
MCV RBC AUTO: 95.7 FL (ref 79–92.2)
PLATELET # BLD AUTO: 472 K/UL (ref 130–400)
POTASSIUM SERPL-SCNC: 4.7 MEQ/L (ref 3.4–4.9)
RBC # BLD AUTO: 3.47 M/UL (ref 4.7–6.1)
SODIUM SERPL-SCNC: 138 MEQ/L (ref 135–144)
WBC # BLD AUTO: 10.2 K/UL (ref 4.8–10.8)

## 2024-04-06 ENCOUNTER — OFFICE VISIT (OUTPATIENT)
Dept: GERIATRIC MEDICINE | Age: 79
End: 2024-04-06

## 2024-04-06 DIAGNOSIS — C34.90 MALIGNANT NEOPLASM OF LUNG, UNSPECIFIED LATERALITY, UNSPECIFIED PART OF LUNG (HCC): ICD-10-CM

## 2024-04-06 DIAGNOSIS — I10 HYPERTENSION, UNSPECIFIED TYPE: ICD-10-CM

## 2024-04-06 DIAGNOSIS — R53.1 WEAKNESS: Primary | ICD-10-CM

## 2024-04-06 DIAGNOSIS — E78.5 HYPERLIPIDEMIA, UNSPECIFIED HYPERLIPIDEMIA TYPE: ICD-10-CM

## 2024-04-06 DIAGNOSIS — F32.A DEPRESSION, UNSPECIFIED DEPRESSION TYPE: ICD-10-CM

## 2024-04-06 DIAGNOSIS — N18.9 CHRONIC KIDNEY DISEASE, UNSPECIFIED CKD STAGE: ICD-10-CM

## 2024-04-09 ENCOUNTER — OFFICE VISIT (OUTPATIENT)
Dept: PALLATIVE CARE | Age: 79
End: 2024-04-09
Payer: MEDICARE

## 2024-04-09 ENCOUNTER — OFFICE VISIT (OUTPATIENT)
Dept: GERIATRIC MEDICINE | Age: 79
End: 2024-04-09

## 2024-04-09 VITALS
WEIGHT: 122 LBS | OXYGEN SATURATION: 89 % | DIASTOLIC BLOOD PRESSURE: 72 MMHG | RESPIRATION RATE: 18 BRPM | HEART RATE: 72 BPM | BODY MASS INDEX: 19.69 KG/M2 | SYSTOLIC BLOOD PRESSURE: 118 MMHG | TEMPERATURE: 99.1 F

## 2024-04-09 DIAGNOSIS — R26.9 ABNORMAL GAIT: ICD-10-CM

## 2024-04-09 DIAGNOSIS — Z51.5 PALLIATIVE CARE ENCOUNTER: ICD-10-CM

## 2024-04-09 DIAGNOSIS — J44.9 CHRONIC OBSTRUCTIVE PULMONARY DISEASE, UNSPECIFIED COPD TYPE (HCC): ICD-10-CM

## 2024-04-09 DIAGNOSIS — C34.90 MALIGNANT NEOPLASM OF LUNG, UNSPECIFIED LATERALITY, UNSPECIFIED PART OF LUNG (HCC): ICD-10-CM

## 2024-04-09 DIAGNOSIS — R53.1 WEAKNESS: Primary | ICD-10-CM

## 2024-04-09 DIAGNOSIS — Z71.89 ADVANCED CARE PLANNING/COUNSELING DISCUSSION: ICD-10-CM

## 2024-04-09 DIAGNOSIS — Z71.89 ENCOUNTER FOR HOSPICE CARE DISCUSSION: ICD-10-CM

## 2024-04-09 DIAGNOSIS — C34.92 NSCLC OF LEFT LUNG (HCC): Primary | ICD-10-CM

## 2024-04-09 DIAGNOSIS — Z71.89 GOALS OF CARE, COUNSELING/DISCUSSION: ICD-10-CM

## 2024-04-09 PROCEDURE — 99310 SBSQ NF CARE HIGH MDM 45: CPT

## 2024-04-09 PROCEDURE — G9692 HOSP RECD BY PT DUR MSMT PER: HCPCS

## 2024-04-09 RX ORDER — ATORVASTATIN CALCIUM 20 MG/1
20 TABLET, FILM COATED ORAL DAILY
COMMUNITY

## 2024-04-09 RX ORDER — ACETAMINOPHEN 325 MG/1
650 TABLET ORAL EVERY 4 HOURS PRN
COMMUNITY

## 2024-04-09 ASSESSMENT — ENCOUNTER SYMPTOMS
COLOR CHANGE: 1
COUGH: 1
GASTROINTESTINAL NEGATIVE: 1

## 2024-04-09 ASSESSMENT — PATIENT HEALTH QUESTIONNAIRE - PHQ9
SUM OF ALL RESPONSES TO PHQ9 QUESTIONS 1 & 2: 2
SUM OF ALL RESPONSES TO PHQ QUESTIONS 1-9: 2
DEPRESSION UNABLE TO ASSESS: FUNCTIONAL CAPACITY MOTIVATION LIMITS ACCURACY
SUM OF ALL RESPONSES TO PHQ QUESTIONS 1-9: 2
1. LITTLE INTEREST OR PLEASURE IN DOING THINGS: NOT AT ALL
2. FEELING DOWN, DEPRESSED OR HOPELESS: MORE THAN HALF THE DAYS

## 2024-04-09 NOTE — PROGRESS NOTES
Subjective:      Patient Id: Seen Ayan at [facility], for initial outpatient palliative Care visit.  He was accompanied to the appointment by: ***.    No chief complaint on file.     HPI       Ayan Art is a 78 y.o. male was seen and evaluated by palliative care for symptom management, advance care planning, and goals of care conversation. 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     Patient complains of ***.  Patient follows with ***.    General: Upon entering the room I find the patient ***, calm, cooperative and in NAD.    COPD: Patient and patient's family have previously opted out of continued evaluation and treatment of lung nodule and/or lung cancer related treatment.    Pain: Patient reports pain in his ***. Rates the pain at a   on a scale of 0 to 10. Describes pain as ***. Aggravating factors include ***. Alleviating factors include ***. Pain is worse in the *** time of day.  Patient states that the pain *** radiate to***. Other symptoms when pain occurs are ***. Current pain medications include ***. No sedation, constipation, or adverse effects on current regimen.     Appetite: Patient was previously noted to have unintentional weight loss.  Patient ***.    Mood:    Sleep:    Skin:    GI/:    I have personally reviewed the patient's most recent and available provider notes, lab work and imaging.      Past Medical History:   Diagnosis Date    Alcohol abuse     quit drinking 8/18/21    CAD (coronary artery disease)     patient states no doctor has told him this / has 1 cardiac stent    Cancer (HCC)     lung LLL    Chronic back pain     Chronic kidney disease     Chronic obstructive pulmonary disease, unspecified COPD

## 2024-04-09 NOTE — PROGRESS NOTES
Subjective:      Patient Id: Seen Ayan at [facility], for initial outpatient palliative Care visit.  He was accompanied to the appointment by: self and with son Josemanuel and INDIRA Paredes on telephone.    Chief Complaint   Patient presents with    Follow-up    Cancer     Lung cancer      HPI       Ayan Art is a 78 y.o. male was seen and evaluated by palliative care for symptom management, advance care planning, and goals of care conversation. 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 Nursing facility visit is necessary in lieu of office due to significant frailty and high symptom burden from comorbid illnesses.     Patient complains of intermittent confusion and history of lung cancer and COPD with lung nodule.  Patient follows with PCP.    General: Upon entering the room I find the patient A&O X4, calm, cooperative and in NAD.  Patient's family expressed concern for continued memory loss during telephone conversation.    COPD: Patient and patient's family have previously opted out of continued evaluation and treatment of lung nodule and/or lung cancer related treatment. Patient takes medications and treatments as advised.  Patient on room air during visit, denied any chest pain, chills or fever during interaction.  Patient was started on Augmentin twice daily x 5 days and Mucinex by attending provider for current symptoms with history of lung cancer and COPD with lung nodule.    Pain: Patient denies pain during interaction but then later admitted to having pain generalized. Rates the pain at a Pain Score:   5 on a scale of 0 to 10. Current pain medications include Methadone 10 mg BID PRN, Lyrica 75 mg BID and Tylenol as needed for pain.

## 2024-04-12 ENCOUNTER — OFFICE VISIT (OUTPATIENT)
Dept: GERIATRIC MEDICINE | Age: 79
End: 2024-04-12

## 2024-04-12 DIAGNOSIS — R29.6 FREQUENT FALLS: ICD-10-CM

## 2024-04-12 DIAGNOSIS — C34.90 MALIGNANT NEOPLASM OF LUNG, UNSPECIFIED LATERALITY, UNSPECIFIED PART OF LUNG (HCC): ICD-10-CM

## 2024-04-12 DIAGNOSIS — R53.1 WEAKNESS: Primary | ICD-10-CM

## 2024-04-12 DIAGNOSIS — R26.2 DIFFICULTY IN WALKING: ICD-10-CM

## 2024-04-12 DIAGNOSIS — J44.9 CHRONIC OBSTRUCTIVE PULMONARY DISEASE, UNSPECIFIED COPD TYPE (HCC): ICD-10-CM

## 2024-04-12 LAB
ANION GAP SERPL CALCULATED.3IONS-SCNC: 10 MEQ/L (ref 9–15)
BUN SERPL-MCNC: 23 MG/DL (ref 8–23)
CALCIUM SERPL-MCNC: 9 MG/DL (ref 8.5–9.9)
CHLORIDE SERPL-SCNC: 101 MEQ/L (ref 95–107)
CO2 SERPL-SCNC: 26 MEQ/L (ref 20–31)
CREAT SERPL-MCNC: 0.8 MG/DL (ref 0.7–1.2)
ERYTHROCYTE [DISTWIDTH] IN BLOOD BY AUTOMATED COUNT: 14.2 % (ref 11.5–14.5)
GLUCOSE SERPL-MCNC: 98 MG/DL (ref 70–99)
HCT VFR BLD AUTO: 31.9 % (ref 42–52)
HGB BLD-MCNC: 10.3 G/DL (ref 14–18)
MCH RBC QN AUTO: 30.6 PG (ref 27–31.3)
MCHC RBC AUTO-ENTMCNC: 32.3 % (ref 33–37)
MCV RBC AUTO: 94.7 FL (ref 79–92.2)
PLATELET # BLD AUTO: 420 K/UL (ref 130–400)
POTASSIUM SERPL-SCNC: 4 MEQ/L (ref 3.4–4.9)
RBC # BLD AUTO: 3.37 M/UL (ref 4.7–6.1)
SODIUM SERPL-SCNC: 137 MEQ/L (ref 135–144)
WBC # BLD AUTO: 10.2 K/UL (ref 4.8–10.8)

## 2024-04-12 NOTE — PROGRESS NOTES
Component Value Date/Time     04/12/2024 07:27 AM    K 4.0 04/12/2024 07:27 AM    K 4.2 10/15/2021 10:22 AM     04/12/2024 07:27 AM    CO2 26 04/12/2024 07:27 AM    BUN 23 04/12/2024 07:27 AM    CREATININE 0.80 04/12/2024 07:27 AM    GLUCOSE 98 04/12/2024 07:27 AM    CALCIUM 9.0 04/12/2024 07:27 AM      HgBA1c:    Lab Results   Component Value Date/Time    LABA1C 5.4 10/14/2022 11:00 AM     TSH:  Lab Results   Component Value Date/Time    TSH 0.447 10/13/2021 03:44 PM     Lipids:  Lab Results   Component Value Date/Time    CHOL 118 09/14/2021 03:50 PM    HDL 54 09/14/2021 03:50 PM    TRIG 84 09/14/2021 03:50 PM       I have reviewed the patient's medical history in detail and updated the computerized patient record.    This note was partially generated using Dragon voice recognition system, and there may be some incorrect words, spellings, punctuation that were not noticed in checking the note before saving.      Electronically signed by: ELOY Chirinos CNP on 4/12/2024

## 2024-04-15 ENCOUNTER — OFFICE VISIT (OUTPATIENT)
Dept: GERIATRIC MEDICINE | Age: 79
End: 2024-04-15

## 2024-04-15 DIAGNOSIS — C34.90 MALIGNANT NEOPLASM OF LUNG, UNSPECIFIED LATERALITY, UNSPECIFIED PART OF LUNG (HCC): ICD-10-CM

## 2024-04-15 DIAGNOSIS — R29.6 FREQUENT FALLS: ICD-10-CM

## 2024-04-15 DIAGNOSIS — I10 HYPERTENSION, UNSPECIFIED TYPE: ICD-10-CM

## 2024-04-15 DIAGNOSIS — R53.1 WEAKNESS: Primary | ICD-10-CM

## 2024-04-15 DIAGNOSIS — R26.2 DIFFICULTY IN WALKING: ICD-10-CM

## 2024-04-15 DIAGNOSIS — F32.A DEPRESSION, UNSPECIFIED DEPRESSION TYPE: ICD-10-CM

## 2024-04-15 DIAGNOSIS — E78.5 HYPERLIPIDEMIA, UNSPECIFIED HYPERLIPIDEMIA TYPE: ICD-10-CM

## 2024-04-15 DIAGNOSIS — J44.9 CHRONIC OBSTRUCTIVE PULMONARY DISEASE, UNSPECIFIED COPD TYPE (HCC): ICD-10-CM

## 2024-04-17 ENCOUNTER — OFFICE VISIT (OUTPATIENT)
Dept: GERIATRIC MEDICINE | Age: 79
End: 2024-04-17
Payer: MEDICARE

## 2024-04-17 DIAGNOSIS — I10 HYPERTENSION, UNSPECIFIED TYPE: ICD-10-CM

## 2024-04-17 DIAGNOSIS — E78.5 HYPERLIPIDEMIA, UNSPECIFIED HYPERLIPIDEMIA TYPE: ICD-10-CM

## 2024-04-17 DIAGNOSIS — R53.1 WEAKNESS: Primary | ICD-10-CM

## 2024-04-17 DIAGNOSIS — J44.9 CHRONIC OBSTRUCTIVE PULMONARY DISEASE, UNSPECIFIED COPD TYPE (HCC): ICD-10-CM

## 2024-04-17 DIAGNOSIS — C34.90 MALIGNANT NEOPLASM OF LUNG, UNSPECIFIED LATERALITY, UNSPECIFIED PART OF LUNG (HCC): ICD-10-CM

## 2024-04-17 DIAGNOSIS — N18.9 CHRONIC KIDNEY DISEASE, UNSPECIFIED CKD STAGE: ICD-10-CM

## 2024-04-17 DIAGNOSIS — F32.A DEPRESSION, UNSPECIFIED DEPRESSION TYPE: ICD-10-CM

## 2024-04-17 DIAGNOSIS — R29.6 FREQUENT FALLS: ICD-10-CM

## 2024-04-17 DIAGNOSIS — R26.2 DIFFICULTY IN WALKING: ICD-10-CM

## 2024-04-17 PROCEDURE — 99308 SBSQ NF CARE LOW MDM 20: CPT | Performed by: INTERNAL MEDICINE

## 2024-04-17 PROCEDURE — G9692 HOSP RECD BY PT DUR MSMT PER: HCPCS | Performed by: INTERNAL MEDICINE

## 2024-04-18 ENCOUNTER — OFFICE VISIT (OUTPATIENT)
Dept: GERIATRIC MEDICINE | Age: 79
End: 2024-04-18
Payer: MEDICARE

## 2024-04-18 DIAGNOSIS — J44.9 CHRONIC OBSTRUCTIVE PULMONARY DISEASE, UNSPECIFIED COPD TYPE (HCC): ICD-10-CM

## 2024-04-18 DIAGNOSIS — C34.90 MALIGNANT NEOPLASM OF LUNG, UNSPECIFIED LATERALITY, UNSPECIFIED PART OF LUNG (HCC): ICD-10-CM

## 2024-04-18 DIAGNOSIS — N18.9 CHRONIC KIDNEY DISEASE, UNSPECIFIED CKD STAGE: ICD-10-CM

## 2024-04-18 DIAGNOSIS — E78.5 HYPERLIPIDEMIA, UNSPECIFIED HYPERLIPIDEMIA TYPE: ICD-10-CM

## 2024-04-18 DIAGNOSIS — I10 HYPERTENSION, UNSPECIFIED TYPE: ICD-10-CM

## 2024-04-18 DIAGNOSIS — R29.6 FREQUENT FALLS: ICD-10-CM

## 2024-04-18 DIAGNOSIS — F32.A DEPRESSION, UNSPECIFIED DEPRESSION TYPE: ICD-10-CM

## 2024-04-18 DIAGNOSIS — R26.2 DIFFICULTY IN WALKING: ICD-10-CM

## 2024-04-18 DIAGNOSIS — R53.1 WEAKNESS: Primary | ICD-10-CM

## 2024-04-18 PROCEDURE — G9692 HOSP RECD BY PT DUR MSMT PER: HCPCS | Performed by: INTERNAL MEDICINE

## 2024-04-18 PROCEDURE — 99308 SBSQ NF CARE LOW MDM 20: CPT | Performed by: INTERNAL MEDICINE

## 2024-04-19 ENCOUNTER — TELEPHONE (OUTPATIENT)
Dept: PULMONOLOGY | Age: 79
End: 2024-04-19

## 2024-04-19 NOTE — TELEPHONE ENCOUNTER
HEMATOLOGY/ONCOLOGY CALLED THEY HAVE BEEN TRYING TO REACH PATIENT SINCE MARCH AND HAVE NOT BEEN ABLE TO CONTACT HIM. THEY ARE CLOSING THE REFERRAL.

## 2024-04-20 ENCOUNTER — OFFICE VISIT (OUTPATIENT)
Dept: GERIATRIC MEDICINE | Age: 79
End: 2024-04-20

## 2024-04-20 DIAGNOSIS — J44.9 CHRONIC OBSTRUCTIVE PULMONARY DISEASE, UNSPECIFIED COPD TYPE (HCC): ICD-10-CM

## 2024-04-20 DIAGNOSIS — N18.9 CHRONIC KIDNEY DISEASE, UNSPECIFIED CKD STAGE: ICD-10-CM

## 2024-04-20 DIAGNOSIS — E78.5 HYPERLIPIDEMIA, UNSPECIFIED HYPERLIPIDEMIA TYPE: ICD-10-CM

## 2024-04-20 DIAGNOSIS — F32.A DEPRESSION, UNSPECIFIED DEPRESSION TYPE: ICD-10-CM

## 2024-04-20 DIAGNOSIS — R26.2 DIFFICULTY IN WALKING: ICD-10-CM

## 2024-04-20 DIAGNOSIS — R29.6 FREQUENT FALLS: ICD-10-CM

## 2024-04-20 DIAGNOSIS — I10 HYPERTENSION, UNSPECIFIED TYPE: ICD-10-CM

## 2024-04-20 DIAGNOSIS — R53.1 WEAKNESS: Primary | ICD-10-CM

## 2024-04-20 DIAGNOSIS — C34.90 MALIGNANT NEOPLASM OF LUNG, UNSPECIFIED LATERALITY, UNSPECIFIED PART OF LUNG (HCC): ICD-10-CM

## 2024-04-20 NOTE — PROGRESS NOTES
SNF PROGRESS NOTE      Cc- weakness       Patient is a Ayan Art 78 y.o. male who is being seen at [facility] s/p hospital stay after a fall at home. He is having ankle and knee pain. Patient does have lung cancer.     Patient is sitting in the chair in his room. He remains on nasal cannula oxygen.         Past Medical History:   Diagnosis Date    Alcohol abuse     quit drinking 8/18/21    CAD (coronary artery disease)     patient states no doctor has told him this / has 1 cardiac stent    Cancer (HCC)     lung LLL    Chronic back pain     Chronic kidney disease     Chronic obstructive pulmonary disease, unspecified COPD type (HCC) 3/24/2022    Chronic sinusitis     DJD (degenerative joint disease) of knee     left knee DJD    Elevated PSA     History of blood transfusion 1980's    History of inferior wall myocardial infarction 01/2016    1 cardiac stent    HTN (hypertension)     meds . 1 yr    Hyperlipidemia     meds > 12 yrs    NSTEMI (non-ST elevated myocardial infarction) ([facility])     Retention of urine, unspecified 3/27/2017    Smoker      Morphine    VS reviewed    Gen- Alert and oriented x 3   Heart- RRR no murmur no LE edema   Lungs- CTA b/l no resp distress 4 L NC  oxygen   Abd- bs x 4           Assessment and Plan    Weakness/ Gait disturbance   PT OT   Lung cancer with mets to bone/ Chronic resp failure   Methadone  On 4 L NC    No current treatment for cancer.   HTN   Metoprolol, spironolactone   HLD   Statin   CKD   Depression   KAUR Mccann DO     Electronically signed by: Shira Peguero DO on 4/15/2024

## 2024-04-22 ENCOUNTER — OFFICE VISIT (OUTPATIENT)
Dept: GERIATRIC MEDICINE | Age: 79
End: 2024-04-22

## 2024-04-22 DIAGNOSIS — J44.9 CHRONIC OBSTRUCTIVE PULMONARY DISEASE, UNSPECIFIED COPD TYPE (HCC): ICD-10-CM

## 2024-04-22 DIAGNOSIS — N18.9 CHRONIC KIDNEY DISEASE, UNSPECIFIED CKD STAGE: ICD-10-CM

## 2024-04-22 DIAGNOSIS — C34.90 MALIGNANT NEOPLASM OF LUNG, UNSPECIFIED LATERALITY, UNSPECIFIED PART OF LUNG (HCC): ICD-10-CM

## 2024-04-22 DIAGNOSIS — F32.A DEPRESSION, UNSPECIFIED DEPRESSION TYPE: ICD-10-CM

## 2024-04-22 DIAGNOSIS — R53.1 WEAKNESS: Primary | ICD-10-CM

## 2024-04-22 DIAGNOSIS — E78.5 HYPERLIPIDEMIA, UNSPECIFIED HYPERLIPIDEMIA TYPE: ICD-10-CM

## 2024-04-22 DIAGNOSIS — R26.2 DIFFICULTY IN WALKING: ICD-10-CM

## 2024-04-22 DIAGNOSIS — I10 HYPERTENSION, UNSPECIFIED TYPE: ICD-10-CM

## 2024-04-22 DIAGNOSIS — R29.6 FREQUENT FALLS: ICD-10-CM

## 2024-04-23 ENCOUNTER — OFFICE VISIT (OUTPATIENT)
Dept: GERIATRIC MEDICINE | Age: 79
End: 2024-04-23
Payer: MEDICARE

## 2024-04-23 DIAGNOSIS — C34.90 MALIGNANT NEOPLASM OF LUNG, UNSPECIFIED LATERALITY, UNSPECIFIED PART OF LUNG (HCC): ICD-10-CM

## 2024-04-23 DIAGNOSIS — R29.6 FREQUENT FALLS: ICD-10-CM

## 2024-04-23 DIAGNOSIS — R52 PAIN: Primary | ICD-10-CM

## 2024-04-23 DIAGNOSIS — M25.551 BILATERAL HIP PAIN: ICD-10-CM

## 2024-04-23 DIAGNOSIS — R53.1 WEAKNESS: Primary | ICD-10-CM

## 2024-04-23 DIAGNOSIS — E78.5 HYPERLIPIDEMIA, UNSPECIFIED HYPERLIPIDEMIA TYPE: ICD-10-CM

## 2024-04-23 DIAGNOSIS — M54.50 CHRONIC LOW BACK PAIN, UNSPECIFIED BACK PAIN LATERALITY, UNSPECIFIED WHETHER SCIATICA PRESENT: ICD-10-CM

## 2024-04-23 DIAGNOSIS — J06.9 UPPER RESPIRATORY TRACT INFECTION, UNSPECIFIED TYPE: ICD-10-CM

## 2024-04-23 DIAGNOSIS — M25.552 BILATERAL HIP PAIN: ICD-10-CM

## 2024-04-23 DIAGNOSIS — G89.29 CHRONIC LOW BACK PAIN, UNSPECIFIED BACK PAIN LATERALITY, UNSPECIFIED WHETHER SCIATICA PRESENT: ICD-10-CM

## 2024-04-23 DIAGNOSIS — R26.2 DIFFICULTY IN WALKING: ICD-10-CM

## 2024-04-23 PROBLEM — F11.10 HEROIN ABUSE (HCC): Status: RESOLVED | Noted: 2021-06-22 | Resolved: 2024-04-23

## 2024-04-23 PROCEDURE — G9692 HOSP RECD BY PT DUR MSMT PER: HCPCS | Performed by: NURSE PRACTITIONER

## 2024-04-23 PROCEDURE — 99309 SBSQ NF CARE MODERATE MDM 30: CPT | Performed by: NURSE PRACTITIONER

## 2024-04-23 RX ORDER — METHADONE HYDROCHLORIDE 10 MG/1
10 TABLET ORAL EVERY 8 HOURS
Qty: 90 TABLET | Refills: 0 | Status: SHIPPED | OUTPATIENT
Start: 2024-04-23 | End: 2024-05-23

## 2024-04-23 NOTE — PROGRESS NOTES
SNF PROGRESS NOTE      Cc- weakness       Patient is a Ayan Art 78 y.o. male who is being seen at [facility] s/p hospital stay after a fall at home. He is having ankle and knee pain. Patient does have lung cancer.     Patient continues with pain. Takes his methadone. He remains on 4 L NC oxygen. He is sitting up in the chair.         Past Medical History:   Diagnosis Date    Alcohol abuse     quit drinking 8/18/21    CAD (coronary artery disease)     patient states no doctor has told him this / has 1 cardiac stent    Cancer (HCC)     lung LLL    Chronic back pain     Chronic kidney disease     Chronic obstructive pulmonary disease, unspecified COPD type (HCC) 3/24/2022    Chronic sinusitis     DJD (degenerative joint disease) of knee     left knee DJD    Elevated PSA     History of blood transfusion 1980's    History of inferior wall myocardial infarction 01/2016    1 cardiac stent    HTN (hypertension)     meds . 1 yr    Hyperlipidemia     meds > 12 yrs    NSTEMI (non-ST elevated myocardial infarction) ([facility])     Retention of urine, unspecified 3/27/2017    Smoker      Morphine    VS reviewed      Gen- Alert and oriented x 3   Heart- RRR no murmur no LE edema   Lungs- CTA b/l no resp distress 4 L NC  oxygen   Abd- bs x 4         Assessment and Plan    Weakness/ Gait disturbance   PT OT   Lung cancer with mets to bone/ Chronic resp failure   Methadone  On 4 L NC    No current treatment for cancer.   HTN   Metoprolol, spironolactone   HLD   Statin   CKD   Depression   jessica Peguero DO, KAUR     Electronically signed by: Shira Peguero DO on 4/17/2024

## 2024-04-23 NOTE — PROGRESS NOTES
diagnostic results, other suggested diagnostic testing, prognosis, risk and benefits of treatment options, importance of compliance with treatment options with resident and nursing staff.  Reviewed therapy plan and progress with therapists.  Reviewed medical record, labs, medications, etc.     I have reviewed the patient's medical history in detail and updated the computerized patient record.    This note was partially generated using Dragon voice recognition system, and there may be some incorrect words, spellings, punctuation that were not noticed in checking the note before saving.      Electronically signed by: ELOY Chirinos CNP on 4/23/2024

## 2024-04-24 ENCOUNTER — OFFICE VISIT (OUTPATIENT)
Dept: GERIATRIC MEDICINE | Age: 79
End: 2024-04-24

## 2024-04-24 DIAGNOSIS — R26.2 DIFFICULTY IN WALKING: ICD-10-CM

## 2024-04-24 DIAGNOSIS — F32.A DEPRESSION, UNSPECIFIED DEPRESSION TYPE: ICD-10-CM

## 2024-04-24 DIAGNOSIS — I10 HYPERTENSION, UNSPECIFIED TYPE: ICD-10-CM

## 2024-04-24 DIAGNOSIS — C34.90 MALIGNANT NEOPLASM OF LUNG, UNSPECIFIED LATERALITY, UNSPECIFIED PART OF LUNG (HCC): Primary | ICD-10-CM

## 2024-04-24 DIAGNOSIS — R29.6 FREQUENT FALLS: ICD-10-CM

## 2024-04-24 DIAGNOSIS — E78.5 HYPERLIPIDEMIA, UNSPECIFIED HYPERLIPIDEMIA TYPE: ICD-10-CM

## 2024-04-24 DIAGNOSIS — J44.9 CHRONIC OBSTRUCTIVE PULMONARY DISEASE, UNSPECIFIED COPD TYPE (HCC): ICD-10-CM

## 2024-04-24 DIAGNOSIS — R53.1 WEAKNESS: ICD-10-CM

## 2024-04-24 NOTE — PROGRESS NOTES
SNF PROGRESS NOTE      Cc- weakness       Patient is a Ayan Art 78 y.o. male who is being seen at [facility] s/p hospital stay after a fall at home. He is having ankle and knee pain. Patient does have lung cancer.     Patient remains on 4 L NC.  He is sitting in the chair at bedside. He is eating ok.          Past Medical History:   Diagnosis Date    Alcohol abuse     quit drinking 8/18/21    CAD (coronary artery disease)     patient states no doctor has told him this / has 1 cardiac stent    Cancer (HCC)     lung LLL    Chronic back pain     Chronic kidney disease     Chronic obstructive pulmonary disease, unspecified COPD type (HCC) 3/24/2022    Chronic sinusitis     DJD (degenerative joint disease) of knee     left knee DJD    Elevated PSA     History of blood transfusion 1980's    History of inferior wall myocardial infarction 01/2016    1 cardiac stent    HTN (hypertension)     meds . 1 yr    Hyperlipidemia     meds > 12 yrs    NSTEMI (non-ST elevated myocardial infarction) ([facility])     Retention of urine, unspecified 3/27/2017    Smoker      Morphine    VS reviewed      Gen- Alert and oriented x 3   Heart- RRR no murmur no LE edema   Lungs- CTA b/l no resp distress 4 L NC  oxygen   Abd- bs x 4       Assessment and Plan    Weakness/ Gait disturbance   PT OT   Lung cancer with mets to bone/ Chronic resp failure   Methadone  On 4 L NC    No current treatment for cancer.   HTN   Metoprolol, spironolactone   HLD   Statin   CKD   Depression   celexa         Shira Peguero DO, KAUR     Electronically signed by: Shira Peguero DO on 4/18/2024

## 2024-04-25 ENCOUNTER — OFFICE VISIT (OUTPATIENT)
Dept: GERIATRIC MEDICINE | Age: 79
End: 2024-04-25

## 2024-04-25 DIAGNOSIS — E78.5 HYPERLIPIDEMIA, UNSPECIFIED HYPERLIPIDEMIA TYPE: ICD-10-CM

## 2024-04-25 DIAGNOSIS — R29.6 FREQUENT FALLS: ICD-10-CM

## 2024-04-25 DIAGNOSIS — C34.90 MALIGNANT NEOPLASM OF LUNG, UNSPECIFIED LATERALITY, UNSPECIFIED PART OF LUNG (HCC): Primary | ICD-10-CM

## 2024-04-25 DIAGNOSIS — R26.2 DIFFICULTY IN WALKING: ICD-10-CM

## 2024-04-25 DIAGNOSIS — F32.A DEPRESSION, UNSPECIFIED DEPRESSION TYPE: ICD-10-CM

## 2024-04-25 DIAGNOSIS — R53.1 WEAKNESS: ICD-10-CM

## 2024-04-25 DIAGNOSIS — I10 HYPERTENSION, UNSPECIFIED TYPE: ICD-10-CM

## 2024-04-25 DIAGNOSIS — J44.9 CHRONIC OBSTRUCTIVE PULMONARY DISEASE, UNSPECIFIED COPD TYPE (HCC): ICD-10-CM

## 2024-04-25 NOTE — PROGRESS NOTES
SNF PROGRESS NOTE      Cc-  weakness       Patient is a Ayan Art 78 y.o. male who is being seen at [facility] s/p hospital stay after a fall at home. He is having ankle and knee pain. Patient does have lung cancer.       Patient is laying in bed. He is on 4 L NC. He is getting ready for breakfast. No issues at this time. Continues to get pain meds, and still with some pain.         Past Medical History:   Diagnosis Date    Alcohol abuse     quit drinking 8/18/21    CAD (coronary artery disease)     patient states no doctor has told him this / has 1 cardiac stent    Cancer (HCC)     lung LLL    Chronic back pain     Chronic kidney disease     Chronic obstructive pulmonary disease, unspecified COPD type (HCC) 3/24/2022    Chronic sinusitis     DJD (degenerative joint disease) of knee     left knee DJD    Elevated PSA     History of blood transfusion 1980's    History of inferior wall myocardial infarction 01/2016    1 cardiac stent    HTN (hypertension)     meds . 1 yr    Hyperlipidemia     meds > 12 yrs    NSTEMI (non-ST elevated myocardial infarction) ([facility])     Retention of urine, unspecified 3/27/2017    Smoker      Morphine    VS reviewed    Gen- Alert and oriented x 3   Heart- RRR no murmur no LE edema   Lungs- CTA b/l no resp distress 4 L NC  oxygen   Abd- bs x 4            Assessment and Plan    Weakness/ Gait disturbance   PT OT   Lung cancer with mets to bone/ Chronic resp failure   Methadone  On 4 L NC    No current treatment for cancer.   HTN   Metoprolol, spironolactone   HLD   Statin   CKD   Depression   celexa      Shira Peguero DO, FACDIMPLE     Electronically signed by: Shira Peguero DO on 4/20/2024

## 2024-04-26 ENCOUNTER — OFFICE VISIT (OUTPATIENT)
Dept: GERIATRIC MEDICINE | Age: 79
End: 2024-04-26

## 2024-04-26 DIAGNOSIS — R26.2 DIFFICULTY IN WALKING: Primary | ICD-10-CM

## 2024-04-26 DIAGNOSIS — G89.4 CHRONIC PAIN SYNDROME: ICD-10-CM

## 2024-04-26 DIAGNOSIS — D72.829 LEUKOCYTOSIS, UNSPECIFIED TYPE: ICD-10-CM

## 2024-04-26 DIAGNOSIS — E87.5 HYPERKALEMIA: ICD-10-CM

## 2024-04-26 DIAGNOSIS — R29.6 FREQUENT FALLS: ICD-10-CM

## 2024-04-26 DIAGNOSIS — J06.9 UPPER RESPIRATORY TRACT INFECTION, UNSPECIFIED TYPE: ICD-10-CM

## 2024-04-26 LAB
ANION GAP SERPL CALCULATED.3IONS-SCNC: 10 MEQ/L (ref 9–15)
BUN SERPL-MCNC: 22 MG/DL (ref 8–23)
CALCIUM SERPL-MCNC: 9.1 MG/DL (ref 8.5–9.9)
CHLORIDE SERPL-SCNC: 101 MEQ/L (ref 95–107)
CO2 SERPL-SCNC: 27 MEQ/L (ref 20–31)
CREAT SERPL-MCNC: 0.95 MG/DL (ref 0.7–1.2)
ERYTHROCYTE [DISTWIDTH] IN BLOOD BY AUTOMATED COUNT: 14.6 % (ref 11.5–14.5)
GLUCOSE SERPL-MCNC: 66 MG/DL (ref 70–99)
HCT VFR BLD AUTO: 32.7 % (ref 42–52)
HGB BLD-MCNC: 10.4 G/DL (ref 14–18)
MCH RBC QN AUTO: 29.6 PG (ref 27–31.3)
MCHC RBC AUTO-ENTMCNC: 31.8 % (ref 33–37)
MCV RBC AUTO: 93.2 FL (ref 79–92.2)
PLATELET # BLD AUTO: 411 K/UL (ref 130–400)
POTASSIUM SERPL-SCNC: 5 MEQ/L (ref 3.4–4.9)
RBC # BLD AUTO: 3.51 M/UL (ref 4.7–6.1)
SODIUM SERPL-SCNC: 138 MEQ/L (ref 135–144)
WBC # BLD AUTO: 12 K/UL (ref 4.8–10.8)

## 2024-04-26 NOTE — PROGRESS NOTES
record for vital signs.      Physical Exam  Constitutional:  up in wheelchair, elderly, frail, in no acute distess  Pulmonary/Chest:  lung sounds rhonchi and diminished in lower lobes, no shortness of breath at rest  Cardiovascular:  S1-S2 regular, no edema  Abd/GI:  abdomen soft, round, active bowel sounds x 4 quadrants  MS/Extremities:  LAY, ROM limited, abnormal gait  Psych:  A/O x 1, cooperative with care at times     Diagnosis Orders   1. Difficulty in walking        2. Frequent falls        3. Chronic pain syndrome        4. Upper respiratory tract infection, unspecified type        5. Leukocytosis, unspecified type        6. Hyperkalemia                Assessment and Plan:      1. Difficulty in walking/Frequent falls  Resident had a fall today, no injury, continue PT and OT with focus on strengthening gait and balance.    2. Chronic pain syndrome  Pain level is improved, resident is on methadone and routine Tylenol, continue as ordered.    3. Upper respiratory tract infection, unspecified type  White blood cell count 12.0, lung sounds are moderately improved, completed dose of Augmentin, discontinue Mucinex.    4. Leukocytosis, unspecified type  White blood count 12.0, resident fell today trying to toilet himself, complains of urinary frequency, UA C&S today.    5. Hyperkalemia  Potassium 5.0, Kayexalate 15 g p.o. x 1 today.  Repeat potassium level in the morning.      Reviewed labs:  Yes    CBC  Lab Results   Component Value Date/Time    WBC 12.0 04/26/2024 06:25 AM    RBC 3.51 04/26/2024 06:25 AM    HGB 10.4 04/26/2024 06:25 AM    HCT 32.7 04/26/2024 06:25 AM    MCV 93.2 04/26/2024 06:25 AM    MCH 29.6 04/26/2024 06:25 AM    MCHC 31.8 04/26/2024 06:25 AM    RDW 14.6 04/26/2024 06:25 AM     04/26/2024 06:25 AM      BMP  Lab Results   Component Value Date/Time     04/26/2024 06:25 AM    K 5.0 04/26/2024 06:25 AM    K 4.2 10/15/2021 10:22 AM     04/26/2024 06:25 AM    CO2 27 04/26/2024 06:25

## 2024-04-26 NOTE — PROGRESS NOTES
SNF PROGRESS NOTE      Cc- weakness       Patient is a Ayan Art 78 y.o. male who is being seen at [facility] s/p hospital stay after a fall at home. He is having ankle and knee pain. Patient does have lung cancer.     Patient is on 4 L NC. He is sitting up in the chair. Still with some pain.          Past Medical History:   Diagnosis Date    Alcohol abuse     quit drinking 8/18/21    CAD (coronary artery disease)     patient states no doctor has told him this / has 1 cardiac stent    Cancer (HCC)     lung LLL    Chronic back pain     Chronic kidney disease     Chronic obstructive pulmonary disease, unspecified COPD type (HCC) 3/24/2022    Chronic sinusitis     DJD (degenerative joint disease) of knee     left knee DJD    Elevated PSA     History of blood transfusion 1980's    History of inferior wall myocardial infarction 01/2016    1 cardiac stent    HTN (hypertension)     meds . 1 yr    Hyperlipidemia     meds > 12 yrs    NSTEMI (non-ST elevated myocardial infarction) ([facility])     Retention of urine, unspecified 3/27/2017    Smoker      Morphine    VS reviewed      Gen- Alert and oriented x 3   Heart- RRR no murmur no LE edema   Lungs- CTA b/l no resp distress 4 L NC  oxygen   Abd- bs x 4         Assessment and Plan    Weakness/ Gait disturbance   PT OT   Lung cancer with mets to bone/ Chronic resp failure   Methadone  On 4 L NC    No current treatment for cancer.   HTN   Metoprolol, spironolactone   HLD   Statin   CKD   Depression   jessica Peguero DO, KAUR     Electronically signed by: Shira Peguero DO on 4/22/2024

## 2024-04-27 LAB
BILIRUB UR QL STRIP: NEGATIVE
CLARITY UR: CLEAR
COLOR UR: YELLOW
GLUCOSE UR STRIP-MCNC: NEGATIVE MG/DL
HGB UR QL STRIP: NEGATIVE
KETONES UR STRIP-MCNC: ABNORMAL MG/DL
LEUKOCYTE ESTERASE UR QL STRIP: NEGATIVE
NITRITE UR QL STRIP: NEGATIVE
PH UR STRIP: 5.5 [PH] (ref 5–9)
POTASSIUM SERPL-SCNC: 4.3 MEQ/L (ref 3.4–4.9)
PROT UR STRIP-MCNC: NEGATIVE MG/DL
SP GR UR STRIP: 1.02 (ref 1–1.03)
UROBILINOGEN UR STRIP-ACNC: 1 E.U./DL

## 2024-04-28 LAB — BACTERIA UR CULT: NORMAL

## 2024-04-29 ENCOUNTER — OFFICE VISIT (OUTPATIENT)
Dept: GERIATRIC MEDICINE | Age: 79
End: 2024-04-29

## 2024-04-29 VITALS
DIASTOLIC BLOOD PRESSURE: 70 MMHG | TEMPERATURE: 97.7 F | RESPIRATION RATE: 18 BRPM | OXYGEN SATURATION: 90 % | SYSTOLIC BLOOD PRESSURE: 112 MMHG | HEART RATE: 60 BPM

## 2024-04-29 DIAGNOSIS — E78.5 HYPERLIPIDEMIA, UNSPECIFIED HYPERLIPIDEMIA TYPE: ICD-10-CM

## 2024-04-29 DIAGNOSIS — R29.6 FREQUENT FALLS: ICD-10-CM

## 2024-04-29 DIAGNOSIS — G89.4 CHRONIC PAIN SYNDROME: ICD-10-CM

## 2024-04-29 DIAGNOSIS — C34.90 MALIGNANT NEOPLASM OF LUNG, UNSPECIFIED LATERALITY, UNSPECIFIED PART OF LUNG (HCC): ICD-10-CM

## 2024-04-29 DIAGNOSIS — S62.102S CLOSED FRACTURE OF LEFT WRIST, SEQUELA: Primary | ICD-10-CM

## 2024-04-29 DIAGNOSIS — I10 HYPERTENSION, UNSPECIFIED TYPE: ICD-10-CM

## 2024-04-29 DIAGNOSIS — J44.9 CHRONIC OBSTRUCTIVE PULMONARY DISEASE, UNSPECIFIED COPD TYPE (HCC): ICD-10-CM

## 2024-04-29 PROBLEM — R26.9 ABNORMAL GAIT: Status: ACTIVE | Noted: 2021-03-18

## 2024-04-29 NOTE — PROGRESS NOTES
SNF PROGRESS NOTE      Cc-  weakness       Patient is a Ayan Art 78 y.o. male  who is being seen at [facility] s/p hospital stay after a fall at home. He is having ankle and knee pain. Patient does have lung cancer.     Over the weekend, the patient suffered a fall and hurt his left wrist. Xrays showed fracture. It was advised the patient go to ER for splint. Apparently, EMS called ER and they states that they wouldn't do anything and send him back.  He was placed in a splint and to follow with ortho. In the meantime, he was having more pain, and Family asked for a hospice consult, which he is to have today. He thinks that he is smoking a cigarette.       Past Medical History:   Diagnosis Date    Alcohol abuse     quit drinking 8/18/21    CAD (coronary artery disease)     patient states no doctor has told him this / has 1 cardiac stent    Cancer (HCC)     lung LLL    Chronic back pain     Chronic kidney disease     Chronic obstructive pulmonary disease, unspecified COPD type (HCC) 3/24/2022    Chronic sinusitis     DJD (degenerative joint disease) of knee     left knee DJD    Elevated PSA     History of blood transfusion 1980's    History of inferior wall myocardial infarction 01/2016    1 cardiac stent    HTN (hypertension)     meds . 1 yr    Hyperlipidemia     meds > 12 yrs    NSTEMI (non-ST elevated myocardial infarction) ([facility])     Retention of urine, unspecified 3/27/2017    Smoker      Morphine    VS reviewed    Gen- Alert and oriented x 1-2   Heart- RRR no murmur no LE edema   Lungs- CTA b/l no resp distress 4 L NC  oxygen   Abd- bs x 4     + left arm in sling       Assessment and Plan    Weakness/ Gait disturbance   Left wrist fracture  Placed in sling   Lung cancer with mets to bone/ Chronic resp failure   Methadone  On 4 L NC    No current treatment for cancer.   HTN   Metoprolol, spironolactone   HLD   Statin   CKD   Depression   celexa       Patient and

## 2024-04-29 NOTE — PROGRESS NOTES
Name: Ayan Art   Facility: [facility]  [address] [place]  [address]    Date: 4/5/2024     Subjective:     Chief Complaint   Patient presents with    New Patient     Weakness, abnormal gait with falls, lung CA, COPD       HPI  Ayan Art is a 78 y.o. male being seen today for evaluation of rehab progress, weakness and gait, lung cancer, COPD.  Resident was admitted to [facility] for acute rehab secondary to debility and deconditioning.  He was in the hospital being treated for weakness falls.  He has non-small cell lung cancer, CAD, chronic pain on methadone, Lyrica, and lung nodule which appears to be malignant.  Family states he had a PET scan which revealed cancer in the lymph nodes, muscle, bone.  They do not want anything else done except for rehab and comfort care.  He is participating in physical and outpatient therapy, not making much progress he has had falls is still weak.  Family report unintentional weight loss over the past 6 months and a functional decline.    Past Medical History:   Diagnosis Date    Alcohol abuse     quit drinking 8/18/21    CAD (coronary artery disease)     patient states no doctor has told him this / has 1 cardiac stent    Cancer (HCC)     lung LLL    Chronic back pain     Chronic kidney disease     Chronic obstructive pulmonary disease, unspecified COPD type (HCC) 3/24/2022    Chronic sinusitis     DJD (degenerative joint disease) of knee     left knee DJD    Elevated PSA     History of blood transfusion 1980's    History of inferior wall myocardial infarction 01/2016    1 cardiac stent    HTN (hypertension)     meds . 1 yr    Hyperlipidemia     meds > 12 yrs    NSTEMI (non-ST elevated myocardial infarction) (HCC)     Retention of urine, unspecified 3/27/2017    Smoker          Allergies:  Reviewed in nursing facility records  Medications:  Reviewed and reconciled in nursing facility records    Review of Systems  A 10 Point review

## 2024-04-30 PROBLEM — Z51.5 HOSPICE CARE: Status: ACTIVE | Noted: 2024-04-02

## 2024-04-30 NOTE — PROGRESS NOTES
Name: Ayan Art   Facility: [facility]  [address] [place]  [address]    Date: 4/9/2024     Subjective:     Chief Complaint   Patient presents with    Follow-up       HPI  Ayan Art is a 78 y.o. male being seen today for evaluation of rehab progress, weakness and gait, lung cancer, COPD.  Resident was admitted to [facility] for acute rehab secondary to debility and deconditioning.  He was in the hospital being treated for weakness falls.  He has non-small cell lung cancer, CAD, chronic pain on methadone, Lyrica, and lung nodule which appears to be malignant.  Resident's sister is here visiting, he is c/o flu symptoms, is wheezing, has rhonchi, is on albuterol twice a day.  Resident has moist productive cough, family would like him treated prophylactic.  He is participating in physical and outpatient therapy, not making much progress he has had falls and c/o weakness.        Past Medical History:   Diagnosis Date    Alcohol abuse     quit drinking 8/18/21    CAD (coronary artery disease)     patient states no doctor has told him this / has 1 cardiac stent    Cancer (HCC)     lung LLL    Chronic back pain     Chronic kidney disease     Chronic obstructive pulmonary disease, unspecified COPD type (HCC) 3/24/2022    Chronic sinusitis     DJD (degenerative joint disease) of knee     left knee DJD    Elevated PSA     History of blood transfusion 1980's    History of inferior wall myocardial infarction 01/2016    1 cardiac stent    HTN (hypertension)     meds . 1 yr    Hyperlipidemia     meds > 12 yrs    NSTEMI (non-ST elevated myocardial infarction) ([facility])     Retention of urine, unspecified 3/27/2017    Smoker          Allergies:  Reviewed in nursing facility records  Medications:  Reviewed and reconciled in nursing facility records    Review of Systems  A 10 Point review of systems was performed and is negative unless previously stated      Objective:   See nursing

## 2024-05-01 ENCOUNTER — OFFICE VISIT (OUTPATIENT)
Dept: GERIATRIC MEDICINE | Age: 79
End: 2024-05-01

## 2024-05-01 DIAGNOSIS — R29.6 FREQUENT FALLS: ICD-10-CM

## 2024-05-01 DIAGNOSIS — S62.102S CLOSED FRACTURE OF LEFT WRIST, SEQUELA: Primary | ICD-10-CM

## 2024-05-01 DIAGNOSIS — J44.9 CHRONIC OBSTRUCTIVE PULMONARY DISEASE, UNSPECIFIED COPD TYPE (HCC): ICD-10-CM

## 2024-05-01 DIAGNOSIS — I10 HYPERTENSION, UNSPECIFIED TYPE: ICD-10-CM

## 2024-05-01 DIAGNOSIS — E78.5 HYPERLIPIDEMIA, UNSPECIFIED HYPERLIPIDEMIA TYPE: ICD-10-CM

## 2024-05-01 DIAGNOSIS — C34.90 MALIGNANT NEOPLASM OF LUNG, UNSPECIFIED LATERALITY, UNSPECIFIED PART OF LUNG (HCC): ICD-10-CM

## 2024-05-01 DIAGNOSIS — G89.4 CHRONIC PAIN SYNDROME: ICD-10-CM
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
## 2024-05-01 NOTE — PROGRESS NOTES
Discharge Summary       Discharge Disposition home with hospice     Discharge Condition  stable       Discharge time 32 min       Medications   Current Outpatient Medications   Medication Sig Dispense Refill    methadone (DOLOPHINE) 10 MG tablet Take 1 tablet by mouth in the morning and 1 tablet at noon and 1 tablet in the evening. Do all this for 30 days. Max Daily Amount: 30 mg. 90 tablet 0    atorvastatin (LIPITOR) 20 MG tablet Take 1 tablet by mouth daily      acetaminophen (TYLENOL) 325 MG tablet Take 2 tablets by mouth every 4 hours as needed for Pain      metoprolol tartrate (LOPRESSOR) 50 MG tablet Take 0.5 tablets by mouth 2 times daily TAKE ONE TABLET BY MOUTH TWO TIMES A  tablet 3    fluticasone-umeclidin-vilant (TRELEGY ELLIPTA) 100-62.5-25 MCG/ACT AEPB inhaler Inhale 1 puff into the lungs daily (Patient taking differently: Inhale 1 puff into the lungs daily Indications: Chronic Obstructive Lung Disease) 2 each 0    lubiprostone (AMITIZA) 24 MCG capsule Take 1 capsule by mouth daily Indications: Irritable Bowel Syndrome      pregabalin (LYRICA) 75 MG capsule Take 1 capsule by mouth 2 times daily. Indications: Chronic Pain      albuterol sulfate HFA (PROVENTIL;VENTOLIN;PROAIR) 108 (90 Base) MCG/ACT inhaler Inhale 2 puffs into the lungs 2 times daily (Patient taking differently: Inhale 2 puffs into the lungs 2 times daily Indications: Chronic Obstructive Lung Disease) 36 g 3    spironolactone (ALDACTONE) 25 MG tablet Take 2 tablets by mouth daily (Patient taking differently: Take 2 tablets by mouth daily Indications: Chronic Kidney Disease, High Blood Pressure Disorder) 60 tablet 3    clopidogrel (PLAVIX) 75 MG tablet Take 1 tablet by mouth daily (Patient taking differently: Take 1 tablet by mouth daily Indications: Treatment to Prevent a Heart Attack) 90 tablet 3    citalopram (CELEXA) 40 MG tablet TAKE 1 TABLET BY MOUTH NIGHTLY

## 2024-09-30 NOTE — DISCHARGE INSTRUCTIONS
[facility]   Physician Orders and Discharge Instructions  [facility], [address] [place] [address] Avenue  Telephone: [phone]        NAME:  Joss Dickson OF BIRTH:  1945  MEDICAL RECORD NUMBER:  [id number]     Your  is:  Socorro Huang Care/Facility:  [facility]     Wound Location:   LEFT ANTERIOR ANKLE      Dressing orders: 1. Cleanse wound(s) with normal saline. 2.. Apply Cyn to the wound bed  3. Then apply dry dressing, wrap with graciela   4. Change every other day or Tuesday, Thursday and Saturday      HOME CARE TO [facility] BILATERAL FEET WITH SOAP AND WATER THEN APPLY AQUAPHOR WITH EACH DRESSING CHANGE,     Compression:  NONE     Offloading Device: None     Other Instructions:  Keep all dressings clean, dry and intact. Keep pressure off the wound(s) at all times. Follow up visit       2 WEEKs   October 19, 2023  @      Please give 24 hour notice if unable to keep appointment. [phone]     If you experience any of the following, please call the Wound Care Service at  [phone] or go to the nearest emergency room. *Increase in pain         *Temperature over 101           *Increase in drainage from your wound or a foul odor  *Uncontrolled swelling            *Need for compression bandage changes due to slippage, breakthrough drainage       PLEASE NOTE: IF YOU ARE UNABLE TO OBTAIN WOUND SUPPLIES, CONTINUE TO USE THE SUPPLIES YOU HAVE AVAILABLE UNTIL YOU ARE ABLE TO REACH US.  IT IS MOST IMPORTANT TO KEEP THE WOUND COVERED AT ALL TIMES
<--- Click to Launch ICDx for PreOp, PostOp and Procedure

## 2025-06-25 NOTE — DISCHARGE INSTRUCTIONS
[facility]   Physician Orders and Discharge Instructions  [address]  Telephone: [phone]        NAME:  Selena Borges OF BIRTH:  1945  MEDICAL RECORD NUMBER:  [id number]     Your  is:  Nicholas Courtney Care/Facility:  [facility]     Wound Location:   LEFT ANTERIOR ANKLE AND LEFT MEDIAL ANKLE  Dressing orders:  Cleanse wound(s) with normal saline. 2.  Apply THERAHONEY HD SHEET - EXTRA GIVEN TO PATIENT. 4. COVER WITH OPTILOCK AND SECURE. 4. Change dressing DAILY. Compression:  NONE     Offloading Device:     Other Instructions:   HOME CARE TO [address] BILATERAL FEET WITH SOAP AND WATER THEN APPLY AQUAPHOR WITH EACH DRESSING CHANGE,   Keep all dressings clean, dry and intact. Keep pressure off the wound(s) at all times. Follow up visit       2 WEEKS  January 16, 2022 AT         Please give 24 hour notice if unable to keep appointment. [phone]     If you experience any of the following, please call the Wound Care Service at  [phone] or go to the nearest emergency room. *Increase in pain         *Temperature over 101           *Increase in drainage from your wound or a foul odor  *Uncontrolled swelling            *Need for compression bandage changes due to slippage, breakthrough drainage       PLEASE NOTE: IF YOU ARE UNABLE TO OBTAIN WOUND SUPPLIES, CONTINUE TO USE THE SUPPLIES YOU HAVE AVAILABLE UNTIL YOU ARE ABLE TO REACH US.  IT IS MOST IMPORTANT TO KEEP THE WOUND COVERED AT ALL TIMES  Electronically signed by Deny Gilmore DPM on 12/27/2022 at 4:31 PM
Hide Additional Notes?: No
Detail Level: Zone

## (undated) DEVICE — GLOVE ORANGE PI 8   MSG9080

## (undated) DEVICE — SYRINGE MED 10ML LUERLOCK TIP W/O SFTY DISP

## (undated) DEVICE — STERILE PATIENT PROTECTIVE PAD FOR IMP® KNEE POSITIONERS & COHESIVE WRAP (10 / CASE): Brand: DE MAYO KNEE POSITIONER®

## (undated) DEVICE — CATHETER IV 16GA 205ML/MIN L1.77IN OD1.74MM ID1.359MM GRY

## (undated) DEVICE — SYRINGE MED 30ML STD CLR PLAS LUERLOCK TIP N CTRL DISP

## (undated) DEVICE — ELECTRODE PT RET AD L9FT HI MOIST COND ADH HYDRGEL CORDED

## (undated) DEVICE — ULTRAMIX BOWL (NEW) KNEE

## (undated) DEVICE — STERILE LATEX POWDER FREE SURGICAL GLOVES WITH HYDROGEL COATING: Brand: PROTEXIS

## (undated) DEVICE — SET KNF FOR MINI CRPL TUNN REL SYS SFGRD 5PK

## (undated) DEVICE — LABEL MED MINI W/ MARKER

## (undated) DEVICE — CATHETER EPI 20GA L40IN POLYAMIDE CLS TIP W/ CONN THREADING

## (undated) DEVICE — DRAPE C ARM W41XL74IN UNIV MOB W RUBBERBAND CLP

## (undated) DEVICE — Device: Brand: STABLECUT®

## (undated) DEVICE — T4 HOOD

## (undated) DEVICE — SUTURE VCRL + SZ 4-0 L18IN ABSRB UD L19MM PS-2 3/8 CIR PRIM VCP496H

## (undated) DEVICE — 2000CC GUARDIAN II: Brand: GUARDIAN

## (undated) DEVICE — 3M™ STERI-DRAPE™ U-DRAPE 1015: Brand: STERI-DRAPE™

## (undated) DEVICE — SPLINT KNEE UNIV FOR LESS THAN 36IN L20IN FOAM LAM E CNTCT

## (undated) DEVICE — 35 ML SYRINGE LUER-LOCK TIP: Brand: MONOJECT

## (undated) DEVICE — GLOVE ORANGE PI 7 1/2   MSG9075

## (undated) DEVICE — 3 ML SYRINGE LUER-LOCK TIP: Brand: MONOJECT

## (undated) DEVICE — SUTURE VCRL + SZ 3-0 L18IN ABSRB UD PS-2 3/8 CIR REV CUT VCP497H

## (undated) DEVICE — LABEL MED MED CHPOR

## (undated) DEVICE — INTENDED FOR TISSUE SEPARATION, AND OTHER PROCEDURES THAT REQUIRE A SHARP SURGICAL BLADE TO PUNCTURE OR CUT.: Brand: BARD-PARKER ® CARBON RIB-BACK BLADES

## (undated) DEVICE — INSERT CUSHION HEAD PRONEVIEW

## (undated) DEVICE — SKIN MARKER,REGULAR TIP WITH RULER: Brand: DEVON

## (undated) DEVICE — CHLORAPREP 26ML ORANGE

## (undated) DEVICE — HAND II: Brand: MEDLINE INDUSTRIES, INC.

## (undated) DEVICE — SPONGE,LAP,18"X18",DLX,XR,ST,5/PK,40/PK: Brand: MEDLINE

## (undated) DEVICE — GAUZE,SPONGE,4"X4",12PLY,STERILE,LF,2'S: Brand: MEDLINE

## (undated) DEVICE — Z DISCONTINUED APPLICATOR SURG PREP 0.35OZ 2% CHG 70% ISO ALC W/ HI LT

## (undated) DEVICE — DRAIN JACKSON PRATT 10FR 7MM: Brand: CARDINAL HEALTH

## (undated) DEVICE — SUTURE VCRL SZ 2-0 L36IN ABSRB UD L36MM CT-1 1/2 CIR J945H

## (undated) DEVICE — 3.0MM PRECISION NEURO (MATCH HEAD)

## (undated) DEVICE — BANDAGE COMPR SGL LAYERED CLP CLSR E 33FT LEN 4IN W TETRA

## (undated) DEVICE — DRAPE MICSCP W46XL120IN FOR ZEISS MD FEATURING CLEARLENS

## (undated) DEVICE — 3M™ IOBAN™ 2 ANTIMICROBIAL INCISE DRAPE 6650EZ: Brand: IOBAN™ 2

## (undated) DEVICE — MAT FLR SURG QUICKWICK 28X54 IN DISP

## (undated) DEVICE — GAUZE,SPONGE,2"X2",8PLY,STERILE,LF,2'S: Brand: MEDLINE

## (undated) DEVICE — PAD,ABDOMINAL,8"X10",ST,LF: Brand: MEDLINE

## (undated) DEVICE — GOWN,AURORA,NONREINFORCED,LARGE: Brand: MEDLINE

## (undated) DEVICE — SHEET,DRAPE,53X77,STERILE: Brand: MEDLINE

## (undated) DEVICE — BLADE RMR L35MM PAT PILOT H

## (undated) DEVICE — GAUZE SPONGES,12 PLY: Brand: CURITY

## (undated) DEVICE — EVACUATOR SURG 400CC PVC 3 SPR BLB FOR WND DRNGE

## (undated) DEVICE — Device

## (undated) DEVICE — 400ML COMPACT EVACUATOR KIT, 1/8" PVC WITH TROCAR: Brand: HEMOVAC® WOUND DRAINAGE SYSTEM

## (undated) DEVICE — PROBE PEDCL L165MM CANN W/ MOD JAMSH NDL NO2 MOD MOD

## (undated) DEVICE — STAPLER EXT SKIN 35 WIDE S STL STPL SQUEEZE HNDL VISISTAT

## (undated) DEVICE — KIT COLLCTN L2.5IN OD1.8IN BONE DUST S STL DISP SHEEHY MESH

## (undated) DEVICE — 3M™ STERI-DRAPE™ INSTRUMENT POUCH 1018: Brand: STERI-DRAPE™

## (undated) DEVICE — SYRINGE MED 3ML CLR PLAS STD N CTRL LUERLOCK TIP DISP

## (undated) DEVICE — TRAY CATH CATH OD16FR BG F HYDROCOATED

## (undated) DEVICE — PAD,NON-ADHERENT,3X8,STERILE,LF,1/PK: Brand: MEDLINE

## (undated) DEVICE — HIGH FLOW TIP

## (undated) DEVICE — NEEDLE HYPO 22GA L1 1/2IN PIVOTING SHLD FOR LUERLOCK SYR

## (undated) DEVICE — BLADE SAW W125XL70MM THK064MM CUT THK094MM REPL RECIP DBL

## (undated) DEVICE — UNDERCAST PADDING: Brand: DEROYAL

## (undated) DEVICE — PACK PROCEDURE SURG TOTAL KNEE PACK

## (undated) DEVICE — X-RAY DETECTABLE SPONGES,16 PLY: Brand: VISTEC

## (undated) DEVICE — SUTURE VCRL SZ 1 L36IN ABSRB UD L36MM CT-1 1/2 CIR J947H

## (undated) DEVICE — SIPS DUAL 2 MINUTE TIP

## (undated) DEVICE — STERILE LATEX POWDER-FREE SURGICAL GLOVESWITH NITRILE COATING: Brand: PROTEXIS

## (undated) DEVICE — CORD,CAUTERY,BIPOLAR,STERILE: Brand: MEDLINE

## (undated) DEVICE — CODMAN® SURGICAL PATTIES 1/2" X 1/2" (1.27CM X 1.27CM): Brand: CODMAN®

## (undated) DEVICE — WAX SURG 2.5GM HEMSTAT BNE BEESWAX PARAFFIN ISO PALMITATE

## (undated) DEVICE — SUTURE VIC + BR UD CP-2 0-0 27IN VCP870H

## (undated) DEVICE — PACK,LAPAROTOMY,NO GOWNS: Brand: MEDLINE

## (undated) DEVICE — PACK,SET UP,DRAPE: Brand: MEDLINE

## (undated) DEVICE — Z DISCONTINUED PER MEDLINE USE 2741944 DRESSING AQUACEL 12 IN SURG W9XL30CM SIL CVR WTRPRF VIR BACT BARR ANTIMIC

## (undated) DEVICE — SLEEVE CMPR SM STD CALF SCD ANEMB LF

## (undated) DEVICE — ALCON SURGICAL BLADE 64: Brand: ALCON

## (undated) DEVICE — BANDAGE,GAUZE,CONFORMING,3"X75",STRL,LF: Brand: MEDLINE

## (undated) DEVICE — CODMAN® SURGICAL PATTIES 1/2" X 3" (1.27CM X 7.62CM): Brand: CODMAN®

## (undated) DEVICE — SUTURE VCRL + SZ 4-0 L18IN ABSRB UD P-3 3/8 CIR REV CUT NDL VCP494H

## (undated) DEVICE — CATHETER IV 16 GAX2 IN STR INTROCAN SAFETY

## (undated) DEVICE — SYRINGE BLB 50CC IRRIG PLIABLE FNGR FLNG GRAD FLSK DISP

## (undated) DEVICE — MEDI-VAC NON-CONDUCTIVE SUCTION TUBING: Brand: CARDINAL HEALTH

## (undated) DEVICE — PENCIL ES L3M BTTN SWCH HOLSTER W/ BLDE ELECTRD EDGE

## (undated) DEVICE — CORD BPLR 2 PIN FLAT AND RND DISP

## (undated) DEVICE — NEEDLE SPINAL 22GA L3.5IN SPINOCAN

## (undated) DEVICE — 1842 FOAM BLOCK NEEDLE COUNTER: Brand: DEVON

## (undated) DEVICE — SUTURE VCRL + SZ 2-0 L27IN ABSRB UD CP-1 1/2 CIR REV CUT VCP266H

## (undated) DEVICE — Z CONVERTED USE 2271043 CONTAINER SPEC COLL 4OZ SCR ON LID PEEL PCH

## (undated) DEVICE — KAIRISON TUBING SET PNEUMATIC, (3000 MM), STERILE, DISPOSABLE, TO BE USED WITH: FK898R, PACKAGE OF 10 PIECES: Brand: KAIRISON